# Patient Record
Sex: MALE | Race: WHITE | NOT HISPANIC OR LATINO
[De-identification: names, ages, dates, MRNs, and addresses within clinical notes are randomized per-mention and may not be internally consistent; named-entity substitution may affect disease eponyms.]

---

## 2017-01-30 ENCOUNTER — RX RENEWAL (OUTPATIENT)
Age: 72
End: 2017-01-30

## 2017-03-16 ENCOUNTER — OUTPATIENT (OUTPATIENT)
Dept: OUTPATIENT SERVICES | Facility: HOSPITAL | Age: 72
LOS: 1 days | End: 2017-03-16
Payer: MEDICARE

## 2017-03-16 ENCOUNTER — TRANSCRIPTION ENCOUNTER (OUTPATIENT)
Age: 72
End: 2017-03-16

## 2017-03-16 DIAGNOSIS — M54.16 RADICULOPATHY, LUMBAR REGION: ICD-10-CM

## 2017-03-16 PROCEDURE — 76000 FLUOROSCOPY <1 HR PHYS/QHP: CPT

## 2017-03-16 PROCEDURE — 64483 NJX AA&/STRD TFRM EPI L/S 1: CPT | Mod: RT

## 2017-03-16 PROCEDURE — 64484 NJX AA&/STRD TFRM EPI L/S EA: CPT | Mod: RT

## 2017-04-18 ENCOUNTER — APPOINTMENT (OUTPATIENT)
Dept: PULMONOLOGY | Facility: CLINIC | Age: 72
End: 2017-04-18

## 2017-04-18 VITALS — SYSTOLIC BLOOD PRESSURE: 120 MMHG | OXYGEN SATURATION: 95 % | HEART RATE: 79 BPM | DIASTOLIC BLOOD PRESSURE: 70 MMHG

## 2017-04-18 VITALS — BODY MASS INDEX: 24.07 KG/M2 | WEIGHT: 163 LBS

## 2017-05-12 ENCOUNTER — OUTPATIENT (OUTPATIENT)
Dept: OUTPATIENT SERVICES | Facility: HOSPITAL | Age: 72
LOS: 1 days | End: 2017-05-12
Payer: MEDICARE

## 2017-05-12 ENCOUNTER — TRANSCRIPTION ENCOUNTER (OUTPATIENT)
Age: 72
End: 2017-05-12

## 2017-05-12 DIAGNOSIS — M54.16 RADICULOPATHY, LUMBAR REGION: ICD-10-CM

## 2017-05-30 ENCOUNTER — TRANSCRIPTION ENCOUNTER (OUTPATIENT)
Age: 72
End: 2017-05-30

## 2017-05-30 ENCOUNTER — OUTPATIENT (OUTPATIENT)
Dept: OUTPATIENT SERVICES | Facility: HOSPITAL | Age: 72
LOS: 1 days | End: 2017-05-30
Payer: MEDICARE

## 2017-05-30 DIAGNOSIS — M54.16 RADICULOPATHY, LUMBAR REGION: ICD-10-CM

## 2017-05-30 PROCEDURE — 64494 INJ PARAVERT F JNT L/S 2 LEV: CPT | Mod: LT

## 2017-05-30 PROCEDURE — 76000 FLUOROSCOPY <1 HR PHYS/QHP: CPT

## 2017-05-30 PROCEDURE — 64493 INJ PARAVERT F JNT L/S 1 LEV: CPT | Mod: LT

## 2017-06-16 PROCEDURE — 76000 FLUOROSCOPY <1 HR PHYS/QHP: CPT

## 2017-06-16 PROCEDURE — 64483 NJX AA&/STRD TFRM EPI L/S 1: CPT | Mod: RT

## 2017-06-16 PROCEDURE — 64484 NJX AA&/STRD TFRM EPI L/S EA: CPT | Mod: RT

## 2017-08-19 ENCOUNTER — OUTPATIENT (OUTPATIENT)
Dept: OUTPATIENT SERVICES | Facility: HOSPITAL | Age: 72
LOS: 1 days | End: 2017-08-19

## 2017-10-27 ENCOUNTER — APPOINTMENT (OUTPATIENT)
Dept: PULMONOLOGY | Facility: CLINIC | Age: 72
End: 2017-10-27
Payer: MEDICARE

## 2017-10-27 VITALS — BODY MASS INDEX: 22.3 KG/M2 | WEIGHT: 151 LBS

## 2017-10-27 VITALS — HEART RATE: 93 BPM | SYSTOLIC BLOOD PRESSURE: 130 MMHG | OXYGEN SATURATION: 98 % | DIASTOLIC BLOOD PRESSURE: 80 MMHG

## 2017-10-27 PROCEDURE — 94010 BREATHING CAPACITY TEST: CPT

## 2017-10-27 PROCEDURE — 99215 OFFICE O/P EST HI 40 MIN: CPT | Mod: 25

## 2017-11-27 ENCOUNTER — APPOINTMENT (OUTPATIENT)
Dept: PULMONOLOGY | Facility: CLINIC | Age: 72
End: 2017-11-27
Payer: MEDICARE

## 2017-11-27 VITALS
RESPIRATION RATE: 14 BRPM | OXYGEN SATURATION: 94 % | WEIGHT: 151 LBS | HEIGHT: 69 IN | BODY MASS INDEX: 22.36 KG/M2 | TEMPERATURE: 98.4 F | HEART RATE: 99 BPM | DIASTOLIC BLOOD PRESSURE: 80 MMHG | SYSTOLIC BLOOD PRESSURE: 120 MMHG

## 2017-11-27 PROCEDURE — 99215 OFFICE O/P EST HI 40 MIN: CPT

## 2017-11-27 RX ORDER — DIAZEPAM 5 MG/1
5 TABLET ORAL
Qty: 3 | Refills: 0 | Status: DISCONTINUED | COMMUNITY
Start: 2017-06-08

## 2017-12-05 ENCOUNTER — APPOINTMENT (OUTPATIENT)
Dept: VASCULAR SURGERY | Facility: CLINIC | Age: 72
End: 2017-12-05
Payer: MEDICARE

## 2017-12-05 VITALS
DIASTOLIC BLOOD PRESSURE: 84 MMHG | HEIGHT: 69 IN | SYSTOLIC BLOOD PRESSURE: 137 MMHG | TEMPERATURE: 98.1 F | OXYGEN SATURATION: 95 % | HEART RATE: 92 BPM | RESPIRATION RATE: 14 BRPM

## 2017-12-05 DIAGNOSIS — R42 DIZZINESS AND GIDDINESS: ICD-10-CM

## 2017-12-05 PROCEDURE — 99214 OFFICE O/P EST MOD 30 MIN: CPT | Mod: 25

## 2017-12-05 PROCEDURE — 93880 EXTRACRANIAL BILAT STUDY: CPT

## 2018-01-10 ENCOUNTER — RX RENEWAL (OUTPATIENT)
Age: 73
End: 2018-01-10

## 2018-03-06 ENCOUNTER — APPOINTMENT (OUTPATIENT)
Dept: VASCULAR SURGERY | Facility: CLINIC | Age: 73
End: 2018-03-06
Payer: MEDICARE

## 2018-03-06 VITALS
OXYGEN SATURATION: 95 % | DIASTOLIC BLOOD PRESSURE: 78 MMHG | WEIGHT: 164 LBS | BODY MASS INDEX: 24.29 KG/M2 | SYSTOLIC BLOOD PRESSURE: 133 MMHG | RESPIRATION RATE: 14 BRPM | HEART RATE: 100 BPM | TEMPERATURE: 97.8 F | HEIGHT: 69 IN

## 2018-03-06 DIAGNOSIS — M79.605 PAIN IN LEFT LEG: ICD-10-CM

## 2018-03-06 PROCEDURE — 99214 OFFICE O/P EST MOD 30 MIN: CPT

## 2018-04-26 ENCOUNTER — APPOINTMENT (OUTPATIENT)
Dept: PULMONOLOGY | Facility: CLINIC | Age: 73
End: 2018-04-26
Payer: MEDICARE

## 2018-04-26 VITALS — OXYGEN SATURATION: 94 % | HEART RATE: 75 BPM

## 2018-04-26 VITALS — SYSTOLIC BLOOD PRESSURE: 120 MMHG | WEIGHT: 156 LBS | DIASTOLIC BLOOD PRESSURE: 80 MMHG | BODY MASS INDEX: 23.04 KG/M2

## 2018-04-26 VITALS — BODY MASS INDEX: 22.89 KG/M2 | WEIGHT: 155 LBS

## 2018-04-26 PROCEDURE — 99215 OFFICE O/P EST HI 40 MIN: CPT | Mod: 25

## 2018-04-26 PROCEDURE — 94060 EVALUATION OF WHEEZING: CPT

## 2018-04-26 PROCEDURE — 94664 DEMO&/EVAL PT USE INHALER: CPT | Mod: 59

## 2018-04-26 RX ORDER — PREDNISONE 10 MG/1
10 TABLET ORAL
Qty: 30 | Refills: 6 | Status: DISCONTINUED | COMMUNITY
Start: 2017-11-27 | End: 2018-04-26

## 2018-04-27 ENCOUNTER — RX RENEWAL (OUTPATIENT)
Age: 73
End: 2018-04-27

## 2018-05-07 ENCOUNTER — RECORD ABSTRACTING (OUTPATIENT)
Age: 73
End: 2018-05-07

## 2018-05-07 DIAGNOSIS — Z95.5 PRESENCE OF CORONARY ANGIOPLASTY IMPLANT AND GRAFT: ICD-10-CM

## 2018-05-11 ENCOUNTER — APPOINTMENT (OUTPATIENT)
Dept: CARDIOLOGY | Facility: CLINIC | Age: 73
End: 2018-05-11
Payer: MEDICARE

## 2018-05-11 VITALS
DIASTOLIC BLOOD PRESSURE: 70 MMHG | BODY MASS INDEX: 22.81 KG/M2 | SYSTOLIC BLOOD PRESSURE: 110 MMHG | RESPIRATION RATE: 14 BRPM | HEART RATE: 74 BPM | WEIGHT: 154 LBS | HEIGHT: 69 IN

## 2018-05-11 PROCEDURE — 93000 ELECTROCARDIOGRAM COMPLETE: CPT

## 2018-05-11 PROCEDURE — 99214 OFFICE O/P EST MOD 30 MIN: CPT

## 2018-05-11 RX ORDER — METOPROLOL TARTRATE 25 MG/1
25 TABLET, FILM COATED ORAL
Qty: 30 | Refills: 0 | Status: DISCONTINUED | COMMUNITY
Start: 2017-08-25 | End: 2018-05-11

## 2018-05-11 RX ORDER — CEFPODOXIME PROXETIL 200 MG/1
200 TABLET, FILM COATED ORAL
Qty: 20 | Refills: 0 | Status: DISCONTINUED | COMMUNITY
Start: 2017-11-27 | End: 2018-05-11

## 2018-05-11 RX ORDER — CIPROFLOXACIN HYDROCHLORIDE 500 MG/1
500 TABLET, FILM COATED ORAL
Qty: 14 | Refills: 0 | Status: DISCONTINUED | COMMUNITY
Start: 2017-08-19 | End: 2018-05-11

## 2018-05-11 RX ORDER — TIZANIDINE 2 MG/1
2 TABLET ORAL
Qty: 60 | Refills: 0 | Status: DISCONTINUED | COMMUNITY
Start: 2017-06-29 | End: 2018-05-11

## 2018-05-11 RX ORDER — HYDROCODONE POLISTIREX AND CHLORPHENIRAMINE POLISTIREX 10; 8 MG/5ML; MG/5ML
10-8 SUSPENSION, EXTENDED RELEASE ORAL
Qty: 115 | Refills: 0 | Status: COMPLETED | COMMUNITY
Start: 2018-01-19

## 2018-05-11 RX ORDER — TIOTROPIUM BROMIDE AND OLODATEROL 3.124; 2.736 UG/1; UG/1
2.5-2.5 SPRAY, METERED RESPIRATORY (INHALATION) DAILY
Qty: 1 | Refills: 5 | Status: DISCONTINUED | COMMUNITY
Start: 2018-04-26 | End: 2018-05-11

## 2018-05-11 RX ORDER — MECLIZINE HYDROCHLORIDE 25 MG/1
25 TABLET ORAL
Qty: 21 | Refills: 0 | Status: DISCONTINUED | COMMUNITY
Start: 2017-12-05 | End: 2018-05-11

## 2018-05-11 RX ORDER — METRONIDAZOLE 500 MG/1
500 TABLET ORAL
Qty: 21 | Refills: 0 | Status: DISCONTINUED | COMMUNITY
Start: 2017-08-19 | End: 2018-05-11

## 2018-05-11 RX ORDER — CYCLOBENZAPRINE HYDROCHLORIDE 10 MG/1
10 TABLET, FILM COATED ORAL
Qty: 30 | Refills: 0 | Status: DISCONTINUED | COMMUNITY
Start: 2017-05-25 | End: 2018-05-11

## 2018-05-11 RX ORDER — PREDNISONE 10 MG/1
10 TABLET ORAL
Qty: 100 | Refills: 0 | Status: DISCONTINUED | COMMUNITY
Start: 2018-04-26 | End: 2018-05-11

## 2018-06-07 ENCOUNTER — RX RENEWAL (OUTPATIENT)
Age: 73
End: 2018-06-07

## 2018-06-07 ENCOUNTER — MEDICATION RENEWAL (OUTPATIENT)
Age: 73
End: 2018-06-07

## 2018-06-12 ENCOUNTER — APPOINTMENT (OUTPATIENT)
Dept: PULMONOLOGY | Facility: CLINIC | Age: 73
End: 2018-06-12
Payer: MEDICARE

## 2018-06-12 VITALS — OXYGEN SATURATION: 94 % | HEART RATE: 78 BPM

## 2018-06-12 VITALS — DIASTOLIC BLOOD PRESSURE: 60 MMHG | SYSTOLIC BLOOD PRESSURE: 120 MMHG

## 2018-06-12 VITALS — BODY MASS INDEX: 23.18 KG/M2 | WEIGHT: 157 LBS

## 2018-06-12 PROCEDURE — 99214 OFFICE O/P EST MOD 30 MIN: CPT | Mod: 25

## 2018-06-12 PROCEDURE — 94010 BREATHING CAPACITY TEST: CPT

## 2018-08-02 ENCOUNTER — APPOINTMENT (OUTPATIENT)
Dept: PULMONOLOGY | Facility: CLINIC | Age: 73
End: 2018-08-02
Payer: MEDICARE

## 2018-08-02 VITALS
HEIGHT: 69 IN | BODY MASS INDEX: 23.25 KG/M2 | WEIGHT: 157 LBS | HEART RATE: 92 BPM | OXYGEN SATURATION: 98 % | RESPIRATION RATE: 14 BRPM | SYSTOLIC BLOOD PRESSURE: 110 MMHG | DIASTOLIC BLOOD PRESSURE: 60 MMHG

## 2018-08-02 PROCEDURE — 99214 OFFICE O/P EST MOD 30 MIN: CPT

## 2018-09-04 ENCOUNTER — APPOINTMENT (OUTPATIENT)
Dept: VASCULAR SURGERY | Facility: CLINIC | Age: 73
End: 2018-09-04

## 2018-09-12 LAB
25(OH)D3 SERPL-MCNC: 40.2 NG/ML
ALBUMIN SERPL ELPH-MCNC: 4.7 G/DL
ALP BLD-CCNC: 96 U/L
ALT SERPL-CCNC: 28 U/L
ANION GAP SERPL CALC-SCNC: 18 MMOL/L
AST SERPL-CCNC: 25 U/L
BASOPHILS # BLD AUTO: 0.07 K/UL
BASOPHILS NFR BLD AUTO: 1.4 %
BILIRUB SERPL-MCNC: 0.7 MG/DL
BUN SERPL-MCNC: 9 MG/DL
CALCIUM SERPL-MCNC: 10.2 MG/DL
CHLORIDE SERPL-SCNC: 100 MMOL/L
CHOLEST SERPL-MCNC: 161 MG/DL
CHOLEST/HDLC SERPL: 2.2 RATIO
CO2 SERPL-SCNC: 24 MMOL/L
CREAT SERPL-MCNC: 0.69 MG/DL
EOSINOPHIL # BLD AUTO: 0.42 K/UL
EOSINOPHIL NFR BLD AUTO: 8.7 %
GLUCOSE SERPL-MCNC: 119 MG/DL
HBA1C MFR BLD HPLC: 5.9 %
HCT VFR BLD CALC: 38.6 %
HDLC SERPL-MCNC: 72 MG/DL
HGB BLD-MCNC: 12.9 G/DL
IMM GRANULOCYTES NFR BLD AUTO: 0.2 %
LDLC SERPL CALC-MCNC: 74 MG/DL
LYMPHOCYTES # BLD AUTO: 1.51 K/UL
LYMPHOCYTES NFR BLD AUTO: 31.1 %
MAN DIFF?: NORMAL
MCHC RBC-ENTMCNC: 30 PG
MCHC RBC-ENTMCNC: 33.4 GM/DL
MCV RBC AUTO: 89.8 FL
MONOCYTES # BLD AUTO: 0.44 K/UL
MONOCYTES NFR BLD AUTO: 9.1 %
NEUTROPHILS # BLD AUTO: 2.4 K/UL
NEUTROPHILS NFR BLD AUTO: 49.5 %
PLATELET # BLD AUTO: 295 K/UL
POTASSIUM SERPL-SCNC: 5.5 MMOL/L
PROT SERPL-MCNC: 7.2 G/DL
PSA SERPL-MCNC: 1.54 NG/ML
RBC # BLD: 4.3 M/UL
RBC # FLD: 15.1 %
SODIUM SERPL-SCNC: 142 MMOL/L
TRIGL SERPL-MCNC: 76 MG/DL
TSH SERPL-ACNC: 1 UIU/ML
WBC # FLD AUTO: 4.85 K/UL

## 2018-09-28 ENCOUNTER — APPOINTMENT (OUTPATIENT)
Dept: CARDIOLOGY | Facility: CLINIC | Age: 73
End: 2018-09-28
Payer: MEDICARE

## 2018-09-28 PROCEDURE — 93306 TTE W/DOPPLER COMPLETE: CPT

## 2018-10-23 ENCOUNTER — APPOINTMENT (OUTPATIENT)
Dept: CARDIOLOGY | Facility: CLINIC | Age: 73
End: 2018-10-23
Payer: MEDICARE

## 2018-10-23 VITALS
SYSTOLIC BLOOD PRESSURE: 108 MMHG | HEART RATE: 76 BPM | WEIGHT: 148 LBS | HEIGHT: 69 IN | RESPIRATION RATE: 14 BRPM | BODY MASS INDEX: 21.92 KG/M2 | DIASTOLIC BLOOD PRESSURE: 70 MMHG

## 2018-10-23 PROCEDURE — 99214 OFFICE O/P EST MOD 30 MIN: CPT

## 2018-10-23 PROCEDURE — 93000 ELECTROCARDIOGRAM COMPLETE: CPT

## 2018-10-23 RX ORDER — PANTOPRAZOLE 40 MG/1
40 TABLET, DELAYED RELEASE ORAL DAILY
Refills: 0 | Status: DISCONTINUED | COMMUNITY
Start: 2017-09-21 | End: 2018-10-23

## 2018-10-26 ENCOUNTER — OUTPATIENT (OUTPATIENT)
Dept: OUTPATIENT SERVICES | Facility: HOSPITAL | Age: 73
LOS: 1 days | End: 2018-10-26
Payer: MEDICARE

## 2018-10-26 DIAGNOSIS — Z01.818 ENCOUNTER FOR OTHER PREPROCEDURAL EXAMINATION: ICD-10-CM

## 2018-10-26 LAB
ANION GAP SERPL CALC-SCNC: 10 MMOL/L — SIGNIFICANT CHANGE UP (ref 5–17)
APTT BLD: 29.1 SEC — SIGNIFICANT CHANGE UP (ref 27.5–37.4)
BASOPHILS # BLD AUTO: 0 K/UL — SIGNIFICANT CHANGE UP (ref 0–0.2)
BASOPHILS NFR BLD AUTO: 0.7 % — SIGNIFICANT CHANGE UP (ref 0–2)
BUN SERPL-MCNC: 14 MG/DL — SIGNIFICANT CHANGE UP (ref 8–20)
CALCIUM SERPL-MCNC: 9.7 MG/DL — SIGNIFICANT CHANGE UP (ref 8.6–10.2)
CHLORIDE SERPL-SCNC: 102 MMOL/L — SIGNIFICANT CHANGE UP (ref 98–107)
CO2 SERPL-SCNC: 28 MMOL/L — SIGNIFICANT CHANGE UP (ref 22–29)
CREAT SERPL-MCNC: 0.57 MG/DL — SIGNIFICANT CHANGE UP (ref 0.5–1.3)
EOSINOPHIL # BLD AUTO: 0.4 K/UL — SIGNIFICANT CHANGE UP (ref 0–0.5)
EOSINOPHIL NFR BLD AUTO: 6.8 % — HIGH (ref 0–5)
GLUCOSE SERPL-MCNC: 133 MG/DL — HIGH (ref 70–115)
HCT VFR BLD CALC: 36.5 % — LOW (ref 42–52)
HGB BLD-MCNC: 11.9 G/DL — LOW (ref 14–18)
INR BLD: 0.95 RATIO — SIGNIFICANT CHANGE UP (ref 0.88–1.16)
LYMPHOCYTES # BLD AUTO: 1.1 K/UL — SIGNIFICANT CHANGE UP (ref 1–4.8)
LYMPHOCYTES # BLD AUTO: 20.3 % — SIGNIFICANT CHANGE UP (ref 20–55)
MCHC RBC-ENTMCNC: 29.9 PG — SIGNIFICANT CHANGE UP (ref 27–31)
MCHC RBC-ENTMCNC: 32.6 G/DL — SIGNIFICANT CHANGE UP (ref 32–36)
MCV RBC AUTO: 91.7 FL — SIGNIFICANT CHANGE UP (ref 80–94)
MONOCYTES # BLD AUTO: 0.5 K/UL — SIGNIFICANT CHANGE UP (ref 0–0.8)
MONOCYTES NFR BLD AUTO: 9.1 % — SIGNIFICANT CHANGE UP (ref 3–10)
NEUTROPHILS # BLD AUTO: 3.5 K/UL — SIGNIFICANT CHANGE UP (ref 1.8–8)
NEUTROPHILS NFR BLD AUTO: 62.9 % — SIGNIFICANT CHANGE UP (ref 37–73)
PLATELET # BLD AUTO: 258 K/UL — SIGNIFICANT CHANGE UP (ref 150–400)
POTASSIUM SERPL-MCNC: 5.1 MMOL/L — SIGNIFICANT CHANGE UP (ref 3.5–5.3)
POTASSIUM SERPL-SCNC: 5.1 MMOL/L — SIGNIFICANT CHANGE UP (ref 3.5–5.3)
PROTHROM AB SERPL-ACNC: 10.4 SEC — SIGNIFICANT CHANGE UP (ref 9.8–12.7)
RBC # BLD: 3.98 M/UL — LOW (ref 4.6–6.2)
RBC # FLD: 14.5 % — SIGNIFICANT CHANGE UP (ref 11–15.6)
SODIUM SERPL-SCNC: 140 MMOL/L — SIGNIFICANT CHANGE UP (ref 135–145)
WBC # BLD: 5.6 K/UL — SIGNIFICANT CHANGE UP (ref 4.8–10.8)
WBC # FLD AUTO: 5.6 K/UL — SIGNIFICANT CHANGE UP (ref 4.8–10.8)

## 2018-10-26 PROCEDURE — 85730 THROMBOPLASTIN TIME PARTIAL: CPT

## 2018-10-26 PROCEDURE — G0463: CPT

## 2018-10-26 PROCEDURE — 71046 X-RAY EXAM CHEST 2 VIEWS: CPT

## 2018-10-26 PROCEDURE — 93005 ELECTROCARDIOGRAM TRACING: CPT

## 2018-10-26 PROCEDURE — 71046 X-RAY EXAM CHEST 2 VIEWS: CPT | Mod: 26

## 2018-10-26 PROCEDURE — 93010 ELECTROCARDIOGRAM REPORT: CPT

## 2018-10-26 PROCEDURE — 85027 COMPLETE CBC AUTOMATED: CPT

## 2018-10-26 PROCEDURE — 85610 PROTHROMBIN TIME: CPT

## 2018-10-26 PROCEDURE — 80048 BASIC METABOLIC PNL TOTAL CA: CPT

## 2018-10-26 PROCEDURE — 36415 COLL VENOUS BLD VENIPUNCTURE: CPT

## 2018-11-06 ENCOUNTER — RX RENEWAL (OUTPATIENT)
Age: 73
End: 2018-11-06

## 2018-11-07 ENCOUNTER — MEDICATION RENEWAL (OUTPATIENT)
Age: 73
End: 2018-11-07

## 2018-12-05 ENCOUNTER — RESULT REVIEW (OUTPATIENT)
Age: 73
End: 2018-12-05

## 2018-12-05 ENCOUNTER — APPOINTMENT (OUTPATIENT)
Dept: PULMONOLOGY | Facility: CLINIC | Age: 73
End: 2018-12-05
Payer: MEDICARE

## 2018-12-05 VITALS
HEART RATE: 78 BPM | OXYGEN SATURATION: 98 % | RESPIRATION RATE: 16 BRPM | DIASTOLIC BLOOD PRESSURE: 80 MMHG | SYSTOLIC BLOOD PRESSURE: 128 MMHG

## 2018-12-05 VITALS — WEIGHT: 155 LBS | BODY MASS INDEX: 22.89 KG/M2

## 2018-12-05 LAB
ANION GAP SERPL CALC-SCNC: 10 MMOL/L
BUN SERPL-MCNC: 10 MG/DL
CALCIUM SERPL-MCNC: 9.8 MG/DL
CHLORIDE SERPL-SCNC: 102 MMOL/L
CO2 SERPL-SCNC: 28 MMOL/L
CREAT SERPL-MCNC: 0.65 MG/DL
GLUCOSE SERPL-MCNC: 87 MG/DL
POTASSIUM SERPL-SCNC: 5 MMOL/L
SODIUM SERPL-SCNC: 140 MMOL/L

## 2018-12-05 PROCEDURE — 99215 OFFICE O/P EST HI 40 MIN: CPT | Mod: 25

## 2018-12-05 PROCEDURE — 94010 BREATHING CAPACITY TEST: CPT

## 2019-01-10 ENCOUNTER — RX RENEWAL (OUTPATIENT)
Age: 74
End: 2019-01-10

## 2019-01-21 ENCOUNTER — TRANSCRIPTION ENCOUNTER (OUTPATIENT)
Age: 74
End: 2019-01-21

## 2019-01-22 ENCOUNTER — OUTPATIENT (OUTPATIENT)
Dept: OUTPATIENT SERVICES | Facility: HOSPITAL | Age: 74
LOS: 1 days | End: 2019-01-22
Payer: MEDICARE

## 2019-01-22 DIAGNOSIS — M54.16 RADICULOPATHY, LUMBAR REGION: ICD-10-CM

## 2019-01-22 PROCEDURE — 64483 NJX AA&/STRD TFRM EPI L/S 1: CPT | Mod: LT

## 2019-01-22 PROCEDURE — 64484 NJX AA&/STRD TFRM EPI L/S EA: CPT

## 2019-01-22 PROCEDURE — 76000 FLUOROSCOPY <1 HR PHYS/QHP: CPT

## 2019-02-06 ENCOUNTER — FORM ENCOUNTER (OUTPATIENT)
Age: 74
End: 2019-02-06

## 2019-02-07 ENCOUNTER — APPOINTMENT (OUTPATIENT)
Dept: CT IMAGING | Facility: CLINIC | Age: 74
End: 2019-02-07
Payer: MEDICARE

## 2019-02-07 ENCOUNTER — OUTPATIENT (OUTPATIENT)
Dept: OUTPATIENT SERVICES | Facility: HOSPITAL | Age: 74
LOS: 1 days | End: 2019-02-07
Payer: MEDICARE

## 2019-02-07 DIAGNOSIS — R91.8 OTHER NONSPECIFIC ABNORMAL FINDING OF LUNG FIELD: ICD-10-CM

## 2019-02-07 PROCEDURE — 71250 CT THORAX DX C-: CPT | Mod: 26

## 2019-02-07 PROCEDURE — 71250 CT THORAX DX C-: CPT

## 2019-02-19 ENCOUNTER — MEDICATION RENEWAL (OUTPATIENT)
Age: 74
End: 2019-02-19

## 2019-02-19 ENCOUNTER — RX RENEWAL (OUTPATIENT)
Age: 74
End: 2019-02-19

## 2019-03-11 ENCOUNTER — RX RENEWAL (OUTPATIENT)
Age: 74
End: 2019-03-11

## 2019-04-02 ENCOUNTER — APPOINTMENT (OUTPATIENT)
Dept: PULMONOLOGY | Facility: CLINIC | Age: 74
End: 2019-04-02
Payer: MEDICARE

## 2019-04-02 VITALS — SYSTOLIC BLOOD PRESSURE: 128 MMHG | OXYGEN SATURATION: 98 % | DIASTOLIC BLOOD PRESSURE: 70 MMHG | HEART RATE: 86 BPM

## 2019-04-02 VITALS — BODY MASS INDEX: 25.74 KG/M2 | WEIGHT: 164 LBS | HEIGHT: 67 IN

## 2019-04-02 PROCEDURE — 99215 OFFICE O/P EST HI 40 MIN: CPT | Mod: 25

## 2019-04-02 PROCEDURE — 94010 BREATHING CAPACITY TEST: CPT

## 2019-04-02 NOTE — CONSULT LETTER
[Dear  ___] : Dear  [unfilled], [Consult Letter:] : I had the pleasure of evaluating your patient, [unfilled]. [Please see my note below.] : Please see my note below. [Sincerely,] : Sincerely, [FreeTextEntry3] : Epi Fuller MD FCCP\par Pulmonary/Critical Care/Sleep Medicine\par Department of Internal Medicine\par \par [facility] [employer]\par

## 2019-04-02 NOTE — DISCUSSION/SUMMARY
[COPD] : chronic obstructive pulmonary disease [Stable] : stable [Responding to Treatment] : responding to treatment [Stage II (Moderate)] : stage II (moderate) [None] : There are no changes in medication management [FreeTextEntry1] : Findings on CAT scan, most likely inflammatory in nature. Patient asymptomatic.\par \par Due to the patient's history of prior tobacco use they fulfill criteria for low dose, lung cancer screening. This method was described to the patient with criteria for greater than 30 pack years of smoking, over the age of 55, and screening for 15 years following cessation of tobacco use.\par  [de-identified] : Followup chest CT in February

## 2019-04-02 NOTE — PHYSICAL EXAM
[General Appearance - Well Developed] : well developed [Normal Appearance] : normal appearance [Well Groomed] : well groomed [General Appearance - Well Nourished] : well nourished [No Deformities] : no deformities [General Appearance - In No Acute Distress] : no acute distress [Normal Conjunctiva] : the conjunctiva exhibited no abnormalities [Eyelids - No Xanthelasma] : the eyelids demonstrated no xanthelasmas [Normal Oropharynx] : normal oropharynx [Neck Appearance] : the appearance of the neck was normal [Neck Cervical Mass (___cm)] : no neck mass was observed [Jugular Venous Distention Increased] : there was no jugular-venous distention [Thyroid Diffuse Enlargement] : the thyroid was not enlarged [Thyroid Nodule] : there were no palpable thyroid nodules [Heart Rate And Rhythm] : heart rate and rhythm were normal [Heart Sounds] : normal S1 and S2 [Murmurs] : no murmurs present [Respiration, Rhythm And Depth] : normal respiratory rhythm and effort [Exaggerated Use Of Accessory Muscles For Inspiration] : no accessory muscle use [Auscultation Breath Sounds / Voice Sounds] : lungs were clear to auscultation bilaterally [Abdomen Soft] : soft [Abdomen Tenderness] : non-tender [Abdomen Mass (___ Cm)] : no abdominal mass palpated [Abnormal Walk] : normal gait [Gait - Sufficient For Exercise Testing] : the gait was sufficient for exercise testing [Nail Clubbing] : no clubbing of the fingernails [Cyanosis, Localized] : no localized cyanosis [Petechial Hemorrhages (___cm)] : no petechial hemorrhages [Skin Color & Pigmentation] : normal skin color and pigmentation [] : no rash [No Venous Stasis] : no venous stasis [Skin Lesions] : no skin lesions [No Skin Ulcers] : no skin ulcer [No Xanthoma] : no  xanthoma was observed [Deep Tendon Reflexes (DTR)] : deep tendon reflexes were 2+ and symmetric [Sensation] : the sensory exam was normal to light touch and pinprick [No Focal Deficits] : no focal deficits

## 2019-04-02 NOTE — PROCEDURE
[FreeTextEntry1] : Spirometry stable with moderate obstruction\par \par \par EXAM: CT CHEST \par \par \par PROCEDURE DATE: 02/07/2019 \par \par \par \par INTERPRETATION: CT CHEST WITHOUT CONTRAST \par \par INDICATION: History of pulmonary nodules. \par \par TECHNIQUE: Unenhanced helical images were obtained of the chest. Coronal and \par sagittal images were reconstructed. Maximum intensity projection images were \par generated. \par \par COMPARISON: CT chest 7/20/2016. Chest radiograph 10/26/2018. \par \par FINDINGS: \par \par Tubes/Lines: The spinal stimulator is in place. \par \par Lungs And Airways: Emphysema. There are bilateral pulmonary nodules. The \par largest nodules measure: \par * A left upper lobe noncalcified nodule measures 3 mm (series 3 image 29). \par * A right middle lobe noncalcified nodule measures 3 mm (series 3 image \par 100). \par The remainder of the lungs are clear. The airways are unremarkable. \par \par Pleura: No pleural effusion or pneumothorax. \par \par Mediastinum: There are no enlarged chest lymph nodes. The visualized thyroid \par gland is unremarkable. The esophagus is unremarkable. \par \par Heart and Vasculature: The heart is normal in size. No pericardial effusion. \par Coronary calcifications. Minimal aortic valve calcifications. The aorta is \par ectatic. Atheromatous disease of the aorta. The main pulmonary artery is \par normal in caliber. \par \par Upper Abdomen: The upper abdomen is unremarkable. \par \par Bones And Soft Tissues: Degenerative change of the spine. Subtle deformity \par of the superior endplate of the T4 body. Slight anterior wedging of the T5 \par vertebral body. \par \par \par IMPRESSION: \par \par 1. No change in the pulmonary nodules since 7/20/2016. \par 2. Emphysema. \par \par \par REYNA DUMONT M.D., ATTENDING RADIOLOGIST \par This document has been electronically signed. Feb 8 2019 11:46AM \par Chest CAT scan reviewed on PACS with the patient.\par

## 2019-04-02 NOTE — HISTORY OF PRESENT ILLNESS
[Doing Well] : doing well [None] : ~He/She~ has no comorbid illnesses [Difficulty Breathing During Exertion] : improved dyspnea on exertion [Feelings Of Weakness On Exertion] : improved exercise intolerance [Cough] : denies coughing [Coughing Up Sputum] : denies coughing up sputum [Wheezing] : denies wheezing [Regional Soft Tissue Swelling Both Lower Extremities] : denies lower extremity edema

## 2019-04-23 ENCOUNTER — APPOINTMENT (OUTPATIENT)
Dept: PULMONOLOGY | Facility: CLINIC | Age: 74
End: 2019-04-23

## 2019-04-23 ENCOUNTER — APPOINTMENT (OUTPATIENT)
Dept: CARDIOLOGY | Facility: CLINIC | Age: 74
End: 2019-04-23
Payer: MEDICARE

## 2019-04-23 ENCOUNTER — MEDICATION RENEWAL (OUTPATIENT)
Age: 74
End: 2019-04-23

## 2019-04-23 ENCOUNTER — NON-APPOINTMENT (OUTPATIENT)
Age: 74
End: 2019-04-23

## 2019-04-23 VITALS
DIASTOLIC BLOOD PRESSURE: 72 MMHG | HEART RATE: 71 BPM | HEIGHT: 67 IN | RESPIRATION RATE: 16 BRPM | BODY MASS INDEX: 25.9 KG/M2 | SYSTOLIC BLOOD PRESSURE: 140 MMHG | WEIGHT: 165 LBS

## 2019-04-23 PROCEDURE — 93000 ELECTROCARDIOGRAM COMPLETE: CPT

## 2019-04-23 PROCEDURE — 99214 OFFICE O/P EST MOD 30 MIN: CPT

## 2019-04-25 NOTE — HISTORY OF PRESENT ILLNESS
[FreeTextEntry1] : Mr. Henson presents today with complaints of intermittent retrosternal chest pain which he describes as a pressure-like sensation.  Chest pain occurs both with and without exertion.  He is status-post recent insertion of a spinal stimulator and is currently in need of additional nerve block therapy.

## 2019-04-25 NOTE — PHYSICAL EXAM
[General Appearance - Well Developed] : well developed [General Appearance - Well Nourished] : well nourished [General Appearance - In No Acute Distress] : no acute distress [Normal Oral Mucosa] : normal oral mucosa [Normal Conjunctiva] : the conjunctiva exhibited no abnormalities [Auscultation Breath Sounds / Voice Sounds] : lungs were clear to auscultation bilaterally [Heart Rate And Rhythm] : heart rate and rhythm were normal [Heart Sounds] : normal S1 and S2 [Bowel Sounds] : normal bowel sounds [Abnormal Walk] : normal gait [Skin Turgor] : normal skin turgor [Skin Color & Pigmentation] : normal skin color and pigmentation [Affect] : the affect was normal [Oriented To Time, Place, And Person] : oriented to person, place, and time [Mood] : the mood was normal [FreeTextEntry1] : No edema.

## 2019-04-25 NOTE — REASON FOR VISIT
[FreeTextEntry1] : Mr. Henson is a 73-year-old white male with a past medical history significant for hypertension, hyperlipidemia, coronary artery disease, and severe lumbosacral disc disease who presents for follow up evaluation.

## 2019-04-25 NOTE — ASSESSMENT
[FreeTextEntry1] : 1.  EKG today reveals normal sinus rhythm at 71 bpm.  Normal intervals.  No evidence of ischemia.  \par 2.  Chest pain:  Given patient’s underlying cardiac history and need for additional pain management therapy under anesthesia, I have advised Mr. Henson to undergo a nuclear stress test as soon as possible.  Echocardiography performed last fall revealed normal left ventricular function.  No unnecessary exertion until further notice.   \par

## 2019-05-20 ENCOUNTER — APPOINTMENT (OUTPATIENT)
Dept: CARDIOLOGY | Facility: CLINIC | Age: 74
End: 2019-05-20
Payer: MEDICARE

## 2019-05-20 PROCEDURE — 78452 HT MUSCLE IMAGE SPECT MULT: CPT

## 2019-05-20 PROCEDURE — A9500: CPT

## 2019-05-20 PROCEDURE — 93015 CV STRESS TEST SUPVJ I&R: CPT

## 2019-05-24 ENCOUNTER — NON-APPOINTMENT (OUTPATIENT)
Age: 74
End: 2019-05-24

## 2019-05-24 ENCOUNTER — APPOINTMENT (OUTPATIENT)
Dept: CARDIOLOGY | Facility: CLINIC | Age: 74
End: 2019-05-24
Payer: MEDICARE

## 2019-05-24 VITALS
HEART RATE: 63 BPM | BODY MASS INDEX: 26.21 KG/M2 | WEIGHT: 167 LBS | DIASTOLIC BLOOD PRESSURE: 82 MMHG | SYSTOLIC BLOOD PRESSURE: 149 MMHG | RESPIRATION RATE: 16 BRPM | HEIGHT: 67 IN

## 2019-05-24 PROCEDURE — 99214 OFFICE O/P EST MOD 30 MIN: CPT

## 2019-05-24 PROCEDURE — 93000 ELECTROCARDIOGRAM COMPLETE: CPT

## 2019-05-24 NOTE — PHYSICAL EXAM
[Normal Conjunctiva] : the conjunctiva exhibited no abnormalities [General Appearance - In No Acute Distress] : no acute distress [General Appearance - Well Developed] : well developed [Normal Oral Mucosa] : normal oral mucosa [Auscultation Breath Sounds / Voice Sounds] : lungs were clear to auscultation bilaterally [Heart Rate And Rhythm] : heart rate and rhythm were normal [Arterial Pulses Normal] : the arterial pulses were normal [Abnormal Walk] : normal gait [Bowel Sounds] : normal bowel sounds [Skin Color & Pigmentation] : normal skin color and pigmentation [Skin Turgor] : normal skin turgor [FreeTextEntry1] : No edema [Oriented To Time, Place, And Person] : oriented to person, place, and time [Affect] : the affect was normal [Mood] : the mood was normal

## 2019-05-24 NOTE — HISTORY OF PRESENT ILLNESS
[FreeTextEntry1] : Mr. Henson presents today for follow up after his recent nuclear stress test.  He continues to experience chest discomfort "pressure" and shortness of breath with exertion, as well as increasing fatigue.  These symptoms are very similar to those that he felt prior to his previous stents.  He also experienced chest discomfort while performing his nuclear stress test.

## 2019-05-24 NOTE — DISCUSSION/SUMMARY
[FreeTextEntry1] : Case and plan discussed with Dr. Fink.\par \par 1 - Hypertension:  blood pressure submaximally controlled on current medications.  Will increase lisinopril to 2.5mg BID.  Advised to follow low salt diet.\par \par 2 - Coronary artery disease status-post stenting:  patient with chest discomfort "pressure" and shortness of breath with exertion and Increasing fatigue.  Patient underwent nuclear stress test.\par - Normal Sestamibi myocardial perfusion SPECT imaging.\par - EF 70%.\par - Despite EKG changes, there were no signs of ischemia on perfusion imaging.\par - The patient developed back pain, shortness of breath, leg fatigue and chest pain during the stress test.  The symptoms resolved with rest.\par - The blood pressure response was normal.\par - The patient developed occasional PACs, PVCs, and 3 beat atrial tachycardia during exercise.\par - The patient's functional capacity was average.\par - There are no prior studies on this patient for comparison purposes.\par - Recommend clinical correlation with the above findings.\par \par Despite a negative nuclear stress test, and the patient's continuous symptoms, that he states are very similar to those experienced prior to his stents, we will schedule him for a cardiac catheterization at [facility] with Dr. Mueller.  Hopefully we can get him done early next week.\par \par 3 - Follow up with Dr. Fink after cardiac catheterization is completed.\par \par

## 2019-05-24 NOTE — REASON FOR VISIT
[FreeTextEntry1] : The patient is a 74 y.o. white male who presents today for the evaluation and management of hypertension, hyperlipidemia, coronary artery disease status-post stenting.  History of severe lumbosacral disc disease.

## 2019-05-31 ENCOUNTER — MEDICATION RENEWAL (OUTPATIENT)
Age: 74
End: 2019-05-31

## 2019-06-05 ENCOUNTER — RX RENEWAL (OUTPATIENT)
Age: 74
End: 2019-06-05

## 2019-06-07 ENCOUNTER — INPATIENT (INPATIENT)
Facility: HOSPITAL | Age: 74
LOS: 0 days | Discharge: HOME | DRG: 247 | End: 2019-06-08
Attending: INTERNAL MEDICINE | Admitting: INTERNAL MEDICINE
Payer: COMMERCIAL

## 2019-06-07 ENCOUNTER — TRANSCRIPTION ENCOUNTER (OUTPATIENT)
Age: 74
End: 2019-06-07

## 2019-06-07 VITALS
RESPIRATION RATE: 16 BRPM | DIASTOLIC BLOOD PRESSURE: 83 MMHG | TEMPERATURE: 98 F | HEART RATE: 76 BPM | SYSTOLIC BLOOD PRESSURE: 156 MMHG | OXYGEN SATURATION: 95 %

## 2019-06-07 DIAGNOSIS — Z98.49 CATARACT EXTRACTION STATUS, UNSPECIFIED EYE: Chronic | ICD-10-CM

## 2019-06-07 DIAGNOSIS — Z95.5 PRESENCE OF CORONARY ANGIOPLASTY IMPLANT AND GRAFT: Chronic | ICD-10-CM

## 2019-06-07 DIAGNOSIS — I25.10 ATHEROSCLEROTIC HEART DISEASE OF NATIVE CORONARY ARTERY WITHOUT ANGINA PECTORIS: ICD-10-CM

## 2019-06-07 DIAGNOSIS — R07.9 CHEST PAIN, UNSPECIFIED: ICD-10-CM

## 2019-06-07 LAB
ANION GAP SERPL CALC-SCNC: 13 MMOL/L — SIGNIFICANT CHANGE UP (ref 5–17)
APTT BLD: 29.7 SEC — SIGNIFICANT CHANGE UP (ref 27.5–36.3)
BUN SERPL-MCNC: 15 MG/DL — SIGNIFICANT CHANGE UP (ref 8–20)
CALCIUM SERPL-MCNC: 9.5 MG/DL — SIGNIFICANT CHANGE UP (ref 8.6–10.2)
CHLORIDE SERPL-SCNC: 104 MMOL/L — SIGNIFICANT CHANGE UP (ref 98–107)
CO2 SERPL-SCNC: 25 MMOL/L — SIGNIFICANT CHANGE UP (ref 22–29)
CREAT SERPL-MCNC: 0.57 MG/DL — SIGNIFICANT CHANGE UP (ref 0.5–1.3)
GLUCOSE SERPL-MCNC: 106 MG/DL — SIGNIFICANT CHANGE UP (ref 70–115)
HCT VFR BLD CALC: 37.8 % — LOW (ref 42–52)
HGB BLD-MCNC: 12.5 G/DL — LOW (ref 14–18)
INR BLD: 0.96 RATIO — SIGNIFICANT CHANGE UP (ref 0.88–1.16)
MCHC RBC-ENTMCNC: 30 PG — SIGNIFICANT CHANGE UP (ref 27–31)
MCHC RBC-ENTMCNC: 33.1 G/DL — SIGNIFICANT CHANGE UP (ref 32–36)
MCV RBC AUTO: 90.6 FL — SIGNIFICANT CHANGE UP (ref 80–94)
PLATELET # BLD AUTO: 281 K/UL — SIGNIFICANT CHANGE UP (ref 150–400)
POTASSIUM SERPL-MCNC: 4.4 MMOL/L — SIGNIFICANT CHANGE UP (ref 3.5–5.3)
POTASSIUM SERPL-SCNC: 4.4 MMOL/L — SIGNIFICANT CHANGE UP (ref 3.5–5.3)
PROTHROM AB SERPL-ACNC: 11 SEC — SIGNIFICANT CHANGE UP (ref 10–12.9)
RBC # BLD: 4.17 M/UL — LOW (ref 4.6–6.2)
RBC # FLD: 14.3 % — SIGNIFICANT CHANGE UP (ref 11–15.6)
SODIUM SERPL-SCNC: 142 MMOL/L — SIGNIFICANT CHANGE UP (ref 135–145)
WBC # BLD: 4.9 K/UL — SIGNIFICANT CHANGE UP (ref 4.8–10.8)
WBC # FLD AUTO: 4.9 K/UL — SIGNIFICANT CHANGE UP (ref 4.8–10.8)

## 2019-06-07 PROCEDURE — 99222 1ST HOSP IP/OBS MODERATE 55: CPT | Mod: 25

## 2019-06-07 RX ORDER — ASPIRIN/CALCIUM CARB/MAGNESIUM 324 MG
81 TABLET ORAL ONCE
Refills: 0 | Status: COMPLETED | OUTPATIENT
Start: 2019-06-07 | End: 2019-06-07

## 2019-06-07 RX ORDER — ALPRAZOLAM 0.25 MG
0.25 TABLET ORAL EVERY 6 HOURS
Refills: 0 | Status: DISCONTINUED | OUTPATIENT
Start: 2019-06-07 | End: 2019-06-08

## 2019-06-07 RX ORDER — LISINOPRIL 2.5 MG/1
5 TABLET ORAL DAILY
Refills: 0 | Status: DISCONTINUED | OUTPATIENT
Start: 2019-06-07 | End: 2019-06-08

## 2019-06-07 RX ORDER — ASPIRIN/CALCIUM CARB/MAGNESIUM 324 MG
81 TABLET ORAL DAILY
Refills: 0 | Status: DISCONTINUED | OUTPATIENT
Start: 2019-06-07 | End: 2019-06-08

## 2019-06-07 RX ORDER — FENTANYL CITRATE 50 UG/ML
25 INJECTION INTRAVENOUS ONCE
Refills: 0 | Status: DISCONTINUED | OUTPATIENT
Start: 2019-06-07 | End: 2019-06-07

## 2019-06-07 RX ORDER — METOPROLOL TARTRATE 50 MG
50 TABLET ORAL DAILY
Refills: 0 | Status: DISCONTINUED | OUTPATIENT
Start: 2019-06-07 | End: 2019-06-08

## 2019-06-07 RX ORDER — ATORVASTATIN CALCIUM 80 MG/1
40 TABLET, FILM COATED ORAL AT BEDTIME
Refills: 0 | Status: DISCONTINUED | OUTPATIENT
Start: 2019-06-07 | End: 2019-06-08

## 2019-06-07 RX ORDER — CLOPIDOGREL BISULFATE 75 MG/1
75 TABLET, FILM COATED ORAL DAILY
Refills: 0 | Status: DISCONTINUED | OUTPATIENT
Start: 2019-06-07 | End: 2019-06-08

## 2019-06-07 RX ORDER — GABAPENTIN 400 MG/1
300 CAPSULE ORAL THREE TIMES A DAY
Refills: 0 | Status: DISCONTINUED | OUTPATIENT
Start: 2019-06-07 | End: 2019-06-08

## 2019-06-07 RX ORDER — OXYCODONE AND ACETAMINOPHEN 5; 325 MG/1; MG/1
2 TABLET ORAL EVERY 6 HOURS
Refills: 0 | Status: DISCONTINUED | OUTPATIENT
Start: 2019-06-07 | End: 2019-06-08

## 2019-06-07 RX ADMIN — Medication 81 MILLIGRAM(S): at 13:42

## 2019-06-07 RX ADMIN — FENTANYL CITRATE 25 MICROGRAM(S): 50 INJECTION INTRAVENOUS at 13:15

## 2019-06-07 RX ADMIN — ATORVASTATIN CALCIUM 40 MILLIGRAM(S): 80 TABLET, FILM COATED ORAL at 21:38

## 2019-06-07 RX ADMIN — OXYCODONE AND ACETAMINOPHEN 2 TABLET(S): 5; 325 TABLET ORAL at 19:08

## 2019-06-07 RX ADMIN — OXYCODONE AND ACETAMINOPHEN 2 TABLET(S): 5; 325 TABLET ORAL at 17:48

## 2019-06-07 RX ADMIN — FENTANYL CITRATE 25 MICROGRAM(S): 50 INJECTION INTRAVENOUS at 13:30

## 2019-06-07 RX ADMIN — Medication 0.25 MILLIGRAM(S): at 21:38

## 2019-06-07 RX ADMIN — Medication 50 MILLIGRAM(S): at 21:40

## 2019-06-07 RX ADMIN — GABAPENTIN 300 MILLIGRAM(S): 400 CAPSULE ORAL at 21:38

## 2019-06-07 NOTE — DISCHARGE NOTE PROVIDER - HOSPITAL COURSE
74 year old male with h/o HTN, HLD, lumbosacral disc disease with nerve stimulator and CAD s/p RCA stent (per pt and done in [place]) who c/o retrosternal chest pain. Now S/P PCI with YAYA X2 RCA via right radial. 74 year old male with h/o HTN, HLD, lumbosacral disc disease with nerve stimulator and CAD s/p RCA stent (per pt and done in [place]) who c/o retrosternal chest pain. Now S/P PCI with YAYA X2 RCA via right radial.     No overnight events    Vital stable     Presents this am feeling well Reports no chest pain  or SOB     a/p CAD with stents to RCA via RRA     - ASA/ plavix reinforced     - Continue statin , BB and ACE-1     _ Procedure results medications and follow up d/w pt      -Wrist precautions    - Follow  up with Cardiologist 1 week

## 2019-06-07 NOTE — DISCHARGE NOTE PROVIDER - CARE PROVIDER_API CALL
Jassi Fink)  Cardiovascular Disease; Internal Medicine  [address] [place] [address]  Phone: [phone]  Fax: [phone]  Follow Up Time:

## 2019-06-07 NOTE — H&P PST ADULT - ASSESSMENT
74 year old male with h/o cad prior cardiac stent who presents with intermittent chest pain.  For [facility] 74 year old male with h/o cad prior cardiac stent who presents with intermittent chest pain.  For C    Bleeding risk: 1.9%

## 2019-06-07 NOTE — DISCHARGE NOTE PROVIDER - NSDCCPCAREPLAN_GEN_ALL_CORE_FT
PRINCIPAL DISCHARGE DIAGNOSIS  Diagnosis: CAD in native artery  Assessment and Plan of Treatment: s/p PCI

## 2019-06-07 NOTE — H&P PST ADULT - HISTORY OF PRESENT ILLNESS
74 year old male with h/o HTN, HLD, lumbosacral disc disease with nerve stimulator and CAD s/p RCA stent (per pt and done in [place]) who c/o retrosternal chest pain. For [facility] to assess coronary arteries. Besides CAD pt with on other risk factors.     EKG: NSR 63 bpm  YRN 9/2018: EF 60-65%, mild valvular disease  Stress test: 5/20/19: EF 70%, 1.0mm ST segment depression leads II, III and aVF and V4, V5, V6 during peak stress which was negative for ischemia, test was terminated d/t SOB, atypical chest pain.  Guajardo treadmill score of -8, moderate risk    PT takes Beta blocker, ace, plavix, statin

## 2019-06-07 NOTE — H&P PST ADULT - NSICDXPASTMEDICALHX_GEN_ALL_CORE_FT
PAST MEDICAL HISTORY:  Arthritis     CAD in native artery     Lumbosacral disc disease has nerve stimulator

## 2019-06-07 NOTE — PROGRESS NOTE ADULT - SUBJECTIVE AND OBJECTIVE BOX
Nurse Practitioner Progress note:     INTERVAL HISTORY :74 year old male with h/o HTN, HLD, lumbosacral disc disease with nerve stimulator and CAD s/p RCA stent (per pt and done in [place]) who c/o retrosternal chest pain. For [facility] to assess coronary arteries.       MEDICATIONS:  lisinopril 5 milliGRAM(s) Oral daily  metoprolol succinate ER 50 milliGRAM(s) Oral daily  fentaNYL    Injectable 25 MICROGram(s) IV Push once  gabapentin 300 milliGRAM(s) Oral three times a day  oxyCODONE    5 mG/acetaminophen 325 mG 2 Tablet(s) Oral every 6 hours PRN  atorvastatin 40 milliGRAM(s) Oral at bedtime  aspirin  chewable 81 milliGRAM(s) Oral daily  aspirin  chewable 81 milliGRAM(s) Oral once  clopidogrel Tablet 75 milliGRAM(s) Oral daily      TELEMETRY: NSR    T(C): 36.7 (06-07-19 @ 09:38), Max: 36.7 (06-07-19 @ 09:38)  HR: 76 (06-07-19 @ 09:38) (76 - 76)  BP: 156/83 (06-07-19 @ 09:38) (156/83 - 156/83)  RR: 16 (06-07-19 @ 09:38) (16 - 16)  SpO2: 95% (06-07-19 @ 09:38) (95% - 95%)  Wt(kg): --    PHYSICAL EXAM:  Appearance: Normal	  Cardiovascular: Normal S1 S2, No JVD, No murmurs, No edema  Respiratory: Lungs clear to auscultation	  Psychiatry: A & O x 3, Mood & affect appropriate  Neurologic: Non-focal, A&O X3.  No neuro deficits  Procedure Site: Right radial band in place.  Site benign.  No bleeding/hematoma/ecchymosis.  + palp radial pulse    12 lead EKG:  	    PROCEDURE RESULTS: S/P PCI with YAYA X2 to RCA via right radial    ASSESSMENT/PLAN: 	  -radial precautions  -D/C radial band in 2 hours  -Resume home meds  -Initiate ASA  -Follow up with Dr. Santoyo  -Check labs/EKG/site check in AM  -Probable discharge in AM Nurse Practitioner Progress note:     INTERVAL HISTORY :74 year old male with h/o HTN, HLD, lumbosacral disc disease with nerve stimulator and CAD s/p RCA stent (per pt and done in [place]) who c/o retrosternal chest pain. For [facility] to assess coronary arteries.       MEDICATIONS:  lisinopril 5 milliGRAM(s) Oral daily  metoprolol succinate ER 50 milliGRAM(s) Oral daily  fentaNYL    Injectable 25 MICROGram(s) IV Push once  gabapentin 300 milliGRAM(s) Oral three times a day  oxyCODONE    5 mG/acetaminophen 325 mG 2 Tablet(s) Oral every 6 hours PRN  atorvastatin 40 milliGRAM(s) Oral at bedtime  aspirin  chewable 81 milliGRAM(s) Oral daily  aspirin  chewable 81 milliGRAM(s) Oral once  clopidogrel Tablet 75 milliGRAM(s) Oral daily      TELEMETRY: NSR    T(C): 36.7 (06-07-19 @ 09:38), Max: 36.7 (06-07-19 @ 09:38)  HR: 76 (06-07-19 @ 09:38) (76 - 76)  BP: 156/83 (06-07-19 @ 09:38) (156/83 - 156/83)  RR: 16 (06-07-19 @ 09:38) (16 - 16)  SpO2: 95% (06-07-19 @ 09:38) (95% - 95%)  Wt(kg): --    PHYSICAL EXAM:  Appearance: Normal	  Cardiovascular: Normal S1 S2, No JVD, No murmurs, No edema  Respiratory: Lungs clear to auscultation	  Psychiatry: A & O x 3, Mood & affect appropriate  Neurologic: Non-focal, A&O X3.  No neuro deficits  Procedure Site: Right radial band in place.  Site benign.  No bleeding/hematoma/ecchymosis.  + palp radial pulse    12 lead EKG:  	    PROCEDURE RESULTS: S/P PCI with YAYA X2 to RCA via right radial    ASSESSMENT/PLAN: 	  -Admit to telemetry d/t long lesion and hypotension during cath  -radial precautions  -D/C radial band in 2 hours  -Resume home meds  -Initiate ASA  -Follow up with Dr. Santoyo  -Check labs/EKG/site check in AM  -Probable discharge in AM Nurse Practitioner Progress note:     INTERVAL HISTORY :74 year old male with h/o HTN, HLD, lumbosacral disc disease with nerve stimulator and CAD s/p RCA stent (per pt and done in [place]) who c/o retrosternal chest pain. For [facility] to assess coronary arteries.       MEDICATIONS:  lisinopril 5 milliGRAM(s) Oral daily  metoprolol succinate ER 50 milliGRAM(s) Oral daily  fentaNYL    Injectable 25 MICROGram(s) IV Push once  gabapentin 300 milliGRAM(s) Oral three times a day  oxyCODONE    5 mG/acetaminophen 325 mG 2 Tablet(s) Oral every 6 hours PRN  atorvastatin 40 milliGRAM(s) Oral at bedtime  aspirin  chewable 81 milliGRAM(s) Oral daily  aspirin  chewable 81 milliGRAM(s) Oral once  clopidogrel Tablet 75 milliGRAM(s) Oral daily      TELEMETRY: NSR    T(C): 36.7 (06-07-19 @ 09:38), Max: 36.7 (06-07-19 @ 09:38)  HR: 76 (06-07-19 @ 09:38) (76 - 76)  BP: 156/83 (06-07-19 @ 09:38) (156/83 - 156/83)  RR: 16 (06-07-19 @ 09:38) (16 - 16)  SpO2: 95% (06-07-19 @ 09:38) (95% - 95%)  Wt(kg): --    PHYSICAL EXAM:  Appearance: Normal	  Cardiovascular: Normal S1 S2, No JVD, No murmurs, No edema  Respiratory: Lungs clear to auscultation	  Psychiatry: A & O x 3, Mood & affect appropriate  Neurologic: Non-focal, A&O X3.  No neuro deficits  Procedure Site: Right radial band in place.  Site benign.  No bleeding/hematoma/ecchymosis.  + palp radial pulse    12 lead EKG:  	NSR 81 bpm.  No acute changes    PROCEDURE RESULTS: S/P PCI with YAYA X2 to RCA via right radial    ASSESSMENT/PLAN: 	  -Admit to telemetry d/t long lesion and hypotension during cath  -radial precautions  -D/C radial band in 2 hours  -Resume home meds  -Initiate ASA  -Follow up with Dr. Santoyo  -Check labs/EKG/site check in AM  -Probable discharge in AM

## 2019-06-08 ENCOUNTER — TRANSCRIPTION ENCOUNTER (OUTPATIENT)
Age: 74
End: 2019-06-08

## 2019-06-08 VITALS
HEART RATE: 88 BPM | OXYGEN SATURATION: 95 % | RESPIRATION RATE: 20 BRPM | SYSTOLIC BLOOD PRESSURE: 118 MMHG | DIASTOLIC BLOOD PRESSURE: 59 MMHG

## 2019-06-08 LAB
ANION GAP SERPL CALC-SCNC: 11 MMOL/L — SIGNIFICANT CHANGE UP (ref 5–17)
BUN SERPL-MCNC: 14 MG/DL — SIGNIFICANT CHANGE UP (ref 8–20)
CALCIUM SERPL-MCNC: 9.2 MG/DL — SIGNIFICANT CHANGE UP (ref 8.6–10.2)
CHLORIDE SERPL-SCNC: 105 MMOL/L — SIGNIFICANT CHANGE UP (ref 98–107)
CO2 SERPL-SCNC: 25 MMOL/L — SIGNIFICANT CHANGE UP (ref 22–29)
CREAT SERPL-MCNC: 0.53 MG/DL — SIGNIFICANT CHANGE UP (ref 0.5–1.3)
GLUCOSE SERPL-MCNC: 96 MG/DL — SIGNIFICANT CHANGE UP (ref 70–115)
HCT VFR BLD CALC: 38.2 % — LOW (ref 42–52)
HGB BLD-MCNC: 12.7 G/DL — LOW (ref 14–18)
MAGNESIUM SERPL-MCNC: 2 MG/DL — SIGNIFICANT CHANGE UP (ref 1.6–2.6)
MCHC RBC-ENTMCNC: 30.1 PG — SIGNIFICANT CHANGE UP (ref 27–31)
MCHC RBC-ENTMCNC: 33.2 G/DL — SIGNIFICANT CHANGE UP (ref 32–36)
MCV RBC AUTO: 90.5 FL — SIGNIFICANT CHANGE UP (ref 80–94)
PLATELET # BLD AUTO: 270 K/UL — SIGNIFICANT CHANGE UP (ref 150–400)
POTASSIUM SERPL-MCNC: 4.1 MMOL/L — SIGNIFICANT CHANGE UP (ref 3.5–5.3)
POTASSIUM SERPL-SCNC: 4.1 MMOL/L — SIGNIFICANT CHANGE UP (ref 3.5–5.3)
RBC # BLD: 4.22 M/UL — LOW (ref 4.6–6.2)
RBC # FLD: 14.3 % — SIGNIFICANT CHANGE UP (ref 11–15.6)
SODIUM SERPL-SCNC: 141 MMOL/L — SIGNIFICANT CHANGE UP (ref 135–145)
WBC # BLD: 5.8 K/UL — SIGNIFICANT CHANGE UP (ref 4.8–10.8)
WBC # FLD AUTO: 5.8 K/UL — SIGNIFICANT CHANGE UP (ref 4.8–10.8)

## 2019-06-08 PROCEDURE — 36415 COLL VENOUS BLD VENIPUNCTURE: CPT

## 2019-06-08 PROCEDURE — 92978 ENDOLUMINL IVUS OCT C 1ST: CPT

## 2019-06-08 PROCEDURE — 85730 THROMBOPLASTIN TIME PARTIAL: CPT

## 2019-06-08 PROCEDURE — C1769: CPT

## 2019-06-08 PROCEDURE — 80048 BASIC METABOLIC PNL TOTAL CA: CPT

## 2019-06-08 PROCEDURE — 99153 MOD SED SAME PHYS/QHP EA: CPT

## 2019-06-08 PROCEDURE — 85610 PROTHROMBIN TIME: CPT

## 2019-06-08 PROCEDURE — 93010 ELECTROCARDIOGRAM REPORT: CPT

## 2019-06-08 PROCEDURE — C1753: CPT

## 2019-06-08 PROCEDURE — 93458 L HRT ARTERY/VENTRICLE ANGIO: CPT

## 2019-06-08 PROCEDURE — C1725: CPT

## 2019-06-08 PROCEDURE — C1894: CPT

## 2019-06-08 PROCEDURE — C1887: CPT

## 2019-06-08 PROCEDURE — 99152 MOD SED SAME PHYS/QHP 5/>YRS: CPT

## 2019-06-08 PROCEDURE — C1874: CPT

## 2019-06-08 PROCEDURE — 83735 ASSAY OF MAGNESIUM: CPT

## 2019-06-08 PROCEDURE — 93005 ELECTROCARDIOGRAM TRACING: CPT

## 2019-06-08 PROCEDURE — 85027 COMPLETE CBC AUTOMATED: CPT

## 2019-06-08 PROCEDURE — 92928 PRQ TCAT PLMT NTRAC ST 1 LES: CPT

## 2019-06-08 RX ORDER — ASPIRIN/CALCIUM CARB/MAGNESIUM 324 MG
1 TABLET ORAL
Qty: 0 | Refills: 0 | DISCHARGE
Start: 2019-06-08

## 2019-06-08 RX ADMIN — OXYCODONE AND ACETAMINOPHEN 2 TABLET(S): 5; 325 TABLET ORAL at 06:20

## 2019-06-08 RX ADMIN — GABAPENTIN 300 MILLIGRAM(S): 400 CAPSULE ORAL at 05:25

## 2019-06-08 RX ADMIN — OXYCODONE AND ACETAMINOPHEN 2 TABLET(S): 5; 325 TABLET ORAL at 05:25

## 2019-06-08 RX ADMIN — LISINOPRIL 5 MILLIGRAM(S): 2.5 TABLET ORAL at 08:41

## 2019-06-08 NOTE — PROGRESS NOTE ADULT - ASSESSMENT
Can you office help elaborate on this, please.    Thank you    74 year old male with h/o HTN, HLD, lumbosacral disc disease with nerve stimulator and CAD s/p RCA stent (per pt and done in [place]) who c/o retrosternal chest pain. For [facility] to assess coronary arteries.   s/p LHC and intervention s/p YAYA x 2 RCA   No overnight events   Presents this am feeling well Reports no chest pain  or SOB 74 year old male with h/o HTN, HLD, lumbosacral disc disease with nerve stimulator and CAD s/p RCA stent (per pt and done in [place]) who c/o retrosternal chest pain. For [facility] to assess coronary arteries.   s/p LHC and intervention s/p YAYA x 2 RCA   No overnight events   Presents this am feeling well Reports no chest pain  or SOB   a/p CAD with stents to RCA via RRA   - ASA/ plavix reinforced   - Continue statin , BB and ACE-1   _ Procedure results medications and follow up d/w pt    -Wrist precautions  - Follow  up with Cardiologist 1 week

## 2019-06-08 NOTE — PROGRESS NOTE ADULT - SUBJECTIVE AND OBJECTIVE BOX
Subjective:  I feel good this morning       Medications:  ALPRAZolam 0.25 milliGRAM(s) Oral every 6 hours PRN  aspirin  chewable 81 milliGRAM(s) Oral daily  atorvastatin 40 milliGRAM(s) Oral at bedtime  clopidogrel Tablet 75 milliGRAM(s) Oral daily  gabapentin 300 milliGRAM(s) Oral three times a day  lisinopril 5 milliGRAM(s) Oral daily  metoprolol succinate ER 50 milliGRAM(s) Oral daily  oxyCODONE    5 mG/acetaminophen 325 mG 2 Tablet(s) Oral every 6 hours PRN      PHYSICAL EXAM:  Vital Signs Last 24 Hrs  T(C): 36.7 (07 Jun 2019 09:38), Max: 36.7 (07 Jun 2019 09:38)  T(F): 98.1 (07 Jun 2019 09:38), Max: 98.1 (07 Jun 2019 09:38)  HR: 78 (08 Jun 2019 05:25) (75 - 94)  BP: 124/68 (08 Jun 2019 05:25) (95/54 - 156/83)  RR: 20 (08 Jun 2019 05:25) (16 - 20)  SpO2: 94% (08 Jun 2019 05:25) (93% - 97%)      General: A/ox 3, No acute Distress  Neck: Supple, NO JVD  Cardiac: S1 S2, No M/R/G  Pulmonary: CTAB, Breathing unlabored, No Rhonchi/Rales/Wheezing  Abdomen: Soft, Non -tender, +BS   Extremities: No Rashes, No edema  Neuro: A/o x 3, No focal deficits  Psch: normal mood , normal affect    LABS:                          12.7   5.8   )-----------( 270      ( 08 Jun 2019 05:44 )             38.2     06-08    141  |  105  |  14.0  ----------------------------<  96  4.1   |  25.0  |  0.53    Ca    9.2      08 Jun 2019 05:44  Mg     2.0     06-08      PT/INR - ( 07 Jun 2019 09:38 )   PT: 11.0 sec;   INR: 0.96 ratio         PTT - ( 07 Jun 2019 09:38 )  PTT:29.7 sec Subjective:  "I feel good this morning "  report no chest pain or SOB at present       Medications:  ALPRAZolam 0.25 milliGRAM(s) Oral every 6 hours PRN  aspirin  chewable 81 milliGRAM(s) Oral daily  atorvastatin 40 milliGRAM(s) Oral at bedtime  clopidogrel Tablet 75 milliGRAM(s) Oral daily  gabapentin 300 milliGRAM(s) Oral three times a day  lisinopril 5 milliGRAM(s) Oral daily  metoprolol succinate ER 50 milliGRAM(s) Oral daily  oxyCODONE    5 mG/acetaminophen 325 mG 2 Tablet(s) Oral every 6 hours PRN      PHYSICAL EXAM:  Vital Signs Last 24 Hrs  T(C): 36.7 (07 Jun 2019 09:38), Max: 36.7 (07 Jun 2019 09:38)  T(F): 98.1 (07 Jun 2019 09:38), Max: 98.1 (07 Jun 2019 09:38)  HR: 78 (08 Jun 2019 05:25) (75 - 94)  BP: 124/68 (08 Jun 2019 05:25) (95/54 - 156/83)  RR: 20 (08 Jun 2019 05:25) (16 - 20)  SpO2: 94% (08 Jun 2019 05:25) (93% - 97%)      General: A/ox 3, No acute Distress  Neck: Supple, NO JVD  Cardiac: S1 S2, No M/R/G  Pulmonary: CTAB, Breathing unlabored, No Rhonchi/Rales/Wheezing  Abdomen: Soft, Non -tender, +BS   Extremities: No Rashes, No edema  R radial wrist +pp fingers warm and mobile good capillary refill   Neuro: A/o x 3, No focal deficits  Psch: normal mood , normal affect    LABS:                          12.7   5.8   )-----------( 270      ( 08 Jun 2019 05:44 )             38.2     06-08    141  |  105  |  14.0  ----------------------------<  96  4.1   |  25.0  |  0.53    Ca    9.2      08 Jun 2019 05:44  Mg     2.0     06-08      PT/INR - ( 07 Jun 2019 09:38 )   PT: 11.0 sec;   INR: 0.96 ratio         PTT - ( 07 Jun 2019 09:38 )  PTT:29.7 sec    EKG 6/8/19 NSR rate 75 no ST or t wave changes

## 2019-06-08 NOTE — DISCHARGE NOTE NURSING/CASE MANAGEMENT/SOCIAL WORK - NSDCDPATPORTLINK_GEN_ALL_CORE
You can access the [facility] Patient Portal, offered by [facility], by registering with the following website: [email or web address]

## 2019-06-10 ENCOUNTER — INBOUND DOCUMENT (OUTPATIENT)
Age: 74
End: 2019-06-10

## 2019-06-12 PROBLEM — M51.9 UNSPECIFIED THORACIC, THORACOLUMBAR AND LUMBOSACRAL INTERVERTEBRAL DISC DISORDER: Chronic | Status: ACTIVE | Noted: 2019-06-07

## 2019-06-12 PROBLEM — M19.90 UNSPECIFIED OSTEOARTHRITIS, UNSPECIFIED SITE: Chronic | Status: ACTIVE | Noted: 2019-06-07

## 2019-06-13 RX ORDER — KIT FOR THE PREPARATION OF TECHNETIUM TC99M SESTAMIBI 1 MG/5ML
INJECTION, POWDER, LYOPHILIZED, FOR SOLUTION PARENTERAL
Refills: 0 | Status: COMPLETED | OUTPATIENT
Start: 2019-06-13

## 2019-06-13 RX ADMIN — KIT FOR THE PREPARATION OF TECHNETIUM TC99M SESTAMIBI 0: 1 INJECTION, POWDER, LYOPHILIZED, FOR SOLUTION PARENTERAL at 00:00

## 2019-06-26 ENCOUNTER — MEDICATION RENEWAL (OUTPATIENT)
Age: 74
End: 2019-06-26

## 2019-06-26 ENCOUNTER — APPOINTMENT (OUTPATIENT)
Dept: CARDIOLOGY | Facility: CLINIC | Age: 74
End: 2019-06-26
Payer: MEDICARE

## 2019-06-26 ENCOUNTER — NON-APPOINTMENT (OUTPATIENT)
Age: 74
End: 2019-06-26

## 2019-06-26 VITALS
BODY MASS INDEX: 26.06 KG/M2 | HEIGHT: 67 IN | RESPIRATION RATE: 16 BRPM | WEIGHT: 166 LBS | HEART RATE: 80 BPM | DIASTOLIC BLOOD PRESSURE: 78 MMHG | SYSTOLIC BLOOD PRESSURE: 122 MMHG

## 2019-06-26 DIAGNOSIS — Z00.00 ENCOUNTER FOR GENERAL ADULT MEDICAL EXAMINATION W/OUT ABNORMAL FINDINGS: ICD-10-CM

## 2019-06-26 PROCEDURE — 93000 ELECTROCARDIOGRAM COMPLETE: CPT

## 2019-06-26 PROCEDURE — 99214 OFFICE O/P EST MOD 30 MIN: CPT

## 2019-06-26 RX ORDER — METOPROLOL SUCCINATE 50 MG/1
50 TABLET, EXTENDED RELEASE ORAL
Qty: 90 | Refills: 3 | Status: DISCONTINUED | COMMUNITY
Start: 2017-07-31 | End: 2019-06-26

## 2019-06-27 NOTE — REASON FOR VISIT
[FreeTextEntry1] : Mr. Henson is a pleasant 74-year-old white male with a past medical history significant for hypertension, hyperlipidemia, and coronary artery disease status-post RCA stenting in 2002 as well as severe lumbosacral disc disease who presents for follow up evaluation.

## 2019-06-27 NOTE — ASSESSMENT
[FreeTextEntry1] : 1.  EKG today reveals sinus rhythm at 80 bpm.  RSR’ lead V1.  Normal intervals.  No evidence of ischemia. \par 2.  Coronary artery disease status-post RCA stenting x2:  Patient presents today without chest pain or shortness of breath.  He remains physically active.  I have advised Mr. Henson to follow a strict low-fat / low-cholesterol diet.  Cardiac rehabilitation was offered but declines.  Patient wishes to exercise at a local gym.  I asked him to begin this gradually. \par 3.  Hypertension:  Blood pressure well controlled at this time on current medications.  A low-salt diet advised. \par 4.  Hyperlipidemia:  Patient to follow a low-fat / low-cholesterol diet.  Will undergo fasting bloodwork prior to his next office visit in three months. \par \par

## 2019-06-27 NOTE — PHYSICAL EXAM
[General Appearance - Well Developed] : well developed [General Appearance - Well Nourished] : well nourished [General Appearance - In No Acute Distress] : no acute distress [Normal Conjunctiva] : the conjunctiva exhibited no abnormalities [Normal Oral Mucosa] : normal oral mucosa [Auscultation Breath Sounds / Voice Sounds] : lungs were clear to auscultation bilaterally [Heart Rate And Rhythm] : heart rate and rhythm were normal [Heart Sounds] : normal S1 and S2 [Bowel Sounds] : normal bowel sounds [Abnormal Walk] : normal gait [Skin Color & Pigmentation] : normal skin color and pigmentation [Skin Turgor] : normal skin turgor [Oriented To Time, Place, And Person] : oriented to person, place, and time [Affect] : the affect was normal [Mood] : the mood was normal [FreeTextEntry1] : No edema.

## 2019-06-27 NOTE — HISTORY OF PRESENT ILLNESS
[FreeTextEntry1] : Mr. Henson presents today without complaints of exertional chest pain, shortness of breath, or other cardiac symptoms.  He recently underwent nuclear stress testing which was abnormal and a subsequent cardiac catheterization which reveals significant disease in his right coronary artery including in-stent restenosis.  Patient received two drug-eluting stents to his right coronary artery.  Also noted was a residual lesion in the mid-LAD of 30-50% which was not intervened upon.

## 2019-07-31 ENCOUNTER — OUTPATIENT (OUTPATIENT)
Dept: OUTPATIENT SERVICES | Facility: HOSPITAL | Age: 74
LOS: 1 days | End: 2019-07-31
Payer: MEDICARE

## 2019-07-31 ENCOUNTER — APPOINTMENT (OUTPATIENT)
Dept: RADIOLOGY | Facility: CLINIC | Age: 74
End: 2019-07-31

## 2019-07-31 DIAGNOSIS — M54.16 RADICULOPATHY, LUMBAR REGION: ICD-10-CM

## 2019-07-31 DIAGNOSIS — Z98.49 CATARACT EXTRACTION STATUS, UNSPECIFIED EYE: Chronic | ICD-10-CM

## 2019-07-31 DIAGNOSIS — Z95.5 PRESENCE OF CORONARY ANGIOPLASTY IMPLANT AND GRAFT: Chronic | ICD-10-CM

## 2019-07-31 PROCEDURE — 72074 X-RAY EXAM THORAC SPINE4/>VW: CPT

## 2019-07-31 PROCEDURE — 72074 X-RAY EXAM THORAC SPINE4/>VW: CPT | Mod: 26

## 2019-08-09 ENCOUNTER — RX RENEWAL (OUTPATIENT)
Age: 74
End: 2019-08-09

## 2019-08-12 ENCOUNTER — RX RENEWAL (OUTPATIENT)
Age: 74
End: 2019-08-12

## 2019-08-15 ENCOUNTER — CLINICAL ADVICE (OUTPATIENT)
Age: 74
End: 2019-08-15

## 2019-08-22 ENCOUNTER — RX RENEWAL (OUTPATIENT)
Age: 74
End: 2019-08-22

## 2019-09-18 ENCOUNTER — APPOINTMENT (OUTPATIENT)
Dept: CARDIOLOGY | Facility: CLINIC | Age: 74
End: 2019-09-18
Payer: MEDICARE

## 2019-09-18 ENCOUNTER — NON-APPOINTMENT (OUTPATIENT)
Age: 74
End: 2019-09-18

## 2019-09-18 VITALS
HEART RATE: 76 BPM | DIASTOLIC BLOOD PRESSURE: 78 MMHG | RESPIRATION RATE: 16 BRPM | BODY MASS INDEX: 25.9 KG/M2 | SYSTOLIC BLOOD PRESSURE: 124 MMHG | HEIGHT: 67 IN | WEIGHT: 165 LBS

## 2019-09-18 PROCEDURE — 93000 ELECTROCARDIOGRAM COMPLETE: CPT

## 2019-09-18 PROCEDURE — 99215 OFFICE O/P EST HI 40 MIN: CPT

## 2019-09-18 RX ORDER — LISINOPRIL 2.5 MG/1
2.5 TABLET ORAL TWICE DAILY
Qty: 180 | Refills: 3 | Status: DISCONTINUED | COMMUNITY
Start: 2016-07-22 | End: 2019-09-18

## 2019-09-18 RX ORDER — DIAZEPAM 10 MG/1
10 TABLET ORAL
Refills: 0 | Status: DISCONTINUED | COMMUNITY
Start: 2017-07-13 | End: 2019-09-18

## 2019-09-19 NOTE — HISTORY OF PRESENT ILLNESS
[FreeTextEntry1] : From a cardiac standpoint, Mr. Henson denies exertional chest pain but does admit to occasional dyspnea on exertion.  He denies palpitations, lightheadedness, or syncope.

## 2019-09-19 NOTE — PHYSICAL EXAM
[General Appearance - Well Developed] : well developed [General Appearance - Well Nourished] : well nourished [General Appearance - In No Acute Distress] : no acute distress [Normal Conjunctiva] : the conjunctiva exhibited no abnormalities [Normal Oral Mucosa] : normal oral mucosa [Heart Rate And Rhythm] : heart rate and rhythm were normal [Auscultation Breath Sounds / Voice Sounds] : lungs were clear to auscultation bilaterally [Heart Sounds] : normal S1 and S2 [Bowel Sounds] : normal bowel sounds [Abnormal Walk] : normal gait [Skin Color & Pigmentation] : normal skin color and pigmentation [Skin Turgor] : normal skin turgor [Oriented To Time, Place, And Person] : oriented to person, place, and time [Affect] : the affect was normal [Mood] : the mood was normal [FreeTextEntry1] : No edema.

## 2019-09-19 NOTE — REASON FOR VISIT
[FreeTextEntry1] : Mr. Henson is a pleasant 74-year-old white male with a past medical history significant for hypertension, hyperlipidemia, and coronary artery disease status-post RCA stent insertion in 2002 as well as RCA stent insertion x2 again to the RCA in June of 2019, who presents for follow up evaluation. \par \par \par

## 2019-09-19 NOTE — PHYSICAL EXAM
[General Appearance - Well Developed] : well developed [General Appearance - Well Nourished] : well nourished [General Appearance - In No Acute Distress] : no acute distress [Normal Conjunctiva] : the conjunctiva exhibited no abnormalities [Normal Oral Mucosa] : normal oral mucosa [Auscultation Breath Sounds / Voice Sounds] : lungs were clear to auscultation bilaterally [Heart Rate And Rhythm] : heart rate and rhythm were normal [Heart Sounds] : normal S1 and S2 [Bowel Sounds] : normal bowel sounds [Abnormal Walk] : normal gait [Skin Turgor] : normal skin turgor [Skin Color & Pigmentation] : normal skin color and pigmentation [Oriented To Time, Place, And Person] : oriented to person, place, and time [Affect] : the affect was normal [Mood] : the mood was normal [FreeTextEntry1] : No edema.

## 2019-09-19 NOTE — ASSESSMENT
[FreeTextEntry1] :  1.  EKG today reveals normal sinus rhythm at 76 bpm.  Normal intervals.  No evidence of ischemia.  2.  Hypertension:  Blood pressure reasonably controlled at this time on current medications.  2.  Hypercholesterolemia:  Recent lipid profile revealed a total cholesterol of 149, HDL 50, TC/HDL ratio 3.0, LDL 72, triglycerides 137.  Patient advised to continue a low-fat / low-cholesterol diet.   3.  Anemia:  Patient with hemoglobin of 11.9 and hematocrit of 37.6.  This may well be due combination aspirin-Plavix which the patient is currently on for recent stent insertion.  Patient advised to follow up with PCP regarding anemia workup.  I will start patient on Pantoprazole 40 mg daily at this point.  He may well require a GI evaluation as well.  If push comes to shove, aspirin can be DC’d and the patient can continue on Clopidogrel.   4.  Coronary artery disease status-post multistenting:  Patient chest pain free and reasonably active.  I have advised him to walk as much as possible.  Follow up office visit is planned in three months or sooner if necessary.

## 2019-09-20 LAB
ALBUMIN SERPL ELPH-MCNC: 4.3 G/DL
ALP BLD-CCNC: 81 U/L
ALT SERPL-CCNC: 23 U/L
ANION GAP SERPL CALC-SCNC: 14 MMOL/L
AST SERPL-CCNC: 21 U/L
BASOPHILS # BLD AUTO: 0.05 K/UL
BASOPHILS NFR BLD AUTO: 1 %
BILIRUB SERPL-MCNC: 0.4 MG/DL
BUN SERPL-MCNC: 12 MG/DL
CALCIUM SERPL-MCNC: 9.4 MG/DL
CHLORIDE SERPL-SCNC: 104 MMOL/L
CHOLEST SERPL-MCNC: 149 MG/DL
CHOLEST/HDLC SERPL: 3 RATIO
CO2 SERPL-SCNC: 23 MMOL/L
CREAT SERPL-MCNC: 0.62 MG/DL
EOSINOPHIL # BLD AUTO: 0.34 K/UL
EOSINOPHIL NFR BLD AUTO: 6.7 %
ESTIMATED AVERAGE GLUCOSE: 128 MG/DL
GLUCOSE SERPL-MCNC: 102 MG/DL
HBA1C MFR BLD HPLC: 6.1 %
HCT VFR BLD CALC: 37.6 %
HDLC SERPL-MCNC: 50 MG/DL
HGB BLD-MCNC: 11.9 G/DL
IMM GRANULOCYTES NFR BLD AUTO: 0.2 %
LDLC SERPL CALC-MCNC: 72 MG/DL
LYMPHOCYTES # BLD AUTO: 1.95 K/UL
LYMPHOCYTES NFR BLD AUTO: 38.2 %
MAN DIFF?: NORMAL
MCHC RBC-ENTMCNC: 29.7 PG
MCHC RBC-ENTMCNC: 31.6 GM/DL
MCV RBC AUTO: 93.8 FL
MONOCYTES # BLD AUTO: 0.54 K/UL
MONOCYTES NFR BLD AUTO: 10.6 %
NEUTROPHILS # BLD AUTO: 2.21 K/UL
NEUTROPHILS NFR BLD AUTO: 43.3 %
PLATELET # BLD AUTO: 265 K/UL
POTASSIUM SERPL-SCNC: 4.8 MMOL/L
PROT SERPL-MCNC: 6.2 G/DL
RBC # BLD: 4.01 M/UL
RBC # FLD: 14.6 %
SODIUM SERPL-SCNC: 141 MMOL/L
TRIGL SERPL-MCNC: 137 MG/DL
TSH SERPL-ACNC: 1.68 UIU/ML
WBC # FLD AUTO: 5.1 K/UL

## 2019-10-03 ENCOUNTER — OTHER (OUTPATIENT)
Age: 74
End: 2019-10-03

## 2019-10-17 ENCOUNTER — RX RENEWAL (OUTPATIENT)
Age: 74
End: 2019-10-17

## 2019-10-18 ENCOUNTER — MEDICATION RENEWAL (OUTPATIENT)
Age: 74
End: 2019-10-18

## 2019-10-22 ENCOUNTER — APPOINTMENT (OUTPATIENT)
Dept: PULMONOLOGY | Facility: CLINIC | Age: 74
End: 2019-10-22
Payer: MEDICARE

## 2019-10-22 VITALS — HEART RATE: 74 BPM | OXYGEN SATURATION: 92 % | SYSTOLIC BLOOD PRESSURE: 100 MMHG | DIASTOLIC BLOOD PRESSURE: 60 MMHG

## 2019-10-22 VITALS — WEIGHT: 167 LBS | BODY MASS INDEX: 26.52 KG/M2 | HEIGHT: 66.5 IN

## 2019-10-22 DIAGNOSIS — Z12.9 ENCOUNTER FOR SCREENING FOR MALIGNANT NEOPLASM, SITE UNSPECIFIED: ICD-10-CM

## 2019-10-22 PROCEDURE — 94010 BREATHING CAPACITY TEST: CPT

## 2019-10-22 PROCEDURE — 99214 OFFICE O/P EST MOD 30 MIN: CPT | Mod: 25

## 2019-10-22 NOTE — DISCUSSION/SUMMARY
[COPD] : chronic obstructive pulmonary disease [Stable] : stable [Stage II (Moderate)] : stage II (moderate) [Responding to Treatment] : responding to treatment [None] : There are no changes in medication management [FreeTextEntry1] : Due to the patient's history of prior tobacco use they fulfill criteria for low dose, lung cancer screening. This method was described to the patient with criteria for greater than 30 pack years of smoking, over the age of 55, and screening for 15 years following cessation of tobacco use.\par  [de-identified] : Followup chest CT in February

## 2019-10-22 NOTE — HISTORY OF PRESENT ILLNESS
[None] : ~He/She~ has no comorbid illnesses [Doing Well] : doing well [Difficulty Breathing During Exertion] : improved dyspnea on exertion [Feelings Of Weakness On Exertion] : improved exercise intolerance [Cough] : denies coughing [Wheezing] : denies wheezing [Coughing Up Sputum] : denies coughing up sputum [Regional Soft Tissue Swelling Both Lower Extremities] : denies lower extremity edema [de-identified] : S/p RCA stent x 2 June 2019

## 2019-10-22 NOTE — PHYSICAL EXAM
[General Appearance - Well Developed] : well developed [General Appearance - Well Nourished] : well nourished [Well Groomed] : well groomed [Normal Appearance] : normal appearance [No Deformities] : no deformities [Normal Conjunctiva] : the conjunctiva exhibited no abnormalities [General Appearance - In No Acute Distress] : no acute distress [Neck Appearance] : the appearance of the neck was normal [Eyelids - No Xanthelasma] : the eyelids demonstrated no xanthelasmas [Normal Oropharynx] : normal oropharynx [Jugular Venous Distention Increased] : there was no jugular-venous distention [Neck Cervical Mass (___cm)] : no neck mass was observed [Thyroid Nodule] : there were no palpable thyroid nodules [Heart Rate And Rhythm] : heart rate and rhythm were normal [Thyroid Diffuse Enlargement] : the thyroid was not enlarged [Murmurs] : no murmurs present [Heart Sounds] : normal S1 and S2 [Respiration, Rhythm And Depth] : normal respiratory rhythm and effort [Exaggerated Use Of Accessory Muscles For Inspiration] : no accessory muscle use [Auscultation Breath Sounds / Voice Sounds] : lungs were clear to auscultation bilaterally [Abdomen Soft] : soft [Abdomen Tenderness] : non-tender [Abnormal Walk] : normal gait [Abdomen Mass (___ Cm)] : no abdominal mass palpated [Gait - Sufficient For Exercise Testing] : the gait was sufficient for exercise testing [Petechial Hemorrhages (___cm)] : no petechial hemorrhages [Cyanosis, Localized] : no localized cyanosis [Nail Clubbing] : no clubbing of the fingernails [Skin Color & Pigmentation] : normal skin color and pigmentation [No Venous Stasis] : no venous stasis [Skin Lesions] : no skin lesions [] : no rash [No Xanthoma] : no  xanthoma was observed [No Skin Ulcers] : no skin ulcer [Sensation] : the sensory exam was normal to light touch and pinprick [Deep Tendon Reflexes (DTR)] : deep tendon reflexes were 2+ and symmetric [No Focal Deficits] : no focal deficits [Affect] : the affect was normal [Impaired Insight] : insight and judgment were intact [Oriented To Time, Place, And Person] : oriented to person, place, and time

## 2019-10-22 NOTE — CONSULT LETTER
[Consult Letter:] : I had the pleasure of evaluating your patient, [unfilled]. [Dear  ___] : Dear  [unfilled], [Sincerely,] : Sincerely, [Please see my note below.] : Please see my note below. [FreeTextEntry3] : Epi Fuller MD FCCP\par Pulmonary/Critical Care/Sleep Medicine\par Department of Internal Medicine\par \par [facility] [employer]\par

## 2019-12-27 LAB
ALBUMIN SERPL ELPH-MCNC: 4.6 G/DL
ALP BLD-CCNC: 78 U/L
ALT SERPL-CCNC: 20 U/L
AST SERPL-CCNC: 19 U/L
BASOPHILS # BLD AUTO: 0.06 K/UL
BASOPHILS NFR BLD AUTO: 1.3 %
BILIRUB DIRECT SERPL-MCNC: 0.1 MG/DL
BILIRUB INDIRECT SERPL-MCNC: 0.3 MG/DL
BILIRUB SERPL-MCNC: 0.4 MG/DL
CHOLEST SERPL-MCNC: 156 MG/DL
CHOLEST/HDLC SERPL: 2.9 RATIO
EOSINOPHIL # BLD AUTO: 0.33 K/UL
EOSINOPHIL NFR BLD AUTO: 7.3 %
HCT VFR BLD CALC: 39 %
HDLC SERPL-MCNC: 53 MG/DL
HGB BLD-MCNC: 12.4 G/DL
IMM GRANULOCYTES NFR BLD AUTO: 0.2 %
LDLC SERPL CALC-MCNC: 84 MG/DL
LYMPHOCYTES # BLD AUTO: 1.71 K/UL
LYMPHOCYTES NFR BLD AUTO: 38 %
MAN DIFF?: NORMAL
MCHC RBC-ENTMCNC: 30 PG
MCHC RBC-ENTMCNC: 31.8 GM/DL
MCV RBC AUTO: 94.4 FL
MONOCYTES # BLD AUTO: 0.44 K/UL
MONOCYTES NFR BLD AUTO: 9.8 %
NEUTROPHILS # BLD AUTO: 1.95 K/UL
NEUTROPHILS NFR BLD AUTO: 43.4 %
PLATELET # BLD AUTO: 249 K/UL
PROT SERPL-MCNC: 6.5 G/DL
RBC # BLD: 4.13 M/UL
RBC # FLD: 14.4 %
TRIGL SERPL-MCNC: 95 MG/DL
WBC # FLD AUTO: 4.5 K/UL

## 2019-12-30 ENCOUNTER — NON-APPOINTMENT (OUTPATIENT)
Age: 74
End: 2019-12-30

## 2019-12-30 ENCOUNTER — APPOINTMENT (OUTPATIENT)
Dept: CARDIOLOGY | Facility: CLINIC | Age: 74
End: 2019-12-30
Payer: MEDICARE

## 2019-12-30 VITALS
DIASTOLIC BLOOD PRESSURE: 67 MMHG | HEART RATE: 67 BPM | RESPIRATION RATE: 16 BRPM | WEIGHT: 173 LBS | SYSTOLIC BLOOD PRESSURE: 118 MMHG | HEIGHT: 68 IN | BODY MASS INDEX: 26.22 KG/M2

## 2019-12-30 DIAGNOSIS — D64.9 ANEMIA, UNSPECIFIED: ICD-10-CM

## 2019-12-30 PROCEDURE — 99214 OFFICE O/P EST MOD 30 MIN: CPT

## 2019-12-30 PROCEDURE — 93000 ELECTROCARDIOGRAM COMPLETE: CPT

## 2019-12-30 RX ORDER — PANTOPRAZOLE 40 MG/1
40 TABLET, DELAYED RELEASE ORAL
Qty: 30 | Refills: 3 | Status: DISCONTINUED | COMMUNITY
Start: 2019-09-18 | End: 2019-12-30

## 2019-12-30 NOTE — PHYSICAL EXAM
[General Appearance - Well Developed] : well developed [General Appearance - In No Acute Distress] : no acute distress [Normal Conjunctiva] : the conjunctiva exhibited no abnormalities [Normal Oral Mucosa] : normal oral mucosa [Auscultation Breath Sounds / Voice Sounds] : lungs were clear to auscultation bilaterally [Heart Rate And Rhythm] : heart rate and rhythm were normal [] : no respiratory distress [Arterial Pulses Normal] : the arterial pulses were normal [Bowel Sounds] : normal bowel sounds [Skin Color & Pigmentation] : normal skin color and pigmentation [FreeTextEntry1] : No edema [Abnormal Walk] : normal gait [Affect] : the affect was normal [Oriented To Time, Place, And Person] : oriented to person, place, and time [Skin Turgor] : normal skin turgor [Mood] : the mood was normal

## 2019-12-30 NOTE — REASON FOR VISIT
[FreeTextEntry1] : The patient is a 74 year old white male with a past medical history significant for hypertension, hyperlipidemia, and coronary artery disease status-post RCA stent insertion in 2002 as well as RCA stent insertion X 2 again to the RCA in June of 2019, who presents for follow up evaluation.

## 2019-12-30 NOTE — DISCUSSION/SUMMARY
[FreeTextEntry1] : 1 - Hypertension:  blood pressure well controlled on current medications.  Advised to follow low sodium diet.\par \par 2 - Hypercholesterolemia: cholesterol 156, HDL 53, LDL 84, triglycerides 95, TC/HDL 2.9.  LDL still not at target of 70.  Continue with low fat, low cholesterol, low carbohydrate diet and atorvastatin 40mg daily.\par \par 3 - Coronary artery disease status-post multi-stenting:  Mr. Henson states that he has been experiencing some chest pain with exertion, associated with shortness of breath and some mild lightheadedness.  The discomfort is similar to what he felt prior to his stents, however not as severe.  At this time we will schedule a cardiac catheterization with Dr. Mueller at [facility] by the end of the week.  We will also add NitroPatch 0.2mg.\par \par 4 - Anemia: Hgb 12.4, Hct 39.  Patient stopped taking pantoprazole as he states his face was "getting blotchy."  He still needs to schedule an appointment with the gastroenterologist.\par \par 5 - Follow up after cardiac catheterization is completed.

## 2019-12-30 NOTE — HISTORY OF PRESENT ILLNESS
[FreeTextEntry1] : Mr. Henson presents today for follow up evaluation.  He states that he has been experiencing some chest pain with exertion, associated with shortness of breath and some mild lightheadedness.  The discomfort is similar to what he felt prior to his stents, however not as severe.  He has also been having some tingling/numbness in his right arm and hand.  His hand becomes cold and white.  This first occurred a few days ago and only lasted a few minutes.

## 2020-01-03 ENCOUNTER — TRANSCRIPTION ENCOUNTER (OUTPATIENT)
Age: 75
End: 2020-01-03

## 2020-01-03 ENCOUNTER — OUTPATIENT (OUTPATIENT)
Dept: OUTPATIENT SERVICES | Facility: HOSPITAL | Age: 75
LOS: 1 days | Discharge: HOME | End: 2020-01-03
Payer: MEDICARE

## 2020-01-03 VITALS
OXYGEN SATURATION: 95 % | DIASTOLIC BLOOD PRESSURE: 69 MMHG | HEART RATE: 83 BPM | SYSTOLIC BLOOD PRESSURE: 109 MMHG | RESPIRATION RATE: 18 BRPM

## 2020-01-03 VITALS
SYSTOLIC BLOOD PRESSURE: 140 MMHG | RESPIRATION RATE: 18 BRPM | OXYGEN SATURATION: 97 % | HEART RATE: 76 BPM | DIASTOLIC BLOOD PRESSURE: 78 MMHG | TEMPERATURE: 98 F

## 2020-01-03 DIAGNOSIS — I25.10 ATHEROSCLEROTIC HEART DISEASE OF NATIVE CORONARY ARTERY WITHOUT ANGINA PECTORIS: ICD-10-CM

## 2020-01-03 DIAGNOSIS — Z98.49 CATARACT EXTRACTION STATUS, UNSPECIFIED EYE: Chronic | ICD-10-CM

## 2020-01-03 DIAGNOSIS — Z95.5 PRESENCE OF CORONARY ANGIOPLASTY IMPLANT AND GRAFT: Chronic | ICD-10-CM

## 2020-01-03 LAB
ANION GAP SERPL CALC-SCNC: 15 MMOL/L — SIGNIFICANT CHANGE UP (ref 5–17)
APTT BLD: 30.1 SEC — SIGNIFICANT CHANGE UP (ref 27.5–36.3)
BUN SERPL-MCNC: 20 MG/DL — SIGNIFICANT CHANGE UP (ref 8–20)
CALCIUM SERPL-MCNC: 9.2 MG/DL — SIGNIFICANT CHANGE UP (ref 8.6–10.2)
CHLORIDE SERPL-SCNC: 103 MMOL/L — SIGNIFICANT CHANGE UP (ref 98–107)
CO2 SERPL-SCNC: 25 MMOL/L — SIGNIFICANT CHANGE UP (ref 22–29)
CREAT SERPL-MCNC: 0.61 MG/DL — SIGNIFICANT CHANGE UP (ref 0.5–1.3)
GLUCOSE SERPL-MCNC: 99 MG/DL — SIGNIFICANT CHANGE UP (ref 70–115)
HCT VFR BLD CALC: 38.3 % — LOW (ref 39–50)
HGB BLD-MCNC: 12.4 G/DL — LOW (ref 13–17)
INR BLD: 0.93 RATIO — SIGNIFICANT CHANGE UP (ref 0.88–1.16)
MAGNESIUM SERPL-MCNC: 1.9 MG/DL — SIGNIFICANT CHANGE UP (ref 1.8–2.6)
MCHC RBC-ENTMCNC: 29.7 PG — SIGNIFICANT CHANGE UP (ref 27–34)
MCHC RBC-ENTMCNC: 32.4 GM/DL — SIGNIFICANT CHANGE UP (ref 32–36)
MCV RBC AUTO: 91.6 FL — SIGNIFICANT CHANGE UP (ref 80–100)
PLATELET # BLD AUTO: 249 K/UL — SIGNIFICANT CHANGE UP (ref 150–400)
POTASSIUM SERPL-MCNC: 4.4 MMOL/L — SIGNIFICANT CHANGE UP (ref 3.5–5.3)
POTASSIUM SERPL-SCNC: 4.4 MMOL/L — SIGNIFICANT CHANGE UP (ref 3.5–5.3)
PROTHROM AB SERPL-ACNC: 10.7 SEC — SIGNIFICANT CHANGE UP (ref 10–12.9)
RBC # BLD: 4.18 M/UL — LOW (ref 4.2–5.8)
RBC # FLD: 13.9 % — SIGNIFICANT CHANGE UP (ref 10.3–14.5)
SODIUM SERPL-SCNC: 143 MMOL/L — SIGNIFICANT CHANGE UP (ref 135–145)
WBC # BLD: 5.34 K/UL — SIGNIFICANT CHANGE UP (ref 3.8–10.5)
WBC # FLD AUTO: 5.34 K/UL — SIGNIFICANT CHANGE UP (ref 3.8–10.5)

## 2020-01-03 PROCEDURE — 36415 COLL VENOUS BLD VENIPUNCTURE: CPT

## 2020-01-03 PROCEDURE — 99152 MOD SED SAME PHYS/QHP 5/>YRS: CPT

## 2020-01-03 PROCEDURE — 93571 IV DOP VEL&/PRESS C FLO 1ST: CPT | Mod: LD

## 2020-01-03 PROCEDURE — C1760: CPT

## 2020-01-03 PROCEDURE — 93571 IV DOP VEL&/PRESS C FLO 1ST: CPT | Mod: 26,LD

## 2020-01-03 PROCEDURE — C1894: CPT

## 2020-01-03 PROCEDURE — 85027 COMPLETE CBC AUTOMATED: CPT

## 2020-01-03 PROCEDURE — 93005 ELECTROCARDIOGRAM TRACING: CPT

## 2020-01-03 PROCEDURE — 83735 ASSAY OF MAGNESIUM: CPT

## 2020-01-03 PROCEDURE — 85610 PROTHROMBIN TIME: CPT

## 2020-01-03 PROCEDURE — 80048 BASIC METABOLIC PNL TOTAL CA: CPT

## 2020-01-03 PROCEDURE — 93010 ELECTROCARDIOGRAM REPORT: CPT

## 2020-01-03 PROCEDURE — 85730 THROMBOPLASTIN TIME PARTIAL: CPT

## 2020-01-03 PROCEDURE — C1769: CPT

## 2020-01-03 PROCEDURE — 93458 L HRT ARTERY/VENTRICLE ANGIO: CPT | Mod: 26

## 2020-01-03 PROCEDURE — 99153 MOD SED SAME PHYS/QHP EA: CPT

## 2020-01-03 PROCEDURE — 93458 L HRT ARTERY/VENTRICLE ANGIO: CPT

## 2020-01-03 PROCEDURE — C1887: CPT

## 2020-01-03 RX ORDER — NITROGLYCERIN 6.5 MG
0 CAPSULE, EXTENDED RELEASE ORAL
Qty: 0 | Refills: 0 | DISCHARGE

## 2020-01-03 RX ORDER — OXYCODONE AND ACETAMINOPHEN 5; 325 MG/1; MG/1
2 TABLET ORAL EVERY 6 HOURS
Refills: 0 | Status: DISCONTINUED | OUTPATIENT
Start: 2020-01-03 | End: 2020-01-03

## 2020-01-03 RX ORDER — ISOSORBIDE MONONITRATE 60 MG/1
1 TABLET, EXTENDED RELEASE ORAL
Qty: 30 | Refills: 0
Start: 2020-01-03 | End: 2020-02-01

## 2020-01-03 RX ORDER — LISINOPRIL 2.5 MG/1
1 TABLET ORAL
Qty: 0 | Refills: 0 | DISCHARGE

## 2020-01-03 RX ORDER — METOPROLOL TARTRATE 50 MG
1 TABLET ORAL
Qty: 0 | Refills: 0 | DISCHARGE

## 2020-01-03 RX ORDER — ISOSORBIDE MONONITRATE 60 MG/1
30 TABLET, EXTENDED RELEASE ORAL DAILY
Refills: 0 | Status: DISCONTINUED | OUTPATIENT
Start: 2020-01-03 | End: 2020-02-04

## 2020-01-03 RX ORDER — GABAPENTIN 400 MG/1
1 CAPSULE ORAL
Qty: 0 | Refills: 0 | DISCHARGE

## 2020-01-03 RX ORDER — RANOLAZINE 500 MG/1
500 TABLET, FILM COATED, EXTENDED RELEASE ORAL
Refills: 0 | Status: DISCONTINUED | OUTPATIENT
Start: 2020-01-03 | End: 2020-02-04

## 2020-01-03 RX ORDER — RANOLAZINE 500 MG/1
1 TABLET, FILM COATED, EXTENDED RELEASE ORAL
Qty: 60 | Refills: 0
Start: 2020-01-03 | End: 2020-02-01

## 2020-01-03 RX ADMIN — OXYCODONE AND ACETAMINOPHEN 2 TABLET(S): 5; 325 TABLET ORAL at 09:28

## 2020-01-03 RX ADMIN — OXYCODONE AND ACETAMINOPHEN 2 TABLET(S): 5; 325 TABLET ORAL at 11:26

## 2020-01-03 NOTE — PROGRESS NOTE ADULT - SUBJECTIVE AND OBJECTIVE BOX
Nurse Practitioner Progress note:   s/p [facility]     Patient feels well.  Denies chest pain, shortness of breath, dizziness or palpitations at this time    Right groin procedure site with angioseal CDI. No bleeding, no hematoma, site soft, non tender, positive pedal pulses bilaterally    T(C): 36.6 (01-03-20 @ 08:32), Max: 36.6 (01-03-20 @ 07:57)  HR: 73 (01-03-20 @ 11:45) (73 - 81)  BP: 132/75 (01-03-20 @ 11:30) (132/75 - 140/78)  RR: 18 (01-03-20 @ 11:45) (18 - 20)  SpO2: 93% (01-03-20 @ 11:45) (93% - 97%)    CBC Full  -  ( 03 Jan 2020 07:45 )  WBC Count : 5.34 K/uL  RBC Count : 4.18 M/uL  Hemoglobin : 12.4 g/dL  Hematocrit : 38.3 %  Platelet Count - Automated : 249 K/uL  Mean Cell Volume : 91.6 fl  Mean Cell Hemoglobin : 29.7 pg  Mean Cell Hemoglobin Concentration : 32.4 gm/dL      01-03    143  |  103  |  20.0  ----------------------------<  99  4.4   |  25.0  |  0.61    Ca    9.2      03 Jan 2020 07:45  Mg     1.9     01-03        MEDICATIONS  (STANDING):  isosorbide   mononitrate ER Tablet (IMDUR) 30 milliGRAM(s) Oral daily  ranolazine 500 milliGRAM(s) Oral two times a day    MEDICATIONS  (PRN):  oxycodone    5 mG/acetaminophen 325 mG 2 Tablet(s) Oral every 6 hours PRN Moderate Pain (4 - 6)        HPI:  Narrative: This is a 75 yo white male with a PMHx of HTN, HLD, COPD, BPH, lung nodules, anemia and PCI and stents to the RCA. One YAYA to the RCA in 2002 and 2 YAYA to the RCA in 2019. Patiemnt ccontinues to c/o CP KELLY and some mild lightheadedness and palpations.      ASA 3  Mallampati II    Creat 0.61  Bleeding Risk 1.1%    Symptoms: CP and dyspnea       Anigina (Class): CCS 3       Ischemic Symptoms:     Heart Failure: None           Assessment of LVEF (Must be within 6 months):       EF: 60       Assessed by: echo       Date: 10/12/2018    Prior Cardiac Interventions (LHC, stents, CABG):       PCI's (Date, Stents, Vessels): 2019 and 2002 SES to the RCA       CABG (Date, Grafts): None    Noninvasive Testing:   Stress Test: Date: NA         Echo (Date, Findings):   Normal LV cavity size  EF 60-65%  Mildly dilated LA  LA vol 36 ml/ml2  RA normal in size and structure  normal RV size and sys function  Mild dilatation of rhe ascending aorta  Mild to moderate aortic valve sclerosiss/calcification without any evidence of aortic stenosis  Trace aortic regurg  Mild thickening of the anterior and posterior mitral valve leaflets   Mild mitral valve regurg  Mild tricuspid regurg  Imteratrial and interventricular septa intact    Antianginal Therapies:        Beta Blockers:  Metoprolol       Calcium Channel Blockers: no       Long Acting Nitrates: Nitropatch       Ranexa: no (03 Jan 2020 08:32)    now s/p LHC  revealing moderate to sever diffuse disease to the LAD and LCX. Recommendation medical management - add Renexa and Imdur.        ASSESSMENT/PLAN:    Coronary artery disease  - mod-severe diffuse coronary artery disease  -Recover patient for 3 hours  -Resume cardiac diet  -Ambulate patient p 2.5 hours  -Discharge to home with escort if procedure site and patient remain stable after patient eats, ambulates and toilets  -Start Ranexa 500mg BID  -Start Imdur ER 30mg daily  -Stop nitro-patch  -Resume all other home medications  -Plan of care discussed with patient, family and MD  -Follow-up with attending cardiologist Dr Fink within 1 month  -Discussed therapeutic lifestyle changes to reduce risk factors such as following a cardiac diet, weight loss, maintaining a healthy weight, exercise, smoking cessation, medication compliance, and regular follow-up with MD to know your numbers (BP, cholesterol, weight, and glucose)

## 2020-01-03 NOTE — H&P PST ADULT - HISTORY OF PRESENT ILLNESS
Narrative: This is a 75 yo white male with a PMHx of HTN, HLD, COPD, BPH, lung nodules, anemia and PCI and stents to the RCA. One YAYA to the RCA in 2002 and 2 YAYA to the RCA in 2019. Patiemnt ccontinues to c/o CP KELLY and some mild lightheadedness and palpations.      Symptoms: CP and dyspnea       Anigina (Class): CCS 3       Ischemic Symptoms:     Heart Failure: None           Assessment of LVEF (Must be within 6 months):       EF: 60       Assessed by: echo       Date: 10/12/2018    Prior Cardiac Interventions (LHC, stents, CABG):       PCI's (Date, Stents, Vessels): 2019 and 2002 SES to the RCA       CABG (Date, Grafts): None    Noninvasive Testing:   Stress Test: Date: NA         Echo (Date, Findings):   Normal LV cavity size  EF 60-65%  Mildly dilated LA  LA vol 36 ml/ml2  RA normal in size and structure  normal RV size and sys function  Mild dilatation of rhe ascending aorta  Mild to moderate aortic valve sclerosiss/calcification without any evidence of aortic stenosis  Trace aortic regurg  Mild thickening of the anterior and posterior mitral valve leaflets   Mild mitral valve regurg  Mild tricuspid regurg  Imteratrial and interventricular septa intact    Antianginal Therapies:        Beta Blockers:  Metoprolol       Calcium Channel Blockers: no       Long Acting Nitrates: Nitropatch       Ranexa: no Narrative: This is a 73 yo white male with a PMHx of HTN, HLD, COPD, BPH, lung nodules, anemia and PCI and stents to the RCA. One YAYA to the RCA in 2002 and 2 YAYA to the RCA in 2019. Patiemnt ccontinues to c/o CP KELLY and some mild lightheadedness and palpations.      ASA 3  Mallampati II    Creat 0.61  Bleeding Risk 1.1%    Symptoms: CP and dyspnea       Anigina (Class): CCS 3       Ischemic Symptoms:     Heart Failure: None           Assessment of LVEF (Must be within 6 months):       EF: 60       Assessed by: echo       Date: 10/12/2018    Prior Cardiac Interventions (LHC, stents, CABG):       PCI's (Date, Stents, Vessels): 2019 and 2002 SES to the RCA       CABG (Date, Grafts): None    Noninvasive Testing:   Stress Test: Date: NA         Echo (Date, Findings):   Normal LV cavity size  EF 60-65%  Mildly dilated LA  LA vol 36 ml/ml2  RA normal in size and structure  normal RV size and sys function  Mild dilatation of rhe ascending aorta  Mild to moderate aortic valve sclerosiss/calcification without any evidence of aortic stenosis  Trace aortic regurg  Mild thickening of the anterior and posterior mitral valve leaflets   Mild mitral valve regurg  Mild tricuspid regurg  Imteratrial and interventricular septa intact    Antianginal Therapies:        Beta Blockers:  Metoprolol       Calcium Channel Blockers: no       Long Acting Nitrates: Nitropatch       Ranexa: no

## 2020-01-03 NOTE — DISCHARGE NOTE PROVIDER - NSDCFUSCHEDAPPT_GEN_ALL_CORE_FT
JENNIFER MOORE ; 02/06/2020 ; NPP [address]  JENNIFER MOORE ; 02/06/2020 ; NP [address]

## 2020-01-03 NOTE — DISCHARGE NOTE NURSING/CASE MANAGEMENT/SOCIAL WORK - PATIENT PORTAL LINK FT
You can access the FollowMyHealth Patient Portal offered by [facility] by registering at the following website: [email or web address]. By joining [facility]’s FollowMyHealth portal, you will also be able to view your health information using other applications (apps) compatible with our system.

## 2020-01-03 NOTE — DISCHARGE NOTE PROVIDER - NSDCMRMEDTOKEN_GEN_ALL_CORE_FT
Anoro Ellipta 62.5 mcg-25 mcg/inh inhalation powder: 1 puff(s) inhaled once a day  aspirin 81 mg oral tablet, chewable: 1 tab(s) orally once a day  atorvastatin 40 mg oral tablet: 1 tab(s) orally once a day  clopidogrel 75 mg oral tablet: 1 tab(s) orally once a day  Flomax 0.4 mg oral capsule: 1 cap(s) orally once a day  gabapentin 400 mg oral capsule: 1 cap(s) orally 3 times a day  isosorbide mononitrate 30 mg oral tablet, extended release: 1 tab(s) orally once a day  lisinopril 2.5 mg oral tablet: 1 tab(s) orally once a day  Metoprolol Tartrate 25 mg oral tablet: 1 tab(s) orally 2 times a day  Percocet 10/325 oral tablet:   ranolazine 500 mg oral tablet, extended release: 1 tab(s) orally 2 times a day

## 2020-01-03 NOTE — H&P PST ADULT - NEGATIVE NEUROLOGICAL SYMPTOMS
no transient paralysis/no vertigo/no loss of sensation/no difficulty walking/no headache/no focal seizures/no syncope/no tremors/no weakness/no paresthesias/no generalized seizures

## 2020-01-03 NOTE — DISCHARGE NOTE PROVIDER - HOSPITAL COURSE
HPI:    Narrative: This is a 75 yo white male with a PMHx of HTN, HLD, COPD, BPH, lung nodules, anemia and PCI and stents to the RCA. One YAYA to the RCA in 2002 and 2 YAYA to the RCA in 2019. Patiemnt ccontinues to c/o CP KELLY and some mild lightheadedness and palpations.          ASA 3    Mallampati II        Creat 0.61    Bleeding Risk 1.1%        Symptoms: CP and dyspnea         Anigina (Class): CCS 3         Ischemic Symptoms:         Heart Failure: None                 Assessment of LVEF (Must be within 6 months):         EF: 60         Assessed by: echo         Date: 10/12/2018        Prior Cardiac Interventions (LHC, stents, CABG):         PCI's (Date, Stents, Vessels): 2019 and 2002 SES to the RCA         CABG (Date, Grafts): None        Noninvasive Testing:     Stress Test: Date: NA             Echo (Date, Findings):     Normal LV cavity size    EF 60-65%    Mildly dilated LA    LA vol 36 ml/ml2    RA normal in size and structure    normal RV size and sys function    Mild dilatation of rhe ascending aorta    Mild to moderate aortic valve sclerosiss/calcification without any evidence of aortic stenosis    Trace aortic regurg    Mild thickening of the anterior and posterior mitral valve leaflets     Mild mitral valve regurg    Mild tricuspid regurg    Imteratrial and interventricular septa intact        Antianginal Therapies:          Beta Blockers:  Metoprolol         Calcium Channel Blockers: no         Long Acting Nitrates: Nitropatch         Ranexa: no (03 Jan 2020 08:32)        now s/p LHC  revealing moderate to sever diffuse disease to the LAD and LCX. Recommendation medical management - add Renexa and Imdur.                ASSESSMENT/PLAN:      Coronary artery disease    - mod-severe diffuse coronary artery disease    -Recovered patient for 2.5 hours    -Resumed cardiac diet    -Ambulated patient p 2.5 hours    -Discharged to home with escort     -Started Ranexa 500mg BID    -Started Imdur ER 30mg daily    -Stopped nitro-patch    -Resumed all other home medications    -Plan of care discussed with patient, family and MD    -Follow-up with attending cardiologist Dr Fink within 1 to 2 weeks    -Discussed therapeutic lifestyle changes to reduce risk factors such as following a cardiac diet, weight loss, maintaining a healthy weight, exercise, smoking cessation, medication compliance, and regular follow-up with MD to know your numbers (BP, cholesterol, weight, and glucose)

## 2020-01-03 NOTE — H&P PST ADULT - NSICDXPASTMEDICALHX_GEN_ALL_CORE_FT
PAST MEDICAL HISTORY:  Arthritis     CAD in native artery RCA 3 YAYA 1 in 2002 and 2 in 2019    HLD (hyperlipidemia)     HTN (hypertension)     Lumbosacral disc disease has nerve stimulator

## 2020-01-03 NOTE — H&P PST ADULT - ASSESSMENT
This is a 75 yo white male with a PMHx of HTN, HLD, COPD, BPH, lung nodules, anemia and PCI and stents to the RCA. One YAYA to the RCA in 2002 and 2 YAYA to the RCA in 2019. Patiemnt ccontinues to c/o CP KELLY and some mild lightheadedness and palpations.      Plan: [facility]

## 2020-01-03 NOTE — DISCHARGE NOTE PROVIDER - CARE PROVIDER_API CALL
Jassi Fink)  Cardiovascular Disease; Internal Medicine  [address] [place] [address]  Phone: [phone]  Fax: [phone]  Follow Up Time: 2 weeks

## 2020-01-06 PROBLEM — E78.5 HYPERLIPIDEMIA, UNSPECIFIED: Chronic | Status: ACTIVE | Noted: 2020-01-03

## 2020-01-06 PROBLEM — I25.10 ATHEROSCLEROTIC HEART DISEASE OF NATIVE CORONARY ARTERY WITHOUT ANGINA PECTORIS: Chronic | Status: ACTIVE | Noted: 2019-06-07

## 2020-01-06 PROBLEM — I10 ESSENTIAL (PRIMARY) HYPERTENSION: Chronic | Status: ACTIVE | Noted: 2020-01-03

## 2020-01-07 DIAGNOSIS — R07.89 OTHER CHEST PAIN: ICD-10-CM

## 2020-01-10 ENCOUNTER — APPOINTMENT (OUTPATIENT)
Dept: CARDIOLOGY | Facility: CLINIC | Age: 75
End: 2020-01-10
Payer: MEDICARE

## 2020-01-10 ENCOUNTER — NON-APPOINTMENT (OUTPATIENT)
Age: 75
End: 2020-01-10

## 2020-01-10 VITALS
BODY MASS INDEX: 26.83 KG/M2 | SYSTOLIC BLOOD PRESSURE: 94 MMHG | HEIGHT: 68 IN | DIASTOLIC BLOOD PRESSURE: 60 MMHG | HEART RATE: 76 BPM | WEIGHT: 177 LBS

## 2020-01-10 PROCEDURE — 99214 OFFICE O/P EST MOD 30 MIN: CPT

## 2020-01-10 PROCEDURE — 93000 ELECTROCARDIOGRAM COMPLETE: CPT

## 2020-01-10 RX ORDER — NITROGLYCERIN 40 MG/1
0.2 PATCH TRANSDERMAL DAILY
Qty: 90 | Refills: 3 | Status: DISCONTINUED | COMMUNITY
Start: 2019-12-30 | End: 2020-01-10

## 2020-01-10 RX ORDER — ISOSORBIDE MONONITRATE 30 MG/1
30 TABLET, EXTENDED RELEASE ORAL
Refills: 0 | Status: DISCONTINUED | COMMUNITY
End: 2020-01-10

## 2020-01-11 NOTE — PHYSICAL EXAM
[General Appearance - Well Developed] : well developed [Normal Oral Mucosa] : normal oral mucosa [Normal Conjunctiva] : the conjunctiva exhibited no abnormalities [General Appearance - In No Acute Distress] : no acute distress [] : no respiratory distress [Auscultation Breath Sounds / Voice Sounds] : lungs were clear to auscultation bilaterally [Heart Rate And Rhythm] : heart rate and rhythm were normal [Arterial Pulses Normal] : the arterial pulses were normal [Abnormal Walk] : normal gait [Bowel Sounds] : normal bowel sounds [Oriented To Time, Place, And Person] : oriented to person, place, and time [Skin Turgor] : normal skin turgor [Skin Color & Pigmentation] : normal skin color and pigmentation [Affect] : the affect was normal [Mood] : the mood was normal [FreeTextEntry1] : No edema

## 2020-01-11 NOTE — DISCUSSION/SUMMARY
[FreeTextEntry1] : 1 - Coronary artery disease status-post multi-stenting:  patient had cardiac catheterization on 1/3/2020.  Diffuse disease, medically treated with ranexa and imdur.  If medical therapy does not work, then patient will need to consider CABG.  Blood pressure a bit on the low side, most likely due to imdur.  Will discontinue imdur and increase ranexa to 1000mg BID.\par \par 2 - Follow up in 4 weeks.\par \par

## 2020-02-05 ENCOUNTER — FORM ENCOUNTER (OUTPATIENT)
Age: 75
End: 2020-02-05

## 2020-02-06 ENCOUNTER — OUTPATIENT (OUTPATIENT)
Dept: OUTPATIENT SERVICES | Facility: HOSPITAL | Age: 75
LOS: 1 days | End: 2020-02-06
Payer: MEDICARE

## 2020-02-06 ENCOUNTER — APPOINTMENT (OUTPATIENT)
Dept: CT IMAGING | Facility: CLINIC | Age: 75
End: 2020-02-06
Payer: MEDICARE

## 2020-02-06 VITALS — BODY MASS INDEX: 25.31 KG/M2 | WEIGHT: 167 LBS | HEIGHT: 68 IN

## 2020-02-06 DIAGNOSIS — F17.200 NICOTINE DEPENDENCE, UNSPECIFIED, UNCOMPLICATED: ICD-10-CM

## 2020-02-06 DIAGNOSIS — Z95.5 PRESENCE OF CORONARY ANGIOPLASTY IMPLANT AND GRAFT: Chronic | ICD-10-CM

## 2020-02-06 DIAGNOSIS — Z98.49 CATARACT EXTRACTION STATUS, UNSPECIFIED EYE: Chronic | ICD-10-CM

## 2020-02-06 PROCEDURE — G0296 VISIT TO DETERM LDCT ELIG: CPT

## 2020-02-06 PROCEDURE — G0297: CPT | Mod: 26

## 2020-02-06 PROCEDURE — G0297: CPT

## 2020-02-06 NOTE — DATA REVIEWED
[Lung Cancer Screening] : Patient underwent lung cancer screening [2] : 2 [S] : S [TextBox_12] : 07/16 [TextBox_57] : CT chest 2/19 - no change

## 2020-02-06 NOTE — HISTORY OF PRESENT ILLNESS
[TextBox_13] : Referred by Dr. Epi Fuller.\par \par Mr. MOORE is a 74 year old male with a history of CAD s/p stents, high cholesterol, HTN, COPD and emphysema.\par \par He was seen in the office today for review of eligibility for, as well as, Low-Dose CT lung cancer screening.  Reviewed and confirmed that the patient meets screening eligibility criteria:\par \par 74 years old \par \par Smoking Status: Former smoker\par \par Number of pack(s) per day: 2\par Number of years smoked: 50\par Number of pack years smokin\par \par Number of years since quitting smokin\par Quit year: \par \par Mr. MOORE denies any symptoms of lung cancer, including new cough, change in cough, hemoptysis, and unintentional weight loss.\par \par Mr. MOORE denies any personal history of lung cancer.  No lung cancer in a first degree relative.  Admits to exposure to asbestos.

## 2020-02-06 NOTE — PLAN
[FreeTextEntry1] : - Low Dose CT chest for lung cancer screening.\par \par - Encouraged continued smoking abstinence\par \par Engaged in share decision making with Mr. MOORE.  Answered all questions.  He verbalized understanding and agreement.  He knows to call back with any questions or concerns.\par

## 2020-02-11 ENCOUNTER — NON-APPOINTMENT (OUTPATIENT)
Age: 75
End: 2020-02-11

## 2020-02-14 ENCOUNTER — APPOINTMENT (OUTPATIENT)
Dept: CARDIOLOGY | Facility: CLINIC | Age: 75
End: 2020-02-14
Payer: MEDICARE

## 2020-02-14 ENCOUNTER — NON-APPOINTMENT (OUTPATIENT)
Age: 75
End: 2020-02-14

## 2020-02-14 VITALS
HEIGHT: 68 IN | BODY MASS INDEX: 25.46 KG/M2 | RESPIRATION RATE: 16 BRPM | DIASTOLIC BLOOD PRESSURE: 70 MMHG | SYSTOLIC BLOOD PRESSURE: 110 MMHG | HEART RATE: 71 BPM | WEIGHT: 168 LBS

## 2020-02-14 PROCEDURE — 99214 OFFICE O/P EST MOD 30 MIN: CPT

## 2020-02-14 PROCEDURE — 93000 ELECTROCARDIOGRAM COMPLETE: CPT

## 2020-02-14 NOTE — CARDIOLOGY SUMMARY
Lmom for pt with entire message. I instructed pt to call us with any questions or concerns. [___] : [unfilled]

## 2020-02-14 NOTE — PHYSICAL EXAM
[General Appearance - In No Acute Distress] : no acute distress [General Appearance - Well Developed] : well developed [Normal Conjunctiva] : the conjunctiva exhibited no abnormalities [Normal Oral Mucosa] : normal oral mucosa [] : no respiratory distress [Heart Rate And Rhythm] : heart rate and rhythm were normal [Arterial Pulses Normal] : the arterial pulses were normal [Bowel Sounds] : normal bowel sounds [Abnormal Walk] : normal gait [FreeTextEntry1] : No edema [Skin Color & Pigmentation] : normal skin color and pigmentation [Skin Turgor] : normal skin turgor [Oriented To Time, Place, And Person] : oriented to person, place, and time [Mood] : the mood was normal [Affect] : the affect was normal

## 2020-02-14 NOTE — HISTORY OF PRESENT ILLNESS
[FreeTextEntry1] : Mr. Henson presents today with continuing complaints of shortness of breath and chest pain despite the increase in Ranexa to 1000mg BID.  States he has noticed a very mild improvement, but still having symptoms.  Has been feeling very fatigued and could fall asleep at any given time.  This has all been a lot for him and is making him very depressed.

## 2020-02-14 NOTE — DISCUSSION/SUMMARY
[FreeTextEntry1] : Case and plan discussed with Dr. Fink.\par \par 1 - Chest pain/Shortness of breath: patient continues experiencing both shortness of breath, chest pain and occasional lightheadedness despite Ranexa 1000mg BID.  His most recent cardiac catheterization revealed an EF of 60% and diffuse disease.  Medical management was recommended.  If medical therapy failed then CABG would be considered.  I spoke with Dr. Mueller, to inform him that the patient is breaking through max doses of Ranexa.  Recommends CABG.  Patient stated he will discuss with his wife and will inform us of his decision.\par \par 2 - Hypertension:  blood pressure well controlled on current medications.  Advised to follow low sodium diet.\par \par 3 - Follow up in 6 weeks.

## 2020-03-10 ENCOUNTER — APPOINTMENT (OUTPATIENT)
Dept: CARDIOTHORACIC SURGERY | Facility: CLINIC | Age: 75
End: 2020-03-10
Payer: MEDICARE

## 2020-03-11 ENCOUNTER — APPOINTMENT (OUTPATIENT)
Dept: CARDIOTHORACIC SURGERY | Facility: CLINIC | Age: 75
End: 2020-03-11
Payer: MEDICARE

## 2020-03-11 VITALS
HEART RATE: 73 BPM | SYSTOLIC BLOOD PRESSURE: 149 MMHG | DIASTOLIC BLOOD PRESSURE: 81 MMHG | HEIGHT: 68 IN | RESPIRATION RATE: 16 BRPM | WEIGHT: 168 LBS | OXYGEN SATURATION: 95 % | BODY MASS INDEX: 25.46 KG/M2

## 2020-03-11 PROCEDURE — 99215 OFFICE O/P EST HI 40 MIN: CPT

## 2020-03-11 RX ORDER — TAMSULOSIN HYDROCHLORIDE 0.4 MG/1
0.4 CAPSULE ORAL
Refills: 0 | Status: COMPLETED | COMMUNITY
End: 2020-03-11

## 2020-03-17 NOTE — REVIEW OF SYSTEMS
[Feeling Poorly] : feeling poorly [Feeling Tired] : feeling tired [Chest Pain] : chest pain [Shortness Of Breath] : shortness of breath [SOB on Exertion] : shortness of breath during exertion [Negative] : Heme/Lymph [Palpitations] : no palpitations [Lower Ext Edema] : no extremity edema [Orthopnea] : no orthopnea

## 2020-03-17 NOTE — CONSULT LETTER
[FreeTextEntry2] : Malik Mueller MD [FreeTextEntry3] : Julian Hutchison MD\par  of Cardiothoracic Surgery\par [facility]\par [address] [place] [address] \[place] [address]\par [phone]\par

## 2020-03-17 NOTE — ASSESSMENT
[FreeTextEntry1] : Mr. MOORE is a 74 year old male referred by Dr. Mueller who presents for consultation. His past medical history includes HTN, HLD, CAD (PCI RCA 2002 and again in 2019).\par \par He reports today with complaints of chest discomfort since June and feeling fatigued and generally poor.  He began feeling better after stent placed in June and then in January he began experiencing the symptoms again.  A cardiac cath was performed by Dr Mueller revealing multivessel disease.  He is now having shortness of breath accompanied by chest pain at rest.\par \par After reviewing the cardiac catheterization images it is clear that he has multivessel disease and will require coronary artery bypass grafting.  We discussed the findings together and he and his wife would like to proceed with surgical correction.  All risks, benefits, and alternatives discussed at length with patient overall risk of surgery 2%.  We also discussed his COPD issues and recent pulmonary function testing and possible requirement of aggressive pulmonary treatments post operatively.  All questions addressed.  Patient and spouse would like to proceed with surgical intervention as discussed.\par \par PLAN:\par - Coronary Artery Bypass Grafting  \par - Carotid Doppler Studies\par - Transthoracic Echocardiogram \par \par \par Written by Rd Abebe NP acting as a scribe for Dr. Hutchison. \par “The documentation recorded by the scribe accurately reflects the service I personally performed and the decisions made by me.” \par Julian Hutchison MD. \par \par \par \par \par

## 2020-03-17 NOTE — DATA REVIEWED
[FreeTextEntry1] : Cardiac Catheterization from 01/03/20 at [facility]\par EF estimated was 60 %.\par LM: There was a 30 % stenosis.\par Ostial LAD: There was a 60 % stenosis.  Proximal LAD: There was a 50 % stenosis. The lesion was moderately calcified.\par Mid LAD: There was a 70 % stenosis. The lesion was heavily calcified.\par D1: There was a 70 % stenosis.\par Ostial circumflex: There was a 70 % stenosis.\par Proximal RCA: There was a 50 % stenosis in-stent.  Mid RCA: There was a 0 % stenosis in-stent.\par DIAGNOSTIC RECOMMENDATIONS: Considering diffuse disease, would initially prefer to manage with medical therapy. Will add imdur and Ranexa. If has failure of therapy, will have to consider CABG.\par LM is heavily calcified but non-obstructive. LAD is diffusely diseased. iFr in mid LAD approximately 0.78, proximal LAD 0.86. Has diagonal disease and circumflex disease.\par

## 2020-03-17 NOTE — PHYSICAL EXAM
[General Appearance - Well Nourished] : well nourished [General Appearance - Well Developed] : well developed [Sclera] : the sclera and conjunctiva were normal [Outer Ear] : the ears and nose were normal in appearance [Neck Appearance] : the appearance of the neck was normal [Jugular Venous Distention Increased] : there was no jugular-venous distention [Respiration, Rhythm And Depth] : normal respiratory rhythm and effort [Auscultation Breath Sounds / Voice Sounds] : lungs were clear to auscultation bilaterally [Heart Rate And Rhythm] : heart rate was normal and rhythm regular [Heart Sounds] : normal S1 and S2 [Examination Of The Chest] : the chest was normal in appearance [Chest Visual Inspection Thoracic Asymmetry] : no chest asymmetry [2+] : left 2+ [Abnormal Walk] : normal gait [Motor Tone] : muscle strength and tone were normal [Skin Color & Pigmentation] : normal skin color and pigmentation [Sensation] : the sensory exam was normal to light touch and pinprick [Motor Exam] : the motor exam was normal [Oriented To Time, Place, And Person] : oriented to person, place, and time [Impaired Insight] : insight and judgment were intact [Fingers] :  capillary refill of the fingers was normal

## 2020-03-19 ENCOUNTER — APPOINTMENT (OUTPATIENT)
Dept: PULMONOLOGY | Facility: CLINIC | Age: 75
End: 2020-03-19
Payer: MEDICARE

## 2020-03-19 ENCOUNTER — OUTPATIENT (OUTPATIENT)
Dept: OUTPATIENT SERVICES | Facility: HOSPITAL | Age: 75
LOS: 1 days | End: 2020-03-19
Payer: MEDICARE

## 2020-03-19 ENCOUNTER — RESULT REVIEW (OUTPATIENT)
Age: 75
End: 2020-03-19

## 2020-03-19 VITALS
RESPIRATION RATE: 14 BRPM | SYSTOLIC BLOOD PRESSURE: 110 MMHG | HEART RATE: 70 BPM | HEIGHT: 68 IN | TEMPERATURE: 98 F | DIASTOLIC BLOOD PRESSURE: 70 MMHG | WEIGHT: 170.42 LBS

## 2020-03-19 DIAGNOSIS — Z98.49 CATARACT EXTRACTION STATUS, UNSPECIFIED EYE: Chronic | ICD-10-CM

## 2020-03-19 DIAGNOSIS — I25.10 ATHEROSCLEROTIC HEART DISEASE OF NATIVE CORONARY ARTERY WITHOUT ANGINA PECTORIS: ICD-10-CM

## 2020-03-19 DIAGNOSIS — I65.29 OCCLUSION AND STENOSIS OF UNSPECIFIED CAROTID ARTERY: ICD-10-CM

## 2020-03-19 DIAGNOSIS — I26.99 OTHER PULMONARY EMBOLISM WITHOUT ACUTE COR PULMONALE: ICD-10-CM

## 2020-03-19 DIAGNOSIS — Z95.5 PRESENCE OF CORONARY ANGIOPLASTY IMPLANT AND GRAFT: Chronic | ICD-10-CM

## 2020-03-19 DIAGNOSIS — Z01.818 ENCOUNTER FOR OTHER PREPROCEDURAL EXAMINATION: ICD-10-CM

## 2020-03-19 DIAGNOSIS — Z29.9 ENCOUNTER FOR PROPHYLACTIC MEASURES, UNSPECIFIED: ICD-10-CM

## 2020-03-19 LAB
ALBUMIN SERPL ELPH-MCNC: 4.3 G/DL — SIGNIFICANT CHANGE UP (ref 3.3–5.2)
ALP SERPL-CCNC: 108 U/L — SIGNIFICANT CHANGE UP (ref 40–120)
ALT FLD-CCNC: 34 U/L — SIGNIFICANT CHANGE UP
ANION GAP SERPL CALC-SCNC: 11 MMOL/L — SIGNIFICANT CHANGE UP (ref 5–17)
APPEARANCE UR: CLEAR — SIGNIFICANT CHANGE UP
APTT BLD: 34 SEC — SIGNIFICANT CHANGE UP (ref 27.5–36.3)
AST SERPL-CCNC: 25 U/L — SIGNIFICANT CHANGE UP
BASOPHILS # BLD AUTO: 0.03 K/UL — SIGNIFICANT CHANGE UP (ref 0–0.2)
BASOPHILS NFR BLD AUTO: 0.6 % — SIGNIFICANT CHANGE UP (ref 0–2)
BILIRUB SERPL-MCNC: 0.4 MG/DL — SIGNIFICANT CHANGE UP (ref 0.4–2)
BILIRUB UR-MCNC: NEGATIVE — SIGNIFICANT CHANGE UP
BLD GP AB SCN SERPL QL: SIGNIFICANT CHANGE UP
BUN SERPL-MCNC: 15 MG/DL — SIGNIFICANT CHANGE UP (ref 8–20)
CALCIUM SERPL-MCNC: 9.5 MG/DL — SIGNIFICANT CHANGE UP (ref 8.6–10.2)
CHLORIDE SERPL-SCNC: 98 MMOL/L — SIGNIFICANT CHANGE UP (ref 98–107)
CO2 SERPL-SCNC: 27 MMOL/L — SIGNIFICANT CHANGE UP (ref 22–29)
COLOR SPEC: YELLOW — SIGNIFICANT CHANGE UP
CREAT SERPL-MCNC: 0.62 MG/DL — SIGNIFICANT CHANGE UP (ref 0.5–1.3)
DIFF PNL FLD: NEGATIVE — SIGNIFICANT CHANGE UP
EOSINOPHIL # BLD AUTO: 0.36 K/UL — SIGNIFICANT CHANGE UP (ref 0–0.5)
EOSINOPHIL NFR BLD AUTO: 7.6 % — HIGH (ref 0–6)
GLUCOSE SERPL-MCNC: 86 MG/DL — SIGNIFICANT CHANGE UP (ref 70–99)
GLUCOSE UR QL: NEGATIVE MG/DL — SIGNIFICANT CHANGE UP
HBA1C BLD-MCNC: 5.8 % — HIGH (ref 4–5.6)
HCT VFR BLD CALC: 36.8 % — LOW (ref 39–50)
HGB BLD-MCNC: 11.7 G/DL — LOW (ref 13–17)
IMM GRANULOCYTES NFR BLD AUTO: 0.2 % — SIGNIFICANT CHANGE UP (ref 0–1.5)
INR BLD: 1.02 RATIO — SIGNIFICANT CHANGE UP (ref 0.88–1.16)
KETONES UR-MCNC: NEGATIVE — SIGNIFICANT CHANGE UP
LEUKOCYTE ESTERASE UR-ACNC: NEGATIVE — SIGNIFICANT CHANGE UP
LYMPHOCYTES # BLD AUTO: 1.15 K/UL — SIGNIFICANT CHANGE UP (ref 1–3.3)
LYMPHOCYTES # BLD AUTO: 24.3 % — SIGNIFICANT CHANGE UP (ref 13–44)
MCHC RBC-ENTMCNC: 31 PG — SIGNIFICANT CHANGE UP (ref 27–34)
MCHC RBC-ENTMCNC: 31.8 GM/DL — LOW (ref 32–36)
MCV RBC AUTO: 97.4 FL — SIGNIFICANT CHANGE UP (ref 80–100)
MONOCYTES # BLD AUTO: 0.51 K/UL — SIGNIFICANT CHANGE UP (ref 0–0.9)
MONOCYTES NFR BLD AUTO: 10.8 % — SIGNIFICANT CHANGE UP (ref 2–14)
MRSA PCR RESULT.: SIGNIFICANT CHANGE UP
NEUTROPHILS # BLD AUTO: 2.68 K/UL — SIGNIFICANT CHANGE UP (ref 1.8–7.4)
NEUTROPHILS NFR BLD AUTO: 56.5 % — SIGNIFICANT CHANGE UP (ref 43–77)
NITRITE UR-MCNC: NEGATIVE — SIGNIFICANT CHANGE UP
NT-PROBNP SERPL-SCNC: 204 PG/ML — SIGNIFICANT CHANGE UP (ref 0–300)
PH UR: 6 — SIGNIFICANT CHANGE UP (ref 5–8)
PLATELET # BLD AUTO: 256 K/UL — SIGNIFICANT CHANGE UP (ref 150–400)
POTASSIUM SERPL-MCNC: 5.3 MMOL/L — SIGNIFICANT CHANGE UP (ref 3.5–5.3)
POTASSIUM SERPL-SCNC: 5.3 MMOL/L — SIGNIFICANT CHANGE UP (ref 3.5–5.3)
PREALB SERPL-MCNC: 23 MG/DL — SIGNIFICANT CHANGE UP (ref 18–38)
PROT SERPL-MCNC: 7 G/DL — SIGNIFICANT CHANGE UP (ref 6.6–8.7)
PROT UR-MCNC: NEGATIVE MG/DL — SIGNIFICANT CHANGE UP
PROTHROM AB SERPL-ACNC: 11.5 SEC — SIGNIFICANT CHANGE UP (ref 10–12.9)
RBC # BLD: 3.78 M/UL — LOW (ref 4.2–5.8)
RBC # FLD: 15 % — HIGH (ref 10.3–14.5)
S AUREUS DNA NOSE QL NAA+PROBE: DETECTED
SODIUM SERPL-SCNC: 136 MMOL/L — SIGNIFICANT CHANGE UP (ref 135–145)
SP GR SPEC: 1.01 — SIGNIFICANT CHANGE UP (ref 1.01–1.02)
T3 SERPL-MCNC: 132 NG/DL — SIGNIFICANT CHANGE UP (ref 80–200)
T4 AB SER-ACNC: 6.6 UG/DL — SIGNIFICANT CHANGE UP (ref 4.5–12)
TSH SERPL-MCNC: 1.93 UIU/ML — SIGNIFICANT CHANGE UP (ref 0.27–4.2)
UROBILINOGEN FLD QL: NEGATIVE MG/DL — SIGNIFICANT CHANGE UP
WBC # BLD: 4.74 K/UL — SIGNIFICANT CHANGE UP (ref 3.8–10.5)
WBC # FLD AUTO: 4.74 K/UL — SIGNIFICANT CHANGE UP (ref 3.8–10.5)

## 2020-03-19 PROCEDURE — 84480 ASSAY TRIIODOTHYRONINE (T3): CPT

## 2020-03-19 PROCEDURE — 93880 EXTRACRANIAL BILAT STUDY: CPT

## 2020-03-19 PROCEDURE — 85018 HEMOGLOBIN: CPT | Mod: QW

## 2020-03-19 PROCEDURE — 93306 TTE W/DOPPLER COMPLETE: CPT

## 2020-03-19 PROCEDURE — 36415 COLL VENOUS BLD VENIPUNCTURE: CPT

## 2020-03-19 PROCEDURE — 87086 URINE CULTURE/COLONY COUNT: CPT

## 2020-03-19 PROCEDURE — 85730 THROMBOPLASTIN TIME PARTIAL: CPT

## 2020-03-19 PROCEDURE — 81003 URINALYSIS AUTO W/O SCOPE: CPT

## 2020-03-19 PROCEDURE — 85610 PROTHROMBIN TIME: CPT

## 2020-03-19 PROCEDURE — G0463: CPT | Mod: 25

## 2020-03-19 PROCEDURE — 93005 ELECTROCARDIOGRAM TRACING: CPT | Mod: XU

## 2020-03-19 PROCEDURE — 84436 ASSAY OF TOTAL THYROXINE: CPT

## 2020-03-19 PROCEDURE — 94727 GAS DIL/WSHOT DETER LNG VOL: CPT

## 2020-03-19 PROCEDURE — 94010 BREATHING CAPACITY TEST: CPT

## 2020-03-19 PROCEDURE — 86900 BLOOD TYPING SEROLOGIC ABO: CPT

## 2020-03-19 PROCEDURE — 87640 STAPH A DNA AMP PROBE: CPT

## 2020-03-19 PROCEDURE — 93880 EXTRACRANIAL BILAT STUDY: CPT | Mod: 26

## 2020-03-19 PROCEDURE — 83036 HEMOGLOBIN GLYCOSYLATED A1C: CPT

## 2020-03-19 PROCEDURE — 93306 TTE W/DOPPLER COMPLETE: CPT | Mod: 26

## 2020-03-19 PROCEDURE — 86850 RBC ANTIBODY SCREEN: CPT

## 2020-03-19 PROCEDURE — 87641 MR-STAPH DNA AMP PROBE: CPT

## 2020-03-19 PROCEDURE — 84443 ASSAY THYROID STIM HORMONE: CPT

## 2020-03-19 PROCEDURE — 84134 ASSAY OF PREALBUMIN: CPT

## 2020-03-19 PROCEDURE — 94729 DIFFUSING CAPACITY: CPT

## 2020-03-19 PROCEDURE — 85027 COMPLETE CBC AUTOMATED: CPT

## 2020-03-19 PROCEDURE — 71046 X-RAY EXAM CHEST 2 VIEWS: CPT | Mod: 26

## 2020-03-19 PROCEDURE — 86901 BLOOD TYPING SEROLOGIC RH(D): CPT

## 2020-03-19 PROCEDURE — 93010 ELECTROCARDIOGRAM REPORT: CPT

## 2020-03-19 PROCEDURE — 80053 COMPREHEN METABOLIC PANEL: CPT

## 2020-03-19 PROCEDURE — 71046 X-RAY EXAM CHEST 2 VIEWS: CPT

## 2020-03-19 PROCEDURE — 83880 ASSAY OF NATRIURETIC PEPTIDE: CPT

## 2020-03-19 RX ORDER — MUPIROCIN 20 MG/G
1 OINTMENT TOPICAL
Qty: 1 | Refills: 0
Start: 2020-03-19 | End: 2020-03-23

## 2020-03-19 NOTE — H&P PST ADULT - HISTORY OF PRESENT ILLNESS
Pain (4 - 6)        HPI:  Narrative: This is a 75 yo white male with a PMHx of HTN, HLD, COPD, BPH, lung  nodules, anemia and PCI and stents to the RCA. One YAYA to the RCA in 2002 and 2  YAYA to the RCA in 2019. Patiemnt ccontinues to c/o CP KELLY and some mild  lightheadedness and palpations.    ASA 3    Assessment and Plan:  - Assessment   74 year old male with h/o HTN, HLD, lumbosacral disc disease with nerve  stimulator and CAD s/p RCA stent (per pt and done in [place]) who c/o  retrosternal chest pain. For LHC to assess coronary arteries.  s/p LHC and intervention s/p YAYA x 2 RCA  No overnight events  Presents this am feeling well Reports no chest pain or SOB  a/p CAD with stents to RCA via RRA  - ASA/ plavix reinforced  - Continue statin , BB and ACE-1  _ Procedure results medications and follow up d/w pt  -Wrist precautions  - Follow up with Cardiologist 1 week 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 HLD, lumbosacral disc disease with a nerve stimulator,  COPD, BPH. anemia , stents RCA . Patient c/o chest discomfort with chest pain with activity, walking and lifting . Patient state his pain ranges from 3/10 to 4/10 denies nausea . Patient also reports lightheadedness when standing up from a seated  position. Patient presents to PST for evaluation for a CABG X4  with Dr Hutchison on 03/23/20

## 2020-03-19 NOTE — H&P PST ADULT - ASSESSMENT
OPIOID RISK TOOL    MIRA EACH BOX THAT APPLIES AND ADD TOTALS AT THE END    FAMILY HISTORY OF SUBSTANCE ABUSE                 FEMALE         MALE                                                Alcohol                             [  ]1 pt          [  ]3pts                                               Illegal Drugs                     [  ]2 pts        [  ]3pts                                               Rx Drugs                           [  ]4 pts        [  ]4 pts    PERSONAL HISTORY OF SUBSTANCE ABUSE                                                                                          Alcohol                             [  ]3 pts       [  ]3 pts                                               Illegal Drugs                     [  ]4 pts        [  ]4 pts                                               Rx Drugs                           [  ]5 pts        [  ]5 pts    AGE BETWEEN 16-45 YEARS                                      [  ]1 pt         [  ]1 pt    HISTORY OF PREADOLESCENT   SEXUAL ABUSE                                                             [  ]3 pts        [  ]0pts    PSYCHOLOGICAL DISEASE                     ADD, OCD, Bipolar, Schizophrenia        [  ]2 pts         [  ]2 pts                      Depression                                               [  ]1 pt           [  ]1 pt           SCORING TOTAL   (add numbers and type here)              (**0*)                                     A score of 3 or lower indicated LOW risk for future opioid abuse  A score of 4 to 7 indicated moderate risk for future opioid abuse  A score of 8 or higher indicates a high risk for opioid abuse      CAPRINI VTE 2.0 SCORE [CLOT updated 2019]    AGE RELATED RISK FACTORS                                                       MOBILITY RELATED FACTORS  [ ] Age 41-60 years                                            (1 Point)                    [ ] Bed rest                                                        (1 Point)  [ x] Age: 61-74 years                                           (2 Points)                  [ ] Plaster cast                                                   (2 Points)  [ ] Age= 75 years                                              (3 Points)                    [ ] Bed bound for more than 72 hours                 (2 Points)    DISEASE RELATED RISK FACTORS                                               GENDER SPECIFIC FACTORS  [ ] Edema in the lower extremities                       (1 Point)              [ ] Pregnancy                                                     (1 Point)  [ ] Varicose veins                                               (1 Point)                     [ ] Post-partum < 6 weeks                                   (1 Point)             [ ] BMI > 25 Kg/m2                                            (1 Point)                     [ ] Hormonal therapy  or oral contraception          (1 Point)                 [ ] Sepsis (in the previous month)                        (1 Point)               [ ] History of pregnancy complications                 (1 point)  [ ] Pneumonia or serious lung disease                                               [ ] Unexplained or recurrent                     (1 Point)           (in the previous month)                               (1 Point)  [ ] Abnormal pulmonary function test                     (1 Point)                 SURGERY RELATED RISK FACTORS  [ ] Acute myocardial infarction                              (1 Point)               [ ]  Section                                             (1 Point)  [ ] Congestive heart failure (in the previous month)  (1 Point)      [ ] Minor surgery                                                  (1 Point)   [ ] Inflammatory bowel disease                             (1 Point)               [ ] Arthroscopic surgery                                        (2 Points)  [ ] Central venous access                                      (2 Points)                [x ] General surgery lasting more than 45 minutes (2 points)  [ ] Malignancy- Present or previous                   (2 Points)                [ ] Elective arthroplasty                                         (5 points)    [ ] Stroke (in the previous month)                          (5 Points)                                                                                                                                                           HEMATOLOGY RELATED FACTORS                                                 TRAUMA RELATED RISK FACTORS  [x ] Prior episodes of VTE                                     (3 Points)                [ ] Fracture of the hip, pelvis, or leg                       (5 Points)  [ ] Positive family history for VTE                         (3 Points)             [ ] Acute spinal cord injury (in the previous month)  (5 Points)  [ ] Prothrombin 31067 A                                     (3 Points)               [ ] Paralysis  (less than 1 month)                             (5 Points)  [ ] Factor V Leiden                                             (3 Points)                  [ ] Multiple Trauma within 1 month                        (5 Points)  [ ] Lupus anticoagulants                                     (3 Points)                                                           [ ] Anticardiolipin antibodies                               (3 Points)                                                       [ ] High homocysteine in the blood                      (3 Points)                                             [ ] Other congenital or acquired thrombophilia      (3 Points)                                                [ ] Heparin induced thrombocytopenia                  (3 Points)                                     Total Score [    7      ] OPIOID RISK TOOL    MIRA EACH BOX THAT APPLIES AND ADD TOTALS AT THE END    FAMILY HISTORY OF SUBSTANCE ABUSE                 FEMALE         MALE                                                Alcohol                             [  ]1 pt          [  ]3pts                                               Illegal Drugs                     [  ]2 pts        [  ]3pts                                               Rx Drugs                           [  ]4 pts        [  ]4 pts    PERSONAL HISTORY OF SUBSTANCE ABUSE                                                                                          Alcohol                             [  ]3 pts       [  ]3 pts                                               Illegal Drugs                     [  ]4 pts        [  ]4 pts                                               Rx Drugs                           [  ]5 pts        [  ]5 pts    AGE BETWEEN 16-45 YEARS                                      [  ]1 pt         [  ]1 pt    HISTORY OF PREADOLESCENT   SEXUAL ABUSE                                                             [  ]3 pts        [  ]0pts    PSYCHOLOGICAL DISEASE                     ADD, OCD, Bipolar, Schizophrenia        [  ]2 pts         [  ]2 pts                      Depression                                               [  ]1 pt           [  ]1 pt           SCORING TOTAL   (add numbers and type here)              (**0*)                                     A score of 3 or lower indicated LOW risk for future opioid abuse  A score of 4 to 7 indicated moderate risk for future opioid abuse  A score of 8 or higher indicates a high risk for opioid abuse      CAPRINI VTE 2.0 SCORE [CLOT updated 2019]    AGE RELATED RISK FACTORS                                                       MOBILITY RELATED FACTORS  [ ] Age 41-60 years                                            (1 Point)                    [ ] Bed rest                                                        (1 Point)  [ x] Age: 61-74 years                                           (2 Points)                  [ ] Plaster cast                                                   (2 Points)  [ ] Age= 75 years                                              (3 Points)                    [ ] Bed bound for more than 72 hours                 (2 Points)    DISEASE RELATED RISK FACTORS                                               GENDER SPECIFIC FACTORS  [ ] Edema in the lower extremities                       (1 Point)              [ ] Pregnancy                                                     (1 Point)  [ ] Varicose veins                                               (1 Point)                     [ ] Post-partum < 6 weeks                                   (1 Point)             [ ] BMI > 25 Kg/m2                                            (1 Point)                     [ ] Hormonal therapy  or oral contraception          (1 Point)                 [ ] Sepsis (in the previous month)                        (1 Point)               [ ] History of pregnancy complications                 (1 point)  [ ] Pneumonia or serious lung disease                                               [ ] Unexplained or recurrent                     (1 Point)           (in the previous month)                               (1 Point)  [ ] Abnormal pulmonary function test                     (1 Point)                 SURGERY RELATED RISK FACTORS  [ ] Acute myocardial infarction                              (1 Point)               [ ]  Section                                             (1 Point)  [ ] Congestive heart failure (in the previous month)  (1 Point)      [ ] Minor surgery                                                  (1 Point)   [ ] Inflammatory bowel disease                             (1 Point)               [ ] Arthroscopic surgery                                        (2 Points)  [ ] Central venous access                                      (2 Points)                [x ] General surgery lasting more than 45 minutes (2 points)  [ ] Malignancy- Present or previous                   (2 Points)                [ ] Elective arthroplasty                                         (5 points)    [ ] Stroke (in the previous month)                          (5 Points)                                                                                                                                                           HEMATOLOGY RELATED FACTORS                                                 TRAUMA RELATED RISK FACTORS  [x ] Prior episodes of VTE                                     (3 Points)                [ ] Fracture of the hip, pelvis, or leg                       (5 Points)  [ ] Positive family history for VTE                         (3 Points)             [ ] Acute spinal cord injury (in the previous month)  (5 Points)  [ ] Prothrombin 01337 A                                     (3 Points)               [ ] Paralysis  (less than 1 month)                             (5 Points)  [ ] Factor V Leiden                                             (3 Points)                  [ ] Multiple Trauma within 1 month                        (5 Points)  [ ] Lupus anticoagulants                                     (3 Points)                                                           [ ] Anticardiolipin antibodies                               (3 Points)                                                       [ ] High homocysteine in the blood                      (3 Points)                                             [ ] Other congenital or acquired thrombophilia      (3 Points)                                                [ ] Heparin induced thrombocytopenia                  (3 Points)                                     Total Score [    7      ]    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 HLD, lumbosacral disc disease with a nerve stimulator,  COPD, BPH. anemia , stents RCA . Patient c/o chest discomfort with chest pain with activity, walking and lifting . Patient state his pain ranges from 3/10 to 4/10 denies nausea . Patient also reports lightheadedness when standing up from a seated  position. Patient presents to PST for evaluation for a CABG X4  with Dr Hutchison on 20     Patient educated on pre op prep with written and verbal instruction

## 2020-03-19 NOTE — PATIENT PROFILE ADULT - ANY SIGNIFICANT CHANGE IN ABILITY TO PERFORM THE FOLLOWING ADL SINCE THE ONSET OF PRESENT ILLNESS?
Date & Time: 3/15/2022, 9:15 AM  Patient: Yousuf Hunt  Encounter Provider(s):    Krystal Sargent PA-C       To Whom It May Concern:    Yamilka Garcia was seen and treated in our department on 3/15/2022. She should not return to work until 3/16/22.     If you have any questions or concerns, please do not hesitate to call.        _____________________________  Physician/APC Signature
no

## 2020-03-19 NOTE — H&P PST ADULT - ATTENDING COMMENTS
risks, benefits, and alternatives of surgery was discussed with patient and family. Patient continuing to have active chest pain which is increasing.

## 2020-03-19 NOTE — H&P PST ADULT - NSICDXPROBLEM_GEN_ALL_CORE_FT
PROBLEM DIAGNOSES  Problem: Need for prophylactic measure  Assessment and Plan: PROBLEM DIAGNOSES  Problem: Arteriosclerotic heart disease (ASHD)  Assessment and Plan: pt is having cabg x3 with Dr Hutchison on 03/23/20    Problem: Need for prophylactic measure  Assessment and Plan: patient caprini score is 7 high VTE risk SCD's ordered surgical team to assess the need for pharm proph

## 2020-03-20 DIAGNOSIS — R78.81 BACTEREMIA: ICD-10-CM

## 2020-03-20 DIAGNOSIS — B95.61 BACTEREMIA: ICD-10-CM

## 2020-03-20 LAB
CULTURE RESULTS: SIGNIFICANT CHANGE UP
SPECIMEN SOURCE: SIGNIFICANT CHANGE UP

## 2020-03-23 ENCOUNTER — INPATIENT (INPATIENT)
Facility: HOSPITAL | Age: 75
LOS: 4 days | Discharge: HOME | DRG: 235 | End: 2020-03-28
Attending: THORACIC SURGERY (CARDIOTHORACIC VASCULAR SURGERY) | Admitting: THORACIC SURGERY (CARDIOTHORACIC VASCULAR SURGERY)
Payer: MEDICARE

## 2020-03-23 ENCOUNTER — RESULT REVIEW (OUTPATIENT)
Age: 75
End: 2020-03-23

## 2020-03-23 ENCOUNTER — APPOINTMENT (OUTPATIENT)
Dept: CARDIOTHORACIC SURGERY | Facility: HOSPITAL | Age: 75
End: 2020-03-23

## 2020-03-23 VITALS
SYSTOLIC BLOOD PRESSURE: 152 MMHG | OXYGEN SATURATION: 99 % | TEMPERATURE: 98 F | RESPIRATION RATE: 16 BRPM | WEIGHT: 171.52 LBS | DIASTOLIC BLOOD PRESSURE: 71 MMHG | HEART RATE: 70 BPM | HEIGHT: 68 IN

## 2020-03-23 DIAGNOSIS — Z95.5 PRESENCE OF CORONARY ANGIOPLASTY IMPLANT AND GRAFT: Chronic | ICD-10-CM

## 2020-03-23 DIAGNOSIS — Z98.49 CATARACT EXTRACTION STATUS, UNSPECIFIED EYE: Chronic | ICD-10-CM

## 2020-03-23 DIAGNOSIS — I25.10 ATHEROSCLEROTIC HEART DISEASE OF NATIVE CORONARY ARTERY WITHOUT ANGINA PECTORIS: ICD-10-CM

## 2020-03-23 LAB
ABO RH CONFIRMATION: SIGNIFICANT CHANGE UP
ALBUMIN SERPL ELPH-MCNC: 3.1 G/DL — LOW (ref 3.3–5.2)
ALP SERPL-CCNC: 80 U/L — SIGNIFICANT CHANGE UP (ref 40–120)
ALT FLD-CCNC: 21 U/L — SIGNIFICANT CHANGE UP
ANION GAP SERPL CALC-SCNC: 12 MMOL/L — SIGNIFICANT CHANGE UP (ref 5–17)
APTT BLD: 31.7 SEC — SIGNIFICANT CHANGE UP (ref 27.5–36.3)
AST SERPL-CCNC: 40 U/L — HIGH
BILIRUB SERPL-MCNC: 1.1 MG/DL — SIGNIFICANT CHANGE UP (ref 0.4–2)
BUN SERPL-MCNC: 10 MG/DL — SIGNIFICANT CHANGE UP (ref 8–20)
CALCIUM SERPL-MCNC: 8.8 MG/DL — SIGNIFICANT CHANGE UP (ref 8.6–10.2)
CHLORIDE SERPL-SCNC: 104 MMOL/L — SIGNIFICANT CHANGE UP (ref 98–107)
CK MB CFR SERPL CALC: 34.2 NG/ML — HIGH (ref 0–6.7)
CK SERPL-CCNC: 341 U/L — HIGH (ref 30–200)
CO2 SERPL-SCNC: 23 MMOL/L — SIGNIFICANT CHANGE UP (ref 22–29)
CREAT SERPL-MCNC: 0.46 MG/DL — LOW (ref 0.5–1.3)
GAS PNL BLDA: SIGNIFICANT CHANGE UP
GLUCOSE BLDC GLUCOMTR-MCNC: 123 MG/DL — HIGH (ref 70–99)
GLUCOSE BLDC GLUCOMTR-MCNC: 124 MG/DL — HIGH (ref 70–99)
GLUCOSE BLDC GLUCOMTR-MCNC: 148 MG/DL — HIGH (ref 70–99)
GLUCOSE SERPL-MCNC: 168 MG/DL — HIGH (ref 70–99)
HCT VFR BLD CALC: 33.1 % — LOW (ref 39–50)
HGB BLD-MCNC: 11.2 G/DL — LOW (ref 13–17)
INR BLD: 1.47 RATIO — HIGH (ref 0.88–1.16)
MAGNESIUM SERPL-MCNC: 1.5 MG/DL — LOW (ref 1.6–2.6)
MCHC RBC-ENTMCNC: 31.1 PG — SIGNIFICANT CHANGE UP (ref 27–34)
MCHC RBC-ENTMCNC: 33.8 GM/DL — SIGNIFICANT CHANGE UP (ref 32–36)
MCV RBC AUTO: 91.9 FL — SIGNIFICANT CHANGE UP (ref 80–100)
PLATELET # BLD AUTO: 125 K/UL — LOW (ref 150–400)
POTASSIUM SERPL-MCNC: 4.5 MMOL/L — SIGNIFICANT CHANGE UP (ref 3.5–5.3)
POTASSIUM SERPL-SCNC: 4.5 MMOL/L — SIGNIFICANT CHANGE UP (ref 3.5–5.3)
PROT SERPL-MCNC: 4.9 G/DL — LOW (ref 6.6–8.7)
PROTHROM AB SERPL-ACNC: 16.8 SEC — HIGH (ref 10–12.9)
RBC # BLD: 3.6 M/UL — LOW (ref 4.2–5.8)
RBC # FLD: 15.5 % — HIGH (ref 10.3–14.5)
SODIUM SERPL-SCNC: 139 MMOL/L — SIGNIFICANT CHANGE UP (ref 135–145)
TROPONIN T SERPL-MCNC: 0.89 NG/ML — HIGH (ref 0–0.06)
WBC # BLD: 13.34 K/UL — HIGH (ref 3.8–10.5)
WBC # FLD AUTO: 13.34 K/UL — HIGH (ref 3.8–10.5)

## 2020-03-23 PROCEDURE — 93010 ELECTROCARDIOGRAM REPORT: CPT

## 2020-03-23 PROCEDURE — 71045 X-RAY EXAM CHEST 1 VIEW: CPT | Mod: 26

## 2020-03-23 PROCEDURE — 33508 ENDOSCOPIC VEIN HARVEST: CPT

## 2020-03-23 PROCEDURE — 76998 US GUIDE INTRAOP: CPT | Mod: 26,AS,59

## 2020-03-23 PROCEDURE — 33533 CABG ARTERIAL SINGLE: CPT | Mod: AS

## 2020-03-23 PROCEDURE — 88305 TISSUE EXAM BY PATHOLOGIST: CPT | Mod: 26

## 2020-03-23 PROCEDURE — 76998 US GUIDE INTRAOP: CPT | Mod: 26,59

## 2020-03-23 PROCEDURE — 33518 CABG ARTERY-VEIN TWO: CPT

## 2020-03-23 PROCEDURE — 33518 CABG ARTERY-VEIN TWO: CPT | Mod: AS

## 2020-03-23 PROCEDURE — 33533 CABG ARTERIAL SINGLE: CPT

## 2020-03-23 RX ORDER — SODIUM CHLORIDE 9 MG/ML
3 INJECTION INTRAMUSCULAR; INTRAVENOUS; SUBCUTANEOUS EVERY 8 HOURS
Refills: 0 | Status: DISCONTINUED | OUTPATIENT
Start: 2020-03-23 | End: 2020-03-23

## 2020-03-23 RX ORDER — SODIUM CHLORIDE 9 MG/ML
1000 INJECTION INTRAMUSCULAR; INTRAVENOUS; SUBCUTANEOUS
Refills: 0 | Status: DISCONTINUED | OUTPATIENT
Start: 2020-03-23 | End: 2020-03-25

## 2020-03-23 RX ORDER — DEXTROSE 50 % IN WATER 50 %
25 SYRINGE (ML) INTRAVENOUS
Refills: 0 | Status: DISCONTINUED | OUTPATIENT
Start: 2020-03-23 | End: 2020-03-23

## 2020-03-23 RX ORDER — PANTOPRAZOLE SODIUM 20 MG/1
40 TABLET, DELAYED RELEASE ORAL
Refills: 0 | Status: DISCONTINUED | OUTPATIENT
Start: 2020-03-23 | End: 2020-03-28

## 2020-03-23 RX ORDER — SODIUM CHLORIDE 9 MG/ML
1000 INJECTION INTRAMUSCULAR; INTRAVENOUS; SUBCUTANEOUS
Refills: 0 | Status: DISCONTINUED | OUTPATIENT
Start: 2020-03-23 | End: 2020-03-26

## 2020-03-23 RX ORDER — MAGNESIUM SULFATE 500 MG/ML
2 VIAL (ML) INJECTION ONCE
Refills: 0 | Status: COMPLETED | OUTPATIENT
Start: 2020-03-23 | End: 2020-03-23

## 2020-03-23 RX ORDER — ONDANSETRON 8 MG/1
4 TABLET, FILM COATED ORAL EVERY 4 HOURS
Refills: 0 | Status: DISCONTINUED | OUTPATIENT
Start: 2020-03-23 | End: 2020-03-25

## 2020-03-23 RX ORDER — NOREPINEPHRINE BITARTRATE/D5W 8 MG/250ML
0.1 PLASTIC BAG, INJECTION (ML) INTRAVENOUS
Qty: 8 | Refills: 0 | Status: DISCONTINUED | OUTPATIENT
Start: 2020-03-23 | End: 2020-03-23

## 2020-03-23 RX ORDER — ACETAMINOPHEN 500 MG
1000 TABLET ORAL ONCE
Refills: 0 | Status: COMPLETED | OUTPATIENT
Start: 2020-03-23 | End: 2020-03-23

## 2020-03-23 RX ORDER — ASPIRIN/CALCIUM CARB/MAGNESIUM 324 MG
325 TABLET ORAL DAILY
Refills: 0 | Status: DISCONTINUED | OUTPATIENT
Start: 2020-03-23 | End: 2020-03-23

## 2020-03-23 RX ORDER — SENNA PLUS 8.6 MG/1
2 TABLET ORAL AT BEDTIME
Refills: 0 | Status: DISCONTINUED | OUTPATIENT
Start: 2020-03-23 | End: 2020-03-28

## 2020-03-23 RX ORDER — FENTANYL CITRATE 50 UG/ML
25 INJECTION INTRAVENOUS ONCE
Refills: 0 | Status: DISCONTINUED | OUTPATIENT
Start: 2020-03-23 | End: 2020-03-23

## 2020-03-23 RX ORDER — ATORVASTATIN CALCIUM 80 MG/1
40 TABLET, FILM COATED ORAL AT BEDTIME
Refills: 0 | Status: DISCONTINUED | OUTPATIENT
Start: 2020-03-23 | End: 2020-03-28

## 2020-03-23 RX ORDER — DEXTROSE 50 % IN WATER 50 %
50 SYRINGE (ML) INTRAVENOUS
Refills: 0 | Status: DISCONTINUED | OUTPATIENT
Start: 2020-03-23 | End: 2020-03-23

## 2020-03-23 RX ORDER — POTASSIUM CHLORIDE 20 MEQ
10 PACKET (EA) ORAL
Refills: 0 | Status: COMPLETED | OUTPATIENT
Start: 2020-03-23 | End: 2020-03-23

## 2020-03-23 RX ORDER — POTASSIUM CHLORIDE 20 MEQ
10 PACKET (EA) ORAL
Refills: 0 | Status: DISCONTINUED | OUTPATIENT
Start: 2020-03-23 | End: 2020-03-23

## 2020-03-23 RX ORDER — SODIUM CHLORIDE 9 MG/ML
500 INJECTION, SOLUTION INTRAVENOUS ONCE
Refills: 0 | Status: COMPLETED | OUTPATIENT
Start: 2020-03-23 | End: 2020-03-23

## 2020-03-23 RX ORDER — ASPIRIN/CALCIUM CARB/MAGNESIUM 324 MG
325 TABLET ORAL DAILY
Refills: 0 | Status: DISCONTINUED | OUTPATIENT
Start: 2020-03-24 | End: 2020-03-28

## 2020-03-23 RX ORDER — LISINOPRIL 2.5 MG/1
1 TABLET ORAL
Qty: 0 | Refills: 0 | DISCHARGE

## 2020-03-23 RX ORDER — PSYLLIUM SEED (WITH DEXTROSE)
1 POWDER (GRAM) ORAL THREE TIMES A DAY
Refills: 0 | Status: DISCONTINUED | OUTPATIENT
Start: 2020-03-23 | End: 2020-03-28

## 2020-03-23 RX ORDER — TAMSULOSIN HYDROCHLORIDE 0.4 MG/1
0.4 CAPSULE ORAL AT BEDTIME
Refills: 0 | Status: DISCONTINUED | OUTPATIENT
Start: 2020-03-23 | End: 2020-03-28

## 2020-03-23 RX ORDER — ALBUMIN HUMAN 25 %
250 VIAL (ML) INTRAVENOUS
Refills: 0 | Status: COMPLETED | OUTPATIENT
Start: 2020-03-23 | End: 2020-03-23

## 2020-03-23 RX ORDER — CEFUROXIME AXETIL 250 MG
1500 TABLET ORAL EVERY 8 HOURS
Refills: 0 | Status: COMPLETED | OUTPATIENT
Start: 2020-03-23 | End: 2020-03-25

## 2020-03-23 RX ORDER — GABAPENTIN 400 MG/1
400 CAPSULE ORAL THREE TIMES A DAY
Refills: 0 | Status: DISCONTINUED | OUTPATIENT
Start: 2020-03-24 | End: 2020-03-28

## 2020-03-23 RX ORDER — PROPOFOL 10 MG/ML
10 INJECTION, EMULSION INTRAVENOUS
Qty: 1000 | Refills: 0 | Status: DISCONTINUED | OUTPATIENT
Start: 2020-03-23 | End: 2020-03-23

## 2020-03-23 RX ORDER — VANCOMYCIN HCL 1 G
1000 VIAL (EA) INTRAVENOUS EVERY 12 HOURS
Refills: 0 | Status: COMPLETED | OUTPATIENT
Start: 2020-03-23 | End: 2020-03-25

## 2020-03-23 RX ORDER — PANTOPRAZOLE SODIUM 20 MG/1
40 TABLET, DELAYED RELEASE ORAL DAILY
Refills: 0 | Status: DISCONTINUED | OUTPATIENT
Start: 2020-03-23 | End: 2020-03-23

## 2020-03-23 RX ORDER — HYDROMORPHONE HYDROCHLORIDE 2 MG/ML
0.5 INJECTION INTRAMUSCULAR; INTRAVENOUS; SUBCUTANEOUS
Refills: 0 | Status: DISCONTINUED | OUTPATIENT
Start: 2020-03-23 | End: 2020-03-24

## 2020-03-23 RX ORDER — INSULIN HUMAN 100 [IU]/ML
2 INJECTION, SOLUTION SUBCUTANEOUS
Qty: 50 | Refills: 0 | Status: DISCONTINUED | OUTPATIENT
Start: 2020-03-23 | End: 2020-03-23

## 2020-03-23 RX ORDER — CLOPIDOGREL BISULFATE 75 MG/1
75 TABLET, FILM COATED ORAL DAILY
Refills: 0 | Status: DISCONTINUED | OUTPATIENT
Start: 2020-03-24 | End: 2020-03-28

## 2020-03-23 RX ORDER — CHLORHEXIDINE GLUCONATE 213 G/1000ML
1 SOLUTION TOPICAL DAILY
Refills: 0 | Status: DISCONTINUED | OUTPATIENT
Start: 2020-03-23 | End: 2020-03-25

## 2020-03-23 RX ORDER — DEXMEDETOMIDINE HYDROCHLORIDE IN 0.9% SODIUM CHLORIDE 4 UG/ML
0.2 INJECTION INTRAVENOUS
Qty: 200 | Refills: 0 | Status: DISCONTINUED | OUTPATIENT
Start: 2020-03-23 | End: 2020-03-23

## 2020-03-23 RX ORDER — CHLORHEXIDINE GLUCONATE 213 G/1000ML
15 SOLUTION TOPICAL EVERY 12 HOURS
Refills: 0 | Status: DISCONTINUED | OUTPATIENT
Start: 2020-03-23 | End: 2020-03-23

## 2020-03-23 RX ORDER — CEFUROXIME AXETIL 250 MG
1500 TABLET ORAL ONCE
Refills: 0 | Status: DISCONTINUED | OUTPATIENT
Start: 2020-03-23 | End: 2020-03-23

## 2020-03-23 RX ORDER — ASPIRIN/CALCIUM CARB/MAGNESIUM 324 MG
325 TABLET ORAL ONCE
Refills: 0 | Status: COMPLETED | OUTPATIENT
Start: 2020-03-23 | End: 2020-03-23

## 2020-03-23 RX ORDER — CHLORHEXIDINE GLUCONATE 213 G/1000ML
5 SOLUTION TOPICAL EVERY 4 HOURS
Refills: 0 | Status: DISCONTINUED | OUTPATIENT
Start: 2020-03-23 | End: 2020-03-23

## 2020-03-23 RX ADMIN — FENTANYL CITRATE 25 MICROGRAM(S): 50 INJECTION INTRAVENOUS at 16:00

## 2020-03-23 RX ADMIN — FENTANYL CITRATE 25 MICROGRAM(S): 50 INJECTION INTRAVENOUS at 18:00

## 2020-03-23 RX ADMIN — Medication 100 MILLIEQUIVALENT(S): at 15:32

## 2020-03-23 RX ADMIN — FENTANYL CITRATE 25 MICROGRAM(S): 50 INJECTION INTRAVENOUS at 18:15

## 2020-03-23 RX ADMIN — Medication 50 GRAM(S): at 14:00

## 2020-03-23 RX ADMIN — FENTANYL CITRATE 25 MICROGRAM(S): 50 INJECTION INTRAVENOUS at 16:15

## 2020-03-23 RX ADMIN — Medication 100 MILLIGRAM(S): at 15:32

## 2020-03-23 RX ADMIN — HYDROMORPHONE HYDROCHLORIDE 0.5 MILLIGRAM(S): 2 INJECTION INTRAMUSCULAR; INTRAVENOUS; SUBCUTANEOUS at 20:20

## 2020-03-23 RX ADMIN — Medication 250 MILLIGRAM(S): at 20:20

## 2020-03-23 RX ADMIN — Medication 400 MILLIGRAM(S): at 17:00

## 2020-03-23 RX ADMIN — PANTOPRAZOLE SODIUM 40 MILLIGRAM(S): 20 TABLET, DELAYED RELEASE ORAL at 14:00

## 2020-03-23 RX ADMIN — HYDROMORPHONE HYDROCHLORIDE 0.5 MILLIGRAM(S): 2 INJECTION INTRAMUSCULAR; INTRAVENOUS; SUBCUTANEOUS at 19:45

## 2020-03-23 RX ADMIN — ONDANSETRON 4 MILLIGRAM(S): 8 TABLET, FILM COATED ORAL at 22:05

## 2020-03-23 RX ADMIN — ATORVASTATIN CALCIUM 40 MILLIGRAM(S): 80 TABLET, FILM COATED ORAL at 22:26

## 2020-03-23 RX ADMIN — SODIUM CHLORIDE 1000 MILLILITER(S): 9 INJECTION, SOLUTION INTRAVENOUS at 14:02

## 2020-03-23 RX ADMIN — SODIUM CHLORIDE 1000 MILLILITER(S): 9 INJECTION, SOLUTION INTRAVENOUS at 16:12

## 2020-03-23 RX ADMIN — FENTANYL CITRATE 25 MICROGRAM(S): 50 INJECTION INTRAVENOUS at 16:10

## 2020-03-23 RX ADMIN — Medication 50 GRAM(S): at 20:42

## 2020-03-23 RX ADMIN — Medication 325 MILLIGRAM(S): at 20:42

## 2020-03-23 RX ADMIN — Medication 1000 MILLIGRAM(S): at 17:30

## 2020-03-23 RX ADMIN — HYDROMORPHONE HYDROCHLORIDE 0.5 MILLIGRAM(S): 2 INJECTION INTRAMUSCULAR; INTRAVENOUS; SUBCUTANEOUS at 23:15

## 2020-03-23 RX ADMIN — CHLORHEXIDINE GLUCONATE 5 MILLILITER(S): 213 SOLUTION TOPICAL at 14:00

## 2020-03-23 RX ADMIN — Medication 500 MILLILITER(S): at 14:01

## 2020-03-23 RX ADMIN — Medication 500 MILLILITER(S): at 15:31

## 2020-03-23 RX ADMIN — HYDROMORPHONE HYDROCHLORIDE 0.5 MILLIGRAM(S): 2 INJECTION INTRAMUSCULAR; INTRAVENOUS; SUBCUTANEOUS at 22:59

## 2020-03-23 RX ADMIN — FENTANYL CITRATE 25 MICROGRAM(S): 50 INJECTION INTRAVENOUS at 16:25

## 2020-03-23 NOTE — AIRWAY REMOVAL NOTE  ADULT & PEDS - ARTIFICAL AIRWAY REMOVAL COMMENTS
No respiratory distress noted.  Pt appears stable and is tolerating N/C well.  Monitoring continued.

## 2020-03-23 NOTE — ASU PREOP CHECKLIST - HEIGHT IN CM
Patient was transported to our department for radiation therapy treatment number 4 of 5 to his upper T spine. We plan on transporting him again tomorrow for his final treatment.   [phone]

## 2020-03-23 NOTE — BRIEF OPERATIVE NOTE - COMMENTS
No qualified resident was available to assist in this case. I have personally first assisted the Cardiothoracic Surgeon listed in this brief op note throughout the entirety of this case.     To CTICU on Levophed and Propofol.

## 2020-03-23 NOTE — BRIEF OPERATIVE NOTE - NSICDXBRIEFPROCEDURE_GEN_ALL_CORE_FT
PROCEDURES:  Bypass graft, coronary artery, 1 arterial and 2 venous 23-Mar-2020 12:26:56  Cami Shearer

## 2020-03-24 DIAGNOSIS — M51.9 UNSPECIFIED THORACIC, THORACOLUMBAR AND LUMBOSACRAL INTERVERTEBRAL DISC DISORDER: ICD-10-CM

## 2020-03-24 DIAGNOSIS — Z29.9 ENCOUNTER FOR PROPHYLACTIC MEASURES, UNSPECIFIED: ICD-10-CM

## 2020-03-24 DIAGNOSIS — N40.0 BENIGN PROSTATIC HYPERPLASIA WITHOUT LOWER URINARY TRACT SYMPTOMS: ICD-10-CM

## 2020-03-24 DIAGNOSIS — E78.5 HYPERLIPIDEMIA, UNSPECIFIED: ICD-10-CM

## 2020-03-24 DIAGNOSIS — I10 ESSENTIAL (PRIMARY) HYPERTENSION: ICD-10-CM

## 2020-03-24 DIAGNOSIS — I25.10 ATHEROSCLEROTIC HEART DISEASE OF NATIVE CORONARY ARTERY WITHOUT ANGINA PECTORIS: ICD-10-CM

## 2020-03-24 LAB
ANION GAP SERPL CALC-SCNC: 10 MMOL/L — SIGNIFICANT CHANGE UP (ref 5–17)
ANION GAP SERPL CALC-SCNC: 12 MMOL/L — SIGNIFICANT CHANGE UP (ref 5–17)
BUN SERPL-MCNC: 14 MG/DL — SIGNIFICANT CHANGE UP (ref 8–20)
BUN SERPL-MCNC: 17 MG/DL — SIGNIFICANT CHANGE UP (ref 8–20)
CALCIUM SERPL-MCNC: 7.9 MG/DL — LOW (ref 8.6–10.2)
CALCIUM SERPL-MCNC: 8.2 MG/DL — LOW (ref 8.6–10.2)
CHLORIDE SERPL-SCNC: 100 MMOL/L — SIGNIFICANT CHANGE UP (ref 98–107)
CHLORIDE SERPL-SCNC: 104 MMOL/L — SIGNIFICANT CHANGE UP (ref 98–107)
CO2 SERPL-SCNC: 21 MMOL/L — LOW (ref 22–29)
CO2 SERPL-SCNC: 24 MMOL/L — SIGNIFICANT CHANGE UP (ref 22–29)
CREAT SERPL-MCNC: 0.51 MG/DL — SIGNIFICANT CHANGE UP (ref 0.5–1.3)
CREAT SERPL-MCNC: 0.53 MG/DL — SIGNIFICANT CHANGE UP (ref 0.5–1.3)
GLUCOSE BLDC GLUCOMTR-MCNC: 122 MG/DL — HIGH (ref 70–99)
GLUCOSE BLDC GLUCOMTR-MCNC: 125 MG/DL — HIGH (ref 70–99)
GLUCOSE BLDC GLUCOMTR-MCNC: 134 MG/DL — HIGH (ref 70–99)
GLUCOSE BLDC GLUCOMTR-MCNC: 150 MG/DL — HIGH (ref 70–99)
GLUCOSE BLDC GLUCOMTR-MCNC: 154 MG/DL — HIGH (ref 70–99)
GLUCOSE SERPL-MCNC: 126 MG/DL — HIGH (ref 70–99)
GLUCOSE SERPL-MCNC: 164 MG/DL — HIGH (ref 70–99)
HCT VFR BLD CALC: 28 % — LOW (ref 39–50)
HCT VFR BLD CALC: 29.8 % — LOW (ref 39–50)
HGB BLD-MCNC: 9.4 G/DL — LOW (ref 13–17)
HGB BLD-MCNC: 9.9 G/DL — LOW (ref 13–17)
MAGNESIUM SERPL-MCNC: 1.9 MG/DL — SIGNIFICANT CHANGE UP (ref 1.6–2.6)
MAGNESIUM SERPL-MCNC: 2 MG/DL — SIGNIFICANT CHANGE UP (ref 1.6–2.6)
MCHC RBC-ENTMCNC: 30.7 PG — SIGNIFICANT CHANGE UP (ref 27–34)
MCHC RBC-ENTMCNC: 30.8 PG — SIGNIFICANT CHANGE UP (ref 27–34)
MCHC RBC-ENTMCNC: 33.2 GM/DL — SIGNIFICANT CHANGE UP (ref 32–36)
MCHC RBC-ENTMCNC: 33.6 GM/DL — SIGNIFICANT CHANGE UP (ref 32–36)
MCV RBC AUTO: 91.8 FL — SIGNIFICANT CHANGE UP (ref 80–100)
MCV RBC AUTO: 92.3 FL — SIGNIFICANT CHANGE UP (ref 80–100)
PLATELET # BLD AUTO: 118 K/UL — LOW (ref 150–400)
PLATELET # BLD AUTO: 124 K/UL — LOW (ref 150–400)
POTASSIUM SERPL-MCNC: 3.9 MMOL/L — SIGNIFICANT CHANGE UP (ref 3.5–5.3)
POTASSIUM SERPL-MCNC: 4.6 MMOL/L — SIGNIFICANT CHANGE UP (ref 3.5–5.3)
POTASSIUM SERPL-SCNC: 3.9 MMOL/L — SIGNIFICANT CHANGE UP (ref 3.5–5.3)
POTASSIUM SERPL-SCNC: 4.6 MMOL/L — SIGNIFICANT CHANGE UP (ref 3.5–5.3)
RBC # BLD: 3.05 M/UL — LOW (ref 4.2–5.8)
RBC # BLD: 3.23 M/UL — LOW (ref 4.2–5.8)
RBC # FLD: 15.9 % — HIGH (ref 10.3–14.5)
RBC # FLD: 16.5 % — HIGH (ref 10.3–14.5)
SODIUM SERPL-SCNC: 133 MMOL/L — LOW (ref 135–145)
SODIUM SERPL-SCNC: 138 MMOL/L — SIGNIFICANT CHANGE UP (ref 135–145)
WBC # BLD: 6.49 K/UL — SIGNIFICANT CHANGE UP (ref 3.8–10.5)
WBC # BLD: 8.37 K/UL — SIGNIFICANT CHANGE UP (ref 3.8–10.5)
WBC # FLD AUTO: 6.49 K/UL — SIGNIFICANT CHANGE UP (ref 3.8–10.5)
WBC # FLD AUTO: 8.37 K/UL — SIGNIFICANT CHANGE UP (ref 3.8–10.5)

## 2020-03-24 PROCEDURE — 71045 X-RAY EXAM CHEST 1 VIEW: CPT | Mod: 26

## 2020-03-24 PROCEDURE — 93010 ELECTROCARDIOGRAM REPORT: CPT

## 2020-03-24 RX ORDER — OXYCODONE HYDROCHLORIDE 5 MG/1
5 TABLET ORAL ONCE
Refills: 0 | Status: DISCONTINUED | OUTPATIENT
Start: 2020-03-24 | End: 2020-03-24

## 2020-03-24 RX ORDER — ACETAMINOPHEN 500 MG
1000 TABLET ORAL ONCE
Refills: 0 | Status: COMPLETED | OUTPATIENT
Start: 2020-03-24 | End: 2020-03-24

## 2020-03-24 RX ORDER — AMIODARONE HYDROCHLORIDE 400 MG/1
200 TABLET ORAL DAILY
Refills: 0 | Status: DISCONTINUED | OUTPATIENT
Start: 2020-03-28 | End: 2020-03-28

## 2020-03-24 RX ORDER — POTASSIUM CHLORIDE 20 MEQ
10 PACKET (EA) ORAL
Refills: 0 | Status: COMPLETED | OUTPATIENT
Start: 2020-03-24 | End: 2020-03-24

## 2020-03-24 RX ORDER — AMIODARONE HYDROCHLORIDE 400 MG/1
400 TABLET ORAL EVERY 8 HOURS
Refills: 0 | Status: COMPLETED | OUTPATIENT
Start: 2020-03-24 | End: 2020-03-28

## 2020-03-24 RX ORDER — MAGNESIUM SULFATE 500 MG/ML
2 VIAL (ML) INJECTION ONCE
Refills: 0 | Status: COMPLETED | OUTPATIENT
Start: 2020-03-24 | End: 2020-03-24

## 2020-03-24 RX ORDER — OXYCODONE HYDROCHLORIDE 5 MG/1
5 TABLET ORAL EVERY 4 HOURS
Refills: 0 | Status: DISCONTINUED | OUTPATIENT
Start: 2020-03-24 | End: 2020-03-24

## 2020-03-24 RX ORDER — AMIODARONE HYDROCHLORIDE 400 MG/1
150 TABLET ORAL ONCE
Refills: 0 | Status: COMPLETED | OUTPATIENT
Start: 2020-03-24 | End: 2020-03-24

## 2020-03-24 RX ORDER — MIDODRINE HYDROCHLORIDE 2.5 MG/1
5 TABLET ORAL EVERY 8 HOURS
Refills: 0 | Status: DISCONTINUED | OUTPATIENT
Start: 2020-03-24 | End: 2020-03-26

## 2020-03-24 RX ORDER — NOREPINEPHRINE BITARTRATE/D5W 8 MG/250ML
0.05 PLASTIC BAG, INJECTION (ML) INTRAVENOUS
Qty: 8 | Refills: 0 | Status: DISCONTINUED | OUTPATIENT
Start: 2020-03-24 | End: 2020-03-25

## 2020-03-24 RX ORDER — HYDROMORPHONE HYDROCHLORIDE 2 MG/ML
0.25 INJECTION INTRAMUSCULAR; INTRAVENOUS; SUBCUTANEOUS ONCE
Refills: 0 | Status: DISCONTINUED | OUTPATIENT
Start: 2020-03-24 | End: 2020-03-24

## 2020-03-24 RX ORDER — OXYCODONE HYDROCHLORIDE 5 MG/1
5 TABLET ORAL EVERY 4 HOURS
Refills: 0 | Status: DISCONTINUED | OUTPATIENT
Start: 2020-03-24 | End: 2020-03-28

## 2020-03-24 RX ORDER — SODIUM CHLORIDE 9 MG/ML
500 INJECTION, SOLUTION INTRAVENOUS ONCE
Refills: 0 | Status: COMPLETED | OUTPATIENT
Start: 2020-03-24 | End: 2020-03-24

## 2020-03-24 RX ORDER — AMIODARONE HYDROCHLORIDE 400 MG/1
TABLET ORAL
Refills: 0 | Status: DISCONTINUED | OUTPATIENT
Start: 2020-03-28 | End: 2020-03-28

## 2020-03-24 RX ORDER — METOPROLOL TARTRATE 50 MG
12.5 TABLET ORAL
Refills: 0 | Status: DISCONTINUED | OUTPATIENT
Start: 2020-03-24 | End: 2020-03-27

## 2020-03-24 RX ORDER — INSULIN LISPRO 100/ML
VIAL (ML) SUBCUTANEOUS
Refills: 0 | Status: DISCONTINUED | OUTPATIENT
Start: 2020-03-24 | End: 2020-03-27

## 2020-03-24 RX ORDER — OXYCODONE HYDROCHLORIDE 5 MG/1
10 TABLET ORAL EVERY 4 HOURS
Refills: 0 | Status: DISCONTINUED | OUTPATIENT
Start: 2020-03-24 | End: 2020-03-28

## 2020-03-24 RX ORDER — ENOXAPARIN SODIUM 100 MG/ML
40 INJECTION SUBCUTANEOUS DAILY
Refills: 0 | Status: DISCONTINUED | OUTPATIENT
Start: 2020-03-24 | End: 2020-03-28

## 2020-03-24 RX ORDER — MIDODRINE HYDROCHLORIDE 2.5 MG/1
10 TABLET ORAL EVERY 8 HOURS
Refills: 0 | Status: DISCONTINUED | OUTPATIENT
Start: 2020-03-24 | End: 2020-03-24

## 2020-03-24 RX ADMIN — OXYCODONE HYDROCHLORIDE 10 MILLIGRAM(S): 5 TABLET ORAL at 22:00

## 2020-03-24 RX ADMIN — MIDODRINE HYDROCHLORIDE 5 MILLIGRAM(S): 2.5 TABLET ORAL at 14:16

## 2020-03-24 RX ADMIN — MIDODRINE HYDROCHLORIDE 10 MILLIGRAM(S): 2.5 TABLET ORAL at 10:33

## 2020-03-24 RX ADMIN — GABAPENTIN 400 MILLIGRAM(S): 400 CAPSULE ORAL at 05:43

## 2020-03-24 RX ADMIN — HYDROMORPHONE HYDROCHLORIDE 0.5 MILLIGRAM(S): 2 INJECTION INTRAMUSCULAR; INTRAVENOUS; SUBCUTANEOUS at 02:00

## 2020-03-24 RX ADMIN — Medication 100 MILLIGRAM(S): at 23:48

## 2020-03-24 RX ADMIN — OXYCODONE HYDROCHLORIDE 5 MILLIGRAM(S): 5 TABLET ORAL at 11:35

## 2020-03-24 RX ADMIN — Medication 50 MILLIEQUIVALENT(S): at 17:14

## 2020-03-24 RX ADMIN — OXYCODONE HYDROCHLORIDE 5 MILLIGRAM(S): 5 TABLET ORAL at 10:25

## 2020-03-24 RX ADMIN — Medication 325 MILLIGRAM(S): at 12:24

## 2020-03-24 RX ADMIN — HYDROMORPHONE HYDROCHLORIDE 0.5 MILLIGRAM(S): 2 INJECTION INTRAMUSCULAR; INTRAVENOUS; SUBCUTANEOUS at 05:09

## 2020-03-24 RX ADMIN — ATORVASTATIN CALCIUM 40 MILLIGRAM(S): 80 TABLET, FILM COATED ORAL at 21:30

## 2020-03-24 RX ADMIN — Medication 400 MILLIGRAM(S): at 12:30

## 2020-03-24 RX ADMIN — AMIODARONE HYDROCHLORIDE 618 MILLIGRAM(S): 400 TABLET ORAL at 14:45

## 2020-03-24 RX ADMIN — AMIODARONE HYDROCHLORIDE 618 MILLIGRAM(S): 400 TABLET ORAL at 14:25

## 2020-03-24 RX ADMIN — Medication 50 GRAM(S): at 14:59

## 2020-03-24 RX ADMIN — SODIUM CHLORIDE 500 MILLILITER(S): 9 INJECTION, SOLUTION INTRAVENOUS at 00:20

## 2020-03-24 RX ADMIN — Medication 50 MILLIEQUIVALENT(S): at 17:34

## 2020-03-24 RX ADMIN — Medication 250 MILLIGRAM(S): at 20:29

## 2020-03-24 RX ADMIN — GABAPENTIN 400 MILLIGRAM(S): 400 CAPSULE ORAL at 14:16

## 2020-03-24 RX ADMIN — OXYCODONE HYDROCHLORIDE 10 MILLIGRAM(S): 5 TABLET ORAL at 21:26

## 2020-03-24 RX ADMIN — HYDROMORPHONE HYDROCHLORIDE 0.5 MILLIGRAM(S): 2 INJECTION INTRAMUSCULAR; INTRAVENOUS; SUBCUTANEOUS at 05:44

## 2020-03-24 RX ADMIN — Medication 250 MILLIGRAM(S): at 08:33

## 2020-03-24 RX ADMIN — Medication 1 PACKET(S): at 14:17

## 2020-03-24 RX ADMIN — Medication 12.5 MILLIGRAM(S): at 18:55

## 2020-03-24 RX ADMIN — OXYCODONE HYDROCHLORIDE 10 MILLIGRAM(S): 5 TABLET ORAL at 18:35

## 2020-03-24 RX ADMIN — HYDROMORPHONE HYDROCHLORIDE 0.5 MILLIGRAM(S): 2 INJECTION INTRAMUSCULAR; INTRAVENOUS; SUBCUTANEOUS at 02:15

## 2020-03-24 RX ADMIN — OXYCODONE HYDROCHLORIDE 5 MILLIGRAM(S): 5 TABLET ORAL at 13:45

## 2020-03-24 RX ADMIN — AMIODARONE HYDROCHLORIDE 400 MILLIGRAM(S): 400 TABLET ORAL at 21:24

## 2020-03-24 RX ADMIN — GABAPENTIN 400 MILLIGRAM(S): 400 CAPSULE ORAL at 22:51

## 2020-03-24 RX ADMIN — Medication 100 MILLIGRAM(S): at 16:12

## 2020-03-24 RX ADMIN — CLOPIDOGREL BISULFATE 75 MILLIGRAM(S): 75 TABLET, FILM COATED ORAL at 12:24

## 2020-03-24 RX ADMIN — OXYCODONE HYDROCHLORIDE 5 MILLIGRAM(S): 5 TABLET ORAL at 10:35

## 2020-03-24 RX ADMIN — Medication 1 PACKET(S): at 21:25

## 2020-03-24 RX ADMIN — ENOXAPARIN SODIUM 40 MILLIGRAM(S): 100 INJECTION SUBCUTANEOUS at 12:25

## 2020-03-24 RX ADMIN — OXYCODONE HYDROCHLORIDE 10 MILLIGRAM(S): 5 TABLET ORAL at 17:35

## 2020-03-24 RX ADMIN — HYDROMORPHONE HYDROCHLORIDE 0.25 MILLIGRAM(S): 2 INJECTION INTRAMUSCULAR; INTRAVENOUS; SUBCUTANEOUS at 12:35

## 2020-03-24 RX ADMIN — Medication 1000 MILLIGRAM(S): at 20:30

## 2020-03-24 RX ADMIN — Medication 100 MILLIGRAM(S): at 08:33

## 2020-03-24 RX ADMIN — OXYCODONE HYDROCHLORIDE 5 MILLIGRAM(S): 5 TABLET ORAL at 14:45

## 2020-03-24 RX ADMIN — Medication 400 MILLIGRAM(S): at 06:20

## 2020-03-24 RX ADMIN — CHLORHEXIDINE GLUCONATE 1 APPLICATION(S): 213 SOLUTION TOPICAL at 12:09

## 2020-03-24 RX ADMIN — Medication 400 MILLIGRAM(S): at 20:15

## 2020-03-24 RX ADMIN — Medication 1000 MILLIGRAM(S): at 06:50

## 2020-03-24 RX ADMIN — SENNA PLUS 2 TABLET(S): 8.6 TABLET ORAL at 21:24

## 2020-03-24 RX ADMIN — Medication 50 MILLIEQUIVALENT(S): at 16:32

## 2020-03-24 RX ADMIN — HYDROMORPHONE HYDROCHLORIDE 0.25 MILLIGRAM(S): 2 INJECTION INTRAMUSCULAR; INTRAVENOUS; SUBCUTANEOUS at 12:20

## 2020-03-24 RX ADMIN — Medication 1000 MILLIGRAM(S): at 13:30

## 2020-03-24 RX ADMIN — Medication 7.29 MICROGRAM(S)/KG/MIN: at 06:41

## 2020-03-24 RX ADMIN — OXYCODONE HYDROCHLORIDE 5 MILLIGRAM(S): 5 TABLET ORAL at 09:25

## 2020-03-24 RX ADMIN — Medication 1: at 16:28

## 2020-03-24 RX ADMIN — MIDODRINE HYDROCHLORIDE 5 MILLIGRAM(S): 2.5 TABLET ORAL at 21:24

## 2020-03-24 RX ADMIN — Medication 100 MILLIGRAM(S): at 00:05

## 2020-03-24 RX ADMIN — TAMSULOSIN HYDROCHLORIDE 0.4 MILLIGRAM(S): 0.4 CAPSULE ORAL at 00:13

## 2020-03-24 RX ADMIN — PANTOPRAZOLE SODIUM 40 MILLIGRAM(S): 20 TABLET, DELAYED RELEASE ORAL at 10:33

## 2020-03-24 RX ADMIN — TAMSULOSIN HYDROCHLORIDE 0.4 MILLIGRAM(S): 0.4 CAPSULE ORAL at 22:51

## 2020-03-24 RX ADMIN — AMIODARONE HYDROCHLORIDE 400 MILLIGRAM(S): 400 TABLET ORAL at 16:12

## 2020-03-24 NOTE — PROGRESS NOTE ADULT - PROVIDER SPECIALTY LIST ADULT
August 28, 2018     Evelin Sims, [address]  [address] [place] [address]    Patient: Cristino Kimball   YOB: 1937   Date of Visit: 8/28/2018       Dear Dr Xiang Hollins:    Thank you for referring Chris Crawford to me for evaluation  Below are my notes for this consultation  If you have questions, please do not hesitate to call me  I look forward to following your patient along with you  Sincerely,        Lakhwinder Mackay MD        CC: No Recipients  Lakhwinder Mackay MD  8/28/2018 12:05 PM  Sign at close encounter  Follow-up Note - [address] [facility]  Cristino Kimball 1937 68 y o  female     ASSESSMENT @ PLAN:   She is a 79-year-old female with severe aortic stenosis who is going for aortic valve surgery and probable anticoagulation who has a stool Hemoccult positive microcytic iron deficiency anemia  She had a normal colonoscopy 5 years ago and she is reporting epigastric abdominal pain and has known reflux    1 will do EGD to investigate    2 she will continue on her omeprazole    3 if the endoscopy is negative she will prior a colonoscopy and video capsule endoscopy    Reason:   Stool Hemoccult positive iron deficiency anemia    HPI:   She is a 26-year-old female with multiple medical problems  She has aortic valve stenosis  She is going for a valve surgery  She was found in the preoperative testing to have an iron deficiency anemia and her stool Hemoccult test was positive  The patient had a normal colonoscopy 5 years ago  She has never had endoscopy  She does not have overt blood loss  She has no melena or bleeding  She has no nausea vomiting she has no diarrhea no constipation she has no heartburn  She has an uncomfortable sensation when she eats in the epigastrium  She has never had an ulcer  She is going for valve surgery  REVIEW OF SYSTEMS:     CONSTITUTIONAL: Denies any fever, chills, or rigors   Good appetite, and no CT Surgery recent weight loss  HEENT: No earache or tinnitus  Denies hearing loss or visual disturbances  CARDIOVASCULAR: No chest pain or palpitations  RESPIRATORY: Denies any cough, hemoptysis, shortness of breath or dyspnea on exertion  GASTROINTESTINAL: As noted in the History of Present Illness  GENITOURINARY: No problems with urination  Denies any hematuria or dysuria  NEUROLOGIC: No dizziness or vertigo, denies headaches  MUSCULOSKELETAL: Denies any muscle or joint pain  SKIN: Denies skin rashes or itching  ENDOCRINE: Denies excessive thirst  Denies intolerance to heat or cold  PSYCHOSOCIAL: Denies depression or anxiety  Denies any recent memory loss  Past Medical History:   Diagnosis Date    Anemia 08/22/2018    Aortic valve disease     Arthritis     Cardiac disease     "leaky valve"    CHF (congestive heart failure) (HCC)     Coronary artery disease     Disease of thyroid gland     Dislocation of right shoulder joint     Hyperlipidemia     Hypertension     Hypothyroid 08/22/2018    Murmur, cardiac     Obesity, morbid ([facility] [address] ) 08/22/2018    Pulmonary hypertension ([facility] [address] ) 08/22/2018    Simple goiter     Skin cyst     within the armpits, right      Past Surgical History:   Procedure Laterality Date    BREAST BIOPSY      CARPAL TUNNEL RELEASE      CHOLECYSTECTOMY      DILATION AND CURETTAGE OF UTERUS      HYSTEROSCOPY      JOINT REPLACEMENT      b/l knee     JOINT REPLACEMENT  2007    left hip     Social History     Social History    Marital status: Single     Spouse name: N/A    Number of children: N/A    Years of education: N/A     Occupational History    Not on file       Social History Main Topics    Smoking status: Never Smoker    Smokeless tobacco: Never Used    Alcohol use No    Drug use: No    Sexual activity: No     Other Topics Concern    Not on file     Social History Narrative    Denied drinking coffee    Denied exercise habits         Family History   Problem Relation Age of Onset    Diabetes Mother     Stroke Mother     Cancer Father     Lung cancer Father     Diabetes Sister     Heart disease Sister     Coronary artery disease Family     Diabetes Family     Hypertension Family     Cancer Family     Stroke Family      Latex and Neosporin [neomycin-bacitracin zn-polymyx]  Current Outpatient Prescriptions   Medication Sig Dispense Refill    albuterol (2 5 mg/3 mL) 0 083 % nebulizer solution Take 3 mL by nebulization every 6 (six) hours as needed for wheezing or shortness of breath 75 mL 0    aspirin (ECOTRIN LOW STRENGTH) 81 mg EC tablet Take 1 tablet by mouth daily 30 tablet 0    b complex vitamins capsule Take 1 capsule by mouth 2 (two) times a day        calcium carbonate (OS-SYLVESTER) 600 MG tablet Take 1 tablet by mouth 2 (two) times a day        Fesoterodine Fumarate ER (TOVIAZ) 8 MG TB24 Take 8 mg by mouth daily      fluticasone (FLONASE) 50 mcg/act nasal spray 1 spray into each nostril daily      furosemide (LASIX) 40 mg tablet Take 1 tablet (40 mg total) by mouth daily 30 tablet 0    levothyroxine 50 mcg tablet Take 50 mcg by mouth daily      loratadine (CLARITIN) 10 mg tablet Take 10 mg by mouth daily      losartan (COZAAR) 50 mg tablet Take 0 5 tablets (25 mg total) by mouth daily 30 tablet 3    omeprazole (PriLOSEC) 40 MG capsule Take 40 mg by mouth 2 (two) times a day  1    sertraline (ZOLOFT) 50 mg tablet Take 50 mg by mouth daily      simvastatin (ZOCOR) 40 mg tablet Take 40 mg by mouth daily at bedtime      temazepam (RESTORIL) 15 mg capsule Take 15 mg by mouth daily  1     No current facility-administered medications for this visit  Blood pressure 136/70, pulse 67, weight 99 5 kg (219 lb 4 oz)      PHYSICAL EXAM:     General Appearance:   Alert, cooperative, no distress, appears stated age    HEENT:   Normocephalic, atraumatic, anicteric      Neck:  Supple, symmetrical, trachea midline, no adenopathy;    thyroid: no enlargement/tenderness/nodules; no carotid  bruit or JVD    Lungs:   Clear to auscultation bilaterally; no rales, rhonchi or wheezing; respirations unlabored    Heart[de-identified]   S1 and S2 normal; regular rate and rhythm; no murmur, rub, or gallop     Abdomen:   Soft, non-tender, non-distended; normal bowel sounds; no masses, no organomegaly    Genitalia:   Deferred    Rectal:   Deferred    Extremities:  No cyanosis, clubbing or edema    Pulses:  2+ and symmetric all extremities    Skin:  Skin color, texture, turgor normal, no rashes or lesions    Lymph nodes:  No palpable cervical, axillary or inguinal lymphadenopathy        Lab Results   Component Value Date    WBC 8 75 08/15/2018    HGB 9 8 (L) 08/15/2018    HCT 33 2 (L) 08/15/2018    MCV 79 (L) 08/15/2018     (H) 08/15/2018     Lab Results   Component Value Date    CALCIUM 9 6 08/15/2018     08/15/2018    K 4 3 08/15/2018    CO2 31 08/15/2018     08/15/2018    BUN 25 08/15/2018    CREATININE 0 83 08/15/2018     Lab Results   Component Value Date    ALT 18 08/13/2018    AST 18 08/13/2018    ALKPHOS 86 08/13/2018     Lab Results   Component Value Date    INR 0 98 12/02/2017    PROTIME 13 2 12/02/2017

## 2020-03-24 NOTE — PROGRESS NOTE ADULT - SUBJECTIVE AND OBJECTIVE BOX
POD #1, s/p CABG x 3 (LIMA to LAD, SVG to PDA, SVG to OM)    Subjective: no c/o at this time. Denies CP, SOB, palpitations, N/V, other c/o.    T(C): 36.8 (03-24-20 @ 00:00), Max: 37.1 (03-23-20 @ 16:00)  HR: 100 (03-24-20 @ 00:00) (70 - 103), SR  BP: 114/67 (03-24-20 @ 00:00) (101/47 - 152/71)  ABP: 90/42 (03-24-20 @ 00:00) (88/56 - 146/70)  ABP(mean): 78 (03-23-20 @ 23:00) (70 - 96)  RR: 20 (03-24-20 @ 00:00) (12 - 21)  SpO2: 98% (03-24-20 @ 00:00) (97% - 100%) on 3L NC  CVP(mm Hg): 5 (03-24-20 @ 00:00) (1 - 12)      I&O's Detail    23 Mar 2020 07:01  -  24 Mar 2020 01:10  --------------------------------------------------------  IN:    Albumin 5%  - 250 mL: 500 mL    Lactated Ringers IV Bolus: 1000 mL    Oral Fluid: 100 mL    propofol Infusion: 27 mL    sodium chloride 0.9%.: 120 mL    sodium chloride 0.9%.: 60 mL    Solution: 100 mL    Solution: 100 mL    Solution: 250 mL    Solution: 100 mL    Solution: 100 mL  Total IN: 2457 mL    OUT:    Chest Tube: 120 mL    Chest Tube: 605 mL    Indwelling Catheter - Urethral: 1200 mL  Total OUT: 1925 mL    Total NET: 532 mL      LABS: All Lab data reviewed and analyzed                        11.2   13.34 )-----------( 125      ( 23 Mar 2020 12:44 )             33.1     03-23    139  |  104  |  10.0  ----------------------------<  168<H>  4.5   |  23.0  |  0.46<L>    Ca    8.8      23 Mar 2020 12:44  Mg     1.5     03-23  TPro  4.9<L>  /  Alb  3.1<L>  /  TBili  1.1  /  DBili  x   /  AST  40<H>  /  ALT  21  /  AlkPhos  80  03-23    PT/INR - ( 23 Mar 2020 12:44 )   PT: 16.8 sec;   INR: 1.47 ratio    PTT - ( 23 Mar 2020 12:44 )  PTT:31.7 sec    CARDIAC MARKERS ( 23 Mar 2020 12:44 )   U/L / CKMB34.2 ng/mL / Troponin T0.89 ng/mL /    ABG - ( 23 Mar 2020 18:41 )  pH, Arterial: 7.39  /  pCO2: 42    /  pO2: 90    / HCO3: 25    / Base Excess: 0.5   /  SaO2: 98        POCT Blood Glucose.: 124 mg/dL (23 Mar 2020 23:26)  POCT Blood Glucose.: 123 mg/dL (23 Mar 2020 20:52)  POCT Blood Glucose.: 148 mg/dL (23 Mar 2020 15:36)           RADIOLOGY: - Reviewed and analyzed   < from: Xray Chest 1 View AP/PA. (03.23.20 @ 12:06) >  FINDINGS: Single frontal view of the chest demonstrates patient to be status post open heart surgery. Endotracheal tube tip is above the janeth. NG tube courses below the hemidiaphragm. Right-sided central venous catheter tip in the distal SVC. Mediastinal and left-sided chest tubes are demonstrated. No large pneumothorax. The cardiomediastinal silhouette is enlarged. No acute osseous abnormalities. Overlying EKG leads and wires are noted. Intrathoracic pain stimulator leads are noted. Mild congestive changes.  IMPRESSION: Status post open heart surgery. Mild congestive changes.  < end of copied text >    3/24/2020 CXR: pending      HOSPITAL MEDICATIONS: All medications reviewed and analyzed  MEDICATIONS  (STANDING):  aspirin enteric coated 325 milliGRAM(s) Oral daily  atorvastatin 40 milliGRAM(s) Oral at bedtime  cefuroxime  IVPB 1500 milliGRAM(s) IV Intermittent every 8 hours  chlorhexidine 2% Cloths 1 Application(s) Topical daily  clopidogrel Tablet 75 milliGRAM(s) Oral daily  gabapentin 400 milliGRAM(s) Oral three times a day  lactated ringers Bolus 500 milliLiter(s) IV Bolus once  pantoprazole    Tablet 40 milliGRAM(s) Oral before breakfast  psyllium Powder 1 Packet(s) Oral three times a day  senna 2 Tablet(s) Oral at bedtime  sodium chloride 0.9%. 1000 milliLiter(s) (10 mL/Hr) IV Continuous  sodium chloride 0.9%. 1000 milliLiter(s) (5 mL/Hr) IV Continuous   tamsulosin 0.4 milliGRAM(s) Oral at bedtime  vancomycin  IVPB 1000 milliGRAM(s) IV Intermittent every 12 hours    MEDICATIONS  (PRN):  HYDROmorphone  Injectable 0.5 milliGRAM(s) IV Push every 3 hours PRN Severe Pain (7 - 10)  ondansetron Injectable 4 milliGRAM(s) IV Push every 4 hours PRN Nausea and/or Vomiting      Physical Exam  Neuro: A+O x 3, non-focal, speech clear and intact  HEENT:  NCAT, PERRL, EOMI. No conjuctival edema or icterus, no thrush.  No ETT or NGT/OGT  Neck:  RIJ introducer with site C/D/I. supple, trachea midline, no tracheostomy  Pulm: CTA, good air entry, equal bilaterally, no rales/rhonchi/wheezing, no accessory muscle use noted  Chest: mediastinal CT and left pleural CT all with dressings intact and no air leak, no subQ emphysema       +PW (settings: VVI, rate: 50, mA: 10, threshold: 1.5, sensitivity: 0.8)  CV: regular rate, regular rhythm, +S1S2, no murmur or rub noted  Abd: soft, NT, ND, + BS  : cano in situ to bedside drainage  Ext: BARKER x 4, no edema, no cyanosis or clubbing, distal motor/neuro/circ intact  Skin: warm, dry, well perfused  Incisions: midsternal C/D/I/stable w/ dressing, LLE C/D/I w/ dressing and Ace wrap

## 2020-03-25 DIAGNOSIS — I48.0 PAROXYSMAL ATRIAL FIBRILLATION: ICD-10-CM

## 2020-03-25 LAB
ANION GAP SERPL CALC-SCNC: 9 MMOL/L — SIGNIFICANT CHANGE UP (ref 5–17)
BUN SERPL-MCNC: 14 MG/DL — SIGNIFICANT CHANGE UP (ref 8–20)
CALCIUM SERPL-MCNC: 8 MG/DL — LOW (ref 8.6–10.2)
CHLORIDE SERPL-SCNC: 104 MMOL/L — SIGNIFICANT CHANGE UP (ref 98–107)
CO2 SERPL-SCNC: 24 MMOL/L — SIGNIFICANT CHANGE UP (ref 22–29)
CREAT SERPL-MCNC: 0.45 MG/DL — LOW (ref 0.5–1.3)
GLUCOSE BLDC GLUCOMTR-MCNC: 111 MG/DL — HIGH (ref 70–99)
GLUCOSE BLDC GLUCOMTR-MCNC: 112 MG/DL — HIGH (ref 70–99)
GLUCOSE BLDC GLUCOMTR-MCNC: 116 MG/DL — HIGH (ref 70–99)
GLUCOSE BLDC GLUCOMTR-MCNC: 125 MG/DL — HIGH (ref 70–99)
GLUCOSE SERPL-MCNC: 123 MG/DL — HIGH (ref 70–99)
HCT VFR BLD CALC: 28.8 % — LOW (ref 39–50)
HGB BLD-MCNC: 9.5 G/DL — LOW (ref 13–17)
MAGNESIUM SERPL-MCNC: 2.1 MG/DL — SIGNIFICANT CHANGE UP (ref 1.6–2.6)
MCHC RBC-ENTMCNC: 30.5 PG — SIGNIFICANT CHANGE UP (ref 27–34)
MCHC RBC-ENTMCNC: 33 GM/DL — SIGNIFICANT CHANGE UP (ref 32–36)
MCV RBC AUTO: 92.6 FL — SIGNIFICANT CHANGE UP (ref 80–100)
PLATELET # BLD AUTO: 119 K/UL — LOW (ref 150–400)
POTASSIUM SERPL-MCNC: 4.5 MMOL/L — SIGNIFICANT CHANGE UP (ref 3.5–5.3)
POTASSIUM SERPL-SCNC: 4.5 MMOL/L — SIGNIFICANT CHANGE UP (ref 3.5–5.3)
RBC # BLD: 3.11 M/UL — LOW (ref 4.2–5.8)
RBC # FLD: 16.1 % — HIGH (ref 10.3–14.5)
SODIUM SERPL-SCNC: 137 MMOL/L — SIGNIFICANT CHANGE UP (ref 135–145)
SURGICAL PATHOLOGY STUDY: SIGNIFICANT CHANGE UP
WBC # BLD: 8.25 K/UL — SIGNIFICANT CHANGE UP (ref 3.8–10.5)
WBC # FLD AUTO: 8.25 K/UL — SIGNIFICANT CHANGE UP (ref 3.8–10.5)

## 2020-03-25 PROCEDURE — 71045 X-RAY EXAM CHEST 1 VIEW: CPT | Mod: 26

## 2020-03-25 PROCEDURE — 93010 ELECTROCARDIOGRAM REPORT: CPT

## 2020-03-25 RX ORDER — ACETAMINOPHEN 500 MG
1000 TABLET ORAL ONCE
Refills: 0 | Status: COMPLETED | OUTPATIENT
Start: 2020-03-25 | End: 2020-03-25

## 2020-03-25 RX ORDER — FUROSEMIDE 40 MG
40 TABLET ORAL DAILY
Refills: 0 | Status: DISCONTINUED | OUTPATIENT
Start: 2020-03-25 | End: 2020-03-28

## 2020-03-25 RX ORDER — ACETAMINOPHEN 500 MG
975 TABLET ORAL EVERY 8 HOURS
Refills: 0 | Status: DISCONTINUED | OUTPATIENT
Start: 2020-03-25 | End: 2020-03-28

## 2020-03-25 RX ORDER — HYDROMORPHONE HYDROCHLORIDE 2 MG/ML
0.5 INJECTION INTRAMUSCULAR; INTRAVENOUS; SUBCUTANEOUS ONCE
Refills: 0 | Status: DISCONTINUED | OUTPATIENT
Start: 2020-03-25 | End: 2020-03-25

## 2020-03-25 RX ADMIN — ENOXAPARIN SODIUM 40 MILLIGRAM(S): 100 INJECTION SUBCUTANEOUS at 13:44

## 2020-03-25 RX ADMIN — GABAPENTIN 400 MILLIGRAM(S): 400 CAPSULE ORAL at 22:02

## 2020-03-25 RX ADMIN — Medication 100 MILLIGRAM(S): at 07:46

## 2020-03-25 RX ADMIN — AMIODARONE HYDROCHLORIDE 400 MILLIGRAM(S): 400 TABLET ORAL at 13:43

## 2020-03-25 RX ADMIN — Medication 12.5 MILLIGRAM(S): at 06:04

## 2020-03-25 RX ADMIN — MIDODRINE HYDROCHLORIDE 5 MILLIGRAM(S): 2.5 TABLET ORAL at 22:02

## 2020-03-25 RX ADMIN — OXYCODONE HYDROCHLORIDE 10 MILLIGRAM(S): 5 TABLET ORAL at 22:02

## 2020-03-25 RX ADMIN — Medication 1 PACKET(S): at 06:06

## 2020-03-25 RX ADMIN — CLOPIDOGREL BISULFATE 75 MILLIGRAM(S): 75 TABLET, FILM COATED ORAL at 13:45

## 2020-03-25 RX ADMIN — Medication 40 MILLIGRAM(S): at 07:01

## 2020-03-25 RX ADMIN — OXYCODONE HYDROCHLORIDE 10 MILLIGRAM(S): 5 TABLET ORAL at 10:11

## 2020-03-25 RX ADMIN — OXYCODONE HYDROCHLORIDE 10 MILLIGRAM(S): 5 TABLET ORAL at 11:00

## 2020-03-25 RX ADMIN — MIDODRINE HYDROCHLORIDE 5 MILLIGRAM(S): 2.5 TABLET ORAL at 13:46

## 2020-03-25 RX ADMIN — HYDROMORPHONE HYDROCHLORIDE 0.5 MILLIGRAM(S): 2 INJECTION INTRAMUSCULAR; INTRAVENOUS; SUBCUTANEOUS at 15:00

## 2020-03-25 RX ADMIN — OXYCODONE HYDROCHLORIDE 10 MILLIGRAM(S): 5 TABLET ORAL at 02:00

## 2020-03-25 RX ADMIN — AMIODARONE HYDROCHLORIDE 400 MILLIGRAM(S): 400 TABLET ORAL at 06:03

## 2020-03-25 RX ADMIN — ATORVASTATIN CALCIUM 40 MILLIGRAM(S): 80 TABLET, FILM COATED ORAL at 22:02

## 2020-03-25 RX ADMIN — AMIODARONE HYDROCHLORIDE 400 MILLIGRAM(S): 400 TABLET ORAL at 22:02

## 2020-03-25 RX ADMIN — MIDODRINE HYDROCHLORIDE 5 MILLIGRAM(S): 2.5 TABLET ORAL at 06:03

## 2020-03-25 RX ADMIN — Medication 250 MILLIGRAM(S): at 07:47

## 2020-03-25 RX ADMIN — Medication 12.5 MILLIGRAM(S): at 18:45

## 2020-03-25 RX ADMIN — OXYCODONE HYDROCHLORIDE 10 MILLIGRAM(S): 5 TABLET ORAL at 18:29

## 2020-03-25 RX ADMIN — PANTOPRAZOLE SODIUM 40 MILLIGRAM(S): 20 TABLET, DELAYED RELEASE ORAL at 06:07

## 2020-03-25 RX ADMIN — OXYCODONE HYDROCHLORIDE 10 MILLIGRAM(S): 5 TABLET ORAL at 06:02

## 2020-03-25 RX ADMIN — HYDROMORPHONE HYDROCHLORIDE 0.5 MILLIGRAM(S): 2 INJECTION INTRAMUSCULAR; INTRAVENOUS; SUBCUTANEOUS at 15:30

## 2020-03-25 RX ADMIN — Medication 1 PACKET(S): at 22:02

## 2020-03-25 RX ADMIN — Medication 325 MILLIGRAM(S): at 13:46

## 2020-03-25 RX ADMIN — Medication 1 PACKET(S): at 13:43

## 2020-03-25 RX ADMIN — OXYCODONE HYDROCHLORIDE 10 MILLIGRAM(S): 5 TABLET ORAL at 22:30

## 2020-03-25 RX ADMIN — OXYCODONE HYDROCHLORIDE 10 MILLIGRAM(S): 5 TABLET ORAL at 06:30

## 2020-03-25 RX ADMIN — OXYCODONE HYDROCHLORIDE 10 MILLIGRAM(S): 5 TABLET ORAL at 01:33

## 2020-03-25 RX ADMIN — SENNA PLUS 2 TABLET(S): 8.6 TABLET ORAL at 22:02

## 2020-03-25 RX ADMIN — GABAPENTIN 400 MILLIGRAM(S): 400 CAPSULE ORAL at 07:01

## 2020-03-25 RX ADMIN — TAMSULOSIN HYDROCHLORIDE 0.4 MILLIGRAM(S): 0.4 CAPSULE ORAL at 22:02

## 2020-03-25 RX ADMIN — GABAPENTIN 400 MILLIGRAM(S): 400 CAPSULE ORAL at 13:44

## 2020-03-25 RX ADMIN — Medication 400 MILLIGRAM(S): at 23:49

## 2020-03-25 NOTE — CHART NOTE - NSCHARTNOTEFT_GEN_A_CORE
Upon Nutritional Assessment by the Registered Dietitian your patient was determined to meet criteria / has evidence of the following diagnosis/diagnoses:          [ ]  Mild Protein Calorie Malnutrition        [ ]  Moderate Protein Calorie Malnutrition        [x ] Severe Protein Calorie Malnutrition        [ ] Unspecified Protein Calorie Malnutrition        [ ] Underweight / BMI <19        [ ] Morbid Obesity / BMI > 40      Findings as based on:  •  Comprehensive nutrition assessment and consultation  •  Calorie counts (nutrient intake analysis)  •  Food acceptance and intake status from observations by staff  •  Follow up  •  Patient education  •  Intervention secondary to interdisciplinary rounds  •   concerns    Ensure TID     Treatment:    The following diet has been recommended:      PROVIDER Section:     By signing this assessment you are acknowledging and agree with the diagnosis/diagnoses assigned by the Registered Dietitian    Comments:

## 2020-03-25 NOTE — DIETITIAN INITIAL EVALUATION ADULT. - PERTINENT LABORATORY DATA
03-25 Na137 mmol/L Glu 123 mg/dL<H> K+ 4.5 mmol/L Cr  0.45 mg/dL<L> BUN 14.0 mg/dL Phos n/a   Alb n/a   PAB n/a

## 2020-03-25 NOTE — DIETITIAN INITIAL EVALUATION ADULT. - ETIOLOGY
Related to inadequate protein energy intake in setting of advanced age with decreased appetite and now healing (s/p CABG x 3)

## 2020-03-25 NOTE — DIETITIAN INITIAL EVALUATION ADULT. - MALNUTRITION
Severe-chronic NFPE: moderate muscle mass loss in temple, clavicle, shoulder and moderate fat loss in orbit/buccal, thoracic regions; +generalized edema noted

## 2020-03-25 NOTE — PROGRESS NOTE ADULT - SUBJECTIVE AND OBJECTIVE BOX
POD 2 s/p C3L, postop course notable for AF RVR requiring amio    Subjective: c/o incisional pain, relieved with oxycodone, denies CP, palpitations, SOB, cough, fever, chills, itchiness/rash, diaphoresis, vision changes, HA, dizziness/lightheadedness, numbness/tingling, abd pain, N/V     T(C): 36.8 (03-24-20 @ 16:00), Max: 36.8 (03-24-20 @ 16:00)  HR: 78 (03-25-20 @ 00:00) (76 - 134)  BP: 97/59 (03-25-20 @ 00:00) (86/60 - 124/75)  ABP: 103/48 (03-25-20 @ 00:00) (80/48 - 122/64)  ABP(mean): 64 (03-25-20 @ 00:00) (60 - 88)  RR: 14 (03-25-20 @ 00:00) (11 - 29)  SpO2: 97% (03-25-20 @ 00:00) (93% - 100%)  CVP(mm Hg): 8 (03-25-20 @ 00:00) (5 - 13)    03-24    133<L>  |  100  |  17.0  ----------------------------<  164<H>  3.9   |  21.0<L>  |  0.51    Ca    8.2<L>      24 Mar 2020 14:53  Mg     1.9     03-24    TPro  4.9<L>  /  Alb  3.1<L>  /  TBili  1.1  /  DBili  x   /  AST  40<H>  /  ALT  21  /  AlkPhos  80  03-23                               9.9    8.37  )-----------( 118      ( 24 Mar 2020 14:53 )             29.8   PT/INR - ( 23 Mar 2020 12:44 )   PT: 16.8 sec;   INR: 1.47 ratio    PTT - ( 23 Mar 2020 12:44 )  PTT:31.7 sec  ABG - ( 23 Mar 2020 18:41 )  pH, Arterial: 7.39  pH, Blood: x     /  pCO2: 42    /  pO2: 90    / HCO3: 25    / Base Excess: 0.5   /  SaO2: 98        CAPILLARY BLOOD GLUCOSE  POCT Blood Glucose.: 122 mg/dL (24 Mar 2020 21:18)  POCT Blood Glucose.: 154 mg/dL (24 Mar 2020 16:09)  POCT Blood Glucose.: 134 mg/dL (24 Mar 2020 12:26)  POCT Blood Glucose.: 150 mg/dL (24 Mar 2020 08:10)  POCT Blood Glucose.: 125 mg/dL (24 Mar 2020 02:20)    23 Mar 2020 07:01  -  24 Mar 2020 07:00  --------------------------------------------------------  IN:    Albumin 5%  - 250 mL: 500 mL    Lactated Ringers IV Bolus: 1500 mL    norepinephrine Infusion: 17.9 mL    Oral Fluid: 200 mL    Packed Red Blood Cells: 300 mL    propofol Infusion: 27 mL    sodium chloride 0.9%.: 95 mL    sodium chloride 0.9%.: 190 mL    Solution: 200 mL    Solution: 100 mL    Solution: 100 mL    Solution: 250 mL    Solution: 100 mL  Total IN: 3579.9 mL    OUT:    Chest Tube: 220 mL    Chest Tube: 790 mL    Indwelling Catheter - Urethral: 1540 mL  Total OUT: 2550 mL  Total NET: 1029.9 mL    24 Mar 2020 07:01  -  25 Mar 2020 01:15  --------------------------------------------------------  IN:    norepinephrine Infusion: 18.5 mL    Oral Fluid: 1010 mL    sodium chloride 0.9%.: 180 mL    sodium chloride 0.9%.: 90 mL    Solution: 50 mL    Solution: 200 mL    Solution: 150 mL    Solution: 200 mL    Solution: 500 mL    Solution: 200 mL  Total IN: 2598.5 mL    OUT:    Chest Tube: 170 mL    Chest Tube: 140 mL    Indwelling Catheter - Urethral: 1925 mL  Total OUT: 2235 mL  Total NET: 363.5 mL    Drug Dosing Weight  Height (cm): 172.72 (23 Mar 2020 06:31)  Weight (kg): 77.8 (23 Mar 2020 06:31)  BMI (kg/m2): 26.1 (23 Mar 2020 06:31)  BSA (m2): 1.91 (23 Mar 2020 06:31)    MEDICATIONS  (STANDING):  aMIOdarone    Tablet 400 milliGRAM(s) Oral every 8 hours  aMIOdarone    Tablet   Oral   aspirin enteric coated 325 milliGRAM(s) Oral daily  atorvastatin 40 milliGRAM(s) Oral at bedtime  cefuroxime  IVPB 1500 milliGRAM(s) IV Intermittent every 8 hours  chlorhexidine 2% Cloths 1 Application(s) Topical daily  clopidogrel Tablet 75 milliGRAM(s) Oral daily  enoxaparin Injectable 40 milliGRAM(s) SubCutaneous daily  gabapentin 400 milliGRAM(s) Oral three times a day  insulin lispro (HumaLOG) corrective regimen sliding scale   SubCutaneous Before meals and at bedtime  metoprolol tartrate 12.5 milliGRAM(s) Oral two times a day  midodrine 5 milliGRAM(s) Oral every 8 hours  norepinephrine Infusion 0.05 MICROgram(s)/kG/Min (7.29 mL/Hr) IV Continuous <Continuous>  pantoprazole    Tablet 40 milliGRAM(s) Oral before breakfast  psyllium Powder 1 Packet(s) Oral three times a day  senna 2 Tablet(s) Oral at bedtime  sodium chloride 0.9%. 1000 milliLiter(s) (10 mL/Hr) IV Continuous <Continuous>  sodium chloride 0.9%. 1000 milliLiter(s) (5 mL/Hr) IV Continuous <Continuous>  tamsulosin 0.4 milliGRAM(s) Oral at bedtime  vancomycin  IVPB 1000 milliGRAM(s) IV Intermittent every 12 hours    MEDICATIONS  (PRN):  ondansetron Injectable 4 milliGRAM(s) IV Push every 4 hours PRN Nausea and/or Vomiting  oxyCODONE    IR 5 milliGRAM(s) Oral every 4 hours PRN Moderate Pain (4 - 6)  oxyCODONE    IR 10 milliGRAM(s) Oral every 4 hours PRN Severe Pain (7 - 10)    Physical Exam  Constitutional: NAD, well developed, well nourished  Neuro: A+O x 3, non-focal, speech clear and intact  Neck: supple, no JVD  CV: S1S2 RRR, no murmurs/rbus  Pulm/chest: diminished breath sounds b/l with rhonchi, no wheezing  Abd: hypoactive BS soft NT ND  Ext: BARKER x 4, trace b/l LE edema, no clubbing/cyanosis  Skin: warm, well perfused  vascular: 2+ DP/radial pulses b/l  Psych: calm, appropriate affect   inc: dsg intact with blood stain, sternum stable, no click  lines/tubes: RIJ intro, R radial josemanuel, cano, MCTx2, LpCTx1 all to suction no air leak, EPM VVI 50/10/2.0

## 2020-03-25 NOTE — DIETITIAN INITIAL EVALUATION ADULT. - OTHER INFO
Pt is a 74 year ols Male, PMH HTN, HLD, lumbosacral disc disease with nerve stimulation, COPD, BPH, anemia, RCA stents (YAYA x 1 in 2002, x 2 in 2019), bilat cataract sx, A1c 5.8, small bilat pulmonary nodules. 3/23 s/p CABG x 3; Pt awake and alert. Pt reports tolerating clear liquid well without difficulty swallowing. Pt reports having decreased appetite over past few years, typically only eats one meal per day; Pt is unsure if he has lost wt. Pt is edentulous, has dentures, however not currently with him. NFPE conducted.

## 2020-03-26 LAB
ANION GAP SERPL CALC-SCNC: 10 MMOL/L — SIGNIFICANT CHANGE UP (ref 5–17)
BUN SERPL-MCNC: 13 MG/DL — SIGNIFICANT CHANGE UP (ref 8–20)
CALCIUM SERPL-MCNC: 8 MG/DL — LOW (ref 8.6–10.2)
CHLORIDE SERPL-SCNC: 100 MMOL/L — SIGNIFICANT CHANGE UP (ref 98–107)
CO2 SERPL-SCNC: 27 MMOL/L — SIGNIFICANT CHANGE UP (ref 22–29)
CREAT SERPL-MCNC: 0.46 MG/DL — LOW (ref 0.5–1.3)
GLUCOSE BLDC GLUCOMTR-MCNC: 103 MG/DL — HIGH (ref 70–99)
GLUCOSE BLDC GLUCOMTR-MCNC: 105 MG/DL — HIGH (ref 70–99)
GLUCOSE BLDC GLUCOMTR-MCNC: 134 MG/DL — HIGH (ref 70–99)
GLUCOSE BLDC GLUCOMTR-MCNC: 94 MG/DL — SIGNIFICANT CHANGE UP (ref 70–99)
GLUCOSE SERPL-MCNC: 119 MG/DL — HIGH (ref 70–99)
HCT VFR BLD CALC: 25.4 % — LOW (ref 39–50)
HCT VFR BLD CALC: 31.9 % — LOW (ref 39–50)
HGB BLD-MCNC: 10.9 G/DL — LOW (ref 13–17)
HGB BLD-MCNC: 8.5 G/DL — LOW (ref 13–17)
MAGNESIUM SERPL-MCNC: 2 MG/DL — SIGNIFICANT CHANGE UP (ref 1.6–2.6)
MCHC RBC-ENTMCNC: 31.1 PG — SIGNIFICANT CHANGE UP (ref 27–34)
MCHC RBC-ENTMCNC: 31.2 PG — SIGNIFICANT CHANGE UP (ref 27–34)
MCHC RBC-ENTMCNC: 33.5 GM/DL — SIGNIFICANT CHANGE UP (ref 32–36)
MCHC RBC-ENTMCNC: 34.2 GM/DL — SIGNIFICANT CHANGE UP (ref 32–36)
MCV RBC AUTO: 91.4 FL — SIGNIFICANT CHANGE UP (ref 80–100)
MCV RBC AUTO: 93 FL — SIGNIFICANT CHANGE UP (ref 80–100)
PLATELET # BLD AUTO: 142 K/UL — LOW (ref 150–400)
PLATELET # BLD AUTO: 162 K/UL — SIGNIFICANT CHANGE UP (ref 150–400)
POTASSIUM SERPL-MCNC: 3.9 MMOL/L — SIGNIFICANT CHANGE UP (ref 3.5–5.3)
POTASSIUM SERPL-SCNC: 3.9 MMOL/L — SIGNIFICANT CHANGE UP (ref 3.5–5.3)
RBC # BLD: 2.73 M/UL — LOW (ref 4.2–5.8)
RBC # BLD: 3.49 M/UL — LOW (ref 4.2–5.8)
RBC # FLD: 15.4 % — HIGH (ref 10.3–14.5)
RBC # FLD: 15.5 % — HIGH (ref 10.3–14.5)
SODIUM SERPL-SCNC: 137 MMOL/L — SIGNIFICANT CHANGE UP (ref 135–145)
WBC # BLD: 7.74 K/UL — SIGNIFICANT CHANGE UP (ref 3.8–10.5)
WBC # BLD: 9.14 K/UL — SIGNIFICANT CHANGE UP (ref 3.8–10.5)
WBC # FLD AUTO: 7.74 K/UL — SIGNIFICANT CHANGE UP (ref 3.8–10.5)
WBC # FLD AUTO: 9.14 K/UL — SIGNIFICANT CHANGE UP (ref 3.8–10.5)

## 2020-03-26 PROCEDURE — 84436 ASSAY OF TOTAL THYROXINE: CPT

## 2020-03-26 PROCEDURE — 87086 URINE CULTURE/COLONY COUNT: CPT

## 2020-03-26 PROCEDURE — 86900 BLOOD TYPING SEROLOGIC ABO: CPT

## 2020-03-26 PROCEDURE — 86901 BLOOD TYPING SEROLOGIC RH(D): CPT

## 2020-03-26 PROCEDURE — 85027 COMPLETE CBC AUTOMATED: CPT

## 2020-03-26 PROCEDURE — 87640 STAPH A DNA AMP PROBE: CPT

## 2020-03-26 PROCEDURE — 86923 COMPATIBILITY TEST ELECTRIC: CPT

## 2020-03-26 PROCEDURE — 84443 ASSAY THYROID STIM HORMONE: CPT

## 2020-03-26 PROCEDURE — 85610 PROTHROMBIN TIME: CPT

## 2020-03-26 PROCEDURE — G0463: CPT

## 2020-03-26 PROCEDURE — 36415 COLL VENOUS BLD VENIPUNCTURE: CPT

## 2020-03-26 PROCEDURE — 87641 MR-STAPH DNA AMP PROBE: CPT

## 2020-03-26 PROCEDURE — 85730 THROMBOPLASTIN TIME PARTIAL: CPT

## 2020-03-26 PROCEDURE — 71045 X-RAY EXAM CHEST 1 VIEW: CPT | Mod: 26

## 2020-03-26 PROCEDURE — 93005 ELECTROCARDIOGRAM TRACING: CPT

## 2020-03-26 PROCEDURE — 86850 RBC ANTIBODY SCREEN: CPT

## 2020-03-26 PROCEDURE — 83880 ASSAY OF NATRIURETIC PEPTIDE: CPT

## 2020-03-26 PROCEDURE — 71046 X-RAY EXAM CHEST 2 VIEWS: CPT

## 2020-03-26 PROCEDURE — 80053 COMPREHEN METABOLIC PANEL: CPT

## 2020-03-26 PROCEDURE — 84480 ASSAY TRIIODOTHYRONINE (T3): CPT

## 2020-03-26 PROCEDURE — 83036 HEMOGLOBIN GLYCOSYLATED A1C: CPT

## 2020-03-26 PROCEDURE — 84134 ASSAY OF PREALBUMIN: CPT

## 2020-03-26 PROCEDURE — 81003 URINALYSIS AUTO W/O SCOPE: CPT

## 2020-03-26 RX ORDER — FUROSEMIDE 40 MG
20 TABLET ORAL ONCE
Refills: 0 | Status: COMPLETED | OUTPATIENT
Start: 2020-03-26 | End: 2020-03-26

## 2020-03-26 RX ORDER — POTASSIUM CHLORIDE 20 MEQ
20 PACKET (EA) ORAL ONCE
Refills: 0 | Status: COMPLETED | OUTPATIENT
Start: 2020-03-26 | End: 2020-03-26

## 2020-03-26 RX ORDER — SODIUM CHLORIDE 9 MG/ML
3 INJECTION INTRAMUSCULAR; INTRAVENOUS; SUBCUTANEOUS EVERY 8 HOURS
Refills: 0 | Status: DISCONTINUED | OUTPATIENT
Start: 2020-03-26 | End: 2020-03-28

## 2020-03-26 RX ORDER — MIDODRINE HYDROCHLORIDE 2.5 MG/1
10 TABLET ORAL EVERY 8 HOURS
Refills: 0 | Status: DISCONTINUED | OUTPATIENT
Start: 2020-03-26 | End: 2020-03-28

## 2020-03-26 RX ADMIN — Medication 40 MILLIGRAM(S): at 06:25

## 2020-03-26 RX ADMIN — AMIODARONE HYDROCHLORIDE 400 MILLIGRAM(S): 400 TABLET ORAL at 21:15

## 2020-03-26 RX ADMIN — Medication 20 MILLIGRAM(S): at 11:06

## 2020-03-26 RX ADMIN — GABAPENTIN 400 MILLIGRAM(S): 400 CAPSULE ORAL at 21:16

## 2020-03-26 RX ADMIN — Medication 1 PACKET(S): at 21:16

## 2020-03-26 RX ADMIN — SENNA PLUS 2 TABLET(S): 8.6 TABLET ORAL at 21:16

## 2020-03-26 RX ADMIN — Medication 12.5 MILLIGRAM(S): at 06:25

## 2020-03-26 RX ADMIN — OXYCODONE HYDROCHLORIDE 10 MILLIGRAM(S): 5 TABLET ORAL at 15:11

## 2020-03-26 RX ADMIN — Medication 1 PACKET(S): at 15:11

## 2020-03-26 RX ADMIN — MIDODRINE HYDROCHLORIDE 10 MILLIGRAM(S): 2.5 TABLET ORAL at 14:37

## 2020-03-26 RX ADMIN — TAMSULOSIN HYDROCHLORIDE 0.4 MILLIGRAM(S): 0.4 CAPSULE ORAL at 21:16

## 2020-03-26 RX ADMIN — Medication 1 PACKET(S): at 06:25

## 2020-03-26 RX ADMIN — CLOPIDOGREL BISULFATE 75 MILLIGRAM(S): 75 TABLET, FILM COATED ORAL at 12:18

## 2020-03-26 RX ADMIN — SODIUM CHLORIDE 3 MILLILITER(S): 9 INJECTION INTRAMUSCULAR; INTRAVENOUS; SUBCUTANEOUS at 14:32

## 2020-03-26 RX ADMIN — OXYCODONE HYDROCHLORIDE 10 MILLIGRAM(S): 5 TABLET ORAL at 19:21

## 2020-03-26 RX ADMIN — OXYCODONE HYDROCHLORIDE 10 MILLIGRAM(S): 5 TABLET ORAL at 19:51

## 2020-03-26 RX ADMIN — AMIODARONE HYDROCHLORIDE 400 MILLIGRAM(S): 400 TABLET ORAL at 14:36

## 2020-03-26 RX ADMIN — GABAPENTIN 400 MILLIGRAM(S): 400 CAPSULE ORAL at 08:00

## 2020-03-26 RX ADMIN — ATORVASTATIN CALCIUM 40 MILLIGRAM(S): 80 TABLET, FILM COATED ORAL at 21:15

## 2020-03-26 RX ADMIN — ENOXAPARIN SODIUM 40 MILLIGRAM(S): 100 INJECTION SUBCUTANEOUS at 12:18

## 2020-03-26 RX ADMIN — OXYCODONE HYDROCHLORIDE 10 MILLIGRAM(S): 5 TABLET ORAL at 11:05

## 2020-03-26 RX ADMIN — Medication 12.5 MILLIGRAM(S): at 16:28

## 2020-03-26 RX ADMIN — OXYCODONE HYDROCHLORIDE 10 MILLIGRAM(S): 5 TABLET ORAL at 12:05

## 2020-03-26 RX ADMIN — OXYCODONE HYDROCHLORIDE 10 MILLIGRAM(S): 5 TABLET ORAL at 16:11

## 2020-03-26 RX ADMIN — Medication 325 MILLIGRAM(S): at 12:19

## 2020-03-26 RX ADMIN — SODIUM CHLORIDE 3 MILLILITER(S): 9 INJECTION INTRAMUSCULAR; INTRAVENOUS; SUBCUTANEOUS at 22:00

## 2020-03-26 RX ADMIN — Medication 20 MILLIEQUIVALENT(S): at 06:32

## 2020-03-26 RX ADMIN — OXYCODONE HYDROCHLORIDE 10 MILLIGRAM(S): 5 TABLET ORAL at 23:53

## 2020-03-26 RX ADMIN — MIDODRINE HYDROCHLORIDE 10 MILLIGRAM(S): 2.5 TABLET ORAL at 06:18

## 2020-03-26 RX ADMIN — OXYCODONE HYDROCHLORIDE 10 MILLIGRAM(S): 5 TABLET ORAL at 06:32

## 2020-03-26 RX ADMIN — MIDODRINE HYDROCHLORIDE 10 MILLIGRAM(S): 2.5 TABLET ORAL at 21:15

## 2020-03-26 RX ADMIN — GABAPENTIN 400 MILLIGRAM(S): 400 CAPSULE ORAL at 14:37

## 2020-03-26 RX ADMIN — AMIODARONE HYDROCHLORIDE 400 MILLIGRAM(S): 400 TABLET ORAL at 06:25

## 2020-03-26 RX ADMIN — PANTOPRAZOLE SODIUM 40 MILLIGRAM(S): 20 TABLET, DELAYED RELEASE ORAL at 06:25

## 2020-03-26 RX ADMIN — Medication 1000 MILLIGRAM(S): at 00:12

## 2020-03-26 NOTE — CHART NOTE - NSCHARTNOTEFT_GEN_A_CORE
Patient transferred to [address] [place] [address] this afternoon without incident.     74 year old male patient with a PMHx of HTN, CAD s/p PCO to RCA, HLD, lumbosacral disc disease with a nerve stimulator, COPD, former smoker, BPH, anemia, now CABG X 3 (LIMA-LAD, SVG-PDA, SVG-OM) with left saphenous vein harvest by Dr. Hutchison on 3/23/20. Postoperative rapid afib noted with amio load started. Blood pressure currently maintained on midodrine 10mg q8hrs. Earlier today patient received 1 unit PRBC for a HCT of 25. Repeat CBC showed a HCT of 31. Patient received 40mg IV Lasix and was downgraded to the floor.     PHYSICAL EXAM:  General: Well nourished, no acute distress, sitting up in chair at bedside  Neurology: Alert and oriented X 4, nonfocal, no gross deficits  Respiratory: Decreased BSs at b/l lung bases, minimal expiratory wheeze noted Right lung base  CV:  RRR, S1 S2  Abdomen: Soft, nontender, nondistended, positive bowel sounds  Extremities: Warm, well perfused, no edema +DP pulses, LLE with ACE wrap in place  Incisions: Midline sternal incision, c/d/i, sternum stable, mepilex in place    Pain currently controlled on current medication regimen. Passed a BM earlier today. V wires attached to external pacing box at bedside as patient remains on an amio load. Vital signs remain stable at this time. Will continue to monitor.

## 2020-03-26 NOTE — PROGRESS NOTE ADULT - SUBJECTIVE AND OBJECTIVE BOX
Subjective:  75yo M resting comfortable, no c/o pain.      HPI:  Patient is a 75 y/o male aox3 retired  .PMHx of HTN. HLD, lumbosacral disc disease with a nerve stimulator,  COPD, BPH. anemia , stents RCA . Patient c/o chest discomfort with chest pain with activity, walking and lifting . Patient state his pain ranges from 3/10 to 4/10 denies nausea . Patient also reports lightheadedness when standing up from a seated  position. Patient presents to PST for evaluation for a CABG X4  with Dr Hutchison on 20 (19 Mar 2020 10:15)          PAST MEDICAL & SURGICAL HISTORY:  HLD (hyperlipidemia)  HTN (hypertension)  Lumbosacral disc disease: has nerve stimulator  Arthritis  CAD in native artery: RCA 3 YAYA 1 in  and 2 in   S/P cataract surgery: b/l eyes   H/O heart artery stent: RCA          MEDICATIONS  (STANDING):  aMIOdarone    Tablet 400 milliGRAM(s) Oral every 8 hours  aMIOdarone    Tablet   Oral   aspirin enteric coated 325 milliGRAM(s) Oral daily  atorvastatin 40 milliGRAM(s) Oral at bedtime  clopidogrel Tablet 75 milliGRAM(s) Oral daily  enoxaparin Injectable 40 milliGRAM(s) SubCutaneous daily  furosemide    Tablet 40 milliGRAM(s) Oral daily  gabapentin 400 milliGRAM(s) Oral three times a day  insulin lispro (HumaLOG) corrective regimen sliding scale   SubCutaneous Before meals and at bedtime  metoprolol tartrate 12.5 milliGRAM(s) Oral two times a day  midodrine 10 milliGRAM(s) Oral every 8 hours  pantoprazole    Tablet 40 milliGRAM(s) Oral before breakfast  psyllium Powder 1 Packet(s) Oral three times a day  senna 2 Tablet(s) Oral at bedtime  sodium chloride 0.9%. 1000 milliLiter(s) (5 mL/Hr) IV Continuous <Continuous>  tamsulosin 0.4 milliGRAM(s) Oral at bedtime    MEDICATIONS  (PRN):  acetaminophen   Tablet .. 975 milliGRAM(s) Oral every 8 hours PRN Moderate Pain (4 - 6)  oxyCODONE    IR 5 milliGRAM(s) Oral every 4 hours PRN Moderate Pain (4 - 6)  oxyCODONE    IR 10 milliGRAM(s) Oral every 4 hours PRN Severe Pain (7 - 10)          Allergies    codeine (Urticaria)  ibuprofen (Anaphylaxis)    Intolerances          WEIGHTS:  Daily     Daily Weight in k (25 Mar 2020 07:59)  Admit Wt: Drug Dosing Weight  Height (cm): 172.72 (23 Mar 2020 06:31)  Weight (kg): 77.8 (23 Mar 2020 06:31)  BMI (kg/m2): 26.1 (23 Mar 2020 06:31)  BSA (m2): 1.91 (23 Mar 2020 06:31)  I&O's Summary    24 Mar 2020 07:01  -  25 Mar 2020 07:00  --------------------------------------------------------  IN: 2688.5 mL / OUT: 2645 mL / NET: 43.5 mL    25 Mar 2020 07:01  -  26 Mar 2020 03:08  --------------------------------------------------------  IN: 875 mL / OUT: 2150 mL / NET: -1275 mL        VITAL SIGNS:  ICU Vital Signs Last 24 Hrs  T(C): 37.1 (26 Mar 2020 00:00), Max: 37.3 (25 Mar 2020 20:00)  T(F): 98.7 (26 Mar 2020 00:00), Max: 99.1 (25 Mar 2020 20:00)  HR: 84 (26 Mar 2020 02:00) (84 - 103)  BP: 100/59 (26 Mar 2020 02:00) (96/63 - 136/72)  BP(mean): 73 (26 Mar 2020 02:00) (71 - 92)  ABP: 131/55 (25 Mar 2020 05:00) (101/51 - 131/55)  ABP(mean): 77 (25 Mar 2020 05:00) (66 - 77)  RR: 22 (26 Mar 2020 02:00) (12 - 23)  SpO2: 99% (26 Mar 2020 02:00) (95% - 100%)        All laboratory results, radiology and medications reviewed.    LABS:  03-25    137  |  104  |  14.0  ----------------------------<  123<H>  4.5   |  24.0  |  0.45<L>    Ca    8.0<L>      25 Mar 2020 03:20  Mg     2.1                                        9.5    8.25  )-----------( 119      ( 25 Mar 2020 03:20 )             28.8                    CAPILLARY BLOOD GLUCOSE      POCT Blood Glucose.: 112 mg/dL (25 Mar 2020 21:45)  POCT Blood Glucose.: 125 mg/dL (25 Mar 2020 16:41)  POCT Blood Glucose.: 111 mg/dL (25 Mar 2020 13:35)  POCT Blood Glucose.: 116 mg/dL (25 Mar 2020 05:13)             PHYSICAL EXAM:  General:  well nourished, no acute distress  Neurology:  alert and oriented X 4, nonfocal, no gross deficits  Respiratory:  clear to auscultation bilaterally  CV:  regular rate and rhythm, normal S1 S2  Abdomen:  soft, nontender, nondistended, positive bowel sounds  Extremities:  warm, well perfused, no edema +DP pulses  Incisions:  midline sternal incision, c/d/i, sternum stable

## 2020-03-27 ENCOUNTER — APPOINTMENT (OUTPATIENT)
Dept: CARDIOLOGY | Facility: CLINIC | Age: 75
End: 2020-03-27

## 2020-03-27 ENCOUNTER — TRANSCRIPTION ENCOUNTER (OUTPATIENT)
Age: 75
End: 2020-03-27

## 2020-03-27 LAB
ANION GAP SERPL CALC-SCNC: 12 MMOL/L — SIGNIFICANT CHANGE UP (ref 5–17)
BUN SERPL-MCNC: 15 MG/DL — SIGNIFICANT CHANGE UP (ref 8–20)
CALCIUM SERPL-MCNC: 8.2 MG/DL — LOW (ref 8.6–10.2)
CHLORIDE SERPL-SCNC: 99 MMOL/L — SIGNIFICANT CHANGE UP (ref 98–107)
CO2 SERPL-SCNC: 29 MMOL/L — SIGNIFICANT CHANGE UP (ref 22–29)
CREAT SERPL-MCNC: 0.47 MG/DL — LOW (ref 0.5–1.3)
GLUCOSE BLDC GLUCOMTR-MCNC: 103 MG/DL — HIGH (ref 70–99)
GLUCOSE BLDC GLUCOMTR-MCNC: 98 MG/DL — SIGNIFICANT CHANGE UP (ref 70–99)
GLUCOSE SERPL-MCNC: 102 MG/DL — HIGH (ref 70–99)
HCT VFR BLD CALC: 29.2 % — LOW (ref 39–50)
HGB BLD-MCNC: 9.6 G/DL — LOW (ref 13–17)
MAGNESIUM SERPL-MCNC: 2 MG/DL — SIGNIFICANT CHANGE UP (ref 1.6–2.6)
MCHC RBC-ENTMCNC: 30.6 PG — SIGNIFICANT CHANGE UP (ref 27–34)
MCHC RBC-ENTMCNC: 32.9 GM/DL — SIGNIFICANT CHANGE UP (ref 32–36)
MCV RBC AUTO: 93 FL — SIGNIFICANT CHANGE UP (ref 80–100)
PLATELET # BLD AUTO: 193 K/UL — SIGNIFICANT CHANGE UP (ref 150–400)
POTASSIUM SERPL-MCNC: 4.3 MMOL/L — SIGNIFICANT CHANGE UP (ref 3.5–5.3)
POTASSIUM SERPL-SCNC: 4.3 MMOL/L — SIGNIFICANT CHANGE UP (ref 3.5–5.3)
RBC # BLD: 3.14 M/UL — LOW (ref 4.2–5.8)
RBC # FLD: 15.8 % — HIGH (ref 10.3–14.5)
SODIUM SERPL-SCNC: 140 MMOL/L — SIGNIFICANT CHANGE UP (ref 135–145)
WBC # BLD: 6.93 K/UL — SIGNIFICANT CHANGE UP (ref 3.8–10.5)
WBC # FLD AUTO: 6.93 K/UL — SIGNIFICANT CHANGE UP (ref 3.8–10.5)

## 2020-03-27 PROCEDURE — 71045 X-RAY EXAM CHEST 1 VIEW: CPT | Mod: 26

## 2020-03-27 RX ORDER — METOPROLOL TARTRATE 50 MG
12.5 TABLET ORAL ONCE
Refills: 0 | Status: COMPLETED | OUTPATIENT
Start: 2020-03-27 | End: 2020-03-27

## 2020-03-27 RX ORDER — METOPROLOL TARTRATE 50 MG
25 TABLET ORAL
Refills: 0 | Status: DISCONTINUED | OUTPATIENT
Start: 2020-03-27 | End: 2020-03-28

## 2020-03-27 RX ORDER — DILTIAZEM HCL 120 MG
10 CAPSULE, EXT RELEASE 24 HR ORAL ONCE
Refills: 0 | Status: COMPLETED | OUTPATIENT
Start: 2020-03-27 | End: 2020-03-27

## 2020-03-27 RX ADMIN — SENNA PLUS 2 TABLET(S): 8.6 TABLET ORAL at 21:22

## 2020-03-27 RX ADMIN — OXYCODONE HYDROCHLORIDE 10 MILLIGRAM(S): 5 TABLET ORAL at 08:46

## 2020-03-27 RX ADMIN — MIDODRINE HYDROCHLORIDE 10 MILLIGRAM(S): 2.5 TABLET ORAL at 06:33

## 2020-03-27 RX ADMIN — AMIODARONE HYDROCHLORIDE 400 MILLIGRAM(S): 400 TABLET ORAL at 15:21

## 2020-03-27 RX ADMIN — Medication 1 PACKET(S): at 15:21

## 2020-03-27 RX ADMIN — GABAPENTIN 400 MILLIGRAM(S): 400 CAPSULE ORAL at 06:31

## 2020-03-27 RX ADMIN — OXYCODONE HYDROCHLORIDE 10 MILLIGRAM(S): 5 TABLET ORAL at 17:22

## 2020-03-27 RX ADMIN — TAMSULOSIN HYDROCHLORIDE 0.4 MILLIGRAM(S): 0.4 CAPSULE ORAL at 21:21

## 2020-03-27 RX ADMIN — OXYCODONE HYDROCHLORIDE 10 MILLIGRAM(S): 5 TABLET ORAL at 00:23

## 2020-03-27 RX ADMIN — OXYCODONE HYDROCHLORIDE 10 MILLIGRAM(S): 5 TABLET ORAL at 16:52

## 2020-03-27 RX ADMIN — MIDODRINE HYDROCHLORIDE 10 MILLIGRAM(S): 2.5 TABLET ORAL at 15:21

## 2020-03-27 RX ADMIN — Medication 10 MILLIGRAM(S): at 08:16

## 2020-03-27 RX ADMIN — OXYCODONE HYDROCHLORIDE 10 MILLIGRAM(S): 5 TABLET ORAL at 08:16

## 2020-03-27 RX ADMIN — AMIODARONE HYDROCHLORIDE 400 MILLIGRAM(S): 400 TABLET ORAL at 06:32

## 2020-03-27 RX ADMIN — Medication 40 MILLIGRAM(S): at 06:32

## 2020-03-27 RX ADMIN — SODIUM CHLORIDE 3 MILLILITER(S): 9 INJECTION INTRAMUSCULAR; INTRAVENOUS; SUBCUTANEOUS at 11:02

## 2020-03-27 RX ADMIN — ATORVASTATIN CALCIUM 40 MILLIGRAM(S): 80 TABLET, FILM COATED ORAL at 21:22

## 2020-03-27 RX ADMIN — OXYCODONE HYDROCHLORIDE 10 MILLIGRAM(S): 5 TABLET ORAL at 12:18

## 2020-03-27 RX ADMIN — SODIUM CHLORIDE 3 MILLILITER(S): 9 INJECTION INTRAMUSCULAR; INTRAVENOUS; SUBCUTANEOUS at 21:25

## 2020-03-27 RX ADMIN — ENOXAPARIN SODIUM 40 MILLIGRAM(S): 100 INJECTION SUBCUTANEOUS at 21:22

## 2020-03-27 RX ADMIN — OXYCODONE HYDROCHLORIDE 10 MILLIGRAM(S): 5 TABLET ORAL at 12:48

## 2020-03-27 RX ADMIN — Medication 25 MILLIGRAM(S): at 16:52

## 2020-03-27 RX ADMIN — PANTOPRAZOLE SODIUM 40 MILLIGRAM(S): 20 TABLET, DELAYED RELEASE ORAL at 06:32

## 2020-03-27 RX ADMIN — GABAPENTIN 400 MILLIGRAM(S): 400 CAPSULE ORAL at 21:21

## 2020-03-27 RX ADMIN — Medication 325 MILLIGRAM(S): at 11:01

## 2020-03-27 RX ADMIN — SODIUM CHLORIDE 3 MILLILITER(S): 9 INJECTION INTRAMUSCULAR; INTRAVENOUS; SUBCUTANEOUS at 06:35

## 2020-03-27 RX ADMIN — CLOPIDOGREL BISULFATE 75 MILLIGRAM(S): 75 TABLET, FILM COATED ORAL at 11:01

## 2020-03-27 RX ADMIN — Medication 1 PACKET(S): at 06:36

## 2020-03-27 RX ADMIN — OXYCODONE HYDROCHLORIDE 10 MILLIGRAM(S): 5 TABLET ORAL at 04:24

## 2020-03-27 RX ADMIN — Medication 12.5 MILLIGRAM(S): at 06:32

## 2020-03-27 RX ADMIN — AMIODARONE HYDROCHLORIDE 400 MILLIGRAM(S): 400 TABLET ORAL at 22:17

## 2020-03-27 RX ADMIN — Medication 12.5 MILLIGRAM(S): at 12:18

## 2020-03-27 RX ADMIN — GABAPENTIN 400 MILLIGRAM(S): 400 CAPSULE ORAL at 15:20

## 2020-03-27 RX ADMIN — OXYCODONE HYDROCHLORIDE 10 MILLIGRAM(S): 5 TABLET ORAL at 21:22

## 2020-03-27 RX ADMIN — OXYCODONE HYDROCHLORIDE 10 MILLIGRAM(S): 5 TABLET ORAL at 22:22

## 2020-03-27 RX ADMIN — OXYCODONE HYDROCHLORIDE 10 MILLIGRAM(S): 5 TABLET ORAL at 04:54

## 2020-03-27 NOTE — DISCHARGE NOTE PROVIDER - NSDCACTIVITY_GEN_ALL_CORE
Do not drive or operate machinery/Showering allowed/Do not make important decisions/Stairs allowed/No heavy lifting/straining/Walking - Outdoors allowed/Walking - Indoors allowed

## 2020-03-27 NOTE — PROGRESS NOTE ADULT - PROBLEM SELECTOR PLAN 6
Continue Amio load.    Rapid afib this AM> total of 10mg IV Cardizem given and converted to -105.  Lopressor increased to 25mg BID.
continue post-op GI, DVT, abx prophylaxis
currently SR on amio load and lopessor
currently SR on Amio load and Lopessor

## 2020-03-27 NOTE — PROGRESS NOTE ADULT - PROBLEM SELECTOR PLAN 5
continue Gabapentin  spinal stimulator currently off
continue gabapentin
continue gabapentin  spinal stimulator currently off
continue Gabapentin  spinal stimulator currently off

## 2020-03-27 NOTE — PROGRESS NOTE ADULT - ASSESSMENT
74 year old male with PMH HTN, HLD, lumbosacral disc disease with nerve stimulation, COPD, BPH, anemia, RCA stents (YAYA x 1 in 2002, x 2 in 2019), bilat cataract sx, A1c 5.8, small bilat pulmonary nodules. 3/23 s/p CABG x 3 (LIMA to LAD, SVG to PDA, SVG to OM).
74M, PMH HTN, HLD, lumbosacral disc disease with nerve stimulation, COPD, BPH, anemia, RCA stents (YAYA x 1 in 2002, x 2 in 2019), bilat cataract sx, A1c 5.8, small bilat pulmonary nodules. 3/23 s/p CABG x 3 (LIMA to LAD, SVG to PDA, SVG to OM), postop ANKUR,  on PO amio load.
74M, PMH HTN, HLD, lumbosacral disc disease with nerve stimulation, COPD, BPH, anemia, RCA stents (YAYA x 1 in 2002, x 2 in 2019), bilat cataract sx, A1c 5.8, small bilat pulmonary nodules. 3/23 s/p CABG x 3 (LIMA to LAD, SVG to PDA, SVG to OM), postop ANKUR, now SR on amio load;
74M, PMH HTN, HLD, lumbosacral disc disease with nerve stimulation, COPD, BPH, anemia, RCA stents (YAYA x 1 in 2002, x 2 in 2019), bilat cataract sx, A1c 5.8, small bilat pulmonary nodules. 3/23 s/p CABG x 3 (LIMA to LAD, SVG to PDA, SVG to OM), postop ANKUR, now SR on PO amio load.

## 2020-03-27 NOTE — CHART NOTE - NSCHARTNOTEFT_GEN_A_CORE
Source: Patient [ ]  Family [ ]   other [x ]    Current Diet: Diet, Regular:   Consistent Carbohydrate {No Snacks}  DASH/TLC {Sodium & Cholesteral Restricted} (03-25-20 @ 08:27)    PO intake:  Pt with fair po intake at meals.    Current Weight:   (3/27) 167#  (3/23) 170#    % Weight Change possible 3# wt loss since admission, will continue to monitor.  Aware 1+ trace edema b/l legs noted.    Pertinent Medications: MEDICATIONS  (STANDING):  aMIOdarone    Tablet 400 milliGRAM(s) Oral every 8 hours  aMIOdarone    Tablet   Oral   aspirin enteric coated 325 milliGRAM(s) Oral daily  atorvastatin 40 milliGRAM(s) Oral at bedtime  clopidogrel Tablet 75 milliGRAM(s) Oral daily  enoxaparin Injectable 40 milliGRAM(s) SubCutaneous daily  furosemide    Tablet 40 milliGRAM(s) Oral daily  gabapentin 400 milliGRAM(s) Oral three times a day  insulin lispro (HumaLOG) corrective regimen sliding scale   SubCutaneous Before meals and at bedtime  metoprolol tartrate 12.5 milliGRAM(s) Oral two times a day  midodrine 10 milliGRAM(s) Oral every 8 hours  pantoprazole    Tablet 40 milliGRAM(s) Oral before breakfast  psyllium Powder 1 Packet(s) Oral three times a day  senna 2 Tablet(s) Oral at bedtime  sodium chloride 0.9% lock flush 3 milliLiter(s) IV Push every 8 hours  tamsulosin 0.4 milliGRAM(s) Oral at bedtime    MEDICATIONS  (PRN):  acetaminophen   Tablet .. 975 milliGRAM(s) Oral every 8 hours PRN Moderate Pain (4 - 6)  oxyCODONE    IR 5 milliGRAM(s) Oral every 4 hours PRN Moderate Pain (4 - 6)  oxyCODONE    IR 10 milliGRAM(s) Oral every 4 hours PRN Severe Pain (7 - 10)    Pertinent Labs: CBC Full  -  ( 27 Mar 2020 06:06 )  WBC Count : 6.93 K/uL  RBC Count : 3.14 M/uL  Hemoglobin : 9.6 g/dL  Hematocrit : 29.2 %  Platelet Count - Automated : 193 K/uL  Mean Cell Volume : 93.0 fl  Mean Cell Hemoglobin : 30.6 pg  Mean Cell Hemoglobin Concentration : 32.9 gm/dL  03-27 Na140 mmol/L Glu 102 mg/dL<H> K+ 4.3 mmol/L Cr  0.47 mg/dL<L> BUN 15.0 mg/dL Phos n/a   Alb n/a   PAB n/a       Skin: sx sternum    Nutrition focused physical exam previously conducted - found signs of malnutrition [ ]absent [x ]present    Subcutaneous fat loss: [x ] Orbital fat pads region, [x ]Buccal fat region, [ ]Triceps region,  [ ]Ribs region    Muscle wasting: [x ]Temples region, [ x]Clavicle region, [ x]Shoulder region, [ ]Scapula region, [ ]Interosseous region,  [ ]thigh region, [ ]Calf region    Estimated Needs:   [x ] no change since previous assessment  [ ] recalculated:     Current Nutrition Diagnosis: Pt remains at nutrition risk secondary to severe protein calorie malnutrition related to inability to consume sufficient protein energy intake with decreased appetite in setting of advanced age and now healing (s/p CABG x 3) as evidenced by pt meeting <50% estimated energy intake > 5 days and moderate muscle wasting/fat loss.  Pt also with possible 3# wt loss since admission- will continue to monitor.  Pt now of DASH, cons cho diet with fair po intake at meals.    Recommendations:   RX: Ensure TID  RX: MVI daily   Monitor daily wts     Monitoring and Evaluation:   [x ] PO intake [x ] Tolerance to diet prescription [X] Weights  [X] Follow up per protocol [X] Labs:

## 2020-03-27 NOTE — DISCHARGE NOTE PROVIDER - NSDCCAREPROVSEEN_GEN_ALL_CORE_FT
Julian Hutchison    Patient assessment, examination, discharge planning, coordination of care, patient and family education and counseling took me 45 minutes to complete.     Pt went home

## 2020-03-27 NOTE — DISCHARGE NOTE PROVIDER - NSDCMRMEDTOKEN_GEN_ALL_CORE_FT
Anoro Ellipta 62.5 mcg-25 mcg/inh inhalation powder: 1 puff(s) inhaled once a day  aspirin 81 mg oral tablet, chewable: 1 tab(s) orally once a day  atorvastatin 40 mg oral tablet: 1 tab(s) orally once a day  clopidogrel 75 mg oral tablet: 1 tab(s) orally once a day  Flomax 0.4 mg oral capsule: 1 cap(s) orally once a day  gabapentin 400 mg oral capsule: 1 cap(s) orally 3 times a day  isosorbide mononitrate 30 mg oral tablet, extended release: 1 tab(s) orally once a day  lisinopril 2.5 mg oral tablet: 1 tab(s) orally once a day  Metoprolol Tartrate 25 mg oral tablet: 1 tab(s) orally 2 times a day  mupirocin 2% topical ointment: Apply topically to affected area nosrtils 2 times a day for 5 days . start today   Percocet 10/325 oral tablet:   ranolazine 500 mg oral tablet, extended release: 1 tab(s) orally 2 times a day acetaminophen 325 mg oral tablet: 3 tab(s) orally every 8 hours, As needed, Moderate Pain (4 - 6)  Anoro Ellipta 62.5 mcg-25 mcg/inh inhalation powder: 1 puff(s) inhaled once a day  aspirin 81 mg oral tablet, chewable: 1 tab(s) orally once a day MDD:1  atorvastatin 40 mg oral tablet: 1 tab(s) orally once a day (at bedtime)  clopidogrel 75 mg oral tablet: 1 tab(s) orally once a day  Flomax 0.4 mg oral capsule: 1 cap(s) orally once a day  furosemide 40 mg oral tablet: 1 tab(s) orally once a day  gabapentin 400 mg oral capsule: 1 cap(s) orally 3 times a day  Metoprolol Tartrate 25 mg oral tablet: 1 tab(s) orally 2 times a day  midodrine 5 mg oral tablet: 1 tab(s) orally 3 times a day  oxyCODONE 5 mg oral tablet: 1 tab(s) orally every 6 hours, As Needed -Moderate Pain (4 - 6) MDD:4 acetaminophen 325 mg oral tablet: 3 tab(s) orally every 8 hours, As needed, Moderate Pain (4 - 6)  Anoro Ellipta 62.5 mcg-25 mcg/inh inhalation powder: 1 puff(s) inhaled once a day  aspirin 81 mg oral tablet, chewable: 1 tab(s) orally once a day MDD:1  atorvastatin 40 mg oral tablet: 1 tab(s) orally once a day (at bedtime)  clopidogrel 75 mg oral tablet: 1 tab(s) orally once a day  DME: [facility]   Flomax 0.4 mg oral capsule: 1 cap(s) orally once a day  furosemide 40 mg oral tablet: 1 tab(s) orally once a day  gabapentin 400 mg oral capsule: 1 cap(s) orally 3 times a day  Metoprolol Tartrate 25 mg oral tablet: 1 tab(s) orally 2 times a day  midodrine 5 mg oral tablet: 1 tab(s) orally 3 times a day  oxyCODONE 5 mg oral tablet: 1 tab(s) orally every 6 hours, As Needed -Moderate Pain (4 - 6) MDD:4

## 2020-03-27 NOTE — DISCHARGE NOTE PROVIDER - NSDCFUSCHEDAPPT_GEN_ALL_CORE_FT
JENNIFER MOORE ; 04/15/2020 ; [facility] [address] [place] [address] JENNIFER MOORE ; 04/15/2020 ; [facility] [address] [place] [address] JENNIFER MOORE ; 04/15/2020 ; [facility] [address] [place] [address]

## 2020-03-27 NOTE — DISCHARGE NOTE PROVIDER - CARE PROVIDER_API CALL
Julian Hutchison)  Surgery; Thoracic and Cardiac Surgery  [address] [place] [address]  Phone: [phone]  Fax: [phone]  Follow Up Time:     Jassi Fink)  Cardiovascular Disease; Internal Medicine  [address] [place] [address]  Phone: [phone]  Fax: [phone]  Follow Up Time:

## 2020-03-27 NOTE — DISCHARGE NOTE PROVIDER - HOSPITAL COURSE
74M, PMH HTN, HLD, lumbosacral disc disease with nerve stimulation, COPD, BPH, anemia, RCA stents (YAYA x 1 in 2002, x 2 in 2019), bilat cataract sx, A1c 5.8, small bilat pulmonary nodules. 3/23 s/p CABG x 3 (LIMA to LAD, SVG to PDA, SVG to OM), postop ANKUR,  on PO amio load. 74M, PMH HTN, HLD, lumbosacral disc disease with nerve stimulation, COPD, BPH, anemia, RCA stents (YAYA x 1 in 2002, x 2 in 2019), bilat cataract sx, A1c 5.8, small bilat pulmonary nodules. 3/23 s/p CABG x 3 (LIMA to LAD, SVG to PDA, SVG to OM), postop ANKUR,  on PO amio load.     Subjective: Pt in chair NAD.  No Issues overnight. Plan for discharge today     VITAL SIGNS    T(C): 36.7 (20 @ 09:38), Max: 36.8 (20 @ 15:55)    HR: 89 (20 @ 09:38) (81 - 90)    BP: 135/84 (20 @ 09:38) (110/67 - 148/78)    RR: 16 (20 @ 09:38) (16 - 18)    SpO2: 93% (20 @ 09:38) (93% - 99%) RA            Daily       Daily Weight in k.2 (28 Mar 2020 05:00)    Admit Wt: Drug Dosing Weight    Height (cm): 172.72 (23 Mar 2020 06:31)    Weight (kg): 77.8 (23 Mar 2020 06:31)    Telemetry:   SR     LVEF: 70%    MEDICATIONS    acetaminophen   Tablet .. 975 milliGRAM(s) Oral every 8 hours PRN    aspirin enteric coated 325 milliGRAM(s) Oral daily    atorvastatin 40 milliGRAM(s) Oral at bedtime    clopidogrel Tablet 75 milliGRAM(s) Oral daily    enoxaparin Injectable 40 milliGRAM(s) SubCutaneous daily    furosemide    Tablet 40 milliGRAM(s) Oral daily    gabapentin 400 milliGRAM(s) Oral three times a day    metoprolol tartrate 25 milliGRAM(s) Oral two times a day    midodrine. 5 milliGRAM(s) Oral three times a day    oxyCODONE    IR 5 milliGRAM(s) Oral every 4 hours PRN    oxyCODONE    IR 10 milliGRAM(s) Oral every 4 hours PRN    pantoprazole    Tablet 40 milliGRAM(s) Oral before breakfast    psyllium Powder 1 Packet(s) Oral three times a day    senna 2 Tablet(s) Oral at bedtime    sodium chloride 0.9% lock flush 3 milliLiter(s) IV Push every 8 hours    tamsulosin 0.4 milliGRAM(s) Oral at bedtime    MEDICATIONS  (PRN):    acetaminophen   Tablet .. 975 milliGRAM(s) Oral every 8 hours PRN Moderate Pain (4 - 6)    oxyCODONE    IR 5 milliGRAM(s) Oral every 4 hours PRN Moderate Pain (4 - 6)    oxyCODONE    IR 10 milliGRAM(s) Oral every 4 hours PRN Severe Pain (7 - 10)    PHYSICAL EXAM    General: Alert Awake NAD     Respiratory:  Decreased at bases b/l     CV: RRR S1 S2     Abdomen: Soft NT ND + BS     Extremities: Trace edema b/l EVH site C/D/I      Incisions: MSI C/D/I  stable + Staples and silks     I&O's Detail        27 Mar 2020 07:01  -  28 Mar 2020 07:00    --------------------------------------------------------    IN:      Oral Fluid: 840 mL    Total IN: 840 mL        OUT:      Voided: 1275 mL    Total OUT: 1275 mL    Total NET: -435 mL    LABS            141  |  97<L>  |  16.0    ----------------------------<  102<H>    3.8   |  30.0<H>  |  0.40<L>    Ca    8.4<L>      28 Mar 2020 06:01    Phos  3.2         Mg     2.0         TPro  5.9<L>  /  Alb  3.4  /  TBili  0.9  /  DBili  x   /  AST  55<H>  /  ALT  52<H>  /  AlkPhos  123<H>  03-2                         9.9      6.32  )-----------( 247      ( 28 Mar 2020 06:01 )               30.1       PT/INR - ( 28 Mar 2020 06:01 )   PT: 14.7 sec;   INR: 1.29 ratio          LIVER FUNCTIONS - ( 28 Mar 2020 06:01 ).    Assessment and Plan:     · Assessment    74M, PMH HTN, HLD, lumbosacral disc disease with nerve stimulation, COPD, BPH, anemia, RCA stents (YAYA x 1 in 2002, x 2 in 2019), bilat cataract sx, A1c 5.8, small bilat pulmonary nodules. 3/23 s/p CABG x 3 (LIMA to LAD, SVG to PDA, SVG to OM), postop ANKUR,  on PO amio load.     Problem/Plan - 1:    ·  Problem: Coronary artery disease involving native coronary artery of native heart, angina presence unspecified.  Plan: oob, ambulate, PT as tolerated    Encourage Incentive Spirometry, chest PT, coughing and deep breathing    cont ASA and Lipitor for graft patency    Midodrine  increased back to 10mg TID.    Lopressor increased to 25mg BID for tachycardia and Afib.    tolerating diet, cont bowel regimen    Lasix for post op volume overload.    Problem/Plan - 2:    ·  Problem: HLD (hyperlipidemia).  Plan: continue statin.     Problem/Plan - 3:    ·  Problem: HTN (hypertension).  Plan: cont Lopressor along with Midodrine.      Problem/Plan - 4    ·  Problem: Lumbosacral disc disease.  Plan: continue Gabapentin    spinal stimulator currently off.     Problem/Plan - 5:    Problem: Paroxysmal atrial fibrillation. Plan: Continue Amio load.      Rapid afib this AM> total of 10mg IV Cardizem given and converted to -105.    Lopressor increased to 25mg BID.    Plan to d/c home today     BARRY Hutchison in am rounds

## 2020-03-27 NOTE — PROGRESS NOTE ADULT - PROBLEM SELECTOR PLAN 1
neurologically intact  hemodynamically stable  start lopressor this am if BP remains adequate  Aspirin and plavix for acute graft closure prophylaxis  Lipitor for chronic graft patency prophylaxis  extubated yesterday, oxygenating well, coughing and deep breathing exercises/incentive spirometry instructed and encouraged  dysphagia screen passed, clear liquid diet and advance as tolerated  no diuresis at this time, replace electrolytes PRN, cano catheter to remain in place POD #1 for urine output monitoring in critically ill patient  BS WNL, if remains so will change to FS AC+HS with coverage  OOB to chair and PT consult this morning  acetaminophen and dilaudid PRN for analgesia  continue with supportive ICU care as needed  above plan of care to be discussed with CT surgical team on am rounds
oob, ambulate, PT as tolerated  Encourage Incentive Spirometry, chest PT, coughing and deep breathing  cont ASA and Lipitor for graft patency  Midodrine  increased back to 10mg TID.  Lopressor increased to 25mg BID for tachycardia and Afib.  tolerating diet, cont bowel regimen  Lasix for post op volume overload.
oob, ambulate, PT as tolerated  wean NC, encourage Incentive Spirometry, chest PT, coughing and deep breathing  cont asa and lipitor for graft patency  wean midodrine since BP improved and continue lopressor  tolerating diet, cont bowel regimen  Bun/Cr stable, d/c cano, autodiuresing, replace lytes PRN  trend H/H  likely deintensify and transfer to floor  d/w team in AM rounds
oob, ambulate, PT as tolerated  Encourage Incentive Spirometry, chest PT, coughing and deep breathing  cont ASA and Lipitor for graft patency  Midodrine  increased back to 10mg TID, continue lopressor 12.5mg BID  tolerating diet, cont bowel regimen  Bun/Cr stable,  auto diuresing, replace lytes PRN  trend H/H  likely transfer to floor later today  d/w team in AM rounds

## 2020-03-27 NOTE — PROGRESS NOTE ADULT - PROBLEM SELECTOR PLAN 3
beta-blocker when BP will allow
cont Lopressor along with Midodrine.
cont lopressor  wean midodrine off since BP improved
cont Lopressor  Wean Midodrine, maintain SBP > 100

## 2020-03-27 NOTE — DISCHARGE NOTE PROVIDER - NSDCCPTREATMENT_GEN_ALL_CORE_FT
PRINCIPAL PROCEDURE  Procedure: Bypass graft, coronary artery, 1 arterial and 2 venous  Findings and Treatment:

## 2020-03-27 NOTE — PROGRESS NOTE ADULT - SUBJECTIVE AND OBJECTIVE BOX
Subjective: Pt sitting up in chair.  Denies CP or SOB.  NAD noted.      Tele:   SR overnight.  ANKUR this AM.  Now SR.                        T(F): 98 (20 @ 09:45), Max: 98.7 (20 @ 06:36)  HR: 93 (20 @ 09:45) (81 - 93)  BP: 108/68 (20 @ 09:45) (102/70 - 128/73)  RR: 20 (20 @ 09:45) (18 - 20)  SpO2: 100% (20 @ 09:45) (97% - 100%) On room air.    Daily     Daily Weight in k.7 (27 Mar 2020 06:50)    LV EF: 70%    Allergies:  codeine (Urticaria)  ibuprofen (Anaphylaxis)          140  |  99  |  15.0  ----------------------------<  102<H>  4.3   |  29.0  |  0.47<L>    Ca    8.2<L>      27 Mar 2020 06:06  Mg     2.0                                      9.6    6.93  )-----------( 193      ( 27 Mar 2020 06:06 )             29.2               CAPILLARY BLOOD GLUCOSE  POCT Blood Glucose.: 98 mg/dL (27 Mar 2020 12:04)  POCT Blood Glucose.: 103 mg/dL (27 Mar 2020 08:04)  POCT Blood Glucose.: 103 mg/dL (26 Mar 2020 21:19)  POCT Blood Glucose.: 105 mg/dL (26 Mar 2020 17:09)           CXR: < from: Xray Chest 1 View- PORTABLE-Routine (20 @ 07:42) >  EXAM:  XR CHEST PORTABLE ROUTINE 1V                          PROCEDURE DATE:  2020      INTERPRETATION:  Clinical information: Status post open-heart surgery.    TECHNIQUE: Frontal view of the chest.    COMPARISON: Prior chest x-ray examination dated 3/26/2020.    FINDINGS: The patient is status post median sternotomy. Surgical skin staples are noted within the midline.    The lungs are clear. The heart size is at the upper limits of normal. Multilevel degenerative changes are noted within the imaged potions of the spine. Spinal stimulator leads are in place and appear unchanged in position.    IMPRESSION: Clear lungs without interval change.    WILLIE HOBBS M.D., ATTENDING RADIOLOGIST  This document has been electronically signed. Mar 27 2020  8:54AM    < end of copied text >      I&O's Detail    26 Mar 2020 07:01  -  27 Mar 2020 07:00  --------------------------------------------------------  IN:    Oral Fluid: 520 mL    Packed Red Blood Cells: 331 mL  Total IN: 851 mL    OUT:    Voided: 1150 mL  Total OUT: 1150 mL    Total NET: -299 mL      27 Mar 2020 07:  -  27 Mar 2020 12:09  --------------------------------------------------------  IN:    Oral Fluid: 300 mL  Total IN: 300 mL    OUT:    Voided: 625 mL  Total OUT: 625 mL    Total NET: -325 mL      CHEST TUBE:  [ ] YES [x ] NO  OUTPUT:     per 24 hours    AIR LEAKS:  [ ] YES [ ] NO      DARWIN DRAIN:   [ ] YES [x ] NO  OUTPUT:     per 24 hours    EPICARDIAL WIRES:  [x ] YES [ ] NO      BOWEL MOVEMENT:  [x ] YES [ ] NO        Active Medications:  acetaminophen   Tablet .. 975 milliGRAM(s) Oral every 8 hours PRN  aMIOdarone    Tablet 400 milliGRAM(s) Oral every 8 hours  aMIOdarone    Tablet   Oral   aspirin enteric coated 325 milliGRAM(s) Oral daily  atorvastatin 40 milliGRAM(s) Oral at bedtime  clopidogrel Tablet 75 milliGRAM(s) Oral daily  enoxaparin Injectable 40 milliGRAM(s) SubCutaneous daily  furosemide    Tablet 40 milliGRAM(s) Oral daily  gabapentin 400 milliGRAM(s) Oral three times a day  insulin lispro (HumaLOG) corrective regimen sliding scale   SubCutaneous Before meals and at bedtime  metoprolol tartrate 12.5 milliGRAM(s) Oral once  metoprolol tartrate 25 milliGRAM(s) Oral two times a day  midodrine 10 milliGRAM(s) Oral every 8 hours  oxyCODONE    IR 5 milliGRAM(s) Oral every 4 hours PRN  oxyCODONE    IR 10 milliGRAM(s) Oral every 4 hours PRN  pantoprazole    Tablet 40 milliGRAM(s) Oral before breakfast  psyllium Powder 1 Packet(s) Oral three times a day  senna 2 Tablet(s) Oral at bedtime  sodium chloride 0.9% lock flush 3 milliLiter(s) IV Push every 8 hours  tamsulosin 0.4 milliGRAM(s) Oral at bedtime      Physical Exam:    Neuro: AAOx3.  Non focal.    Pulm: Diminished BLL.    CV: RRR.  +S1+S2.    Abd: Soft/NT/ND.  +BS. +BM this AM.    Extremities: Mild edema BLE.  +pp.    Incision(s): MSI with mepilex c/d/i.  + PW/                     PAST MEDICAL & SURGICAL HISTORY:  HLD (hyperlipidemia)  HTN (hypertension)  Lumbosacral disc disease: has nerve stimulator  Arthritis  CAD in native artery: RCA 3 YAYA 1 in  and 2 in   S/P cataract surgery: b/l eyes 2016  H/O heart artery stent: RCA

## 2020-03-27 NOTE — PROGRESS NOTE ADULT - PROBLEM SELECTOR PROBLEM 6
Paroxysmal atrial fibrillation
Paroxysmal atrial fibrillation
Prophylactic measure
Paroxysmal atrial fibrillation

## 2020-03-27 NOTE — DISCHARGE NOTE PROVIDER - CARE PROVIDERS DIRECT ADDRESSES
[email or web address]

## 2020-03-27 NOTE — PROGRESS NOTE ADULT - PROBLEM SELECTOR PLAN 7
Continue Lovenox for DVT prophylaxis  Continue Pantoprazole for GI prophylaxis    Discussed with Dr. Hutchison.
continue post-op GI, DVT, abx prophylaxis
Continue Lovenox for DVT prophylaxis  Continue Pantoprazole for GI prophylaxis

## 2020-03-27 NOTE — DISCHARGE NOTE PROVIDER - NSDCCPCAREPLAN_GEN_ALL_CORE_FT
PRINCIPAL DISCHARGE DIAGNOSIS  Diagnosis: Coronary artery disease involving native coronary artery of native heart, angina presence unspecified  Assessment and Plan of Treatment: Coronary artery disease involving native coronary artery of native heart, angina presence unspecified  Please follow up with Dr. Hutchison in 1 week. Please call the office for an appointment [phone].  There is a prescription for a chest xray in your discharge folder.  Please come to the hospital approximately 1 hour prior to your appointment to get a chest xray.  Bring the prescription with you.   Shower daily, no bathing swimming in a pool or the ocean until instructed by MD.    We advise that you do not drive until instructed by MD.   You may not return to work until instructed by MD.   DO NOT APPLY CREAM POWDER LOTION OR OINTMENT TO ANY SURGICAL WOUND>THIS CAN IMPEDE HEALING AND CAUSE AN INFECTION  Please eat a low fat low salt diet.   Please come to ED or Call Cardio thoracic office at [phone] if Chest pain, Shortness of Breath, persistant Nausea & vomiting, oozing from wounds,palpitations or pain not relieved with medication  Weigh yourself daily>notify surgeons office if  2 lb increase in weight in 24 hours.   Wash incisions with soap and water using washcloth in shower daily      SECONDARY DISCHARGE DIAGNOSES  Diagnosis: Paroxysmal atrial fibrillation  Assessment and Plan of Treatment: Paroxysmal atrial fibrillation  Please continue to take your amiodarone.  Follow up with  your cardiologist in 2-3 weeks.  Call for an appointment.

## 2020-03-27 NOTE — DISCHARGE NOTE PROVIDER - NSDCFUADDAPPT_GEN_ALL_CORE_FT
Follow up appointment with Dr Hutchison in 1 week  Cardiac surgery office second floor at [facility]     Please arrive one hour prior to your appointment and have a CXR done.. Go to registration with your prescription

## 2020-03-28 ENCOUNTER — TRANSCRIPTION ENCOUNTER (OUTPATIENT)
Age: 75
End: 2020-03-28

## 2020-03-28 VITALS — DIASTOLIC BLOOD PRESSURE: 64 MMHG | SYSTOLIC BLOOD PRESSURE: 110 MMHG

## 2020-03-28 LAB
ALBUMIN SERPL ELPH-MCNC: 3.4 G/DL — SIGNIFICANT CHANGE UP (ref 3.3–5.2)
ALP SERPL-CCNC: 123 U/L — HIGH (ref 40–120)
ALT FLD-CCNC: 52 U/L — HIGH
ANION GAP SERPL CALC-SCNC: 14 MMOL/L — SIGNIFICANT CHANGE UP (ref 5–17)
AST SERPL-CCNC: 55 U/L — HIGH
BILIRUB SERPL-MCNC: 0.9 MG/DL — SIGNIFICANT CHANGE UP (ref 0.4–2)
BUN SERPL-MCNC: 16 MG/DL — SIGNIFICANT CHANGE UP (ref 8–20)
CALCIUM SERPL-MCNC: 8.4 MG/DL — LOW (ref 8.6–10.2)
CHLORIDE SERPL-SCNC: 97 MMOL/L — LOW (ref 98–107)
CO2 SERPL-SCNC: 30 MMOL/L — HIGH (ref 22–29)
CREAT SERPL-MCNC: 0.4 MG/DL — LOW (ref 0.5–1.3)
GLUCOSE SERPL-MCNC: 102 MG/DL — HIGH (ref 70–99)
HCT VFR BLD CALC: 30.1 % — LOW (ref 39–50)
HGB BLD-MCNC: 9.9 G/DL — LOW (ref 13–17)
INR BLD: 1.29 RATIO — HIGH (ref 0.88–1.16)
MAGNESIUM SERPL-MCNC: 2 MG/DL — SIGNIFICANT CHANGE UP (ref 1.6–2.6)
MCHC RBC-ENTMCNC: 30.9 PG — SIGNIFICANT CHANGE UP (ref 27–34)
MCHC RBC-ENTMCNC: 32.9 GM/DL — SIGNIFICANT CHANGE UP (ref 32–36)
MCV RBC AUTO: 94.1 FL — SIGNIFICANT CHANGE UP (ref 80–100)
PHOSPHATE SERPL-MCNC: 3.2 MG/DL — SIGNIFICANT CHANGE UP (ref 2.4–4.7)
PLATELET # BLD AUTO: 247 K/UL — SIGNIFICANT CHANGE UP (ref 150–400)
POTASSIUM SERPL-MCNC: 3.8 MMOL/L — SIGNIFICANT CHANGE UP (ref 3.5–5.3)
POTASSIUM SERPL-SCNC: 3.8 MMOL/L — SIGNIFICANT CHANGE UP (ref 3.5–5.3)
PROT SERPL-MCNC: 5.9 G/DL — LOW (ref 6.6–8.7)
PROTHROM AB SERPL-ACNC: 14.7 SEC — HIGH (ref 10–12.9)
RBC # BLD: 3.2 M/UL — LOW (ref 4.2–5.8)
RBC # FLD: 15.3 % — HIGH (ref 10.3–14.5)
SODIUM SERPL-SCNC: 141 MMOL/L — SIGNIFICANT CHANGE UP (ref 135–145)
WBC # BLD: 6.32 K/UL — SIGNIFICANT CHANGE UP (ref 3.8–10.5)
WBC # FLD AUTO: 6.32 K/UL — SIGNIFICANT CHANGE UP (ref 3.8–10.5)

## 2020-03-28 PROCEDURE — 85027 COMPLETE CBC AUTOMATED: CPT

## 2020-03-28 PROCEDURE — 86923 COMPATIBILITY TEST ELECTRIC: CPT

## 2020-03-28 PROCEDURE — 80048 BASIC METABOLIC PNL TOTAL CA: CPT

## 2020-03-28 PROCEDURE — 36430 TRANSFUSION BLD/BLD COMPNT: CPT

## 2020-03-28 PROCEDURE — 93005 ELECTROCARDIOGRAM TRACING: CPT

## 2020-03-28 PROCEDURE — 71045 X-RAY EXAM CHEST 1 VIEW: CPT | Mod: 26

## 2020-03-28 PROCEDURE — 71045 X-RAY EXAM CHEST 1 VIEW: CPT

## 2020-03-28 PROCEDURE — 88305 TISSUE EXAM BY PATHOLOGIST: CPT

## 2020-03-28 PROCEDURE — 82947 ASSAY GLUCOSE BLOOD QUANT: CPT

## 2020-03-28 PROCEDURE — 84295 ASSAY OF SERUM SODIUM: CPT

## 2020-03-28 PROCEDURE — 93320 DOPPLER ECHO COMPLETE: CPT

## 2020-03-28 PROCEDURE — 97163 PT EVAL HIGH COMPLEX 45 MIN: CPT

## 2020-03-28 PROCEDURE — 83605 ASSAY OF LACTIC ACID: CPT

## 2020-03-28 PROCEDURE — P9016: CPT

## 2020-03-28 PROCEDURE — 80053 COMPREHEN METABOLIC PANEL: CPT

## 2020-03-28 PROCEDURE — 82550 ASSAY OF CK (CPK): CPT

## 2020-03-28 PROCEDURE — 82435 ASSAY OF BLOOD CHLORIDE: CPT

## 2020-03-28 PROCEDURE — 36415 COLL VENOUS BLD VENIPUNCTURE: CPT

## 2020-03-28 PROCEDURE — C1889: CPT

## 2020-03-28 PROCEDURE — 83735 ASSAY OF MAGNESIUM: CPT

## 2020-03-28 PROCEDURE — 85730 THROMBOPLASTIN TIME PARTIAL: CPT

## 2020-03-28 PROCEDURE — 85014 HEMATOCRIT: CPT

## 2020-03-28 PROCEDURE — 82803 BLOOD GASES ANY COMBINATION: CPT

## 2020-03-28 PROCEDURE — 82330 ASSAY OF CALCIUM: CPT

## 2020-03-28 PROCEDURE — 97530 THERAPEUTIC ACTIVITIES: CPT

## 2020-03-28 PROCEDURE — 84484 ASSAY OF TROPONIN QUANT: CPT

## 2020-03-28 PROCEDURE — 82553 CREATINE MB FRACTION: CPT

## 2020-03-28 PROCEDURE — 85610 PROTHROMBIN TIME: CPT

## 2020-03-28 PROCEDURE — 84100 ASSAY OF PHOSPHORUS: CPT

## 2020-03-28 PROCEDURE — P9045: CPT

## 2020-03-28 PROCEDURE — 97116 GAIT TRAINING THERAPY: CPT

## 2020-03-28 PROCEDURE — 97110 THERAPEUTIC EXERCISES: CPT

## 2020-03-28 PROCEDURE — 94002 VENT MGMT INPAT INIT DAY: CPT

## 2020-03-28 PROCEDURE — 93010 ELECTROCARDIOGRAM REPORT: CPT

## 2020-03-28 PROCEDURE — 84132 ASSAY OF SERUM POTASSIUM: CPT

## 2020-03-28 PROCEDURE — 93312 ECHO TRANSESOPHAGEAL: CPT

## 2020-03-28 PROCEDURE — 94760 N-INVAS EAR/PLS OXIMETRY 1: CPT

## 2020-03-28 PROCEDURE — 93325 DOPPLER ECHO COLOR FLOW MAPG: CPT

## 2020-03-28 PROCEDURE — 82962 GLUCOSE BLOOD TEST: CPT

## 2020-03-28 RX ORDER — METOPROLOL TARTRATE 50 MG
1 TABLET ORAL
Qty: 0 | Refills: 0 | DISCHARGE

## 2020-03-28 RX ORDER — MIDODRINE HYDROCHLORIDE 2.5 MG/1
1 TABLET ORAL
Qty: 90 | Refills: 0
Start: 2020-03-28

## 2020-03-28 RX ORDER — TAMSULOSIN HYDROCHLORIDE 0.4 MG/1
1 CAPSULE ORAL
Qty: 0 | Refills: 0 | DISCHARGE

## 2020-03-28 RX ORDER — GABAPENTIN 400 MG/1
1 CAPSULE ORAL
Qty: 90 | Refills: 0
Start: 2020-03-28

## 2020-03-28 RX ORDER — OXYCODONE HYDROCHLORIDE 5 MG/1
1 TABLET ORAL
Qty: 50 | Refills: 0
Start: 2020-03-28

## 2020-03-28 RX ORDER — POTASSIUM CHLORIDE 20 MEQ
40 PACKET (EA) ORAL ONCE
Refills: 0 | Status: COMPLETED | OUTPATIENT
Start: 2020-03-28 | End: 2020-03-28

## 2020-03-28 RX ORDER — LISINOPRIL 2.5 MG/1
1 TABLET ORAL
Qty: 0 | Refills: 0 | DISCHARGE

## 2020-03-28 RX ORDER — METOPROLOL TARTRATE 50 MG
1 TABLET ORAL
Qty: 60 | Refills: 1
Start: 2020-03-28

## 2020-03-28 RX ORDER — ATORVASTATIN CALCIUM 80 MG/1
1 TABLET, FILM COATED ORAL
Qty: 30 | Refills: 0
Start: 2020-03-28

## 2020-03-28 RX ORDER — FUROSEMIDE 40 MG
1 TABLET ORAL
Qty: 7 | Refills: 0
Start: 2020-03-28 | End: 2020-04-03

## 2020-03-28 RX ORDER — CLOPIDOGREL BISULFATE 75 MG/1
1 TABLET, FILM COATED ORAL
Qty: 0 | Refills: 0 | DISCHARGE

## 2020-03-28 RX ORDER — TAMSULOSIN HYDROCHLORIDE 0.4 MG/1
1 CAPSULE ORAL
Qty: 30 | Refills: 1
Start: 2020-03-28

## 2020-03-28 RX ORDER — CLOPIDOGREL BISULFATE 75 MG/1
1 TABLET, FILM COATED ORAL
Qty: 30 | Refills: 0
Start: 2020-03-28

## 2020-03-28 RX ORDER — ASPIRIN/CALCIUM CARB/MAGNESIUM 324 MG
1 TABLET ORAL
Qty: 30 | Refills: 0
Start: 2020-03-28

## 2020-03-28 RX ORDER — GABAPENTIN 400 MG/1
1 CAPSULE ORAL
Qty: 0 | Refills: 0 | DISCHARGE

## 2020-03-28 RX ORDER — MIDODRINE HYDROCHLORIDE 2.5 MG/1
5 TABLET ORAL THREE TIMES A DAY
Refills: 0 | Status: DISCONTINUED | OUTPATIENT
Start: 2020-03-28 | End: 2020-03-28

## 2020-03-28 RX ORDER — ATORVASTATIN CALCIUM 80 MG/1
1 TABLET, FILM COATED ORAL
Qty: 0 | Refills: 0 | DISCHARGE

## 2020-03-28 RX ORDER — ACETAMINOPHEN 500 MG
3 TABLET ORAL
Qty: 0 | Refills: 0 | DISCHARGE
Start: 2020-03-28

## 2020-03-28 RX ADMIN — OXYCODONE HYDROCHLORIDE 10 MILLIGRAM(S): 5 TABLET ORAL at 13:11

## 2020-03-28 RX ADMIN — SODIUM CHLORIDE 3 MILLILITER(S): 9 INJECTION INTRAMUSCULAR; INTRAVENOUS; SUBCUTANEOUS at 05:35

## 2020-03-28 RX ADMIN — MIDODRINE HYDROCHLORIDE 10 MILLIGRAM(S): 2.5 TABLET ORAL at 05:30

## 2020-03-28 RX ADMIN — GABAPENTIN 400 MILLIGRAM(S): 400 CAPSULE ORAL at 05:29

## 2020-03-28 RX ADMIN — OXYCODONE HYDROCHLORIDE 10 MILLIGRAM(S): 5 TABLET ORAL at 09:39

## 2020-03-28 RX ADMIN — OXYCODONE HYDROCHLORIDE 10 MILLIGRAM(S): 5 TABLET ORAL at 05:30

## 2020-03-28 RX ADMIN — OXYCODONE HYDROCHLORIDE 10 MILLIGRAM(S): 5 TABLET ORAL at 06:30

## 2020-03-28 RX ADMIN — PANTOPRAZOLE SODIUM 40 MILLIGRAM(S): 20 TABLET, DELAYED RELEASE ORAL at 05:30

## 2020-03-28 RX ADMIN — Medication 1 PACKET(S): at 05:30

## 2020-03-28 RX ADMIN — Medication 40 MILLIEQUIVALENT(S): at 09:39

## 2020-03-28 RX ADMIN — Medication 325 MILLIGRAM(S): at 12:17

## 2020-03-28 RX ADMIN — MIDODRINE HYDROCHLORIDE 5 MILLIGRAM(S): 2.5 TABLET ORAL at 12:20

## 2020-03-28 RX ADMIN — Medication 40 MILLIGRAM(S): at 05:30

## 2020-03-28 RX ADMIN — Medication 25 MILLIGRAM(S): at 05:29

## 2020-03-28 RX ADMIN — OXYCODONE HYDROCHLORIDE 10 MILLIGRAM(S): 5 TABLET ORAL at 12:19

## 2020-03-28 RX ADMIN — OXYCODONE HYDROCHLORIDE 10 MILLIGRAM(S): 5 TABLET ORAL at 01:29

## 2020-03-28 RX ADMIN — GABAPENTIN 400 MILLIGRAM(S): 400 CAPSULE ORAL at 12:18

## 2020-03-28 RX ADMIN — CLOPIDOGREL BISULFATE 75 MILLIGRAM(S): 75 TABLET, FILM COATED ORAL at 12:17

## 2020-03-28 RX ADMIN — OXYCODONE HYDROCHLORIDE 10 MILLIGRAM(S): 5 TABLET ORAL at 02:29

## 2020-03-28 RX ADMIN — AMIODARONE HYDROCHLORIDE 400 MILLIGRAM(S): 400 TABLET ORAL at 05:30

## 2020-03-28 NOTE — DISCHARGE NOTE NURSING/CASE MANAGEMENT/SOCIAL WORK - NSDCFUADDAPPT_GEN_ALL_CORE_FT
Follow up appointment with Dr Hutchison in 1 week  Cardiac surgery office second floor at [facility]     Please arrive one hour prior to your appointment and have a CXR done.. Go to registration with your prescription

## 2020-03-28 NOTE — DISCHARGE NOTE NURSING/CASE MANAGEMENT/SOCIAL WORK - PATIENT PORTAL LINK FT
You can access the FollowMyHealth Patient Portal offered by [facility] by registering at the following website: [email or web address]. By joining [employer]’s FollowMyHealth portal, you will also be able to view your health information using other applications (apps) compatible with our system.

## 2020-03-30 RX ORDER — LISINOPRIL 2.5 MG/1
2.5 TABLET ORAL DAILY
Qty: 90 | Refills: 3 | Status: DISCONTINUED | COMMUNITY
End: 2020-03-30

## 2020-03-30 RX ORDER — RANOLAZINE 1000 MG/1
1000 TABLET, EXTENDED RELEASE ORAL
Qty: 180 | Refills: 3 | Status: DISCONTINUED | COMMUNITY
End: 2020-03-30

## 2020-03-30 RX ORDER — MUPIROCIN 20 MG/G
2 OINTMENT TOPICAL
Qty: 1 | Refills: 0 | Status: DISCONTINUED | COMMUNITY
Start: 2020-03-20 | End: 2020-03-30

## 2020-04-07 ENCOUNTER — APPOINTMENT (OUTPATIENT)
Dept: CARDIOTHORACIC SURGERY | Facility: CLINIC | Age: 75
End: 2020-04-07
Payer: MEDICARE

## 2020-04-07 VITALS
BODY MASS INDEX: 25.09 KG/M2 | WEIGHT: 165 LBS | OXYGEN SATURATION: 96 % | TEMPERATURE: 98.3 F | HEART RATE: 76 BPM | SYSTOLIC BLOOD PRESSURE: 116 MMHG | RESPIRATION RATE: 16 BRPM | DIASTOLIC BLOOD PRESSURE: 72 MMHG

## 2020-04-07 PROCEDURE — 99024 POSTOP FOLLOW-UP VISIT: CPT

## 2020-04-22 ENCOUNTER — APPOINTMENT (OUTPATIENT)
Dept: CARDIOLOGY | Facility: CLINIC | Age: 75
End: 2020-04-22
Payer: MEDICARE

## 2020-04-22 PROCEDURE — 99214 OFFICE O/P EST MOD 30 MIN: CPT | Mod: 95

## 2020-04-27 NOTE — ASSESSMENT
[FreeTextEntry1] : \par 1.  Coronary artery disease status-post CABG x3:  Patient recovering uneventfully from surgery one month ago.  I have advised him to drink plenty of water and may use Mucinex on a p.r.n. basis. \par \par 2.  Post-op hypotension:  In terms of his blood pressure at home, he is ranging between 110 and 125 systolic.  I have advised him to cut back on Midodrine to 5 mg b.i.d. as long his blood pressure is 110 or greater.  Ultimately, he will be weaned off.  We will speak with him in two weeks to reassess this issue.  \par \par 3.  Hyperlipidemia:  At this time, the patient is advised on a low-fat / low-cholesterol diet.  The diet at this point may also be rather liberal in terms of calories.  If clinically stable, will see patient hopefully in the office in three months.    \par

## 2020-04-27 NOTE — HISTORY OF PRESENT ILLNESS
[Home] : at home, [unfilled] , at the time of the visit. [Medical Office: ([facility])___] : at the medical office located in  [Spouse] : spouse [FreeTextEntry1] : Patient requested a TELEHEALTH contact at this time to discuss specific cardiovascular issues and associated risk factors. This contact took place via TELEHEALTH link utilizing [employer] software. Consent was obtained from the patient in advance of this communication. The consent form can be found in the "OTHER CONSENTS" section of the patient's medical record. Time spent on this communication was approximately: 25 MINUTES\par \par \par From a cardiac standpoint, Mr. Henson denies exertional chest pain or shortness of breath.  He states he is bringing up considerable amounts of mucous which I suspect are related to his underlying COPD.  He has some right shoulder pain which is likely post-op musculoskeletal in nature.  Denies palpitations or lightheadedness but has been taking Midodrine 5 mg t.i.d. since surgery for low blood pressure. \par

## 2020-04-27 NOTE — PHYSICAL EXAM
[General Appearance - Well Developed] : well developed [General Appearance - In No Acute Distress] : no acute distress [] : no respiratory distress [Normal Conjunctiva] : the conjunctiva exhibited no abnormalities [Skin Color & Pigmentation] : normal skin color and pigmentation [Skin Lesions] : no skin lesions [No Venous Stasis] : no venous stasis [FreeTextEntry1] : thin

## 2020-04-27 NOTE — REASON FOR VISIT
[FreeTextEntry1] : Mr. Henson is a pleasant 74-year-old white male with a past medical history significant for hypertension, hyperlipidemia, and coronary artery disease status-post RCA stent insertion in 2002 as well as recent CABG x3 (LIMA to LAD, saphenous vein grafts to circumflex OM1, and PDA), performed March 23, 2020, at [facility] by Dr. Hutchison.  Patient presents today for telehealth follow up visit.\par

## 2020-05-28 ENCOUNTER — APPOINTMENT (OUTPATIENT)
Dept: ORTHOPEDIC SURGERY | Facility: CLINIC | Age: 75
End: 2020-05-28

## 2020-05-29 ENCOUNTER — APPOINTMENT (OUTPATIENT)
Dept: PULMONOLOGY | Facility: CLINIC | Age: 75
End: 2020-05-29
Payer: MEDICARE

## 2020-05-29 VITALS — DIASTOLIC BLOOD PRESSURE: 70 MMHG | BODY MASS INDEX: 24.48 KG/M2 | SYSTOLIC BLOOD PRESSURE: 122 MMHG | WEIGHT: 161 LBS

## 2020-05-29 VITALS — HEART RATE: 80 BPM | OXYGEN SATURATION: 93 %

## 2020-05-29 PROCEDURE — 99214 OFFICE O/P EST MOD 30 MIN: CPT

## 2020-05-29 RX ORDER — OXYCODONE 5 MG/1
5 TABLET ORAL
Qty: 50 | Refills: 0 | Status: DISCONTINUED | COMMUNITY
Start: 2020-03-28 | End: 2020-05-29

## 2020-05-29 RX ORDER — OXYCODONE AND ACETAMINOPHEN 10; 325 MG/1; MG/1
10-325 TABLET ORAL 3 TIMES DAILY
Qty: 90 | Refills: 0 | Status: DISCONTINUED | COMMUNITY
Start: 2017-06-29 | End: 2020-05-29

## 2020-05-29 NOTE — PHYSICAL EXAM
[No Acute Distress] : no acute distress [Normal Oropharynx] : normal oropharynx [Normal Appearance] : normal appearance [Normal Rate/Rhythm] : normal rate/rhythm [No Neck Mass] : no neck mass [Normal S1, S2] : normal s1, s2 [No Murmurs] : no murmurs [No Resp Distress] : no resp distress [Clear to Auscultation Bilaterally] : clear to auscultation bilaterally [Surgical scars] : surgical scars [No Abnormalities] : no abnormalities [No Clubbing] : no clubbing [Benign] : benign [Normal Gait] : normal gait [No Edema] : no edema [No Cyanosis] : no cyanosis [Normal Color/ Pigmentation] : normal color/ pigmentation [FROM] : FROM [No Focal Deficits] : no focal deficits [Oriented x3] : oriented x3 [Normal Affect] : normal affect

## 2020-05-29 NOTE — DISCUSSION/SUMMARY
[FreeTextEntry1] : Lung cancer screening normal [COPD] : chronic obstructive pulmonary disease [Decompensated] : decompensated [Stage II (Moderate)] : stage II (moderate) [Medication Changes Per Orders] : Medication changes are as documented in orders [de-identified] : Followup chest CT in February

## 2020-05-29 NOTE — CONSULT LETTER
[Consult Letter:] : I had the pleasure of evaluating your patient, [unfilled]. [Dear  ___] : Dear  [unfilled], [Please see my note below.] : Please see my note below. [Sincerely,] : Sincerely, [FreeTextEntry3] : Epi Fuller MD FCCP\par Pulmonary/Critical Care/Sleep Medicine\par Department of Internal Medicine\par \par [employer] [facility]\par

## 2020-05-29 NOTE — HISTORY OF PRESENT ILLNESS
[Doing Well] : doing well [None] : ~He/She~ has no comorbid illnesses [Feelings Of Weakness On Exertion] : denies exercise intolerance [Difficulty Breathing During Exertion] : denies dyspnea on exertion [Cough] : denies coughing [Coughing Up Sputum] : denies coughing up sputum [Regional Soft Tissue Swelling Both Lower Extremities] : denies lower extremity edema [Wheezing] : denies wheezing [de-identified] : s/p CABG x 3 3/23/20 [de-identified] : Nightly wheezes

## 2020-07-21 ENCOUNTER — APPOINTMENT (OUTPATIENT)
Dept: CARDIOLOGY | Facility: CLINIC | Age: 75
End: 2020-07-21
Payer: MEDICARE

## 2020-07-21 VITALS
DIASTOLIC BLOOD PRESSURE: 62 MMHG | RESPIRATION RATE: 16 BRPM | BODY MASS INDEX: 24.25 KG/M2 | HEIGHT: 68 IN | HEART RATE: 77 BPM | SYSTOLIC BLOOD PRESSURE: 110 MMHG | TEMPERATURE: 98.2 F | WEIGHT: 160 LBS

## 2020-07-21 DIAGNOSIS — I95.9 HYPOTENSION, UNSPECIFIED: ICD-10-CM

## 2020-07-21 PROCEDURE — 93000 ELECTROCARDIOGRAM COMPLETE: CPT

## 2020-07-21 PROCEDURE — 99214 OFFICE O/P EST MOD 30 MIN: CPT

## 2020-07-21 RX ORDER — RANOLAZINE 500 MG/1
500 TABLET, EXTENDED RELEASE ORAL
Qty: 60 | Refills: 0 | Status: DISCONTINUED | COMMUNITY
Start: 2020-01-03 | End: 2020-07-21

## 2020-07-24 NOTE — REASON FOR VISIT
[FreeTextEntry1] : Mr. Henson is a pleasant 75-year-old white male with a past medical history significant for hypertension, hyperlipidemia, and coronary artery disease status-post RCA stent insertion 2002 as well as more recently CABG x3 (LIMA to LAD, saphenous vein grafts to circumflex OM1 and PDA) performed at [facility] on March 23, 2020, by Dr. Hutchison.\par \par

## 2020-07-24 NOTE — HISTORY OF PRESENT ILLNESS
[FreeTextEntry1] : \par From a cardiac standpoint, Mr. Henson appears well.  He does admit to some chest discomfort which at times he describes as pressure and at times he describes as sharp.  These appear to be exacerbated by movement and suspect of representing musculoskeletal issues, as well as part of the healing from his recent bypass surgery.  He denies shortness of breath, palpitations, lightheadedness, or syncope, and remains reasonably active.

## 2020-07-24 NOTE — PHYSICAL EXAM
[General Appearance - Well Developed] : well developed [General Appearance - In No Acute Distress] : no acute distress [Normal Conjunctiva] : the conjunctiva exhibited no abnormalities [Normal Oral Mucosa] : normal oral mucosa [] : no respiratory distress [Heart Rate And Rhythm] : heart rate and rhythm were normal [Heart Sounds] : normal S1 and S2 [Bowel Sounds] : normal bowel sounds [Abnormal Walk] : normal gait [Skin Color & Pigmentation] : normal skin color and pigmentation [Skin Lesions] : no skin lesions [No Venous Stasis] : no venous stasis [Affect] : the affect was normal [Oriented To Time, Place, And Person] : oriented to person, place, and time [Mood] : the mood was normal [FreeTextEntry1] : No edema.

## 2020-07-24 NOTE — ASSESSMENT
[FreeTextEntry1] : \par 1.  EKG today reveals sinus rhythm at 77 bpm.  Right bundle branch block.  No acute ischemic changes. \par \par 2.  Coronary artery disease status-post CABG x3:  Patient clinically stable at this time.  Experiencing some wound healing symptoms but overall remains active.  I have reassured him and advised him to walk as much as possible.\par \par 3.  Hyperlipidemia:  Patient has not undergone fasting bloodwork in sometime.  Will continue his low-fat / low-cholesterol diet as well as Atorvastatin therapy.  \par \par 4.  Labile blood pressure:  Patient still on Midodrine 5 mg b.i.d.  Will continue for now.  I will reassess this in the next 2-3 months. \par \par 5.  Back pain:  Patient may proceed with epidural injections.  Will need to stop both aspirin and Plavix for one week prior to his injection. \par \par 6.  If clinically stable, follow up office visit three months.   \par

## 2020-08-21 ENCOUNTER — APPOINTMENT (OUTPATIENT)
Dept: PULMONOLOGY | Facility: CLINIC | Age: 75
End: 2020-08-21
Payer: MEDICARE

## 2020-08-21 DIAGNOSIS — R50.9 FEVER, UNSPECIFIED: ICD-10-CM

## 2020-08-21 PROCEDURE — 99214 OFFICE O/P EST MOD 30 MIN: CPT | Mod: 95

## 2020-08-21 NOTE — DISCUSSION/SUMMARY
[COPD] : chronic obstructive pulmonary disease [Decompensated] : decompensated [Stage II (Moderate)] : stage II (moderate) [Medication Changes Per Orders] : Medication changes are as documented in orders [Pneumonia] : pneumonia [Patient] : the patient [de-identified] : Secondary to febrile illness. [de-identified] : Viral illness, Covid 19 [de-identified] : Covid 19 panel, chest x-ray [de-identified] : Referred to [facility] urgent care Center in [place]

## 2020-08-21 NOTE — CONSULT LETTER
[Dear  ___] : Dear  [unfilled], [Consult Letter:] : I had the pleasure of evaluating your patient, [unfilled]. [Please see my note below.] : Please see my note below. [Sincerely,] : Sincerely, [FreeTextEntry3] : Epi Fuller MD FCCP\par Pulmonary/Critical Care/Sleep Medicine\par Department of Internal Medicine\par \par [employer] [facility] [employer]\par

## 2020-08-21 NOTE — HISTORY OF PRESENT ILLNESS
[Home] : at home, [unfilled] , at the time of the visit. [Medical Office: ([facility])___] : at the medical office located in  [Doing Well] : doing well [None] : ~He/She~ has no comorbid illnesses [Coughing Up Sputum] : denies coughing up sputum [Wheezing] : denies wheezing [Regional Soft Tissue Swelling Both Lower Extremities] : denies lower extremity edema [Difficulty Breathing During Exertion] : worsened dyspnea on exertion [Feelings Of Weakness On Exertion] : worsened exercise intolerance [Cough] : worsened coughing [de-identified] : s/p CABG x 3 3/23/20 [de-identified] : Patient developed fever of 101 of last 24 hours associated with increased respiratory symptoms. Denies any history of new exposures except for attending a wake 3 days ago.

## 2020-08-24 LAB
ALBUMIN SERPL ELPH-MCNC: 4.6 G/DL
ALP BLD-CCNC: 158 U/L
ALT SERPL-CCNC: 40 U/L
ANION GAP SERPL CALC-SCNC: 14 MMOL/L
AST SERPL-CCNC: 31 U/L
BASOPHILS # BLD AUTO: 0.05 K/UL
BASOPHILS NFR BLD AUTO: 0.4 %
BILIRUB SERPL-MCNC: 0.5 MG/DL
BUN SERPL-MCNC: 11 MG/DL
CALCIUM SERPL-MCNC: 9.2 MG/DL
CHLORIDE SERPL-SCNC: 102 MMOL/L
CO2 SERPL-SCNC: 25 MMOL/L
CREAT SERPL-MCNC: 0.7 MG/DL
CRP SERPL-MCNC: 3.18 MG/DL
DEPRECATED D DIMER PPP IA-ACNC: 359 NG/ML DDU
EOSINOPHIL # BLD AUTO: 0.27 K/UL
EOSINOPHIL NFR BLD AUTO: 2.1 %
FERRITIN SERPL-MCNC: 246 NG/ML
GLUCOSE SERPL-MCNC: 133 MG/DL
HCT VFR BLD CALC: 39.6 %
HGB BLD-MCNC: 12.2 G/DL
IMM GRANULOCYTES NFR BLD AUTO: 0.4 %
LYMPHOCYTES # BLD AUTO: 1.05 K/UL
LYMPHOCYTES NFR BLD AUTO: 8.3 %
MAN DIFF?: NORMAL
MCHC RBC-ENTMCNC: 29.8 PG
MCHC RBC-ENTMCNC: 30.8 GM/DL
MCV RBC AUTO: 96.6 FL
MONOCYTES # BLD AUTO: 0.76 K/UL
MONOCYTES NFR BLD AUTO: 6 %
NEUTROPHILS # BLD AUTO: 10.48 K/UL
NEUTROPHILS NFR BLD AUTO: 82.8 %
PLATELET # BLD AUTO: 278 K/UL
POTASSIUM SERPL-SCNC: 4.5 MMOL/L
PROCALCITONIN SERPL-MCNC: 0.12 NG/ML
PROT SERPL-MCNC: 6.6 G/DL
RBC # BLD: 4.1 M/UL
RBC # FLD: 15.3 %
SARS-COV-2 N GENE NPH QL NAA+PROBE: NOT DETECTED
SODIUM SERPL-SCNC: 141 MMOL/L
WBC # FLD AUTO: 12.66 K/UL

## 2020-08-27 ENCOUNTER — RX RENEWAL (OUTPATIENT)
Age: 75
End: 2020-08-27

## 2020-09-16 ENCOUNTER — APPOINTMENT (OUTPATIENT)
Dept: PULMONOLOGY | Facility: CLINIC | Age: 75
End: 2020-09-16
Payer: MEDICARE

## 2020-09-16 VITALS
RESPIRATION RATE: 14 BRPM | WEIGHT: 158 LBS | BODY MASS INDEX: 23.95 KG/M2 | HEART RATE: 73 BPM | HEIGHT: 68 IN | SYSTOLIC BLOOD PRESSURE: 105 MMHG | OXYGEN SATURATION: 96 % | DIASTOLIC BLOOD PRESSURE: 70 MMHG

## 2020-09-16 DIAGNOSIS — Z23 ENCOUNTER FOR IMMUNIZATION: ICD-10-CM

## 2020-09-16 PROCEDURE — 99214 OFFICE O/P EST MOD 30 MIN: CPT | Mod: 25

## 2020-09-16 PROCEDURE — 90662 IIV NO PRSV INCREASED AG IM: CPT

## 2020-09-16 PROCEDURE — G0008: CPT

## 2020-09-16 RX ORDER — OXYCODONE AND ACETAMINOPHEN 10; 325 MG/1; MG/1
10-325 TABLET ORAL 3 TIMES DAILY
Qty: 21 | Refills: 0 | Status: DISCONTINUED | COMMUNITY
Start: 2020-04-17 | End: 2020-09-16

## 2020-09-16 NOTE — CONSULT LETTER
[Dear  ___] : Dear  [unfilled], [Consult Letter:] : I had the pleasure of evaluating your patient, [unfilled]. [Please see my note below.] : Please see my note below. [Sincerely,] : Sincerely, [FreeTextEntry3] : Epi Fuller MD FCCP\par Pulmonary/Critical Care/Sleep Medicine\par Department of Internal Medicine\par \par [facility] [employer]\par

## 2020-09-16 NOTE — DISCUSSION/SUMMARY
[COPD] : chronic obstructive pulmonary disease [Stage II (Moderate)] : stage II (moderate) [Stable] : stable [Responding to Treatment] : responding to treatment [None] : There are no changes in medication management [de-identified] : samples of trelegy provided in place of anoro and arnuity [de-identified] : Followup chest CT in February

## 2020-09-16 NOTE — HISTORY OF PRESENT ILLNESS
[Doing Well] : doing well [None] : ~He/She~ has no comorbid illnesses [Cough] : denies coughing [Coughing Up Sputum] : denies coughing up sputum [Wheezing] : denies wheezing [Regional Soft Tissue Swelling Both Lower Extremities] : denies lower extremity edema [Difficulty Breathing During Exertion] : resolved dyspnea on exertion [Feelings Of Weakness On Exertion] : resolved exercise intolerance [Fever] : no fever [de-identified] : s/p CABG x 3 3/23/20 [de-identified] : s/p AECOPD with fever 8/21/20 [TextBox_3] : 3

## 2020-10-07 ENCOUNTER — LABORATORY RESULT (OUTPATIENT)
Age: 75
End: 2020-10-07

## 2020-10-16 ENCOUNTER — NON-APPOINTMENT (OUTPATIENT)
Age: 75
End: 2020-10-16

## 2020-10-16 ENCOUNTER — APPOINTMENT (OUTPATIENT)
Dept: CARDIOLOGY | Facility: CLINIC | Age: 75
End: 2020-10-16
Payer: MEDICARE

## 2020-10-16 VITALS
WEIGHT: 156 LBS | HEIGHT: 68 IN | DIASTOLIC BLOOD PRESSURE: 68 MMHG | TEMPERATURE: 98 F | SYSTOLIC BLOOD PRESSURE: 122 MMHG | BODY MASS INDEX: 23.64 KG/M2 | HEART RATE: 81 BPM | RESPIRATION RATE: 14 BRPM

## 2020-10-16 DIAGNOSIS — E55.9 VITAMIN D DEFICIENCY, UNSPECIFIED: ICD-10-CM

## 2020-10-16 PROCEDURE — 99214 OFFICE O/P EST MOD 30 MIN: CPT

## 2020-10-16 PROCEDURE — 93000 ELECTROCARDIOGRAM COMPLETE: CPT

## 2020-10-16 RX ORDER — PANTOPRAZOLE 40 MG/1
TABLET, DELAYED RELEASE ORAL
Refills: 0 | Status: DISCONTINUED | COMMUNITY
End: 2020-10-16

## 2020-10-16 RX ORDER — LEVOFLOXACIN 500 MG/1
500 TABLET, FILM COATED ORAL
Qty: 7 | Refills: 0 | Status: DISCONTINUED | COMMUNITY
Start: 2020-08-21 | End: 2020-10-16

## 2020-10-16 RX ORDER — FLUTICASONE FUROATE 200 UG/1
200 POWDER RESPIRATORY (INHALATION) DAILY
Qty: 1 | Refills: 5 | Status: DISCONTINUED | COMMUNITY
Start: 2020-05-29 | End: 2020-10-16

## 2020-10-16 NOTE — HISTORY OF PRESENT ILLNESS
[FreeTextEntry1] : Mr. Henson presents today for follow up evaluation.  Presently feeling well.  Denies complaints of chest pain, shortness of breath, palpitations, lightheadedness or syncope.  He does have some mild incisional discomfort.  Has been compliant with his medications and diet.

## 2020-10-16 NOTE — PHYSICAL EXAM
[General Appearance - In No Acute Distress] : no acute distress [General Appearance - Well Developed] : well developed [Normal Oral Mucosa] : normal oral mucosa [Normal Conjunctiva] : the conjunctiva exhibited no abnormalities [] : no respiratory distress [Auscultation Breath Sounds / Voice Sounds] : lungs were clear to auscultation bilaterally [Heart Rate And Rhythm] : heart rate and rhythm were normal [Arterial Pulses Normal] : the arterial pulses were normal [Bowel Sounds] : normal bowel sounds [Abnormal Walk] : normal gait [FreeTextEntry1] : No edema [Skin Color & Pigmentation] : normal skin color and pigmentation [Oriented To Time, Place, And Person] : oriented to person, place, and time [Skin Turgor] : normal skin turgor [Mood] : the mood was normal [Affect] : the affect was normal

## 2020-10-16 NOTE — DISCUSSION/SUMMARY
[FreeTextEntry1] : 1 - Hypertension:  blood pressure well controlled on current medications.  States his blood pressure runs low at times at home and feels fatigued.  Will continue with Midodrine 5mg BID.  Will reassess with Dr. Fink at next visit.\par \par 2 - Coronary artery disease status-post stenting in 2002 and more recently CABG x 3 (3/2020): stable at this time.  Denies chest pain, shortness of breath, palpitaitons, lightheadedness or syncope.  Continues with some mild incisional pain.  Advised to continue to be as active as possible.\par \par 3 - Hyperlipidemia:  cholesterol 135, triglycerides 93, HDL 50, LDL 66, TC/HDL 2.7.  Continue Atorvastatin 40mg daily.  Follow low fat, low cholesterol diet.  Fasting lipids prior to follow up.\par \par 4 - Labs:  H/H 12.4/40.3, platelets 236, potassium 4.5, glucose 88, BUN 16, creatinine 0.64, PSA 1.32, HgA1c 6, Vitamin D 23.8.  Patient to start Vitamin D3 5000 units daily.  Repeal labs in 3 months.\par \par 5 - Follow up with Dr. Fink in 3 months.

## 2020-10-16 NOTE — REASON FOR VISIT
[FreeTextEntry1] : The patient is a pleasant 75-year-old white male with a past medical histyory significant for hypertension, hyperlipidemia, and coronary artery disease status-post  RCA stent insertion in 2002 as well as more recently CABG x 3 (LIMA to LAD, SVG to OM1 and  PDA) performed at [facility] on March 23, 2020 by Dr. Hutchison.

## 2020-10-27 ENCOUNTER — INPATIENT (INPATIENT)
Facility: HOSPITAL | Age: 75
LOS: 1 days | Discharge: HOME | DRG: 871 | End: 2020-10-29
Attending: HOSPITALIST | Admitting: HOSPITALIST
Payer: MEDICARE

## 2020-10-27 ENCOUNTER — NON-APPOINTMENT (OUTPATIENT)
Age: 75
End: 2020-10-27

## 2020-10-27 ENCOUNTER — TRANSCRIPTION ENCOUNTER (OUTPATIENT)
Age: 75
End: 2020-10-27

## 2020-10-27 VITALS
OXYGEN SATURATION: 93 % | SYSTOLIC BLOOD PRESSURE: 100 MMHG | HEIGHT: 68 IN | WEIGHT: 158.07 LBS | RESPIRATION RATE: 24 BRPM | DIASTOLIC BLOOD PRESSURE: 68 MMHG | HEART RATE: 101 BPM | TEMPERATURE: 100 F

## 2020-10-27 DIAGNOSIS — A41.9 SEPSIS, UNSPECIFIED ORGANISM: ICD-10-CM

## 2020-10-27 DIAGNOSIS — Z98.49 CATARACT EXTRACTION STATUS, UNSPECIFIED EYE: Chronic | ICD-10-CM

## 2020-10-27 DIAGNOSIS — Z95.5 PRESENCE OF CORONARY ANGIOPLASTY IMPLANT AND GRAFT: Chronic | ICD-10-CM

## 2020-10-27 LAB
ALBUMIN SERPL ELPH-MCNC: 1.5 G/DL — LOW (ref 3.3–5.2)
ALP SERPL-CCNC: 29 U/L — LOW (ref 40–120)
ALT FLD-CCNC: 9 U/L — SIGNIFICANT CHANGE UP
ANION GAP SERPL CALC-SCNC: 21 MMOL/L — HIGH (ref 5–17)
APPEARANCE UR: CLEAR — SIGNIFICANT CHANGE UP
APTT BLD: 22.5 SEC — LOW (ref 27.5–35.5)
AST SERPL-CCNC: 6 U/L — SIGNIFICANT CHANGE UP
BASOPHILS # BLD AUTO: 0.05 K/UL — SIGNIFICANT CHANGE UP (ref 0–0.2)
BASOPHILS NFR BLD AUTO: 0.3 % — SIGNIFICANT CHANGE UP (ref 0–2)
BILIRUB SERPL-MCNC: 0.3 MG/DL — LOW (ref 0.4–2)
BILIRUB UR-MCNC: NEGATIVE — SIGNIFICANT CHANGE UP
BUN SERPL-MCNC: 7 MG/DL — LOW (ref 8–20)
CALCIUM SERPL-MCNC: 6.5 MG/DL — CRITICAL LOW (ref 8.6–10.2)
CHLORIDE SERPL-SCNC: 98 MMOL/L — SIGNIFICANT CHANGE UP (ref 98–107)
CO2 SERPL-SCNC: 13 MMOL/L — LOW (ref 22–29)
COLOR SPEC: YELLOW — SIGNIFICANT CHANGE UP
CREAT SERPL-MCNC: <0.2 MG/DL — LOW (ref 0.5–1.3)
DIFF PNL FLD: NEGATIVE — SIGNIFICANT CHANGE UP
EOSINOPHIL # BLD AUTO: 0.28 K/UL — SIGNIFICANT CHANGE UP (ref 0–0.5)
EOSINOPHIL NFR BLD AUTO: 1.8 % — SIGNIFICANT CHANGE UP (ref 0–6)
GLUCOSE SERPL-MCNC: 54 MG/DL — LOW (ref 70–99)
GLUCOSE UR QL: 1000 MG/DL
HCT VFR BLD CALC: 37.2 % — LOW (ref 39–50)
HGB BLD-MCNC: 12.3 G/DL — LOW (ref 13–17)
IMM GRANULOCYTES NFR BLD AUTO: 0.4 % — SIGNIFICANT CHANGE UP (ref 0–1.5)
INR BLD: 1.09 RATIO — SIGNIFICANT CHANGE UP (ref 0.88–1.16)
KETONES UR-MCNC: NEGATIVE — SIGNIFICANT CHANGE UP
LACTATE BLDV-MCNC: 1.2 MMOL/L — SIGNIFICANT CHANGE UP (ref 0.5–2)
LEUKOCYTE ESTERASE UR-ACNC: NEGATIVE — SIGNIFICANT CHANGE UP
LYMPHOCYTES # BLD AUTO: 1.33 K/UL — SIGNIFICANT CHANGE UP (ref 1–3.3)
LYMPHOCYTES # BLD AUTO: 8.4 % — LOW (ref 13–44)
MCHC RBC-ENTMCNC: 30.9 PG — SIGNIFICANT CHANGE UP (ref 27–34)
MCHC RBC-ENTMCNC: 33.1 GM/DL — SIGNIFICANT CHANGE UP (ref 32–36)
MCV RBC AUTO: 93.5 FL — SIGNIFICANT CHANGE UP (ref 80–100)
MONOCYTES # BLD AUTO: 1.12 K/UL — HIGH (ref 0–0.9)
MONOCYTES NFR BLD AUTO: 7 % — SIGNIFICANT CHANGE UP (ref 2–14)
NEUTROPHILS # BLD AUTO: 13.06 K/UL — HIGH (ref 1.8–7.4)
NEUTROPHILS NFR BLD AUTO: 82.1 % — HIGH (ref 43–77)
NITRITE UR-MCNC: NEGATIVE — SIGNIFICANT CHANGE UP
NT-PROBNP SERPL-SCNC: 91 PG/ML — SIGNIFICANT CHANGE UP (ref 0–300)
PH UR: 6 — SIGNIFICANT CHANGE UP (ref 5–8)
PLATELET # BLD AUTO: 239 K/UL — SIGNIFICANT CHANGE UP (ref 150–400)
POTASSIUM SERPL-MCNC: 3.9 MMOL/L — SIGNIFICANT CHANGE UP (ref 3.5–5.3)
POTASSIUM SERPL-SCNC: 3.9 MMOL/L — SIGNIFICANT CHANGE UP (ref 3.5–5.3)
PROT SERPL-MCNC: 2.4 G/DL — LOW (ref 6.6–8.7)
PROT UR-MCNC: NEGATIVE MG/DL — SIGNIFICANT CHANGE UP
PROTHROM AB SERPL-ACNC: 12.6 SEC — SIGNIFICANT CHANGE UP (ref 10.6–13.6)
RAPID RVP RESULT: SIGNIFICANT CHANGE UP
RBC # BLD: 3.98 M/UL — LOW (ref 4.2–5.8)
RBC # FLD: 14.4 % — SIGNIFICANT CHANGE UP (ref 10.3–14.5)
SARS-COV-2 RNA SPEC QL NAA+PROBE: SIGNIFICANT CHANGE UP
SODIUM SERPL-SCNC: 132 MMOL/L — LOW (ref 135–145)
SP GR SPEC: 1.01 — SIGNIFICANT CHANGE UP (ref 1.01–1.02)
TROPONIN T SERPL-MCNC: <0.01 NG/ML — SIGNIFICANT CHANGE UP (ref 0–0.06)
UROBILINOGEN FLD QL: NEGATIVE MG/DL — SIGNIFICANT CHANGE UP
WBC # BLD: 15.9 K/UL — HIGH (ref 3.8–10.5)
WBC # FLD AUTO: 15.9 K/UL — HIGH (ref 3.8–10.5)

## 2020-10-27 PROCEDURE — 99285 EMERGENCY DEPT VISIT HI MDM: CPT | Mod: CS

## 2020-10-27 PROCEDURE — 99223 1ST HOSP IP/OBS HIGH 75: CPT

## 2020-10-27 PROCEDURE — 93010 ELECTROCARDIOGRAM REPORT: CPT

## 2020-10-27 PROCEDURE — 71045 X-RAY EXAM CHEST 1 VIEW: CPT | Mod: 26

## 2020-10-27 RX ORDER — CEFTRIAXONE 500 MG/1
1000 INJECTION, POWDER, FOR SOLUTION INTRAMUSCULAR; INTRAVENOUS ONCE
Refills: 0 | Status: COMPLETED | OUTPATIENT
Start: 2020-10-27 | End: 2020-10-27

## 2020-10-27 RX ORDER — SODIUM CHLORIDE 9 MG/ML
1000 INJECTION, SOLUTION INTRAVENOUS ONCE
Refills: 0 | Status: COMPLETED | OUTPATIENT
Start: 2020-10-27 | End: 2020-10-28

## 2020-10-27 RX ORDER — AZITHROMYCIN 500 MG/1
500 TABLET, FILM COATED ORAL ONCE
Refills: 0 | Status: COMPLETED | OUTPATIENT
Start: 2020-10-27 | End: 2020-10-27

## 2020-10-27 RX ORDER — ENOXAPARIN SODIUM 100 MG/ML
40 INJECTION SUBCUTANEOUS DAILY
Refills: 0 | Status: DISCONTINUED | OUTPATIENT
Start: 2020-10-27 | End: 2020-10-29

## 2020-10-27 RX ORDER — OXYCODONE AND ACETAMINOPHEN 5; 325 MG/1; MG/1
2 TABLET ORAL EVERY 6 HOURS
Refills: 0 | Status: DISCONTINUED | OUTPATIENT
Start: 2020-10-27 | End: 2020-10-29

## 2020-10-27 RX ORDER — SODIUM CHLORIDE 9 MG/ML
2200 INJECTION, SOLUTION INTRAVENOUS ONCE
Refills: 0 | Status: COMPLETED | OUTPATIENT
Start: 2020-10-27 | End: 2020-10-27

## 2020-10-27 RX ORDER — DEXTROSE 50 % IN WATER 50 %
50 SYRINGE (ML) INTRAVENOUS ONCE
Refills: 0 | Status: COMPLETED | OUTPATIENT
Start: 2020-10-27 | End: 2020-10-27

## 2020-10-27 RX ORDER — CALCIUM GLUCONATE 100 MG/ML
2 VIAL (ML) INTRAVENOUS ONCE
Refills: 0 | Status: COMPLETED | OUTPATIENT
Start: 2020-10-27 | End: 2020-10-27

## 2020-10-27 RX ORDER — SODIUM CHLORIDE 9 MG/ML
1000 INJECTION, SOLUTION INTRAVENOUS
Refills: 0 | Status: DISCONTINUED | OUTPATIENT
Start: 2020-10-27 | End: 2020-10-28

## 2020-10-27 RX ORDER — ALBUTEROL 90 UG/1
1 AEROSOL, METERED ORAL
Refills: 0 | Status: DISCONTINUED | OUTPATIENT
Start: 2020-10-27 | End: 2020-10-29

## 2020-10-27 RX ADMIN — AZITHROMYCIN 500 MILLIGRAM(S): 500 TABLET, FILM COATED ORAL at 20:54

## 2020-10-27 RX ADMIN — ALBUTEROL 1 PUFF(S): 90 AEROSOL, METERED ORAL at 18:45

## 2020-10-27 RX ADMIN — OXYCODONE AND ACETAMINOPHEN 2 TABLET(S): 5; 325 TABLET ORAL at 21:05

## 2020-10-27 RX ADMIN — ALBUTEROL 1 PUFF(S): 90 AEROSOL, METERED ORAL at 19:05

## 2020-10-27 RX ADMIN — Medication 60 MILLIGRAM(S): at 18:36

## 2020-10-27 RX ADMIN — Medication 50 MILLILITER(S): at 21:05

## 2020-10-27 RX ADMIN — Medication 200 GRAM(S): at 21:19

## 2020-10-27 RX ADMIN — SODIUM CHLORIDE 2200 MILLILITER(S): 9 INJECTION, SOLUTION INTRAVENOUS at 20:31

## 2020-10-27 RX ADMIN — AZITHROMYCIN 255 MILLIGRAM(S): 500 TABLET, FILM COATED ORAL at 19:54

## 2020-10-27 RX ADMIN — CEFTRIAXONE 100 MILLIGRAM(S): 500 INJECTION, POWDER, FOR SOLUTION INTRAMUSCULAR; INTRAVENOUS at 18:46

## 2020-10-27 RX ADMIN — OXYCODONE AND ACETAMINOPHEN 2 TABLET(S): 5; 325 TABLET ORAL at 20:38

## 2020-10-27 RX ADMIN — SODIUM CHLORIDE 2200 MILLILITER(S): 9 INJECTION, SOLUTION INTRAVENOUS at 18:20

## 2020-10-27 NOTE — ED ADULT TRIAGE NOTE - CHIEF COMPLAINT QUOTE
pt kristya from [facility] urgent care for sob, hypotension and fever since this morning. reports was tested today and COVID negative resulted. as per ems, spo2 on RA 75%. pt presents 93% on 4lpm o2 via nasal cannula. pt c/o chest pain and sob. reports hx copd and cardiac hx.

## 2020-10-27 NOTE — H&P ADULT - ASSESSMENT
76 y/o male with PMH of HTN, HLD, CAD s/p CABG x3, COPD, pre-DM, afib was sent to the ED from urgent care for evaluation of hypoxia. Reported fever (102) at home, noted to be hypoxic to 75% at the urgent care. In the ED, leukocytosis, tachycardia, sepsis criteria initiated. Antibiotic started for possible PNA.  Also, found to have hypoglycemia in the ED, patient endorse not eating all day.     Shortness of breath with productive cough likely 2/2 PNA   Admit to telemetry   Continue antibiotic (Rocephin and Azithromycin)   Oxygen vis NC as needed     Sepsis likely due to PNA   WBC: 15.90 with left shift   Blood and urine culture sent follow up   IVF given as per protocol   Tylenol PRN for fever/pain  Trend CBC     Hypocalcemia   Ca: 6.5; corrected 8.5  Likely due to hypoalbuminemia   Supplemented in the ED   Will repeat lab stat     CAD  S/p CABG x3 (03/2020)  Continue BB, Statin     Afib   Rate controlled   Metoprolol 25mg bid with holding parameters     BPH   Tamsulosin 0.4mg     Hx of hypotension   Midodrine 5mg bid     Pre-DM  HbA1C 6  Diet control    Supportive   DVT prophylaxis: Lovenox   Diet: DASH                  76 y/o male with PMH of HTN, HLD, CAD s/p CABG x3, COPD, pre-DM, afib was sent to the ED from urgent care for evaluation of hypoxia. Reported fever (102) at home, noted to be hypoxic to 75% at the urgent care. In the ED, leukocytosis, tachycardia, sepsis criteria initiated. Antibiotic started for possible PNA.  Also, found to have hypoglycemia in the ED, patient endorse not eating all day.     Shortness of breath with productive cough likely 2/2 PNA   Admit to telemetry   Continue antibiotic (Rocephin and Azithromycin)   Oxygen vis NC as needed     Sepsis likely due to PNA   WBC: 15.90 with left shift   Blood and urine culture sent follow up   IVF given as per protocol   Tylenol PRN for fever/pain  Trend CBC     Electrolytes abnormality   Ca: 6.5; corrected 8.5  Hypoalbuminemia 1.5  Likely lab error, repeat CMP WNL     CAD  S/p CABG x3 (03/2020)  Continue BB, Statin     Afib   Rate controlled   Metoprolol 25mg bid with holding parameters     BPH   Tamsulosin 0.4mg     Hx of hypotension   Midodrine 5mg bid     Pre-DM  HbA1C 6  Diet control    Supportive   DVT prophylaxis: Lovenox   Diet: DASH                  74 y/o male with PMH of HTN, HLD, CAD s/p CABG x3, COPD, pre-DM, afib was sent to the ED from urgent care for evaluation of hypoxia. Reported fever (102) at home, noted to be hypoxic to 75% at the urgent care. In the ED, leukocytosis, tachycardia, sepsis criteria initiated. Antibiotic started for possible PNA.  Also, found to have hypoglycemia in the ED, patient endorse not eating all day. Also electrolytes deranged but likely lab error, repeat was wnl.    Shortness of breath with productive cough likely 2/2 PNA   Admit to telemetry   Continue antibiotic (Rocephin and Azithromycin)   Oxygen vis NC as needed     Sepsis likely due to PNA   WBC: 15.90 with left shift   Blood and urine culture sent follow up   IVF given as per protocol   Tylenol PRN for fever/pain  Trend CBC     COPD   Not on home O2   Albuterol PRN   Oxygen via NC as needed     CAD  S/p CABG x3 (03/2020)  Continue BB, Statin     Afib   Rate controlled   Metoprolol 25mg bid with holding parameters     BPH   Tamsulosin 0.4mg     Hx of hypotension   Midodrine 5mg bid     Pre-DM  HbA1C 6  Diet control    Supportive   DVT prophylaxis: Lovenox   Diet: DASH

## 2020-10-27 NOTE — ED PROVIDER NOTE - PMH
Arthritis    CAD in native artery  RCA 3 YAYA 1 in 2002 and 2 in 2019  HLD (hyperlipidemia)    HTN (hypertension)    Lumbosacral disc disease  has nerve stimulator

## 2020-10-27 NOTE — ED ADULT NURSE NOTE - OBJECTIVE STATEMENT
from [facility] urgent care, a&ox3, for sob, hypotension and fever since this morning. reports was tested today and COVID negative resulted. as per ems, spo2 on RA 75%. pt presents 93% on 4lpm o2 via nasal cannula. pt c/o chest pain and sob. reports hx copd and cardiac hx. md keating at bedside for eval.

## 2020-10-27 NOTE — ED ADULT NURSE REASSESSMENT NOTE - NS ED NURSE REASSESS COMMENT FT1
Pt serum glucose 54 & BP 97/55. Pt asymptomatic @ this time. MD Gregory made aware. Meds to be given as per MD order. Will reassess.

## 2020-10-27 NOTE — ED PROVIDER NOTE - PHYSICAL EXAMINATION
Gen: NAD, AOx3  Head: NCAT  HEENT: EOMI, oral mucosa moist, normal conjunctiva, neck supple  Lung: decreased air entry with faint forced expiratory wheeze, +belly breathing, speaking in full sentences, O2 90% on RA  CV: tachycardic, regular, no murmur, Normal perfusion  Abd: soft, NTND  MSK: No edema, no visible deformities  Neuro: No focal neurologic deficits  Skin: No rash   Psych: normal affect

## 2020-10-27 NOTE — ED PROVIDER NOTE - NS ED ROS FT
ROS: +CP/SOB. +cough. +fever. no n/v/d/c. no abd pain. no rash. no bleeding. no urinary complaints. +weakness. no vision changes. no HA. no neck/back pain. no extremity swelling/deformity. No change in mental status.

## 2020-10-27 NOTE — ED ADULT NURSE NOTE - ED STAT RN HANDOFF DETAILS
Report given to Adalberto SORIA. Pt made aware of admission and verbalized understanding. Pt remains in ISO4 on CM w/. Respirations even & unlabored. NAD present.

## 2020-10-27 NOTE — ED ADULT NURSE REASSESSMENT NOTE - NS ED NURSE REASSESS COMMENT FT1
Assumed pt care @1940. Report received from day YANG Aguilera. Pt A&Ox4 w/no complaints at this time. Pt remains on CM w/. VSS. Respirations even & unlabored. NAD present. Pt using urinal @ bedside. 2nd  antibiotic initiated. Pt made aware of plan of care.

## 2020-10-27 NOTE — H&P ADULT - MS EXT PE MLT D E PC
[de-identified] : Mr. Lawrence is a 74 y/o male who presented with one month history of RLQ abdominal pain.  He denies weight loss, fever, melena, blood in stool or nausea/emesis.  Patient underwent EGD/colonoscopy 02/25/2019 which demonstrated a partially obstructing necrotic lesion with apple core shape at the hepatic flexure.  Biopsies taken demonstrated moderately differentiated adenocarcinoma.  The mass was tattooed with Mary ink.\par A initial staging CT abdomen/pelvis was performed 2/28/2019 did not show evidence of distant metastasis or regional adenopathy. Known lesion was not commented on in the official report.\par Patient had blood work done at [facility], but not available for review.\par \par 04/09/2019:  Patient is s/p robotic right colectomy 03/25/2019.  He had an uneventful post-operative course.  He reports feeling well and is tolerating diet.  He denies nausea/emesis and is having regular bowel movements.\par Pathology:  T3N0 moderately differentiated adenocarcinoma of colon with focus of poorly differentiated cancer.  Seventeen lymph nodes negative, margins negative. no cyanosis/no pedal edema/no clubbing

## 2020-10-27 NOTE — ED PROVIDER NOTE - CLINICAL SUMMARY MEDICAL DECISION MAKING FREE TEXT BOX
likely sepsis from PNA/resp illness. will cover with ceftri/azithro, albuterol inhaler and steroids, cultures, RVP/COVID. requiring 3L NC TBA

## 2020-10-27 NOTE — ED PROVIDER NOTE - CARE PLAN
Principal Discharge DX:	Sepsis  Secondary Diagnosis:	Pneumonia  Secondary Diagnosis:	COPD exacerbation  Secondary Diagnosis:	Hypocalcemia  Secondary Diagnosis:	Hypoglycemia

## 2020-10-27 NOTE — ED ADULT NURSE NOTE - CHIEF COMPLAINT QUOTE
pt kristya from [facility] urgent care for sob, hypotension and fever since this morning. reports was tested today and COVID negative resulted. as per ems, spo2 on RA 75%. pt presents 93% on 4lpm o2 via nasal cannula. pt c/o chest pain and sob. reports hx copd and cardiac hx.

## 2020-10-27 NOTE — ED ADULT NURSE NOTE - NSIMPLEMENTINTERV_GEN_ALL_ED
Implemented All Universal Safety Interventions:  [place] to call system. Call bell, personal items and telephone within reach. Instruct patient to call for assistance. Room bathroom lighting operational. Non-slip footwear when patient is off stretcher. Physically safe environment: no spills, clutter or unnecessary equipment. Stretcher in lowest position, wheels locked, appropriate side rails in place.

## 2020-10-27 NOTE — ED ADULT TRIAGE NOTE - BANDS:
Chief Complaint   Patient presents with   • Depression     Doing better   • Imm/Inj     Tdap       SUBJECTIVE:  Malia Castellano is a 38 year old female presenting with follow-up depression. Has been taking bupropion 100 mg twice a day, sertraline 100 mg a day, however she takes a half a tablet of the 100 mg in the morning and half a tablet in the evening. Denies side effects. Still struggles with anxiety. States her depression is significantly better. She is more ambitious.. Has not had any suicidal thoughts at this time. She continues with counseling which is going well. Sleeping well. Avoiding alcohol. States that her appetite is good. She is taking classes, states that it is going very well. She had  from her significant other several weeks ago, she states that they are now working on things and are back together.    PAST MEDICAL HISTORY:  Patient Active Problem List   Diagnosis   • Migraine with aura and without status migrainosus   • Severe episode of recurrent major depressive disorder, without psychotic features   • Borderline personality disorder       No past surgical history on file.    MEDICATIONS:  Current Outpatient Prescriptions   Medication Sig Dispense Refill   • buPROPion (WELLBUTRIN) 100 MG tablet Take 1 tablet by mouth 2 times daily. Indications: anxiety and depression Take 1 pill 2 x a day  for anxiety and depression 60 tablet 2   • sertraline (ZOLOFT) 100 MG tablet Take 100 mg by mouth daily. Taking 1/2 tablet in the morning & 1/2 tablet at night     • sumatriptan (IMITREX) 100 MG tablet Take1 at the onset of migraine headaches, repeat in 2 hours if needed. No more than 2 doses in 24 hours 9 tablet 1   • sertraline (ZOLOFT) 25 MG tablet Indications: anxiety and depression Take 25 mg by mouth 2 x a day in addition to the 100 mg tablet 30 tablet 0     No current facility-administered medications for this visit.        ALLERGIES:  ALLERGIES:  No Known Allergies    SOCIAL HISTORY:  Social  History     Social History   • Marital status: Single     Spouse name: N/A   • Number of children: 4   • Years of education: N/A     Occupational History   • Student       [employer]     Social History Main Topics   • Smoking status: Former Smoker     Types: Cigarettes   • Smokeless tobacco: Never Used      Comment: Quit 2000   • Alcohol use 2.4 oz/week     4 Standard drinks or equivalent per week   • Drug use: No   • Sexual activity: Yes     Partners: Male     Birth control/ protection: None     Other Topics Concern   • Not on file     Social History Narrative        OBJECTIVE:  Vital Signs: Weight is stable  Visit Vitals   • /82 (BP Location: [facility], Patient Position: Sitting, Cuff Size: Regular)   • Pulse 52   • Ht 5' 10\" (1.778 m)   • Wt 59.7 kg   • BMI 18.88 kg/m2     General: Appears well, smiles during the exam. Dress casually, grooming is good  Mentation: Alert and oriented ×3. Converses appropriately. She initiates conversation. She is upbeat and smiles throughout our visit. She discusses the future. Does not pace. No tremors noted. No flight of ideas. Speech is not pressured.  No visits with results within 1 Week(s) from this visit.  Latest known visit with results is:    Lab Services on 12/02/2016   Component Date Value   • WBC 12/02/2016 4.7    • RBC 12/02/2016 4.59    • HGB 12/02/2016 13.2    • HCT 12/02/2016 39.2    • MCV 12/02/2016 85.4    • MCH 12/02/2016 28.8    • MCHC 12/02/2016 33.7    • RDW-CV 12/02/2016 13.0    • PLT 12/02/2016 302    • DIFF TYPE 12/02/2016 AUTOMATED DIFFERENTIAL    • Neutrophil 12/02/2016 60    • LYMPH 12/02/2016 26    • MONO 12/02/2016 11    • EOSIN 12/02/2016 2    • BASO 12/02/2016 1    • Absolute Neutrophil 12/02/2016 2.8    • Absolute Lymph 12/02/2016 1.2    • Absolute Mono 12/02/2016 0.5    • Absolute Eos 12/02/2016 0.1    • Absolute Baso 12/02/2016 0.0    • Fasting Status 12/02/2016 UNKNOWN    • Sodium 12/02/2016 136    • Potassium 12/02/2016 4.2    • Chloride 12/02/2016 101     • Carbon Dioxide 12/02/2016 27    • Anion Gap 12/02/2016 12    • Glucose 12/02/2016 91    • BUN 12/02/2016 15    • Creatinine 12/02/2016 0.73    • GFR Estimate,  Am* 12/02/2016 >90    • GFR Estimate, Non [place]* 12/02/2016 >90    • BUN/Creatinine Ratio 12/02/2016 21    • CALCIUM 12/02/2016 9.1    • TOTAL BILIRUBIN 12/02/2016 0.7    • AST/SGOT 12/02/2016 9    • ALT/SGPT 12/02/2016 30    • ALK PHOSPHATASE 12/02/2016 60    • TOTAL PROTEIN 12/02/2016 7.0    • Albumin 12/02/2016 4.1    • GLOBULIN 12/02/2016 2.9    • A/G Ratio, Serum 12/02/2016 1.4    • TSH 12/02/2016 1.913          ASSESSMENT AND PLAN:  Ami was seen today for depression and imm/inj.    Diagnoses and all orders for this visit:    Anxiety and depression    Other orders  -     sertraline (ZOLOFT) 25 MG tablet; Indications: anxiety and depression Take 25 mg by mouth 2 x a day in addition to the 100 mg tablet  -     buPROPion (WELLBUTRIN) 100 MG tablet; Take 1 tablet by mouth 2 times daily. Indications: anxiety and depression Take 1 pill 2 x a day  for anxiety and depression     Plan: Increase sertraline 250 mg daily. Potential side effects were again reviewed. Continue bupropion 100 mg twice daily. Follow-up in 6 weeks, sooner as needed. Continue with counseling. Continue to exercise and eat healthy.          Allergy;

## 2020-10-27 NOTE — H&P ADULT - HISTORY OF PRESENT ILLNESS
76 y/o male with PMH of HTN, HLD, CAD s/p CABG x3, COPD, pre-DM, afib was sent to the ED from urgent care for evaluation of hypoxia. Patient reported not feeling well for the past few days; noted fever (102) and weakness today. He also reported worsening cough productive of whitish-yellow sputum as well as shortness of breath and chest discomfort. He took Percocet for his back pain prior to going to the urgent care. At urgent care, he was found to be hypoxic to 75% and rapid covid was negative. He has no nausea, vomiting, diaphoresis, sick contact, recent travel, abdominal pain, change in bowel/urinary, HA.

## 2020-10-27 NOTE — ED PROVIDER NOTE - OBJECTIVE STATEMENT
76yo M with COPD, CABG, BPH mild fatigue and cough last few days, today woke up with fever 102 and SOB worse on exertion. has chest discomfort, interimttent but worse today with SOB. no leg swelling. no h/o PE/DVT. no sick contacts. went to urgent care who sent to ED for O2 of 75% COVID negative at urgent care. no urinary sx. no GI sx. no sick contacts. cough productive- discolored phlegm

## 2020-10-28 ENCOUNTER — TRANSCRIPTION ENCOUNTER (OUTPATIENT)
Age: 75
End: 2020-10-28

## 2020-10-28 LAB
ALBUMIN SERPL ELPH-MCNC: 3.7 G/DL — SIGNIFICANT CHANGE UP (ref 3.3–5.2)
ALP SERPL-CCNC: 84 U/L — SIGNIFICANT CHANGE UP (ref 40–120)
ALT FLD-CCNC: 23 U/L — SIGNIFICANT CHANGE UP
ANION GAP SERPL CALC-SCNC: 10 MMOL/L — SIGNIFICANT CHANGE UP (ref 5–17)
ANION GAP SERPL CALC-SCNC: 14 MMOL/L — SIGNIFICANT CHANGE UP (ref 5–17)
AST SERPL-CCNC: 22 U/L — SIGNIFICANT CHANGE UP
BILIRUB SERPL-MCNC: 0.6 MG/DL — SIGNIFICANT CHANGE UP (ref 0.4–2)
BUN SERPL-MCNC: 11 MG/DL — SIGNIFICANT CHANGE UP (ref 8–20)
BUN SERPL-MCNC: 12 MG/DL — SIGNIFICANT CHANGE UP (ref 8–20)
CALCIUM SERPL-MCNC: 9.2 MG/DL — SIGNIFICANT CHANGE UP (ref 8.6–10.2)
CALCIUM SERPL-MCNC: 9.2 MG/DL — SIGNIFICANT CHANGE UP (ref 8.6–10.2)
CHLORIDE SERPL-SCNC: 102 MMOL/L — SIGNIFICANT CHANGE UP (ref 98–107)
CHLORIDE SERPL-SCNC: 99 MMOL/L — SIGNIFICANT CHANGE UP (ref 98–107)
CO2 SERPL-SCNC: 23 MMOL/L — SIGNIFICANT CHANGE UP (ref 22–29)
CO2 SERPL-SCNC: 28 MMOL/L — SIGNIFICANT CHANGE UP (ref 22–29)
CREAT SERPL-MCNC: 0.53 MG/DL — SIGNIFICANT CHANGE UP (ref 0.5–1.3)
CREAT SERPL-MCNC: 0.6 MG/DL — SIGNIFICANT CHANGE UP (ref 0.5–1.3)
GLUCOSE BLDC GLUCOMTR-MCNC: 249 MG/DL — HIGH (ref 70–99)
GLUCOSE BLDC GLUCOMTR-MCNC: 273 MG/DL — HIGH (ref 70–99)
GLUCOSE SERPL-MCNC: 174 MG/DL — HIGH (ref 70–99)
GLUCOSE SERPL-MCNC: 263 MG/DL — HIGH (ref 70–99)
HCT VFR BLD CALC: 33.9 % — LOW (ref 39–50)
HCV AB S/CO SERPL IA: 0.09 S/CO — SIGNIFICANT CHANGE UP (ref 0–0.99)
HCV AB SERPL-IMP: SIGNIFICANT CHANGE UP
HGB BLD-MCNC: 10.9 G/DL — LOW (ref 13–17)
MCHC RBC-ENTMCNC: 30.6 PG — SIGNIFICANT CHANGE UP (ref 27–34)
MCHC RBC-ENTMCNC: 32.2 GM/DL — SIGNIFICANT CHANGE UP (ref 32–36)
MCV RBC AUTO: 95.2 FL — SIGNIFICANT CHANGE UP (ref 80–100)
PLATELET # BLD AUTO: 213 K/UL — SIGNIFICANT CHANGE UP (ref 150–400)
POTASSIUM SERPL-MCNC: 4.5 MMOL/L — SIGNIFICANT CHANGE UP (ref 3.5–5.3)
POTASSIUM SERPL-MCNC: 5.3 MMOL/L — SIGNIFICANT CHANGE UP (ref 3.5–5.3)
POTASSIUM SERPL-SCNC: 4.5 MMOL/L — SIGNIFICANT CHANGE UP (ref 3.5–5.3)
POTASSIUM SERPL-SCNC: 5.3 MMOL/L — SIGNIFICANT CHANGE UP (ref 3.5–5.3)
PROCALCITONIN SERPL-MCNC: 0.27 NG/ML — HIGH (ref 0.02–0.1)
PROT SERPL-MCNC: 6.2 G/DL — LOW (ref 6.6–8.7)
RBC # BLD: 3.56 M/UL — LOW (ref 4.2–5.8)
RBC # FLD: 14.5 % — SIGNIFICANT CHANGE UP (ref 10.3–14.5)
SARS-COV-2 IGG SERPL QL IA: NEGATIVE — SIGNIFICANT CHANGE UP
SARS-COV-2 IGM SERPL IA-ACNC: <0.1 INDEX — SIGNIFICANT CHANGE UP
SODIUM SERPL-SCNC: 136 MMOL/L — SIGNIFICANT CHANGE UP (ref 135–145)
SODIUM SERPL-SCNC: 140 MMOL/L — SIGNIFICANT CHANGE UP (ref 135–145)
WBC # BLD: 10.83 K/UL — HIGH (ref 3.8–10.5)
WBC # FLD AUTO: 10.83 K/UL — HIGH (ref 3.8–10.5)

## 2020-10-28 PROCEDURE — 99233 SBSQ HOSP IP/OBS HIGH 50: CPT

## 2020-10-28 PROCEDURE — 99223 1ST HOSP IP/OBS HIGH 75: CPT

## 2020-10-28 PROCEDURE — 99221 1ST HOSP IP/OBS SF/LOW 40: CPT

## 2020-10-28 RX ORDER — ASPIRIN/CALCIUM CARB/MAGNESIUM 324 MG
81 TABLET ORAL DAILY
Refills: 0 | Status: DISCONTINUED | OUTPATIENT
Start: 2020-10-28 | End: 2020-10-29

## 2020-10-28 RX ORDER — ACETAMINOPHEN 500 MG
650 TABLET ORAL EVERY 6 HOURS
Refills: 0 | Status: DISCONTINUED | OUTPATIENT
Start: 2020-10-28 | End: 2020-10-29

## 2020-10-28 RX ORDER — TAMSULOSIN HYDROCHLORIDE 0.4 MG/1
0.4 CAPSULE ORAL AT BEDTIME
Refills: 0 | Status: DISCONTINUED | OUTPATIENT
Start: 2020-10-28 | End: 2020-10-29

## 2020-10-28 RX ORDER — AZITHROMYCIN 500 MG/1
500 TABLET, FILM COATED ORAL EVERY 24 HOURS
Refills: 0 | Status: DISCONTINUED | OUTPATIENT
Start: 2020-10-28 | End: 2020-10-29

## 2020-10-28 RX ORDER — CHOLECALCIFEROL (VITAMIN D3) 125 MCG
5000 CAPSULE ORAL DAILY
Refills: 0 | Status: DISCONTINUED | OUTPATIENT
Start: 2020-10-28 | End: 2020-10-29

## 2020-10-28 RX ORDER — CEFTRIAXONE 500 MG/1
1000 INJECTION, POWDER, FOR SOLUTION INTRAMUSCULAR; INTRAVENOUS EVERY 24 HOURS
Refills: 0 | Status: DISCONTINUED | OUTPATIENT
Start: 2020-10-28 | End: 2020-10-29

## 2020-10-28 RX ORDER — GABAPENTIN 400 MG/1
400 CAPSULE ORAL THREE TIMES A DAY
Refills: 0 | Status: DISCONTINUED | OUTPATIENT
Start: 2020-10-28 | End: 2020-10-29

## 2020-10-28 RX ORDER — ATORVASTATIN CALCIUM 80 MG/1
40 TABLET, FILM COATED ORAL AT BEDTIME
Refills: 0 | Status: DISCONTINUED | OUTPATIENT
Start: 2020-10-28 | End: 2020-10-29

## 2020-10-28 RX ORDER — MIDODRINE HYDROCHLORIDE 2.5 MG/1
5 TABLET ORAL
Refills: 0 | Status: DISCONTINUED | OUTPATIENT
Start: 2020-10-28 | End: 2020-10-29

## 2020-10-28 RX ORDER — METOPROLOL TARTRATE 50 MG
25 TABLET ORAL
Refills: 0 | Status: DISCONTINUED | OUTPATIENT
Start: 2020-10-28 | End: 2020-10-29

## 2020-10-28 RX ADMIN — ENOXAPARIN SODIUM 40 MILLIGRAM(S): 100 INJECTION SUBCUTANEOUS at 08:04

## 2020-10-28 RX ADMIN — SODIUM CHLORIDE 1000 MILLILITER(S): 9 INJECTION, SOLUTION INTRAVENOUS at 00:03

## 2020-10-28 RX ADMIN — Medication 81 MILLIGRAM(S): at 08:04

## 2020-10-28 RX ADMIN — OXYCODONE AND ACETAMINOPHEN 2 TABLET(S): 5; 325 TABLET ORAL at 05:15

## 2020-10-28 RX ADMIN — MIDODRINE HYDROCHLORIDE 5 MILLIGRAM(S): 2.5 TABLET ORAL at 22:27

## 2020-10-28 RX ADMIN — OXYCODONE AND ACETAMINOPHEN 2 TABLET(S): 5; 325 TABLET ORAL at 12:42

## 2020-10-28 RX ADMIN — AZITHROMYCIN 255 MILLIGRAM(S): 500 TABLET, FILM COATED ORAL at 19:43

## 2020-10-28 RX ADMIN — Medication 5000 UNIT(S): at 08:04

## 2020-10-28 RX ADMIN — GABAPENTIN 400 MILLIGRAM(S): 400 CAPSULE ORAL at 05:16

## 2020-10-28 RX ADMIN — MIDODRINE HYDROCHLORIDE 5 MILLIGRAM(S): 2.5 TABLET ORAL at 08:04

## 2020-10-28 RX ADMIN — CEFTRIAXONE 100 MILLIGRAM(S): 500 INJECTION, POWDER, FOR SOLUTION INTRAMUSCULAR; INTRAVENOUS at 17:31

## 2020-10-28 RX ADMIN — OXYCODONE AND ACETAMINOPHEN 2 TABLET(S): 5; 325 TABLET ORAL at 20:30

## 2020-10-28 RX ADMIN — GABAPENTIN 400 MILLIGRAM(S): 400 CAPSULE ORAL at 13:03

## 2020-10-28 RX ADMIN — Medication 25 MILLIGRAM(S): at 05:16

## 2020-10-28 RX ADMIN — OXYCODONE AND ACETAMINOPHEN 2 TABLET(S): 5; 325 TABLET ORAL at 19:46

## 2020-10-28 RX ADMIN — ATORVASTATIN CALCIUM 40 MILLIGRAM(S): 80 TABLET, FILM COATED ORAL at 22:27

## 2020-10-28 RX ADMIN — Medication 25 MILLIGRAM(S): at 16:43

## 2020-10-28 RX ADMIN — TAMSULOSIN HYDROCHLORIDE 0.4 MILLIGRAM(S): 0.4 CAPSULE ORAL at 22:27

## 2020-10-28 RX ADMIN — GABAPENTIN 400 MILLIGRAM(S): 400 CAPSULE ORAL at 22:27

## 2020-10-28 RX ADMIN — OXYCODONE AND ACETAMINOPHEN 2 TABLET(S): 5; 325 TABLET ORAL at 11:46

## 2020-10-28 NOTE — DISCHARGE NOTE NURSING/CASE MANAGEMENT/SOCIAL WORK - PATIENT PORTAL LINK FT
You can access the FollowMyHealth Patient Portal offered by [facility] by registering at the following website: [email or web address]. By joining [facility]’s FollowMyHealth portal, you will also be able to view your health information using other applications (apps) compatible with our system.

## 2020-10-28 NOTE — PROGRESS NOTE ADULT - ASSESSMENT
74 y/o male with PMH of HTN, HLD, CAD s/p CABG x3, COPD, pre-DM, afib was sent to the ED from urgent care for evaluation of hypoxia. Reported fever (102) at home, noted to be hypoxic to 75% at the urgent care. In the ED, leukocytosis, tachycardia, sepsis criteria initiated. Antibiotic started for possible PNA.  Also, found to have hypoglycemia in the ED, patient endorse not eating all day. Also electrolytes deranged but likely lab error, repeat was wnl.    Shortness of breath with productive cough likely 2/2 PNA   Admit to telemetry   Continue antibiotic (Rocephin and Azithromycin)   Oxygen vis NC as needed     Sepsis likely due to PNA from likely gram pos or gram negative bacterial infection   WBC: 15.90 with left shift   Blood and urine culture pending  IVF given as per protocol   Tylenol PRN for fever/pain  Trend CBC, sepsis is resolving, white count trended down, will continue with Azithromycin and Ceftriaxone   he does not require any supplemental oxygen      COPD   Not on home O2   Albuterol PRN   Oxygen via NC as needed    CAD  S/p CABG x3 (03/2020)  Continue BB, Statin     Paroxsymal Afib   Rate controlled   Metoprolol 25mg bid with holding parameters, not on anticoagulation.     BPH   Tamsulosin 0.4mg     Hx of hypotension   Midodrine 5mg bid     Pre-DM  HbA1C 6  Diet control    Supportive   DVT prophylaxis: Lovenox   Diet: DASH     Dispo: likely tomorrow, will pt eval, O2 on ambulation and follow cultures.                  76 y/o male with PMH of HTN, HLD, CAD s/p CABG x3, COPD, pre-DM, afib was sent to the ED from urgent care for evaluation of hypoxia. Reported fever (102) at home, noted to be hypoxic to 75% at the urgent care. In the ED, leukocytosis, tachycardia, sepsis criteria initiated. Antibiotic started for possible PNA.  Also, found to have hypoglycemia in the ED, patient endorse not eating all day. Also electrolytes deranged but likely lab error, repeat was wnl.    Shortness of breath with productive cough likely 2/2 PNA   Admit to telemetry   Continue antibiotic (Rocephin and Azithromycin)   Oxygen vis NC as needed     Sepsis likely due to PNA from likely gram pos or gram negative bacterial infection   WBC: 15.90 with left shift   Blood and urine culture pending  IVF given as per protocol   Tylenol PRN for fever/pain  Trend CBC, sepsis is resolving, white count trended down, will continue with Azithromycin and Ceftriaxone   he does not require any supplemental oxygen      COPD   Not on home O2   Albuterol PRN   Oxygen via NC as needed    CAD  S/p CABG x3 (03/2020)  Continue aspirin BB, Statin     Paroxsymal Afib   Rate controlled   Metoprolol 25mg bid with holding parameters, not on anticoagulation., will discuss discuss with his cardiologist to see the reason.     BPH   Tamsulosin 0.4mg     Hx of hypotension   Midodrine 5mg bid     Pre-DM  HbA1C 6  Diet control    Anemia: looks chronic, his H&H has been at his baseline, further workup as out patient.     Supportive   DVT prophylaxis: Lovenox   Diet: DASH     Dispo: likely tomorrow, will pt eval, O2 on ambulation and follow cultures.

## 2020-10-28 NOTE — DISCHARGE NOTE PROVIDER - HOSPITAL COURSE
76 y/o male with PMH of HTN, HLD, CAD s/p CABG x3, COPD, pre-DM, afib was sent to the ED from urgent care for evaluation of hypoxia. Reported fever (102) at home, noted to be hypoxic to 75% at the urgent care. In the ED, leukocytosis, tachycardia, sepsis criteria initiated. Antibiotic started for possible PNA.  Also, found to have hypoglycemia in the ED, patient endorse not eating all day. Also electrolytes deranged but likely lab error, repeat was wnl, he was admitted under medicine was hooked up with monitor, his blood cultures obtained and was started on azithromycin and Ceftriaxone, he got IV fluids becaue of sepsis likely due to PNA from likely gram pos or gram negative bacterial infection, [facility] admission WBC: 15.90 with left shift, came down now, Blood cultures came   his sepsis resolved, white count trended down, he has been weaned off from the supplemental oxygen, he will be continue to take azithromycin and will change Ceftriaxone to Augmentin to  complete 5 days course.   He has Hx of COPD, not on home O2 , no exacerbation, he was continued with Albuterol PRN   he has Hx of CAD S/p CABG x3 (03/2020), he was continued with aspirin BB, Statin   he has hx of Paroxsymal Afib, he had post operatively after the CABG, he is sinus rhythm now, he has no more episodes and he is not on any anticoagulation, seen by cardiology.   Patient has hx BPH and was continued with Tamsulosin 0.4mg   He has Hx of hypotension continued with Midodrine 5mg bid   Anemia: looks chronic, his H&H has been at his baseline, further workup as out patient.            76 y/o male with PMH of HTN, HLD, CAD s/p CABG x3, COPD, pre-DM, afib was sent to the ED from urgent care for evaluation of hypoxia. Reported fever (102) at home, noted to be hypoxic to 75% at the urgent care. In the ED, leukocytosis, tachycardia, sepsis criteria initiated. Antibiotic started for possible PNA.  Also, found to have hypoglycemia in the ED, patient endorse not eating all day. Also electrolytes deranged but likely lab error, repeat was wnl, he was admitted under medicine was hooked up with monitor, his blood cultures obtained and was started on azithromycin and Ceftriaxone, he got IV fluids becaue of sepsis likely due to PNA from likely gram pos or gram negative bacterial infection, his admission WBC: 15.90 with left shift, came down now, Blood cultures has been negative, his sepsis resolved, white count trended down, he has been weaned off from the supplemental oxygen, he was continued with azithromycin and Ceftriaxone to Augmentin to  complete 7 days course.   He has Hx of COPD, not on home O2 , no exacerbation, he was continued with Albuterol PRN   he has Hx of CAD S/p CABG x3 (03/2020), he was continued with aspirin BB, Statin   he has hx of Paroxsymal Afib, he had post operatively after the CABG, he is sinus rhythm now, he has no more episodes and he is not on any anticoagulation, seen by cardiology.   Patient has hx BPH and was continued with Tamsulosin 0.4mg   He has Hx of hypotension continued with Midodrine 5mg bid   Anemia: looks chronic, his H&H has been at his baseline, further workup as out patient.   he was have some crampy pain in the left leg, US doppler obtained showed no evidence of DVT.    patient is looking better, his all symptoms has been resolved, he is being discharged home in a stable condition.     Vital Signs Last 24 Hrs  T(C): 37 (29 Oct 2020 11:35), Max: 37 (29 Oct 2020 11:35)  T(F): 98.6 (29 Oct 2020 11:35), Max: 98.6 (29 Oct 2020 11:35)  HR: 80 (29 Oct 2020 11:35) (75 - 82)  BP: 122/70 (29 Oct 2020 11:35) (112/63 - 132/74)  RR: 20 (29 Oct 2020 11:35) (20 - 23)  SpO2: 97% (29 Oct 2020 11:35) (93% - 97%)    PHYSICAL EXAM:    GENERAL: Elderly male looking comfortable   HEENT: PERRL, +EOMI  NECK: soft, Supple, No JVD,   CHEST/LUNG: Decrease air entry bilaterally; No wheezing  HEART: S1S2+, Regular rate and rhythm; No murmurs  ABDOMEN: Soft, Nontender, Nondistended; Bowel sounds present  EXTREMITIES:  1+ Peripheral Pulses, No edema  SKIN: No rashes or lesions  NEURO: AAOX3, no focal deficits, no motor r sensory loss  PSYCH: normal mood      Total time spent 38minutes

## 2020-10-28 NOTE — CONSULT NOTE ADULT - ASSESSMENT
Imp--COPD, Fevers, leading to hypoxemia, hypotension.  Cultures neg so far, CXR NAPD, WBC 10k.  RVP neg.  Plan--Nebs, abx, fluids.  procalc

## 2020-10-28 NOTE — DISCHARGE NOTE PROVIDER - CARE PROVIDER_API CALL
Walter Arndt  CRITICAL CARE MEDICINE  [address], [address]  [place] [address]  Phone: [phone]  Fax: [phone]  Follow Up Time:     PMD,   1 week, please call the office and get an appiontment  Phone: (   )    -  Fax: (   )    -  Follow Up Time:

## 2020-10-28 NOTE — DISCHARGE NOTE PROVIDER - NSDCCPCAREPLAN_GEN_ALL_CORE_FT
PRINCIPAL DISCHARGE DIAGNOSIS  Diagnosis: Sepsis  Assessment and Plan of Treatment:       SECONDARY DISCHARGE DIAGNOSES  Diagnosis: Pneumonia  Assessment and Plan of Treatment:     Diagnosis: Hypoglycemia  Assessment and Plan of Treatment:     Diagnosis: Hypocalcemia  Assessment and Plan of Treatment:     Diagnosis: COPD exacerbation  Assessment and Plan of Treatment:     Diagnosis: Pneumonia  Assessment and Plan of Treatment:

## 2020-10-28 NOTE — DISCHARGE NOTE PROVIDER - PROVIDER TOKENS
PROVIDER:[TOKEN:[[id number]]],FREE:[LAST:[PMD],PHONE:[(   )    -],FAX:[(   )    -],ADDRESS:[1 week, please call the office and get an appiontment]]

## 2020-10-28 NOTE — PHARMACOTHERAPY INTERVENTION NOTE - COMMENTS
[facility]ed [facility] to obtain [facility] list Called [facility] to [facility])    [facility] mayra

## 2020-10-28 NOTE — CONSULT NOTE ADULT - SUBJECTIVE AND OBJECTIVE BOX
PULMONARY CONSULT NOTE      [id number]    Patient is a 75y old  Male who presents with a chief complaint of SOB, fevers.    INTERVAL HPI/OVERNIGHT EVENTS:5 y/o male with PMH of HTN, HLD, CAD s/p CABG x3, COPD, pre-DM, afib was sent to the ED from urgent care for evaluation of hypoxia. Patient reported not feeling well for the past few days; noted fever (102) and weakness today. He also reported worsening cough productive of whitish-yellow sputum as well as shortness of breath and chest discomfort. He took Percocet for his back pain prior to going to the urgent care. At urgent care, he was found to be hypoxic to 75% and rapid covid was negative. He has no nausea, vomiting, diaphoresis, sick contact, recent travel, abdominal pain, change in bowel/urinary, HA.     Pt known to us with mod-severe COPD--FEV1 50%, not on home O2. Was hypotensive, given fluids.  Feeling better today but still not at baseline.  Cough with white-yellow sputum.      MEDICATIONS  (STANDING):  ALBUTerol    90 MICROgram(s) HFA Inhaler 1 Puff(s) Inhalation every 15 minutes  aspirin  chewable 81 milliGRAM(s) Oral daily  atorvastatin 40 milliGRAM(s) Oral at bedtime  azithromycin  IVPB 500 milliGRAM(s) IV Intermittent every 24 hours  cefTRIAXone   IVPB 1000 milliGRAM(s) IV Intermittent every 24 hours  cholecalciferol 5000 Unit(s) Oral daily  enoxaparin Injectable 40 milliGRAM(s) SubCutaneous daily  gabapentin 400 milliGRAM(s) Oral three times a day  metoprolol tartrate 25 milliGRAM(s) Oral two times a day  midodrine. 5 milliGRAM(s) Oral <User Schedule>  tamsulosin 0.4 milliGRAM(s) Oral at bedtime      MEDICATIONS  (PRN):  acetaminophen   Tablet .. 650 milliGRAM(s) Oral every 6 hours PRN Temp greater or equal to 38C (100.4F), Mild Pain (1 - 3)  oxycodone    5 mG/acetaminophen 325 mG 2 Tablet(s) Oral every 6 hours PRN Moderate Pain (4 - 6)      Allergies    codeine (Urticaria)  ibuprofen (Anaphylaxis)    Intolerances        PAST MEDICAL & SURGICAL HISTORY:  HLD (hyperlipidemia)    HTN (hypertension)    Lumbosacral disc disease  has nerve stimulator    Arthritis    CAD in native artery  RCA 3 YAYA 1 in  and 2 in 2019    S/P cataract surgery  b/l eyes 2016    H/O heart artery stent  RCA        FAMILY HISTORY:  FH: colon cancer        SOCIAL HISTORY  Smoking History: former    REVIEW OF SYSTEMS:    CONSTITUTIONAL:  As per HPI.    HEENT:  Eyes:  No diplopia or blurred vision. ENT:  No earache, sore throat or runny nose.    CARDIOVASCULAR:  No pressure, squeezing, tightness, heaviness or aching about the chest; no palpitations.    RESPIRATORY:  Per HPI    GASTROINTESTINAL:  No nausea, vomiting or diarrhea.    GENITOURINARY:  No dysuria, frequency or urgency.    MUSCULOSKELETAL:  No joint pains    SKIN:  No new lesions.    NEUROLOGIC:  No paresthesias, fasciculations, seizures or weakness.    PSYCHIATRIC:  No disorder of thought or mood.    ENDOCRINE:  No heat or cold intolerance, polyuria or polydipsia.    HEMATOLOGICAL:  No easy bruising or bleeding.     Vital Signs Last 24 Hrs  T(C): 36.4 (28 Oct 2020 08:41), Max: 37.5 (27 Oct 2020 18:06)  T(F): 97.6 (28 Oct 2020 08:41), Max: 99.5 (27 Oct 2020 18:06)  HR: 83 (28 Oct 2020 08:41) (81 - 101)  BP: 120/56 (28 Oct 2020 08:41) (97/55 - 126/68)  BP(mean): --  RR: 22 (28 Oct 2020 08:41) (20 - 56)  SpO2: 96% (28 Oct 2020 08:41) (93% - 99%)    PHYSICAL EXAMINATION:    GENERAL: The patient is a well-developed, well-nourished __WM___in no apparent distress.     HEENT: Head is normocephalic and atraumatic. Extraocular muscles are intact. Mucous membranes are moist.     NECK: Supple.     LUNGS: diminished bs, scattered rhonchi    HEART: Regular rate and rhythm without murmur.    ABDOMEN: Soft, nontender, and nondistended.  No hepatosplenomegaly is noted.    EXTREMITIES: Without any cyanosis, clubbing, rash, lesions or edema.    NEUROLOGIC: Grossly intact.    SKIN: No ulceration or induration present.      LABS:                        10.9   10.83 )-----------( 213      ( 28 Oct 2020 03:13 )             33.9     10-28    140  |  102  |  11.0  ----------------------------<  174<H>  4.5   |  28.0  |  0.53    Ca    9.2      28 Oct 2020 03:13    TPro  6.2<L>  /  Alb  3.7  /  TBili  0.6  /  DBili  x   /  AST  22  /  ALT  23  /  AlkPhos  84  10-28    PT/INR - ( 27 Oct 2020 18:33 )   PT: 12.6 sec;   INR: 1.09 ratio         PTT - ( 27 Oct 2020 18:33 )  PTT:22.5 sec  Urinalysis Basic - ( 27 Oct 2020 23:49 )    Color: Yellow / Appearance: Clear / S.010 / pH: x  Gluc: x / Ketone: Negative  / Bili: Negative / Urobili: Negative mg/dL   Blood: x / Protein: Negative mg/dL / Nitrite: Negative   Leuk Esterase: Negative / RBC: x / WBC x   Sq Epi: x / Non Sq Epi: x / Bacteria: x        CARDIAC MARKERS ( 27 Oct 2020 20:22 )  x     / <0.01 ng/mL / x     / x     / x            Serum Pro-Brain Natriuretic Peptide: 91 pg/mL (10-27-20 @ 20:22)          MICROBIOLOGY:Respiratory Viral Panel with COVID-19 by AGUSTIN (10.27.20 @ 18:45)    Rapid RVP Result: NotDetec: This is NOT your COVID-19 test result. Separate COVID-19 report may  follow.  This Respiratory Panel uses polymerase chain reaction (PCR) to detect for  adenovirus; coronavirus (HKU1, NL63, 229E, OC43); human metapneumovirus  (hMPV); human enterovirus/rhinovirus (Entero/RV); influenza A; influenza  A/H1; influenza A/H3; influenza A/H1-2009; influenza B; parainfluenza  viruses 1, 2, 3, 4; respiratory syncytial virus; Mycoplasma pneumoniae;  and Chlamydophila pneumoniae.        RADIOLOGY & ADDITIONAL STUDIES:< from: Xray Chest 1 View- PORTABLE-Urgent (10.27.20 @ 18:51) >  IMPRESSION: No acute cardiopulmonary disease findings.    < end of copied text >

## 2020-10-28 NOTE — DISCHARGE NOTE NURSING/CASE MANAGEMENT/SOCIAL WORK - NSDCFUADDAPPT_GEN_ALL_CORE_FT
You have an appt w/ your pulmonologist Dr Fuller ([phone]) on 11/11/20 at 10:00. Please make every attempt to keep this apt.    You have stated that you don't need assistance affording your medications.

## 2020-10-28 NOTE — DISCHARGE NOTE PROVIDER - NSDCFUSCHEDAPPT_GEN_ALL_CORE_FT
JENNIFER MOORE ; 11/11/2020 ; NPP PulmMed [address] [facility]  JENNIFER MOORE ; 01/14/2021 ; NPP Cardiology [address]

## 2020-10-28 NOTE — PROGRESS NOTE ADULT - SUBJECTIVE AND OBJECTIVE BOX
JENNIFER MOORE    [id number]    75y      Male    Patient is a 75y old  Male who presents with a chief complaint of     INTERVAL HPI/OVERNIGHT EVENTS:    Patient is feeling some what better, breathing is improving, has intermittent cough, has no more fever, chills, chest pain  REVIEW OF SYSTEMS:    CONSTITUTIONAL: No fever, some fatigue  RESPIRATORY: intermittent cough, improving shortness of breath  CARDIOVASCULAR: No chest pain, palpitations  GASTROINTESTINAL: No abdominal, No nausea, vomiting  NEUROLOGICAL: No headaches,  loss of strength.  MISCELLANEOUS: No joint swelling or pain       Vital Signs Last 24 Hrs  T(C): 36.4 (28 Oct 2020 08:41), Max: 37.5 (27 Oct 2020 18:06)  T(F): 97.6 (28 Oct 2020 08:41), Max: 99.5 (27 Oct 2020 18:06)  HR: 83 (28 Oct 2020 08:41) (81 - 101)  BP: 120/56 (28 Oct 2020 08:41) (97/55 - 126/68)  RR: 22 (28 Oct 2020 08:41) (20 - 56)  SpO2: 96% (28 Oct 2020 08:41) (93% - 99%)    PHYSICAL EXAM:    GENERAL: Elderly male looking comfortable   HEENT: PERRL, +EOMI  NECK: soft, Supple, No JVD,   CHEST/LUNG: Decrease air entry bilaterally; No wheezing  HEART: S1S2+, Regular rate and rhythm; No murmurs  ABDOMEN: Soft, Nontender, Nondistended; Bowel sounds present  EXTREMITIES:  1+ Peripheral Pulses, No edema  SKIN: No rashes or lesions  NEURO: AAOX3, no focal deficits, no motor r sensory loss  PSYCH: normal mood      LABS:                        10.9   10.83 )-----------( 213      ( 28 Oct 2020 03:13 )             33.9     10-28    140  |  102  |  11.0  ----------------------------<  174<H>  4.5   |  28.0  |  0.53    Ca    9.2      28 Oct 2020 03:13    TPro  6.2<L>  /  Alb  3.7  /  TBili  0.6  /  DBili  x   /  AST  22  /  ALT  23  /  AlkPhos  84  10-28    PT/INR - ( 27 Oct 2020 18:33 )   PT: 12.6 sec;   INR: 1.09 ratio         PTT - ( 27 Oct 2020 18:33 )  PTT:22.5 sec  Urinalysis Basic - ( 27 Oct 2020 23:49 )    Color: Yellow / Appearance: Clear / S.010 / pH: x  Gluc: x / Ketone: Negative  / Bili: Negative / Urobili: Negative mg/dL   Blood: x / Protein: Negative mg/dL / Nitrite: Negative   Leuk Esterase: Negative / RBC: x / WBC x   Sq Epi: x / Non Sq Epi: x / Bacteria: x          I&O's Summary      MEDICATIONS  (STANDING):  ALBUTerol    90 MICROgram(s) HFA Inhaler 1 Puff(s) Inhalation every 15 minutes  aspirin  chewable 81 milliGRAM(s) Oral daily  atorvastatin 40 milliGRAM(s) Oral at bedtime  azithromycin  IVPB 500 milliGRAM(s) IV Intermittent every 24 hours  cefTRIAXone   IVPB 1000 milliGRAM(s) IV Intermittent every 24 hours  cholecalciferol 5000 Unit(s) Oral daily  enoxaparin Injectable 40 milliGRAM(s) SubCutaneous daily  gabapentin 400 milliGRAM(s) Oral three times a day  metoprolol tartrate 25 milliGRAM(s) Oral two times a day  midodrine. 5 milliGRAM(s) Oral <User Schedule>  tamsulosin 0.4 milliGRAM(s) Oral at bedtime    MEDICATIONS  (PRN):  acetaminophen   Tablet .. 650 milliGRAM(s) Oral every 6 hours PRN Temp greater or equal to 38C (100.4F), Mild Pain (1 - 3)  oxycodone    5 mG/acetaminophen 325 mG 2 Tablet(s) Oral every 6 hours PRN Moderate Pain (4 - 6)

## 2020-10-28 NOTE — CONSULT NOTE ADULT - SUBJECTIVE AND OBJECTIVE BOX
CHIEF COMPLAINT: cough    HPI: Patient is a  75y Male with CAd s/p CABG 3/2020, HTN, HLD, here with cough and fever. Started past couple days. Fever up to 103, cough several times per day with minimal sputum. HAs had some discomfort in chest at surgical site since CABG, no other chest pains. Did have episode of acute SOB that improved but otherwise states breathing ok. No PND/orthopnea/palps/sycnope/edema. Patient was seen at urgent care, hypoxic and rapid COVID negative. Had COVID PCR here negative now as well.     PAST MEDICAL & SURGICAL HISTORY:  HLD (hyperlipidemia)    HTN (hypertension)    Lumbosacral disc disease  has nerve stimulator    Arthritis    CAD in native artery  RCA 3 YAYA 1 in 2002 and 2 in 2019    S/P cataract surgery  b/l eyes 2016    H/O heart artery stent  RCA        MEDICATIONS:  MEDICATIONS  (STANDING):  ALBUTerol    90 MICROgram(s) HFA Inhaler 1 Puff(s) Inhalation every 15 minutes  aspirin  chewable 81 milliGRAM(s) Oral daily  atorvastatin 40 milliGRAM(s) Oral at bedtime  azithromycin  IVPB 500 milliGRAM(s) IV Intermittent every 24 hours  cefTRIAXone   IVPB 1000 milliGRAM(s) IV Intermittent every 24 hours  cholecalciferol 5000 Unit(s) Oral daily  enoxaparin Injectable 40 milliGRAM(s) SubCutaneous daily  gabapentin 400 milliGRAM(s) Oral three times a day  metoprolol tartrate 25 milliGRAM(s) Oral two times a day  midodrine. 5 milliGRAM(s) Oral <User Schedule>  tamsulosin 0.4 milliGRAM(s) Oral at bedtime    MEDICATIONS  (PRN):  acetaminophen   Tablet .. 650 milliGRAM(s) Oral every 6 hours PRN Temp greater or equal to 38C (100.4F), Mild Pain (1 - 3)  oxycodone    5 mG/acetaminophen 325 mG 2 Tablet(s) Oral every 6 hours PRN Moderate Pain (4 - 6)    acetaminophen   Tablet .. 650 milliGRAM(s) Oral every 6 hours PRN  ALBUTerol    90 MICROgram(s) HFA Inhaler 1 Puff(s) Inhalation every 15 minutes  aspirin  chewable 81 milliGRAM(s) Oral daily  atorvastatin 40 milliGRAM(s) Oral at bedtime  azithromycin  IVPB 500 milliGRAM(s) IV Intermittent every 24 hours  cefTRIAXone   IVPB 1000 milliGRAM(s) IV Intermittent every 24 hours  cholecalciferol 5000 Unit(s) Oral daily  enoxaparin Injectable 40 milliGRAM(s) SubCutaneous daily  gabapentin 400 milliGRAM(s) Oral three times a day  metoprolol tartrate 25 milliGRAM(s) Oral two times a day  midodrine. 5 milliGRAM(s) Oral <User Schedule>  oxycodone    5 mG/acetaminophen 325 mG 2 Tablet(s) Oral every 6 hours PRN  tamsulosin 0.4 milliGRAM(s) Oral at bedtime      FAMILY HISTORY:  FAMILY HISTORY:  FH: colon cancer        SOCIAL HISTORY: no EtOH, drugs or tobacco    ROS: GI negative,  All others negative    PHYSCIAL EXAM:  Vital Signs Last 24 Hrs  T(C): 36.4 (28 Oct 2020 08:41), Max: 37.5 (27 Oct 2020 18:06)  T(F): 97.6 (28 Oct 2020 08:41), Max: 99.5 (27 Oct 2020 18:06)  HR: 83 (28 Oct 2020 08:41) (81 - 101)  BP: 120/56 (28 Oct 2020 08:41) (97/55 - 126/68)  BP(mean): --  RR: 22 (28 Oct 2020 08:41) (20 - 56)  SpO2: 96% (28 Oct 2020 08:41) (93% - 99%)  I&O's Summary    GEN: NAD  HEENT: MMM, sclera anicteric  RESP: bronchial breath sounds  CVS: S1S2, RRR, no JVD, no M/R/G  GI: Soft, NT, ND, BS+  EXT: no C/C/E  NEURO: AAOX3  PSYCH: Normal affect    ECG: SR, LAE, RBBB    LABS:                        10.9   10.83 )-----------( 213      ( 28 Oct 2020 03:13 )             33.9     10-28    140  |  102  |  11.0  ----------------------------<  174<H>  4.5   |  28.0  |  0.53    Ca    9.2      28 Oct 2020 03:13    TPro  6.2<L>  /  Alb  3.7  /  TBili  0.6  /  DBili  x   /  AST  22  /  ALT  23  /  AlkPhos  84  10-28    CARDIAC MARKERS ( 27 Oct 2020 20:22 )  x     / <0.01 ng/mL / x     / x     / x          PT/INR - ( 27 Oct 2020 18:33 )   PT: 12.6 sec;   INR: 1.09 ratio         PTT - ( 27 Oct 2020 18:33 )  PTT:22.5 sec      RADIOLOGY & ADDITIONAL STUDIES:    Assessment:    Patient is a  75y Male with a PMH of CAD s/P CABG x 3 3/2020, HTN, HLD, COPD, post-op PAF without recurrence here wiht PNA. COVID negative (rapid and PCR). NO signs CHF or active ischemia. PAtient in SR. Had post-op AF and no recurrence, has remained on ASA only.     Plan:  1. Antibiotics for PNA  2. Continue ASA/statin and metoprolol  3. No indication for A/C  4. Supportive care  5. Call if questions, thanks!      Hakeem Saxena MD

## 2020-10-28 NOTE — DISCHARGE NOTE PROVIDER - NSDCFUADDAPPT_GEN_ALL_CORE_FT
You have an appt w/ your pulmonologist Dr Fuller ([phone]) on 11/11/20 at 10:00. Please make every attempt to keep this apt.    You have stated that you don't need assistance affording your medications.

## 2020-10-29 VITALS
TEMPERATURE: 99 F | HEART RATE: 80 BPM | SYSTOLIC BLOOD PRESSURE: 122 MMHG | OXYGEN SATURATION: 97 % | RESPIRATION RATE: 20 BRPM | DIASTOLIC BLOOD PRESSURE: 70 MMHG

## 2020-10-29 LAB
CULTURE RESULTS: NO GROWTH — SIGNIFICANT CHANGE UP
SPECIMEN SOURCE: SIGNIFICANT CHANGE UP

## 2020-10-29 PROCEDURE — 93971 EXTREMITY STUDY: CPT

## 2020-10-29 PROCEDURE — 82962 GLUCOSE BLOOD TEST: CPT

## 2020-10-29 PROCEDURE — 71045 X-RAY EXAM CHEST 1 VIEW: CPT

## 2020-10-29 PROCEDURE — 99285 EMERGENCY DEPT VISIT HI MDM: CPT | Mod: 25

## 2020-10-29 PROCEDURE — 87040 BLOOD CULTURE FOR BACTERIA: CPT

## 2020-10-29 PROCEDURE — 85610 PROTHROMBIN TIME: CPT

## 2020-10-29 PROCEDURE — 96365 THER/PROPH/DIAG IV INF INIT: CPT

## 2020-10-29 PROCEDURE — 86803 HEPATITIS C AB TEST: CPT

## 2020-10-29 PROCEDURE — 80053 COMPREHEN METABOLIC PANEL: CPT

## 2020-10-29 PROCEDURE — U0003: CPT

## 2020-10-29 PROCEDURE — 87086 URINE CULTURE/COLONY COUNT: CPT

## 2020-10-29 PROCEDURE — 81003 URINALYSIS AUTO W/O SCOPE: CPT

## 2020-10-29 PROCEDURE — 85027 COMPLETE CBC AUTOMATED: CPT

## 2020-10-29 PROCEDURE — 93005 ELECTROCARDIOGRAM TRACING: CPT

## 2020-10-29 PROCEDURE — 85730 THROMBOPLASTIN TIME PARTIAL: CPT

## 2020-10-29 PROCEDURE — 86769 SARS-COV-2 COVID-19 ANTIBODY: CPT

## 2020-10-29 PROCEDURE — 84145 PROCALCITONIN (PCT): CPT

## 2020-10-29 PROCEDURE — 84484 ASSAY OF TROPONIN QUANT: CPT

## 2020-10-29 PROCEDURE — 36415 COLL VENOUS BLD VENIPUNCTURE: CPT

## 2020-10-29 PROCEDURE — 0225U NFCT DS DNA&RNA 21 SARSCOV2: CPT

## 2020-10-29 PROCEDURE — 85025 COMPLETE CBC W/AUTO DIFF WBC: CPT

## 2020-10-29 PROCEDURE — 93971 EXTREMITY STUDY: CPT | Mod: 26,LT

## 2020-10-29 PROCEDURE — 94640 AIRWAY INHALATION TREATMENT: CPT

## 2020-10-29 PROCEDURE — 83880 ASSAY OF NATRIURETIC PEPTIDE: CPT

## 2020-10-29 PROCEDURE — 96375 TX/PRO/DX INJ NEW DRUG ADDON: CPT

## 2020-10-29 PROCEDURE — 80048 BASIC METABOLIC PNL TOTAL CA: CPT

## 2020-10-29 PROCEDURE — 83605 ASSAY OF LACTIC ACID: CPT

## 2020-10-29 PROCEDURE — 96361 HYDRATE IV INFUSION ADD-ON: CPT

## 2020-10-29 PROCEDURE — 99232 SBSQ HOSP IP/OBS MODERATE 35: CPT

## 2020-10-29 PROCEDURE — 99239 HOSP IP/OBS DSCHRG MGMT >30: CPT

## 2020-10-29 RX ORDER — CHOLECALCIFEROL (VITAMIN D3) 125 MCG
1 CAPSULE ORAL
Qty: 0 | Refills: 0 | DISCHARGE

## 2020-10-29 RX ORDER — MIDODRINE HYDROCHLORIDE 2.5 MG/1
1 TABLET ORAL
Qty: 0 | Refills: 0 | DISCHARGE
Start: 2020-10-29

## 2020-10-29 RX ORDER — CHOLECALCIFEROL (VITAMIN D3) 125 MCG
5000 CAPSULE ORAL
Qty: 0 | Refills: 0 | DISCHARGE
Start: 2020-10-29

## 2020-10-29 RX ORDER — UMECLIDINIUM BROMIDE AND VILANTEROL TRIFENATATE 62.5; 25 UG/1; UG/1
1 POWDER RESPIRATORY (INHALATION)
Qty: 0 | Refills: 0 | DISCHARGE

## 2020-10-29 RX ORDER — ACETAMINOPHEN 500 MG
2 TABLET ORAL
Qty: 0 | Refills: 0 | DISCHARGE
Start: 2020-10-29

## 2020-10-29 RX ORDER — UMECLIDINIUM BROMIDE AND VILANTEROL TRIFENATATE 62.5; 25 UG/1; UG/1
1 POWDER RESPIRATORY (INHALATION)
Qty: 1 | Refills: 0
Start: 2020-10-29 | End: 2020-11-27

## 2020-10-29 RX ORDER — ALBUTEROL 90 UG/1
1 AEROSOL, METERED ORAL
Qty: 1 | Refills: 0
Start: 2020-10-29 | End: 2020-11-27

## 2020-10-29 RX ORDER — AZITHROMYCIN 500 MG/1
1 TABLET, FILM COATED ORAL
Qty: 2 | Refills: 0
Start: 2020-10-29 | End: 2020-10-30

## 2020-10-29 RX ORDER — MIDODRINE HYDROCHLORIDE 2.5 MG/1
1 TABLET ORAL
Qty: 0 | Refills: 0 | DISCHARGE

## 2020-10-29 RX ORDER — METOPROLOL TARTRATE 50 MG
1 TABLET ORAL
Qty: 0 | Refills: 0 | DISCHARGE
Start: 2020-10-29

## 2020-10-29 RX ADMIN — Medication 650 MILLIGRAM(S): at 05:50

## 2020-10-29 RX ADMIN — MIDODRINE HYDROCHLORIDE 5 MILLIGRAM(S): 2.5 TABLET ORAL at 08:38

## 2020-10-29 RX ADMIN — Medication 650 MILLIGRAM(S): at 05:05

## 2020-10-29 RX ADMIN — OXYCODONE AND ACETAMINOPHEN 2 TABLET(S): 5; 325 TABLET ORAL at 03:38

## 2020-10-29 RX ADMIN — ENOXAPARIN SODIUM 40 MILLIGRAM(S): 100 INJECTION SUBCUTANEOUS at 08:38

## 2020-10-29 RX ADMIN — Medication 5000 UNIT(S): at 08:39

## 2020-10-29 RX ADMIN — OXYCODONE AND ACETAMINOPHEN 2 TABLET(S): 5; 325 TABLET ORAL at 04:25

## 2020-10-29 RX ADMIN — Medication 25 MILLIGRAM(S): at 05:01

## 2020-10-29 RX ADMIN — GABAPENTIN 400 MILLIGRAM(S): 400 CAPSULE ORAL at 05:01

## 2020-10-29 RX ADMIN — OXYCODONE AND ACETAMINOPHEN 2 TABLET(S): 5; 325 TABLET ORAL at 11:00

## 2020-10-29 RX ADMIN — Medication 81 MILLIGRAM(S): at 08:38

## 2020-10-29 NOTE — PROGRESS NOTE ADULT - ASSESSMENT
Imp-- possible pneumonia--cxr unrevealing.  Temp and WBC down with abx.  COPD stable  Plan--OK for d/c on po abx  f/u our office.

## 2020-10-29 NOTE — PROGRESS NOTE ADULT - SUBJECTIVE AND OBJECTIVE BOX
PULMONARY PROGRESS NOTE      [id number]    Patient is a 75y old  Male who presents with a chief complaint of     INTERVAL HPI/OVERNIGHT EVENTS:Feeling better, afeb.  No SOB or cough    MEDICATIONS  (STANDING):  ALBUTerol    90 MICROgram(s) HFA Inhaler 1 Puff(s) Inhalation every 15 minutes  aspirin  chewable 81 milliGRAM(s) Oral daily  atorvastatin 40 milliGRAM(s) Oral at bedtime  azithromycin  IVPB 500 milliGRAM(s) IV Intermittent every 24 hours  cefTRIAXone   IVPB 1000 milliGRAM(s) IV Intermittent every 24 hours  cholecalciferol 5000 Unit(s) Oral daily  enoxaparin Injectable 40 milliGRAM(s) SubCutaneous daily  gabapentin 400 milliGRAM(s) Oral three times a day  metoprolol tartrate 25 milliGRAM(s) Oral two times a day  midodrine. 5 milliGRAM(s) Oral <User Schedule>  tamsulosin 0.4 milliGRAM(s) Oral at bedtime      MEDICATIONS  (PRN):  acetaminophen   Tablet .. 650 milliGRAM(s) Oral every 6 hours PRN Temp greater or equal to 38C (100.4F), Mild Pain (1 - 3)  oxycodone    5 mG/acetaminophen 325 mG 2 Tablet(s) Oral every 6 hours PRN Moderate Pain (4 - 6)      Allergies    codeine (Urticaria)  ibuprofen (Anaphylaxis)    Intolerances        PAST MEDICAL & SURGICAL HISTORY:  HLD (hyperlipidemia)    HTN (hypertension)    Lumbosacral disc disease  has nerve stimulator    Arthritis    CAD in native artery  RCA 3 YAYA 1 in  and 2 in     S/P cataract surgery  b/l eyes     H/O heart artery stent  RCA        SOCIAL HISTORY  Smoking History:   former    REVIEW OF SYSTEMS:    CONSTITUTIONAL:  No distress    HEENT:  Eyes:  No diplopia or blurred vision. ENT:  No earache, sore throat or runny nose.    CARDIOVASCULAR:  No pressure, squeezing, tightness, heaviness or aching about the chest; no palpitations.    RESPIRATORY:  per hpi    GASTROINTESTINAL:  No nausea, vomiting or diarrhea.    GENITOURINARY:  No dysuria, frequency or urgency.    MUSCULOSKELETAL:  No joint pain    SKIN:  No new lesions.    NEUROLOGIC:  No paresthesias, fasciculations, seizures or weakness.    PSYCHIATRIC:  No disorder of thought or mood.    ENDOCRINE:  No heat or cold intolerance, polyuria or polydipsia.    HEMATOLOGICAL:  No easy bruising or bleeding.     Vital Signs Last 24 Hrs  T(C): 36.5 (29 Oct 2020 07:40), Max: 36.7 (28 Oct 2020 15:18)  T(F): 97.7 (29 Oct 2020 07:40), Max: 98 (28 Oct 2020 15:18)  HR: 79 (29 Oct 2020 07:40) (75 - 82)  BP: 112/63 (29 Oct 2020 07:40) (112/63 - 132/74)  BP(mean): --  RR: 23 (29 Oct 2020 07:40) (20 - 23)  SpO2: 95% (29 Oct 2020 07:40) (93% - 97%)    PHYSICAL EXAMINATION:    GENERAL: The patient is awake and alert in no apparent distress.     HEENT: Head is normocephalic and atraumatic. Extraocular muscles are intact. Mucous membranes are moist.    NECK: Supple.    LUNGS:diminished bs, few crackles Rt base    HEART: Regular rate and rhythm without murmur.    ABDOMEN: Soft, nontender, and nondistended.      EXTREMITIES: Without any cyanosis, clubbing, rash, lesions or edema.    NEUROLOGIC: Grossly intact.    SKIN: No ulceration or induration present.      LABS:                        10.9   10.83 )-----------( 213      ( 28 Oct 2020 03:13 )             33.9     10-28    140  |  102  |  11.0  ----------------------------<  174<H>  4.5   |  28.0  |  0.53    Ca    9.2      28 Oct 2020 03:13    TPro  6.2<L>  /  Alb  3.7  /  TBili  0.6  /  DBili  x   /  AST  22  /  ALT  23  /  AlkPhos  84  10-28    PT/INR - ( 27 Oct 2020 18:33 )   PT: 12.6 sec;   INR: 1.09 ratio         PTT - ( 27 Oct 2020 18:33 )  PTT:22.5 sec  Urinalysis Basic - ( 27 Oct 2020 23:49 )    Color: Yellow / Appearance: Clear / S.010 / pH: x  Gluc: x / Ketone: Negative  / Bili: Negative / Urobili: Negative mg/dL   Blood: x / Protein: Negative mg/dL / Nitrite: Negative   Leuk Esterase: Negative / RBC: x / WBC x   Sq Epi: x / Non Sq Epi: x / Bacteria: x        CARDIAC MARKERS ( 27 Oct 2020 20:22 )  x     / <0.01 ng/mL / x     / x     / x            Serum Pro-Brain Natriuretic Peptide: 91 pg/mL (10-27-20 @ 20:22)      Procalcitonin, Serum: 0.27 ng/mL (10-28-20 @ 17:09)      MICROBIOLOGY:    RADIOLOGY & ADDITIONAL STUDIES:

## 2020-11-01 LAB
CULTURE RESULTS: SIGNIFICANT CHANGE UP
CULTURE RESULTS: SIGNIFICANT CHANGE UP
SPECIMEN SOURCE: SIGNIFICANT CHANGE UP
SPECIMEN SOURCE: SIGNIFICANT CHANGE UP

## 2020-11-11 ENCOUNTER — APPOINTMENT (OUTPATIENT)
Dept: PULMONOLOGY | Facility: CLINIC | Age: 75
End: 2020-11-11
Payer: MEDICARE

## 2020-11-11 VITALS
DIASTOLIC BLOOD PRESSURE: 72 MMHG | HEIGHT: 66 IN | SYSTOLIC BLOOD PRESSURE: 122 MMHG | HEART RATE: 84 BPM | WEIGHT: 156 LBS | OXYGEN SATURATION: 95 % | BODY MASS INDEX: 25.07 KG/M2

## 2020-11-11 PROCEDURE — 99495 TRANSJ CARE MGMT MOD F2F 14D: CPT

## 2020-11-11 NOTE — QUALITY MEASURES
[Patient unable to perform] : patient is unable to perform spirometry [Patient has reason to be excluded] : patient has reason to be excluded from screening for tobacco use (eg: limited life expectancy, other medical reason)

## 2020-11-11 NOTE — REASON FOR VISIT
[Follow-Up - From Hospitalization] : a follow-up visit after a recent hospitalization [COPD] : COPD [TextBox_44] : pneumonia

## 2020-11-11 NOTE — DISCUSSION/SUMMARY
[COPD] : chronic obstructive pulmonary disease [Improving] : improving [None] : There are no changes in medication management [de-identified] : s/p hospitalization [de-identified] : CBC, CMP [FreeTextEntry1] : Patient's hospital record, as well as PACs, was reviewed

## 2020-11-11 NOTE — HISTORY OF PRESENT ILLNESS
[TextBox_4] : 75-year-old male with a history of hypertension, hyperlipidemia, stage II, COPD, atrial fibrillation, seen today status post hospitalization at [facility] from 10/27-10/29/20. Patient was treated with course of azithromycin and ceftriaxone as well as IV fluids with negative cultures and resolving leukocytosis. He did not suffer an exacerbation during this episode. He has completed a course of Augmentin and is seen today in followup. [ESS] : 0

## 2020-11-11 NOTE — CONSULT LETTER
[Dear  ___] : Dear  [unfilled], [Consult Letter:] : I had the pleasure of evaluating your patient, [unfilled]. [Please see my note below.] : Please see my note below. [Consult Closing:] : Thank you very much for allowing me to participate in the care of this patient.  If you have any questions, please do not hesitate to contact me. [Sincerely,] : Sincerely, [FreeTextEntry3] : Epi Fuller MD FCCP\par Pulmonary/Critical Care/Sleep Medicine\par Department of Internal Medicine\par \par [facility] [employer]\par

## 2020-11-12 LAB
ALBUMIN SERPL ELPH-MCNC: 4.3 G/DL
ALP BLD-CCNC: 115 U/L
ALT SERPL-CCNC: 48 U/L
ANION GAP SERPL CALC-SCNC: 14 MMOL/L
AST SERPL-CCNC: 24 U/L
BASOPHILS # BLD AUTO: 0.08 K/UL
BASOPHILS NFR BLD AUTO: 1 %
BILIRUB SERPL-MCNC: 0.3 MG/DL
BUN SERPL-MCNC: 8 MG/DL
CALCIUM SERPL-MCNC: 9.6 MG/DL
CHLORIDE SERPL-SCNC: 103 MMOL/L
CO2 SERPL-SCNC: 28 MMOL/L
CREAT SERPL-MCNC: 0.61 MG/DL
EOSINOPHIL # BLD AUTO: 0.33 K/UL
EOSINOPHIL NFR BLD AUTO: 4.3 %
GLUCOSE SERPL-MCNC: 123 MG/DL
HCT VFR BLD CALC: 37.4 %
HGB BLD-MCNC: 11.8 G/DL
IMM GRANULOCYTES NFR BLD AUTO: 0.3 %
LYMPHOCYTES # BLD AUTO: 1.62 K/UL
LYMPHOCYTES NFR BLD AUTO: 21.1 %
MAN DIFF?: NORMAL
MCHC RBC-ENTMCNC: 30.8 PG
MCHC RBC-ENTMCNC: 31.6 GM/DL
MCV RBC AUTO: 97.7 FL
MONOCYTES # BLD AUTO: 0.68 K/UL
MONOCYTES NFR BLD AUTO: 8.9 %
NEUTROPHILS # BLD AUTO: 4.94 K/UL
NEUTROPHILS NFR BLD AUTO: 64.4 %
PLATELET # BLD AUTO: 319 K/UL
POTASSIUM SERPL-SCNC: 5.4 MMOL/L
PROT SERPL-MCNC: 6.4 G/DL
RBC # BLD: 3.83 M/UL
RBC # FLD: 14.3 %
SODIUM SERPL-SCNC: 145 MMOL/L
WBC # FLD AUTO: 7.67 K/UL

## 2020-11-25 ENCOUNTER — APPOINTMENT (OUTPATIENT)
Dept: RADIOLOGY | Facility: CLINIC | Age: 75
End: 2020-11-25
Payer: MEDICARE

## 2020-11-25 PROCEDURE — 71046 X-RAY EXAM CHEST 2 VIEWS: CPT

## 2020-12-15 DIAGNOSIS — Z01.818 ENCOUNTER FOR OTHER PREPROCEDURAL EXAMINATION: ICD-10-CM

## 2020-12-17 ENCOUNTER — INPATIENT (INPATIENT)
Facility: HOSPITAL | Age: 75
LOS: 4 days | Discharge: HOME | DRG: 205 | End: 2020-12-22
Attending: FAMILY MEDICINE | Admitting: HOSPITALIST
Payer: MEDICARE

## 2020-12-17 VITALS
TEMPERATURE: 99 F | HEART RATE: 116 BPM | HEIGHT: 68 IN | SYSTOLIC BLOOD PRESSURE: 126 MMHG | OXYGEN SATURATION: 88 % | DIASTOLIC BLOOD PRESSURE: 75 MMHG | WEIGHT: 156.09 LBS | RESPIRATION RATE: 20 BRPM

## 2020-12-17 DIAGNOSIS — Z95.1 PRESENCE OF AORTOCORONARY BYPASS GRAFT: Chronic | ICD-10-CM

## 2020-12-17 DIAGNOSIS — J96.01 ACUTE RESPIRATORY FAILURE WITH HYPOXIA: ICD-10-CM

## 2020-12-17 DIAGNOSIS — Z98.49 CATARACT EXTRACTION STATUS, UNSPECIFIED EYE: Chronic | ICD-10-CM

## 2020-12-17 DIAGNOSIS — Z95.5 PRESENCE OF CORONARY ANGIOPLASTY IMPLANT AND GRAFT: Chronic | ICD-10-CM

## 2020-12-17 LAB
ALBUMIN SERPL ELPH-MCNC: 4 G/DL — SIGNIFICANT CHANGE UP (ref 3.3–5.2)
ALP SERPL-CCNC: 91 U/L — SIGNIFICANT CHANGE UP (ref 40–120)
ALT FLD-CCNC: 16 U/L — SIGNIFICANT CHANGE UP
ANION GAP SERPL CALC-SCNC: 12 MMOL/L — SIGNIFICANT CHANGE UP (ref 5–17)
APTT BLD: 31.8 SEC — SIGNIFICANT CHANGE UP (ref 27.5–35.5)
AST SERPL-CCNC: 21 U/L — SIGNIFICANT CHANGE UP
BASE EXCESS BLDV CALC-SCNC: 4.7 MMOL/L — HIGH (ref -2–2)
BASOPHILS # BLD AUTO: 0.04 K/UL — SIGNIFICANT CHANGE UP (ref 0–0.2)
BASOPHILS NFR BLD AUTO: 0.3 % — SIGNIFICANT CHANGE UP (ref 0–2)
BILIRUB SERPL-MCNC: 0.4 MG/DL — SIGNIFICANT CHANGE UP (ref 0.4–2)
BUN SERPL-MCNC: 13 MG/DL — SIGNIFICANT CHANGE UP (ref 8–20)
CA-I SERPL-SCNC: 1.12 MMOL/L — LOW (ref 1.15–1.33)
CALCIUM SERPL-MCNC: 8.9 MG/DL — SIGNIFICANT CHANGE UP (ref 8.6–10.2)
CHLORIDE BLDV-SCNC: 104 MMOL/L — SIGNIFICANT CHANGE UP (ref 98–107)
CHLORIDE SERPL-SCNC: 101 MMOL/L — SIGNIFICANT CHANGE UP (ref 98–107)
CK SERPL-CCNC: 67 U/L — SIGNIFICANT CHANGE UP (ref 30–200)
CO2 SERPL-SCNC: 25 MMOL/L — SIGNIFICANT CHANGE UP (ref 22–29)
CREAT SERPL-MCNC: 0.48 MG/DL — LOW (ref 0.5–1.3)
CRP SERPL-MCNC: 1.75 MG/DL — HIGH (ref 0–0.4)
D DIMER BLD IA.RAPID-MCNC: 206 NG/ML DDU — SIGNIFICANT CHANGE UP
EOSINOPHIL # BLD AUTO: 0.27 K/UL — SIGNIFICANT CHANGE UP (ref 0–0.5)
EOSINOPHIL NFR BLD AUTO: 2.1 % — SIGNIFICANT CHANGE UP (ref 0–6)
FERRITIN SERPL-MCNC: 212 NG/ML — SIGNIFICANT CHANGE UP (ref 30–400)
GAS PNL BLDV: 141 MMOL/L — SIGNIFICANT CHANGE UP (ref 135–145)
GAS PNL BLDV: SIGNIFICANT CHANGE UP
GAS PNL BLDV: SIGNIFICANT CHANGE UP
GLUCOSE BLDV-MCNC: 102 MG/DL — HIGH (ref 70–99)
GLUCOSE SERPL-MCNC: 104 MG/DL — HIGH (ref 70–99)
HCO3 BLDV-SCNC: 29 MMOL/L — SIGNIFICANT CHANGE UP (ref 21–29)
HCT VFR BLD CALC: 35.2 % — LOW (ref 39–50)
HCT VFR BLDA CALC: 36 — LOW (ref 39–50)
HGB BLD CALC-MCNC: 11.9 G/DL — LOW (ref 13–17)
HGB BLD-MCNC: 11.5 G/DL — LOW (ref 13–17)
IMM GRANULOCYTES NFR BLD AUTO: 0.3 % — SIGNIFICANT CHANGE UP (ref 0–1.5)
INR BLD: 1.1 RATIO — SIGNIFICANT CHANGE UP (ref 0.88–1.16)
LACTATE BLDV-MCNC: 1.2 MMOL/L — SIGNIFICANT CHANGE UP (ref 0.5–2)
LDH SERPL L TO P-CCNC: 231 U/L — HIGH (ref 98–192)
LYMPHOCYTES # BLD AUTO: 1.09 K/UL — SIGNIFICANT CHANGE UP (ref 1–3.3)
LYMPHOCYTES # BLD AUTO: 8.4 % — LOW (ref 13–44)
MCHC RBC-ENTMCNC: 30.7 PG — SIGNIFICANT CHANGE UP (ref 27–34)
MCHC RBC-ENTMCNC: 32.7 GM/DL — SIGNIFICANT CHANGE UP (ref 32–36)
MCV RBC AUTO: 93.9 FL — SIGNIFICANT CHANGE UP (ref 80–100)
MONOCYTES # BLD AUTO: 0.75 K/UL — SIGNIFICANT CHANGE UP (ref 0–0.9)
MONOCYTES NFR BLD AUTO: 5.8 % — SIGNIFICANT CHANGE UP (ref 2–14)
NEUTROPHILS # BLD AUTO: 10.76 K/UL — HIGH (ref 1.8–7.4)
NEUTROPHILS NFR BLD AUTO: 83.1 % — HIGH (ref 43–77)
NT-PROBNP SERPL-SCNC: 471 PG/ML — HIGH (ref 0–300)
OTHER CELLS CSF MANUAL: 16 ML/DL — LOW (ref 18–22)
PCO2 BLDV: 49 MMHG — SIGNIFICANT CHANGE UP (ref 35–50)
PH BLDV: 7.4 — SIGNIFICANT CHANGE UP (ref 7.32–7.43)
PLATELET # BLD AUTO: 248 K/UL — SIGNIFICANT CHANGE UP (ref 150–400)
PO2 BLDV: 78 MMHG — HIGH (ref 25–45)
POTASSIUM BLDV-SCNC: 4.3 MMOL/L — SIGNIFICANT CHANGE UP (ref 3.4–4.5)
POTASSIUM SERPL-MCNC: 4.2 MMOL/L — SIGNIFICANT CHANGE UP (ref 3.5–5.3)
POTASSIUM SERPL-SCNC: 4.2 MMOL/L — SIGNIFICANT CHANGE UP (ref 3.5–5.3)
PROCALCITONIN SERPL-MCNC: 0.1 NG/ML — SIGNIFICANT CHANGE UP (ref 0.02–0.1)
PROT SERPL-MCNC: 6.4 G/DL — LOW (ref 6.6–8.7)
PROTHROM AB SERPL-ACNC: 12.7 SEC — SIGNIFICANT CHANGE UP (ref 10.6–13.6)
RAPID RVP RESULT: SIGNIFICANT CHANGE UP
RBC # BLD: 3.75 M/UL — LOW (ref 4.2–5.8)
RBC # FLD: 13.9 % — SIGNIFICANT CHANGE UP (ref 10.3–14.5)
SAO2 % BLDV: 96 % — SIGNIFICANT CHANGE UP
SARS-COV-2 RNA SPEC QL NAA+PROBE: SIGNIFICANT CHANGE UP
SODIUM SERPL-SCNC: 138 MMOL/L — SIGNIFICANT CHANGE UP (ref 135–145)
TROPONIN T SERPL-MCNC: <0.01 NG/ML — SIGNIFICANT CHANGE UP (ref 0–0.06)
WBC # BLD: 12.95 K/UL — HIGH (ref 3.8–10.5)
WBC # FLD AUTO: 12.95 K/UL — HIGH (ref 3.8–10.5)

## 2020-12-17 PROCEDURE — 71250 CT THORAX DX C-: CPT | Mod: 26

## 2020-12-17 PROCEDURE — 71046 X-RAY EXAM CHEST 2 VIEWS: CPT | Mod: 26

## 2020-12-17 PROCEDURE — 99285 EMERGENCY DEPT VISIT HI MDM: CPT

## 2020-12-17 PROCEDURE — 93010 ELECTROCARDIOGRAM REPORT: CPT

## 2020-12-17 RX ORDER — ACETAMINOPHEN 500 MG
975 TABLET ORAL ONCE
Refills: 0 | Status: COMPLETED | OUTPATIENT
Start: 2020-12-17 | End: 2020-12-17

## 2020-12-17 RX ORDER — CEFEPIME 1 G/1
2000 INJECTION, POWDER, FOR SOLUTION INTRAMUSCULAR; INTRAVENOUS EVERY 8 HOURS
Refills: 0 | Status: DISCONTINUED | OUTPATIENT
Start: 2020-12-17 | End: 2020-12-22

## 2020-12-17 RX ORDER — METOPROLOL TARTRATE 50 MG
25 TABLET ORAL
Refills: 0 | Status: DISCONTINUED | OUTPATIENT
Start: 2020-12-17 | End: 2020-12-22

## 2020-12-17 RX ORDER — ATORVASTATIN CALCIUM 80 MG/1
40 TABLET, FILM COATED ORAL AT BEDTIME
Refills: 0 | Status: DISCONTINUED | OUTPATIENT
Start: 2020-12-17 | End: 2020-12-22

## 2020-12-17 RX ORDER — TIOTROPIUM BROMIDE 18 UG/1
1 CAPSULE ORAL; RESPIRATORY (INHALATION) DAILY
Refills: 0 | Status: DISCONTINUED | OUTPATIENT
Start: 2020-12-17 | End: 2020-12-17

## 2020-12-17 RX ORDER — OXYCODONE HYDROCHLORIDE 5 MG/1
10 TABLET ORAL ONCE
Refills: 0 | Status: DISCONTINUED | OUTPATIENT
Start: 2020-12-17 | End: 2020-12-17

## 2020-12-17 RX ORDER — SODIUM CHLORIDE 9 MG/ML
2100 INJECTION, SOLUTION INTRAVENOUS ONCE
Refills: 0 | Status: COMPLETED | OUTPATIENT
Start: 2020-12-17 | End: 2020-12-17

## 2020-12-17 RX ORDER — VANCOMYCIN HCL 1 G
1000 VIAL (EA) INTRAVENOUS EVERY 12 HOURS
Refills: 0 | Status: DISCONTINUED | OUTPATIENT
Start: 2020-12-17 | End: 2020-12-18

## 2020-12-17 RX ORDER — ALBUTEROL 90 UG/1
2.5 AEROSOL, METERED ORAL
Refills: 0 | Status: DISCONTINUED | OUTPATIENT
Start: 2020-12-17 | End: 2020-12-22

## 2020-12-17 RX ORDER — ALBUTEROL 90 UG/1
2 AEROSOL, METERED ORAL EVERY 6 HOURS
Refills: 0 | Status: DISCONTINUED | OUTPATIENT
Start: 2020-12-17 | End: 2020-12-17

## 2020-12-17 RX ORDER — TIOTROPIUM BROMIDE 18 UG/1
1 CAPSULE ORAL; RESPIRATORY (INHALATION) DAILY
Refills: 0 | Status: DISCONTINUED | OUTPATIENT
Start: 2020-12-17 | End: 2020-12-21

## 2020-12-17 RX ORDER — CEFEPIME 1 G/1
1000 INJECTION, POWDER, FOR SOLUTION INTRAMUSCULAR; INTRAVENOUS ONCE
Refills: 0 | Status: COMPLETED | OUTPATIENT
Start: 2020-12-17 | End: 2020-12-17

## 2020-12-17 RX ORDER — ACETAMINOPHEN 500 MG
650 TABLET ORAL EVERY 6 HOURS
Refills: 0 | Status: DISCONTINUED | OUTPATIENT
Start: 2020-12-17 | End: 2020-12-22

## 2020-12-17 RX ORDER — MIDODRINE HYDROCHLORIDE 2.5 MG/1
5 TABLET ORAL
Refills: 0 | Status: DISCONTINUED | OUTPATIENT
Start: 2020-12-17 | End: 2020-12-21

## 2020-12-17 RX ORDER — ALBUTEROL 90 UG/1
1 AEROSOL, METERED ORAL EVERY 4 HOURS
Refills: 0 | Status: COMPLETED | OUTPATIENT
Start: 2020-12-17 | End: 2021-11-15

## 2020-12-17 RX ORDER — GABAPENTIN 400 MG/1
400 CAPSULE ORAL ONCE
Refills: 0 | Status: COMPLETED | OUTPATIENT
Start: 2020-12-17 | End: 2020-12-17

## 2020-12-17 RX ORDER — LACTOBACILLUS ACIDOPHILUS 100MM CELL
1 CAPSULE ORAL
Refills: 0 | Status: DISCONTINUED | OUTPATIENT
Start: 2020-12-17 | End: 2020-12-22

## 2020-12-17 RX ORDER — TAMSULOSIN HYDROCHLORIDE 0.4 MG/1
0.4 CAPSULE ORAL AT BEDTIME
Refills: 0 | Status: DISCONTINUED | OUTPATIENT
Start: 2020-12-17 | End: 2020-12-22

## 2020-12-17 RX ORDER — ASPIRIN/CALCIUM CARB/MAGNESIUM 324 MG
81 TABLET ORAL DAILY
Refills: 0 | Status: DISCONTINUED | OUTPATIENT
Start: 2020-12-17 | End: 2020-12-22

## 2020-12-17 RX ORDER — IPRATROPIUM/ALBUTEROL SULFATE 18-103MCG
3 AEROSOL WITH ADAPTER (GRAM) INHALATION EVERY 6 HOURS
Refills: 0 | Status: DISCONTINUED | OUTPATIENT
Start: 2020-12-17 | End: 2020-12-20

## 2020-12-17 RX ORDER — GABAPENTIN 400 MG/1
400 CAPSULE ORAL THREE TIMES A DAY
Refills: 0 | Status: DISCONTINUED | OUTPATIENT
Start: 2020-12-17 | End: 2020-12-22

## 2020-12-17 RX ORDER — AZITHROMYCIN 500 MG/1
500 TABLET, FILM COATED ORAL ONCE
Refills: 0 | Status: COMPLETED | OUTPATIENT
Start: 2020-12-17 | End: 2020-12-17

## 2020-12-17 RX ADMIN — CEFEPIME 100 MILLIGRAM(S): 1 INJECTION, POWDER, FOR SOLUTION INTRAMUSCULAR; INTRAVENOUS at 23:01

## 2020-12-17 RX ADMIN — GABAPENTIN 400 MILLIGRAM(S): 400 CAPSULE ORAL at 23:02

## 2020-12-17 RX ADMIN — SODIUM CHLORIDE 2100 MILLILITER(S): 9 INJECTION, SOLUTION INTRAVENOUS at 23:48

## 2020-12-17 RX ADMIN — Medication 125 MILLIGRAM(S): at 23:01

## 2020-12-17 RX ADMIN — AZITHROMYCIN 255 MILLIGRAM(S): 500 TABLET, FILM COATED ORAL at 21:18

## 2020-12-17 RX ADMIN — Medication 975 MILLIGRAM(S): at 22:52

## 2020-12-17 RX ADMIN — OXYCODONE HYDROCHLORIDE 10 MILLIGRAM(S): 5 TABLET ORAL at 22:52

## 2020-12-17 NOTE — ED PROVIDER NOTE - NS ED ROS FT
Const: + fever, + chills, + generalized weakness  HEENT: Denies blurry vision, sore throat  Neck: Denies neck pain/stiffness  Resp: + coughing, + SOB  Cardiovascular: + CP. Denies palpitations, LE edema  GI: Denies nausea, vomiting, abdominal pain, diarrhea, constipation, blood in stool  : Denies urinary frequency/urgency/dysuria, hematuria  MSK: Denies back pain  Neuro: Denies HA, dizziness, numbness, weakness  Skin: Denies rashes.

## 2020-12-17 NOTE — ED ADULT NURSE NOTE - NSIMPLEMENTINTERV_GEN_ALL_ED
Implemented All Universal Safety Interventions:  [place] to call system. Call bell, personal items and telephone within reach. Instruct patient to call for assistance. Room bathroom lighting operational. Non-slip footwear when patient is off stretcher. Physically safe environment: no spills, clutter or unnecessary equipment. Stretcher in lowest position, wheels locked, appropriate side rails in place.

## 2020-12-17 NOTE — ED PROVIDER NOTE - CLINICAL SUMMARY MEDICAL DECISION MAKING FREE TEXT BOX
76 y/o M presents complaining of shortness of breath, fever, cough since last night, not hypoxic, temp 99.4 here, took tylenol 3 hours PTA. Will evaluate for infectious cause including COVID, treat coarse breath sounds with albuterol/ipratropium, check EKG and trop/BNP due to patient's cardiac history as well and reassess.

## 2020-12-17 NOTE — H&P ADULT - NSHPLABSRESULTS_GEN_ALL_CORE
ekg reviewed by me sinus tach 108, rbbb, septal infarct age undetermined, cardiologist read pending.

## 2020-12-17 NOTE — H&P ADULT - ASSESSMENT
In summary 74 y/o male who was admitted at [facility] on october 28 for pneumonia came back to the ER for past 2 days of increasing cough with phlegm, sob and wheeze in the chest, last night pt. got sig. cough and wheeze, used his inhaler and felt better but not at his baseline so came to the ER, no sick contact, no recent travel. no cp, no abd. pain, no fever. no n/v/d. pt. got iv antibiotics in the ER, solumedrol 125 mg , breathing treatment and feels a little better. pt. reports no swallowing difficulty. pt's covid-19 and rvp negative in the ER. pt. will be admitted for HCAP, COPD exacerbation.     - HCAP, will keep on cefepime and vancomycin as complex pneumonia,  blood, sputum, urine cx to follow, will get ID consult.    - COPD exacerbation, will keep on solumedrol, duoneb, o2 support, pulmonology consult. copd exacerbation likely contributing to pneumonia.    - hypoxia, o2 sat RA 88 %, responding very well to NC 2 L , sat 96 %, likely copd exacerbation and pneumonia related. d-dimer normal.    - Essential ht, continue metoprolol, bp controlled.    - CAD, s/p cabg, native heart without angina, continue b.blk, asa, statin.

## 2020-12-17 NOTE — H&P ADULT - NSICDXPASTSURGICALHX_GEN_ALL_CORE_FT
PAST SURGICAL HISTORY:  H/O heart artery stent RCA    S/P CABG (coronary artery bypass graft)     S/P cataract surgery b/l eyes 2016

## 2020-12-17 NOTE — ED PROVIDER NOTE - PROGRESS NOTE DETAILS
Laureano: SPO2 [id number] on NC. CT with scattered opacities, will treat as bacterial PNA. PT remains hemodynamically stable, HR down to 90s, appears euvolemic on exam.

## 2020-12-17 NOTE — H&P ADULT - NSHPPHYSICALEXAM_GEN_ALL_CORE
General: Well developed male lying in bed not in distress.  HEENT: AT, NC. PERRL. intact EOM. no throat erythema or exudate.   Neck: supple. no JVD.   Chest: mild end. exp. wheeze at bases bilaterally, air entry mildly diminished B/L , no inter costal retractions.  Heart: S1,S2. RRR. no heart murmur. no edema.   Abdomen: soft. non-tender. non-distended. + BS.   Ext: no calf tenderness. ROM of all ext. intact. distal pulses intact.  Neuro: AAO x3. no focal weakness. no speech disorder, CNs ii to xii intact.  m/r/s intact.  Skin: no rash noted, warm and dry, no pallor.   Psych : co-operative, mood ok , no si/hi.

## 2020-12-17 NOTE — ED ADULT NURSE NOTE - OBJECTIVE STATEMENT
pt awake, alert and oriented x3 c/o fever, chills, cough and shortness of breath sinceyesterday.  pt states recently had pneumonia.  pt hypoxic on room air ~88%, denies home o2 use.

## 2020-12-17 NOTE — ED PROVIDER NOTE - OBJECTIVE STATEMENT
74 y/o M with COPD, CAD s/p CABG, HTN, HLD presents complaining of fever, chills, generalized weakness, cough and SOB that have gradually worsened since yesterday. He was admitted here in October with "walking pneumonia" and states that he has never really been the same since, but yesterday his symptoms worsened with fever T max 101.2 last night, improved with tylenol, then recurrence of fever this afternoon to 100 for which he took tylenol 3 hours PTA. He states that he feels shortness of breath with even mild exertion, such as instructing his neighbor how to start his electric , this afternoon. He states his cough is like "when I was a smoker", at times productive of a thick yellow phlegm, but otherwise dry. He feels body aches and fatigue since yesterday. He sees Dr. Beverly for pulmonology and Dr. Fink for cardiology. He notes allergy to ibuprofen (anaphylaxis), but takes ASA daily without incident.

## 2020-12-17 NOTE — ED ADULT TRIAGE NOTE - CHIEF COMPLAINT QUOTE
pt with reports of SOB and fever since 4:30am. states his pulmonologist told him to come in, hx COPD, no home O2, recently told he had walking PNA.

## 2020-12-17 NOTE — ED ADULT TRIAGE NOTE - NS ED NURSE BANDS TYPE
oriented to person, place, and time. She appears well-developed and well-nourished. Restless/agitated   HENT:   Head: Normocephalic and atraumatic. Eyes: Pupils are equal, round, and reactive to light. Conjunctivae and EOM are normal.   Neck: Neck supple. Cardiovascular: Normal rate, regular rhythm, normal heart sounds and intact distal pulses. Exam reveals no gallop and no friction rub. No murmur heard. Pulmonary/Chest: Effort normal and breath sounds normal. No respiratory distress. She has no wheezes. She has no rales. Abdominal: Soft. She exhibits no distension and no mass. There is no tenderness. There is no rebound and no guarding. Musculoskeletal: She exhibits no edema, tenderness or deformity. Lymphadenopathy:     She has no cervical adenopathy. Neurological: She is alert and oriented to person, place, and time. No cranial nerve deficit or sensory deficit. She exhibits normal muscle tone. Skin: Skin is warm and dry. No rash noted. She is not diaphoretic.        DIFFERENTIAL  DIAGNOSIS     PLAN (LABS / IMAGING / EKG):  Orders Placed This Encounter   Procedures    XR CHEST PORTABLE    CBC WITH AUTO DIFFERENTIAL    Immature Platelet Fraction    Basic Metabolic Panel    HCG Qualitative, Serum    PREVIOUS SPECIMEN    Troponin       MEDICATIONS ORDERED:  Orders Placed This Encounter   Medications    cloNIDine (CATAPRES) tablet 0.1 mg    ondansetron (ZOFRAN) tablet 4 mg    hydrOXYzine (ATARAX) tablet 10 mg       DDX: Syncope, dehydration, cardiogenic syncope, withdrawal, malingering, ACS, intoxication    DIAGNOSTIC RESULTS / EMERGENCY DEPARTMENT COURSE / MDM     LABS:  Results for orders placed or performed during the hospital encounter of 04/26/19   CBC WITH AUTO DIFFERENTIAL   Result Value Ref Range    WBC 8.0 3.5 - 11.3 k/uL    RBC 5.04 3.95 - 5.11 m/uL    Hemoglobin 13.8 11.9 - 15.1 g/dL    Hematocrit 43.1 36.3 - 47.1 %    MCV 85.5 82.6 - 102.9 fL    MCH 27.4 25.2 - 33.5 pg    MCHC 32.0 28.4 - 34.8 g/dL    RDW 13.5 11.8 - 14.4 %    Platelets See Reflexed IPF Result 138 - 453 k/uL    MPV NOT REPORTED 8.1 - 13.5 fL    NRBC Automated 0.0 0.0 per 100 WBC    Differential Type NOT REPORTED     WBC Morphology NOT REPORTED     RBC Morphology NOT REPORTED     Platelet Estimate NOT REPORTED     Seg Neutrophils 81 (H) 36 - 65 %    Lymphocytes 16 (L) 24 - 43 %    Monocytes 2 (L) 3 - 12 %    Eosinophils % 1 1 - 4 %    Basophils 0 0 - 2 %    Immature Granulocytes 0 0 %    Segs Absolute 6.43 1.50 - 8.10 k/uL    Absolute Lymph # 1.24 1.10 - 3.70 k/uL    Absolute Mono # 0.19 0.10 - 1.20 k/uL    Absolute Eos # 0.07 0.00 - 0.44 k/uL    Basophils # 0.03 0.00 - 0.20 k/uL    Absolute Immature Granulocyte <0.03 0.00 - 0.30 k/uL   Immature Platelet Fraction   Result Value Ref Range    Platelet, Fluorescence Platelet clumps present, count appears adequate. 138 - 453 k/uL   Basic Metabolic Panel   Result Value Ref Range    Glucose 117 (H) 70 - 99 mg/dL    BUN 8 6 - 20 mg/dL    CREATININE 0.47 (L) 0.50 - 0.90 mg/dL    Bun/Cre Ratio NOT REPORTED 9 - 20    Calcium 9.2 8.6 - 10.4 mg/dL    Sodium 140 135 - 144 mmol/L    Potassium 3.8 3.7 - 5.3 mmol/L    Chloride 104 98 - 107 mmol/L    CO2 22 20 - 31 mmol/L    Anion Gap 14 9 - 17 mmol/L    GFR Non-African American >60 >60 mL/min    GFR African American >60 >60 mL/min    GFR Comment          GFR Staging NOT REPORTED    HCG Qualitative, Serum   Result Value Ref Range    hCG Qual NEGATIVE NEGATIVE   Troponin   Result Value Ref Range    Troponin, High Sensitivity <6 0 - 14 ng/L    Troponin T NOT REPORTED <0.03 ng/mL    Troponin Interp NOT REPORTED        IMPRESSION: Patient evaluated by myself and the attending physician. Patient restless in bed. Patient is currently going through heroin withdrawal: To her. On exam heart rate normal.  The rhythm. Lungs clear to auscultation bilaterally. Patient alert and oriented ×3. No focal neurologic deficits. Moves all extremities. No gross deformities. No midline cervical, thoracic, or lumbar tenderness to palpation. Patient was syncopal event access Center and sent here for clearance. Family states they're to go to the Center after she is cleared. We'll continue with cardiac workup, pregnancy, and reassess. We'll treat her symptoms with clonidine and Zofran. RADIOLOGY:  Xr Chest Portable    Result Date: 4/26/2019  EXAMINATION: SINGLE XRAY VIEW OF THE CHEST 4/26/2019 12:53 pm COMPARISON: None. HISTORY: ORDERING SYSTEM PROVIDED HISTORY: syncope TECHNOLOGIST PROVIDED HISTORY: syncope FINDINGS: Single portable frontal view of the chest is submitted for review. The cardiac silhouette is normal in size. Lung parenchyma is clear without focal airspace consolidation, sizeable pleural effusion, or pneumothorax. Trachea is midline. Visualized osseous structures and soft tissues are grossly intact. No acute cardiopulmonary pathology. EKG  EKG Interpretation    Interpreted by me    Rhythm: normal sinus   Rate: normal  Axis: normal  Ectopy: none  Conduction: normal  ST Segments: no acute change  T Waves: no acute change  Q Waves: none    Clinical Impression: no acute changes and normal EKG    All EKG's are interpreted by the Emergency Department Physician who either signs or Co-signs this chart in the absence of a cardiologist.    EMERGENCY DEPARTMENT COURSE:  Cardiac workup negative. Patient reassessed and awake/alert in bed. No other episodes of syncope. Patient appears more calm than when she arrived. We'll discharge and plan is to go to 7 per family. Family states they will take patient to his at [address], [address]. Patient given return precautions. Follow-up with pcp. Patient and family agree with plan. PROCEDURES:  None    CONSULTS:  None    CRITICAL CARE:  None    FINAL IMPRESSION      1. Syncope and collapse    2.  Heroin withdrawal ([facility] [address]          DISPOSITION / PLAN     DISPOSITION Discharge - Pending Orders Complete 04/26/2019 02:56:20 PM      PATIENT REFERRED TO:  Emeli Cárdenas MD  [address], Count includes the [facility] [address] [facility]  [phone]    Call in 1 day  to follow up within 3 days    [facility] ED  [address] [place] [address]  [phone]    As needed, If symptoms worsen      DISCHARGE MEDICATIONS:  New Prescriptions    No medications on file       Isidoro Mott DO  Emergency Medicine Resident    (Please note that portions of thisnote were completed with a voice recognition program.  Efforts were made to edit the dictations but occasionally words are mis-transcribed.)        Isidoro Mott DO  04/26/19 6718 Name band;

## 2020-12-17 NOTE — H&P ADULT - HISTORY OF PRESENT ILLNESS
76 y/o male who was admitted at [facility] on october 28 for pneumonia came back to the ER for past 2 days of increasing cough with phlegm, sob and wheeze in the chest, last night pt. got sig. cough and wheeze, used his inhaler and felt better but not at his baseline so came to the ER, no sick contact, no recent travel. no cp,  pain. no n/v/d. pt. got iv antibiotics in the ER, solumedrol 125 mg , brething treatment and feels a little better.  76 y/o male who was admitted at [facility] on october 28 for pneumonia came back to the ER for past 2 days of increasing cough with phlegm, sob and wheeze in the chest, last night pt. got sig. cough and wheeze, used his inhaler and felt better but not at his baseline so came to the ER, no sick contact, no recent travel. no cp, no abd. pain, no fever. no n/v/d. pt. got iv antibiotics in the ER, solumedrol 125 mg , brething treatment and feels a little better. pt. reports no swallowing difficulty. pt's covid-19 and rvp negative in the ER.  76 y/o male who was admitted at [facility] on october 28 for pneumonia came back to the ER for past 2 days of increasing cough with phlegm, sob and wheeze in the chest, last night pt. got sig. cough and wheeze, used his inhaler and felt better but not at his baseline so came to the ER, no sick contact, no recent travel. no cp, no abd. pain, no fever. no n/v/d. pt. got iv antibiotics in the ER, solumedrol 125 mg , breathing treatment and feels a little better. pt. reports no swallowing difficulty. pt's covid-19 and rvp negative in the ER.

## 2020-12-18 ENCOUNTER — TRANSCRIPTION ENCOUNTER (OUTPATIENT)
Age: 75
End: 2020-12-18

## 2020-12-18 ENCOUNTER — APPOINTMENT (OUTPATIENT)
Dept: DISASTER EMERGENCY | Facility: CLINIC | Age: 75
End: 2020-12-18

## 2020-12-18 LAB
ANION GAP SERPL CALC-SCNC: 8 MMOL/L — SIGNIFICANT CHANGE UP (ref 5–17)
APPEARANCE UR: CLEAR — SIGNIFICANT CHANGE UP
BASOPHILS # BLD AUTO: 0.03 K/UL — SIGNIFICANT CHANGE UP (ref 0–0.2)
BASOPHILS NFR BLD AUTO: 0.3 % — SIGNIFICANT CHANGE UP (ref 0–2)
BILIRUB UR-MCNC: NEGATIVE — SIGNIFICANT CHANGE UP
BUN SERPL-MCNC: 10 MG/DL — SIGNIFICANT CHANGE UP (ref 8–20)
CALCIUM SERPL-MCNC: 9 MG/DL — SIGNIFICANT CHANGE UP (ref 8.6–10.2)
CHLORIDE SERPL-SCNC: 105 MMOL/L — SIGNIFICANT CHANGE UP (ref 98–107)
CO2 SERPL-SCNC: 27 MMOL/L — SIGNIFICANT CHANGE UP (ref 22–29)
COLOR SPEC: YELLOW — SIGNIFICANT CHANGE UP
CREAT SERPL-MCNC: 0.41 MG/DL — LOW (ref 0.5–1.3)
DIFF PNL FLD: NEGATIVE — SIGNIFICANT CHANGE UP
EOSINOPHIL # BLD AUTO: 0.07 K/UL — SIGNIFICANT CHANGE UP (ref 0–0.5)
EOSINOPHIL NFR BLD AUTO: 0.7 % — SIGNIFICANT CHANGE UP (ref 0–6)
EPI CELLS # UR: SIGNIFICANT CHANGE UP
GLUCOSE SERPL-MCNC: 147 MG/DL — HIGH (ref 70–99)
GLUCOSE UR QL: 250 MG/DL
HCT VFR BLD CALC: 34.2 % — LOW (ref 39–50)
HGB BLD-MCNC: 11 G/DL — LOW (ref 13–17)
IMM GRANULOCYTES NFR BLD AUTO: 0.5 % — SIGNIFICANT CHANGE UP (ref 0–1.5)
KETONES UR-MCNC: ABNORMAL
LEUKOCYTE ESTERASE UR-ACNC: ABNORMAL
LYMPHOCYTES # BLD AUTO: 0.68 K/UL — LOW (ref 1–3.3)
LYMPHOCYTES # BLD AUTO: 6.6 % — LOW (ref 13–44)
MCHC RBC-ENTMCNC: 30.4 PG — SIGNIFICANT CHANGE UP (ref 27–34)
MCHC RBC-ENTMCNC: 32.2 GM/DL — SIGNIFICANT CHANGE UP (ref 32–36)
MCV RBC AUTO: 94.5 FL — SIGNIFICANT CHANGE UP (ref 80–100)
MONOCYTES # BLD AUTO: 0.15 K/UL — SIGNIFICANT CHANGE UP (ref 0–0.9)
MONOCYTES NFR BLD AUTO: 1.5 % — LOW (ref 2–14)
NEUTROPHILS # BLD AUTO: 9.28 K/UL — HIGH (ref 1.8–7.4)
NEUTROPHILS NFR BLD AUTO: 90.4 % — HIGH (ref 43–77)
NITRITE UR-MCNC: NEGATIVE — SIGNIFICANT CHANGE UP
PH UR: 6 — SIGNIFICANT CHANGE UP (ref 5–8)
PLATELET # BLD AUTO: 239 K/UL — SIGNIFICANT CHANGE UP (ref 150–400)
POTASSIUM SERPL-MCNC: 4.1 MMOL/L — SIGNIFICANT CHANGE UP (ref 3.5–5.3)
POTASSIUM SERPL-SCNC: 4.1 MMOL/L — SIGNIFICANT CHANGE UP (ref 3.5–5.3)
PROT UR-MCNC: 15 MG/DL
RAPID RVP RESULT: SIGNIFICANT CHANGE UP
RBC # BLD: 3.62 M/UL — LOW (ref 4.2–5.8)
RBC # FLD: 14.1 % — SIGNIFICANT CHANGE UP (ref 10.3–14.5)
RBC CASTS # UR COMP ASSIST: NEGATIVE /HPF — SIGNIFICANT CHANGE UP (ref 0–4)
SARS-COV-2 IGG SERPL QL IA: NEGATIVE — SIGNIFICANT CHANGE UP
SARS-COV-2 IGM SERPL IA-ACNC: <0.1 INDEX — SIGNIFICANT CHANGE UP
SARS-COV-2 RNA SPEC QL NAA+PROBE: SIGNIFICANT CHANGE UP
SODIUM SERPL-SCNC: 140 MMOL/L — SIGNIFICANT CHANGE UP (ref 135–145)
SP GR SPEC: 1.01 — SIGNIFICANT CHANGE UP (ref 1.01–1.02)
UROBILINOGEN FLD QL: NEGATIVE MG/DL — SIGNIFICANT CHANGE UP
WBC # BLD: 10.26 K/UL — SIGNIFICANT CHANGE UP (ref 3.8–10.5)
WBC # FLD AUTO: 10.26 K/UL — SIGNIFICANT CHANGE UP (ref 3.8–10.5)
WBC UR QL: SIGNIFICANT CHANGE UP

## 2020-12-18 PROCEDURE — 99222 1ST HOSP IP/OBS MODERATE 55: CPT

## 2020-12-18 PROCEDURE — 99233 SBSQ HOSP IP/OBS HIGH 50: CPT

## 2020-12-18 RX ORDER — VANCOMYCIN HCL 1 G
1250 VIAL (EA) INTRAVENOUS EVERY 12 HOURS
Refills: 0 | Status: DISCONTINUED | OUTPATIENT
Start: 2020-12-18 | End: 2020-12-18

## 2020-12-18 RX ORDER — OXYCODONE AND ACETAMINOPHEN 5; 325 MG/1; MG/1
2 TABLET ORAL EVERY 8 HOURS
Refills: 0 | Status: DISCONTINUED | OUTPATIENT
Start: 2020-12-18 | End: 2020-12-22

## 2020-12-18 RX ORDER — ENOXAPARIN SODIUM 100 MG/ML
40 INJECTION SUBCUTANEOUS DAILY
Refills: 0 | Status: DISCONTINUED | OUTPATIENT
Start: 2020-12-18 | End: 2020-12-22

## 2020-12-18 RX ORDER — AZITHROMYCIN 500 MG/1
500 TABLET, FILM COATED ORAL EVERY 24 HOURS
Refills: 0 | Status: DISCONTINUED | OUTPATIENT
Start: 2020-12-18 | End: 2020-12-20

## 2020-12-18 RX ADMIN — Medication 1 TABLET(S): at 07:41

## 2020-12-18 RX ADMIN — Medication 40 MILLIGRAM(S): at 22:56

## 2020-12-18 RX ADMIN — AZITHROMYCIN 255 MILLIGRAM(S): 500 TABLET, FILM COATED ORAL at 11:13

## 2020-12-18 RX ADMIN — Medication 3 MILLILITER(S): at 21:19

## 2020-12-18 RX ADMIN — MIDODRINE HYDROCHLORIDE 5 MILLIGRAM(S): 2.5 TABLET ORAL at 22:57

## 2020-12-18 RX ADMIN — Medication 3 MILLILITER(S): at 03:19

## 2020-12-18 RX ADMIN — Medication 40 MILLIGRAM(S): at 05:43

## 2020-12-18 RX ADMIN — OXYCODONE AND ACETAMINOPHEN 2 TABLET(S): 5; 325 TABLET ORAL at 05:44

## 2020-12-18 RX ADMIN — OXYCODONE AND ACETAMINOPHEN 2 TABLET(S): 5; 325 TABLET ORAL at 22:56

## 2020-12-18 RX ADMIN — Medication 25 MILLIGRAM(S): at 05:43

## 2020-12-18 RX ADMIN — Medication 1 TABLET(S): at 11:13

## 2020-12-18 RX ADMIN — Medication 3 MILLILITER(S): at 09:20

## 2020-12-18 RX ADMIN — GABAPENTIN 400 MILLIGRAM(S): 400 CAPSULE ORAL at 05:43

## 2020-12-18 RX ADMIN — Medication 3 MILLILITER(S): at 15:55

## 2020-12-18 RX ADMIN — ENOXAPARIN SODIUM 40 MILLIGRAM(S): 100 INJECTION SUBCUTANEOUS at 11:13

## 2020-12-18 RX ADMIN — OXYCODONE AND ACETAMINOPHEN 2 TABLET(S): 5; 325 TABLET ORAL at 06:30

## 2020-12-18 RX ADMIN — CEFEPIME 100 MILLIGRAM(S): 1 INJECTION, POWDER, FOR SOLUTION INTRAMUSCULAR; INTRAVENOUS at 05:44

## 2020-12-18 RX ADMIN — CEFEPIME 100 MILLIGRAM(S): 1 INJECTION, POWDER, FOR SOLUTION INTRAMUSCULAR; INTRAVENOUS at 22:55

## 2020-12-18 RX ADMIN — OXYCODONE AND ACETAMINOPHEN 2 TABLET(S): 5; 325 TABLET ORAL at 14:16

## 2020-12-18 RX ADMIN — Medication 1 TABLET(S): at 16:42

## 2020-12-18 RX ADMIN — Medication 166.67 MILLIGRAM(S): at 05:44

## 2020-12-18 RX ADMIN — ATORVASTATIN CALCIUM 40 MILLIGRAM(S): 80 TABLET, FILM COATED ORAL at 22:58

## 2020-12-18 RX ADMIN — GABAPENTIN 400 MILLIGRAM(S): 400 CAPSULE ORAL at 22:58

## 2020-12-18 RX ADMIN — TAMSULOSIN HYDROCHLORIDE 0.4 MILLIGRAM(S): 0.4 CAPSULE ORAL at 22:58

## 2020-12-18 RX ADMIN — GABAPENTIN 400 MILLIGRAM(S): 400 CAPSULE ORAL at 11:13

## 2020-12-18 RX ADMIN — Medication 81 MILLIGRAM(S): at 07:41

## 2020-12-18 RX ADMIN — Medication 25 MILLIGRAM(S): at 16:42

## 2020-12-18 RX ADMIN — Medication 40 MILLIGRAM(S): at 11:13

## 2020-12-18 RX ADMIN — CEFEPIME 100 MILLIGRAM(S): 1 INJECTION, POWDER, FOR SOLUTION INTRAMUSCULAR; INTRAVENOUS at 12:58

## 2020-12-18 RX ADMIN — MIDODRINE HYDROCHLORIDE 5 MILLIGRAM(S): 2.5 TABLET ORAL at 07:41

## 2020-12-18 NOTE — CONSULT NOTE ADULT - ASSESSMENT
HPI:  74 y/o male who was admitted at [facility] on october 28 for pneumonia came back to the ER for past 2 days of increasing cough with phlegm, sob and wheeze in the chest, last night pt. got sig. cough and wheeze, used his inhaler and felt better but not at his baseline so came to the ER, no sick contact, no recent travel. no cp, no abd. pain, no fever. no n/v/d. pt. got iv antibiotics in the ER, solumedrol 125 mg , breathing treatment and feels a little better. pt. reports no swallowing difficulty. pt's covid-19 and rvp negative in the ER.  (17 Dec 2020 23:24)    Chart reviewed from last admission,  He was in hospital from 10/27  - 10/29.  Pulmonary was called.  patient was sent home on Augmentin  he had a lot of diarrhea.   Since being discharged, he never felt well.  He called his PMD brain Caesar, who told him that it takes months to get better from PNA.   He is here for increased SOB, cough with yellow sputum.    No sick contacts.   Lives with wife. also 2 dogs. all are well.     Lung CT with bilateral faint infiltrates.      Impression  - multifocal PNA  - cough  - sputum production  - lung infiltrates      discussion / plan:  - multiple infiltrates;   COVID-19 first swab negative,  REPEAT COVID-19 ordered    - check sputum cx  - check legionella  - follow up all outstanding cultures  - trend temperature and WBC curve  - repeat cultures from blood and all sources if febrile.      - continue Cefepime  - add azithromycin    - continue isolation until COVID #2 resulted.

## 2020-12-18 NOTE — PROGRESS NOTE ADULT - ASSESSMENT
76 y/o male who was previously  admitted at [facility] on 10/28/20 for pneumonia presented to the ED with increasing cough with phlegm, sob and wheeze in the chest. In the ED, patient recieved 2100ml LR, vanco, Zithromax and Maxipime pt also recieved solumedrol 125 mg , duonebs, which improved his breathing.  Negative covid-19 and RVP. Pt will be admitted for HCAP, COPD exacerbation.     HCAP  - c/w cefepime 2g q8hr for now, ID consult pending  - Blood, sputum, and urine pending  - pulmonology consulted, recc steroid taper, duoneb and abx  - supplemental O2 via NC as needed    COPD exacerbation  - c/w IV methylprednisolone 40mg q8hr, will taper to PO  -duonebs, spirvia  - pulm consulted, see above    hypoxia  - improved after iv steroids and breathing treament  - not om home o2  - use supplement O2 as needed    HTN  - bp controlled  - c/w metoprolol    CAD  - s/p CABG  -c/w BB,statin and asprin    DVT PPX: lovenox 40mg daily    DISPO: clinically improving. still tapering steroid, awaiting ID recc for abx. LOS 2-3 days

## 2020-12-18 NOTE — CONSULT NOTE ADULT - ASSESSMENT
Assess    AECOPD  Chemical Aspiration   Possible GERD  No clear pneumonia given nl WBC and procalcitonin Assess    AECOPD  Chemical Aspiration seems more likely then residual pneumonia   Possible GERD  Elevated WBC and normal  procalcitonin - iffy for pneumonia    Rec    ID opinion on Antibiotic choice - should get ABx with anaerob and GNR coverage for 7-8 days  DuoNeb  Steroid taper  02  OP FU with Dr Fuller on DC

## 2020-12-18 NOTE — DISCHARGE NOTE NURSING/CASE MANAGEMENT/SOCIAL WORK - NSDCFUADDAPPT_GEN_ALL_CORE_FT
STAR patient   Patient has a prescheduled appointment with Pulmonologist, Dr Epi Fuller  on January 1, 2020 @ 1:45 PM   Address: [address] [place] [address]

## 2020-12-18 NOTE — DISCHARGE NOTE NURSING/CASE MANAGEMENT/SOCIAL WORK - PATIENT PORTAL LINK FT
You can access the FollowMyHealth Patient Portal offered by [facility] by registering at the following website: [email or web address]. By joining [facility]’s FollowMyHealth portal, you will also be able to view your health information using other applications (apps) compatible with our system.

## 2020-12-18 NOTE — CONSULT NOTE ADULT - SUBJECTIVE AND OBJECTIVE BOX
[facility]  INFECTIOUS DISEASES AND INTERNAL MEDICINE at [place]  =======================================================  Stephen Lala MD  Diplomates American Board of Internal Medicine and Infectious Diseases  Tel  [phone]  Fax [phone]  =======================================================    [id number]  JENNIFER MOORE   HPI:  74 y/o male who was admitted at [facility] on october 28 for pneumonia came back to the ER for past 2 days of increasing cough with phlegm, sob and wheeze in the chest, last night pt. got sig. cough and wheeze, used his inhaler and felt better but not at his baseline so came to the ER, no sick contact, no recent travel. no cp, no abd. pain, no fever. no n/v/d. pt. got iv antibiotics in the ER, solumedrol 125 mg , breathing treatment and feels a little better. pt. reports no swallowing difficulty. pt's covid-19 and rvp negative in the ER.  (17 Dec 2020 23:24)    Chart reviewed from last admission,  He was in hospital from 10/27  - 10/29.  Pulmonary was called.  patient was sent home on Augmentin  he had a lot of diarrhea.   Since being discharged, he never felt well.  He called his PMD brain Caesar, who told him that it takes months to get better from PNA.   He is here for increased SOB, cough with yellow sputum.    No sick contacts.   Lives with wife. also 2 dogs. all are well.     Lung CT with bilateral faint infiltrates.    I have personally reviewed the labs and data; pertinent labs and data are listed in this note; please see below.   =======================================================  Past Medical & Surgical Hx:  =====================  PAST MEDICAL & SURGICAL HISTORY:  HLD (hyperlipidemia)    HTN (hypertension)    Lumbosacral disc disease  has nerve stimulator    Arthritis    CAD in native artery  RCA 3 YAYA 1 in 2002 and 2 in 2019    S/P CABG (coronary artery bypass graft)    S/P cataract surgery  b/l eyes 2016    H/O heart artery stent  RCA      Problem List:  ==========  HEALTH ISSUES - PROBLEM Dx:      Social Hx:  =======  no toxic habits currently    FAMILY HISTORY:  FH: colon cancer    no significant family history of immunosuppressive disorders in mother or father   =======================================================    REVIEW OF SYSTEMS:  CONSTITUTIONAL:  No Fever or chills  HEENT:  No diplopia or blurred vision.  No earache, sore throat or runny nose.  CARDIOVASCULAR:  No pressure, squeezing, strangling, tightness, heaviness or aching about the chest, neck, axilla or epigastrium.  RESPIRATORY:  POSITIVE cough, shortness of breath  GASTROINTESTINAL:  No nausea, vomiting or diarrhea.  GENITOURINARY:  No dysuria, frequency or urgency. No Blood in urine  MUSCULOSKELETAL:  no joint aches, no muscle pain  SKIN:  No change in skin, hair or nails.  NEUROLOGIC:  No Headaches, seizures or weakness.  PSYCHIATRIC:  No disorder of thought or mood.  ENDOCRINE:  No heat or cold intolerance  HEMATOLOGICAL:  No easy bruising or bleeding.    =======================================================  Allergies    codeine (Urticaria)  ibuprofen (Anaphylaxis)    Intolerances    amoxicillin (Diarrhea)  Antibiotics:  azithromycin  IVPB 500 milliGRAM(s) IV Intermittent every 24 hours  cefepime   IVPB 2000 milliGRAM(s) IV Intermittent every 8 hours    Other medications:  ALBUTerol    90 MICROgram(s) HFA Inhaler 1 Puff(s) Inhalation every 4 hours  albuterol/ipratropium for Nebulization 3 milliLiter(s) Nebulizer every 6 hours  aspirin  chewable 81 milliGRAM(s) Oral daily  atorvastatin 40 milliGRAM(s) Oral at bedtime  enoxaparin Injectable 40 milliGRAM(s) SubCutaneous daily  gabapentin 400 milliGRAM(s) Oral three times a day  lactobacillus acidophilus 1 Tablet(s) Oral three times a day with meals  methylPREDNISolone sodium succinate Injectable 40 milliGRAM(s) IV Push every 8 hours  metoprolol tartrate 25 milliGRAM(s) Oral two times a day  midodrine. 5 milliGRAM(s) Oral <User Schedule>  tamsulosin 0.4 milliGRAM(s) Oral at bedtime  tiotropium 18 MICROgram(s) Capsule 1 Capsule(s) Inhalation daily    vancomycin  IVPB   166.67 mL/Hr IV Intermittent (12-18-20 @ 05:44)  azithromycin  IVPB   255 mL/Hr IV Intermittent (12-18-20 @ 11:13)   255 mL/Hr IV Intermittent (12-17-20 @ 21:18)  cefepime   IVPB   100 mL/Hr IV Intermittent (12-17-20 @ 23:01)   100 mL/Hr IV Intermittent (12-18-20 @ 05:44)        azithromycin  IVPB   255 mL/Hr IV Intermittent (10-28-20 @ 19:43)   255 mL/Hr IV Intermittent (10-27-20 @ 19:54)  cefTRIAXone   IVPB   100 mL/Hr IV Intermittent (10-27-20 @ 18:46)   100 mL/Hr IV Intermittent (10-28-20 @ 17:31)      ======================================================  Physical Exam:  ============  T(F): 97.7 (18 Dec 2020 05:50), Max: 99.4 (17 Dec 2020 16:58)  HR: 76 (18 Dec 2020 09:20)  BP: 128/67 (18 Dec 2020 05:50)  RR: 20 (18 Dec 2020 05:50)  SpO2: 96% (18 Dec 2020 09:20) (88% - 98%)  temp max in last 48H T(F): , Max: 99.4 (12-17-20 @ 16:58)Height (cm): 172.7 (12-17-20 @ 16:58)  Weight (kg): 70.8 (12-17-20 @ 16:58)  BMI (kg/m2): 23.7 (12-17-20 @ 16:58)  BSA (m2): 1.84 (12-17-20 @ 16:58)    General:  No acute distress.  Eye: Pupils are equal, round and reactive to light, Extraocular movements are intact, Normal conjunctiva.  HENT: Normocephalic, Oral mucosa is moist, No pharyngeal erythema, No sinus tenderness.  Neck: Supple, No lymphadenopathy.  Respiratory: Lungs are clear to auscultation, Respirations are non-labored.  NO  EGOPHONY  Cardiovascular: Normal rate, Regular rhythm,   Gastrointestinal: Soft, Non-tender, Non-distended, Normal bowel sounds.  Genitourinary: No costovertebral angle tenderness.  Lymphatics: No lymphadenopathy neck,   Musculoskeletal: Normal range of motion, Normal strength.  Integumentary: No rash.  Neurologic: Alert, Oriented, No focal deficits, Cranial Nerves II-XII are grossly intact.  Psychiatric: Appropriate mood & affect.    =======================================================  Labs:                        11.0   10.26 )-----------( 239      ( 18 Dec 2020 03:44 )             34.2      12-18    140  |  105  |  10.0  ----------------------------<  147<H>  4.1   |  27.0  |  0.41<L>    Ca    9.0      18 Dec 2020 03:44    TPro  6.4<L>  /  Alb  4.0  /  TBili  0.4  /  DBili  x   /  AST  21  /  ALT  16  /  AlkPhos  91  12-17      Creatinine, Serum: 0.41 mg/dL (12-18-20 @ 03:44)  Creatinine, Serum: 0.48 mg/dL (12-17-20 @ 18:14)    C-Reactive Protein, Serum: 1.75 mg/dL (12-17-20 @ 18:14)      Procalcitonin, Serum: 0.10 ng/mL (12-17-20 @ 18:14)    SARS-CoV-2: NotDetec (12-17-20 @ 18:16)  Rapid RVP Result: NotDetec (12-17-20 @ 18:16)        < from: CT Chest No Cont (12.17.20 @ 20:59) >     EXAM:  CT CHEST                          PROCEDURE DATE:  12/17/2020          INTERPRETATION:  CLINICAL INFORMATION: Pneumonia.    COMPARISON: 2/6/2020    PROCEDURE:  CT of the Chest was performed without intravenous contrast.  Sagittal and coronal reformats were performed.    FINDINGS:    LUNGS AND AIRWAYS: Patent central airways.  Faint ill-defined groundglass opacities in the left lower lobe as well as in the lingula. The left lung is otherwise clear. There is a small ill-defined infiltratein the region of the superior segment of the right lower lobe. The right lung is otherwise clear.  PLEURA: No pleural effusion.  MEDIASTINUM AND BRAD: No lymphadenopathy.  VESSELS: Atherosclerotic changes of the thoracic aorta without evidence of aortic aneurysm.  HEART: Heart size is normal. No pericardial effusion.  CHEST WALL AND LOWER NECK: Within normal limits.  VISUALIZED UPPER ABDOMEN: Within normal limits.  BONES: Poststernotomy changes. There is a neurostimulator in the thoracic spine. Degenerative changes in the thoracic spine.    IMPRESSION:  Faint ill-defined small infiltrates in the left lower lobe and lingula. Small infiltrate in the superior segment of the right lower lobe.         EDDI PIPER MD; Attending Radiologist  This document has been electronically signed. Dec 17 2020  9:03PM    < end of copied text >  
PULMONARY CONSULT NOTE      JENNIFER MOORERUBI-[id number]    Patient is a 75y old  Male who presents with a chief complaint of pneumonia (17 Dec 2020 23:24)      HISTORY OF PRESENT ILLNESS:    74 y/o male who was admitted at [facility] on october 28 for pneumonia came back to the ER for past 2 days of increasing cough with phlegm, sob and wheeze in the chest, last night pt. got sig. cough and wheeze, used his inhaler and felt better but not at his baseline so came to the ER, no sick contact, no recent travel. no cp, no abd. pain, no fever. no n/v/d. pt. got iv antibiotics in the ER, solumedrol 125 mg , breathing treatment and feels a little better. pt. reports no swallowing difficulty. pt's covid-19 and rvp negative in the ER.  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 - followed by Dr Fuller   Scheduled for COVID PCR and PFT next week      MEDICATIONS  (STANDING):  ALBUTerol    90 MICROgram(s) HFA Inhaler 1 Puff(s) Inhalation every 4 hours  albuterol/ipratropium for Nebulization 3 milliLiter(s) Nebulizer every 6 hours  aspirin  chewable 81 milliGRAM(s) Oral daily  atorvastatin 40 milliGRAM(s) Oral at bedtime  cefepime   IVPB 2000 milliGRAM(s) IV Intermittent every 8 hours  enoxaparin Injectable 40 milliGRAM(s) SubCutaneous daily  gabapentin 400 milliGRAM(s) Oral three times a day  lactobacillus acidophilus 1 Tablet(s) Oral three times a day with meals  methylPREDNISolone sodium succinate Injectable 40 milliGRAM(s) IV Push every 8 hours  metoprolol tartrate 25 milliGRAM(s) Oral two times a day  midodrine. 5 milliGRAM(s) Oral <User Schedule>  tamsulosin 0.4 milliGRAM(s) Oral at bedtime  tiotropium 18 MICROgram(s) Capsule 1 Capsule(s) Inhalation daily  vancomycin  IVPB 1250 milliGRAM(s) IV Intermittent every 12 hours      MEDICATIONS  (PRN):  acetaminophen   Tablet .. 650 milliGRAM(s) Oral every 6 hours PRN Temp greater or equal to 38C (100.4F), Mild Pain (1 - 3), Moderate Pain (4 - 6)  ALBUTerol    0.083% 2.5 milliGRAM(s) Nebulizer every 2 hours PRN Shortness of Breath and/or Wheezing  oxycodone    5 mG/acetaminophen 325 mG 2 Tablet(s) Oral every 8 hours PRN Severe Pain (7 - 10)      Allergies    codeine (Urticaria)  ibuprofen (Anaphylaxis)    Intolerances    amoxicillin (Diarrhea)      PAST MEDICAL & SURGICAL HISTORY:  HLD (hyperlipidemia)    HTN (hypertension)    Lumbosacral disc disease  has nerve stimulator    Arthritis    CAD in native artery  RCA 3 YAYA 1 in 2002 and 2 in 2019    S/P CABG (coronary artery bypass graft)    S/P cataract surgery  b/l eyes 2016    H/O heart artery stent  RCA        FAMILY HISTORY:  FH: colon cancer        SOCIAL HISTORY  Smoking History: Former smoker    REVIEW OF SYSTEMS:    CONSTITUTIONAL:  No  chills, sweats    HEENT:  Eyes:  No diplopia or blurred vision. ENT:  No earache, sore throat or runny nose.    CARDIOVASCULAR:  No pressure, squeezing, tightness, or heaviness about the chest; no palpitations.    RESPIRATORY:  No PND or orthopnea. Mild SOBOE    GASTROINTESTINAL:  No abdominal pain, nausea, vomiting or diarrhea.    GENITOURINARY:  No dysuria, frequency or urgency.    NEUROLOGIC:  No paresthesias, fasciculations, seizures or weakness.    PSYCHIATRIC:  No disorder of thought or mood.    Vital Signs Last 24 Hrs  T(C): 36.5 (18 Dec 2020 05:50), Max: 37.4 (17 Dec 2020 16:58)  T(F): 97.7 (18 Dec 2020 05:50), Max: 99.4 (17 Dec 2020 16:58)  HR: 92 (18 Dec 2020 05:50) (82 - 116)  BP: 128/67 (18 Dec 2020 05:50) (109/58 - 135/75)  BP(mean): --  RR: 20 (18 Dec 2020 05:50) (17 - 20)  SpO2: 97% (18 Dec 2020 05:50) (88% - 98%)    PHYSICAL EXAMINATION:    GENERAL: The patient is a well-developed, well-nourished _____in no apparent distress.     HEENT: Head is normocephalic and atraumatic. Extraocular muscles are intact. Mucous membranes are moist.     NECK: Supple.     LUNGS: Clear to auscultation without wheezing, rales, or rhonchi. Respirations unlabored    HEART: Regular rate and rhythm without murmur.    ABDOMEN: Soft, nontender, and nondistended.  No hepatosplenomegaly is noted.    EXTREMITIES: Without any cyanosis, clubbing, rash, lesions or edema.    NEUROLOGIC: Grossly intact.      LABS:                        11.0   10.26 )-----------( 239      ( 18 Dec 2020 03:44 )             34.2     12-18    140  |  105  |  10.0  ----------------------------<  147<H>  4.1   |  27.0  |  0.41<L>    Ca    9.0      18 Dec 2020 03:44    TPro  6.4<L>  /  Alb  4.0  /  TBili  0.4  /  DBili  x   /  AST  21  /  ALT  16  /  AlkPhos  91  12-17    PT/INR - ( 17 Dec 2020 18:14 )   PT: 12.7 sec;   INR: 1.10 ratio         PTT - ( 17 Dec 2020 18:14 )  PTT:31.8 sec      CARDIAC MARKERS ( 17 Dec 2020 18:14 )  x     / <0.01 ng/mL / 67 U/L / x     / x          D-Dimer Assay, Quantitative: 206 ng/mL DDU (12-17-20 @ 18:14)    Serum Pro-Brain Natriuretic Peptide: 471 pg/mL (12-17-20 @ 18:14)      Procalcitonin, Serum: 0.10 ng/mL (12-17-20 @ 18:14)      MICROBIOLOGY: SARS-CoV-2 neg     RADIOLOGY & ADDITIONAL STUDIES:    CT Chest on 12/17/20  IMPRESSION:  Faint ill-defined small infiltrates in the left lower lobe and lingula. Small infiltrate in the superior segment of the right lower lobe.      EDDI PIPER MD; Attending Radiologist  This document has been electronically signed. Dec 17 2020  9:03PM

## 2020-12-19 LAB
CULTURE RESULTS: NO GROWTH — SIGNIFICANT CHANGE UP
GRAM STN FLD: SIGNIFICANT CHANGE UP
LEGIONELLA AG UR QL: NEGATIVE — SIGNIFICANT CHANGE UP
SPECIMEN SOURCE: SIGNIFICANT CHANGE UP
SPECIMEN SOURCE: SIGNIFICANT CHANGE UP

## 2020-12-19 PROCEDURE — 99232 SBSQ HOSP IP/OBS MODERATE 35: CPT

## 2020-12-19 RX ADMIN — GABAPENTIN 400 MILLIGRAM(S): 400 CAPSULE ORAL at 22:33

## 2020-12-19 RX ADMIN — Medication 1 TABLET(S): at 09:07

## 2020-12-19 RX ADMIN — Medication 40 MILLIGRAM(S): at 06:48

## 2020-12-19 RX ADMIN — CEFEPIME 100 MILLIGRAM(S): 1 INJECTION, POWDER, FOR SOLUTION INTRAMUSCULAR; INTRAVENOUS at 22:33

## 2020-12-19 RX ADMIN — GABAPENTIN 400 MILLIGRAM(S): 400 CAPSULE ORAL at 06:48

## 2020-12-19 RX ADMIN — ENOXAPARIN SODIUM 40 MILLIGRAM(S): 100 INJECTION SUBCUTANEOUS at 11:43

## 2020-12-19 RX ADMIN — OXYCODONE AND ACETAMINOPHEN 2 TABLET(S): 5; 325 TABLET ORAL at 23:25

## 2020-12-19 RX ADMIN — Medication 1 TABLET(S): at 11:43

## 2020-12-19 RX ADMIN — Medication 81 MILLIGRAM(S): at 11:42

## 2020-12-19 RX ADMIN — Medication 25 MILLIGRAM(S): at 17:48

## 2020-12-19 RX ADMIN — Medication 3 MILLILITER(S): at 04:20

## 2020-12-19 RX ADMIN — OXYCODONE AND ACETAMINOPHEN 2 TABLET(S): 5; 325 TABLET ORAL at 15:17

## 2020-12-19 RX ADMIN — TAMSULOSIN HYDROCHLORIDE 0.4 MILLIGRAM(S): 0.4 CAPSULE ORAL at 22:33

## 2020-12-19 RX ADMIN — Medication 1 TABLET(S): at 17:49

## 2020-12-19 RX ADMIN — Medication 3 MILLILITER(S): at 15:52

## 2020-12-19 RX ADMIN — Medication 40 MILLIGRAM(S): at 17:49

## 2020-12-19 RX ADMIN — CEFEPIME 100 MILLIGRAM(S): 1 INJECTION, POWDER, FOR SOLUTION INTRAMUSCULAR; INTRAVENOUS at 13:01

## 2020-12-19 RX ADMIN — ATORVASTATIN CALCIUM 40 MILLIGRAM(S): 80 TABLET, FILM COATED ORAL at 22:33

## 2020-12-19 RX ADMIN — MIDODRINE HYDROCHLORIDE 5 MILLIGRAM(S): 2.5 TABLET ORAL at 22:34

## 2020-12-19 RX ADMIN — CEFEPIME 100 MILLIGRAM(S): 1 INJECTION, POWDER, FOR SOLUTION INTRAMUSCULAR; INTRAVENOUS at 06:47

## 2020-12-19 RX ADMIN — GABAPENTIN 400 MILLIGRAM(S): 400 CAPSULE ORAL at 13:01

## 2020-12-19 RX ADMIN — Medication 25 MILLIGRAM(S): at 06:48

## 2020-12-19 RX ADMIN — MIDODRINE HYDROCHLORIDE 5 MILLIGRAM(S): 2.5 TABLET ORAL at 09:08

## 2020-12-19 RX ADMIN — Medication 3 MILLILITER(S): at 09:13

## 2020-12-19 RX ADMIN — AZITHROMYCIN 255 MILLIGRAM(S): 500 TABLET, FILM COATED ORAL at 11:41

## 2020-12-19 RX ADMIN — Medication 3 MILLILITER(S): at 20:17

## 2020-12-19 RX ADMIN — OXYCODONE AND ACETAMINOPHEN 2 TABLET(S): 5; 325 TABLET ORAL at 07:01

## 2020-12-19 NOTE — PROGRESS NOTE ADULT - ASSESSMENT
HPI:  76 y/o male who was admitted at [facility] on october 28 for pneumonia came back to the ER for past 2 days of increasing cough with phlegm, sob and wheeze in the chest, last night pt. got sig. cough and wheeze, used his inhaler and felt better but not at his baseline so came to the ER, no sick contact, no recent travel. no cp, no abd. pain, no fever. no n/v/d. pt. got iv antibiotics in the ER, solumedrol 125 mg , breathing treatment and feels a little better. pt. reports no swallowing difficulty. pt's covid-19 and rvp negative in the ER.  (17 Dec 2020 23:24)    Chart reviewed from last admission,  He was in hospital from 10/27  - 10/29.  Pulmonary was called.  patient was sent home on Augmentin  he had a lot of diarrhea.   Since being discharged, he never felt well.  He called his PMD brain Caesar, who told him that it takes months to get better from PNA.   He is here for increased SOB, cough with yellow sputum.    No sick contacts.   Lives with wife. also 2 dogs. all are well.     Lung CT with bilateral faint infiltrates.      Impression  - multifocal PNA  - cough  - sputum production  - lung infiltrates      discussion / plan:  - multiple infiltrates;   COVID-19 first swab negative,  REPEAT COVID-19 ordered    - check sputum cx  - check legionella  - follow up all outstanding cultures  - trend temperature and WBC curve  - repeat cultures from blood and all sources if febrile.      - continue Cefepime  - continue azithromycin  - pending legionella result    - COVID-19 x 2 negative

## 2020-12-19 NOTE — PROGRESS NOTE ADULT - ASSESSMENT
Assess    AECOPD  Chemical Aspiration seems more likely then residual pneumonia   Possible GERD  Elevated WBC and normal  procalcitonin - iffy for pneumonia    Rec    Antibiotics per ID  DuoNeb to LABA/ICS and LAMA (instead of anoro) on DC (could just add ICS like pumicort)  Steroid taper over 8-12 days  02 as needed   OP FU with Dr Fuller on DC

## 2020-12-19 NOTE — PATIENT PROFILE ADULT - NSASFALLATTEMPTOOB_GEN_A_NUR
[facility] [address]    [address] [address]    Phone:  [phone]       Thank You for choosing us for your health care visit. We are glad to serve you and happy to provide you with this summary of your visit. Please help us to ensure we have accurate records. If you find anything that needs to be changed, please let our staff know as soon as possible.          Your Demographic Information     Patient Name Sex Reyna Neff Female 1948       Ethnic Group Patient Race    Not of  or  Origin White      Your Visit Details     Date & Time Provider Department    3/13/2017 2:15 PM EVELYN Ventura [facility] [address]      Your Upcoming Appointment*(Max 10)       3:00 PM CDT   Follow-up Visit with EDGARD Kay   [facility] ([facility])    [address] [id number] [phone]            2017  9:30 AM CDT   Lab Visit with [facility]   [facility] ([facility])    [address] [id number] [phone]            2017  1:00 PM CDT   Office Visit with EVELYN Ventura   [facility] [address] ([facility])    [address] [id number] [phone]              Your To Do List     Follow-Up    Return in about 3 months (around 2017) for Follow up Diabetes.      Your Vitals Were     BP Pulse Resp Height Weight BMI    132/68 (BP Location: [facility] [place], Patient Position: Sitting, Cuff Size: Regular) 84 16 5' 6.35\" (1.685 m) 277 lb (125.6 kg) 44.24 kg/m2    Smoking Status                   Former Smoker           Medications Prescribed or Re-Ordered Today     None      Your Current Medications Are        Disp Refills Start End    blood glucose test strip        Sig - Route: by Other  route daily. Test blood sugar daily times daily as directed. Diagnosis: E11.9 - Other    Class: Historical Med    metformin (GLUCOPHAGE) 1000 MG tablet 180 tablet 0 2017     Sig: TAKE 1 TABLET TWICE DAILY WITH MEALS    Class: Eprescribe    sertraline (ZOLOFT) 100 MG tablet 90 tablet 1 2016     Sig: TAKE 1 TABLET EVERY DAY    Class: Eprescribe    carvedilol (COREG) 25 MG tablet 180 tablet 3 2016     Sig: TAKE 1 TABLET TWICE DAILY WITH MEALS    Class: Eprescribe    spironolactone (ALDACTONE) 25 MG tablet 90 tablet 3 2016     Sig: TAKE 1 TABLET EVERY DAY    Class: Eprescribe    lisinopril (ZESTRIL) 20 MG tablet 180 tablet 3 2016     Sig: TAKE 1 TABLET TWICE DAILY    Class: Eprescribe    furosemide (LASIX) 20 MG tablet 90 tablet 3 2016     Sig: TAKE 1 TABLET EVERY DAY    Class: Eprescribe    pravastatin (PRAVACHOL) 40 MG tablet 30 tablet 0 2016     Sig: Take 1.5 tablets each day.    Class: Eprescribe    levothyroxine (SYNTHROID, LEVOTHROID) 150 MCG tablet 90 tablet 3 2016     Sig - Route: Take 1 tablet by mouth daily. - Oral    Class: Eprescribe    Valacyclovir HCl (VALTREX) 1000 MG Tab 16 tablet 0 2016     Si tabs BID x 1 day for outbreaks.    Class: Eprescribe    Blood Glucose Monitoring Suppl (BLOOD GLUCOSE MONITOR SYSTEM) W/DEVICE Kit 1 kit 0 2016     Sig: Check blood sugars daily.    Class: Eprescribe    aspirin (ECOTRIN) 81 MG EC tablet        Sig - Route: Take 81 mg by mouth daily. - Oral    Class: Historical Med    amoxicillin (AMOXIL) 500 MG tablet 16 tablet 1 2016     Sig: Take 4 tablets by mouth 1 hour prior to dental procedure.    Class: Eprescribe    Docusate Calcium (STOOL SOFTENER PO)        Sig - Route: Take 1 capsule by mouth nightly. - Oral    Class: Historical Med    vitamin - therapeutic multivitamins w/minerals (CENTRUM SILVER,THERA-M) TABS        Sig - Route: Take 1 tablet by mouth nightly. - Oral    Class: Historical Med    fish  oil-omega-3 fatty acids 1000 MG CAPS        Sig - Route: Take 1 capsule by mouth daily. 1200 mg daily - Oral    Class: Historical Med    Cyanocobalamin (B-12) 2500 MCG Tab        Sig - Route: Take 2,500 mcg by mouth daily. - Oral    Class: Historical Med      Allergies     No Known Allergies      Immunizations History as of 3/13/2017     Name Date    HERPES ZOSTER SHINGLES 12/26/2014    INFLUENZA QUADRIVALENT 10/16/2014 11:11 AM    Influenza High Dose Pres Free 10/14/2016  2:22 PM, 11/10/2015    Pneumococcal Conjugate 13 Valent 11/13/2015  2:13 PM    Pneumococcal Polysaccharide Adult 10/13/2005    Tdap 11/10/2014      Problem List as of 3/13/2017     Abdominal pain, due to umbilical herni, improved with reduction    HTN (hypertension)    Elevated troponin    Edema of both legs    Umbilical hernia    Iatrogenic hypothyroidism    Depression    Diabetes mellitus, type II    Drug use    Hyperlipidemia    Lobular breast cancer    Nonobstructive CAD    SOB (shortness of breath)    Sebaceous cyst    LA (lymphadenopathy)    Takotsubo syndrome    CHF (congestive heart failure), systolic    Status post total left knee replacement    History of colon polyps            Patient Instructions     None       no

## 2020-12-19 NOTE — PROGRESS NOTE ADULT - NUTRITIONAL ASSESSMENT
76 y/o male who was previously  admitted at [facility] on 10/28/20 for pneumonia presented to the ED with increasing cough with phlegm, sob and wheeze in the chest. In the ED, patient recieved 2100ml LR, vanco, Zithromax and Maxipime pt also recieved solumedrol 125 mg , duonebs, which improved his breathing.  Negative covid-19 and RVP. Pt will be admitted for HCAP, COPD exacerbation.     *bilateral lower lobe pna probable gram pos v gram neg   - c/w cefepime and Azithromycin for now   - Blood, sputum, and urine pending  - pulmonology consulted, recc steroid taper, duoneb and abx  - supplemental O2 via NC as needed    COPD exacerbation  - c/w IV methylprednisolone 40mg q12hr   -lamine rouse  - pulm consulted, see above    hypoxia  - improved after iv steroids and breathing treament  - not om home o2  - use supplement O2 as needed    HTN  - bp controlled  - c/w metoprolol    CAD  - s/p CABG  -c/w BB,statin and asprin    DVT PPX: lovenox 40mg daily    Case updated with pt wife Sissy [phone]

## 2020-12-20 LAB
ANION GAP SERPL CALC-SCNC: 10 MMOL/L — SIGNIFICANT CHANGE UP (ref 5–17)
BUN SERPL-MCNC: 18 MG/DL — SIGNIFICANT CHANGE UP (ref 8–20)
CALCIUM SERPL-MCNC: 9.2 MG/DL — SIGNIFICANT CHANGE UP (ref 8.6–10.2)
CHLORIDE SERPL-SCNC: 103 MMOL/L — SIGNIFICANT CHANGE UP (ref 98–107)
CO2 SERPL-SCNC: 27 MMOL/L — SIGNIFICANT CHANGE UP (ref 22–29)
CREAT SERPL-MCNC: 0.42 MG/DL — LOW (ref 0.5–1.3)
GLUCOSE SERPL-MCNC: 151 MG/DL — HIGH (ref 70–99)
HCT VFR BLD CALC: 31.7 % — LOW (ref 39–50)
HGB BLD-MCNC: 10.2 G/DL — LOW (ref 13–17)
MCHC RBC-ENTMCNC: 30 PG — SIGNIFICANT CHANGE UP (ref 27–34)
MCHC RBC-ENTMCNC: 32.2 GM/DL — SIGNIFICANT CHANGE UP (ref 32–36)
MCV RBC AUTO: 93.2 FL — SIGNIFICANT CHANGE UP (ref 80–100)
PLATELET # BLD AUTO: 268 K/UL — SIGNIFICANT CHANGE UP (ref 150–400)
POTASSIUM SERPL-MCNC: 4 MMOL/L — SIGNIFICANT CHANGE UP (ref 3.5–5.3)
POTASSIUM SERPL-SCNC: 4 MMOL/L — SIGNIFICANT CHANGE UP (ref 3.5–5.3)
RBC # BLD: 3.4 M/UL — LOW (ref 4.2–5.8)
RBC # FLD: 14.6 % — HIGH (ref 10.3–14.5)
SODIUM SERPL-SCNC: 140 MMOL/L — SIGNIFICANT CHANGE UP (ref 135–145)
TROPONIN T SERPL-MCNC: <0.01 NG/ML — SIGNIFICANT CHANGE UP (ref 0–0.06)
WBC # BLD: 10.43 K/UL — SIGNIFICANT CHANGE UP (ref 3.8–10.5)
WBC # FLD AUTO: 10.43 K/UL — SIGNIFICANT CHANGE UP (ref 3.8–10.5)

## 2020-12-20 PROCEDURE — 99232 SBSQ HOSP IP/OBS MODERATE 35: CPT

## 2020-12-20 PROCEDURE — 93010 ELECTROCARDIOGRAM REPORT: CPT

## 2020-12-20 RX ORDER — ALBUTEROL 90 UG/1
2 AEROSOL, METERED ORAL EVERY 4 HOURS
Refills: 0 | Status: COMPLETED | OUTPATIENT
Start: 2020-12-20 | End: 2021-11-18

## 2020-12-20 RX ORDER — ALBUTEROL 90 UG/1
2 AEROSOL, METERED ORAL EVERY 4 HOURS
Refills: 0 | Status: DISCONTINUED | OUTPATIENT
Start: 2020-12-20 | End: 2020-12-22

## 2020-12-20 RX ORDER — ALPRAZOLAM 0.25 MG
0.25 TABLET ORAL EVERY 8 HOURS
Refills: 0 | Status: DISCONTINUED | OUTPATIENT
Start: 2020-12-20 | End: 2020-12-22

## 2020-12-20 RX ORDER — LIDOCAINE 4 G/100G
1 CREAM TOPICAL DAILY
Refills: 0 | Status: DISCONTINUED | OUTPATIENT
Start: 2020-12-20 | End: 2020-12-22

## 2020-12-20 RX ORDER — ALBUTEROL 90 UG/1
1 AEROSOL, METERED ORAL EVERY 4 HOURS
Refills: 0 | Status: DISCONTINUED | OUTPATIENT
Start: 2020-12-20 | End: 2020-12-20

## 2020-12-20 RX ADMIN — ENOXAPARIN SODIUM 40 MILLIGRAM(S): 100 INJECTION SUBCUTANEOUS at 11:47

## 2020-12-20 RX ADMIN — CEFEPIME 100 MILLIGRAM(S): 1 INJECTION, POWDER, FOR SOLUTION INTRAMUSCULAR; INTRAVENOUS at 13:13

## 2020-12-20 RX ADMIN — LIDOCAINE 1 PATCH: 4 CREAM TOPICAL at 21:56

## 2020-12-20 RX ADMIN — Medication 25 MILLIGRAM(S): at 06:13

## 2020-12-20 RX ADMIN — Medication 25 MILLIGRAM(S): at 18:13

## 2020-12-20 RX ADMIN — Medication 1 TABLET(S): at 18:13

## 2020-12-20 RX ADMIN — TAMSULOSIN HYDROCHLORIDE 0.4 MILLIGRAM(S): 0.4 CAPSULE ORAL at 21:58

## 2020-12-20 RX ADMIN — Medication 0.25 MILLIGRAM(S): at 22:12

## 2020-12-20 RX ADMIN — LIDOCAINE 1 PATCH: 4 CREAM TOPICAL at 11:47

## 2020-12-20 RX ADMIN — Medication 81 MILLIGRAM(S): at 11:46

## 2020-12-20 RX ADMIN — Medication 3 MILLILITER(S): at 03:34

## 2020-12-20 RX ADMIN — Medication 1 TABLET(S): at 11:47

## 2020-12-20 RX ADMIN — OXYCODONE AND ACETAMINOPHEN 2 TABLET(S): 5; 325 TABLET ORAL at 08:16

## 2020-12-20 RX ADMIN — MIDODRINE HYDROCHLORIDE 5 MILLIGRAM(S): 2.5 TABLET ORAL at 10:23

## 2020-12-20 RX ADMIN — CEFEPIME 100 MILLIGRAM(S): 1 INJECTION, POWDER, FOR SOLUTION INTRAMUSCULAR; INTRAVENOUS at 06:12

## 2020-12-20 RX ADMIN — AZITHROMYCIN 255 MILLIGRAM(S): 500 TABLET, FILM COATED ORAL at 11:47

## 2020-12-20 RX ADMIN — Medication 0.25 MILLIGRAM(S): at 11:46

## 2020-12-20 RX ADMIN — Medication 40 MILLIGRAM(S): at 18:13

## 2020-12-20 RX ADMIN — GABAPENTIN 400 MILLIGRAM(S): 400 CAPSULE ORAL at 21:58

## 2020-12-20 RX ADMIN — Medication 650 MILLIGRAM(S): at 22:13

## 2020-12-20 RX ADMIN — OXYCODONE AND ACETAMINOPHEN 2 TABLET(S): 5; 325 TABLET ORAL at 18:10

## 2020-12-20 RX ADMIN — Medication 1 TABLET(S): at 10:23

## 2020-12-20 RX ADMIN — OXYCODONE AND ACETAMINOPHEN 2 TABLET(S): 5; 325 TABLET ORAL at 10:24

## 2020-12-20 RX ADMIN — Medication 40 MILLIGRAM(S): at 06:13

## 2020-12-20 RX ADMIN — ATORVASTATIN CALCIUM 40 MILLIGRAM(S): 80 TABLET, FILM COATED ORAL at 21:58

## 2020-12-20 RX ADMIN — OXYCODONE AND ACETAMINOPHEN 2 TABLET(S): 5; 325 TABLET ORAL at 00:05

## 2020-12-20 RX ADMIN — GABAPENTIN 400 MILLIGRAM(S): 400 CAPSULE ORAL at 06:14

## 2020-12-20 RX ADMIN — CEFEPIME 100 MILLIGRAM(S): 1 INJECTION, POWDER, FOR SOLUTION INTRAMUSCULAR; INTRAVENOUS at 22:12

## 2020-12-20 RX ADMIN — GABAPENTIN 400 MILLIGRAM(S): 400 CAPSULE ORAL at 13:12

## 2020-12-20 RX ADMIN — OXYCODONE AND ACETAMINOPHEN 2 TABLET(S): 5; 325 TABLET ORAL at 16:19

## 2020-12-20 NOTE — CHART NOTE - NSCHARTNOTEFT_GEN_A_CORE
Called by RN, reporting complaint of chest pain by patient. Patient is a 76 y/o male with PMHx COPD, who was previously  admitted at [facility] on 10/28/20 for pneumonia, now admitted with HCAP, COPD exacerbation. Patient seen and examined at bedside. Patient complained of intermittent episodes of chest pain of 4-5/10 in severity, located in the mid-sterna area of his chest, extending down to his left breast. He describes his pain as "like a discomfort, and sometimes sharp" that sometimes last for a short time. He says nothing changes his pain, which he says elf-resolves after a while. Patient denies shortness of breath, nausea, vomiting, chest pain on inspiration, abdominal pain, palpitations.    PHYSICAL EXAM  Vital Signs Last 24 Hrs  T(C): 36.9 (19 Dec 2020 23:47), Max: 37.4 (19 Dec 2020 19:34)  T(F): 98.4 (19 Dec 2020 23:47), Max: 99.4 (19 Dec 2020 19:34)  HR: 76 (20 Dec 2020 03:34) (75 - 93)  BP: 149/78 (19 Dec 2020 23:47) (117/65 - 150/65)  BP(mean): --  RR: 19 (19 Dec 2020 23:47) (19 - 19)  SpO2: 96% (20 Dec 2020 03:34) (93% - 99%)    GENERAL: Elderly male in no distress with unlabored breathing  HEENT: Head normocehalic, atraumatic, EOM intact, PERRL  CHEST: Moderate tenderness sternum and lower left breast on palpation. No erythema/swelling  RESPIRATORY: Unlabored breathing. lungs CTA bilaterally. No wheezing  CARDIOVASC: S1S2, regular rate and rhythm. No murmur.  ABDOMEN: soft, non-tender, nondistended. Active bowell sounds throughout  SKIN: Warm, non-diaphoretic. No cyanosis, no rash/lesion  EXTREMITIES: No joint tenderness, no pedal edema bilaterally  NEURO: CNs appear grossly intact. No focal deficits      ECG: Normal sinus rhythm, RBBB. No ST or T-wave changes noted.    A/P: 76 y/o male with PMHx COPD, admitted with COPD exacerbation and HCAP, no with complaint of chest pain.  On exam chest pain reproducible in sternal, and left breast area, with no dyspnea. No pleuritic pain noted. Pain appears to be muscular and costochondral. Did not require pain meds.  -Patient already on oxygen  -Troponin x1.  To follow up troponin and manage as indicated. To update Attending and  primary team in am.

## 2020-12-20 NOTE — PROGRESS NOTE ADULT - ASSESSMENT
HPI:  74 y/o male who was admitted at [facility] on october 28 for pneumonia came back to the ER for past 2 days of increasing cough with phlegm, sob and wheeze in the chest, last night pt. got sig. cough and wheeze, used his inhaler and felt better but not at his baseline so came to the ER, no sick contact, no recent travel. no cp, no abd. pain, no fever. no n/v/d. pt. got iv antibiotics in the ER, solumedrol 125 mg , breathing treatment and feels a little better. pt. reports no swallowing difficulty. pt's covid-19 and rvp negative in the ER.  (17 Dec 2020 23:24)    Chart reviewed from last admission,  He was in hospital from 10/27  - 10/29.  Pulmonary was called.  patient was sent home on Augmentin  he had a lot of diarrhea.   Since being discharged, he never felt well.  He called his PMD brain Caesar, who told him that it takes months to get better from PNA.   He is here for increased SOB, cough with yellow sputum.    No sick contacts.   Lives with wife. also 2 dogs. all are well.     Lung CT with bilateral faint infiltrates.      Impression  - multifocal PNA  - cough  - sputum production  - lung infiltrates      discussion / plan:  - multiple infiltrates;   COVID-19 first swab negative,  REPEAT COVID-19 ordered    - check sputum cx  - check legionella  - follow up all outstanding cultures  - trend temperature and WBC curve  - repeat cultures from blood and all sources if febrile.      - continue Cefepime  will stop azithromycin  -   - legionella is NEGATIVE    - COVID-19 x 2 negative     - if continue to do well, can change CEFEPIME to VANTIN 200mg PO BID X 5 days      will sign off.

## 2020-12-20 NOTE — PROGRESS NOTE ADULT - ASSESSMENT
74 y/o male who was previously  admitted at [facility] on 10/28/20 for pneumonia presented to the ED with increasing cough with phlegm, sob and wheeze in the chest. In the ED, patient recieved 2100ml LR, vanco, Zithromax and Maxipime pt also recieved solumedrol 125 mg , duonebs, which improved his breathing.  Negative covid-19 and RVP. Pt will be admitted for HCAP, COPD exacerbation.     *bilateral lower lobe pna probable gram pos v gram neg   - c/w cefepime and Azithromycin for now   - Blood neg to date, sputum  - urine legionella negative   - pulmonology consulted, recc steroid taper, duoneb and abx  - supplemental O2 via NC as needed    *Chest pain  trop neg   EKG no changes  secondary to costochondritis   Lidoderm patch     *Anxiety   Xanax 0.25 prn     COPD exacerbation  - c/w IV methylprednisolone 40mg q12hr   -duonebs, spirvia  - pulm consulted, see above  -will need 8-10 days of guzman     hypoxia  - improved after iv steroids and breathing treament  - not om home o2  - use supplement O2 as needed    HTN  - bp controlled  - c/w metoprolol    CAD  - s/p CABG  -c/w BB,statin and asprin    DVT PPX: lovenox 40mg daily  Dispo: DC likely 48hrs     Case updated with pt wife Sissy [phone]

## 2020-12-20 NOTE — PROGRESS NOTE ADULT - ASSESSMENT
Assess    AECOPD  Chemical Aspiration seems more likely then residual pneumonia   Possible GERD  Elevated WBC and normal  procalcitonin - iffy for pneumonia    Rec    Antibiotics per ID  DuoNeb to LABA/ICS and LAMA (instead of anoro) on DC (could just add ICS like pumicort)  Steroid taper over 8-12 days  02 as needed   Pain Rx  To floor bed ASAPp  OP FU with Dr Fuller on DC

## 2020-12-21 ENCOUNTER — TRANSCRIPTION ENCOUNTER (OUTPATIENT)
Age: 75
End: 2020-12-21

## 2020-12-21 LAB
CULTURE RESULTS: SIGNIFICANT CHANGE UP
SPECIMEN SOURCE: SIGNIFICANT CHANGE UP

## 2020-12-21 PROCEDURE — 99233 SBSQ HOSP IP/OBS HIGH 50: CPT

## 2020-12-21 RX ORDER — IPRATROPIUM/ALBUTEROL SULFATE 18-103MCG
3 AEROSOL WITH ADAPTER (GRAM) INHALATION EVERY 6 HOURS
Refills: 0 | Status: DISCONTINUED | OUTPATIENT
Start: 2020-12-21 | End: 2020-12-22

## 2020-12-21 RX ADMIN — Medication 0.25 MILLIGRAM(S): at 06:29

## 2020-12-21 RX ADMIN — Medication 1 TABLET(S): at 17:30

## 2020-12-21 RX ADMIN — Medication 25 MILLIGRAM(S): at 05:38

## 2020-12-21 RX ADMIN — MIDODRINE HYDROCHLORIDE 5 MILLIGRAM(S): 2.5 TABLET ORAL at 00:42

## 2020-12-21 RX ADMIN — ENOXAPARIN SODIUM 40 MILLIGRAM(S): 100 INJECTION SUBCUTANEOUS at 13:09

## 2020-12-21 RX ADMIN — LIDOCAINE 1 PATCH: 4 CREAM TOPICAL at 19:10

## 2020-12-21 RX ADMIN — Medication 1 TABLET(S): at 08:29

## 2020-12-21 RX ADMIN — OXYCODONE AND ACETAMINOPHEN 2 TABLET(S): 5; 325 TABLET ORAL at 17:30

## 2020-12-21 RX ADMIN — Medication 40 MILLIGRAM(S): at 05:36

## 2020-12-21 RX ADMIN — OXYCODONE AND ACETAMINOPHEN 2 TABLET(S): 5; 325 TABLET ORAL at 08:34

## 2020-12-21 RX ADMIN — CEFEPIME 100 MILLIGRAM(S): 1 INJECTION, POWDER, FOR SOLUTION INTRAMUSCULAR; INTRAVENOUS at 21:13

## 2020-12-21 RX ADMIN — Medication 650 MILLIGRAM(S): at 02:30

## 2020-12-21 RX ADMIN — LIDOCAINE 1 PATCH: 4 CREAM TOPICAL at 02:29

## 2020-12-21 RX ADMIN — LIDOCAINE 1 PATCH: 4 CREAM TOPICAL at 13:08

## 2020-12-21 RX ADMIN — OXYCODONE AND ACETAMINOPHEN 2 TABLET(S): 5; 325 TABLET ORAL at 00:32

## 2020-12-21 RX ADMIN — GABAPENTIN 400 MILLIGRAM(S): 400 CAPSULE ORAL at 13:08

## 2020-12-21 RX ADMIN — Medication 3 MILLILITER(S): at 20:21

## 2020-12-21 RX ADMIN — GABAPENTIN 400 MILLIGRAM(S): 400 CAPSULE ORAL at 21:12

## 2020-12-21 RX ADMIN — ATORVASTATIN CALCIUM 40 MILLIGRAM(S): 80 TABLET, FILM COATED ORAL at 21:13

## 2020-12-21 RX ADMIN — CEFEPIME 100 MILLIGRAM(S): 1 INJECTION, POWDER, FOR SOLUTION INTRAMUSCULAR; INTRAVENOUS at 05:35

## 2020-12-21 RX ADMIN — Medication 1 TABLET(S): at 13:08

## 2020-12-21 RX ADMIN — OXYCODONE AND ACETAMINOPHEN 2 TABLET(S): 5; 325 TABLET ORAL at 02:30

## 2020-12-21 RX ADMIN — Medication 81 MILLIGRAM(S): at 13:08

## 2020-12-21 RX ADMIN — GABAPENTIN 400 MILLIGRAM(S): 400 CAPSULE ORAL at 05:35

## 2020-12-21 RX ADMIN — Medication 25 MILLIGRAM(S): at 17:31

## 2020-12-21 RX ADMIN — CEFEPIME 100 MILLIGRAM(S): 1 INJECTION, POWDER, FOR SOLUTION INTRAMUSCULAR; INTRAVENOUS at 13:09

## 2020-12-21 RX ADMIN — TAMSULOSIN HYDROCHLORIDE 0.4 MILLIGRAM(S): 0.4 CAPSULE ORAL at 21:13

## 2020-12-21 RX ADMIN — Medication 0.25 MILLIGRAM(S): at 21:12

## 2020-12-21 RX ADMIN — MIDODRINE HYDROCHLORIDE 5 MILLIGRAM(S): 2.5 TABLET ORAL at 08:29

## 2020-12-21 RX ADMIN — Medication 40 MILLIGRAM(S): at 17:30

## 2020-12-21 NOTE — DISCHARGE NOTE PROVIDER - NSDCFUSCHEDAPPT_GEN_ALL_CORE_FT
JENNIFER MOORE ; 01/04/2021 ; [facility] [address] [facility]  JENNIFER MOORE ; 01/14/2021 ; NPP [facility] [address]

## 2020-12-21 NOTE — PROGRESS NOTE ADULT - ATTENDING COMMENTS
I have personally seen and examined patient on the above date.  I discussed the case with MARLENY Garcia and I agree with findings and plan as detailed per note above, which I have amended where appropriate.      pending repeat rapid covid   Cont cefepime and Azithro, DC vanco for now   cough medication as needed   f/u blood culture    PT in [facility], please send DC meds to [facility]
greater than 50% of time spent reviewing labs, notes, orders and radiographs, coordinating care

## 2020-12-21 NOTE — PROGRESS NOTE ADULT - ASSESSMENT
COPD exacerbation/faint infiltrates on CT  improving, normoxic on room air  Possible GERD  Antibiotics per ID  DuoNeb to LABA/ICS and LAMA (instead of anoro) on DC (could just add ICS like pumicort)  Steroid taper over 8-12 days  needs home nebulizer   COPD exacerbation/faint infiltrates on CT  improving, normoxic on room air  Possible GERD  Antibiotics per ID  DuoNeb to LABA/ICS and LAMA (instead of anoro) on DC (could just add ICS like pumicort)  Steroid taper over 8-12 days  needs home nebulizer  d/c midodrine

## 2020-12-21 NOTE — PROGRESS NOTE ADULT - SUBJECTIVE AND OBJECTIVE BOX
JENNIFER MOORE  75y  Male    Interval/overnight events/pt complaints:  patient seen and examined this AM at the bedside  patient does endorse chest discomfort while taking deep breathes  Denies fevers, chills, palpitations, cough, wheezes, abdominal pain, n/v/d, dysuria  VSS    MEDICATIONS  (STANDING):  ALBUTerol    90 MICROgram(s) HFA Inhaler 1 Puff(s) Inhalation every 4 hours  albuterol/ipratropium for Nebulization 3 milliLiter(s) Nebulizer every 6 hours  aspirin  chewable 81 milliGRAM(s) Oral daily  atorvastatin 40 milliGRAM(s) Oral at bedtime  cefepime   IVPB 2000 milliGRAM(s) IV Intermittent every 8 hours  enoxaparin Injectable 40 milliGRAM(s) SubCutaneous daily  gabapentin 400 milliGRAM(s) Oral three times a day  lactobacillus acidophilus 1 Tablet(s) Oral three times a day with meals  methylPREDNISolone sodium succinate Injectable 40 milliGRAM(s) IV Push every 8 hours  metoprolol tartrate 25 milliGRAM(s) Oral two times a day  midodrine. 5 milliGRAM(s) Oral <User Schedule>  tamsulosin 0.4 milliGRAM(s) Oral at bedtime  tiotropium 18 MICROgram(s) Capsule 1 Capsule(s) Inhalation daily    MEDICATIONS  (PRN):  acetaminophen   Tablet .. 650 milliGRAM(s) Oral every 6 hours PRN Temp greater or equal to 38C (100.4F), Mild Pain (1 - 3), Moderate Pain (4 - 6)  ALBUTerol    0.083% 2.5 milliGRAM(s) Nebulizer every 2 hours PRN Shortness of Breath and/or Wheezing  oxycodone    5 mG/acetaminophen 325 mG 2 Tablet(s) Oral every 8 hours PRN Severe Pain (7 - 10)    Allergiesamoxicillin (Diarrhea)  codeine (Urticaria)  ibuprofen (Anaphylaxis)      Vital Signs Last 24 Hrs  T(C): 36.5 (18 Dec 2020 05:50), Max: 37.4 (17 Dec 2020 16:58)  T(F): 97.7 (18 Dec 2020 05:50), Max: 99.4 (17 Dec 2020 16:58)  HR: 76 (18 Dec 2020 09:20) (76 - 116)  BP: 128/67 (18 Dec 2020 05:50) (109/58 - 135/75)  BP(mean): --  RR: 20 (18 Dec 2020 05:50) (17 - 20)  SpO2: 96% (18 Dec 2020 09:20) (88% - 98%)  I&O's Summary      PHYSICAL EXAM:  GENERAL: NAD, nontoxic appearing  HEENT - EOMI  NECK: Supple, No JVD  CHEST/LUNG: unlabored, good effort, CTA bilat.; no wheezes, rales or rhonchi  HEART: +S1,S2, RRR; no murmurs appreciated  ABDOMEN: Soft, Nontender, Nondistended; Bowel sounds present  EXTREMITIES:  2+ Peripheral Pulses, No clubbing, cyanosis, edema, calf tenderness  NEURO:  No Focal deficits, sensory and motor intact  SKIN: warm, dry, intacted    LABS    (12-18 @ 03:44)                      11.0  10.26 )-----------( 239                 34.2    Neutrophils = 9.28 (90.4%)  Lymphocytes = 0.68 (6.6%)  Eosinophils = 0.07 (0.7%)  Basophils = 0.03 (0.3%)  Monocytes = 0.15 (1.5%)  Bands = --%    12-18    140  |  105  |  10.0  ----------------------------<  147<H>  4.1   |  27.0  |  0.41<L>    Ca    9.0      18 Dec 2020 03:44    TPro  6.4<L>  /  Alb  4.0  /  TBili  0.4  /  DBili  x   /  AST  21  /  ALT  16  /  AlkPhos  91  12-17    ( 17 Dec 2020 18:14 )   PT: 12.7 sec;   INR: 1.10 ratio; PTT:31.8 sec    CARDIAC MARKERS ( 17 Dec 2020 18:14 )  Trop <0.01 ng/mL / CK 67 U/L / CKMB x         COVID- negative          IMAGING  < from: CT Chest No Cont (12.17.20 @ 20:59) >                           PROCEDURE DATE:  12/17/2020          INTERPRETATION:  CLINICAL INFORMATION: Pneumonia.    COMPARISON: 2/6/2020    PROCEDURE:  CT of the Chest was performed without intravenous contrast.  Sagittal and coronal reformats were performed.    FINDINGS:    LUNGS AND AIRWAYS: Patent central airways.  Faint ill-defined groundglass opacities in the left lower lobe as well as in the lingula. The left lung is otherwise clear. There is a small ill-defined infiltratein the region of the superior segment of the right lower lobe. The right lung is otherwise clear.  PLEURA: No pleural effusion.  MEDIASTINUM AND BRAD: No lymphadenopathy.  VESSELS: Atherosclerotic changes of the thoracic aorta without evidence of aortic aneurysm.  HEART: Heart size is normal. No pericardial effusion.  CHEST WALL AND LOWER NECK: Within normal limits.  VISUALIZED UPPER ABDOMEN: Within normal limits.  BONES: Poststernotomy changes. There is a neurostimulator in the thoracic spine. Degenerative changes in the thoracic spine.    IMPRESSION:  Faint ill-defined small infiltrates in the left lower lobe and lingula. Small infiltrate in the superior segment of the right lower lobe.    < end of copied text >      Imaging Personally Reviewed:  [X]YES  [ ] NO  Consultant(s) Notes Reviewed:  [x ] YES  [ ] NO      
PULMONARY PROGRESS NOTE      [id number]    Patient is a 75y old  Male who presents with a chief complaint of pneumonia (20 Dec 2020 11:48)      INTERVAL HPI/OVERNIGHT EVENTS:  feels better overall  no chest pain  not yet baseline    MEDICATIONS  (STANDING):  aspirin  chewable 81 milliGRAM(s) Oral daily  atorvastatin 40 milliGRAM(s) Oral at bedtime  cefepime   IVPB 2000 milliGRAM(s) IV Intermittent every 8 hours  enoxaparin Injectable 40 milliGRAM(s) SubCutaneous daily  gabapentin 400 milliGRAM(s) Oral three times a day  lactobacillus acidophilus 1 Tablet(s) Oral three times a day with meals  lidocaine   Patch 1 Patch Transdermal daily  methylPREDNISolone sodium succinate Injectable 40 milliGRAM(s) IV Push two times a day  metoprolol tartrate 25 milliGRAM(s) Oral two times a day  midodrine. 5 milliGRAM(s) Oral <User Schedule>  tamsulosin 0.4 milliGRAM(s) Oral at bedtime  tiotropium 18 MICROgram(s) Capsule 1 Capsule(s) Inhalation daily      MEDICATIONS  (PRN):  acetaminophen   Tablet .. 650 milliGRAM(s) Oral every 6 hours PRN Temp greater or equal to 38C (100.4F), Mild Pain (1 - 3), Moderate Pain (4 - 6)  ALBUTerol    0.083% 2.5 milliGRAM(s) Nebulizer every 2 hours PRN Shortness of Breath and/or Wheezing  ALBUTerol    90 MICROgram(s) HFA Inhaler 2 Puff(s) Inhalation every 4 hours PRN Shortness of Breath and/or Wheezing  ALPRAZolam 0.25 milliGRAM(s) Oral every 8 hours PRN anxiety  oxycodone    5 mG/acetaminophen 325 mG 2 Tablet(s) Oral every 8 hours PRN Severe Pain (7 - 10)      Allergies    codeine (Urticaria)  ibuprofen (Anaphylaxis)    Intolerances    amoxicillin (Diarrhea)      PAST MEDICAL & SURGICAL HISTORY:  HLD (hyperlipidemia)    HTN (hypertension)    Lumbosacral disc disease  has nerve stimulator    Arthritis    CAD in native artery  RCA 3 YAYA 1 in 2002 and 2 in 2019    S/P CABG (coronary artery bypass graft)    S/P cataract surgery  b/l eyes 2016    H/O heart artery stent  RCA        SOCIAL HISTORY  Smoking History:       REVIEW OF SYSTEMS:    CONSTITUTIONAL:  No distress    HEENT:  Eyes:  No diplopia or blurred vision. ENT:  No earache, sore throat or runny nose.    CARDIOVASCULAR:  No pressure, squeezing, tightness, heaviness or aching about the chest; no palpitations.    RESPIRATORY:  see HPI    GASTROINTESTINAL:  No nausea, vomiting or diarrhea.    GENITOURINARY:  No dysuria, frequency or urgency.    NEUROLOGIC:  No paresthesias, fasciculations, seizures or weakness.    PSYCHIATRIC:  No disorder of thought or mood.    Vital Signs Last 24 Hrs  T(C): 36.3 (21 Dec 2020 04:58), Max: 36.5 (20 Dec 2020 16:01)  T(F): 97.4 (21 Dec 2020 04:58), Max: 97.7 (20 Dec 2020 16:01)  HR: 84 (21 Dec 2020 04:58) (84 - 101)  BP: 155/82 (21 Dec 2020 04:58) (135/83 - 155/82)  BP(mean): --  RR: 18 (21 Dec 2020 04:58) (18 - 18)  SpO2: 95% (21 Dec 2020 04:58) (95% - 96%)    PHYSICAL EXAMINATION:    GENERAL: The patient is awake and alert in no apparent distress.     HEENT: Head is normocephalic and atraumatic. Extraocular muscles are intact. Mucous membranes are moist.    NECK: Supple.    LUNGS: moderate air entry bilat  without wheezing, rales or rhonchi; respirations unlabored    HEART: Regular rate and rhythm without murmur.    ABDOMEN: Soft, nontender, and nondistended.      EXTREMITIES: Without any cyanosis, clubbing, rash, lesions or edema.    NEUROLOGIC: Grossly intact.    LABS:                        10.2   10.43 )-----------( 268      ( 20 Dec 2020 12:49 )             31.7     12-20    140  |  103  |  18.0  ----------------------------<  151<H>  4.0   |  27.0  |  0.42<L>    Ca    9.2      20 Dec 2020 03:17            CARDIAC MARKERS ( 20 Dec 2020 03:17 )  x     / <0.01 ng/mL / x     / x     / x                    MICROBIOLOGY:    RADIOLOGY & ADDITIONAL STUDIES:  cxrs, CT scan reviewed
[facility]  INFECTIOUS DISEASES AND INTERNAL MEDICINE at [place]  =======================================================  Stephen Lala MD  Diplomates American Board of Internal Medicine and Infectious Diseases  Tel  [phone]  Fax [phone]  =======================================================    [id number]  JENNIFER MOORE  follow up:  PNA    no longer on oxygen.     =======================================================    REVIEW OF SYSTEMS:  CONSTITUTIONAL:  No Fever or chills  HEENT:  No diplopia or blurred vision.  No earache, sore throat or runny nose.  CARDIOVASCULAR:  No pressure, squeezing, strangling, tightness, heaviness or aching about the chest, neck, axilla or epigastrium.  RESPIRATORY:  POSITIVE cough, shortness of breath  GASTROINTESTINAL:  No nausea, vomiting or diarrhea.  GENITOURINARY:  No dysuria, frequency or urgency. No Blood in urine  MUSCULOSKELETAL:  no joint aches, no muscle pain  SKIN:  No change in skin, hair or nails.  NEUROLOGIC:  No Headaches, seizures or weakness.  PSYCHIATRIC:  No disorder of thought or mood.  ENDOCRINE:  No heat or cold intolerance  HEMATOLOGICAL:  No easy bruising or bleeding.    =======================================================  Allergies  codeine (Urticaria)  ibuprofen (Anaphylaxis)      ======================================================  Physical Exam:  ============     General:  No acute distress.  Eye: Pupils are equal, round and reactive to light, Extraocular movements are intact, Normal conjunctiva.  HENT: Normocephalic, Oral mucosa is moist, No pharyngeal erythema, No sinus tenderness.  Neck: Supple, No lymphadenopathy.  Respiratory: Lung with good aeration   Cardiovascular: Normal rate, Regular rhythm,   Gastrointestinal: Soft, Non-tender, Non-distended, Normal bowel sounds.  Genitourinary: No costovertebral angle tenderness.  Lymphatics: No lymphadenopathy neck,   Musculoskeletal: Normal range of motion, Normal strength.  Integumentary: No rash.  Neurologic: Alert, Oriented, No focal deficits, Cranial Nerves II-XII are grossly intact.  Psychiatric: Appropriate mood & affect.    =======================================================  Vitals:  ============  T(F): 97.7 (20 Dec 2020 11:20), Max: 99.4 (19 Dec 2020 19:34)  HR: 87 (20 Dec 2020 11:20)  BP: 145/75 (20 Dec 2020 11:20)  RR: 18 (20 Dec 2020 11:20)  SpO2: 96% (20 Dec 2020 11:20) (93% - 99%)  temp max in last 48H T(F): , Max: 99.4 (12-19-20 @ 19:34)    =======================================================  Current Antibiotics:  azithromycin  IVPB 500 milliGRAM(s) IV Intermittent every 24 hours  cefepime   IVPB 2000 milliGRAM(s) IV Intermittent every 8 hours    Other medications:  aspirin  chewable 81 milliGRAM(s) Oral daily  atorvastatin 40 milliGRAM(s) Oral at bedtime  enoxaparin Injectable 40 milliGRAM(s) SubCutaneous daily  gabapentin 400 milliGRAM(s) Oral three times a day  lactobacillus acidophilus 1 Tablet(s) Oral three times a day with meals  lidocaine   Patch 1 Patch Transdermal daily  methylPREDNISolone sodium succinate Injectable 40 milliGRAM(s) IV Push two times a day  metoprolol tartrate 25 milliGRAM(s) Oral two times a day  midodrine. 5 milliGRAM(s) Oral <User Schedule>  tamsulosin 0.4 milliGRAM(s) Oral at bedtime  tiotropium 18 MICROgram(s) Capsule 1 Capsule(s) Inhalation daily      =======================================================  Labs:                        10.2   10.43 )-----------( 268      ( 20 Dec 2020 12:49 )             31.7      12-20    140  |  103  |  18.0  ----------------------------<  151<H>  4.0   |  27.0  |  0.42<L>    Ca    9.2      20 Dec 2020 03:17        Culture - Sputum (collected 12-19-20 @ 16:18)  Source: .Sputum Sputum  Gram Stain (12-19-20 @ 22:21):    Few polymorphonuclear leukocytes per low power field    Rare Squamous epithelial cells per low power field    Few Yeast like cells per oil power field    Few Gram Positive Cocci in Pairs and Chains per oil power field    Rare Gram Positive Rods per oil power field    Culture - Urine (collected 12-19-20 @ 01:11)  Source: .Urine Clean Catch (Midstream)  Final Report (12-19-20 @ 20:00):    No growth    Culture - Blood (collected 12-18-20 @ 03:44)  Source: .Blood Blood-Peripheral    Culture - Blood (collected 12-18-20 @ 03:44)  Source: .Blood Blood-Peripheral    Legionella Antigen, Urine: Negative (12-19-20 @ 03:55)    Creatinine, Serum: 0.42 mg/dL (12-20-20 @ 03:17)  Creatinine, Serum: 0.41 mg/dL (12-18-20 @ 03:44)  Creatinine, Serum: 0.48 mg/dL (12-17-20 @ 18:14)    Procalcitonin, Serum: 0.10 ng/mL (12-17-20 @ 18:14)    D-Dimer Assay, Quantitative: 206 ng/mL DDU (12-17-20 @ 18:14)    Ferritin, Serum: 212 ng/mL (12-17-20 @ 18:14)    C-Reactive Protein, Serum: 1.75 mg/dL (12-17-20 @ 18:14)    WBC Count: 10.43 K/uL (12-20-20 @ 12:49)  WBC Count: 10.26 K/uL (12-18-20 @ 03:44)  WBC Count: 12.95 K/uL (12-17-20 @ 18:14)    SARS-CoV-2: NotDetec (12-18-20 @ 10:24)  Rapid RVP Result: NotDetec (12-18-20 @ 10:24)  SARS-CoV-2: NotDetec (12-17-20 @ 18:16)  Rapid RVP Result: NotDetec (12-17-20 @ 18:16)    COVID-19 IgG Antibody Index: <0.10 Index (12-18-20 @ 07:18)  COVID-19 IgG Antibody Interpretation: Negative (12-18-20 @ 07:18)    Lactate Dehydrogenase, Serum: 231 U/L (12-17-20 @ 18:14)    Alkaline Phosphatase, Serum: 91 U/L (12-17-20 @ 18:14)  Alanine Aminotransferase (ALT/SGPT): 16 U/L (12-17-20 @ 18:14)  Aspartate Aminotransferase (AST/SGOT): 21 U/L (12-17-20 @ 18:14)  Bilirubin Total, Serum: 0.4 mg/dL (12-17-20 @ 18:14)    < from: CT Chest No Cont (12.17.20 @ 20:59) >     EXAM:  CT CHEST                          PROCEDURE DATE:  12/17/2020          INTERPRETATION:  CLINICAL INFORMATION: Pneumonia.    COMPARISON: 2/6/2020    PROCEDURE:  CT of the Chest was performed without intravenous contrast.  Sagittal and coronal reformats were performed.    FINDINGS:    LUNGS AND AIRWAYS: Patent central airways.  Faint ill-defined groundglass opacities in the left lower lobe as well as in the lingula. The left lung is otherwise clear. There is a small ill-defined infiltratein the region of the superior segment of the right lower lobe. The right lung is otherwise clear.  PLEURA: No pleural effusion.  MEDIASTINUM AND BRAD: No lymphadenopathy.  VESSELS: Atherosclerotic changes of the thoracic aorta without evidence of aortic aneurysm.  HEART: Heart size is normal. No pericardial effusion.  CHEST WALL AND LOWER NECK: Within normal limits.  VISUALIZED UPPER ABDOMEN: Within normal limits.  BONES: Poststernotomy changes. There is a neurostimulator in the thoracic spine. Degenerative changes in the thoracic spine.    IMPRESSION:  Faint ill-defined small infiltrates in the left lower lobe and lingula. Small infiltrate in the superior segment of the right lower lobe.         EDDI PIPER MD; Attending Radiologist  This document has been electronically signed. Dec 17 2020  9:03PM    < end of copied text >  
[facility]  INFECTIOUS DISEASES AND INTERNAL MEDICINE at [place]  =======================================================  Stephen Lala MD  Diplomates American Board of Internal Medicine and Infectious Diseases  Tel  [phone]  Fax [phone]  =======================================================    [id number]  JENNIFER MOORE  follow up:  PNA    WBC normalizing  no fevers      =======================================================    REVIEW OF SYSTEMS:  CONSTITUTIONAL:  No Fever or chills  HEENT:  No diplopia or blurred vision.  No earache, sore throat or runny nose.  CARDIOVASCULAR:  No pressure, squeezing, strangling, tightness, heaviness or aching about the chest, neck, axilla or epigastrium.  RESPIRATORY:  POSITIVE cough, shortness of breath  GASTROINTESTINAL:  No nausea, vomiting or diarrhea.  GENITOURINARY:  No dysuria, frequency or urgency. No Blood in urine  MUSCULOSKELETAL:  no joint aches, no muscle pain  SKIN:  No change in skin, hair or nails.  NEUROLOGIC:  No Headaches, seizures or weakness.  PSYCHIATRIC:  No disorder of thought or mood.  ENDOCRINE:  No heat or cold intolerance  HEMATOLOGICAL:  No easy bruising or bleeding.    =======================================================  Allergies  codeine (Urticaria)  ibuprofen (Anaphylaxis)          ======================================================  Physical Exam:  ============     General:  No acute distress.  Eye: Pupils are equal, round and reactive to light, Extraocular movements are intact, Normal conjunctiva.  HENT: Normocephalic, Oral mucosa is moist, No pharyngeal erythema, No sinus tenderness.  Neck: Supple, No lymphadenopathy.  Respiratory: Lungs are clear to auscultation, Respirations are non-labored.  NO  EGOPHONY  Cardiovascular: Normal rate, Regular rhythm,   Gastrointestinal: Soft, Non-tender, Non-distended, Normal bowel sounds.  Genitourinary: No costovertebral angle tenderness.  Lymphatics: No lymphadenopathy neck,   Musculoskeletal: Normal range of motion, Normal strength.  Integumentary: No rash.  Neurologic: Alert, Oriented, No focal deficits, Cranial Nerves II-XII are grossly intact.  Psychiatric: Appropriate mood & affect.    =======================================================  Vitals:  ============  T(F): 97.7 (19 Dec 2020 13:08), Max: 98.6 (18 Dec 2020 15:57)  HR: 93 (19 Dec 2020 13:08)  BP: 120/71 (19 Dec 2020 13:08)  RR: 19 (19 Dec 2020 13:08)  SpO2: 95% (19 Dec 2020 13:08) (95% - 98%)  temp max in last 48H T(F): , Max: 99.4 (12-17-20 @ 16:58)    =======================================================  Current Antibiotics:  azithromycin  IVPB 500 milliGRAM(s) IV Intermittent every 24 hours  cefepime   IVPB 2000 milliGRAM(s) IV Intermittent every 8 hours    Other medications:  ALBUTerol    90 MICROgram(s) HFA Inhaler 1 Puff(s) Inhalation every 4 hours  albuterol/ipratropium for Nebulization 3 milliLiter(s) Nebulizer every 6 hours  aspirin  chewable 81 milliGRAM(s) Oral daily  atorvastatin 40 milliGRAM(s) Oral at bedtime  enoxaparin Injectable 40 milliGRAM(s) SubCutaneous daily  gabapentin 400 milliGRAM(s) Oral three times a day  lactobacillus acidophilus 1 Tablet(s) Oral three times a day with meals  methylPREDNISolone sodium succinate Injectable 40 milliGRAM(s) IV Push two times a day  metoprolol tartrate 25 milliGRAM(s) Oral two times a day  midodrine. 5 milliGRAM(s) Oral <User Schedule>  tamsulosin 0.4 milliGRAM(s) Oral at bedtime  tiotropium 18 MICROgram(s) Capsule 1 Capsule(s) Inhalation daily      =======================================================  Labs:                        11.0   10.26 )-----------( 239      ( 18 Dec 2020 03:44 )             34.2      12-18    140  |  105  |  10.0  ----------------------------<  147<H>  4.1   |  27.0  |  0.41<L>    Ca    9.0      18 Dec 2020 03:44    TPro  6.4<L>  /  Alb  4.0  /  TBili  0.4  /  DBili  x   /  AST  21  /  ALT  16  /  AlkPhos  91  12-17      Creatinine, Serum: 0.41 mg/dL (12-18-20 @ 03:44)  Creatinine, Serum: 0.48 mg/dL (12-17-20 @ 18:14)    Procalcitonin, Serum: 0.10 ng/mL (12-17-20 @ 18:14)    D-Dimer Assay, Quantitative: 206 ng/mL DDU (12-17-20 @ 18:14)    Ferritin, Serum: 212 ng/mL (12-17-20 @ 18:14)    C-Reactive Protein, Serum: 1.75 mg/dL (12-17-20 @ 18:14)    WBC Count: 10.26 K/uL (12-18-20 @ 03:44)  WBC Count: 12.95 K/uL (12-17-20 @ 18:14)    SARS-CoV-2: NotDetec (12-18-20 @ 10:24)  Rapid RVP Result: NotDetec (12-18-20 @ 10:24)  SARS-CoV-2: NotDetec (12-17-20 @ 18:16)  Rapid RVP Result: NotDetec (12-17-20 @ 18:16)    COVID-19 IgG Antibody Index: <0.10 Index (12-18-20 @ 07:18)  COVID-19 IgG Antibody Interpretation: Negative (12-18-20 @ 07:18)    Lactate Dehydrogenase, Serum: 231 U/L (12-17-20 @ 18:14)    Alkaline Phosphatase, Serum: 91 U/L (12-17-20 @ 18:14)  Alanine Aminotransferase (ALT/SGPT): 16 U/L (12-17-20 @ 18:14)  Aspartate Aminotransferase (AST/SGOT): 21 U/L (12-17-20 @ 18:14)  Bilirubin Total, Serum: 0.4 mg/dL (12-17-20 @ 18:14)        < from: CT Chest No Cont (12.17.20 @ 20:59) >     EXAM:  CT CHEST                          PROCEDURE DATE:  12/17/2020          INTERPRETATION:  CLINICAL INFORMATION: Pneumonia.    COMPARISON: 2/6/2020    PROCEDURE:  CT of the Chest was performed without intravenous contrast.  Sagittal and coronal reformats were performed.    FINDINGS:    LUNGS AND AIRWAYS: Patent central airways.  Faint ill-defined groundglass opacities in the left lower lobe as well as in the lingula. The left lung is otherwise clear. There is a small ill-defined infiltratein the region of the superior segment of the right lower lobe. The right lung is otherwise clear.  PLEURA: No pleural effusion.  MEDIASTINUM AND BRAD: No lymphadenopathy.  VESSELS: Atherosclerotic changes of the thoracic aorta without evidence of aortic aneurysm.  HEART: Heart size is normal. No pericardial effusion.  CHEST WALL AND LOWER NECK: Within normal limits.  VISUALIZED UPPER ABDOMEN: Within normal limits.  BONES: Poststernotomy changes. There is a neurostimulator in the thoracic spine. Degenerative changes in the thoracic spine.    IMPRESSION:  Faint ill-defined small infiltrates in the left lower lobe and lingula. Small infiltrate in the superior segment of the right lower lobe.         EDDI PIPER MD; Attending Radiologist  This document has been electronically signed. Dec 17 2020  9:03PM    < end of copied text >  
HPI  Pt is a 76yo M admitted to [facility] for bilateral pneumonia.   Pt was seen and examined at bedside. Overnight pt had left chest pain. Pt states it is still there but improved. Pt also feel more anxiety then normal and is giving low dose xanax        Vital Signs Last 24 Hrs  T(C): 36.8 (20 Dec 2020 04:18), Max: 37.4 (19 Dec 2020 19:34)  T(F): 98.2 (20 Dec 2020 04:18), Max: 99.4 (19 Dec 2020 19:34)  HR: 88 (20 Dec 2020 04:18) (75 - 93)  BP: 130/74 (20 Dec 2020 04:18) (120/71 - 150/65)  BP(mean): --  RR: 19 (20 Dec 2020 04:18) (19 - 19)  SpO2: 96% (20 Dec 2020 08:01) (93% - 99%)    I&O's Summary      CAPILLARY BLOOD GLUCOSE          PHYSICAL EXAM:    GENERAL: NAD, nontoxic appearing  HEENT - EOMI  NECK: Supple, No JVD  CHEST/LUNG: unlabored, good effort, CTA bilat.; no wheezes, rales or rhonchi  HEART: +S1,S2, RRR; no murmurs appreciated  ABDOMEN: Soft, Nontender, Nondistended; Bowel sounds present  EXTREMITIES:  2+ Peripheral Pulses, No clubbing, cyanosis, edema, calf tenderness  NEURO:  No Focal deficits, sensory and motor intact  SKIN: warm, dry, intacted    MEDICATIONS:  MEDICATIONS  (STANDING):  aspirin  chewable 81 milliGRAM(s) Oral daily  atorvastatin 40 milliGRAM(s) Oral at bedtime  azithromycin  IVPB 500 milliGRAM(s) IV Intermittent every 24 hours  cefepime   IVPB 2000 milliGRAM(s) IV Intermittent every 8 hours  enoxaparin Injectable 40 milliGRAM(s) SubCutaneous daily  gabapentin 400 milliGRAM(s) Oral three times a day  lactobacillus acidophilus 1 Tablet(s) Oral three times a day with meals  lidocaine   Patch 1 Patch Transdermal daily  methylPREDNISolone sodium succinate Injectable 40 milliGRAM(s) IV Push two times a day  metoprolol tartrate 25 milliGRAM(s) Oral two times a day  midodrine. 5 milliGRAM(s) Oral <User Schedule>  tamsulosin 0.4 milliGRAM(s) Oral at bedtime  tiotropium 18 MICROgram(s) Capsule 1 Capsule(s) Inhalation daily      LABS: All Labs Reviewed:    12-20    140  |  103  |  18.0  ----------------------------<  151<H>  4.0   |  27.0  |  0.42<L>    Ca    9.2      20 Dec 2020 03:17        CARDIAC MARKERS ( 20 Dec 2020 03:17 )  x     / <0.01 ng/mL / x     / x     / x          Blood Culture: 12-19 @ 16:18  Organism --  Gram Stain Blood -- Gram Stain   Few polymorphonuclear leukocytes per low power field  Rare Squamous epithelial cells per low power field  Few Yeast like cells per oil power field  Few Gram Positive Cocci in Pairs and Chains per oil power field  Rare Gram Positive Rods per oil power field  Specimen Source .Sputum Sputum  Culture-Blood --    12-19 @ 01:11  Organism --  Gram Stain Blood -- Gram Stain --  Specimen Source .Urine Clean Catch (Midstream)  Culture-Blood --    12-18 @ 03:44  Organism --  Gram Stain Blood -- Gram Stain --  Specimen Source .Blood Blood-Peripheral  Culture-Blood --        RADIOLOGY/EKG:    DVT PPX:    ADVANCED DIRECTIVE:    DISPOSITION:
PULMONARY PROGRESS NOTE      [id number]    Patient is a 75y old  Male who presents with a chief complaint of pneumonia (19 Dec 2020 12:23)  76 y/o male who was admitted at [facility] on  for pneumonia came back to the ER for past 2 days of increasing cough with phlegm, sob and wheeze in the chest, last night pt. got sig. cough and wheeze, used his inhaler and felt better but not at his baseline so came to the ER, no sick contact, no recent travel. no cp, no abd. pain, no fever. no n/v/d. pt. got iv antibiotics in the ER, solumedrol 125 mg , breathing treatment and feels a little better. pt. reports no swallowing difficulty. pt's covid-19 and rvp negative in the ER.  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 - followed by Dr Fuller   Scheduled for COVID PCR and PFT next week    INTERVAL HPI/OVERNIGHT EVENTS:  Unable to sleep  Chest pain  High BP  Unhappy in ED >48hrs     MEDICATIONS  (STANDING):  ALBUTerol    90 MICROgram(s) HFA Inhaler 1 Puff(s) Inhalation every 4 hours  albuterol/ipratropium for Nebulization 3 milliLiter(s) Nebulizer every 6 hours  aspirin  chewable 81 milliGRAM(s) Oral daily  atorvastatin 40 milliGRAM(s) Oral at bedtime  azithromycin  IVPB 500 milliGRAM(s) IV Intermittent every 24 hours  cefepime   IVPB 2000 milliGRAM(s) IV Intermittent every 8 hours  enoxaparin Injectable 40 milliGRAM(s) SubCutaneous daily  gabapentin 400 milliGRAM(s) Oral three times a day  lactobacillus acidophilus 1 Tablet(s) Oral three times a day with meals  methylPREDNISolone sodium succinate Injectable 40 milliGRAM(s) IV Push two times a day  metoprolol tartrate 25 milliGRAM(s) Oral two times a day  midodrine. 5 milliGRAM(s) Oral <User Schedule>  tamsulosin 0.4 milliGRAM(s) Oral at bedtime  tiotropium 18 MICROgram(s) Capsule 1 Capsule(s) Inhalation daily      MEDICATIONS  (PRN):  acetaminophen   Tablet .. 650 milliGRAM(s) Oral every 6 hours PRN Temp greater or equal to 38C (100.4F), Mild Pain (1 - 3), Moderate Pain (4 - 6)  ALBUTerol    0.083% 2.5 milliGRAM(s) Nebulizer every 2 hours PRN Shortness of Breath and/or Wheezing  oxycodone    5 mG/acetaminophen 325 mG 2 Tablet(s) Oral every 8 hours PRN Severe Pain (7 - 10)      Allergies    codeine (Urticaria)  ibuprofen (Anaphylaxis)    Intolerances    amoxicillin (Diarrhea)      PAST MEDICAL & SURGICAL HISTORY:  HLD (hyperlipidemia)    HTN (hypertension)    Lumbosacral disc disease  has nerve stimulator    Arthritis    CAD in native artery  RCA 3 YAYA 1 in  and 2 in     S/P CABG (coronary artery bypass graft)    S/P cataract surgery  b/l eyes 2016    H/O heart artery stent  RCA        SOCIAL HISTORY  Smoking History:       REVIEW OF SYSTEMS:    CONSTITUTIONAL:  Mild distress    HEENT:  Eyes:  No diplopia or blurred vision. ENT:  No earache, sore throat or runny nose.    CARDIOVASCULAR:  No palpitations.    RESPIRATORY:  No cough, shortness of breath, PND or orthopnea. Mild SOBOE    GASTROINTESTINAL:  No nausea, vomiting or diarrhea.    GENITOURINARY:  No dysuria, frequency or urgency.    NEUROLOGIC:  No paresthesias, fasciculations, seizures or weakness.    PSYCHIATRIC:  No disorder of thought or mood.    Vital Signs Last 24 Hrs  T(C): 36.8 (20 Dec 2020 04:18), Max: 37.4 (19 Dec 2020 19:34)  T(F): 98.2 (20 Dec 2020 04:18), Max: 99.4 (19 Dec 2020 19:34)  HR: 88 (20 Dec 2020 04:18) (75 - 93)  BP: 130/74 (20 Dec 2020 04:18) (120/71 - 150/65)  BP(mean): --  RR: 19 (20 Dec 2020 04:18) (19 - 19)  SpO2: 98% (20 Dec 2020 04:18) (93% - 99%)    PHYSICAL EXAMINATION:    GENERAL: The patient is awake and alert in no apparent distress.     HEENT: Head is normocephalic and atraumatic. Extraocular muscles are intact. Mucous membranes are moist.    NECK: Supple.    LUNGS: Clear to auscultation without wheezing, rales or rhonchi; respirations unlabored    HEART: Regular rate and rhythm without murmur.    ABDOMEN: Soft, nontender, and nondistended.      EXTREMITIES: Without any cyanosis, clubbing, rash, lesions or edema.    NEUROLOGIC: Grossly intact.    LABS:        140  |  103  |  18.0  ----------------------------<  151<H>  4.0   |  27.0  |  0.42<L>    Ca    9.2      20 Dec 2020 03:17        Urinalysis Basic - ( 18 Dec 2020 20:40 )    Color: Yellow / Appearance: Clear / S.015 / pH: x  Gluc: x / Ketone: Trace  / Bili: Negative / Urobili: Negative mg/dL   Blood: x / Protein: 15 mg/dL / Nitrite: Negative   Leuk Esterase: Trace / RBC: Negative /HPF / WBC 0-2   Sq Epi: x / Non Sq Epi: Occasional / Bacteria: x        CARDIAC MARKERS ( 20 Dec 2020 03:17 )  x     / <0.01 ng/mL / x     / x     / x            Serum Pro-Brain Natriuretic Peptide: 471 pg/mL (20 @ 18:14)      Procalcitonin, Serum: 0.10 ng/mL (20 @ 18:14)      MICROBIOLOGY:  SARS-CoV-2 and RVP neg     RADIOLOGY & ADDITIONAL STUDIES:    CT Chest on 20  IMPRESSION:  Faint ill-defined small infiltrates in the left lower lobe and lingula. Small infiltrate in the superior segment of the right lower lobe.      EDDI PIPER MD; Attending Radiologist  This document has been electronically signed. Dec 17 2020  9:03PM      
HPI  Pt is a 74yo M admitted to [facility] for bilateral pneumonia.   Pt was seen and examined at bedside. Pt states he has more phlegm today then yesterday.      Vital Signs Last 24 Hrs  T(C): 36.9 (19 Dec 2020 08:12), Max: 37 (18 Dec 2020 15:57)  T(F): 98.4 (19 Dec 2020 08:12), Max: 98.6 (18 Dec 2020 15:57)  HR: 76 (19 Dec 2020 09:26) (76 - 98)  BP: 128/72 (19 Dec 2020 08:12) (111/59 - 136/72)  BP(mean): --  RR: 19 (19 Dec 2020 08:12) (19 - 20)  SpO2: 95% (19 Dec 2020 08:12) (95% - 98%)    I&O's Summary      CAPILLARY BLOOD GLUCOSE          PHYSICAL EXAM:    GENERAL: NAD, nontoxic appearing  HEENT - EOMI  NECK: Supple, No JVD  CHEST/LUNG: unlabored, good effort, CTA bilat.; no wheezes, rales or rhonchi  HEART: +S1,S2, RRR; no murmurs appreciated  ABDOMEN: Soft, Nontender, Nondistended; Bowel sounds present  EXTREMITIES:  2+ Peripheral Pulses, No clubbing, cyanosis, edema, calf tenderness  NEURO:  No Focal deficits, sensory and motor intact  SKIN: warm, dry, intacted    MEDICATIONS:  MEDICATIONS  (STANDING):  ALBUTerol    90 MICROgram(s) HFA Inhaler 1 Puff(s) Inhalation every 4 hours  albuterol/ipratropium for Nebulization 3 milliLiter(s) Nebulizer every 6 hours  aspirin  chewable 81 milliGRAM(s) Oral daily  atorvastatin 40 milliGRAM(s) Oral at bedtime  azithromycin  IVPB 500 milliGRAM(s) IV Intermittent every 24 hours  cefepime   IVPB 2000 milliGRAM(s) IV Intermittent every 8 hours  enoxaparin Injectable 40 milliGRAM(s) SubCutaneous daily  gabapentin 400 milliGRAM(s) Oral three times a day  lactobacillus acidophilus 1 Tablet(s) Oral three times a day with meals  methylPREDNISolone sodium succinate Injectable 40 milliGRAM(s) IV Push every 8 hours  metoprolol tartrate 25 milliGRAM(s) Oral two times a day  midodrine. 5 milliGRAM(s) Oral <User Schedule>  tamsulosin 0.4 milliGRAM(s) Oral at bedtime  tiotropium 18 MICROgram(s) Capsule 1 Capsule(s) Inhalation daily      LABS: All Labs Reviewed:                        11.0   10.26 )-----------( 239      ( 18 Dec 2020 03:44 )             34.2     12-18    140  |  105  |  10.0  ----------------------------<  147<H>  4.1   |  27.0  |  0.41<L>    Ca    9.0      18 Dec 2020 03:44    TPro  6.4<L>  /  Alb  4.0  /  TBili  0.4  /  DBili  x   /  AST  21  /  ALT  16  /  AlkPhos  91  12-17    PT/INR - ( 17 Dec 2020 18:14 )   PT: 12.7 sec;   INR: 1.10 ratio         PTT - ( 17 Dec 2020 18:14 )  PTT:31.8 sec  CARDIAC MARKERS ( 17 Dec 2020 18:14 )  x     / <0.01 ng/mL / 67 U/L / x     / x          Blood Culture:     RADIOLOGY/EKG:    DVT PPX:    ADVANCED DIRECTIVE:    DISPOSITION:
PULMONARY Progress NOTE      JENNIFER MOOREK[facility]-[id number]    Patient is a 75y old  Male who presents with a chief complaint of pneumonia (17 Dec 2020 23:24)      HISTORY OF PRESENT ILLNESS:    74 y/o male who was admitted at [facility] on october 28 for pneumonia came back to the ER for past 2 days of increasing cough with phlegm, sob and wheeze in the chest, last night pt. got sig. cough and wheeze, used his inhaler and felt better but not at his baseline so came to the ER, no sick contact, no recent travel. no cp, no abd. pain, no fever. no n/v/d. pt. got iv antibiotics in the ER, solumedrol 125 mg , breathing treatment and feels a little better. pt. reports no swallowing difficulty. pt's covid-19 and rvp negative in the ER.  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 - followed by Dr Fuller   Scheduled for COVID PCR and PFT next week    Interval Hx  Much improved  Doesn't want to go home and return again quickly      MEDICATIONS  (STANDING):  ALBUTerol    90 MICROgram(s) HFA Inhaler 1 Puff(s) Inhalation every 4 hours  albuterol/ipratropium for Nebulization 3 milliLiter(s) Nebulizer every 6 hours  aspirin  chewable 81 milliGRAM(s) Oral daily  atorvastatin 40 milliGRAM(s) Oral at bedtime  cefepime   IVPB 2000 milliGRAM(s) IV Intermittent every 8 hours  enoxaparin Injectable 40 milliGRAM(s) SubCutaneous daily  gabapentin 400 milliGRAM(s) Oral three times a day  lactobacillus acidophilus 1 Tablet(s) Oral three times a day with meals  methylPREDNISolone sodium succinate Injectable 40 milliGRAM(s) IV Push every 8 hours  metoprolol tartrate 25 milliGRAM(s) Oral two times a day  midodrine. 5 milliGRAM(s) Oral <User Schedule>  tamsulosin 0.4 milliGRAM(s) Oral at bedtime  tiotropium 18 MICROgram(s) Capsule 1 Capsule(s) Inhalation daily  vancomycin  IVPB 1250 milliGRAM(s) IV Intermittent every 12 hours      MEDICATIONS  (PRN):  acetaminophen   Tablet .. 650 milliGRAM(s) Oral every 6 hours PRN Temp greater or equal to 38C (100.4F), Mild Pain (1 - 3), Moderate Pain (4 - 6)  ALBUTerol    0.083% 2.5 milliGRAM(s) Nebulizer every 2 hours PRN Shortness of Breath and/or Wheezing  oxycodone    5 mG/acetaminophen 325 mG 2 Tablet(s) Oral every 8 hours PRN Severe Pain (7 - 10)      Allergies    codeine (Urticaria)  ibuprofen (Anaphylaxis)    Intolerances    amoxicillin (Diarrhea)      PAST MEDICAL & SURGICAL HISTORY:  HLD (hyperlipidemia)    HTN (hypertension)    Lumbosacral disc disease  has nerve stimulator    Arthritis    CAD in native artery  RCA 3 YAYA 1 in 2002 and 2 in 2019    S/P CABG (coronary artery bypass graft)    S/P cataract surgery  b/l eyes 2016    H/O heart artery stent  RCA        FAMILY HISTORY:  FH: colon cancer        SOCIAL HISTORY  Smoking History: Former smoker    REVIEW OF SYSTEMS:    CONSTITUTIONAL:  No  chills, sweats    HEENT:  Eyes:  No diplopia or blurred vision. ENT:  No earache, sore throat or runny nose.    CARDIOVASCULAR:  No pressure, squeezing, tightness, or heaviness about the chest; no palpitations.    RESPIRATORY:  No PND or orthopnea. Mild SOBOE    GASTROINTESTINAL:  No abdominal pain, nausea, vomiting or diarrhea.    GENITOURINARY:  No dysuria, frequency or urgency.    NEUROLOGIC:  No paresthesias, fasciculations, seizures or weakness.    PSYCHIATRIC:  No disorder of thought or mood.    Vital Signs Last 24 Hrs  T(C): 36.5 (18 Dec 2020 05:50), Max: 37.4 (17 Dec 2020 16:58)  T(F): 97.7 (18 Dec 2020 05:50), Max: 99.4 (17 Dec 2020 16:58)  HR: 92 (18 Dec 2020 05:50) (82 - 116)  BP: 128/67 (18 Dec 2020 05:50) (109/58 - 135/75)  BP(mean): --  RR: 20 (18 Dec 2020 05:50) (17 - 20)  SpO2: 97% (18 Dec 2020 05:50) (88% - 98%)    PHYSICAL EXAMINATION:    GENERAL: The patient is a well-developed, well-nourished _____in no apparent distress.     HEENT: Head is normocephalic and atraumatic. Extraocular muscles are intact. Mucous membranes are moist.     NECK: Supple.     LUNGS: Clear to auscultation without wheezing, rales, or rhonchi. Respirations unlabored    HEART: Regular rate and rhythm without murmur.    ABDOMEN: Soft, nontender, and nondistended.  No hepatosplenomegaly is noted.    EXTREMITIES: Without any cyanosis, clubbing, rash, lesions or edema.    NEUROLOGIC: Grossly intact.      LABS:                        11.0   10.26 )-----------( 239      ( 18 Dec 2020 03:44 )             34.2     12-18    140  |  105  |  10.0  ----------------------------<  147<H>  4.1   |  27.0  |  0.41<L>    Ca    9.0      18 Dec 2020 03:44    TPro  6.4<L>  /  Alb  4.0  /  TBili  0.4  /  DBili  x   /  AST  21  /  ALT  16  /  AlkPhos  91  12-17    PT/INR - ( 17 Dec 2020 18:14 )   PT: 12.7 sec;   INR: 1.10 ratio         PTT - ( 17 Dec 2020 18:14 )  PTT:31.8 sec      CARDIAC MARKERS ( 17 Dec 2020 18:14 )  x     / <0.01 ng/mL / 67 U/L / x     / x          D-Dimer Assay, Quantitative: 206 ng/mL DDU (12-17-20 @ 18:14)    Serum Pro-Brain Natriuretic Peptide: 471 pg/mL (12-17-20 @ 18:14)      Procalcitonin, Serum: 0.10 ng/mL (12-17-20 @ 18:14)      MICROBIOLOGY: SARS-CoV-2 and RVP neg     RADIOLOGY & ADDITIONAL STUDIES:    CT Chest on 12/17/20  IMPRESSION:  Faint ill-defined small infiltrates in the left lower lobe and lingula. Small infiltrate in the superior segment of the right lower lobe.      EDDI PIPER MD; Attending Radiologist  This document has been electronically signed. Dec 17 2020  9:03PM

## 2020-12-21 NOTE — DISCHARGE NOTE PROVIDER - CARE PROVIDERS DIRECT ADDRESSES
,[email or web address] Point [email or web address] [email or web address]

## 2020-12-21 NOTE — DISCHARGE NOTE PROVIDER - NSDCCPCAREPLAN_GEN_ALL_CORE_FT
PRINCIPAL DISCHARGE DIAGNOSIS  Diagnosis: Acute respiratory failure with hypoxia  Assessment and Plan of Treatment: resolved. due to POA pneumonia and COPD. F/u pulm and PMD. Cont steroids and respiratory treatments      SECONDARY DISCHARGE DIAGNOSES  Diagnosis: HTN (hypertension)  Assessment and Plan of Treatment: Continue medications as prescribed. Please follow up with your primary care physician and cardiology within 1 week.    Diagnosis: CAD (coronary atherosclerotic disease)  Assessment and Plan of Treatment: CAD s/p CABG. To continue BB,statin and asprin and to Please follow up with your primary care physician and cardiology within 1 week.    Diagnosis: Pneumonia  Assessment and Plan of Treatment: s/p antibiotics, Please follow up with your primary care physician and pulmonology within 1 week.

## 2020-12-21 NOTE — DISCHARGE NOTE PROVIDER - HOSPITAL COURSE
Pt is a 76 y/o male who was previously  admitted at [facility] on 10/28/20 for pneumonia presented to the ED with increasing cough with phlegm, sob and wheeze in the chest. In the ED, patient recieved 2100ml LR, vanco, Zithromax and Maxipime pt also received solumedrol 125 mg , duonebs, which improved his breathing. Negative covid-19 and RVP. Pt will be admitted for HCAP, COPD exacerbation. Started on IV cefepime and Azithromycin. Blood neg to date, sputum negative. urine legionella negative. Pulmonology consulted and Pt to follow up upon discharge. Steroid taper and to continue duonebs and inhaler upon dischrage. Chest pain workup negative, trop neg, EKG no changes, secondary to costochondritis, improved.       Pt is a 76 y/o male who was previously  admitted at [facility] on 10/28/20 for pneumonia presented to the ED with increasing cough with phlegm, sob and wheeze in the chest. In the ED, patient recieved 2100ml LR, vanco, Zithromax and Maxipime pt also received solumedrol 125 mg , duonebs, which improved his breathing. Negative covid-19 and RVP. Pt will be admitted for HCAP, COPD exacerbation. Started on IV cefepime and Azithromycin. Blood neg to date, sputum negative. urine legionella negative. Pulmonology consulted and Pt to follow up upon discharge. Steroid taper and to continue duonebs and inhaler upon dischrage. Chest pain workup negative, trop neg, EKG no changes, secondary to costochondritis, improved.       Vital Signs Last 24 Hrs  T(C): 36.7 (22 Dec 2020 08:25), Max: 36.7 (21 Dec 2020 22:16)  T(F): 98.1 (22 Dec 2020 08:25), Max: 98.1 (21 Dec 2020 22:16)  HR: 80 (22 Dec 2020 08:25) (80 - 86)  BP: 158/90 (22 Dec 2020 08:25) (137/82 - 158/90)  RR: 18 (22 Dec 2020 08:25) (18 - 18)  SpO2: 94% (22 Dec 2020 08:25) (93% - 97%)    PHYSICAL EXAM:  GENERAL: Pt lying in bed comfortably in NAD  HEAD:  Atraumatic   EYES: EOMI, PERRL, conjunctiva and sclera clear  ENT: Moist mucous membranes  NECK: Supple, No JVD  CHEST/LUNG: Clear to auscultation bilaterally; No rales, rhonchi, wheezing or rubs. Unlabored respirations  HEART: Regular rate and rhythm; No murmurs, rubs, or gallops  ABDOMEN: Bowel sounds present; Soft, Nontender, Nondistended. No guarding or rigidity    EXTREMITIES:  2+ Peripheral Pulses, brisk capillary refill. No clubbing, cyanosis, or edema  NERVOUS SYSTEM:  Alert & Oriented X3, speech clear. Answers questions appropriately. Full and equal strength B/L upper and lower extremities. No deficits   MSK: FROM x 4 extremities   SKIN: No rashes or lesions    Disposition: Stable for discharge. Outpatient followup discussed and to take medications as prescribed.  Discharge planning time 35 minutes.

## 2020-12-21 NOTE — DISCHARGE NOTE PROVIDER - NSDCMRMEDTOKEN_GEN_ALL_CORE_FT
albuterol 90 mcg/inh inhalation aerosol: 1 puff(s) inhaled 4 times a day, As Needed   Anoro Ellipta 62.5 mcg-25 mcg/inh inhalation powder: 1 puff(s) inhaled once a day  aspirin 81 mg oral tablet, chewable: 1 tab(s) orally once a day MDD:1  atorvastatin 40 mg oral tablet: 1 tab(s) orally once a day (at bedtime)  cholecalciferol oral tablet: 5000 unit(s) orally once a day  Flomax 0.4 mg oral capsule: 1 cap(s) orally once a day  gabapentin 400 mg oral capsule: 1 cap(s) orally 3 times a day  metoprolol tartrate 25 mg oral tablet: 1 tab(s) orally 2 times a day  midodrine 5 mg oral tablet: 1 tab(s) orally 2 times a day  Percocet 10/325 oral tablet: 1 tab(s) orally 3 times a day, As Needed   acetaminophen 325 mg oral tablet: 2 tab(s) orally every 6 hours, As needed, Temp greater or equal to 38C (100.4F), Mild Pain (1 - 3), Moderate Pain (4 - 6)  albuterol 90 mcg/inh inhalation aerosol: 1 puff(s) inhaled 4 times a day, As Needed   Anoro Ellipta 62.5 mcg-25 mcg/inh inhalation powder: 1 puff(s) inhaled once a day  aspirin 81 mg oral tablet, chewable: 1 tab(s) orally once a day MDD:1  atorvastatin 40 mg oral tablet: 1 tab(s) orally once a day (at bedtime)  cholecalciferol oral tablet: 5000 unit(s) orally once a day  Flomax 0.4 mg oral capsule: 1 cap(s) orally once a day  gabapentin 400 mg oral capsule: 1 cap(s) orally 3 times a day  metoprolol tartrate 25 mg oral tablet: 1 tab(s) orally 2 times a day  Percocet 10/325 oral tablet: 1 tab(s) orally 3 times a day, As Needed   acetaminophen 325 mg oral tablet: 2 tab(s) orally every 6 hours, As needed, Temp greater or equal to 38C (100.4F), Mild Pain (1 - 3), Moderate Pain (4 - 6)  albuterol 90 mcg/inh inhalation aerosol: 1 puff(s) inhaled 4 times a day, As Needed   Anoro Ellipta 62.5 mcg-25 mcg/inh inhalation powder: 1 puff(s) inhaled once a day  aspirin 81 mg oral tablet, chewable: 1 tab(s) orally once a day MDD:1  atorvastatin 40 mg oral tablet: 1 tab(s) orally once a day (at bedtime)  cefpodoxime 200 mg oral tablet: 1 tab(s) orally 2 times a day   cholecalciferol oral tablet: 5000 unit(s) orally once a day  Flomax 0.4 mg oral capsule: 1 cap(s) orally once a day  gabapentin 400 mg oral capsule: 1 cap(s) orally 3 times a day  metoprolol tartrate 25 mg oral tablet: 1 tab(s) orally 2 times a day  Percocet 10/325 oral tablet: 1 tab(s) orally 3 times a day, As Needed  predniSONE 50 mg oral tablet: 1 tab(s) orally once a day   Pulmicort Flexhaler 90 mcg/inh inhalation powder: 180 microgram(s) inhaled 2 times a day

## 2020-12-21 NOTE — PROGRESS NOTE ADULT - PROVIDER SPECIALTY LIST ADULT
Hospitalist
Hospitalist
Infectious Disease
Hospitalist
Infectious Disease
Pulmonology

## 2020-12-21 NOTE — DISCHARGE NOTE PROVIDER - PROVIDER TOKENS
done PROVIDER:[TOKEN:[[id number]],FOLLOWUP:[1 week]],PROVIDER:[TOKEN:[[id number]],FOLLOWUP:[1 week]],PROVIDER:[TOKEN:[[id number]],FOLLOWUP:[1 week]],PROVIDER:[TOKEN:[[id number]],FOLLOWUP:[1 week]]

## 2020-12-21 NOTE — DISCHARGE NOTE PROVIDER - NSDCFUADDAPPT_GEN_ALL_CORE_FT
STAR patient   Patient has a prescheduled appointment with Pulmonologist, Dr Epi Fuller  on January 1, 2020 @ 1:45 PM   Address: [address] [place] [address]

## 2020-12-22 ENCOUNTER — APPOINTMENT (OUTPATIENT)
Dept: PULMONOLOGY | Facility: CLINIC | Age: 75
End: 2020-12-22

## 2020-12-22 VITALS
SYSTOLIC BLOOD PRESSURE: 158 MMHG | HEART RATE: 80 BPM | TEMPERATURE: 98 F | OXYGEN SATURATION: 94 % | RESPIRATION RATE: 18 BRPM | DIASTOLIC BLOOD PRESSURE: 90 MMHG

## 2020-12-22 LAB
ALBUMIN SERPL ELPH-MCNC: 3.9 G/DL — SIGNIFICANT CHANGE UP (ref 3.3–5.2)
ALP SERPL-CCNC: 76 U/L — SIGNIFICANT CHANGE UP (ref 40–120)
ALT FLD-CCNC: 18 U/L — SIGNIFICANT CHANGE UP
ANION GAP SERPL CALC-SCNC: 11 MMOL/L — SIGNIFICANT CHANGE UP (ref 5–17)
AST SERPL-CCNC: 11 U/L — SIGNIFICANT CHANGE UP
BASOPHILS # BLD AUTO: 0 K/UL — SIGNIFICANT CHANGE UP (ref 0–0.2)
BASOPHILS NFR BLD AUTO: 0 % — SIGNIFICANT CHANGE UP (ref 0–2)
BILIRUB SERPL-MCNC: 0.3 MG/DL — LOW (ref 0.4–2)
BUN SERPL-MCNC: 22 MG/DL — HIGH (ref 8–20)
BURR CELLS BLD QL SMEAR: PRESENT — SIGNIFICANT CHANGE UP
CALCIUM SERPL-MCNC: 9.3 MG/DL — SIGNIFICANT CHANGE UP (ref 8.6–10.2)
CHLORIDE SERPL-SCNC: 104 MMOL/L — SIGNIFICANT CHANGE UP (ref 98–107)
CO2 SERPL-SCNC: 30 MMOL/L — HIGH (ref 22–29)
CREAT SERPL-MCNC: 0.49 MG/DL — LOW (ref 0.5–1.3)
ELLIPTOCYTES BLD QL SMEAR: SLIGHT — SIGNIFICANT CHANGE UP
EOSINOPHIL # BLD AUTO: 0 K/UL — SIGNIFICANT CHANGE UP (ref 0–0.5)
EOSINOPHIL NFR BLD AUTO: 0 % — SIGNIFICANT CHANGE UP (ref 0–6)
GLUCOSE SERPL-MCNC: 159 MG/DL — HIGH (ref 70–99)
HCT VFR BLD CALC: 38.2 % — LOW (ref 39–50)
HGB BLD-MCNC: 12.3 G/DL — LOW (ref 13–17)
LYMPHOCYTES # BLD AUTO: 0.47 K/UL — LOW (ref 1–3.3)
LYMPHOCYTES # BLD AUTO: 6.1 % — LOW (ref 13–44)
MAGNESIUM SERPL-MCNC: 2.2 MG/DL — SIGNIFICANT CHANGE UP (ref 1.6–2.6)
MANUAL SMEAR VERIFICATION: SIGNIFICANT CHANGE UP
MCHC RBC-ENTMCNC: 30.1 PG — SIGNIFICANT CHANGE UP (ref 27–34)
MCHC RBC-ENTMCNC: 32.2 GM/DL — SIGNIFICANT CHANGE UP (ref 32–36)
MCV RBC AUTO: 93.6 FL — SIGNIFICANT CHANGE UP (ref 80–100)
METAMYELOCYTES # FLD: 0.9 % — HIGH (ref 0–0)
MONOCYTES # BLD AUTO: 0.4 K/UL — SIGNIFICANT CHANGE UP (ref 0–0.9)
MONOCYTES NFR BLD AUTO: 5.2 % — SIGNIFICANT CHANGE UP (ref 2–14)
NEUTROPHILS # BLD AUTO: 6.65 K/UL — SIGNIFICANT CHANGE UP (ref 1.8–7.4)
NEUTROPHILS NFR BLD AUTO: 86.9 % — HIGH (ref 43–77)
OVALOCYTES BLD QL SMEAR: SLIGHT — SIGNIFICANT CHANGE UP
PLAT MORPH BLD: NORMAL — SIGNIFICANT CHANGE UP
PLATELET # BLD AUTO: 290 K/UL — SIGNIFICANT CHANGE UP (ref 150–400)
POIKILOCYTOSIS BLD QL AUTO: SLIGHT — SIGNIFICANT CHANGE UP
POTASSIUM SERPL-MCNC: 5.3 MMOL/L — SIGNIFICANT CHANGE UP (ref 3.5–5.3)
POTASSIUM SERPL-SCNC: 5.3 MMOL/L — SIGNIFICANT CHANGE UP (ref 3.5–5.3)
PROT SERPL-MCNC: 6.4 G/DL — LOW (ref 6.6–8.7)
RBC # BLD: 4.08 M/UL — LOW (ref 4.2–5.8)
RBC # FLD: 13.8 % — SIGNIFICANT CHANGE UP (ref 10.3–14.5)
RBC BLD AUTO: ABNORMAL
SODIUM SERPL-SCNC: 145 MMOL/L — SIGNIFICANT CHANGE UP (ref 135–145)
VARIANT LYMPHS # BLD: 0.9 % — SIGNIFICANT CHANGE UP (ref 0–6)
WBC # BLD: 7.65 K/UL — SIGNIFICANT CHANGE UP (ref 3.8–10.5)
WBC # FLD AUTO: 7.65 K/UL — SIGNIFICANT CHANGE UP (ref 3.8–10.5)

## 2020-12-22 PROCEDURE — 99239 HOSP IP/OBS DSCHRG MGMT >30: CPT

## 2020-12-22 RX ORDER — ACETAMINOPHEN 500 MG
2 TABLET ORAL
Qty: 0 | Refills: 0 | DISCHARGE
Start: 2020-12-22

## 2020-12-22 RX ORDER — BUDESONIDE AND FORMOTEROL FUMARATE DIHYDRATE 160; 4.5 UG/1; UG/1
1 AEROSOL RESPIRATORY (INHALATION)
Qty: 1 | Refills: 0
Start: 2020-12-22 | End: 2021-01-20

## 2020-12-22 RX ORDER — CEFPODOXIME PROXETIL 100 MG
1 TABLET ORAL
Qty: 10 | Refills: 0
Start: 2020-12-22 | End: 2020-12-26

## 2020-12-22 RX ORDER — BUDESONIDE, MICRONIZED 100 %
180 POWDER (GRAM) MISCELLANEOUS
Qty: 1 | Refills: 0
Start: 2020-12-22 | End: 2021-01-20

## 2020-12-22 RX ADMIN — Medication 1 TABLET(S): at 13:52

## 2020-12-22 RX ADMIN — Medication 40 MILLIGRAM(S): at 05:34

## 2020-12-22 RX ADMIN — LIDOCAINE 1 PATCH: 4 CREAM TOPICAL at 02:49

## 2020-12-22 RX ADMIN — OXYCODONE AND ACETAMINOPHEN 2 TABLET(S): 5; 325 TABLET ORAL at 02:49

## 2020-12-22 RX ADMIN — GABAPENTIN 400 MILLIGRAM(S): 400 CAPSULE ORAL at 13:52

## 2020-12-22 RX ADMIN — OXYCODONE AND ACETAMINOPHEN 2 TABLET(S): 5; 325 TABLET ORAL at 11:12

## 2020-12-22 RX ADMIN — ENOXAPARIN SODIUM 40 MILLIGRAM(S): 100 INJECTION SUBCUTANEOUS at 11:13

## 2020-12-22 RX ADMIN — LIDOCAINE 1 PATCH: 4 CREAM TOPICAL at 11:13

## 2020-12-22 RX ADMIN — GABAPENTIN 400 MILLIGRAM(S): 400 CAPSULE ORAL at 05:33

## 2020-12-22 RX ADMIN — CEFEPIME 100 MILLIGRAM(S): 1 INJECTION, POWDER, FOR SOLUTION INTRAMUSCULAR; INTRAVENOUS at 05:34

## 2020-12-22 RX ADMIN — OXYCODONE AND ACETAMINOPHEN 2 TABLET(S): 5; 325 TABLET ORAL at 12:39

## 2020-12-22 RX ADMIN — Medication 3 MILLILITER(S): at 08:28

## 2020-12-22 RX ADMIN — OXYCODONE AND ACETAMINOPHEN 2 TABLET(S): 5; 325 TABLET ORAL at 03:36

## 2020-12-22 RX ADMIN — Medication 0.25 MILLIGRAM(S): at 09:51

## 2020-12-22 RX ADMIN — Medication 25 MILLIGRAM(S): at 05:33

## 2020-12-22 RX ADMIN — Medication 81 MILLIGRAM(S): at 11:13

## 2020-12-22 RX ADMIN — Medication 1 TABLET(S): at 08:20

## 2020-12-22 RX ADMIN — Medication 3 MILLILITER(S): at 02:06

## 2020-12-23 ENCOUNTER — TRANSCRIPTION ENCOUNTER (OUTPATIENT)
Age: 75
End: 2020-12-23

## 2020-12-28 ENCOUNTER — TRANSCRIPTION ENCOUNTER (OUTPATIENT)
Age: 75
End: 2020-12-28

## 2020-12-28 PROCEDURE — 85610 PROTHROMBIN TIME: CPT

## 2020-12-28 PROCEDURE — 85018 HEMOGLOBIN: CPT

## 2020-12-28 PROCEDURE — 84145 PROCALCITONIN (PCT): CPT

## 2020-12-28 PROCEDURE — 71046 X-RAY EXAM CHEST 2 VIEWS: CPT

## 2020-12-28 PROCEDURE — 82550 ASSAY OF CK (CPK): CPT

## 2020-12-28 PROCEDURE — 83605 ASSAY OF LACTIC ACID: CPT

## 2020-12-28 PROCEDURE — 83880 ASSAY OF NATRIURETIC PEPTIDE: CPT

## 2020-12-28 PROCEDURE — 93005 ELECTROCARDIOGRAM TRACING: CPT

## 2020-12-28 PROCEDURE — 84132 ASSAY OF SERUM POTASSIUM: CPT

## 2020-12-28 PROCEDURE — 94760 N-INVAS EAR/PLS OXIMETRY 1: CPT

## 2020-12-28 PROCEDURE — 87070 CULTURE OTHR SPECIMN AEROBIC: CPT

## 2020-12-28 PROCEDURE — 82435 ASSAY OF BLOOD CHLORIDE: CPT

## 2020-12-28 PROCEDURE — 85025 COMPLETE CBC W/AUTO DIFF WBC: CPT

## 2020-12-28 PROCEDURE — 87086 URINE CULTURE/COLONY COUNT: CPT

## 2020-12-28 PROCEDURE — 84484 ASSAY OF TROPONIN QUANT: CPT

## 2020-12-28 PROCEDURE — 87040 BLOOD CULTURE FOR BACTERIA: CPT

## 2020-12-28 PROCEDURE — 86769 SARS-COV-2 COVID-19 ANTIBODY: CPT

## 2020-12-28 PROCEDURE — 84295 ASSAY OF SERUM SODIUM: CPT

## 2020-12-28 PROCEDURE — 82947 ASSAY GLUCOSE BLOOD QUANT: CPT

## 2020-12-28 PROCEDURE — 85027 COMPLETE CBC AUTOMATED: CPT

## 2020-12-28 PROCEDURE — 87449 NOS EACH ORGANISM AG IA: CPT

## 2020-12-28 PROCEDURE — 80048 BASIC METABOLIC PNL TOTAL CA: CPT

## 2020-12-28 PROCEDURE — 83735 ASSAY OF MAGNESIUM: CPT

## 2020-12-28 PROCEDURE — 85014 HEMATOCRIT: CPT

## 2020-12-28 PROCEDURE — 94640 AIRWAY INHALATION TREATMENT: CPT

## 2020-12-28 PROCEDURE — 81001 URINALYSIS AUTO W/SCOPE: CPT

## 2020-12-28 PROCEDURE — 82728 ASSAY OF FERRITIN: CPT

## 2020-12-28 PROCEDURE — 86140 C-REACTIVE PROTEIN: CPT

## 2020-12-28 PROCEDURE — 85730 THROMBOPLASTIN TIME PARTIAL: CPT

## 2020-12-28 PROCEDURE — 71250 CT THORAX DX C-: CPT

## 2020-12-28 PROCEDURE — 85379 FIBRIN DEGRADATION QUANT: CPT

## 2020-12-28 PROCEDURE — 82330 ASSAY OF CALCIUM: CPT

## 2020-12-28 PROCEDURE — 99285 EMERGENCY DEPT VISIT HI MDM: CPT

## 2020-12-28 PROCEDURE — 82803 BLOOD GASES ANY COMBINATION: CPT

## 2020-12-28 PROCEDURE — 80053 COMPREHEN METABOLIC PANEL: CPT

## 2020-12-28 PROCEDURE — 83615 LACTATE (LD) (LDH) ENZYME: CPT

## 2020-12-28 PROCEDURE — 0225U NFCT DS DNA&RNA 21 SARSCOV2: CPT

## 2020-12-28 PROCEDURE — 36415 COLL VENOUS BLD VENIPUNCTURE: CPT

## 2020-12-29 ENCOUNTER — LABORATORY RESULT (OUTPATIENT)
Age: 75
End: 2020-12-29

## 2020-12-29 ENCOUNTER — TRANSCRIPTION ENCOUNTER (OUTPATIENT)
Age: 75
End: 2020-12-29

## 2020-12-29 ENCOUNTER — INPATIENT (INPATIENT)
Facility: HOSPITAL | Age: 75
LOS: 6 days | Discharge: HOME | DRG: 178 | End: 2021-01-05
Attending: FAMILY MEDICINE | Admitting: FAMILY MEDICINE
Payer: MEDICARE

## 2020-12-29 ENCOUNTER — APPOINTMENT (OUTPATIENT)
Dept: INTERNAL MEDICINE | Facility: CLINIC | Age: 75
End: 2020-12-29
Payer: MEDICARE

## 2020-12-29 VITALS
WEIGHT: 159 LBS | DIASTOLIC BLOOD PRESSURE: 56 MMHG | TEMPERATURE: 97.1 F | HEIGHT: 67 IN | HEART RATE: 122 BPM | OXYGEN SATURATION: 92 % | BODY MASS INDEX: 24.96 KG/M2 | SYSTOLIC BLOOD PRESSURE: 87 MMHG

## 2020-12-29 VITALS — SYSTOLIC BLOOD PRESSURE: 96 MMHG | DIASTOLIC BLOOD PRESSURE: 72 MMHG

## 2020-12-29 VITALS — WEIGHT: 160.06 LBS | HEIGHT: 68 IN

## 2020-12-29 DIAGNOSIS — R25.1 TREMOR, UNSPECIFIED: ICD-10-CM

## 2020-12-29 DIAGNOSIS — Z95.1 PRESENCE OF AORTOCORONARY BYPASS GRAFT: Chronic | ICD-10-CM

## 2020-12-29 DIAGNOSIS — Z95.5 PRESENCE OF CORONARY ANGIOPLASTY IMPLANT AND GRAFT: Chronic | ICD-10-CM

## 2020-12-29 DIAGNOSIS — Z98.49 CATARACT EXTRACTION STATUS, UNSPECIFIED EYE: Chronic | ICD-10-CM

## 2020-12-29 DIAGNOSIS — J18.9 PNEUMONIA, UNSPECIFIED ORGANISM: ICD-10-CM

## 2020-12-29 LAB
ALBUMIN SERPL ELPH-MCNC: 3.8 G/DL — SIGNIFICANT CHANGE UP (ref 3.3–5.2)
ALP SERPL-CCNC: 219 U/L — HIGH (ref 40–120)
ALT FLD-CCNC: 59 U/L — HIGH
ANION GAP SERPL CALC-SCNC: 11 MMOL/L — SIGNIFICANT CHANGE UP (ref 5–17)
APPEARANCE UR: CLEAR — SIGNIFICANT CHANGE UP
APTT BLD: 31.2 SEC — SIGNIFICANT CHANGE UP (ref 27.5–35.5)
AST SERPL-CCNC: 37 U/L — SIGNIFICANT CHANGE UP
BASE EXCESS BLDV CALC-SCNC: 5.3 MMOL/L — HIGH (ref -2–2)
BASOPHILS # BLD AUTO: 0.03 K/UL — SIGNIFICANT CHANGE UP (ref 0–0.2)
BASOPHILS NFR BLD AUTO: 0.1 % — SIGNIFICANT CHANGE UP (ref 0–2)
BILIRUB SERPL-MCNC: 0.7 MG/DL — SIGNIFICANT CHANGE UP (ref 0.4–2)
BILIRUB UR-MCNC: NEGATIVE — SIGNIFICANT CHANGE UP
BUN SERPL-MCNC: 17 MG/DL — SIGNIFICANT CHANGE UP (ref 8–20)
CA-I SERPL-SCNC: 1.12 MMOL/L — LOW (ref 1.15–1.33)
CALCIUM SERPL-MCNC: 9.1 MG/DL — SIGNIFICANT CHANGE UP (ref 8.6–10.2)
CHLORIDE BLDV-SCNC: 98 MMOL/L — SIGNIFICANT CHANGE UP (ref 98–107)
CHLORIDE SERPL-SCNC: 97 MMOL/L — LOW (ref 98–107)
CO2 SERPL-SCNC: 27 MMOL/L — SIGNIFICANT CHANGE UP (ref 22–29)
COLOR SPEC: YELLOW — SIGNIFICANT CHANGE UP
CREAT SERPL-MCNC: 0.73 MG/DL — SIGNIFICANT CHANGE UP (ref 0.5–1.3)
DIFF PNL FLD: NEGATIVE — SIGNIFICANT CHANGE UP
EOSINOPHIL # BLD AUTO: 0.35 K/UL — SIGNIFICANT CHANGE UP (ref 0–0.5)
EOSINOPHIL NFR BLD AUTO: 1.6 % — SIGNIFICANT CHANGE UP (ref 0–6)
FLUAV AG NPH QL: SIGNIFICANT CHANGE UP COUNTS
FLUBV AG NPH QL: SIGNIFICANT CHANGE UP COUNTS
GAS PNL BLDV: 137 MMOL/L — SIGNIFICANT CHANGE UP (ref 135–145)
GAS PNL BLDV: SIGNIFICANT CHANGE UP
GAS PNL BLDV: SIGNIFICANT CHANGE UP
GLUCOSE BLDV-MCNC: 155 MG/DL — HIGH (ref 70–99)
GLUCOSE SERPL-MCNC: 159 MG/DL — HIGH (ref 70–99)
GLUCOSE UR QL: NEGATIVE MG/DL — SIGNIFICANT CHANGE UP
HCO3 BLDV-SCNC: 29 MMOL/L — SIGNIFICANT CHANGE UP (ref 21–29)
HCT VFR BLD CALC: 38.6 % — LOW (ref 39–50)
HCT VFR BLDA CALC: 39 — SIGNIFICANT CHANGE UP (ref 39–50)
HGB BLD CALC-MCNC: 12.8 G/DL — LOW (ref 13–17)
HGB BLD-MCNC: 12.4 G/DL — LOW (ref 13–17)
IMM GRANULOCYTES NFR BLD AUTO: 0.7 % — SIGNIFICANT CHANGE UP (ref 0–1.5)
INR BLD: 1.14 RATIO — SIGNIFICANT CHANGE UP (ref 0.88–1.16)
KETONES UR-MCNC: NEGATIVE — SIGNIFICANT CHANGE UP
LACTATE BLDV-MCNC: 1.1 MMOL/L — SIGNIFICANT CHANGE UP (ref 0.5–2)
LEUKOCYTE ESTERASE UR-ACNC: NEGATIVE — SIGNIFICANT CHANGE UP
LYMPHOCYTES # BLD AUTO: 0.92 K/UL — LOW (ref 1–3.3)
LYMPHOCYTES # BLD AUTO: 4.1 % — LOW (ref 13–44)
MCHC RBC-ENTMCNC: 30 PG — SIGNIFICANT CHANGE UP (ref 27–34)
MCHC RBC-ENTMCNC: 32.1 GM/DL — SIGNIFICANT CHANGE UP (ref 32–36)
MCV RBC AUTO: 93.5 FL — SIGNIFICANT CHANGE UP (ref 80–100)
MONOCYTES # BLD AUTO: 1.15 K/UL — HIGH (ref 0–0.9)
MONOCYTES NFR BLD AUTO: 5.1 % — SIGNIFICANT CHANGE UP (ref 2–14)
NEUTROPHILS # BLD AUTO: 19.9 K/UL — HIGH (ref 1.8–7.4)
NEUTROPHILS NFR BLD AUTO: 88.4 % — HIGH (ref 43–77)
NITRITE UR-MCNC: NEGATIVE — SIGNIFICANT CHANGE UP
OTHER CELLS CSF MANUAL: 16 ML/DL — LOW (ref 18–22)
PCO2 BLDV: 49 MMHG — SIGNIFICANT CHANGE UP (ref 35–50)
PH BLDV: 7.41 — SIGNIFICANT CHANGE UP (ref 7.32–7.43)
PH UR: 6.5 — SIGNIFICANT CHANGE UP (ref 5–8)
PLATELET # BLD AUTO: 276 K/UL — SIGNIFICANT CHANGE UP (ref 150–400)
PO2 BLDV: 64 MMHG — HIGH (ref 25–45)
POTASSIUM BLDV-SCNC: 4.6 MMOL/L — HIGH (ref 3.4–4.5)
POTASSIUM SERPL-MCNC: 4.6 MMOL/L — SIGNIFICANT CHANGE UP (ref 3.5–5.3)
POTASSIUM SERPL-SCNC: 4.6 MMOL/L — SIGNIFICANT CHANGE UP (ref 3.5–5.3)
PROT SERPL-MCNC: 6.8 G/DL — SIGNIFICANT CHANGE UP (ref 6.6–8.7)
PROT UR-MCNC: NEGATIVE MG/DL — SIGNIFICANT CHANGE UP
PROTHROM AB SERPL-ACNC: 13.1 SEC — SIGNIFICANT CHANGE UP (ref 10.6–13.6)
RBC # BLD: 4.13 M/UL — LOW (ref 4.2–5.8)
RBC # FLD: 14.3 % — SIGNIFICANT CHANGE UP (ref 10.3–14.5)
RSV RNA NPH QL NAA+NON-PROBE: SIGNIFICANT CHANGE UP COUNTS
SAO2 % BLDV: 92 % — SIGNIFICANT CHANGE UP
SARS-COV-2 RNA SPEC QL NAA+PROBE: SIGNIFICANT CHANGE UP COUNTS
SODIUM SERPL-SCNC: 135 MMOL/L — SIGNIFICANT CHANGE UP (ref 135–145)
SP GR SPEC: 1 — LOW (ref 1.01–1.02)
TROPONIN T SERPL-MCNC: <0.01 NG/ML — SIGNIFICANT CHANGE UP (ref 0–0.06)
TROPONIN T SERPL-MCNC: <0.01 NG/ML — SIGNIFICANT CHANGE UP (ref 0–0.06)
UROBILINOGEN FLD QL: NEGATIVE MG/DL — SIGNIFICANT CHANGE UP
WBC # BLD: 22.51 K/UL — HIGH (ref 3.8–10.5)
WBC # FLD AUTO: 22.51 K/UL — HIGH (ref 3.8–10.5)

## 2020-12-29 PROCEDURE — 36415 COLL VENOUS BLD VENIPUNCTURE: CPT

## 2020-12-29 PROCEDURE — 71045 X-RAY EXAM CHEST 1 VIEW: CPT | Mod: 26

## 2020-12-29 PROCEDURE — 99291 CRITICAL CARE FIRST HOUR: CPT | Mod: CS

## 2020-12-29 PROCEDURE — 99222 1ST HOSP IP/OBS MODERATE 55: CPT

## 2020-12-29 PROCEDURE — 93306 TTE W/DOPPLER COMPLETE: CPT | Mod: 26

## 2020-12-29 PROCEDURE — 99215 OFFICE O/P EST HI 40 MIN: CPT | Mod: 25

## 2020-12-29 PROCEDURE — 71275 CT ANGIOGRAPHY CHEST: CPT | Mod: 26

## 2020-12-29 PROCEDURE — 99223 1ST HOSP IP/OBS HIGH 75: CPT

## 2020-12-29 PROCEDURE — 93010 ELECTROCARDIOGRAM REPORT: CPT

## 2020-12-29 RX ORDER — TIOTROPIUM BROMIDE 18 UG/1
1 CAPSULE ORAL; RESPIRATORY (INHALATION) DAILY
Refills: 0 | Status: DISCONTINUED | OUTPATIENT
Start: 2020-12-29 | End: 2021-01-05

## 2020-12-29 RX ORDER — POLYETHYLENE GLYCOL 3350 17 G/17G
17 POWDER, FOR SOLUTION ORAL DAILY
Refills: 0 | Status: DISCONTINUED | OUTPATIENT
Start: 2020-12-29 | End: 2021-01-05

## 2020-12-29 RX ORDER — MEROPENEM 1 G/30ML
1000 INJECTION INTRAVENOUS EVERY 8 HOURS
Refills: 0 | Status: DISCONTINUED | OUTPATIENT
Start: 2020-12-29 | End: 2021-01-04

## 2020-12-29 RX ORDER — ALBUTEROL 90 UG/1
1 AEROSOL, METERED ORAL EVERY 4 HOURS
Refills: 0 | Status: DISCONTINUED | OUTPATIENT
Start: 2020-12-29 | End: 2021-01-05

## 2020-12-29 RX ORDER — AZITHROMYCIN 500 MG/1
500 TABLET, FILM COATED ORAL ONCE
Refills: 0 | Status: COMPLETED | OUTPATIENT
Start: 2020-12-29 | End: 2020-12-29

## 2020-12-29 RX ORDER — ASPIRIN/CALCIUM CARB/MAGNESIUM 324 MG
81 TABLET ORAL DAILY
Refills: 0 | Status: DISCONTINUED | OUTPATIENT
Start: 2020-12-29 | End: 2021-01-05

## 2020-12-29 RX ORDER — SODIUM CHLORIDE 9 MG/ML
2300 INJECTION INTRAMUSCULAR; INTRAVENOUS; SUBCUTANEOUS ONCE
Refills: 0 | Status: COMPLETED | OUTPATIENT
Start: 2020-12-29 | End: 2020-12-29

## 2020-12-29 RX ORDER — VANCOMYCIN HCL 1 G
1000 VIAL (EA) INTRAVENOUS ONCE
Refills: 0 | Status: COMPLETED | OUTPATIENT
Start: 2020-12-29 | End: 2020-12-29

## 2020-12-29 RX ORDER — ENOXAPARIN SODIUM 100 MG/ML
40 INJECTION SUBCUTANEOUS DAILY
Refills: 0 | Status: DISCONTINUED | OUTPATIENT
Start: 2020-12-29 | End: 2020-12-31

## 2020-12-29 RX ORDER — BUDESONIDE, MICRONIZED 100 %
0.5 POWDER (GRAM) MISCELLANEOUS
Refills: 0 | Status: DISCONTINUED | OUTPATIENT
Start: 2020-12-29 | End: 2020-12-31

## 2020-12-29 RX ORDER — OXYCODONE AND ACETAMINOPHEN 5; 325 MG/1; MG/1
2 TABLET ORAL EVERY 8 HOURS
Refills: 0 | Status: DISCONTINUED | OUTPATIENT
Start: 2020-12-29 | End: 2021-01-05

## 2020-12-29 RX ORDER — AZITHROMYCIN 500 MG/1
250 TABLET, FILM COATED ORAL DAILY
Refills: 0 | Status: COMPLETED | OUTPATIENT
Start: 2020-12-30 | End: 2021-01-02

## 2020-12-29 RX ORDER — TAMSULOSIN HYDROCHLORIDE 0.4 MG/1
0.4 CAPSULE ORAL AT BEDTIME
Refills: 0 | Status: DISCONTINUED | OUTPATIENT
Start: 2020-12-29 | End: 2021-01-05

## 2020-12-29 RX ORDER — METOPROLOL TARTRATE 50 MG
25 TABLET ORAL
Refills: 0 | Status: DISCONTINUED | OUTPATIENT
Start: 2020-12-29 | End: 2021-01-05

## 2020-12-29 RX ORDER — LACTOBACILLUS ACIDOPHILUS 100MM CELL
1 CAPSULE ORAL
Refills: 0 | Status: DISCONTINUED | OUTPATIENT
Start: 2020-12-29 | End: 2021-01-05

## 2020-12-29 RX ORDER — OXYCODONE AND ACETAMINOPHEN 5; 325 MG/1; MG/1
1 TABLET ORAL EVERY 12 HOURS
Refills: 0 | Status: DISCONTINUED | OUTPATIENT
Start: 2020-12-29 | End: 2020-12-29

## 2020-12-29 RX ORDER — GABAPENTIN 400 MG/1
400 CAPSULE ORAL THREE TIMES A DAY
Refills: 0 | Status: DISCONTINUED | OUTPATIENT
Start: 2020-12-29 | End: 2021-01-05

## 2020-12-29 RX ORDER — NITROGLYCERIN 6.5 MG
0.4 CAPSULE, EXTENDED RELEASE ORAL
Refills: 0 | Status: DISCONTINUED | OUTPATIENT
Start: 2020-12-29 | End: 2021-01-05

## 2020-12-29 RX ORDER — CEFEPIME 1 G/1
2000 INJECTION, POWDER, FOR SOLUTION INTRAMUSCULAR; INTRAVENOUS ONCE
Refills: 0 | Status: COMPLETED | OUTPATIENT
Start: 2020-12-29 | End: 2020-12-29

## 2020-12-29 RX ORDER — OXYCODONE AND ACETAMINOPHEN 5; 325 MG/1; MG/1
2 TABLET ORAL ONCE
Refills: 0 | Status: DISCONTINUED | OUTPATIENT
Start: 2020-12-29 | End: 2020-12-29

## 2020-12-29 RX ORDER — IPRATROPIUM/ALBUTEROL SULFATE 18-103MCG
3 AEROSOL WITH ADAPTER (GRAM) INHALATION EVERY 6 HOURS
Refills: 0 | Status: DISCONTINUED | OUTPATIENT
Start: 2020-12-29 | End: 2020-12-31

## 2020-12-29 RX ORDER — ATORVASTATIN CALCIUM 80 MG/1
40 TABLET, FILM COATED ORAL AT BEDTIME
Refills: 0 | Status: DISCONTINUED | OUTPATIENT
Start: 2020-12-29 | End: 2021-01-05

## 2020-12-29 RX ORDER — CHOLECALCIFEROL (VITAMIN D3) 125 MCG
2000 CAPSULE ORAL DAILY
Refills: 0 | Status: DISCONTINUED | OUTPATIENT
Start: 2020-12-29 | End: 2021-01-05

## 2020-12-29 RX ADMIN — SODIUM CHLORIDE 2300 MILLILITER(S): 9 INJECTION INTRAMUSCULAR; INTRAVENOUS; SUBCUTANEOUS at 13:37

## 2020-12-29 RX ADMIN — Medication 1000 MILLIGRAM(S): at 13:37

## 2020-12-29 RX ADMIN — MEROPENEM 100 MILLIGRAM(S): 1 INJECTION INTRAVENOUS at 22:09

## 2020-12-29 RX ADMIN — TAMSULOSIN HYDROCHLORIDE 0.4 MILLIGRAM(S): 0.4 CAPSULE ORAL at 22:10

## 2020-12-29 RX ADMIN — SODIUM CHLORIDE 2300 MILLILITER(S): 9 INJECTION INTRAMUSCULAR; INTRAVENOUS; SUBCUTANEOUS at 12:37

## 2020-12-29 RX ADMIN — Medication 25 MILLIGRAM(S): at 19:53

## 2020-12-29 RX ADMIN — Medication 250 MILLIGRAM(S): at 12:37

## 2020-12-29 RX ADMIN — OXYCODONE AND ACETAMINOPHEN 1 TABLET(S): 5; 325 TABLET ORAL at 19:52

## 2020-12-29 RX ADMIN — OXYCODONE AND ACETAMINOPHEN 2 TABLET(S): 5; 325 TABLET ORAL at 12:37

## 2020-12-29 RX ADMIN — CEFEPIME 100 MILLIGRAM(S): 1 INJECTION, POWDER, FOR SOLUTION INTRAMUSCULAR; INTRAVENOUS at 14:34

## 2020-12-29 RX ADMIN — Medication 3 MILLILITER(S): at 22:04

## 2020-12-29 RX ADMIN — OXYCODONE AND ACETAMINOPHEN 2 TABLET(S): 5; 325 TABLET ORAL at 13:42

## 2020-12-29 RX ADMIN — GABAPENTIN 400 MILLIGRAM(S): 400 CAPSULE ORAL at 22:09

## 2020-12-29 RX ADMIN — Medication 0.5 MILLIGRAM(S): at 22:04

## 2020-12-29 RX ADMIN — ATORVASTATIN CALCIUM 40 MILLIGRAM(S): 80 TABLET, FILM COATED ORAL at 22:09

## 2020-12-29 RX ADMIN — AZITHROMYCIN 500 MILLIGRAM(S): 500 TABLET, FILM COATED ORAL at 19:57

## 2020-12-29 RX ADMIN — OXYCODONE AND ACETAMINOPHEN 1 TABLET(S): 5; 325 TABLET ORAL at 20:42

## 2020-12-29 NOTE — CONSULT NOTE ADULT - SUBJECTIVE AND OBJECTIVE BOX
JENNIFER MOORE  [id number]      HPI:        ALLERGIES:  amoxicillin (Diarrhea)  codeine (Urticaria)  ibuprofen (Anaphylaxis)      PAST MEDICAL & SURGICAL HISTORY:  HLD (hyperlipidemia)    HTN (hypertension)    Lumbosacral disc disease  has nerve stimulator    Arthritis    CAD in native artery  RCA 3 YAYA 1 in 2002 and 2 in 2019    S/P CABG (coronary artery bypass graft)    S/P cataract surgery  b/l eyes 2016    H/O heart artery stent  RCA          CURRENT MEDICATIONS:      SOCIAL HISTORY:      FAMILY HISTORY:  FH: colon cancer        ROS:  All 10 systems reviewed and positives noted in HPI    OBJECTIVE:    VITAL SIGNS:  Vital Signs Last 24 Hrs  T(C): 38.3 (29 Dec 2020 12:08), Max: 38.3 (29 Dec 2020 12:08)  T(F): 100.9 (29 Dec 2020 12:08), Max: 100.9 (29 Dec 2020 12:08)  HR: 114 (29 Dec 2020 11:31) (114 - 114)  BP: 120/77 (29 Dec 2020 11:31) (120/77 - 120/77)  BP(mean): --  RR: 18 (29 Dec 2020 11:31) (18 - 18)  SpO2: 91% (29 Dec 2020 11:31) (91% - 91%)    PHYSICAL EXAM:  General: well appearing, no distress  HEENT: sclera anicteric  Neck: supple, no carotid bruits b/l  CVS: JVP ~ 7 cm H20, RRR, s1, s2, no murmurs/rubs/gallops  Chest: unlabored respirations, clear to auscultation b/l  Abdomen: non-distended  Extremities: no lower extremity edema b/l  Neuro: awake, alert & oriented x 3  Psych: normal affect      LABS:                        12.4   22.51 )-----------( 276      ( 29 Dec 2020 12:25 )             38.6     12-29    135  |  97<L>  |  17.0  ----------------------------<  159<H>  4.6   |  27.0  |  0.73    Ca    9.1      29 Dec 2020 12:25    TPro  6.8  /  Alb  3.8  /  TBili  0.7  /  DBili  x   /  AST  37  /  ALT  59<H>  /  AlkPhos  219<H>  12-29    CARDIAC MARKERS ( 29 Dec 2020 12:25 )  x     / <0.01 ng/mL / x     / x     / x          PT/INR - ( 29 Dec 2020 12:25 )   PT: 13.1 sec;   INR: 1.14 ratio         PTT - ( 29 Dec 2020 12:25 )  PTT:31.2 sec      ECG:      TTE:     JENNIFER HENSON  [id number]      HPI:  Jennifer Henson is a 75 year old man with past medical history of Coronary artery disease (s/p RCA stent in 2002 and recent CABG x3 vessel; LIMA-LAD, SVG-OM1 & PDA in March 2020), Hypertension, Hyperlipidemia, COPD and recent hospitalizations for pneumonia who presents with chest pain.       ALLERGIES:  amoxicillin (Diarrhea)  codeine (Urticaria)  ibuprofen (Anaphylaxis)      PAST MEDICAL & SURGICAL HISTORY:  Coronary artery disease (s/p RCA stent in 2002 and recent CABG x3 vessel; LIMA-LAD, SVG-OM1 & PDA in March 2020)  Hypertension  Hyperlipidemia  COPD       FAMILY HISTORY:  FH: colon cancer      ROS:  All 10 systems reviewed and positives noted in HPI    OBJECTIVE:    VITAL SIGNS:  Vital Signs Last 24 Hrs  T(C): 38.3 (29 Dec 2020 12:08), Max: 38.3 (29 Dec 2020 12:08)  T(F): 100.9 (29 Dec 2020 12:08), Max: 100.9 (29 Dec 2020 12:08)  HR: 114 (29 Dec 2020 11:31) (114 - 114)  BP: 120/77 (29 Dec 2020 11:31) (120/77 - 120/77)  BP(mean): --  RR: 18 (29 Dec 2020 11:31) (18 - 18)  SpO2: 91% (29 Dec 2020 11:31) (91% - 91%)    PHYSICAL EXAM:  General: well appearing, no distress  HEENT: sclera anicteric  Neck: supple, no carotid bruits b/l  CVS: JVP ~ 7 cm H20, RRR, s1, s2, no murmurs/rubs/gallops  Chest: unlabored respirations, clear to auscultation b/l  Abdomen: non-distended  Extremities: no lower extremity edema b/l  Neuro: awake, alert & oriented x 3  Psych: normal affect      LABS:                        12.4   22.51 )-----------( 276      ( 29 Dec 2020 12:25 )             38.6     12-29    135  |  97<L>  |  17.0  ----------------------------<  159<H>  4.6   |  27.0  |  0.73    Ca    9.1      29 Dec 2020 12:25    TPro  6.8  /  Alb  3.8  /  TBili  0.7  /  DBili  x   /  AST  37  /  ALT  59<H>  /  AlkPhos  219<H>  12-29    CARDIAC MARKERS ( 29 Dec 2020 12:25 )  x     / <0.01 ng/mL / x     / x     / x          PT/INR - ( 29 Dec 2020 12:25 )   PT: 13.1 sec;   INR: 1.14 ratio         PTT - ( 29 Dec 2020 12:25 )  PTT:31.2 sec      ECG (12/29/2020):       TTE:     JENNIFER HENSON  [id number]      HPI:  Jennifer Henson is a 75 year old man with past medical history of Coronary artery disease (s/p RCA stent in 2002 and recent CABG x3 vessel; LIMA-LAD, SVG-OM1 & PDA in March 2020), Hypertension, Hyperlipidemia, COPD and recent hospitalizations for pneumonia who presents with chest pain.       ALLERGIES:  amoxicillin (Diarrhea)  codeine (Urticaria)  ibuprofen (Anaphylaxis)      PAST MEDICAL & SURGICAL HISTORY:  Coronary artery disease (s/p RCA stent in 2002 and recent CABG x3 vessel; LIMA-LAD, SVG-OM1 & PDA in March 2020)  Hypertension  Hyperlipidemia  COPD       FAMILY HISTORY:  FH: colon cancer      ROS:  All 10 systems reviewed and positives noted in HPI    OBJECTIVE:    VITAL SIGNS:  Vital Signs Last 24 Hrs  T(C): 38.3 (29 Dec 2020 12:08), Max: 38.3 (29 Dec 2020 12:08)  T(F): 100.9 (29 Dec 2020 12:08), Max: 100.9 (29 Dec 2020 12:08)  HR: 114 (29 Dec 2020 11:31) (114 - 114)  BP: 120/77 (29 Dec 2020 11:31) (120/77 - 120/77)  BP(mean): --  RR: 18 (29 Dec 2020 11:31) (18 - 18)  SpO2: 91% (29 Dec 2020 11:31) (91% - 91%)    PHYSICAL EXAM:  General: well appearing, no distress  HEENT: sclera anicteric  Neck: supple, no carotid bruits b/l  CVS: JVP ~ 7 cm H20, RRR, s1, s2, no murmurs/rubs/gallops  Chest: unlabored respirations, clear to auscultation b/l  Abdomen: non-distended  Extremities: no lower extremity edema b/l  Neuro: awake, alert & oriented x 3  Psych: normal affect      LABS:                        12.4   22.51 )-----------( 276      ( 29 Dec 2020 12:25 )             38.6     12-29    135  |  97<L>  |  17.0  ----------------------------<  159<H>  4.6   |  27.0  |  0.73    Ca    9.1      29 Dec 2020 12:25    TPro  6.8  /  Alb  3.8  /  TBili  0.7  /  DBili  x   /  AST  37  /  ALT  59<H>  /  AlkPhos  219<H>  12-29    CARDIAC MARKERS ( 29 Dec 2020 12:25 )  x     / <0.01 ng/mL / x     / x     / x          PT/INR - ( 29 Dec 2020 12:25 )   PT: 13.1 sec;   INR: 1.14 ratio         PTT - ( 29 Dec 2020 12:25 )  PTT:31.2 sec      ECG (12/29/2020): sinus tachycardia, RBBB (similar to prior ECG)      TTE:    Home Cardiac Meds:  Midodrine 5 mg BID  Metoprolol tartrate 25 mg BID  Atorvastatin 40 mg daily  Aspirin 81 mg daily      JENNIFER HENSON  [id number]      HPI:  Jennifer Henson is a 75 year old man with past medical history of Coronary artery disease (s/p RCA stent in 2002 and recent CABG x3 vessel; LIMA-LAD, SVG-OM1 & PDA in March 2020), Hypertension, Hyperlipidemia, COPD and recent hospitalizations for pneumonia who presents with chest pain.       ALLERGIES:  amoxicillin (Diarrhea)  codeine (Urticaria)  ibuprofen (Anaphylaxis)      PAST MEDICAL & SURGICAL HISTORY:  Coronary artery disease (s/p RCA stent in 2002 and recent CABG x3 vessel; LIMA-LAD, SVG-OM1 & PDA in March 2020)  Hypertension  Hyperlipidemia  COPD       FAMILY HISTORY:  FH: colon cancer      ROS:  All 10 systems reviewed and positives noted in HPI    OBJECTIVE:    VITAL SIGNS:  Vital Signs Last 24 Hrs  T(C): 38.3 (29 Dec 2020 12:08), Max: 38.3 (29 Dec 2020 12:08)  T(F): 100.9 (29 Dec 2020 12:08), Max: 100.9 (29 Dec 2020 12:08)  HR: 114 (29 Dec 2020 11:31) (114 - 114)  BP: 120/77 (29 Dec 2020 11:31) (120/77 - 120/77)  BP(mean): --  RR: 18 (29 Dec 2020 11:31) (18 - 18)  SpO2: 91% (29 Dec 2020 11:31) (91% - 91%)    PHYSICAL EXAM:  General: well appearing, no distress  HEENT: sclera anicteric  Neck: supple, no carotid bruits b/l  CVS: JVP ~ 7 cm H20, RRR, s1, s2, no murmurs/rubs/gallops  Chest: unlabored respirations, clear to auscultation b/l  Abdomen: non-distended  Extremities: no lower extremity edema b/l  Neuro: awake, alert & oriented x 3  Psych: normal affect      LABS:                        12.4   22.51 )-----------( 276      ( 29 Dec 2020 12:25 )             38.6     12-29    135  |  97<L>  |  17.0  ----------------------------<  159<H>  4.6   |  27.0  |  0.73    Ca    9.1      29 Dec 2020 12:25    TPro  6.8  /  Alb  3.8  /  TBili  0.7  /  DBili  x   /  AST  37  /  ALT  59<H>  /  AlkPhos  219<H>  12-29    CARDIAC MARKERS ( 29 Dec 2020 12:25 )  x     / <0.01 ng/mL / x     / x     / x          PT/INR - ( 29 Dec 2020 12:25 )   PT: 13.1 sec;   INR: 1.14 ratio         PTT - ( 29 Dec 2020 12:25 )  PTT:31.2 sec      ECG (12/29/2020): sinus tachycardia, RBBB (similar to prior ECG)    TTE (3/2020):   1. Technically good study.   2. Normal global left ventricular systolic function.   3. Left ventricular ejection fraction, by visual estimation, is 70 to 75%.   4. Trace mitral valve regurgitation.   5. There is no evidence of pericardial effusion.    Home Cardiac Meds:  Midodrine 5 mg BID  Metoprolol tartrate 25 mg BID  Atorvastatin 40 mg daily  Aspirin 81 mg daily      JENNIFER HENSON  [id number]      HPI:  Jennifer Henson is a 75 year old man with past medical history of Coronary artery disease (s/p RCA stent in 2002 and recent CABG x3 vessel; LIMA-LAD, SVG-OM1 & PDA in March 2020), Hypertension, Hyperlipidemia, COPD and recent hospitalizations for pneumonia who presents with dyspnea, cough and episodes of chest pain. The patient reports chest wall tenderness which is worse on palpation since his CABG. Denies exertional angina. Reports that he has had productive cough of mucus and fever for past few days. Also has had dyspnea. Denies palpitations or syncope.       ALLERGIES:  amoxicillin (Diarrhea)  codeine (Urticaria)  ibuprofen (Anaphylaxis)      PAST MEDICAL & SURGICAL HISTORY:  Coronary artery disease (s/p RCA stent in 2002 and recent CABG x3 vessel; LIMA-LAD, SVG-OM1 & PDA in March 2020)  Hypertension  Hyperlipidemia  COPD       FAMILY HISTORY:  FH: colon cancer      ROS:  All 10 systems reviewed and positives noted in HPI    OBJECTIVE:    VITAL SIGNS:  Vital Signs Last 24 Hrs  T(C): 38.3 (29 Dec 2020 12:08), Max: 38.3 (29 Dec 2020 12:08)  T(F): 100.9 (29 Dec 2020 12:08), Max: 100.9 (29 Dec 2020 12:08)  HR: 114 (29 Dec 2020 11:31) (114 - 114)  BP: 120/77 (29 Dec 2020 11:31) (120/77 - 120/77)  BP(mean): --  RR: 18 (29 Dec 2020 11:31) (18 - 18)  SpO2: 91% (29 Dec 2020 11:31) (91% - 91%)    PHYSICAL EXAM:  General: elderly man, no acute distress  HEENT: sclera anicteric  Neck: supple, no carotid bruits b/l  CVS: JVP ~ 9 cm H20, RRR, s1, s2, no murmurs  Chest: unlabored respirations, coarse  Abdomen: non-distended  Extremities: no lower extremity edema b/l  Neuro: awake, alert & oriented x 3  Psych: normal affect      LABS:                        12.4   22.51 )-----------( 276      ( 29 Dec 2020 12:25 )             38.6     12-29    135  |  97<L>  |  17.0  ----------------------------<  159<H>  4.6   |  27.0  |  0.73    Ca    9.1      29 Dec 2020 12:25    TPro  6.8  /  Alb  3.8  /  TBili  0.7  /  DBili  x   /  AST  37  /  ALT  59<H>  /  AlkPhos  219<H>  12-29    CARDIAC MARKERS ( 29 Dec 2020 12:25 )  x     / <0.01 ng/mL / x     / x     / x          PT/INR - ( 29 Dec 2020 12:25 )   PT: 13.1 sec;   INR: 1.14 ratio         PTT - ( 29 Dec 2020 12:25 )  PTT:31.2 sec      ECG (12/29/2020): sinus tachycardia, RBBB (similar to prior ECG)    TTE (3/2020):   1. Technically good study.   2. Normal global left ventricular systolic function.   3. Left ventricular ejection fraction, by visual estimation, is 70 to 75%.   4. Trace mitral valve regurgitation.   5. There is no evidence of pericardial effusion.    Home Cardiac Meds:  Midodrine 5 mg BID  Metoprolol tartrate 25 mg BID  Atorvastatin 40 mg daily  Aspirin 81 mg daily

## 2020-12-29 NOTE — ED PROVIDER NOTE - PROGRESS NOTE DETAILS
Ziggy: pt with sats ~90%, infiltrates worwening on ct, wbc 22k, pt requiring admission, ID recommending admission as well. abx/fluids give.

## 2020-12-29 NOTE — ED PROVIDER NOTE - PHYSICAL EXAMINATION
Gen: No acute distress, non toxic  HEENT: Mucous membranes moist, pink conjunctivae, EOMI  CV: tachy ~115, nl s1/s2.  Resp: slightly diminished throughout, normal rate and effort  GI: Abdomen soft, NT, ND. No rebound, no guarding  : No CVAT  Neuro: A&O x 3, moving all 4 extremities  MSK: No spine or joint tenderness to palpation  Skin: No rashes. intact and perfused.

## 2020-12-29 NOTE — CONSULT NOTE ADULT - SUBJECTIVE AND OBJECTIVE BOX
[facility]  INFECTIOUS DISEASES AND INTERNAL MEDICINE at [place]  =======================================================  Stephen Lala MD  Diplomates American Board of Internal Medicine and Infectious Diseases  Tel  [phone]  Fax [phone]  =======================================================    [id number]  JENNIFER PURDYRUBI   HPI:  This75 y/o male who was seen at ID office for follow up , was recently admitted at [facility] for pneumonia and mentioned about on and off pressure type chest pain for past 2 -3 days , lasting for a minute and then getting better, no aggravating factor, no radiation , no diaphoresis, no dizziness, no syncopy. some sob with cough, no orthopnea, pt. still has some cough and phlegm, last night had fever of 100.2 at home. no abd. pain. no n/v/d. no sick contact. pt. reports point tenderness on left side of his chest. pt. reports that he is an active person , not on home o2. pt. reports no trouble swallowing. As per pt. he finished his po steroids 3 days ago which he was given on his recent discharge from [facility].  (29 Dec 2020 16:31)    Recent CT scan showed minimal infiltrates prior to discharge:  No pulmonary embolism.  Diffuse bronchial wall thickening, likely reactive airways disease or bronchitis.  Patchy bilateral groundglass opacities, greater in the lower lungs; differential includesmultifocal infection or inflammation including viral pneumonia.  Scattered tree-in-bud opacities, greater in the lower lobes, likely infectious/inflammatory in etiology.  Lung opacities have overall increased since 12/17/2020 with previously seenopacities in the lower lobes and lingula improved/resolved.        New CT with worsened lower lobe infiltrate.   + SOB  + sputum  denies sick contacts.   COVID testing negative here.       I have personally reviewed the labs and data; pertinent labs and data are listed in this note; please see below.   =======================================================  Past Medical & Surgical Hx:  =====================  PAST MEDICAL & SURGICAL HISTORY:  HLD (hyperlipidemia)    HTN (hypertension)    Lumbosacral disc disease  has nerve stimulator    Arthritis    CAD in native artery  RCA 3 YAYA 1 in 2002 and 2 in 2019    S/P CABG (coronary artery bypass graft)    S/P cataract surgery  b/l eyes 2016    H/O heart artery stent  RCA      Problem List:  ==========  HEALTH ISSUES - PROBLEM Dx:        Social Hx:  =======  no toxic habits currently    FAMILY HISTORY:  FH: colon cancer - father    no significant family history of immunosuppressive disorders in mother or father   =======================================================    REVIEW OF SYSTEMS:  CONSTITUTIONAL:  No Fever or chills  HEENT:  No diplopia or blurred vision.  No earache, sore throat or runny nose.  CARDIOVASCULAR:  No pressure, squeezing, strangling, tightness, heaviness or aching about the chest, neck, axilla or epigastrium.  RESPIRATORY:  as per HPI  GASTROINTESTINAL:  No nausea, vomiting or diarrhea.  GENITOURINARY:  No dysuria, frequency or urgency. No Blood in urine  MUSCULOSKELETAL:  no joint aches, no muscle pain  SKIN:  No change in skin, hair or nails.  NEUROLOGIC:  No Headaches, seizures or weakness.  PSYCHIATRIC:  No disorder of thought or mood.  ENDOCRINE:  No heat or cold intolerance  HEMATOLOGICAL:  No easy bruising or bleeding.    =======================================================  Allergies    codeine (Urticaria)  ibuprofen (Anaphylaxis)    Intolerances    amoxicillin (Diarrhea)  Antibiotics:  azithromycin   Tablet 500 milliGRAM(s) Oral once  meropenem  IVPB 1000 milliGRAM(s) IV Intermittent every 8 hours    Other medications:  ALBUTerol    90 MICROgram(s) HFA Inhaler 1 Puff(s) Inhalation every 4 hours  albuterol/ipratropium for Nebulization 3 milliLiter(s) Nebulizer every 6 hours  aspirin enteric coated 81 milliGRAM(s) Oral daily  atorvastatin 40 milliGRAM(s) Oral at bedtime  cholecalciferol 2000 Unit(s) Oral daily  enoxaparin Injectable 40 milliGRAM(s) SubCutaneous daily  gabapentin 400 milliGRAM(s) Oral three times a day  metoprolol tartrate 25 milliGRAM(s) Oral two times a day  tamsulosin 0.4 milliGRAM(s) Oral at bedtime  tiotropium 18 MICROgram(s) Capsule 1 Capsule(s) Inhalation daily     azithromycin  IVPB   255 mL/Hr IV Intermittent (12-18-20 @ 11:13)   255 mL/Hr IV Intermittent (12-19-20 @ 11:41)   255 mL/Hr IV Intermittent (12-20-20 @ 11:47)    azithromycin  IVPB   255 mL/Hr IV Intermittent (12-17-20 @ 21:18)    cefepime   IVPB   100 mL/Hr IV Intermittent (12-17-20 @ 23:01)    cefepime   IVPB   100 mL/Hr IV Intermittent (12-18-20 @ 05:44)   100 mL/Hr IV Intermittent (12-18-20 @ 12:58)   100 mL/Hr IV Intermittent (12-18-20 @ 22:55)   100 mL/Hr IV Intermittent (12-19-20 @ 06:47)   100 mL/Hr IV Intermittent (12-19-20 @ 13:01)   100 mL/Hr IV Intermittent (12-19-20 @ 22:33)   100 mL/Hr IV Intermittent (12-20-20 @ 06:12)   100 mL/Hr IV Intermittent (12-20-20 @ 13:13)   100 mL/Hr IV Intermittent (12-20-20 @ 22:12)   100 mL/Hr IV Intermittent (12-21-20 @ 05:35)   100 mL/Hr IV Intermittent (12-21-20 @ 13:09)   100 mL/Hr IV Intermittent (12-21-20 @ 21:13)   100 mL/Hr IV Intermittent (12-22-20 @ 05:34)    cefepime   IVPB   100 mL/Hr IV Intermittent (12-29-20 @ 14:34)    vancomycin  IVPB   166.67 mL/Hr IV Intermittent (12-18-20 @ 05:44)    vancomycin  IVPB.   250 mL/Hr IV Intermittent (12-29-20 @ 12:37)      ======================================================  Physical Exam:  ============  T(F): 98.5 (29 Dec 2020 15:54), Max: 100.9 (29 Dec 2020 12:08)  HR: 88 (29 Dec 2020 15:54)  BP: 111/60 (29 Dec 2020 15:54)  RR: 18 (29 Dec 2020 15:54)  SpO2: 91% (29 Dec 2020 15:54) (91% - 91%)  temp max in last 48H T(F): , Max: 100.9 (12-29-20 @ 12:08)Height (cm): 172.7 (12-29-20 @ 11:27)  Weight (kg): 72.6 (12-29-20 @ 11:27)  BMI (kg/m2): 24.3 (12-29-20 @ 11:27)  BSA (m2): 1.86 (12-29-20 @ 11:27)    General:  No acute distress.  Eye: Pupils are equal, round and reactive to light, Extraocular movements are intact, Normal conjunctiva.  HENT: Normocephalic, Oral mucosa is moist, No pharyngeal erythema, No sinus tenderness.  Neck: Supple, No lymphadenopathy.  Respiratory: Lungs with poor aeration at bases.   Cardiovascular: Normal rate, Regular rhythm,   Gastrointestinal: Soft, Non-tender, Non-distended, Normal bowel sounds.  Genitourinary: No costovertebral angle tenderness.  Lymphatics: No lymphadenopathy neck,   Musculoskeletal: Normal range of motion, Normal strength.  Integumentary: No rash.  Neurologic: Alert, Oriented, No focal deficits, Cranial Nerves II-XII are grossly intact.  Psychiatric: Appropriate mood & affect.    =======================================================  Labs:                        12.4   22.51 )-----------( 276      ( 29 Dec 2020 12:25 )             38.6      12-29    135  |  97<L>  |  17.0  ----------------------------<  159<H>  4.6   |  27.0  |  0.73    Ca    9.1      29 Dec 2020 12:25    TPro  6.8  /  Alb  3.8  /  TBili  0.7  /  DBili  x   /  AST  37  /  ALT  59<H>  /  AlkPhos  219<H>  12-29      Culture - Sputum (collected 12-19-20 @ 16:18)  Source: .Sputum Sputum  Gram Stain (12-19-20 @ 22:21):    Few polymorphonuclear leukocytes per low power field    Rare Squamous epithelial cells per low power field    Few Yeast like cells per oil power field    Few Gram Positive Cocci in Pairs and Chains per oil power field    Rare Gram Positive Rods per oil power field  Final Report (12-21-20 @ 17:01):    Normal Respiratory Doris present    Culture - Urine (collected 12-19-20 @ 01:11)  Source: .Urine Clean Catch (Midstream)  Final Report (12-19-20 @ 20:00):    No growth    Culture - Blood (collected 12-18-20 @ 03:44)  Source: .Blood Blood-Peripheral  Final Report (12-23-20 @ 05:00):    No growth at 5 days.    Culture - Blood (collected 12-18-20 @ 03:44)  Source: .Blood Blood-Peripheral  Final Report (12-23-20 @ 05:00):    No growth at 5 days.      Creatinine, Serum: 0.73 mg/dL (12-29-20 @ 12:25)    C-Reactive Protein, Serum: 1.75 mg/dL (12-17-20 @ 18:14)      Procalcitonin, Serum: 0.10 ng/mL (12-17-20 @ 18:14)    SARS-CoV-2: NotDetec (12-18-20 @ 10:24)  Rapid RVP Result: NotDetec (12-18-20 @ 10:24)  SARS-CoV-2: NotDetec (12-17-20 @ 18:16)  Rapid RVP Result: NotDetec (12-17-20 @ 18:16)    COVID-19 IgG Antibody Index: <0.10 Index (12-18-20 @ 07:18)  COVID-19 IgG Antibody Interpretation: Negative (12-18-20 @ 07:18)     < from: CT Angio Chest w/ IV Cont (12.29.20 @ 14:39) >     EXAM:  CT ANGIO CHEST (W)AW IC                          PROCEDURE DATE:  12/29/2020          INTERPRETATION:  CLINICAL INFORMATION: Tachycardia. Hypoxia. Evaluate for pulmonary embolism.    COMPARISON: CT chest 12/17/2020 and chest x-ray 12/29/2020    PROCEDURE:  CT Angiography of the Chest.  74 ml of Omnipaque 350 was injected intravenously. 26 ml were discarded.  Sagittal and coronal reformats were performed as well as 3D (MIP) reconstructions.    FINDINGS:    LUNGS AND AIRWAYS: Diffuse bronchial wall thickening with areas of mucous plugging within the peripheral aspect of the lower lobes. Patchy bilateral groundglass opacities, greater in the lower lobes. There are also scattered tree-in-bud opacities, greatest in the lower lobes. 6 mm right upper lobe nodule (series 5 image 46 with a small droplet of air centrally, likely infectious/inflammatory. Overall, the lung opacities have increased since 12/17/2020, however previously seen opacities in the lower lobes and lingula have improved/resolved. Emphysematous changes.  PLEURA: No pleural effusion.  MEDIASTINUM AND BRAD: No lymphadenopathy.  VESSELS: No pulmonary embolism. Thoracic aorta within normal limits for caliber with atherosclerotic changes. Coronary artery calcifications.  HEART: Heart size is normal. No pericardial effusion.  CHEST WALL AND LOWER NECK: No enlarged axillary lymph nodes.  VISUALIZED UPPER ABDOMEN: Within normal limits.  BONES: Partially imaged spine stimulator. Sternotomy. Compression deformities in the upper thoracic spine, unchanged.    IMPRESSION:  No pulmonary embolism.    Diffuse bronchial wall thickening, likely reactive airways disease or bronchitis.    Patchy bilateral groundglass opacities, greater in the lower lungs; differential includesmultifocal infection or inflammation including viral pneumonia.    Scattered tree-in-bud opacities, greater in the lower lobes, likely infectious/inflammatory in etiology.    Lung opacities have overall increased since 12/17/2020 with previously seenopacities in the lower lobes and lingula improved/resolved.            SHERIDAN ANGELES MD; Attending Radiologist  This document has been electronically signed. Dec 29 2020  3:45PM    < end of copied text >

## 2020-12-29 NOTE — H&P ADULT - NSHPPHYSICALEXAM_GEN_ALL_CORE
General: Well developed. well nourished. not in distress  HEENT: AT, NC. PERRL. intact EOM. no throat erythema or exudate.   Neck: supple. no JVD. no palpable lymph nodes  Chest: CTA bilaterally  Heart: normal S1,S2. RRR. no heart murmur.  Abdomen: soft. non-tender. non-distended. + BS. no hernia or palpable masses.  Genital: not examined  Ext: no C/C/E. no calf tenderness.  Neuro: AAO x3. no focal weakness. no speech disorder  Skin: no rash Physical Exam: General: Well developed male lying in bed not in distress.  HEENT: AT, NC. PERRL. intact EOM. no throat erythema or exudate.   Neck: supple. no JVD.   Chest: no sig. wheeze appreciated , air entry mildly diminished B/L , no inter costal retractions.  Heart: S1,S2. RRR. no heart murmur. no edema.   Abdomen: soft. non-tender. non-distended. + BS.   Ext: no calf tenderness. ROM of all ext. intact. distal pulses 2 +.   Neuro: AAO x3. no focal weakness. no speech disorder, CNs ii to xii intact.  m/r/s intact.  Skin: no rash noted, warm and dry, no pallor.   Psych : co-operative, mood ok , no si/hi.

## 2020-12-29 NOTE — ED PROVIDER NOTE - CLINICAL SUMMARY MEDICAL DECISION MAKING FREE TEXT BOX
tachycardic, febrile, hypoxic ~90% on room air, with recent hcap and hospitalizations. cp with cabg in march. code sepsis with fluids/abx, covid swab, trop, cta to eval for PE, ID and cards consult. reassess.

## 2020-12-29 NOTE — ED PROVIDER NOTE - OBJECTIVE STATEMENT
Patient is a  75y Male with CAd s/p CABG 3/2020, HTN, HLD, copd with 2 recent admissions over past 2 months for pna s/p course of cefepime/cefpodoxime finishing yesterday with neg covids c/o chest pain today. Pt has had intermittent chest pain since cabg in march, had some cp today at ID office with Dr Dailey, who called pt cards office Dr. Fink who told pt to come in. Pt had tachycardia/hypoxia with sbp ~90 in office. states similar cp he has been having. no abd pain, no meningeal symptoms, no urinary symptoms.     ROS: + fever/chills. No eye pain/changes in vision, No ear pain/sore throat/dysphagia, No palpitations.  No abdominal pain, N/V/D, no black/bloody bm. No dysuria/frequency/discharge, No headache. No Dizziness.    No rashes or breaks in skin. No numbness/tingling/weakness.

## 2020-12-29 NOTE — CONSULT NOTE ADULT - ASSESSMENT
Assessment:  Robert Henson is a 75 year old man with past medical history of Coronary artery disease (s/p RCA stent in 2002 and recent CABG x3 vessel; LIMA-LAD, SVG-OM1 & PDA in March 2020), Hypertension, Hyperlipidemia, COPD and recent hospitalizations for pneumonia who presents with chest pain.       Recommendations:  Assessment:  Robert Henson is a 75 year old man with past medical history of Coronary artery disease (s/p RCA stent in 2002 and recent CABG x3 vessel; LIMA-LAD, SVG-OM1 & PDA in March 2020), Hypertension, Hyperlipidemia, COPD and recent hospitalizations for pneumonia who presents with dyspnea, cough and episodes of chest pain, overall suggestive of recurrent pneumonia. Patient reports fever of 100F at home, also here with leukocytosis and infiltrates on CT chest all suggestive of recurrent pneumonia. ECG with no new acute ischemic ST/T wave abnormalities, troponin negative and chest pain appears musculoskeletal in etiology which makes acute coronary syndrome less likely. No overt signs of volume overload. SARS, flu and RSV negative. No pulmonary embolism on CT chest.      Recommendations:  [] Chest pain: Has reproducible chest wall pain on physical exam. Likely musculoskeletal in etiology. Can repeat 2 additional troponins to ensure does not trend up. Will also check echocardiogram to re-evaluate LVEF.   [] Dyspnea/Cough/Leukocytosis: Concerning for recurrent pneumonia, management per primary team and possibly Infectious Diseases and Pulmonary. Unclear why patient presenting with recurrent pneumonias, would ensure no immunocompromised status this admission.  [] CAD, s/p CABG: Appears stable, will follow-up echo and troponins. Continue home Metoprolol tartrate 25 mg BID, Atorvastatin 40 mg daily & Aspirin 81 mg daily     Thank you for the consult. We will follow along.    Discussed with Dr. Little.    Berenice Moncada MD  Cardiology

## 2020-12-29 NOTE — H&P ADULT - ASSESSMENT
In summary 76 y/o male who was seen at ID office for follow up , was recently admitted at [facility] for pneumonia , discharged on 12/22/20 mentioned about on and off pressure type chest pain for past 2 -3 days , lasting for a minute and then getting better, no aggravating factor, no radiation , no diaphoresis, no dizziness, no syncopy. some sob with cough, no orthopnea, pt. still has some cough and phlegm, last night had fever of 100.2 at home. no abd. pain. no n/v/d. no sick contact. pt. reports point tenderness on left side of his chest. pt. reports that he is an active person , not on home o2. pt. reports no trouble swallowing. As per pt. he finished his po steroids 3 days ago which he was given on his recent discharge from [facility]. pt. will be admitted for HCAP, pt. evaluated by ID and started on meropenem and zithromax.    - HCAP,  Pt. will be on meropenem and zithromax, pt's elevated leukocytosis might be related to his recent use of steroids and possible contribution from recurring pneumonia. follow blood, urine and sputum cx.    - Other Chest pain, will trend trop, first negative, cardio consult appreciated possible musculoskeletal as pt. has point tenderness over left chest wall, considering his CAD, will get echo, continue with charok, asa, statins.    -  H/O copd, pt. not in copd exacerbation will be on duoneb, hold steroids at this point, will request pulmonology f/u for recurrent pneumonia.     -  Essential htn, controlled, continue metoprolol.    - H/o chronic back pain, prn percocet.      In summary 74 y/o male who was seen at ID office for follow up , was recently admitted at [facility] for pneumonia , discharged on 12/22/20 mentioned about on and off pressure type chest pain for past 2 -3 days , lasting for a minute and then getting better, no aggravating factor, no radiation , no diaphoresis, no dizziness, no syncopy. some sob with cough, no orthopnea, pt. still has some cough and phlegm, last night had fever of 100.2 at home. no abd. pain. no n/v/d. no sick contact. pt. reports point tenderness on left side of his chest. pt. reports that he is an active person , not on home o2. pt. reports no trouble swallowing. As per pt. he finished his po steroids 3 days ago which he was given on his recent discharge from [facility]. pt. will be admitted for HCAP, pt. evaluated by ID and started on meropenem and zithromax.    - HCAP,  Pt. will be on meropenem and zithromax, pt's elevated leukocytosis might be related to his recent use of steroids and possible contribution from recurring pneumonia. follow blood, urine and sputum cx.    - Other Chest pain, will trend trop, first negative, cardio consult appreciated possible musculoskeletal as pt. has point tenderness over left chest wall, considering his CAD, will get echo, continue with masonblk, asa, statins.    -  H/O copd, pt. not in copd exacerbation will be on duoneb, pulmicort ,  hold po steroids at this point, will request pulmonology f/u for recurrent pneumonia.     -  Essential htn, controlled, continue metoprolol.    - H/o chronic back pain, prn percocet.      In summary 76 y/o male who was seen at ID office for follow up , was recently admitted at [facility] for pneumonia , discharged on 12/22/20 mentioned about on and off pressure type chest pain for past 2 -3 days , lasting for a minute and then getting better, no aggravating factor, no radiation , no diaphoresis, no dizziness, no syncopy. some sob with cough, no orthopnea, pt. still has some cough and phlegm, last night had fever of 100.2 at home. no abd. pain. no n/v/d. no sick contact. pt. reports point tenderness on left side of his chest. pt. reports that he is an active person , not on home o2. pt. reports no trouble swallowing. As per pt. he finished his po steroids 3 days ago which he was given on his recent discharge from [facility]. pt. will be admitted for HCAP, pt. evaluated by ID and started on meropenem and zithromax.    - HCAP,  Pt. will be on meropenem and zithromax, pt's elevated leukocytosis might be related to his recent use of steroids and possible contribution from recurring pneumonia. follow blood, urine and sputum cx.    - Other Chest pain, will trend trop, first negative, cardio consult appreciated possible musculoskeletal as pt. has point tenderness over left chest wall, considering his CAD, will get echo, continue with masonblk, asa, statins.    -  H/O copd, pt. not in copd exacerbation will be on duoneb, pulmicort ,  hold po steroids at this point, will request pulmonology f/u for recurrent pneumonia.     -  Essential htn, controlled, continue metoprolol.    - H/o chronic back pain, prn percocet.     - pt. reported some chest discomfort across chest wall with point tenderness on chest wall , no cp otherwise, no sob, 2nd trop came back negative as well, bp stable, mild anxious ? requesting percocet. 12/30/20, 12:15 am.

## 2020-12-29 NOTE — H&P ADULT - HISTORY OF PRESENT ILLNESS
74 y/o male who was seen at ID office for follow up , was recently admitted at [facility] for pneumonia and mentioned about on and off pressure type chest pain for past 2 -3 days , lasting for a minute and then getting better, no aggravating factor, no radiation , no diaphoresis, no dizziness, no syncopy. some sob with cough, no orthopnea, pt. still has some cough and phlegm, last night had fever of 100.2 at home. no abd. pain. no n/v/d. no sick contact.  74 y/o male who was seen at ID office for follow up , was recently admitted at [facility] for pneumonia and mentioned about on and off pressure type chest pain for past 2 -3 days , lasting for a minute and then getting better, no aggravating factor, no radiation , no diaphoresis, no dizziness, no syncopy. some sob with cough, no orthopnea, pt. still has some cough and phlegm, last night had fever of 100.2 at home. no abd. pain. no n/v/d. no sick contact. pt. reports that he is an active person , not on home o2. 74 y/o male who was seen at ID office for follow up , was recently admitted at [facility] for pneumonia and mentioned about on and off pressure type chest pain for past 2 -3 days , lasting for a minute and then getting better, no aggravating factor, no radiation , no diaphoresis, no dizziness, no syncopy. some sob with cough, no orthopnea, pt. still has some cough and phlegm, last night had fever of 100.2 at home. no abd. pain. no n/v/d. no sick contact. pt. reports point tenderness on left side of his chest. pt. reports that he is an active person , not on home o2. pt. reports no trouble swallowing.  76 y/o male who was seen at ID office for follow up , was recently admitted at [facility] for pneumonia and mentioned about on and off pressure type chest pain for past 2 -3 days , lasting for a minute and then getting better, no aggravating factor, no radiation , no diaphoresis, no dizziness, no syncopy. some sob with cough, no orthopnea, pt. still has some cough and phlegm, last night had fever of 100.2 at home. no abd. pain. no n/v/d. no sick contact. pt. reports point tenderness on left side of his chest. pt. reports that he is an active person , not on home o2. pt. reports no trouble swallowing. As per pt. he finished his po steroids 3 days ago which he was given on his recent discharge from [facility].  76 y/o male who was seen at ID office for follow up , was recently admitted at [facility] for pneumonia , discharged on 12/22/20 mentioned about on and off pressure type chest pain for past 2 -3 days , lasting for a minute and then getting better, no aggravating factor, no radiation , no diaphoresis, no dizziness, no syncopy. some sob with cough, no orthopnea, pt. still has some cough and phlegm, last night had fever of 100.2 at home. no abd. pain. no n/v/d. no sick contact. pt. reports point tenderness on left side of his chest. pt. reports that he is an active person , not on home o2. pt. reports no trouble swallowing. As per pt. he finished his po steroids 3 days ago which he was given on his recent discharge from [facility].   pt's ct angio done today showed :  No pulmonary embolism.  Diffuse bronchial wall thickening, likely reactive airways disease or bronchitis.Patchy bilateral groundglass opacities, greater in the lower lungs; differential includes multifocal infection or inflammation including viral pneumonia.Scattered tree-in-bud opacities, greater in the lower lobes, likely infectious/inflammatory in etiology. Lung opacities have overall increased since 12/17/2020 with previously seen opacities in the lower lobes and lingula improved/resolved.

## 2020-12-29 NOTE — ED CLERICAL - NS ED CLERK NOTE PRE-ARRIVAL INFORMATION; ADDITIONAL PRE-ARRIVAL INFORMATION
This patient is enrolled in a readmission reduction program and has active care navigation. This patient can be followed up by the care navigation team within 24 hours. To arrange close follow-up or to obtain additional clinical information about this patient, please call the contact number above. Please speak with the [place] ED Case Manager for assistance with discharge planning

## 2020-12-29 NOTE — ED ADULT NURSE NOTE - OBJECTIVE STATEMENT
Patient with chest pain x1 day, sinus tach on cardiac monitor, saturating >92% on room air. Patient is alert and oriented x4, hx of pneumonia with recent hospitalization.  Skin is warm and dry.  Rectal temp elevated, sepsis protocol ordered.  Established peripheral IV access, administered IVF and antibiotics as ordered. Patient without complaints at this time.  Educated patient on plan of care, stretcher in lowest position, wheels locked, call bell in reach.

## 2020-12-29 NOTE — CONSULT NOTE ADULT - ASSESSMENT
This75 y/o male who was seen at ID office for follow up , was recently admitted at [facility] for pneumonia and mentioned about on and off pressure type chest pain for past 2 -3 days , lasting for a minute and then getting better, no aggravating factor, no radiation , no diaphoresis, no dizziness, no syncopy. some sob with cough, no orthopnea, pt. still has some cough and phlegm, last night had fever of 100.2 at home. no abd. pain. no n/v/d. no sick contact. pt. reports point tenderness on left side of his chest. pt. reports that he is an active person , not on home o2. pt. reports no trouble swallowing. As per pt. he finished his po steroids 3 days ago which he was given on his recent discharge from [facility].  (29 Dec 2020 16:31)    Recent CT scan showed minimal infiltrates prior to discharge:  No pulmonary embolism.  Diffuse bronchial wall thickening, likely reactive airways disease or bronchitis.  Patchy bilateral groundglass opacities, greater in the lower lungs; differential includesmultifocal infection or inflammation including viral pneumonia.  Scattered tree-in-bud opacities, greater in the lower lobes, likely infectious/inflammatory in etiology.  Lung opacities have overall increased since 12/17/2020 with previously seenopacities in the lower lobes and lingula improved/resolved.        New CT with worsened lower lobe infiltrate.   + SOB  + sputum  denies sick contacts.   COVID testing negative here.         IMpression:  SOB  multifocal PNA  WBC elevation  acute febrile illness        Plan:  check sputum cx  check legioneilla  - follow up all outstanding cultures  - trend temperature and WBC curve  - repeat cultures from blood and all sources if febrile.    continue Antibiotics:  azithromycin   Tablet 500 milliGRAM(s) Oral once  meropenem  IVPB 1000 milliGRAM(s) IV Intermittent every 8 hours      WBC reactive,  will trend

## 2020-12-29 NOTE — ED ADULT NURSE REASSESSMENT NOTE - NS ED NURSE REASSESS COMMENT FT1
patient resting comfortably in bed, no complaints at this time. remains on cardiac monitor. awaiting hospital bed.

## 2020-12-30 ENCOUNTER — TRANSCRIPTION ENCOUNTER (OUTPATIENT)
Age: 75
End: 2020-12-30

## 2020-12-30 DIAGNOSIS — J18.9 PNEUMONIA, UNSPECIFIED ORGANISM: ICD-10-CM

## 2020-12-30 DIAGNOSIS — J44.9 CHRONIC OBSTRUCTIVE PULMONARY DISEASE, UNSPECIFIED: ICD-10-CM

## 2020-12-30 LAB
ANION GAP SERPL CALC-SCNC: 10 MMOL/L — SIGNIFICANT CHANGE UP (ref 5–17)
BASOPHILS # BLD AUTO: 0.02 K/UL — SIGNIFICANT CHANGE UP (ref 0–0.2)
BASOPHILS NFR BLD AUTO: 0.1 % — SIGNIFICANT CHANGE UP (ref 0–2)
BUN SERPL-MCNC: 12 MG/DL — SIGNIFICANT CHANGE UP (ref 8–20)
CALCIUM SERPL-MCNC: 8.5 MG/DL — LOW (ref 8.6–10.2)
CHLORIDE SERPL-SCNC: 98 MMOL/L — SIGNIFICANT CHANGE UP (ref 98–107)
CO2 SERPL-SCNC: 28 MMOL/L — SIGNIFICANT CHANGE UP (ref 22–29)
CREAT SERPL-MCNC: 0.48 MG/DL — LOW (ref 0.5–1.3)
CULTURE RESULTS: NO GROWTH — SIGNIFICANT CHANGE UP
EOSINOPHIL # BLD AUTO: 0.41 K/UL — SIGNIFICANT CHANGE UP (ref 0–0.5)
EOSINOPHIL NFR BLD AUTO: 2.7 % — SIGNIFICANT CHANGE UP (ref 0–6)
GLUCOSE SERPL-MCNC: 103 MG/DL — HIGH (ref 70–99)
HCT VFR BLD CALC: 33.5 % — LOW (ref 39–50)
HGB BLD-MCNC: 10.8 G/DL — LOW (ref 13–17)
IMM GRANULOCYTES NFR BLD AUTO: 0.6 % — SIGNIFICANT CHANGE UP (ref 0–1.5)
LYMPHOCYTES # BLD AUTO: 1.1 K/UL — SIGNIFICANT CHANGE UP (ref 1–3.3)
LYMPHOCYTES # BLD AUTO: 7.2 % — LOW (ref 13–44)
MCHC RBC-ENTMCNC: 30.8 PG — SIGNIFICANT CHANGE UP (ref 27–34)
MCHC RBC-ENTMCNC: 32.2 GM/DL — SIGNIFICANT CHANGE UP (ref 32–36)
MCV RBC AUTO: 95.4 FL — SIGNIFICANT CHANGE UP (ref 80–100)
MONOCYTES # BLD AUTO: 1.14 K/UL — HIGH (ref 0–0.9)
MONOCYTES NFR BLD AUTO: 7.5 % — SIGNIFICANT CHANGE UP (ref 2–14)
NEUTROPHILS # BLD AUTO: 12.42 K/UL — HIGH (ref 1.8–7.4)
NEUTROPHILS NFR BLD AUTO: 81.9 % — HIGH (ref 43–77)
PLATELET # BLD AUTO: 244 K/UL — SIGNIFICANT CHANGE UP (ref 150–400)
POTASSIUM SERPL-MCNC: 4.4 MMOL/L — SIGNIFICANT CHANGE UP (ref 3.5–5.3)
POTASSIUM SERPL-SCNC: 4.4 MMOL/L — SIGNIFICANT CHANGE UP (ref 3.5–5.3)
RBC # BLD: 3.51 M/UL — LOW (ref 4.2–5.8)
RBC # FLD: 14.3 % — SIGNIFICANT CHANGE UP (ref 10.3–14.5)
SARS-COV-2 IGG SERPL QL IA: NEGATIVE — SIGNIFICANT CHANGE UP
SARS-COV-2 IGM SERPL IA-ACNC: 0.06 INDEX — SIGNIFICANT CHANGE UP
SODIUM SERPL-SCNC: 136 MMOL/L — SIGNIFICANT CHANGE UP (ref 135–145)
SPECIMEN SOURCE: SIGNIFICANT CHANGE UP
TROPONIN T SERPL-MCNC: <0.01 NG/ML — SIGNIFICANT CHANGE UP (ref 0–0.06)
WBC # BLD: 15.18 K/UL — HIGH (ref 3.8–10.5)
WBC # FLD AUTO: 15.18 K/UL — HIGH (ref 3.8–10.5)

## 2020-12-30 PROCEDURE — 99222 1ST HOSP IP/OBS MODERATE 55: CPT

## 2020-12-30 PROCEDURE — 99232 SBSQ HOSP IP/OBS MODERATE 35: CPT

## 2020-12-30 PROCEDURE — 99233 SBSQ HOSP IP/OBS HIGH 50: CPT

## 2020-12-30 RX ADMIN — Medication 2000 UNIT(S): at 08:11

## 2020-12-30 RX ADMIN — Medication 3 MILLILITER(S): at 15:22

## 2020-12-30 RX ADMIN — Medication 1 TABLET(S): at 15:20

## 2020-12-30 RX ADMIN — Medication 0.5 MILLIGRAM(S): at 20:18

## 2020-12-30 RX ADMIN — Medication 1 TABLET(S): at 12:13

## 2020-12-30 RX ADMIN — Medication 25 MILLIGRAM(S): at 06:22

## 2020-12-30 RX ADMIN — Medication 30 MILLIGRAM(S): at 15:20

## 2020-12-30 RX ADMIN — OXYCODONE AND ACETAMINOPHEN 2 TABLET(S): 5; 325 TABLET ORAL at 08:11

## 2020-12-30 RX ADMIN — Medication 1 TABLET(S): at 08:11

## 2020-12-30 RX ADMIN — GABAPENTIN 400 MILLIGRAM(S): 400 CAPSULE ORAL at 06:22

## 2020-12-30 RX ADMIN — Medication 25 MILLIGRAM(S): at 15:20

## 2020-12-30 RX ADMIN — GABAPENTIN 400 MILLIGRAM(S): 400 CAPSULE ORAL at 12:15

## 2020-12-30 RX ADMIN — Medication 3 MILLILITER(S): at 20:18

## 2020-12-30 RX ADMIN — MEROPENEM 100 MILLIGRAM(S): 1 INJECTION INTRAVENOUS at 21:49

## 2020-12-30 RX ADMIN — ATORVASTATIN CALCIUM 40 MILLIGRAM(S): 80 TABLET, FILM COATED ORAL at 21:48

## 2020-12-30 RX ADMIN — MEROPENEM 100 MILLIGRAM(S): 1 INJECTION INTRAVENOUS at 12:14

## 2020-12-30 RX ADMIN — Medication 81 MILLIGRAM(S): at 08:11

## 2020-12-30 RX ADMIN — Medication 3 MILLILITER(S): at 08:25

## 2020-12-30 RX ADMIN — TAMSULOSIN HYDROCHLORIDE 0.4 MILLIGRAM(S): 0.4 CAPSULE ORAL at 21:48

## 2020-12-30 RX ADMIN — OXYCODONE AND ACETAMINOPHEN 2 TABLET(S): 5; 325 TABLET ORAL at 00:21

## 2020-12-30 RX ADMIN — Medication 3 MILLILITER(S): at 02:28

## 2020-12-30 RX ADMIN — GABAPENTIN 400 MILLIGRAM(S): 400 CAPSULE ORAL at 21:48

## 2020-12-30 RX ADMIN — Medication 0.5 MILLIGRAM(S): at 08:25

## 2020-12-30 RX ADMIN — AZITHROMYCIN 250 MILLIGRAM(S): 500 TABLET, FILM COATED ORAL at 08:11

## 2020-12-30 RX ADMIN — OXYCODONE AND ACETAMINOPHEN 2 TABLET(S): 5; 325 TABLET ORAL at 09:20

## 2020-12-30 RX ADMIN — OXYCODONE AND ACETAMINOPHEN 2 TABLET(S): 5; 325 TABLET ORAL at 16:48

## 2020-12-30 RX ADMIN — OXYCODONE AND ACETAMINOPHEN 2 TABLET(S): 5; 325 TABLET ORAL at 00:43

## 2020-12-30 RX ADMIN — ENOXAPARIN SODIUM 40 MILLIGRAM(S): 100 INJECTION SUBCUTANEOUS at 08:12

## 2020-12-30 RX ADMIN — MEROPENEM 100 MILLIGRAM(S): 1 INJECTION INTRAVENOUS at 06:22

## 2020-12-30 NOTE — DISCHARGE NOTE NURSING/CASE MANAGEMENT/SOCIAL WORK - NSDCFUADDAPPT_GEN_ALL_CORE_FT
PHARMACY:  [facility], [place]  THE PT. DOES NOT HAVE ANY DIFFICULTIES IN OBTAINING OR TAKING HIS MEDICATIONS      PULMONARY FOLLOW UP APPOINTMENT WAS SCHEDULED   WITH DR. COLIN  ([phone])  ON 1/8/21 11:30 AM  PLEASE BRING YOUR DISCHARGE PAPERWORK WITH YOU TO YOUR APPOINTMENT

## 2020-12-30 NOTE — PROGRESS NOTE ADULT - SUBJECTIVE AND OBJECTIVE BOX
JENNIFER OSCAR  [id number]      Chief Complaint:      Interval History:      Tele:        ALBUTerol    90 MICROgram(s) HFA Inhaler 1 Puff(s) Inhalation every 4 hours  albuterol/ipratropium for Nebulization 3 milliLiter(s) Nebulizer every 6 hours  aspirin enteric coated 81 milliGRAM(s) Oral daily  atorvastatin 40 milliGRAM(s) Oral at bedtime  azithromycin   Tablet 250 milliGRAM(s) Oral daily  buDESOnide    Inhalation Suspension 0.5 milliGRAM(s) Inhalation two times a day  cholecalciferol 2000 Unit(s) Oral daily  enoxaparin Injectable 40 milliGRAM(s) SubCutaneous daily  gabapentin 400 milliGRAM(s) Oral three times a day  lactobacillus acidophilus 1 Tablet(s) Oral three times a day with meals  meropenem  IVPB 1000 milliGRAM(s) IV Intermittent every 8 hours  metoprolol tartrate 25 milliGRAM(s) Oral two times a day  nitroglycerin     SubLingual 0.4 milliGRAM(s) SubLingual every 5 minutes PRN  oxycodone    5 mG/acetaminophen 325 mG 2 Tablet(s) Oral every 8 hours PRN  polyethylene glycol 3350 17 Gram(s) Oral daily PRN  predniSONE   Tablet   Oral   predniSONE   Tablet 30 milliGRAM(s) Oral daily  tamsulosin 0.4 milliGRAM(s) Oral at bedtime  tiotropium 18 MICROgram(s) Capsule 1 Capsule(s) Inhalation daily          Physical Exam:  T(C): 36.9 (12-30-20 @ 11:03), Max: 37.4 (12-29-20 @ 23:44)  HR: 105 (12-30-20 @ 15:23) (88 - 108)  BP: 121/75 (12-30-20 @ 11:03) (109/65 - 126/61)  RR: 38 (12-30-20 @ 11:03) (18 - 38)  SpO2: 93% (12-30-20 @ 15:23) (91% - 98%)  Wt(kg): --  General: Comfortable in NAD  Neck: No JVD  CVS: nl s1s2, no s3  Pulm: CTA b/l  Abd: soft, non-tender  Ext: No c/c/e  Neuro A&O x3  Psych: Normal affect      Labs:   30 Dec 2020 04:20    136    |  98     |  12.0   ----------------------------<  103    4.4     |  28.0   |  0.48     Ca    8.5        30 Dec 2020 04:20    TPro  6.8    /  Alb  3.8    /  TBili  0.7    /  DBili  x      /  AST  37     /  ALT  59     /  AlkPhos  219    29 Dec 2020 12:25                          10.8   15.18 )-----------( 244      ( 30 Dec 2020 04:20 )             33.5     PT/INR - ( 29 Dec 2020 12:25 )   PT: 13.1 sec;   INR: 1.14 ratio         PTT - ( 29 Dec 2020 12:25 )  PTT:31.2 sec  CARDIAC MARKERS ( 30 Dec 2020 04:20 )  x     / <0.01 ng/mL / x     / x     / x      CARDIAC MARKERS ( 29 Dec 2020 21:44 )  x     / <0.01 ng/mL / x     / x     / x      CARDIAC MARKERS ( 29 Dec 2020 12:25 )  x     / <0.01 ng/mL / x     / x     / x              Assessment:  75yMale PMX    Plan:  1.        JENNIFER ELLISLEMUEL  [id number]      Chief Complaint: Atypical chest pain/Pneumonia    Interval History: The patient reports less cough and less dyspnea.     Tele: sinus rhythm       Current meds:   ALBUTerol    90 MICROgram(s) HFA Inhaler 1 Puff(s) Inhalation every 4 hours  albuterol/ipratropium for Nebulization 3 milliLiter(s) Nebulizer every 6 hours  aspirin enteric coated 81 milliGRAM(s) Oral daily  atorvastatin 40 milliGRAM(s) Oral at bedtime  azithromycin   Tablet 250 milliGRAM(s) Oral daily  buDESOnide    Inhalation Suspension 0.5 milliGRAM(s) Inhalation two times a day  cholecalciferol 2000 Unit(s) Oral daily  enoxaparin Injectable 40 milliGRAM(s) SubCutaneous daily  gabapentin 400 milliGRAM(s) Oral three times a day  lactobacillus acidophilus 1 Tablet(s) Oral three times a day with meals  meropenem  IVPB 1000 milliGRAM(s) IV Intermittent every 8 hours  metoprolol tartrate 25 milliGRAM(s) Oral two times a day  nitroglycerin     SubLingual 0.4 milliGRAM(s) SubLingual every 5 minutes PRN  oxycodone    5 mG/acetaminophen 325 mG 2 Tablet(s) Oral every 8 hours PRN  polyethylene glycol 3350 17 Gram(s) Oral daily PRN  predniSONE   Tablet   Oral   predniSONE   Tablet 30 milliGRAM(s) Oral daily  tamsulosin 0.4 milliGRAM(s) Oral at bedtime  tiotropium 18 MICROgram(s) Capsule 1 Capsule(s) Inhalation daily      Objective:     Vital Signs:   T(C): 36.9 (12-30-20 @ 11:03), Max: 37.4 (12-29-20 @ 23:44)  HR: 105 (12-30-20 @ 15:23) (88 - 108)  BP: 121/75 (12-30-20 @ 11:03) (109/65 - 126/61)  RR: 38 (12-30-20 @ 11:03) (18 - 38)  SpO2: 93% (12-30-20 @ 15:23) (91% - 98%)  Wt(kg): --    PHYSICAL EXAM:  General: elderly man, no acute distress  HEENT: sclera anicteric  Neck: supple, no carotid bruits b/l  CVS: JVP ~ 9 cm H20, RRR, s1, s2, no murmurs  Chest: unlabored respirations, coarse  Abdomen: non-distended  Extremities: no lower extremity edema b/l  Neuro: awake, alert & oriented x 3  Psych: normal affect      Labs:   30 Dec 2020 04:20    136    |  98     |  12.0   ----------------------------<  103    4.4     |  28.0   |  0.48     Ca    8.5        30 Dec 2020 04:20    TPro  6.8    /  Alb  3.8    /  TBili  0.7    /  DBili  x      /  AST  37     /  ALT  59     /  AlkPhos  219    29 Dec 2020 12:25                          10.8   15.18 )-----------( 244      ( 30 Dec 2020 04:20 )             33.5     PT/INR - ( 29 Dec 2020 12:25 )   PT: 13.1 sec;   INR: 1.14 ratio         PTT - ( 29 Dec 2020 12:25 )  PTT:31.2 sec  CARDIAC MARKERS ( 30 Dec 2020 04:20 )  x     / <0.01 ng/mL / x     / x     / x      CARDIAC MARKERS ( 29 Dec 2020 21:44 )  x     / <0.01 ng/mL / x     / x     / x      CARDIAC MARKERS ( 29 Dec 2020 12:25 )  x     / <0.01 ng/mL / x     / x     / x              ECG (12/29/2020): sinus tachycardia, RBBB (similar to prior ECG)    TTE (3/2020):   1. Technically good study.   2. Normal global left ventricular systolic function.   3. Left ventricular ejection fraction, by visual estimation, is 70 to 75%.   4. Trace mitral valve regurgitation.   5. There is no evidence of pericardial effusion.    TTE (12/29/2020):   1. Left ventricular ejection fraction, by visual estimation, is 65 to 70%.   2. Normal global left ventricular systolic function.   3. Spectral Doppler shows pseudonormal pattern of left ventricular myocardial filling (Grade II diastolic dysfunction).   4. Mildly reduced RV systolic function with TAPSE of 11mm.   5. Normal left atrial size.   6. Normal right atrial size.   7. Trace mitral valve regurgitation.   8. Sclerotic aortic valve with normal opening.   9. Endocardial visualization was enhanced with intravenous echo contrast.  10. Peak transaortic gradient equals 7.0 mmHg, mean transaortic gradient equals 3.7 mmHg, the calculated aortic valve area equals 1.59 cm² by the continuity equation consistent with mild aortic stenosis.  11. There is no evidence of pericardial effusion.  12. Compared to TTE done on 3/2020; the left ventricular function is unchanged but there is now mild aortic stenosis.      Home Cardiac Meds:  Midodrine 5 mg BID  Metoprolol tartrate 25 mg BID  Atorvastatin 40 mg daily  Aspirin 81 mg daily

## 2020-12-30 NOTE — PROGRESS NOTE ADULT - SUBJECTIVE AND OBJECTIVE BOX
[facility]  INFECTIOUS DISEASES AND INTERNAL MEDICINE at [place]  =======================================================  Stephen Lala MD  Diplomates American Board of Internal Medicine and Infectious Diseases  Tel  [phone]  Fax [phone]  =======================================================    [id number]  JENNIFER MOORE   follow up:  PNA  still with cough and SOB  has some sputum.   but not sent for cx.          =======================================================    REVIEW OF SYSTEMS:  CONSTITUTIONAL:  No Fever or chills  HEENT:  No diplopia or blurred vision.  No earache, sore throat or runny nose.  CARDIOVASCULAR:  No pressure, squeezing, strangling, tightness, heaviness or aching about the chest, neck, axilla or epigastrium.  RESPIRATORY:  as per HPI  GASTROINTESTINAL:  No nausea, vomiting or diarrhea.  GENITOURINARY:  No dysuria, frequency or urgency. No Blood in urine  MUSCULOSKELETAL:  no joint aches, no muscle pain  SKIN:  No change in skin, hair or nails.  NEUROLOGIC:  No Headaches, seizures or weakness.  PSYCHIATRIC:  No disorder of thought or mood.  ENDOCRINE:  No heat or cold intolerance  HEMATOLOGICAL:  No easy bruising or bleeding.    =======================================================  Allergies    codeine (Urticaria)  ibuprofen (Anaphylaxis)     ======================================================  Physical Exam:  ============     General:  No acute distress.  Eye: Pupils are equal, round and reactive to light, Extraocular movements are intact, Normal conjunctiva.  HENT: Normocephalic, Oral mucosa is moist, No pharyngeal erythema, No sinus tenderness.  Neck: Supple, No lymphadenopathy.  Respiratory: Lungs with poor aeration at bases.  Unchanged  Cardiovascular: Normal rate, Regular rhythm,   Gastrointestinal: Soft, Non-tender, Non-distended, Normal bowel sounds.  Genitourinary: No costovertebral angle tenderness.  Lymphatics: No lymphadenopathy neck,   Musculoskeletal: Normal range of motion, Normal strength.  Integumentary: No rash.  Neurologic: Alert, Oriented, No focal deficits, Cranial Nerves II-XII are grossly intact.  Psychiatric: Appropriate mood & affect.    =======================================================  Vitals:  ============  T(F): 98.5 (30 Dec 2020 11:03), Max: 100.9 (29 Dec 2020 12:08)  HR: 108 (30 Dec 2020 11:03)  BP: 121/75 (30 Dec 2020 11:03)  RR: 38 (30 Dec 2020 11:03)  SpO2: 94% (30 Dec 2020 11:03) (91% - 98%)  temp max in last 48H T(F): , Max: 100.9 (12-29-20 @ 12:08)    =======================================================  Current Antibiotics:  azithromycin   Tablet 250 milliGRAM(s) Oral daily  meropenem  IVPB 1000 milliGRAM(s) IV Intermittent every 8 hours    Other medications:  ALBUTerol    90 MICROgram(s) HFA Inhaler 1 Puff(s) Inhalation every 4 hours  albuterol/ipratropium for Nebulization 3 milliLiter(s) Nebulizer every 6 hours  aspirin enteric coated 81 milliGRAM(s) Oral daily  atorvastatin 40 milliGRAM(s) Oral at bedtime  buDESOnide    Inhalation Suspension 0.5 milliGRAM(s) Inhalation two times a day  cholecalciferol 2000 Unit(s) Oral daily  enoxaparin Injectable 40 milliGRAM(s) SubCutaneous daily  gabapentin 400 milliGRAM(s) Oral three times a day  lactobacillus acidophilus 1 Tablet(s) Oral three times a day with meals  metoprolol tartrate 25 milliGRAM(s) Oral two times a day  tamsulosin 0.4 milliGRAM(s) Oral at bedtime  tiotropium 18 MICROgram(s) Capsule 1 Capsule(s) Inhalation daily      =======================================================  Labs:                        10.8   15.18 )-----------( 244      ( 30 Dec 2020 04:20 )             33.5      12-30    136  |  98  |  12.0  ----------------------------<  103<H>  4.4   |  28.0  |  0.48<L>    Ca    8.5<L>      30 Dec 2020 04:20    TPro  6.8  /  Alb  3.8  /  TBili  0.7  /  DBili  x   /  AST  37  /  ALT  59<H>  /  AlkPhos  219<H>  12-29      Culture - Sputum (collected 12-19-20 @ 16:18)  Source: .Sputum Sputum  Gram Stain (12-19-20 @ 22:21):    Few polymorphonuclear leukocytes per low power field    Rare Squamous epithelial cells per low power field    Few Yeast like cells per oil power field    Few Gram Positive Cocci in Pairs and Chains per oil power field    Rare Gram Positive Rods per oil power field  Final Report (12-21-20 @ 17:01):    Normal Respiratory Doris present    Culture - Urine (collected 12-19-20 @ 01:11)  Source: .Urine Clean Catch (Midstream)  Final Report (12-19-20 @ 20:00):    No growth    Culture - Blood (collected 12-18-20 @ 03:44)  Source: .Blood Blood-Peripheral  Final Report (12-23-20 @ 05:00):    No growth at 5 days.    Culture - Blood (collected 12-18-20 @ 03:44)  Source: .Blood Blood-Peripheral  Final Report (12-23-20 @ 05:00):    No growth at 5 days.      Creatinine, Serum: 0.48 mg/dL (12-30-20 @ 04:20)  Creatinine, Serum: 0.73 mg/dL (12-29-20 @ 12:25)    Procalcitonin, Serum: 0.10 ng/mL (12-17-20 @ 18:14)      Ferritin, Serum: 212 ng/mL (12-17-20 @ 18:14)    C-Reactive Protein, Serum: 1.75 mg/dL (12-17-20 @ 18:14)    WBC Count: 15.18 K/uL (12-30-20 @ 04:20)  WBC Count: 22.51 K/uL (12-29-20 @ 12:25)    SARS-CoV-2: NotDetec (12-18-20 @ 10:24)  Rapid RVP Result: NotDetec (12-18-20 @ 10:24)  SARS-CoV-2: NotDetec (12-17-20 @ 18:16)  Rapid RVP Result: NotDetec (12-17-20 @ 18:16)    COVID-19 IgG Antibody Interpretation: Negative (12-30-20 @ 04:49)  COVID-19 IgG Antibody Index: 0.06 Index (12-30-20 @ 04:49)  COVID-19 IgG Antibody Index: <0.10 Index (12-18-20 @ 07:18)  COVID-19 IgG Antibody Interpretation: Negative (12-18-20 @ 07:18)    Lactate Dehydrogenase, Serum: 231 U/L (12-17-20 @ 18:14)    Alkaline Phosphatase, Serum: 219 U/L (12-29-20 @ 12:25)  Alanine Aminotransferase (ALT/SGPT): 59 U/L (12-29-20 @ 12:25)  Aspartate Aminotransferase (AST/SGOT): 37 U/L (12-29-20 @ 12:25)  Bilirubin Total, Serum: 0.7 mg/dL (12-29-20 @ 12:25)      < from: CT Angio Chest w/ IV Cont (12.29.20 @ 14:39) >     EXAM:  CT ANGIO CHEST (W)AW IC                          PROCEDURE DATE:  12/29/2020          INTERPRETATION:  CLINICAL INFORMATION: Tachycardia. Hypoxia. Evaluate for pulmonary embolism.    COMPARISON: CT chest 12/17/2020 and chest x-ray 12/29/2020    PROCEDURE:  CT Angiography of the Chest.  74 ml of Omnipaque 350 was injected intravenously. 26 ml were discarded.  Sagittal and coronal reformats were performed as well as 3D (MIP) reconstructions.    FINDINGS:    LUNGS AND AIRWAYS: Diffuse bronchial wall thickening with areas of mucous plugging within the peripheral aspect of the lower lobes. Patchy bilateral groundglass opacities, greater in the lower lobes. There are also scattered tree-in-bud opacities, greatest in the lower lobes. 6 mm right upper lobe nodule (series 5 image 46 with a small droplet of air centrally, likely infectious/inflammatory. Overall, the lung opacities have increased since 12/17/2020, however previously seen opacities in the lower lobes and lingula have improved/resolved. Emphysematous changes.  PLEURA: No pleural effusion.  MEDIASTINUM AND BRAD: No lymphadenopathy.  VESSELS: No pulmonary embolism. Thoracic aorta within normal limits for caliber with atherosclerotic changes. Coronary artery calcifications.  HEART: Heart size is normal. No pericardial effusion.  CHEST WALL AND LOWER NECK: No enlarged axillary lymph nodes.  VISUALIZED UPPER ABDOMEN: Within normal limits.  BONES: Partially imaged spine stimulator. Sternotomy. Compression deformities in the upper thoracic spine, unchanged.    IMPRESSION:  No pulmonary embolism.    Diffuse bronchial wall thickening, likely reactive airways disease or bronchitis.    Patchy bilateral groundglass opacities, greater in the lower lungs; differential includesmultifocal infection or inflammation including viral pneumonia.    Scattered tree-in-bud opacities, greater in the lower lobes, likely infectious/inflammatory in etiology.    Lung opacities have overall increased since 12/17/2020 with previously seenopacities in the lower lobes and lingula improved/resolved.            SHERIDAN ANGELES MD; Attending Radiologist  This document has been electronically signed. Dec 29 2020  3:45PM    < end of copied text >

## 2020-12-30 NOTE — CONSULT NOTE ADULT - SUBJECTIVE AND OBJECTIVE BOX
PULMONARY CONSULT NOTE      [id number]    Patient is a 75y old  Male who presents with a chief complaint of pneumonia / chest pain (29 Dec 2020 17:29)      HISTORY OF PRESENT ILLNESS:    MEDICATIONS  (STANDING):  ALBUTerol    90 MICROgram(s) HFA Inhaler 1 Puff(s) Inhalation every 4 hours  albuterol/ipratropium for Nebulization 3 milliLiter(s) Nebulizer every 6 hours  aspirin enteric coated 81 milliGRAM(s) Oral daily  atorvastatin 40 milliGRAM(s) Oral at bedtime  azithromycin   Tablet 250 milliGRAM(s) Oral daily  buDESOnide    Inhalation Suspension 0.5 milliGRAM(s) Inhalation two times a day  cholecalciferol 2000 Unit(s) Oral daily  enoxaparin Injectable 40 milliGRAM(s) SubCutaneous daily  gabapentin 400 milliGRAM(s) Oral three times a day  lactobacillus acidophilus 1 Tablet(s) Oral three times a day with meals  meropenem  IVPB 1000 milliGRAM(s) IV Intermittent every 8 hours  metoprolol tartrate 25 milliGRAM(s) Oral two times a day  tamsulosin 0.4 milliGRAM(s) Oral at bedtime  tiotropium 18 MICROgram(s) Capsule 1 Capsule(s) Inhalation daily      MEDICATIONS  (PRN):  nitroglycerin     SubLingual 0.4 milliGRAM(s) SubLingual every 5 minutes PRN Chest Pain  oxycodone    5 mG/acetaminophen 325 mG 2 Tablet(s) Oral every 8 hours PRN Severe Pain (7 - 10)  polyethylene glycol 3350 17 Gram(s) Oral daily PRN Constipation      Allergies    codeine (Urticaria)  ibuprofen (Anaphylaxis)    Intolerances    amoxicillin (Diarrhea)      PAST MEDICAL & SURGICAL HISTORY:  HLD (hyperlipidemia)    HTN (hypertension)    Lumbosacral disc disease  has nerve stimulator    Arthritis    CAD in native artery  RCA 3 YAYA 1 in  and 2 in 2019    S/P CABG (coronary artery bypass graft)    S/P cataract surgery  b/l eyes     H/O heart artery stent  RCA        FAMILY HISTORY:  FH: colon cancer        SOCIAL HISTORY  Smoking History:     REVIEW OF SYSTEMS:    CONSTITUTIONAL:  No fevers, chills, sweats    HEENT:  Eyes:  No diplopia or blurred vision. ENT:  No earache, sore throat or runny nose.    CARDIOVASCULAR:  No pressure, squeezing, tightness, or heaviness about the chest; no palpitations.    RESPIRATORY:  No cough, shortness of breath, PND or orthopnea. Mild SOBOE    GASTROINTESTINAL:  No abdominal pain, nausea, vomiting or diarrhea.    GENITOURINARY:  No dysuria, frequency or urgency.    NEUROLOGIC:  No paresthesias, fasciculations, seizures or weakness.    PSYCHIATRIC:  No disorder of thought or mood.    Vital Signs Last 24 Hrs  T(C): 36.9 (30 Dec 2020 11:03), Max: 38.3 (29 Dec 2020 12:08)  T(F): 98.5 (30 Dec 2020 11:03), Max: 100.9 (29 Dec 2020 12:08)  HR: 108 (30 Dec 2020 11:03) (88 - 405)  BP: 121/75 (30 Dec 2020 11:03) (109/65 - 126/61)  BP(mean): 83 (29 Dec 2020 23:44) (83 - 83)  RR: 38 (30 Dec 2020 11:03) (18 - 38)  SpO2: 94% (30 Dec 2020 11:03) (91% - 98%)    PHYSICAL EXAMINATION:    GENERAL: The patient is a well-developed, well-nourished _____in no apparent distress.     HEENT: Head is normocephalic and atraumatic. Extraocular muscles are intact. Mucous membranes are moist.     NECK: Supple.     LUNGS: Clear to auscultation without wheezing, rales, or rhonchi. Respirations unlabored    HEART: Regular rate and rhythm without murmur.    ABDOMEN: Soft, nontender, and nondistended.  No hepatosplenomegaly is noted.    EXTREMITIES: Without any cyanosis, clubbing, rash, lesions or edema.    NEUROLOGIC: Grossly intact.      LABS:                        10.8   15.18 )-----------( 244      ( 30 Dec 2020 04:20 )             33.5     12-30    136  |  98  |  12.0  ----------------------------<  103<H>  4.4   |  28.0  |  0.48<L>    Ca    8.5<L>      30 Dec 2020 04:20    TPro  6.8  /  Alb  3.8  /  TBili  0.7  /  DBili  x   /  AST  37  /  ALT  59<H>  /  AlkPhos  219<H>  12-29    PT/INR - ( 29 Dec 2020 12:25 )   PT: 13.1 sec;   INR: 1.14 ratio         PTT - ( 29 Dec 2020 12:25 )  PTT:31.2 sec  Urinalysis Basic - ( 29 Dec 2020 16:16 )    Color: Yellow / Appearance: Clear / S.005 / pH: x  Gluc: x / Ketone: Negative  / Bili: Negative / Urobili: Negative mg/dL   Blood: x / Protein: Negative mg/dL / Nitrite: Negative   Leuk Esterase: Negative / RBC: x / WBC x   Sq Epi: x / Non Sq Epi: x / Bacteria: x        CARDIAC MARKERS ( 30 Dec 2020 04:20 )  x     / <0.01 ng/mL / x     / x     / x      CARDIAC MARKERS ( 29 Dec 2020 21:44 )  x     / <0.01 ng/mL / x     / x     / x      CARDIAC MARKERS ( 29 Dec 2020 12:25 )  x     / <0.01 ng/mL / x     / x     / x                    MICROBIOLOGY:    RADIOLOGY & ADDITIONAL STUDIES: PULMONARY CONSULT NOTE      [id number]    Patient is a 75y old  Male who presents with a chief complaint of pneumonia / chest pain (29 Dec 2020 17:29)  This75 y/o male who was seen at ID office for follow up , was recently admitted at [facility] for pneumonia and mentioned about on and off pressure type chest pain for past 2 -3 days , lasting for a minute and then getting better, no aggravating factor, no radiation , no diaphoresis, no dizziness, no syncopy. some sob with cough, no orthopnea, pt. still has some cough and phlegm, last night had fever of 100.2 at home. no abd. pain. no n/v/d. no sick contact. pt. reports point tenderness on left side of his chest. pt. reports that he is an active person , not on home o2. pt. reports no trouble swallowing. As per pt. he finished his po steroids 3 days ago which he was given on his recent discharge from [facility].  (29 Dec 2020 16    HISTORY OF PRESENT ILLNESS:  feels better  no CP  less dyspneic  no exposures at home  no pets  no active sinus complaints  MEDICATIONS  (STANDING):  ALBUTerol    90 MICROgram(s) HFA Inhaler 1 Puff(s) Inhalation every 4 hours  albuterol/ipratropium for Nebulization 3 milliLiter(s) Nebulizer every 6 hours  aspirin enteric coated 81 milliGRAM(s) Oral daily  atorvastatin 40 milliGRAM(s) Oral at bedtime  azithromycin   Tablet 250 milliGRAM(s) Oral daily  buDESOnide    Inhalation Suspension 0.5 milliGRAM(s) Inhalation two times a day  cholecalciferol 2000 Unit(s) Oral daily  enoxaparin Injectable 40 milliGRAM(s) SubCutaneous daily  gabapentin 400 milliGRAM(s) Oral three times a day  lactobacillus acidophilus 1 Tablet(s) Oral three times a day with meals  meropenem  IVPB 1000 milliGRAM(s) IV Intermittent every 8 hours  metoprolol tartrate 25 milliGRAM(s) Oral two times a day  tamsulosin 0.4 milliGRAM(s) Oral at bedtime  tiotropium 18 MICROgram(s) Capsule 1 Capsule(s) Inhalation daily      MEDICATIONS  (PRN):  nitroglycerin     SubLingual 0.4 milliGRAM(s) SubLingual every 5 minutes PRN Chest Pain  oxycodone    5 mG/acetaminophen 325 mG 2 Tablet(s) Oral every 8 hours PRN Severe Pain (7 - 10)  polyethylene glycol 3350 17 Gram(s) Oral daily PRN Constipation      Allergies    codeine (Urticaria)  ibuprofen (Anaphylaxis)    Intolerances    amoxicillin (Diarrhea)      PAST MEDICAL & SURGICAL HISTORY:  HLD (hyperlipidemia)    HTN (hypertension)    Lumbosacral disc disease  has nerve stimulator    Arthritis    CAD in native artery  RCA 3 YAYA 1 in  and 2 in 2019    S/P CABG (coronary artery bypass graft)    S/P cataract surgery  b/l eyes 2016    H/O heart artery stent  RCA        FAMILY HISTORY:  FH: colon cancer        SOCIAL HISTORY  Smoking History:     REVIEW OF SYSTEMS:    CONSTITUTIONAL:  No fevers, chills, sweats    HEENT:  Eyes:  No diplopia or blurred vision. ENT:  No earache, sore throat or runny nose.    CARDIOVASCULAR:  No pressure, squeezing, tightness, or heaviness about the chest; no palpitations.    RESPIRATORY:  see HPI    GASTROINTESTINAL:  No abdominal pain, nausea, vomiting or diarrhea.    GENITOURINARY:  No dysuria, frequency or urgency.    NEUROLOGIC:  No paresthesias, fasciculations, seizures or weakness.    PSYCHIATRIC:  No disorder of thought or mood.    Vital Signs Last 24 Hrs  T(C): 36.9 (30 Dec 2020 11:03), Max: 38.3 (29 Dec 2020 12:08)  T(F): 98.5 (30 Dec 2020 11:03), Max: 100.9 (29 Dec 2020 12:08)  HR: 108 (30 Dec 2020 11:03) (88 - 405)  BP: 121/75 (30 Dec 2020 11:03) (109/65 - 126/61)  BP(mean): 83 (29 Dec 2020 23:44) (83 - 83)  RR: 38 (30 Dec 2020 11:03) (18 - 38)  SpO2: 94% (30 Dec 2020 11:03) (91% - 98%)    PHYSICAL EXAMINATION:    GENERAL: The patient is a well-developed, well-nourished _____in no apparent distress.     HEENT: Head is normocephalic and atraumatic. Extraocular muscles are intact. Mucous membranes are moist.     NECK: Supple.     LUNGS: moderate air entry bilat  without wheezing, rales, or rhonchi. Respirations unlabored    HEART: Regular rate and rhythm without murmur.    ABDOMEN: Soft, nontender, and nondistended.  No hepatosplenomegaly is noted.    EXTREMITIES: Without any cyanosis, clubbing, rash, lesions or edema.    NEUROLOGIC: Grossly intact.      LABS:                        10.8   15.18 )-----------( 244      ( 30 Dec 2020 04:20 )             33.5     12    136  |  98  |  12.0  ----------------------------<  103<H>  4.4   |  28.0  |  0.48<L>    Ca    8.5<L>      30 Dec 2020 04:20    TPro  6.8  /  Alb  3.8  /  TBili  0.7  /  DBili  x   /  AST  37  /  ALT  59<H>  /  AlkPhos  219<H>  12    PT/INR - ( 29 Dec 2020 12:25 )   PT: 13.1 sec;   INR: 1.14 ratio         PTT - ( 29 Dec 2020 12:25 )  PTT:31.2 sec  Urinalysis Basic - ( 29 Dec 2020 16:16 )    Color: Yellow / Appearance: Clear / S.005 / pH: x  Gluc: x / Ketone: Negative  / Bili: Negative / Urobili: Negative mg/dL   Blood: x / Protein: Negative mg/dL / Nitrite: Negative   Leuk Esterase: Negative / RBC: x / WBC x   Sq Epi: x / Non Sq Epi: x / Bacteria: x        CARDIAC MARKERS ( 30 Dec 2020 04:20 )  x     / <0.01 ng/mL / x     / x     / x      CARDIAC MARKERS ( 29 Dec 2020 21:44 )  x     / <0.01 ng/mL / x     / x     / x      CARDIAC MARKERS ( 29 Dec 2020 12:25 )  x     / <0.01 ng/mL / x     / x     / x                    MICROBIOLOGY:    RADIOLOGY & ADDITIONAL STUDIES:  CT scans reviewed and compared

## 2020-12-30 NOTE — DISCHARGE NOTE NURSING/CASE MANAGEMENT/SOCIAL WORK - NSSCNAMETXT_GEN_ALL_CORE
[facility]  ([phone])  [facility]  ([phone])

## 2020-12-30 NOTE — PROGRESS NOTE ADULT - SUBJECTIVE AND OBJECTIVE BOX
CC: PNA     INTERVAL HPI/OVERNIGHT EVENTS: seen and examined , feels ok , still having cough, mild chest pain   mild SOB           Vital Signs Last 24 Hrs  T(C): 37.1 (30 Dec 2020 15:51), Max: 37.4 (29 Dec 2020 23:44)  T(F): 98.7 (30 Dec 2020 15:51), Max: 99.3 (29 Dec 2020 23:44)  HR: 98 (30 Dec 2020 15:51) (98 - 108)  BP: 118/71 (30 Dec 2020 15:51) (109/65 - 126/61)  BP(mean): 83 (29 Dec 2020 23:44) (83 - 83)  RR: 25 (30 Dec 2020 15:51) (18 - 38)  SpO2: 92% (30 Dec 2020 15:51) (92% - 98%)    PHYSICAL EXAM:    GENERAL: NAD, resting comfortable in bed   CHEST/LUNG: rales , decreased air entry BL   HEART: S1S2+, Regular rate and rhythm; No murmurs, rubs, or gallops  ABDOMEN: Soft, Nontender, Nondistended; Bowel sounds present  EXTREMITIES:  no pitting edema , pulses palpated BL.        LABS:                        10.8   15.18 )-----------( 244      ( 30 Dec 2020 04:20 )             33.5     12-30    136  |  98  |  12.0  ----------------------------<  103<H>  4.4   |  28.0  |  0.48<L>    Ca    8.5<L>      30 Dec 2020 04:20    TPro  6.8  /  Alb  3.8  /  TBili  0.7  /  DBili  x   /  AST  37  /  ALT  59<H>  /  AlkPhos  219<H>  12-29    PT/INR - ( 29 Dec 2020 12:25 )   PT: 13.1 sec;   INR: 1.14 ratio         PTT - ( 29 Dec 2020 12:25 )  PTT:31.2 sec  Urinalysis Basic - ( 29 Dec 2020 16:16 )    Color: Yellow / Appearance: Clear / S.005 / pH: x  Gluc: x / Ketone: Negative  / Bili: Negative / Urobili: Negative mg/dL   Blood: x / Protein: Negative mg/dL / Nitrite: Negative   Leuk Esterase: Negative / RBC: x / WBC x   Sq Epi: x / Non Sq Epi: x / Bacteria: x          MEDICATIONS  (STANDING):  ALBUTerol    90 MICROgram(s) HFA Inhaler 1 Puff(s) Inhalation every 4 hours  albuterol/ipratropium for Nebulization 3 milliLiter(s) Nebulizer every 6 hours  aspirin enteric coated 81 milliGRAM(s) Oral daily  atorvastatin 40 milliGRAM(s) Oral at bedtime  azithromycin   Tablet 250 milliGRAM(s) Oral daily  buDESOnide    Inhalation Suspension 0.5 milliGRAM(s) Inhalation two times a day  cholecalciferol 2000 Unit(s) Oral daily  enoxaparin Injectable 40 milliGRAM(s) SubCutaneous daily  gabapentin 400 milliGRAM(s) Oral three times a day  lactobacillus acidophilus 1 Tablet(s) Oral three times a day with meals  meropenem  IVPB 1000 milliGRAM(s) IV Intermittent every 8 hours  metoprolol tartrate 25 milliGRAM(s) Oral two times a day  predniSONE   Tablet   Oral   predniSONE   Tablet 30 milliGRAM(s) Oral daily  tamsulosin 0.4 milliGRAM(s) Oral at bedtime  tiotropium 18 MICROgram(s) Capsule 1 Capsule(s) Inhalation daily    MEDICATIONS  (PRN):  nitroglycerin     SubLingual 0.4 milliGRAM(s) SubLingual every 5 minutes PRN Chest Pain  oxycodone    5 mG/acetaminophen 325 mG 2 Tablet(s) Oral every 8 hours PRN Severe Pain (7 - 10)  polyethylene glycol 3350 17 Gram(s) Oral daily PRN Constipation      RADIOLOGY & ADDITIONAL TESTS:

## 2020-12-30 NOTE — DISCHARGE NOTE NURSING/CASE MANAGEMENT/SOCIAL WORK - PATIENT PORTAL LINK FT
You can access the FollowMyHealth Patient Portal offered by [facility] by registering at the following website: [email or web address]. By joining [facility] FollowMyHealth portal, you will also be able to view your health information using other applications (apps) compatible with our system.

## 2020-12-30 NOTE — CONSULT NOTE ADULT - ASSESSMENT
Moderate COPD, recent admission for COPD/pna  Returns with fever, new patchy infiltrates, diffuse  bronchial wall thickening and areas of resolution from prior  WBC and temp  now decreasing and clinically improved, ill -non toxic  denies active sinus complaints or GERD  Covid , Flu, RSV all negative, no Eos  continue abx, nebs, oxygenating adequately on room air  HOB elevated  short prednisone taper  check MRSA, IGE  needs home neb

## 2020-12-31 LAB
ANION GAP SERPL CALC-SCNC: 12 MMOL/L — SIGNIFICANT CHANGE UP (ref 5–17)
BUN SERPL-MCNC: 9 MG/DL — SIGNIFICANT CHANGE UP (ref 8–20)
CALCIUM SERPL-MCNC: 9 MG/DL — SIGNIFICANT CHANGE UP (ref 8.6–10.2)
CHLORIDE SERPL-SCNC: 101 MMOL/L — SIGNIFICANT CHANGE UP (ref 98–107)
CO2 SERPL-SCNC: 27 MMOL/L — SIGNIFICANT CHANGE UP (ref 22–29)
CREAT SERPL-MCNC: 0.42 MG/DL — LOW (ref 0.5–1.3)
GLUCOSE SERPL-MCNC: 226 MG/DL — HIGH (ref 70–99)
GRAM STN FLD: SIGNIFICANT CHANGE UP
HCT VFR BLD CALC: 33.2 % — LOW (ref 39–50)
HGB BLD-MCNC: 10.6 G/DL — LOW (ref 13–17)
LEGIONELLA AG UR QL: NEGATIVE — SIGNIFICANT CHANGE UP
MAGNESIUM SERPL-MCNC: 2 MG/DL — SIGNIFICANT CHANGE UP (ref 1.8–2.6)
MCHC RBC-ENTMCNC: 30.3 PG — SIGNIFICANT CHANGE UP (ref 27–34)
MCHC RBC-ENTMCNC: 31.9 GM/DL — LOW (ref 32–36)
MCV RBC AUTO: 94.9 FL — SIGNIFICANT CHANGE UP (ref 80–100)
PHOSPHATE SERPL-MCNC: 1.9 MG/DL — LOW (ref 2.4–4.7)
PLATELET # BLD AUTO: 274 K/UL — SIGNIFICANT CHANGE UP (ref 150–400)
POTASSIUM SERPL-MCNC: 5.2 MMOL/L — SIGNIFICANT CHANGE UP (ref 3.5–5.3)
POTASSIUM SERPL-SCNC: 5.2 MMOL/L — SIGNIFICANT CHANGE UP (ref 3.5–5.3)
RBC # BLD: 3.5 M/UL — LOW (ref 4.2–5.8)
RBC # FLD: 14.1 % — SIGNIFICANT CHANGE UP (ref 10.3–14.5)
SODIUM SERPL-SCNC: 140 MMOL/L — SIGNIFICANT CHANGE UP (ref 135–145)
SPECIMEN SOURCE: SIGNIFICANT CHANGE UP
T3 SERPL-MCNC: 69 NG/DL — LOW (ref 80–200)
T4 AB SER-ACNC: 6.3 UG/DL — SIGNIFICANT CHANGE UP (ref 4.5–12)
TROPONIN T SERPL-MCNC: <0.01 NG/ML — SIGNIFICANT CHANGE UP (ref 0–0.06)
TSH SERPL-MCNC: 0.17 UIU/ML — LOW (ref 0.27–4.2)
WBC # BLD: 8.91 K/UL — SIGNIFICANT CHANGE UP (ref 3.8–10.5)
WBC # FLD AUTO: 8.91 K/UL — SIGNIFICANT CHANGE UP (ref 3.8–10.5)

## 2020-12-31 PROCEDURE — 99232 SBSQ HOSP IP/OBS MODERATE 35: CPT

## 2020-12-31 PROCEDURE — 93010 ELECTROCARDIOGRAM REPORT: CPT

## 2020-12-31 PROCEDURE — 99233 SBSQ HOSP IP/OBS HIGH 50: CPT

## 2020-12-31 RX ORDER — ENOXAPARIN SODIUM 100 MG/ML
72 INJECTION SUBCUTANEOUS
Refills: 0 | Status: DISCONTINUED | OUTPATIENT
Start: 2020-12-31 | End: 2021-01-01

## 2020-12-31 RX ORDER — SODIUM CHLORIDE 9 MG/ML
1000 INJECTION INTRAMUSCULAR; INTRAVENOUS; SUBCUTANEOUS ONCE
Refills: 0 | Status: COMPLETED | OUTPATIENT
Start: 2020-12-31 | End: 2020-12-31

## 2020-12-31 RX ADMIN — SODIUM CHLORIDE 1000 MILLILITER(S): 9 INJECTION INTRAMUSCULAR; INTRAVENOUS; SUBCUTANEOUS at 07:30

## 2020-12-31 RX ADMIN — MEROPENEM 100 MILLIGRAM(S): 1 INJECTION INTRAVENOUS at 17:24

## 2020-12-31 RX ADMIN — OXYCODONE AND ACETAMINOPHEN 2 TABLET(S): 5; 325 TABLET ORAL at 04:00

## 2020-12-31 RX ADMIN — TAMSULOSIN HYDROCHLORIDE 0.4 MILLIGRAM(S): 0.4 CAPSULE ORAL at 20:29

## 2020-12-31 RX ADMIN — OXYCODONE AND ACETAMINOPHEN 2 TABLET(S): 5; 325 TABLET ORAL at 13:10

## 2020-12-31 RX ADMIN — OXYCODONE AND ACETAMINOPHEN 2 TABLET(S): 5; 325 TABLET ORAL at 12:10

## 2020-12-31 RX ADMIN — Medication 0.5 MILLIGRAM(S): at 09:40

## 2020-12-31 RX ADMIN — Medication 25 MILLIGRAM(S): at 05:05

## 2020-12-31 RX ADMIN — GABAPENTIN 400 MILLIGRAM(S): 400 CAPSULE ORAL at 05:05

## 2020-12-31 RX ADMIN — Medication 1 TABLET(S): at 17:24

## 2020-12-31 RX ADMIN — OXYCODONE AND ACETAMINOPHEN 2 TABLET(S): 5; 325 TABLET ORAL at 20:17

## 2020-12-31 RX ADMIN — Medication 30 MILLIGRAM(S): at 05:05

## 2020-12-31 RX ADMIN — AZITHROMYCIN 250 MILLIGRAM(S): 500 TABLET, FILM COATED ORAL at 12:10

## 2020-12-31 RX ADMIN — OXYCODONE AND ACETAMINOPHEN 2 TABLET(S): 5; 325 TABLET ORAL at 21:17

## 2020-12-31 RX ADMIN — MEROPENEM 100 MILLIGRAM(S): 1 INJECTION INTRAVENOUS at 05:29

## 2020-12-31 RX ADMIN — Medication 1 TABLET(S): at 12:10

## 2020-12-31 RX ADMIN — GABAPENTIN 400 MILLIGRAM(S): 400 CAPSULE ORAL at 20:29

## 2020-12-31 RX ADMIN — ENOXAPARIN SODIUM 72 MILLIGRAM(S): 100 INJECTION SUBCUTANEOUS at 20:29

## 2020-12-31 RX ADMIN — Medication 3 MILLILITER(S): at 09:40

## 2020-12-31 RX ADMIN — Medication 2000 UNIT(S): at 14:11

## 2020-12-31 RX ADMIN — Medication 100 MILLIGRAM(S): at 17:24

## 2020-12-31 RX ADMIN — Medication 81 MILLIGRAM(S): at 12:10

## 2020-12-31 RX ADMIN — Medication 25 MILLIGRAM(S): at 17:24

## 2020-12-31 RX ADMIN — Medication 1 TABLET(S): at 10:14

## 2020-12-31 RX ADMIN — MEROPENEM 100 MILLIGRAM(S): 1 INJECTION INTRAVENOUS at 21:43

## 2020-12-31 RX ADMIN — ATORVASTATIN CALCIUM 40 MILLIGRAM(S): 80 TABLET, FILM COATED ORAL at 20:28

## 2020-12-31 RX ADMIN — GABAPENTIN 400 MILLIGRAM(S): 400 CAPSULE ORAL at 14:11

## 2020-12-31 RX ADMIN — ENOXAPARIN SODIUM 72 MILLIGRAM(S): 100 INJECTION SUBCUTANEOUS at 12:11

## 2020-12-31 NOTE — CHART NOTE - NSCHARTNOTEFT_GEN_A_CORE
Called by RN for patient with chest pressure different from baseline and tachycardia.  VS: /67, , RR 18, SpO2 95%, SpO2 98.2F  Patient seen and examined at bedside    GENERAL: NAD, lying comfortably  HEAD: Atraumatic, Normocephalic  EYES: EOMI, PERRLA, conjunctiva and sclera clear  ENMT: Moist mucous membranes  NECK: Supple, No JVD  NERVOUS SYSTEM: A&OX3, Good concentration  CHEST/LUNG: Clear to auscultation b/l; Unlabored respirations  HEART: S1S2+, Regular rate and rhythm  ABDOMEN: Soft, Nontender, Nondistended; BS+  EXTREMITIES:  2+ Peripheral Pulses, No LE edema  SKIN: Warm and dry Called by RN for patient with chest pressure different from baseline and tachycardia.  VS: /67, , RR 18, SpO2 95%, SpO2 98.2F  Patient seen and examined at bedside    GENERAL: NAD, lying comfortably  HEAD: Atraumatic, Normocephalic  EYES: EOMI, PERRLA, conjunctiva and sclera clear  ENMT: Moist mucous membranes  NECK: Supple, No JVD  NERVOUS SYSTEM: A&OX3, Good concentration  CHEST/LUNG: Clear to auscultation b/l; Unlabored respirations  HEART: S1S2+, Regular rate and rhythm  ABDOMEN: Soft, Nontender, Nondistended; BS+  EXTREMITIES:  2+ Peripheral Pulses, No LE edema  SKIN: Warm and dry    CHADsVASc: 3, started on full dose AC Called by RN for patient with chest pressure, different from baseline, and tachycardia.  VS: /67, , RR 18, SpO2 95%, SpO2 98.2F  Patient seen and examined at bedside.  Patient reporting pressure across his chest; nonradiating, nonreproducible.   Patient also reports feeling anxious, when asked why, he expands that he is nervous about his PNA.   Patient denies chills, dizziness, headache, SOB, abdominal pain, N/V.    GENERAL: NAD, lying comfortably  HEAD: Atraumatic, Normocephalic  EYES: EOMI, PERRLA, conjunctiva and sclera clear  ENMT: Moist mucous membranes  NECK: Supple, No JVD  NERVOUS SYSTEM: A&OX3, Good concentration  CHEST/LUNG: Mild crackles b/l; Unlabored respirations  HEART: S1S2+, Irregular rate and rhythm  ABDOMEN: Soft, Nontender, Nondistended; BS+  EXTREMITIES:  2+ Peripheral Pulses, No LE edema  SKIN: Warm and dry      STAT EKG showing Afib w/ RVR, RBBB, and new Twave inversions in V2 and V4.  Prior to examination patient was given his AM dose of Metoprolol PO; repeat BP 97/64, .  1L bolus ordered and to be given stat.  CBC, BMP, Mg, Phos, Troponin, and Thyroid panel ordered and pending  Patient placed on telemetry for further monitoring  CHADsVASc: 3, started on full dose AC  RN advised to notify provider if any changes.  Continue to monitor. Called by RN for patient with chest pressure, different from baseline, and tachycardia.  VS: /67, , RR 18, SpO2 95%, SpO2 98.2F  Patient seen and examined at bedside.  Patient reporting pressure across his chest; nonradiating, nonreproducible.   Patient also reports feeling anxious, when asked why, he expands that he is nervous about his PNA.   Patient denies chills, dizziness, headache, SOB, abdominal pain, N/V.    GENERAL: NAD, lying comfortably  HEAD: Atraumatic, Normocephalic  EYES: EOMI, PERRLA, conjunctiva and sclera clear  ENMT: Moist mucous membranes  NECK: Supple, No JVD  NERVOUS SYSTEM: A&OX3, Good concentration  CHEST/LUNG: Mild crackles b/l; Unlabored respirations  HEART: S1S2+, Irregular rate and rhythm  ABDOMEN: Soft, Nontender, Nondistended; BS+  EXTREMITIES:  2+ Peripheral Pulses, No LE edema  SKIN: Warm and dry      STAT EKG showing Afib w/ RVR, RBBB, and new Twave inversions in V2 and V4.  Prior to examination patient was given his AM dose of Metoprolol PO; repeat BP 97/64, HR 90.  1L bolus ordered and to be given stat.  CBC, BMP, Mg, Phos, Troponin, and Thyroid panel ordered and pending  Patient placed on telemetry for further monitoring  CHADsVASc: 3, started on full dose AC  RN advised to notify provider if any changes.  Continue to monitor. Called by RN for patient with chest pressure, different from baseline, and tachycardia.  VS: /67, , RR 18, SpO2 95%, SpO2 98.2F  Patient seen and examined at bedside.  Patient reporting pressure across his chest; nonradiating, nonreproducible.   Patient also reports feeling anxious, when asked why, he expands that he is nervous about his PNA.   Patient denies chills, dizziness, headache, SOB, abdominal pain, N/V.    GENERAL: NAD, lying comfortably  HEAD: Atraumatic, Normocephalic  EYES: EOMI, PERRLA, conjunctiva and sclera clear  ENMT: Moist mucous membranes  NECK: Supple, No JVD  NERVOUS SYSTEM: A&OX3, Good concentration  CHEST/LUNG: Mild crackles b/l; Unlabored respirations  HEART: S1S2+, Irregular rate and rhythm  ABDOMEN: Soft, Nontender, Nondistended; BS+  EXTREMITIES:  2+ Peripheral Pulses, No LE edema  SKIN: Warm and dry      STAT EKG showing new onset Afib w/ RVR, RBBB, and new Twave inversions in V2 and V4.  Prior to examination patient was given his AM dose of Metoprolol PO; repeat BP 97/64, HR 90.  1L bolus ordered and to be given stat.  CBC, BMP, Mg, Phos, Troponin, and Thyroid panel ordered and pending  Patient placed on telemetry for further monitoring  CHADsVASc: 3, started on full dose AC  RN advised to notify provider if any changes.  Continue to monitor. Called by RN for patient with chest pressure, different from baseline, and tachycardia.  VS: /67, , RR 18, SpO2 95%, SpO2 98.2F  Patient seen and examined at bedside.  Patient reporting pressure across his chest; nonradiating, nonreproducible.   Patient also reports feeling anxious, when asked why, he expands that he is nervous about his PNA reoccurring.   Patient denies chills, dizziness, headache, SOB, abdominal pain, N/V.    GENERAL: NAD, lying comfortably  HEAD: Atraumatic, Normocephalic  EYES: EOMI, PERRLA, conjunctiva and sclera clear  ENMT: Moist mucous membranes  NECK: Supple, No JVD  NERVOUS SYSTEM: A&OX3, Good concentration  CHEST/LUNG: Mild crackles b/l; Unlabored respirations  HEART: S1S2+, Irregular rate and rhythm  ABDOMEN: Soft, Nontender, Nondistended; BS+  EXTREMITIES:  2+ Peripheral Pulses, No LE edema  SKIN: Warm and dry      STAT EKG showing new onset Afib w/ RVR, RBBB, and new Twave inversions in V2 and V4.  Prior to examination patient was given his AM dose of Metoprolol PO; repeat BP 97/64, HR 90. 1L bolus ordered.  CBC, BMP, Mg, Phos, Troponin, and Thyroid panel ordered and pending.  Patient placed on telemetry for further monitoring.  CHADsVASc: 3, started on full dose AC  Cardio re-consulted  RN advised to notify provider if any changes.  Continue to monitor.

## 2020-12-31 NOTE — PROGRESS NOTE ADULT - SUBJECTIVE AND OBJECTIVE BOX
[facility]  INFECTIOUS DISEASES AND INTERNAL MEDICINE at [facility]  =======================================================  Stephen Lala MD  Diplomates American Board of Internal Medicine and Infectious Diseases  Tel  [phone]  Fax [phone]  =======================================================    [id number]  JENNIFER MOORE   follow up:  PNA  still with cough and SOB  SPUTUM SENT FOR CULTURES,  GRAM STAIN DONE    BREATHING BETTER         =======================================================    REVIEW OF SYSTEMS:  CONSTITUTIONAL:  No Fever or chills  HEENT:  No diplopia or blurred vision.  No earache, sore throat or runny nose.  CARDIOVASCULAR:  No pressure, squeezing, strangling, tightness, heaviness or aching about the chest, neck, axilla or epigastrium.  RESPIRATORY:  as per HPI  GASTROINTESTINAL:  No nausea, vomiting or diarrhea.  GENITOURINARY:  No dysuria, frequency or urgency. No Blood in urine  MUSCULOSKELETAL:  no joint aches, no muscle pain  SKIN:  No change in skin, hair or nails.  NEUROLOGIC:  No Headaches, seizures or weakness.  PSYCHIATRIC:  No disorder of thought or mood.  ENDOCRINE:  No heat or cold intolerance  HEMATOLOGICAL:  No easy bruising or bleeding.    =======================================================  Allergies    codeine (Urticaria)  ibuprofen (Anaphylaxis)     ======================================================  Physical Exam:  ============     General:  No acute distress.  Eye: Pupils are equal, round and reactive to light, Extraocular movements are intact, Normal conjunctiva.  HENT: Normocephalic, Oral mucosa is moist, No pharyngeal erythema, No sinus tenderness.  Neck: Supple, No lymphadenopathy.  Respiratory: Lungs with poor aeration at bases.  Unchanged  Cardiovascular: Normal rate, Regular rhythm,   Gastrointestinal: Soft, Non-tender, Non-distended, Normal bowel sounds.  Genitourinary: No costovertebral angle tenderness.  Lymphatics: No lymphadenopathy neck,   Musculoskeletal: Normal range of motion, Normal strength.  Integumentary: No rash.  Neurologic: Alert, Oriented, No focal deficits, Cranial Nerves II-XII are grossly intact.  Psychiatric: Appropriate mood & affect.    =======================================================  Vitals:  ============  T(F): 98.8 (31 Dec 2020 06:56), Max: 98.8 (31 Dec 2020 06:56)  HR: 97 (31 Dec 2020 06:56)  BP: 137/80 (31 Dec 2020 06:56)  RR: 18 (31 Dec 2020 06:56)  SpO2: 96% (31 Dec 2020 06:56) (92% - 96%)  temp max in last 48H T(F): , Max: 100.9 (12-29-20 @ 12:08)    =======================================================  Current Antibiotics:  azithromycin   Tablet 250 milliGRAM(s) Oral daily  meropenem  IVPB 1000 milliGRAM(s) IV Intermittent every 8 hours    Other medications:  ALBUTerol    90 MICROgram(s) HFA Inhaler 1 Puff(s) Inhalation every 4 hours  albuterol/ipratropium for Nebulization 3 milliLiter(s) Nebulizer every 6 hours  aspirin enteric coated 81 milliGRAM(s) Oral daily  atorvastatin 40 milliGRAM(s) Oral at bedtime  buDESOnide    Inhalation Suspension 0.5 milliGRAM(s) Inhalation two times a day  cholecalciferol 2000 Unit(s) Oral daily  enoxaparin Injectable 72 milliGRAM(s) SubCutaneous two times a day  gabapentin 400 milliGRAM(s) Oral three times a day  lactobacillus acidophilus 1 Tablet(s) Oral three times a day with meals  metoprolol tartrate 25 milliGRAM(s) Oral two times a day  predniSONE   Tablet   Oral   predniSONE   Tablet 30 milliGRAM(s) Oral daily  sodium chloride 0.9% Bolus 1000 milliLiter(s) IV Bolus once  tamsulosin 0.4 milliGRAM(s) Oral at bedtime  tiotropium 18 MICROgram(s) Capsule 1 Capsule(s) Inhalation daily      =======================================================  Labs:                        10.8   15.18 )-----------( 244      ( 30 Dec 2020 04:20 )             33.5      12-30    136  |  98  |  12.0  ----------------------------<  103<H>  4.4   |  28.0  |  0.48<L>    Ca    8.5<L>      30 Dec 2020 04:20    TPro  6.8  /  Alb  3.8  /  TBili  0.7  /  DBili  x   /  AST  37  /  ALT  59<H>  /  AlkPhos  219<H>  12-29      Culture - Sputum (collected 12-30-20 @ 22:04)  Source: .Sputum Sputum  Gram Stain (12-31-20 @ 06:57):    No polymorphonuclear leukocytes per low power field    No Squamous epithelial cells per low power field    Rare Gram Positive Cocci seen per oil power field    Culture - Urine (collected 12-29-20 @ 22:01)  Source: .Urine Clean Catch (Midstream)  Final Report (12-30-20 @ 17:34):    No growth    Culture - Sputum (collected 12-19-20 @ 16:18)  Source: .Sputum Sputum  Gram Stain (12-19-20 @ 22:21):    Few polymorphonuclear leukocytes per low power field    Rare Squamous epithelial cells per low power field    Few Yeast like cells per oil power field    Few Gram Positive Cocci in Pairs and Chains per oil power field    Rare Gram Positive Rods per oil power field  Final Report (12-21-20 @ 17:01):    Normal Respiratory Doris present    Culture - Urine (collected 12-19-20 @ 01:11)  Source: .Urine Clean Catch (Midstream)  Final Report (12-19-20 @ 20:00):    No growth    Culture - Blood (collected 12-18-20 @ 03:44)  Source: .Blood Blood-Peripheral  Final Report (12-23-20 @ 05:00):    No growth at 5 days.    Culture - Blood (collected 12-18-20 @ 03:44)  Source: .Blood Blood-Peripheral  Final Report (12-23-20 @ 05:00):    No growth at 5 days.    Creatinine, Serum: 0.48 mg/dL (12-30-20 @ 04:20)  Creatinine, Serum: 0.73 mg/dL (12-29-20 @ 12:25)    Procalcitonin, Serum: 0.10 ng/mL (12-17-20 @ 18:14)      Ferritin, Serum: 212 ng/mL (12-17-20 @ 18:14)    C-Reactive Protein, Serum: 1.75 mg/dL (12-17-20 @ 18:14)    WBC Count: 15.18 K/uL (12-30-20 @ 04:20)  WBC Count: 22.51 K/uL (12-29-20 @ 12:25)    SARS-CoV-2: NotDetec (12-18-20 @ 10:24)  Rapid RVP Result: NotDetec (12-18-20 @ 10:24)  SARS-CoV-2: NotDetec (12-17-20 @ 18:16)  Rapid RVP Result: NotDetec (12-17-20 @ 18:16)    COVID-19 IgG Antibody Interpretation: Negative (12-30-20 @ 04:49)  COVID-19 IgG Antibody Index: 0.06 Index (12-30-20 @ 04:49)  COVID-19 IgG Antibody Index: <0.10 Index (12-18-20 @ 07:18)  COVID-19 IgG Antibody Interpretation: Negative (12-18-20 @ 07:18)    Lactate Dehydrogenase, Serum: 231 U/L (12-17-20 @ 18:14)    Alkaline Phosphatase, Serum: 219 U/L (12-29-20 @ 12:25)  Alanine Aminotransferase (ALT/SGPT): 59 U/L (12-29-20 @ 12:25)  Aspartate Aminotransferase (AST/SGOT): 37 U/L (12-29-20 @ 12:25)  Bilirubin Total, Serum: 0.7 mg/dL (12-29-20 @ 12:25)      < from: CT Angio Chest w/ IV Cont (12.29.20 @ 14:39) >     EXAM:  CT ANGIO CHEST (W)AW IC                          PROCEDURE DATE:  12/29/2020          INTERPRETATION:  CLINICAL INFORMATION: Tachycardia. Hypoxia. Evaluate for pulmonary embolism.    COMPARISON: CT chest 12/17/2020 and chest x-ray 12/29/2020    PROCEDURE:  CT Angiography of the Chest.  74 ml of Omnipaque 350 was injected intravenously. 26 ml were discarded.  Sagittal and coronal reformats were performed as well as 3D (MIP) reconstructions.    FINDINGS:    LUNGS AND AIRWAYS: Diffuse bronchial wall thickening with areas of mucous plugging within the peripheral aspect of the lower lobes. Patchy bilateral groundglass opacities, greater in the lower lobes. There are also scattered tree-in-bud opacities, greatest in the lower lobes. 6 mm right upper lobe nodule (series 5 image 46 with a small droplet of air centrally, likely infectious/inflammatory. Overall, the lung opacities have increased since 12/17/2020, however previously seen opacities in the lower lobes and lingula have improved/resolved. Emphysematous changes.  PLEURA: No pleural effusion.  MEDIASTINUM AND BRAD: No lymphadenopathy.  VESSELS: No pulmonary embolism. Thoracic aorta within normal limits for caliber with atherosclerotic changes. Coronary artery calcifications.  HEART: Heart size is normal. No pericardial effusion.  CHEST WALL AND LOWER NECK: No enlarged axillary lymph nodes.  VISUALIZED UPPER ABDOMEN: Within normal limits.  BONES: Partially imaged spine stimulator. Sternotomy. Compression deformities in the upper thoracic spine, unchanged.    IMPRESSION:  No pulmonary embolism.    Diffuse bronchial wall thickening, likely reactive airways disease or bronchitis.    Patchy bilateral groundglass opacities, greater in the lower lungs; differential includesmultifocal infection or inflammation including viral pneumonia.    Scattered tree-in-bud opacities, greater in the lower lobes, likely infectious/inflammatory in etiology.    Lung opacities have overall increased since 12/17/2020 with previously seenopacities in the lower lobes and lingula improved/resolved.            SHERIDAN ANGELES MD; Attending Radiologist  This document has been electronically signed. Dec 29 2020  3:45PM    < end of copied text >

## 2020-12-31 NOTE — PROGRESS NOTE ADULT - SUBJECTIVE AND OBJECTIVE BOX
PULMONARY  F/U  NOTE      [id number]    Patient is a 75y old  Male who presents with a chief complaint of pneumonia / chest pain (29 Dec 2020 17:29)      This75 y/o male  2PPD ex smoker -  who was seen at ID office for follow up , was recently admitted at [facility] for pneumonia and mentioned about on and off pressure type chest pain for past 2 -3 days , lasting for a minute and then getting better, no aggravating factor, no radiation , no diaphoresis, no dizziness, no syncopy. some sob with cough, no orthopnea, pt. still has some cough and phlegm, last night had fever of 100.2 at home. no abd. pain. no n/v/d. no sick contact. pt. reports point tenderness on left side of his chest. pt. reports that he is an active person , not on home o2. pt. reports no trouble swallowing. As per pt. he finished his po steroids 3 days ago which he was given on his recent discharge from [facility].  (29 Dec 2020 16    - feeling better   no chest pain  tolerating po   - does admit to dry intermittent cough  -       PAST MEDICAL & SURGICAL HISTORY:  HLD (hyperlipidemia)    HTN (hypertension)    Lumbosacral disc disease  has nerve stimulator    Arthritis    CAD in native artery  RCA 3 YAYA 1 in  and 2 in     S/P CABG (coronary artery bypass graft)    S/P cataract surgery  b/l eyes 2016    H/O heart artery stent  RCA          Medications:  azithromycin   Tablet 250 milliGRAM(s) Oral daily  meropenem  IVPB 1000 milliGRAM(s) IV Intermittent every 8 hours    metoprolol tartrate 25 milliGRAM(s) Oral two times a day  nitroglycerin     SubLingual 0.4 milliGRAM(s) SubLingual every 5 minutes PRN  tamsulosin 0.4 milliGRAM(s) Oral at bedtime    ALBUTerol    90 MICROgram(s) HFA Inhaler 1 Puff(s) Inhalation every 4 hours  albuterol/ipratropium for Nebulization 3 milliLiter(s) Nebulizer every 6 hours  buDESOnide    Inhalation Suspension 0.5 milliGRAM(s) Inhalation two times a day  tiotropium 18 MICROgram(s) Capsule 1 Capsule(s) Inhalation daily    gabapentin 400 milliGRAM(s) Oral three times a day  oxycodone    5 mG/acetaminophen 325 mG 2 Tablet(s) Oral every 8 hours PRN      aspirin enteric coated 81 milliGRAM(s) Oral daily  enoxaparin Injectable 72 milliGRAM(s) SubCutaneous two times a day    polyethylene glycol 3350 17 Gram(s) Oral daily PRN      atorvastatin 40 milliGRAM(s) Oral at bedtime  predniSONE   Tablet   Oral   predniSONE   Tablet 30 milliGRAM(s) Oral daily    cholecalciferol 2000 Unit(s) Oral daily        lactobacillus acidophilus 1 Tablet(s) Oral three times a day with meals          ICU Vital Signs Last 24 Hrs  T(C): 36.4 (31 Dec 2020 08:00), Max: 37.1 (30 Dec 2020 15:51)  T(F): 97.6 (31 Dec 2020 08:00), Max: 98.8 (31 Dec 2020 06:56)  HR: 100 (31 Dec 2020 09:43) (94 - 147)  BP: 138/78 (31 Dec 2020 08:00) (118/71 - 138/78)  BP(mean): --  ABP: --  ABP(mean): --  RR: 18 (31 Dec 2020 08:00) (18 - 25)  SpO2: 95% (31 Dec 2020 09:43) (92% - 96%)          I&O's Detail        LABS:                        10.6   8.91  )-----------( 274      ( 31 Dec 2020 10:16 )             33.2     12-31    140  |  101  |  9.0  ----------------------------<  226<H>  5.2   |  27.0  |  0.42<L>    Ca    9.0      31 Dec 2020 10:16  Phos  1.9     12-31  Mg     2.0     12-31        CARDIAC MARKERS ( 31 Dec 2020 10:16 )  x     / <0.01 ng/mL / x     / x     / x      CARDIAC MARKERS ( 30 Dec 2020 04:20 )  x     / <0.01 ng/mL / x     / x     / x      CARDIAC MARKERS ( 29 Dec 2020 21:44 )  x     / <0.01 ng/mL / x     / x     / x          CAPILLARY BLOOD GLUCOSE          Urinalysis Basic - ( 29 Dec 2020 16:16 )    Color: Yellow / Appearance: Clear / S.005 / pH: x  Gluc: x / Ketone: Negative  / Bili: Negative / Urobili: Negative mg/dL   Blood: x / Protein: Negative mg/dL / Nitrite: Negative   Leuk Esterase: Negative / RBC: x / WBC x   Sq Epi: x / Non Sq Epi: x / Bacteria: x      CULTURES:  Culture Results:   No growth ( @ 22:01)  Culture Results:   No growth at 48 hours ( @ 12:32)  Culture Results:   No growth at 48 hours ( @ 12:32)      Physical Examination:    General:  pleasant male no distress eating  intermittent cough noted   HEENT: Pupils equal, reactive to light.  Symmetric. no thrush   crowded airway  mp  3-     PULM: Clear to auscultation bilaterally, no   wheezing rales  rhonchi     NECK: Supple, no lymphadenopathy, trachea midline    CVS: Regular rate and rhythm, no murmurs, rubs, or gallops    ABD: Soft, nondistended, nontender, normoactive bowel sounds, no masses    EXT: No edema, nontender    SKIN: Warm and well perfused, no rashes noted.    NEURO: Alert, oriented, interactive, nonfocal      RADIOLOGY: *  ct     20    IMPRESSION:  No pulmonary embolism.    Diffuse bronchial wall thickening, likely reactive airways disease or bronchitis.    Patchy bilateral groundglass opacities, greater in the lower lungs; differential includesmultifocal infection or inflammation including viral pneumonia.    Scattered tree-in-bud opacities, greater in the lower lobes, likely infectious/inflammatory in etiology.    Lung opacities have overall increased since 2020 with previously seenopacities in the lower lobes and lingula improved/resolved.            SHERIDAN ANGELES MD; Attending Radiologist  This document has been electronically signed. Dec 29 2020  3:45PM   **

## 2020-12-31 NOTE — PROGRESS NOTE ADULT - SUBJECTIVE AND OBJECTIVE BOX
CC:  PNA     INTERVAL HPI/OVERNIGHT EVENTS: seen and examined , feels better , last night did go into afib now in sinus rhythm   sputum showing gram positive cocci , will involve speech therapy to do swallow evaluation           Vital Signs Last 24 Hrs  T(C): 36.4 (31 Dec 2020 08:00), Max: 37.1 (30 Dec 2020 15:51)  T(F): 97.6 (31 Dec 2020 08:00), Max: 98.8 (31 Dec 2020 06:56)  HR: 100 (31 Dec 2020 09:43) (94 - 147)  BP: 138/78 (31 Dec 2020 08:00) (118/71 - 138/78)  BP(mean): --  RR: 18 (31 Dec 2020 08:00) (18 - 25)  SpO2: 95% (31 Dec 2020 09:43) (92% - 96%)    PHYSICAL EXAM:    GENERAL: NAD, resting comfortable in bed   CHEST/LUNG: CTAB , no wheezing , rales, rhonchi heard   HEART: S1S2+, Regular rate and rhythm; No murmurs, rubs, or gallops  ABDOMEN: Soft, Nontender, Nondistended; Bowel sounds present  EXTREMITIES:  no pitting edema , pulses palpated BL.        LABS:                        10.6   8.91  )-----------( 274      ( 31 Dec 2020 10:16 )             33.2     12-    140  |  101  |  9.0  ----------------------------<  226<H>  5.2   |  27.0  |  0.42<L>    Ca    9.0      31 Dec 2020 10:16  Phos  1.9     1231  Mg     2.0     1231        Urinalysis Basic - ( 29 Dec 2020 16:16 )    Color: Yellow / Appearance: Clear / S.005 / pH: x  Gluc: x / Ketone: Negative  / Bili: Negative / Urobili: Negative mg/dL   Blood: x / Protein: Negative mg/dL / Nitrite: Negative   Leuk Esterase: Negative / RBC: x / WBC x   Sq Epi: x / Non Sq Epi: x / Bacteria: x          MEDICATIONS  (STANDING):  ALBUTerol    90 MICROgram(s) HFA Inhaler 1 Puff(s) Inhalation every 4 hours  aspirin enteric coated 81 milliGRAM(s) Oral daily  atorvastatin 40 milliGRAM(s) Oral at bedtime  azithromycin   Tablet 250 milliGRAM(s) Oral daily  cholecalciferol 2000 Unit(s) Oral daily  enoxaparin Injectable 72 milliGRAM(s) SubCutaneous two times a day  gabapentin 400 milliGRAM(s) Oral three times a day  lactobacillus acidophilus 1 Tablet(s) Oral three times a day with meals  meropenem  IVPB 1000 milliGRAM(s) IV Intermittent every 8 hours  metoprolol tartrate 25 milliGRAM(s) Oral two times a day  predniSONE   Tablet   Oral   predniSONE   Tablet 30 milliGRAM(s) Oral daily  tamsulosin 0.4 milliGRAM(s) Oral at bedtime  tiotropium 18 MICROgram(s) Capsule 1 Capsule(s) Inhalation daily    MEDICATIONS  (PRN):  nitroglycerin     SubLingual 0.4 milliGRAM(s) SubLingual every 5 minutes PRN Chest Pain  oxycodone    5 mG/acetaminophen 325 mG 2 Tablet(s) Oral every 8 hours PRN Severe Pain (7 - 10)  polyethylene glycol 3350 17 Gram(s) Oral daily PRN Constipation      RADIOLOGY & ADDITIONAL TESTS:

## 2021-01-01 LAB
CULTURE RESULTS: SIGNIFICANT CHANGE UP
LEGIONELLA AG UR QL: NEGATIVE — SIGNIFICANT CHANGE UP
MRSA PCR RESULT.: SIGNIFICANT CHANGE UP
S AUREUS DNA NOSE QL NAA+PROBE: SIGNIFICANT CHANGE UP
SPECIMEN SOURCE: SIGNIFICANT CHANGE UP

## 2021-01-01 PROCEDURE — 99232 SBSQ HOSP IP/OBS MODERATE 35: CPT

## 2021-01-01 PROCEDURE — 99233 SBSQ HOSP IP/OBS HIGH 50: CPT

## 2021-01-01 RX ORDER — ALPRAZOLAM 0.25 MG
0.5 TABLET ORAL EVERY 6 HOURS
Refills: 0 | Status: DISCONTINUED | OUTPATIENT
Start: 2021-01-01 | End: 2021-01-05

## 2021-01-01 RX ORDER — ENOXAPARIN SODIUM 100 MG/ML
40 INJECTION SUBCUTANEOUS DAILY
Refills: 0 | Status: DISCONTINUED | OUTPATIENT
Start: 2021-01-02 | End: 2021-01-05

## 2021-01-01 RX ADMIN — OXYCODONE AND ACETAMINOPHEN 2 TABLET(S): 5; 325 TABLET ORAL at 14:02

## 2021-01-01 RX ADMIN — MEROPENEM 100 MILLIGRAM(S): 1 INJECTION INTRAVENOUS at 05:39

## 2021-01-01 RX ADMIN — Medication 0.5 MILLIGRAM(S): at 21:45

## 2021-01-01 RX ADMIN — GABAPENTIN 400 MILLIGRAM(S): 400 CAPSULE ORAL at 05:39

## 2021-01-01 RX ADMIN — Medication 100 MILLIGRAM(S): at 05:38

## 2021-01-01 RX ADMIN — Medication 1 TABLET(S): at 21:44

## 2021-01-01 RX ADMIN — Medication 100 MILLIGRAM(S): at 17:41

## 2021-01-01 RX ADMIN — Medication 30 MILLIGRAM(S): at 05:39

## 2021-01-01 RX ADMIN — GABAPENTIN 400 MILLIGRAM(S): 400 CAPSULE ORAL at 21:45

## 2021-01-01 RX ADMIN — OXYCODONE AND ACETAMINOPHEN 2 TABLET(S): 5; 325 TABLET ORAL at 15:56

## 2021-01-01 RX ADMIN — ATORVASTATIN CALCIUM 40 MILLIGRAM(S): 80 TABLET, FILM COATED ORAL at 21:44

## 2021-01-01 RX ADMIN — Medication 100 MILLIGRAM(S): at 11:34

## 2021-01-01 RX ADMIN — OXYCODONE AND ACETAMINOPHEN 2 TABLET(S): 5; 325 TABLET ORAL at 06:39

## 2021-01-01 RX ADMIN — Medication 0.5 MILLIGRAM(S): at 15:55

## 2021-01-01 RX ADMIN — MEROPENEM 100 MILLIGRAM(S): 1 INJECTION INTRAVENOUS at 21:44

## 2021-01-01 RX ADMIN — TAMSULOSIN HYDROCHLORIDE 0.4 MILLIGRAM(S): 0.4 CAPSULE ORAL at 21:44

## 2021-01-01 RX ADMIN — MEROPENEM 100 MILLIGRAM(S): 1 INJECTION INTRAVENOUS at 14:04

## 2021-01-01 RX ADMIN — ENOXAPARIN SODIUM 72 MILLIGRAM(S): 100 INJECTION SUBCUTANEOUS at 05:38

## 2021-01-01 RX ADMIN — Medication 1 TABLET(S): at 11:33

## 2021-01-01 RX ADMIN — Medication 25 MILLIGRAM(S): at 05:39

## 2021-01-01 RX ADMIN — GABAPENTIN 400 MILLIGRAM(S): 400 CAPSULE ORAL at 14:04

## 2021-01-01 RX ADMIN — Medication 25 MILLIGRAM(S): at 17:41

## 2021-01-01 RX ADMIN — Medication 100 MILLIGRAM(S): at 00:11

## 2021-01-01 RX ADMIN — OXYCODONE AND ACETAMINOPHEN 2 TABLET(S): 5; 325 TABLET ORAL at 05:39

## 2021-01-01 RX ADMIN — Medication 2000 UNIT(S): at 11:33

## 2021-01-01 RX ADMIN — Medication 1 TABLET(S): at 09:51

## 2021-01-01 RX ADMIN — ENOXAPARIN SODIUM 72 MILLIGRAM(S): 100 INJECTION SUBCUTANEOUS at 17:42

## 2021-01-01 RX ADMIN — AZITHROMYCIN 250 MILLIGRAM(S): 500 TABLET, FILM COATED ORAL at 11:33

## 2021-01-01 NOTE — PROGRESS NOTE ADULT - SUBJECTIVE AND OBJECTIVE BOX
[facility]  INFECTIOUS DISEASES AND INTERNAL MEDICINE at [place]  =======================================================  Stephen Lala MD  Diplomates American Board of Internal Medicine and Infectious Diseases  Tel  [phone]  Fax [phone]  =======================================================    [id number]  JENNIFER MOORE   follow up:  PNA  still with cough and SOB  SPUTUM SENT FOR CULTURES,  normal virgilio identified    legionella is Negative    WBC now normalized    BREATHING BETTER         =======================================================    REVIEW OF SYSTEMS:  CONSTITUTIONAL:  No Fever or chills  HEENT:  No diplopia or blurred vision.  No earache, sore throat or runny nose.  CARDIOVASCULAR:  No pressure, squeezing, strangling, tightness, heaviness or aching about the chest, neck, axilla or epigastrium.  RESPIRATORY:  as per HPI  GASTROINTESTINAL:  No nausea, vomiting or diarrhea.  GENITOURINARY:  No dysuria, frequency or urgency. No Blood in urine  MUSCULOSKELETAL:  no joint aches, no muscle pain  SKIN:  No change in skin, hair or nails.  NEUROLOGIC:  No Headaches, seizures or weakness.  PSYCHIATRIC:  No disorder of thought or mood.  ENDOCRINE:  No heat or cold intolerance  HEMATOLOGICAL:  No easy bruising or bleeding.    =======================================================  Allergies    codeine (Urticaria)  ibuprofen (Anaphylaxis)     ======================================================  Physical Exam:  ============     General:  No acute distress.  Eye: Pupils are equal, round and reactive to light, Extraocular movements are intact, Normal conjunctiva.  HENT: Normocephalic, Oral mucosa is moist, No pharyngeal erythema, No sinus tenderness.  Neck: Supple, No lymphadenopathy.  Respiratory: Lungs with prolong exp phase.  No wheezing  Cardiovascular: Normal rate, Regular rhythm,   Gastrointestinal: Soft, Non-tender, Non-distended, Normal bowel sounds.  Genitourinary: No costovertebral angle tenderness.  Lymphatics: No lymphadenopathy neck,   Musculoskeletal: Normal range of motion, Normal strength.  Integumentary: No rash.  Neurologic: Alert, Oriented, No focal deficits, Cranial Nerves II-XII are grossly intact.  Psychiatric: Appropriate mood & affect.    =======================================================     Vitals:  ============  T(F): 97.5 (01 Jan 2021 04:29), Max: 99.6 (31 Dec 2020 20:14)  HR: 68 (01 Jan 2021 08:26)  BP: 126/78 (01 Jan 2021 08:26)  RR: 16 (01 Jan 2021 08:26)  SpO2: 95% (01 Jan 2021 08:26) (92% - 95%)  temp max in last 48H T(F): , Max: 99.6 (12-31-20 @ 20:14)    =======================================================  Current Antibiotics:  azithromycin   Tablet 250 milliGRAM(s) Oral daily  meropenem  IVPB 1000 milliGRAM(s) IV Intermittent every 8 hours    Other medications:  ALBUTerol    90 MICROgram(s) HFA Inhaler 1 Puff(s) Inhalation every 4 hours  aspirin enteric coated 81 milliGRAM(s) Oral daily  atorvastatin 40 milliGRAM(s) Oral at bedtime  cholecalciferol 2000 Unit(s) Oral daily  enoxaparin Injectable 72 milliGRAM(s) SubCutaneous two times a day  gabapentin 400 milliGRAM(s) Oral three times a day  guaiFENesin   Syrup  (Sugar-Free) 100 milliGRAM(s) Oral every 6 hours  lactobacillus acidophilus 1 Tablet(s) Oral three times a day with meals  metoprolol tartrate 25 milliGRAM(s) Oral two times a day  predniSONE   Tablet   Oral   predniSONE   Tablet 30 milliGRAM(s) Oral daily  tamsulosin 0.4 milliGRAM(s) Oral at bedtime  tiotropium 18 MICROgram(s) Capsule 1 Capsule(s) Inhalation daily      =======================================================  Labs:                        10.6   8.91  )-----------( 274      ( 31 Dec 2020 10:16 )             33.2      12-31    140  |  101  |  9.0  ----------------------------<  226<H>  5.2   |  27.0  |  0.42<L>    Ca    9.0      31 Dec 2020 10:16  Phos  1.9     12-31  Mg     2.0     12-31        Culture - Sputum (collected 12-30-20 @ 22:04)  Source: .Sputum Sputum  Gram Stain (12-31-20 @ 06:57):    No polymorphonuclear leukocytes per low power field    No Squamous epithelial cells per low power field    Rare Gram Positive Cocci seen per oil power field    Culture - Urine (collected 12-29-20 @ 22:01)  Source: .Urine Clean Catch (Midstream)  Final Report (12-30-20 @ 17:34):    No growth    Culture - Blood (collected 12-29-20 @ 12:32)  Source: .Blood Blood-Peripheral    Culture - Blood (collected 12-29-20 @ 12:32)  Source: .Blood Blood-Peripheral    Culture - Sputum (collected 12-19-20 @ 16:18)  Source: .Sputum Sputum  Gram Stain (12-19-20 @ 22:21):    Few polymorphonuclear leukocytes per low power field    Rare Squamous epithelial cells per low power field    Few Yeast like cells per oil power field    Few Gram Positive Cocci in Pairs and Chains per oil power field    Rare Gram Positive Rods per oil power field  Final Report (12-21-20 @ 17:01):    Normal Respiratory Virgilio present    Culture - Urine (collected 12-19-20 @ 01:11)  Source: .Urine Clean Catch (Midstream)  Final Report (12-19-20 @ 20:00):    No growth      Creatinine, Serum: 0.42 mg/dL (12-31-20 @ 10:16)  Creatinine, Serum: 0.48 mg/dL (12-30-20 @ 04:20)  Creatinine, Serum: 0.73 mg/dL (12-29-20 @ 12:25)        Ferritin, Serum: 212 ng/mL (12-17-20 @ 18:14)      WBC Count: 8.91 K/uL (12-31-20 @ 10:16)  WBC Count: 15.18 K/uL (12-30-20 @ 04:20)  WBC Count: 22.51 K/uL (12-29-20 @ 12:25)    SARS-CoV-2: NotDetec (12-18-20 @ 10:24)  Rapid RVP Result: NotDetec (12-18-20 @ 10:24)  SARS-CoV-2: NotDetec (12-17-20 @ 18:16)  Rapid RVP Result: NotDetec (12-17-20 @ 18:16)    COVID-19 IgG Antibody Interpretation: Negative (12-30-20 @ 04:49)  COVID-19 IgG Antibody Index: 0.06 Index (12-30-20 @ 04:49)  COVID-19 IgG Antibody Index: <0.10 Index (12-18-20 @ 07:18)  COVID-19 IgG Antibody Interpretation: Negative (12-18-20 @ 07:18)    Lactate Dehydrogenase, Serum: 231 U/L (12-17-20 @ 18:14)    Alkaline Phosphatase, Serum: 219 U/L (12-29-20 @ 12:25)  Alanine Aminotransferase (ALT/SGPT): 59 U/L (12-29-20 @ 12:25)  Aspartate Aminotransferase (AST/SGOT): 37 U/L (12-29-20 @ 12:25)  Bilirubin Total, Serum: 0.7 mg/dL (12-29-20 @ 12:25)      < from: CT Angio Chest w/ IV Cont (12.29.20 @ 14:39) >     EXAM:  CT ANGIO CHEST (W)AW IC                          PROCEDURE DATE:  12/29/2020          INTERPRETATION:  CLINICAL INFORMATION: Tachycardia. Hypoxia. Evaluate for pulmonary embolism.    COMPARISON: CT chest 12/17/2020 and chest x-ray 12/29/2020    PROCEDURE:  CT Angiography of the Chest.  74 ml of Omnipaque 350 was injected intravenously. 26 ml were discarded.  Sagittal and coronal reformats were performed as well as 3D (MIP) reconstructions.    FINDINGS:    LUNGS AND AIRWAYS: Diffuse bronchial wall thickening with areas of mucous plugging within the peripheral aspect of the lower lobes. Patchy bilateral groundglass opacities, greater in the lower lobes. There are also scattered tree-in-bud opacities, greatest in the lower lobes. 6 mm right upper lobe nodule (series 5 image 46 with a small droplet of air centrally, likely infectious/inflammatory. Overall, the lung opacities have increased since 12/17/2020, however previously seen opacities in the lower lobes and lingula have improved/resolved. Emphysematous changes.  PLEURA: No pleural effusion.  MEDIASTINUM AND BRAD: No lymphadenopathy.  VESSELS: No pulmonary embolism. Thoracic aorta within normal limits for caliber with atherosclerotic changes. Coronary artery calcifications.  HEART: Heart size is normal. No pericardial effusion.  CHEST WALL AND LOWER NECK: No enlarged axillary lymph nodes.  VISUALIZED UPPER ABDOMEN: Within normal limits.  BONES: Partially imaged spine stimulator. Sternotomy. Compression deformities in the upper thoracic spine, unchanged.    IMPRESSION:  No pulmonary embolism.    Diffuse bronchial wall thickening, likely reactive airways disease or bronchitis.    Patchy bilateral groundglass opacities, greater in the lower lungs; differential includesmultifocal infection or inflammation including viral pneumonia.    Scattered tree-in-bud opacities, greater in the lower lobes, likely infectious/inflammatory in etiology.    Lung opacities have overall increased since 12/17/2020 with previously seenopacities in the lower lobes and lingula improved/resolved.            SHERIDAN ANGELES MD; Attending Radiologist  This document has been electronically signed. Dec 29 2020  3:45PM    < end of copied text >

## 2021-01-01 NOTE — SWALLOW BEDSIDE ASSESSMENT ADULT - COMMENTS
Recent CT scan showed minimal infiltrates prior to discharge:  No pulmonary embolism.  Diffuse bronchial wall thickening, likely reactive airways disease or bronchitis.  Patchy bilateral ground glass opacities, greater in the lower lungs; differential includes multifocal infection or inflammation including viral pneumonia.  Scattered tree-in-bud opacities, greater in the lower lobes, likely infectious/inflammatory in etiology.  Lung opacities have overall increased since 12/17/2020 with previously seen opacities in the lower lobes and lingula improved/resolved

## 2021-01-01 NOTE — SWALLOW BEDSIDE ASSESSMENT ADULT - SWALLOW EVAL: DIAGNOSIS
Oral and pharyngeal stages deemed WFL for soft and thin liquids. Pt w/o FL denture, therefore requesting softer foods. FL denture is at home. No overt s/s aspiration/penetration noted.

## 2021-01-01 NOTE — PROGRESS NOTE ADULT - SUBJECTIVE AND OBJECTIVE BOX
Mari Fonseca M.D., F.A.C.C.                                                                                            [facility]                                                                                                  [address] [place] [address]                                                                                                     [phone]          COVERING FOR ROBERT DANIELS is a 75y Male   Past medical history of Coronary artery disease (s/p RCA stent in 2002 and recent CABG x3 vessel; LIMA-LAD, SVG-OM1 & PDA in March 2020), Hypertension, Hyperlipidemia, COPD and recent hospitalizations for pneumonia who presents with dyspnea, cough and episodes of chest pain, overall suggestive of recurrent pneumonia.   Patient reports fever of 100F at home, also here with leukocytosis and infiltrates on CT chest all suggestive of recurrent pneumonia.   ECG with no new acute ischemic ST/T wave abnormalities, troponin negative and chest pain appears musculoskeletal in etiology which makes acute coronary syndrome less likely.   No overt signs of volume overload. SARS, flu and RSV negative.   No pulmonary embolism on CT chest.  Pt stable overnight--pt feels fine   Called for ? Afib      MEDICATIONS  (STANDING):  ALBUTerol    90 MICROgram(s) HFA Inhaler 1 Puff(s) Inhalation every 4 hours  aspirin enteric coated 81 milliGRAM(s) Oral daily  atorvastatin 40 milliGRAM(s) Oral at bedtime  azithromycin   Tablet 250 milliGRAM(s) Oral daily  cholecalciferol 2000 Unit(s) Oral daily  enoxaparin Injectable 72 milliGRAM(s) SubCutaneous two times a day  gabapentin 400 milliGRAM(s) Oral three times a day  guaiFENesin   Syrup  (Sugar-Free) 100 milliGRAM(s) Oral every 6 hours  lactobacillus acidophilus 1 Tablet(s) Oral three times a day with meals  meropenem  IVPB 1000 milliGRAM(s) IV Intermittent every 8 hours  metoprolol tartrate 25 milliGRAM(s) Oral two times a day  predniSONE   Tablet   Oral   predniSONE   Tablet 30 milliGRAM(s) Oral daily  tamsulosin 0.4 milliGRAM(s) Oral at bedtime  tiotropium 18 MICROgram(s) Capsule 1 Capsule(s) Inhalation daily    MEDICATIONS  (PRN):  nitroglycerin     SubLingual 0.4 milliGRAM(s) SubLingual every 5 minutes PRN Chest Pain  oxycodone    5 mG/acetaminophen 325 mG 2 Tablet(s) Oral every 8 hours PRN Severe Pain (7 - 10)  polyethylene glycol 3350 17 Gram(s) Oral daily PRN Constipation      Vital Signs Last 24 Hrs  T(C): 36.4 (01 Jan 2021 04:29), Max: 37.6 (31 Dec 2020 20:14)  T(F): 97.5 (01 Jan 2021 04:29), Max: 99.6 (31 Dec 2020 20:14)  HR: 68 (01 Jan 2021 08:26) (68 - 102)  BP: 126/78 (01 Jan 2021 08:26) (119/80 - 134/85)  BP(mean): 93 (01 Jan 2021 04:29) (93 - 95)  RR: 16 (01 Jan 2021 08:26) (16 - 18)  SpO2: 95% (01 Jan 2021 08:26) (92% - 95%)    I&O's Detail      Constitutional:    NC/AT: Normal     HEENT: Normal     Neck:  No JVD, bruits or thyromegaly    Respiratory:  Clear without rales or rhonchi    Cardiovascular:  RR without murmur, rub or gallop.    Gastrointestinal: Soft without hepatosplenomegaly.    Extremities: without cyanosis, clubbing or edema.    Neurological:  Oriented   x  3      . No gross sensory or motor defects.    Skin:   Clear    Psychiatric: Normal affect.                              10.6   8.91  )-----------( 274      ( 31 Dec 2020 10:16 )             33.2     12-31    140  |  101  |  9.0  ----------------------------<  226<H>  5.2   |  27.0  |  0.42<L>    Ca    9.0      31 Dec 2020 10:16  Phos  1.9     12-31  Mg     2.0     12-31        CARDIAC MARKERS ( 31 Dec 2020 10:16 )  x     / <0.01 ng/mL / x     / x     / x          ECG (12/29/2020): sinus tachycardia, RBBB (similar to prior ECG)    TTE (3/2020):   1. Technically good study.   2. Normal global left ventricular systolic function.   3. Left ventricular ejection fraction, by visual estimation, is 70 to 75%.   4. Trace mitral valve regurgitation.   5. There is no evidence of pericardial effusion.    TTE (12/29/2020):   1. Left ventricular ejection fraction, by visual estimation, is 65 to 70%.   2. Normal global left ventricular systolic function.   3. Spectral Doppler shows pseudonormal pattern of left ventricular myocardial filling (Grade II diastolic dysfunction).   4. Mildly reduced RV systolic function with TAPSE of 11mm.   5. Normal left atrial size.   6. Normal right atrial size.   7. Trace mitral valve regurgitation.   8. Sclerotic aortic valve with normal opening.   9. Endocardial visualization was enhanced with intravenous echo contrast.  10. Peak transaortic gradient equals 7.0 mmHg, mean transaortic gradient equals 3.7 mmHg, the calculated aortic valve area equals 1.59 cm² by the continuity equation consistent with mild aortic stenosis.  11. There is no evidence of pericardial effusion.  12. Compared to TTE done on 3/2020; the left ventricular function is unchanged but there is now mild aortic stenosis.            Assessment and Plan:   · Assessment	  Assessment:  Robert Henson is a 75 year old man with past medical history of Coronary artery disease (s/p RCA stent in 2002 and recent CABG x3 vessel; LIMA-LAD, SVG-OM1 & PDA in March 2020), Hypertension, Hyperlipidemia, COPD and recent hospitalizations for pneumonia who presents with dyspnea, cough and episodes of chest pain, overall suggestive of recurrent pneumonia. Patient reports fever of 100F at home, also here with leukocytosis and infiltrates on CT chest all suggestive of recurrent pneumonia. ECG with no new acute ischemic ST/T wave abnormalities, troponin negative and chest pain appears musculoskeletal in etiology which makes acute coronary syndrome less likely. No overt signs of volume overload. SARS, flu and RSV negative. No pulmonary embolism on CT chest.      Review of all strips showed NO ATRIAL FIBRILLATION--- has had a few APCs in a row    Pt stable   Continue Rx of CAD and pneumonia as outlined.  Continue monitoring  No further cardiac work up or Rx necessary at present.   FU with [facility]

## 2021-01-01 NOTE — SWALLOW BEDSIDE ASSESSMENT ADULT - CONSISTENCIES ADMINISTERED
puree mech soft soft solid pt declined, stated that would be too difficult for him to chew w/o FL denture/solid

## 2021-01-01 NOTE — SWALLOW BEDSIDE ASSESSMENT ADULT - SLP PERTINENT HISTORY OF CURRENT PROBLEM
This75 y/o male who was seen at ID office for follow up , was recently admitted at [facility] for pneumonia and mentioned about on and off pressure type chest pain for past 2 -3 days , lasting for a minute and then getting better, no aggravating factor, no radiation , no diaphoresis, no dizziness, no syncopy. some sob with cough, no orthopnea, pt. still has some cough and phlegm, last night had fever of 100.2 at home. no abd. pain. no n/v/d. no sick contact. pt. reports point tenderness on left side of his chest. pt. reports that he is an active person , not on home o2. pt. reports no trouble swallowing. As per pt. he finished his po steroids 3 days ago which he was given on his recent discharge from [facility].

## 2021-01-01 NOTE — PROGRESS NOTE ADULT - SUBJECTIVE AND OBJECTIVE BOX
PULMONARY PROGRESS NOTE      [id number]    Patient is a 75y old  Male who presents with a chief complaint of pneumonia / chest pain (01 Jan 2021 11:02)      INTERVAL HPI/OVERNIGHT EVENTS:    Feeling better  raising scant sputum  no chest pain  96% sat on room air    MEDICATIONS  (STANDING):  ALBUTerol    90 MICROgram(s) HFA Inhaler 1 Puff(s) Inhalation every 4 hours  aspirin enteric coated 81 milliGRAM(s) Oral daily  atorvastatin 40 milliGRAM(s) Oral at bedtime  azithromycin   Tablet 250 milliGRAM(s) Oral daily  cholecalciferol 2000 Unit(s) Oral daily  enoxaparin Injectable 72 milliGRAM(s) SubCutaneous two times a day  gabapentin 400 milliGRAM(s) Oral three times a day  guaiFENesin   Syrup  (Sugar-Free) 100 milliGRAM(s) Oral every 6 hours  lactobacillus acidophilus 1 Tablet(s) Oral three times a day with meals  meropenem  IVPB 1000 milliGRAM(s) IV Intermittent every 8 hours  metoprolol tartrate 25 milliGRAM(s) Oral two times a day  predniSONE   Tablet   Oral   predniSONE   Tablet 30 milliGRAM(s) Oral daily  tamsulosin 0.4 milliGRAM(s) Oral at bedtime  tiotropium 18 MICROgram(s) Capsule 1 Capsule(s) Inhalation daily      MEDICATIONS  (PRN):  nitroglycerin     SubLingual 0.4 milliGRAM(s) SubLingual every 5 minutes PRN Chest Pain  oxycodone    5 mG/acetaminophen 325 mG 2 Tablet(s) Oral every 8 hours PRN Severe Pain (7 - 10)  polyethylene glycol 3350 17 Gram(s) Oral daily PRN Constipation      Allergies    codeine (Urticaria)  ibuprofen (Anaphylaxis)    Intolerances    amoxicillin (Diarrhea)      PAST MEDICAL & SURGICAL HISTORY:  HLD (hyperlipidemia)    HTN (hypertension)    Lumbosacral disc disease  has nerve stimulator    Arthritis    CAD in native artery  RCA 3 YAYA 1 in 2002 and 2 in 2019    S/P CABG (coronary artery bypass graft)    S/P cataract surgery  b/l eyes 2016    H/O heart artery stent  RCA        SOCIAL HISTORY  Smoking History:       REVIEW OF SYSTEMS:    CONSTITUTIONAL:  No distress    HEENT:  Eyes:  No diplopia or blurred vision. ENT:  No earache, sore throat or runny nose.    CARDIOVASCULAR:  No pressure, squeezing, tightness, heaviness or aching about the chest; no palpitations.    RESPIRATORY:  above    GASTROINTESTINAL:  No nausea, vomiting or diarrhea.    GENITOURINARY:  No dysuria, frequency or urgency.    NEUROLOGIC:  No paresthesias, fasciculations, seizures or weakness.    Extremities: No cyanosis, clubbing or edema    PSYCHIATRIC:  No disorder of thought or mood.    Vital Signs Last 24 Hrs  T(C): 36.7 (01 Jan 2021 12:20), Max: 37.6 (31 Dec 2020 20:14)  T(F): 98 (01 Jan 2021 12:20), Max: 99.6 (31 Dec 2020 20:14)  HR: 84 (01 Jan 2021 12:20) (68 - 102)  BP: 154/82 (01 Jan 2021 12:20) (119/80 - 154/82)  BP(mean): 93 (01 Jan 2021 04:29) (93 - 95)  RR: 16 (01 Jan 2021 12:20) (16 - 18)  SpO2: 95% (01 Jan 2021 12:20) (92% - 95%)    PHYSICAL EXAMINATION:    GENERAL: The patient is awake and alert in no apparent distress.     HEENT: Head is normocephalic and atraumatic. Extraocular muscles are intact. Mucous membranes are moist.    NECK: Supple.    LUNGS: Clear to auscultation without wheezing, rales or rhonchi; respirations unlabored    HEART: Regular rate and rhythm without murmur.    ABDOMEN: Soft, nontender, and nondistended.      EXTREMITIES: Without any cyanosis, clubbing, rash, lesions or edema.    NEUROLOGIC: Grossly intact.    LABS:                        10.6   8.91  )-----------( 274      ( 31 Dec 2020 10:16 )             33.2     12-31    140  |  101  |  9.0  ----------------------------<  226<H>  5.2   |  27.0  |  0.42<L>    Ca    9.0      31 Dec 2020 10:16  Phos  1.9     12-31  Mg     2.0     12-31            CARDIAC MARKERS ( 31 Dec 2020 10:16 )  x     / <0.01 ng/mL / x     / x     / x                    MICROBIOLOGY:    RADIOLOGY & ADDITIONAL STUDIES:    < from: CT Angio Chest w/ IV Cont (12.29.20 @ 14:39) >     EXAM:  CT ANGIO CHEST (W)AW IC                          PROCEDURE DATE:  12/29/2020          INTERPRETATION:  CLINICAL INFORMATION: Tachycardia. Hypoxia. Evaluate for pulmonary embolism.    COMPARISON: CT chest 12/17/2020 and chest x-ray 12/29/2020    PROCEDURE:  CT Angiography of the Chest.  74 ml of Omnipaque 350 was injected intravenously. 26 ml were discarded.  Sagittal and coronal reformats were performed as well as 3D (MIP) reconstructions.    FINDINGS:    LUNGS AND AIRWAYS: Diffuse bronchial wall thickening with areas of mucous plugging within the peripheral aspect of the lower lobes. Patchy bilateral groundglass opacities, greater in the lower lobes. There are also scattered tree-in-bud opacities, greatest in the lower lobes. 6 mm right upper lobe nodule (series 5 image 46 with a small droplet of air centrally, likely infectious/inflammatory. Overall, the lung opacities have increased since 12/17/2020, however previously seen opacities in the lower lobes and lingula have improved/resolved. Emphysematous changes.  PLEURA: No pleural effusion.  MEDIASTINUM AND BRAD: No lymphadenopathy.  VESSELS: No pulmonary embolism. Thoracic aorta within normal limits for caliber with atherosclerotic changes. Coronary artery calcifications.  HEART: Heart size is normal. No pericardial effusion.  CHEST WALL AND LOWER NECK: No enlarged axillary lymph nodes.  VISUALIZED UPPER ABDOMEN: Within normal limits.  BONES: Partially imaged spine stimulator. Sternotomy. Compression deformities in the upper thoracic spine, unchanged.    IMPRESSION:  No pulmonary embolism.    Diffuse bronchial wall thickening, likely reactive airways disease or bronchitis.    Patchy bilateral groundglass opacities, greater in the lower lungs; differential includesmultifocal infection or inflammation including viral pneumonia.    Scattered tree-in-bud opacities, greater in the lower lobes, likely infectious/inflammatory in etiology.    Lung opacities have overall increased since 12/17/2020 with previously seenopacities in the lower lobes and lingula improved/resolved.            SHERIDAN ANGELES MD; Attending Radiologist  This document has been electronically signed. Dec 29 2020  3:45PM    < end of copied text >

## 2021-01-01 NOTE — PROGRESS NOTE ADULT - SUBJECTIVE AND OBJECTIVE BOX
CC: PNA     INTERVAL HPI/OVERNIGHT EVENTS: seen and examined , feels better , seen by speech , no concern for aspiration   seen by cardiology , no concern for afib   sputum showed normal virgilio           Vital Signs Last 24 Hrs  T(C): 36.9 (01 Jan 2021 16:05), Max: 37.6 (31 Dec 2020 20:14)  T(F): 98.4 (01 Jan 2021 16:05), Max: 99.6 (31 Dec 2020 20:14)  HR: 91 (01 Jan 2021 16:05) (68 - 93)  BP: 133/86 (01 Jan 2021 16:05) (119/80 - 154/82)  BP(mean): 93 (01 Jan 2021 04:29) (93 - 95)  RR: 16 (01 Jan 2021 12:20) (16 - 18)  SpO2: 96% (01 Jan 2021 16:05) (92% - 96%)    PHYSICAL EXAM:    GENERAL: NAD, resting comfortable in bed   CHEST/LUNG: CTAB , no wheezing , rales, rhonchi heard   HEART: S1S2+, Regular rate and rhythm; No murmurs, rubs, or gallops  ABDOMEN: Soft, Nontender, Nondistended; Bowel sounds present  EXTREMITIES:  no pitting edema , pulses palpated BL.        LABS:                        10.6   8.91  )-----------( 274      ( 31 Dec 2020 10:16 )             33.2     12-31    140  |  101  |  9.0  ----------------------------<  226<H>  5.2   |  27.0  |  0.42<L>    Ca    9.0      31 Dec 2020 10:16  Phos  1.9     12-31  Mg     2.0     12-31              MEDICATIONS  (STANDING):  ALBUTerol    90 MICROgram(s) HFA Inhaler 1 Puff(s) Inhalation every 4 hours  aspirin enteric coated 81 milliGRAM(s) Oral daily  atorvastatin 40 milliGRAM(s) Oral at bedtime  azithromycin   Tablet 250 milliGRAM(s) Oral daily  cholecalciferol 2000 Unit(s) Oral daily  enoxaparin Injectable 72 milliGRAM(s) SubCutaneous two times a day  gabapentin 400 milliGRAM(s) Oral three times a day  guaiFENesin   Syrup  (Sugar-Free) 100 milliGRAM(s) Oral every 6 hours  lactobacillus acidophilus 1 Tablet(s) Oral three times a day with meals  meropenem  IVPB 1000 milliGRAM(s) IV Intermittent every 8 hours  metoprolol tartrate 25 milliGRAM(s) Oral two times a day  predniSONE   Tablet   Oral   predniSONE   Tablet 30 milliGRAM(s) Oral daily  tamsulosin 0.4 milliGRAM(s) Oral at bedtime  tiotropium 18 MICROgram(s) Capsule 1 Capsule(s) Inhalation daily    MEDICATIONS  (PRN):  ALPRAZolam 0.5 milliGRAM(s) Oral every 6 hours PRN Agitation  nitroglycerin     SubLingual 0.4 milliGRAM(s) SubLingual every 5 minutes PRN Chest Pain  oxycodone    5 mG/acetaminophen 325 mG 2 Tablet(s) Oral every 8 hours PRN Severe Pain (7 - 10)  polyethylene glycol 3350 17 Gram(s) Oral daily PRN Constipation      RADIOLOGY & ADDITIONAL TESTS:

## 2021-01-02 LAB
ALBUMIN SERPL ELPH-MCNC: 4.3 G/DL
ALP BLD-CCNC: 240 U/L
ALT SERPL-CCNC: 65 U/L
ANION GAP SERPL CALC-SCNC: 12 MMOL/L — SIGNIFICANT CHANGE UP (ref 5–17)
ANION GAP SERPL CALC-SCNC: 16 MMOL/L
AST SERPL-CCNC: 44 U/L
BASOPHILS # BLD AUTO: 0.04 K/UL
BASOPHILS NFR BLD AUTO: 0.2 %
BILIRUB SERPL-MCNC: 0.7 MG/DL
BUN SERPL-MCNC: 13 MG/DL — SIGNIFICANT CHANGE UP (ref 8–20)
BUN SERPL-MCNC: 17 MG/DL
C PNEUM IGM TITR SER: SIGNIFICANT CHANGE UP
CALCIUM SERPL-MCNC: 9.3 MG/DL — SIGNIFICANT CHANGE UP (ref 8.6–10.2)
CALCIUM SERPL-MCNC: 9.5 MG/DL
CHLAMYDIA AB SER-ACNC: SIGNIFICANT CHANGE UP
CHLAMYDIA IGG SER-ACNC: HIGH
CHLAMYDIA PNEUMONIAE IGA: HIGH
CHLORIDE SERPL-SCNC: 102 MMOL/L — SIGNIFICANT CHANGE UP (ref 98–107)
CHLORIDE SERPL-SCNC: 97 MMOL/L
CO2 SERPL-SCNC: 25 MMOL/L
CO2 SERPL-SCNC: 28 MMOL/L — SIGNIFICANT CHANGE UP (ref 22–29)
CREAT SERPL-MCNC: 0.56 MG/DL — SIGNIFICANT CHANGE UP (ref 0.5–1.3)
CREAT SERPL-MCNC: 1.03 MG/DL
EOSINOPHIL # BLD AUTO: 0.45 K/UL
EOSINOPHIL NFR BLD AUTO: 2 %
GLUCOSE SERPL-MCNC: 127 MG/DL — HIGH (ref 70–99)
GLUCOSE SERPL-MCNC: 154 MG/DL
HCT VFR BLD CALC: 40.5 % — SIGNIFICANT CHANGE UP (ref 39–50)
HCT VFR BLD CALC: 41.6 %
HGB BLD-MCNC: 12.7 G/DL
HGB BLD-MCNC: 12.7 G/DL — LOW (ref 13–17)
IMM GRANULOCYTES NFR BLD AUTO: 0.6 %
LYMPHOCYTES # BLD AUTO: 0.81 K/UL
LYMPHOCYTES NFR BLD AUTO: 3.5 %
MAN DIFF?: NORMAL
MCHC RBC-ENTMCNC: 29.7 PG — SIGNIFICANT CHANGE UP (ref 27–34)
MCHC RBC-ENTMCNC: 30.5 GM/DL
MCHC RBC-ENTMCNC: 30.5 PG
MCHC RBC-ENTMCNC: 31.4 GM/DL — LOW (ref 32–36)
MCV RBC AUTO: 100 FL
MCV RBC AUTO: 94.6 FL — SIGNIFICANT CHANGE UP (ref 80–100)
MONOCYTES # BLD AUTO: 1.22 K/UL
MONOCYTES NFR BLD AUTO: 5.3 %
NEUTROPHILS # BLD AUTO: 20.24 K/UL
NEUTROPHILS NFR BLD AUTO: 88.4 %
PLATELET # BLD AUTO: 289 K/UL
PLATELET # BLD AUTO: 374 K/UL — SIGNIFICANT CHANGE UP (ref 150–400)
POTASSIUM SERPL-MCNC: 3.9 MMOL/L — SIGNIFICANT CHANGE UP (ref 3.5–5.3)
POTASSIUM SERPL-SCNC: 3.9 MMOL/L — SIGNIFICANT CHANGE UP (ref 3.5–5.3)
POTASSIUM SERPL-SCNC: 5.2 MMOL/L
PROCALCITONIN SERPL-MCNC: 0.33 NG/ML
PROT SERPL-MCNC: 6.6 G/DL
RBC # BLD: 4.16 M/UL
RBC # BLD: 4.28 M/UL — SIGNIFICANT CHANGE UP (ref 4.2–5.8)
RBC # FLD: 14.3 % — SIGNIFICANT CHANGE UP (ref 10.3–14.5)
RBC # FLD: 14.9 %
SODIUM SERPL-SCNC: 138 MMOL/L
SODIUM SERPL-SCNC: 142 MMOL/L — SIGNIFICANT CHANGE UP (ref 135–145)
TSH SERPL-ACNC: 0.96 UIU/ML
WBC # BLD: 9.4 K/UL — SIGNIFICANT CHANGE UP (ref 3.8–10.5)
WBC # FLD AUTO: 22.89 K/UL
WBC # FLD AUTO: 9.4 K/UL — SIGNIFICANT CHANGE UP (ref 3.8–10.5)

## 2021-01-02 PROCEDURE — 99233 SBSQ HOSP IP/OBS HIGH 50: CPT

## 2021-01-02 PROCEDURE — 99232 SBSQ HOSP IP/OBS MODERATE 35: CPT

## 2021-01-02 RX ORDER — BUDESONIDE AND FORMOTEROL FUMARATE DIHYDRATE 160; 4.5 UG/1; UG/1
2 AEROSOL RESPIRATORY (INHALATION)
Refills: 0 | Status: DISCONTINUED | OUTPATIENT
Start: 2021-01-02 | End: 2021-01-05

## 2021-01-02 RX ORDER — ACETAMINOPHEN 500 MG
650 TABLET ORAL EVERY 6 HOURS
Refills: 0 | Status: DISCONTINUED | OUTPATIENT
Start: 2021-01-02 | End: 2021-01-05

## 2021-01-02 RX ADMIN — Medication 1 TABLET(S): at 12:58

## 2021-01-02 RX ADMIN — OXYCODONE AND ACETAMINOPHEN 2 TABLET(S): 5; 325 TABLET ORAL at 00:36

## 2021-01-02 RX ADMIN — MEROPENEM 100 MILLIGRAM(S): 1 INJECTION INTRAVENOUS at 05:19

## 2021-01-02 RX ADMIN — Medication 2000 UNIT(S): at 12:58

## 2021-01-02 RX ADMIN — OXYCODONE AND ACETAMINOPHEN 2 TABLET(S): 5; 325 TABLET ORAL at 01:36

## 2021-01-02 RX ADMIN — OXYCODONE AND ACETAMINOPHEN 2 TABLET(S): 5; 325 TABLET ORAL at 16:14

## 2021-01-02 RX ADMIN — Medication 81 MILLIGRAM(S): at 12:59

## 2021-01-02 RX ADMIN — GABAPENTIN 400 MILLIGRAM(S): 400 CAPSULE ORAL at 21:10

## 2021-01-02 RX ADMIN — MEROPENEM 100 MILLIGRAM(S): 1 INJECTION INTRAVENOUS at 13:04

## 2021-01-02 RX ADMIN — Medication 0.5 MILLIGRAM(S): at 21:10

## 2021-01-02 RX ADMIN — GABAPENTIN 400 MILLIGRAM(S): 400 CAPSULE ORAL at 05:19

## 2021-01-02 RX ADMIN — Medication 100 MILLIGRAM(S): at 05:19

## 2021-01-02 RX ADMIN — Medication 25 MILLIGRAM(S): at 17:01

## 2021-01-02 RX ADMIN — ENOXAPARIN SODIUM 40 MILLIGRAM(S): 100 INJECTION SUBCUTANEOUS at 12:58

## 2021-01-02 RX ADMIN — Medication 25 MILLIGRAM(S): at 05:19

## 2021-01-02 RX ADMIN — OXYCODONE AND ACETAMINOPHEN 2 TABLET(S): 5; 325 TABLET ORAL at 09:15

## 2021-01-02 RX ADMIN — Medication 100 MILLIGRAM(S): at 13:27

## 2021-01-02 RX ADMIN — Medication 1 TABLET(S): at 17:01

## 2021-01-02 RX ADMIN — Medication 100 MILLIGRAM(S): at 00:43

## 2021-01-02 RX ADMIN — Medication 0.5 MILLIGRAM(S): at 11:09

## 2021-01-02 RX ADMIN — Medication 30 MILLIGRAM(S): at 05:19

## 2021-01-02 RX ADMIN — TAMSULOSIN HYDROCHLORIDE 0.4 MILLIGRAM(S): 0.4 CAPSULE ORAL at 21:11

## 2021-01-02 RX ADMIN — Medication 100 MILLIGRAM(S): at 17:02

## 2021-01-02 RX ADMIN — BUDESONIDE AND FORMOTEROL FUMARATE DIHYDRATE 2 PUFF(S): 160; 4.5 AEROSOL RESPIRATORY (INHALATION) at 21:16

## 2021-01-02 RX ADMIN — Medication 650 MILLIGRAM(S): at 13:54

## 2021-01-02 RX ADMIN — Medication 650 MILLIGRAM(S): at 14:30

## 2021-01-02 RX ADMIN — OXYCODONE AND ACETAMINOPHEN 2 TABLET(S): 5; 325 TABLET ORAL at 17:30

## 2021-01-02 RX ADMIN — OXYCODONE AND ACETAMINOPHEN 2 TABLET(S): 5; 325 TABLET ORAL at 08:11

## 2021-01-02 RX ADMIN — GABAPENTIN 400 MILLIGRAM(S): 400 CAPSULE ORAL at 13:03

## 2021-01-02 RX ADMIN — AZITHROMYCIN 250 MILLIGRAM(S): 500 TABLET, FILM COATED ORAL at 13:25

## 2021-01-02 RX ADMIN — Medication 1 TABLET(S): at 08:11

## 2021-01-02 RX ADMIN — ATORVASTATIN CALCIUM 40 MILLIGRAM(S): 80 TABLET, FILM COATED ORAL at 21:10

## 2021-01-02 RX ADMIN — MEROPENEM 100 MILLIGRAM(S): 1 INJECTION INTRAVENOUS at 21:11

## 2021-01-02 NOTE — PROGRESS NOTE ADULT - SUBJECTIVE AND OBJECTIVE BOX
PULMONARY PROGRESS NOTE      [id number]    Patient is a 75y old  Male who presents with a chief complaint of pneumonia / chest pain (01 Jan 2021 11:02)      INTERVAL HPI/OVERNIGHT EVENTS:    -improved   -tolerated  swallow evaluation  no dysphagia noted   -some sob with prednisone taper on anoro  at home spiriva started  - feeling better    room air sats 93%        PAST MEDICAL & SURGICAL HISTORY:  HLD (hyperlipidemia)    HTN (hypertension)    Lumbosacral disc disease  has nerve stimulator    Arthritis    CAD in native artery  RCA 3 YAYA 1 in 2002 and 2 in 2019    S/P CABG (coronary artery bypass graft)    S/P cataract surgery  b/l eyes 2016    H/O heart artery stent  RCA          Medications:  azithromycin   Tablet 250 milliGRAM(s) Oral daily  meropenem  IVPB 1000 milliGRAM(s) IV Intermittent every 8 hours    metoprolol tartrate 25 milliGRAM(s) Oral two times a day  nitroglycerin     SubLingual 0.4 milliGRAM(s) SubLingual every 5 minutes PRN  tamsulosin 0.4 milliGRAM(s) Oral at bedtime    ALBUTerol    90 MICROgram(s) HFA Inhaler 1 Puff(s) Inhalation every 4 hours  budesonide 160 MICROgram(s)/formoterol 4.5 MICROgram(s) Inhaler 2 Puff(s) Inhalation two times a day  guaiFENesin   Syrup  (Sugar-Free) 100 milliGRAM(s) Oral every 6 hours  tiotropium 18 MICROgram(s) Capsule 1 Capsule(s) Inhalation daily    ALPRAZolam 0.5 milliGRAM(s) Oral every 6 hours PRN  gabapentin 400 milliGRAM(s) Oral three times a day  oxycodone    5 mG/acetaminophen 325 mG 2 Tablet(s) Oral every 8 hours PRN      aspirin enteric coated 81 milliGRAM(s) Oral daily  enoxaparin Injectable 40 milliGRAM(s) SubCutaneous daily    polyethylene glycol 3350 17 Gram(s) Oral daily PRN      atorvastatin 40 milliGRAM(s) Oral at bedtime  predniSONE   Tablet   Oral     cholecalciferol 2000 Unit(s) Oral daily        lactobacillus acidophilus 1 Tablet(s) Oral three times a day with meals      ROS  constitutional  no fevers  heent no sore throat  resp above  CVS  no chest pain   GI  no vomiting  skin no rash   heme no bleeding  MSK  no joint pain     ICU Vital Signs Last 24 Hrs  T(C): 36.4 (02 Jan 2021 08:05), Max: 37.3 (02 Jan 2021 00:11)  T(F): 97.5 (02 Jan 2021 08:05), Max: 99.2 (02 Jan 2021 00:11)  HR: 95 (02 Jan 2021 08:05) (74 - 95)  BP: 130/841 (02 Jan 2021 08:05) (123/80 - 154/82)  BP(mean): 99 (02 Jan 2021 08:05) (94 - 103)  ABP: --  ABP(mean): --  RR: 16 (02 Jan 2021 08:05) (16 - 18)  SpO2: 93% (02 Jan 2021 08:05) (92% - 96%)          I&O's Detail    01 Jan 2021 07:01  -  02 Jan 2021 07:00  --------------------------------------------------------  IN:    IV PiggyBack: 100 mL  Total IN: 100 mL    OUT:  Total OUT: 0 mL    Total NET: 100 mL      02 Jan 2021 07:01  -  02 Jan 2021 10:56  --------------------------------------------------------  IN:  Total IN: 0 mL    OUT:    Voided (mL): 350 mL  Total OUT: 350 mL    Total NET: -350 mL            LABS:                        12.7   9.40  )-----------( 374      ( 02 Jan 2021 09:20 )             40.5     01-02    142  |  102  |  13.0  ----------------------------<  127<H>  3.9   |  28.0  |  0.56    Ca    9.3      02 Jan 2021 09:20            CAPILLARY BLOOD GLUCOSE            CULTURES:  Culture Results:   Normal Respiratory Doris present (12-30 @ 22:04)  Culture Results:   No growth (12-29 @ 22:01)  Culture Results:   No growth at 48 hours (12-29 @ 12:32)  Culture Results:   No growth at 48 hours (12-29 @ 12:32)      Physical Examination:    General: frail  male no distress     HEENT: Pupils equal, reactive to light.  Symmetric. no thrush     PULM:  b/l air entry   no wheezing rales  rhonchi   NECK: Supple, no lymphadenopathy, trachea midline    CVS: Regular rate and rhythm, no murmurs, rubs, or gallops    ABD: Soft, nondistended, nontender, normoactive bowel sounds, no masses    EXT: No edema, nontender    SKIN: Warm and well perfused, no rashes noted.    NEURO: Alert, oriented, interactive, nonfocal    PROCEDURE DATE:  12/29/2020          INTERPRETATION:  CLINICAL INFORMATION: Tachycardia. Hypoxia. Evaluate for pulmonary embolism.    COMPARISON: CT chest 12/17/2020 and chest x-ray 12/29/2020    PROCEDURE:  CT Angiography of the Chest.  74 ml of Omnipaque 350 was injected intravenously. 26 ml were discarded.  Sagittal and coronal reformats were performed as well as 3D (MIP) reconstructions.    FINDINGS:    LUNGS AND AIRWAYS: Diffuse bronchial wall thickening with areas of mucous plugging within the peripheral aspect of the lower lobes. Patchy bilateral groundglass opacities, greater in the lower lobes. There are also scattered tree-in-bud opacities, greatest in the lower lobes. 6 mm right upper lobe nodule (series 5 image 46 with a small droplet of air centrally, likely infectious/inflammatory. Overall, the lung opacities have increased since 12/17/2020, however previously seen opacities in the lower lobes and lingula have improved/resolved. Emphysematous changes.  PLEURA: No pleural effusion.  MEDIASTINUM AND BRAD: No lymphadenopathy.  VESSELS: No pulmonary embolism. Thoracic aorta within normal limits for caliber with atherosclerotic changes. Coronary artery calcifications.  HEART: Heart size is normal. No pericardial effusion.  CHEST WALL AND LOWER NECK: No enlarged axillary lymph nodes.  VISUALIZED UPPER ABDOMEN: Within normal limits.  BONES: Partially imaged spine stimulator. Sternotomy. Compression deformities in the upper thoracic spine, unchanged.    IMPRESSION:  No pulmonary embolism.    Diffuse bronchial wall thickening, likely reactive airways disease or bronchitis.    Patchy bilateral groundglass opacities, greater in the lower lungs; differential includesmultifocal infection or inflammation including viral pneumonia.    Scattered tree-in-bud opacities, greater in the lower lobes, likely infectious/inflammatory in etiology.    Lung opacities have overall increased since 12/17/2020 with previously seenopacities in the lower lobes and lingula improved/resolved.            SHERIDAN ANGELES MD; Attending Radiologist  This document has been electronically signed. Dec 29 2020  3:45PM    RADIOLOGY:

## 2021-01-02 NOTE — PROGRESS NOTE ADULT - SUBJECTIVE AND OBJECTIVE BOX
Patient is a 75y old  Male who presents with a chief complaint of pneumonia / chest pain (02 Jan 2021 10:54)    Pt seen and exam. SOB improving  No fever, chill, palpitation    SUBJECTIVE / OVERNIGHT EVENTS:  REVIEW OF SYSTEMS: All systems are reviewed and found to be negative except above    MEDICATIONS  (STANDING):  ALBUTerol    90 MICROgram(s) HFA Inhaler 1 Puff(s) Inhalation every 4 hours  aspirin enteric coated 81 milliGRAM(s) Oral daily  atorvastatin 40 milliGRAM(s) Oral at bedtime  azithromycin   Tablet 250 milliGRAM(s) Oral daily  budesonide 160 MICROgram(s)/formoterol 4.5 MICROgram(s) Inhaler 2 Puff(s) Inhalation two times a day  cholecalciferol 2000 Unit(s) Oral daily  enoxaparin Injectable 40 milliGRAM(s) SubCutaneous daily  gabapentin 400 milliGRAM(s) Oral three times a day  guaiFENesin   Syrup  (Sugar-Free) 100 milliGRAM(s) Oral every 6 hours  lactobacillus acidophilus 1 Tablet(s) Oral three times a day with meals  meropenem  IVPB 1000 milliGRAM(s) IV Intermittent every 8 hours  metoprolol tartrate 25 milliGRAM(s) Oral two times a day  predniSONE   Tablet   Oral   tamsulosin 0.4 milliGRAM(s) Oral at bedtime  tiotropium 18 MICROgram(s) Capsule 1 Capsule(s) Inhalation daily    MEDICATIONS  (PRN):  ALPRAZolam 0.5 milliGRAM(s) Oral every 6 hours PRN Agitation  nitroglycerin     SubLingual 0.4 milliGRAM(s) SubLingual every 5 minutes PRN Chest Pain  oxycodone    5 mG/acetaminophen 325 mG 2 Tablet(s) Oral every 8 hours PRN Severe Pain (7 - 10)  polyethylene glycol 3350 17 Gram(s) Oral daily PRN Constipation      CAPILLARY BLOOD GLUCOSE        I&O's Summary    01 Jan 2021 07:01  -  02 Jan 2021 07:00  --------------------------------------------------------  IN: 100 mL / OUT: 0 mL / NET: 100 mL    02 Jan 2021 07:01  -  02 Jan 2021 11:56  --------------------------------------------------------  IN: 0 mL / OUT: 350 mL / NET: -350 mL        PHYSICAL EXAM:  Vital Signs Last 24 Hrs  T(C): 36.7 (02 Jan 2021 11:07), Max: 37.3 (02 Jan 2021 00:11)  T(F): 98 (02 Jan 2021 11:07), Max: 99.2 (02 Jan 2021 00:11)  HR: 101 (02 Jan 2021 11:07) (74 - 101)  BP: 105/70 (02 Jan 2021 11:07) (105/70 - 154/82)  BP(mean): 99 (02 Jan 2021 08:05) (94 - 103)  RR: 18 (02 Jan 2021 11:07) (16 - 18)  SpO2: 93% (02 Jan 2021 11:07) (92% - 96%)    CONSTITUTIONAL: NAD,   EYES: PERRLA; conjunctiva and sclera clear  ENMT: Moist oral mucosa,  RESPIRATORY: Normal respiratory effort; lungs are clear to auscultation bilaterally  CARDIOVASCULAR: Regular rate and rhythm, normal S1 and S2, no murmur   EXTS: No lower extremity edema; Peripheral pulses are 2+ bilaterally  ABDOMEN: Nontender to palpation, normoactive bowel sounds, no rebound/guarding;   MUSCLOSKELETAL:   no clubbing or cyanosis of digits; no joint swelling or tenderness to palpation  PSYCH: affect appropriate  NEUROLOGY: A+O to person, place,+m/s      LABS:                        12.7   9.40  )-----------( 374      ( 02 Jan 2021 09:20 )             40.5     01-02    142  |  102  |  13.0  ----------------------------<  127<H>  3.9   |  28.0  |  0.56    Ca    9.3      02 Jan 2021 09:20                Culture - Sputum (collected 30 Dec 2020 22:04)  Source: .Sputum Sputum  Gram Stain (31 Dec 2020 06:57):    No polymorphonuclear leukocytes per low power field    No Squamous epithelial cells per low power field    Rare Gram Positive Cocci seen per oil power field  Final Report (01 Jan 2021 20:44):    Normal Respiratory Doris present        RADIOLOGY & ADDITIONAL TESTS:  Results Reviewed:   Imaging Personally Reviewed:  Electrocardiogram Personally Reviewed:    COORDINATION OF CARE:  Care Discussed with Consultants/Other Providers [Y/N]:  Prior or Outpatient Records Reviewed [Y/N]:   Patient is a 75y old  Male who presents with a chief complaint of pneumonia / chest pain (02 Jan 2021 10:54)    Pt seen and exam. SOB improving. so2 91-95% at rest.  Pt states sob during ambulation earlier  No fever, chill, palpitation    SUBJECTIVE / OVERNIGHT EVENTS:  REVIEW OF SYSTEMS: All systems are reviewed and found to be negative except above    MEDICATIONS  (STANDING):  ALBUTerol    90 MICROgram(s) HFA Inhaler 1 Puff(s) Inhalation every 4 hours  aspirin enteric coated 81 milliGRAM(s) Oral daily  atorvastatin 40 milliGRAM(s) Oral at bedtime  azithromycin   Tablet 250 milliGRAM(s) Oral daily  budesonide 160 MICROgram(s)/formoterol 4.5 MICROgram(s) Inhaler 2 Puff(s) Inhalation two times a day  cholecalciferol 2000 Unit(s) Oral daily  enoxaparin Injectable 40 milliGRAM(s) SubCutaneous daily  gabapentin 400 milliGRAM(s) Oral three times a day  guaiFENesin   Syrup  (Sugar-Free) 100 milliGRAM(s) Oral every 6 hours  lactobacillus acidophilus 1 Tablet(s) Oral three times a day with meals  meropenem  IVPB 1000 milliGRAM(s) IV Intermittent every 8 hours  metoprolol tartrate 25 milliGRAM(s) Oral two times a day  predniSONE   Tablet   Oral   tamsulosin 0.4 milliGRAM(s) Oral at bedtime  tiotropium 18 MICROgram(s) Capsule 1 Capsule(s) Inhalation daily    MEDICATIONS  (PRN):  ALPRAZolam 0.5 milliGRAM(s) Oral every 6 hours PRN Agitation  nitroglycerin     SubLingual 0.4 milliGRAM(s) SubLingual every 5 minutes PRN Chest Pain  oxycodone    5 mG/acetaminophen 325 mG 2 Tablet(s) Oral every 8 hours PRN Severe Pain (7 - 10)  polyethylene glycol 3350 17 Gram(s) Oral daily PRN Constipation      CAPILLARY BLOOD GLUCOSE        I&O's Summary    01 Jan 2021 07:01  -  02 Jan 2021 07:00  --------------------------------------------------------  IN: 100 mL / OUT: 0 mL / NET: 100 mL    02 Jan 2021 07:01  -  02 Jan 2021 11:56  --------------------------------------------------------  IN: 0 mL / OUT: 350 mL / NET: -350 mL        PHYSICAL EXAM:  Vital Signs Last 24 Hrs  T(C): 36.7 (02 Jan 2021 11:07), Max: 37.3 (02 Jan 2021 00:11)  T(F): 98 (02 Jan 2021 11:07), Max: 99.2 (02 Jan 2021 00:11)  HR: 101 (02 Jan 2021 11:07) (74 - 101)  BP: 105/70 (02 Jan 2021 11:07) (105/70 - 154/82)  BP(mean): 99 (02 Jan 2021 08:05) (94 - 103)  RR: 18 (02 Jan 2021 11:07) (16 - 18)  SpO2: 93% (02 Jan 2021 11:07) (92% - 96%)    CONSTITUTIONAL: NAD,   EYES: PERRLA; conjunctiva and sclera clear  ENMT: Moist oral mucosa,  RESPIRATORY: Normal respiratory effort; lungs are clear to auscultation bilaterally  CARDIOVASCULAR: Regular rate and rhythm, normal S1 and S2, no murmur   EXTS: No lower extremity edema; Peripheral pulses are 2+ bilaterally  ABDOMEN: Nontender to palpation, normoactive bowel sounds, no rebound/guarding;   MUSCLOSKELETAL:   no clubbing or cyanosis of digits; no joint swelling or tenderness to palpation  PSYCH: affect appropriate  NEUROLOGY: A+O to person, place,time, +m/s      LABS:                        12.7   9.40  )-----------( 374      ( 02 Jan 2021 09:20 )             40.5     01-02    142  |  102  |  13.0  ----------------------------<  127<H>  3.9   |  28.0  |  0.56    Ca    9.3      02 Jan 2021 09:20                Culture - Sputum (collected 30 Dec 2020 22:04)  Source: .Sputum Sputum  Gram Stain (31 Dec 2020 06:57):    No polymorphonuclear leukocytes per low power field    No Squamous epithelial cells per low power field    Rare Gram Positive Cocci seen per oil power field  Final Report (01 Jan 2021 20:44):    Normal Respiratory Doris present        RADIOLOGY & ADDITIONAL TESTS:  Results Reviewed:   Imaging Personally Reviewed:  Electrocardiogram Personally Reviewed:    COORDINATION OF CARE:  Care Discussed with Consultants/Other Providers [Y/N]:  Prior or Outpatient Records Reviewed [Y/N]:

## 2021-01-03 LAB
ANION GAP SERPL CALC-SCNC: 9 MMOL/L — SIGNIFICANT CHANGE UP (ref 5–17)
BUN SERPL-MCNC: 17 MG/DL — SIGNIFICANT CHANGE UP (ref 8–20)
CALCIUM SERPL-MCNC: 8.7 MG/DL — SIGNIFICANT CHANGE UP (ref 8.6–10.2)
CHLORIDE SERPL-SCNC: 103 MMOL/L — SIGNIFICANT CHANGE UP (ref 98–107)
CO2 SERPL-SCNC: 28 MMOL/L — SIGNIFICANT CHANGE UP (ref 22–29)
CREAT SERPL-MCNC: 0.45 MG/DL — LOW (ref 0.5–1.3)
CULTURE RESULTS: SIGNIFICANT CHANGE UP
CULTURE RESULTS: SIGNIFICANT CHANGE UP
GLUCOSE SERPL-MCNC: 103 MG/DL — HIGH (ref 70–99)
HCT VFR BLD CALC: 33.4 % — LOW (ref 39–50)
HGB BLD-MCNC: 10.7 G/DL — LOW (ref 13–17)
MCHC RBC-ENTMCNC: 30.1 PG — SIGNIFICANT CHANGE UP (ref 27–34)
MCHC RBC-ENTMCNC: 32 GM/DL — SIGNIFICANT CHANGE UP (ref 32–36)
MCV RBC AUTO: 93.8 FL — SIGNIFICANT CHANGE UP (ref 80–100)
PLATELET # BLD AUTO: 314 K/UL — SIGNIFICANT CHANGE UP (ref 150–400)
POTASSIUM SERPL-MCNC: 3.8 MMOL/L — SIGNIFICANT CHANGE UP (ref 3.5–5.3)
POTASSIUM SERPL-SCNC: 3.8 MMOL/L — SIGNIFICANT CHANGE UP (ref 3.5–5.3)
RBC # BLD: 3.56 M/UL — LOW (ref 4.2–5.8)
RBC # FLD: 14.1 % — SIGNIFICANT CHANGE UP (ref 10.3–14.5)
SODIUM SERPL-SCNC: 140 MMOL/L — SIGNIFICANT CHANGE UP (ref 135–145)
SPECIMEN SOURCE: SIGNIFICANT CHANGE UP
SPECIMEN SOURCE: SIGNIFICANT CHANGE UP
WBC # BLD: 6.45 K/UL — SIGNIFICANT CHANGE UP (ref 3.8–10.5)
WBC # FLD AUTO: 6.45 K/UL — SIGNIFICANT CHANGE UP (ref 3.8–10.5)

## 2021-01-03 PROCEDURE — 99233 SBSQ HOSP IP/OBS HIGH 50: CPT

## 2021-01-03 RX ADMIN — Medication 100 MILLIGRAM(S): at 23:20

## 2021-01-03 RX ADMIN — Medication 25 MILLIGRAM(S): at 05:03

## 2021-01-03 RX ADMIN — GABAPENTIN 400 MILLIGRAM(S): 400 CAPSULE ORAL at 20:14

## 2021-01-03 RX ADMIN — Medication 100 MILLIGRAM(S): at 00:07

## 2021-01-03 RX ADMIN — Medication 100 MILLIGRAM(S): at 12:32

## 2021-01-03 RX ADMIN — TAMSULOSIN HYDROCHLORIDE 0.4 MILLIGRAM(S): 0.4 CAPSULE ORAL at 20:14

## 2021-01-03 RX ADMIN — Medication 650 MILLIGRAM(S): at 05:24

## 2021-01-03 RX ADMIN — MEROPENEM 100 MILLIGRAM(S): 1 INJECTION INTRAVENOUS at 05:03

## 2021-01-03 RX ADMIN — Medication 1 TABLET(S): at 12:31

## 2021-01-03 RX ADMIN — OXYCODONE AND ACETAMINOPHEN 2 TABLET(S): 5; 325 TABLET ORAL at 17:16

## 2021-01-03 RX ADMIN — ENOXAPARIN SODIUM 40 MILLIGRAM(S): 100 INJECTION SUBCUTANEOUS at 12:33

## 2021-01-03 RX ADMIN — GABAPENTIN 400 MILLIGRAM(S): 400 CAPSULE ORAL at 14:09

## 2021-01-03 RX ADMIN — Medication 1 TABLET(S): at 08:32

## 2021-01-03 RX ADMIN — Medication 0.5 MILLIGRAM(S): at 10:22

## 2021-01-03 RX ADMIN — OXYCODONE AND ACETAMINOPHEN 2 TABLET(S): 5; 325 TABLET ORAL at 01:07

## 2021-01-03 RX ADMIN — MEROPENEM 100 MILLIGRAM(S): 1 INJECTION INTRAVENOUS at 22:14

## 2021-01-03 RX ADMIN — OXYCODONE AND ACETAMINOPHEN 2 TABLET(S): 5; 325 TABLET ORAL at 08:31

## 2021-01-03 RX ADMIN — GABAPENTIN 400 MILLIGRAM(S): 400 CAPSULE ORAL at 05:02

## 2021-01-03 RX ADMIN — Medication 100 MILLIGRAM(S): at 17:10

## 2021-01-03 RX ADMIN — Medication 25 MILLIGRAM(S): at 17:15

## 2021-01-03 RX ADMIN — Medication 81 MILLIGRAM(S): at 12:32

## 2021-01-03 RX ADMIN — MEROPENEM 100 MILLIGRAM(S): 1 INJECTION INTRAVENOUS at 14:09

## 2021-01-03 RX ADMIN — Medication 0.5 MILLIGRAM(S): at 20:14

## 2021-01-03 RX ADMIN — Medication 100 MILLIGRAM(S): at 05:03

## 2021-01-03 RX ADMIN — OXYCODONE AND ACETAMINOPHEN 2 TABLET(S): 5; 325 TABLET ORAL at 16:16

## 2021-01-03 RX ADMIN — BUDESONIDE AND FORMOTEROL FUMARATE DIHYDRATE 2 PUFF(S): 160; 4.5 AEROSOL RESPIRATORY (INHALATION) at 08:08

## 2021-01-03 RX ADMIN — ATORVASTATIN CALCIUM 40 MILLIGRAM(S): 80 TABLET, FILM COATED ORAL at 20:14

## 2021-01-03 RX ADMIN — Medication 1 TABLET(S): at 17:10

## 2021-01-03 RX ADMIN — Medication 20 MILLIGRAM(S): at 05:03

## 2021-01-03 RX ADMIN — Medication 2000 UNIT(S): at 12:32

## 2021-01-03 RX ADMIN — BUDESONIDE AND FORMOTEROL FUMARATE DIHYDRATE 2 PUFF(S): 160; 4.5 AEROSOL RESPIRATORY (INHALATION) at 21:19

## 2021-01-03 RX ADMIN — OXYCODONE AND ACETAMINOPHEN 2 TABLET(S): 5; 325 TABLET ORAL at 10:00

## 2021-01-03 RX ADMIN — OXYCODONE AND ACETAMINOPHEN 2 TABLET(S): 5; 325 TABLET ORAL at 00:07

## 2021-01-03 NOTE — PROGRESS NOTE ADULT - SUBJECTIVE AND OBJECTIVE BOX
Patient is a 75y old  Male who presents with a chief complaint of pneumonia / chest pain (02 Jan 2021 11:56)  Pt seen ad exam. stable breathing. mild cough  no fever, chill    SUBJECTIVE / OVERNIGHT EVENTS:  REVIEW OF SYSTEMS: All systems are reviewed and found to be negative except above    MEDICATIONS  (STANDING):  ALBUTerol    90 MICROgram(s) HFA Inhaler 1 Puff(s) Inhalation every 4 hours  aspirin enteric coated 81 milliGRAM(s) Oral daily  atorvastatin 40 milliGRAM(s) Oral at bedtime  budesonide 160 MICROgram(s)/formoterol 4.5 MICROgram(s) Inhaler 2 Puff(s) Inhalation two times a day  cholecalciferol 2000 Unit(s) Oral daily  enoxaparin Injectable 40 milliGRAM(s) SubCutaneous daily  gabapentin 400 milliGRAM(s) Oral three times a day  guaiFENesin   Syrup  (Sugar-Free) 100 milliGRAM(s) Oral every 6 hours  lactobacillus acidophilus 1 Tablet(s) Oral three times a day with meals  meropenem  IVPB 1000 milliGRAM(s) IV Intermittent every 8 hours  metoprolol tartrate 25 milliGRAM(s) Oral two times a day  predniSONE   Tablet   Oral   predniSONE   Tablet 20 milliGRAM(s) Oral daily  tamsulosin 0.4 milliGRAM(s) Oral at bedtime  tiotropium 18 MICROgram(s) Capsule 1 Capsule(s) Inhalation daily    MEDICATIONS  (PRN):  acetaminophen   Tablet .. 650 milliGRAM(s) Oral every 6 hours PRN Moderate Pain (4 - 6)  ALPRAZolam 0.5 milliGRAM(s) Oral every 6 hours PRN Agitation  nitroglycerin     SubLingual 0.4 milliGRAM(s) SubLingual every 5 minutes PRN Chest Pain  oxycodone    5 mG/acetaminophen 325 mG 2 Tablet(s) Oral every 8 hours PRN Severe Pain (7 - 10)  polyethylene glycol 3350 17 Gram(s) Oral daily PRN Constipation      CAPILLARY BLOOD GLUCOSE        I&O's Summary    02 Jan 2021 07:01  -  03 Jan 2021 07:00  --------------------------------------------------------  IN: 0 mL / OUT: 700 mL / NET: -700 mL        PHYSICAL EXAM:  Vital Signs Last 24 Hrs  T(C): 36.7 (03 Jan 2021 08:27), Max: 37.2 (03 Jan 2021 04:24)  T(F): 98 (03 Jan 2021 08:27), Max: 98.9 (03 Jan 2021 04:24)  HR: 91 (03 Jan 2021 08:27) (79 - 97)  BP: 102/67 (03 Jan 2021 08:27) (102/67 - 150/84)  BP(mean): 78 (03 Jan 2021 08:27) (78 - 106)  RR: 17 (03 Jan 2021 08:27) (16 - 18)  SpO2: 97% (03 Jan 2021 08:27) (93% - 97%)    CONSTITUTIONAL: NAD,  EYES: PERRLA; conjunctiva and sclera clear  ENMT: Moist oral mucosa,  RESPIRATORY: Normal respiratory effort; lungs are clear to auscultation bilaterally  CARDIOVASCULAR: Regular rate and rhythm, normal S1 and S2, no murmur   EXTS: No lower extremity edema; Peripheral pulses are 2+ bilaterally  ABDOMEN: Nontender to palpation, normoactive bowel sounds, no rebound/guarding;   MUSCLOSKELETAL:   no clubbing or cyanosis of digits; no joint swelling or tenderness to palpation  PSYCH: affect appropriate  NEUROLOGY: A+O to person, place, and time; CN 2-12 are intact and symmetric; no gross sensory deficits;     LABS:                        10.7   6.45  )-----------( 314      ( 03 Jan 2021 06:37 )             33.4     01-03    140  |  103  |  17.0  ----------------------------<  103<H>  3.8   |  28.0  |  0.45<L>    Ca    8.7      03 Jan 2021 06:37                  RADIOLOGY & ADDITIONAL TESTS:  Results Reviewed:   Imaging Personally Reviewed:  Electrocardiogram Personally Reviewed:    COORDINATION OF CARE:  Care Discussed with Consultants/Other Providers [Y/N]:  Prior or Outpatient Records Reviewed [Y/N]:

## 2021-01-04 ENCOUNTER — TRANSCRIPTION ENCOUNTER (OUTPATIENT)
Age: 76
End: 2021-01-04

## 2021-01-04 LAB
ANION GAP SERPL CALC-SCNC: 11 MMOL/L — SIGNIFICANT CHANGE UP (ref 5–17)
BUN SERPL-MCNC: 19 MG/DL — SIGNIFICANT CHANGE UP (ref 8–20)
CALCIUM SERPL-MCNC: 8.9 MG/DL — SIGNIFICANT CHANGE UP (ref 8.6–10.2)
CHLORIDE SERPL-SCNC: 103 MMOL/L — SIGNIFICANT CHANGE UP (ref 98–107)
CO2 SERPL-SCNC: 28 MMOL/L — SIGNIFICANT CHANGE UP (ref 22–29)
CREAT SERPL-MCNC: 0.49 MG/DL — LOW (ref 0.5–1.3)
GLUCOSE SERPL-MCNC: 111 MG/DL — HIGH (ref 70–99)
POTASSIUM SERPL-MCNC: 4.5 MMOL/L — SIGNIFICANT CHANGE UP (ref 3.5–5.3)
POTASSIUM SERPL-SCNC: 4.5 MMOL/L — SIGNIFICANT CHANGE UP (ref 3.5–5.3)
SODIUM SERPL-SCNC: 142 MMOL/L — SIGNIFICANT CHANGE UP (ref 135–145)

## 2021-01-04 PROCEDURE — 99233 SBSQ HOSP IP/OBS HIGH 50: CPT

## 2021-01-04 PROCEDURE — 71275 CT ANGIOGRAPHY CHEST: CPT | Mod: 26

## 2021-01-04 PROCEDURE — 74230 X-RAY XM SWLNG FUNCJ C+: CPT | Mod: 26

## 2021-01-04 PROCEDURE — ZZZZZ: CPT

## 2021-01-04 PROCEDURE — 99232 SBSQ HOSP IP/OBS MODERATE 35: CPT

## 2021-01-04 RX ORDER — LACTOBACILLUS ACIDOPHILUS 100MM CELL
2 CAPSULE ORAL
Qty: 30 | Refills: 0
Start: 2021-01-04 | End: 2021-01-18

## 2021-01-04 RX ORDER — MEROPENEM 1 G/30ML
1000 INJECTION INTRAVENOUS
Qty: 0 | Refills: 0 | DISCHARGE
Start: 2021-01-04

## 2021-01-04 RX ORDER — MEROPENEM 1 G/30ML
1000 INJECTION INTRAVENOUS EVERY 8 HOURS
Refills: 0 | Status: DISCONTINUED | OUTPATIENT
Start: 2021-01-04 | End: 2021-01-05

## 2021-01-04 RX ORDER — MEROPENEM 1 G/30ML
1000 INJECTION INTRAVENOUS
Qty: 12000 | Refills: 0
Start: 2021-01-04 | End: 2021-01-07

## 2021-01-04 RX ADMIN — TAMSULOSIN HYDROCHLORIDE 0.4 MILLIGRAM(S): 0.4 CAPSULE ORAL at 21:23

## 2021-01-04 RX ADMIN — BUDESONIDE AND FORMOTEROL FUMARATE DIHYDRATE 2 PUFF(S): 160; 4.5 AEROSOL RESPIRATORY (INHALATION) at 21:33

## 2021-01-04 RX ADMIN — Medication 25 MILLIGRAM(S): at 18:15

## 2021-01-04 RX ADMIN — ENOXAPARIN SODIUM 40 MILLIGRAM(S): 100 INJECTION SUBCUTANEOUS at 15:20

## 2021-01-04 RX ADMIN — OXYCODONE AND ACETAMINOPHEN 2 TABLET(S): 5; 325 TABLET ORAL at 00:29

## 2021-01-04 RX ADMIN — Medication 100 MILLIGRAM(S): at 05:09

## 2021-01-04 RX ADMIN — MEROPENEM 100 MILLIGRAM(S): 1 INJECTION INTRAVENOUS at 05:10

## 2021-01-04 RX ADMIN — OXYCODONE AND ACETAMINOPHEN 2 TABLET(S): 5; 325 TABLET ORAL at 09:05

## 2021-01-04 RX ADMIN — OXYCODONE AND ACETAMINOPHEN 2 TABLET(S): 5; 325 TABLET ORAL at 01:29

## 2021-01-04 RX ADMIN — Medication 0.5 MILLIGRAM(S): at 04:11

## 2021-01-04 RX ADMIN — OXYCODONE AND ACETAMINOPHEN 2 TABLET(S): 5; 325 TABLET ORAL at 18:16

## 2021-01-04 RX ADMIN — Medication 1 TABLET(S): at 18:15

## 2021-01-04 RX ADMIN — Medication 0.5 MILLIGRAM(S): at 21:23

## 2021-01-04 RX ADMIN — GABAPENTIN 400 MILLIGRAM(S): 400 CAPSULE ORAL at 05:09

## 2021-01-04 RX ADMIN — Medication 0.5 MILLIGRAM(S): at 11:37

## 2021-01-04 RX ADMIN — Medication 100 MILLIGRAM(S): at 23:00

## 2021-01-04 RX ADMIN — ATORVASTATIN CALCIUM 40 MILLIGRAM(S): 80 TABLET, FILM COATED ORAL at 21:23

## 2021-01-04 RX ADMIN — MEROPENEM 100 MILLIGRAM(S): 1 INJECTION INTRAVENOUS at 21:25

## 2021-01-04 RX ADMIN — Medication 20 MILLIGRAM(S): at 05:09

## 2021-01-04 RX ADMIN — Medication 100 MILLIGRAM(S): at 18:16

## 2021-01-04 RX ADMIN — OXYCODONE AND ACETAMINOPHEN 2 TABLET(S): 5; 325 TABLET ORAL at 11:30

## 2021-01-04 RX ADMIN — BUDESONIDE AND FORMOTEROL FUMARATE DIHYDRATE 2 PUFF(S): 160; 4.5 AEROSOL RESPIRATORY (INHALATION) at 08:30

## 2021-01-04 RX ADMIN — GABAPENTIN 400 MILLIGRAM(S): 400 CAPSULE ORAL at 21:23

## 2021-01-04 RX ADMIN — GABAPENTIN 400 MILLIGRAM(S): 400 CAPSULE ORAL at 15:21

## 2021-01-04 RX ADMIN — Medication 25 MILLIGRAM(S): at 05:10

## 2021-01-04 RX ADMIN — MEROPENEM 100 MILLIGRAM(S): 1 INJECTION INTRAVENOUS at 15:21

## 2021-01-04 RX ADMIN — Medication 81 MILLIGRAM(S): at 15:20

## 2021-01-04 NOTE — SWALLOW VFSS/MBS ASSESSMENT ADULT - RECOMMENDED CONSISTENCY
Regular solids WITH CHIN TUCK POSTURE   Thin liquids WITH HEAD NEUTRAL POSITION  100% supervision for all meals to ensure appropriate posture use   Meds whole in liquid or puree  Upright for all meals, >30 minutes following  STRICT aspiration precautions, monitor pulmonary function closely   Small bites/sips, slow rate, alternate solids & liquids  ENSURE CHIN TUCK WITH SOLIDS & HEAD NEUTRAL WITH LIQUIDS  SLP to follow for dysphagia tx

## 2021-01-04 NOTE — SWALLOW VFSS/MBS ASSESSMENT ADULT - DIAGNOSTIC IMPRESSIONS
Pt presents w/moderate pharyngeal dysphagia. Oral stage overall functional, despite intermittent premature pharyngeal entry between the regina & hypo pharynx, across all trials.      Pharyngeal phase of swallow marked by reduced pharyngeal squeeze & UES opening, resultant min-mod vallecular/PPW/pyriform sinus residue following all semi-solid & solid PO. Reduced to min w/chin tuck posture & subsequent swallow. Pt w/reduced epiglottic deflection & upper/lower airway protection, w/consistent penetration above the level of the vocal cords, following puree, mech soft, soft & hard solids, without sensation, or clearance. Chin tuck posture serves as adequate airway protection for SOLID PO ONLY, w/no further penetration observed w/use of strategy.    Additional penetration of thin liquids x 1 only, observed in head neutral position. Postural strategies not beneficial, w/resultant +aspiration (w/sensation), of thin liquids, w/use of L head turn posture. Pt does not aspirate thin liquids in head neutral position, w/small & single sips.

## 2021-01-04 NOTE — PROGRESS NOTE ADULT - REASON FOR ADMISSION
pneumonia / chest pain

## 2021-01-04 NOTE — SWALLOW VFSS/MBS ASSESSMENT ADULT - UNSUCCESSFUL STRATEGIES TRIALED DURING PROCEDURE
continued penetration/chin tuck/head turn to the right/head turn to the left chin tuck/head turn to the right/head turn to the left

## 2021-01-04 NOTE — DISCHARGE NOTE PROVIDER - NSDCFUSCHEDAPPT_GEN_ALL_CORE_FT
JENNIFER MOORE ; 01/08/2021 ; NPP [facility] [address] [facility]  JENNIFER MOORE ; 01/14/2021 ; NPP [facility] [address]

## 2021-01-04 NOTE — DISCHARGE NOTE PROVIDER - NSDCCPCAREPLAN_GEN_ALL_CORE_FT
PRINCIPAL DISCHARGE DIAGNOSIS  Diagnosis: Pneumonia  Assessment and Plan of Treatment:       SECONDARY DISCHARGE DIAGNOSES  Diagnosis: Chronic obstructive pulmonary disease, unspecified COPD type  Assessment and Plan of Treatment: Chronic obstructive pulmonary disease, unspecified COPD type     PRINCIPAL DISCHARGE DIAGNOSIS  Diagnosis: Pneumonia  Assessment and Plan of Treatment:       SECONDARY DISCHARGE DIAGNOSES  Diagnosis: Lung nodules  Assessment and Plan of Treatment:     Diagnosis: CAD (coronary artery disease)  Assessment and Plan of Treatment:     Diagnosis: Chronic obstructive pulmonary disease, unspecified COPD type  Assessment and Plan of Treatment: Chronic obstructive pulmonary disease, unspecified COPD type

## 2021-01-04 NOTE — SWALLOW VFSS/MBS ASSESSMENT ADULT - SLP GENERAL OBSERVATIONS
Pt recd awake & upright in stretcher in radiology, A&A Ox4, pleasant/cooperative, tolerating RA SpO2 93-95% throughout, 0/10 pain, Dr. Barajas reports ok Pt to complete study despite s/p CT angio of chest to r/o PE

## 2021-01-04 NOTE — PROGRESS NOTE ADULT - SUBJECTIVE AND OBJECTIVE BOX
Patient is a 75y old  Male who presents with a chief complaint of pneumonia / chest pain (03 Jan 2021 11:48)    No sob, cp  feeling better  so2 remained 94-95% RA ambulation. mild tachycardia  SUBJECTIVE / OVERNIGHT EVENTS:  REVIEW OF SYSTEMS: All systems are reviewed and found to be negative except above    MEDICATIONS  (STANDING):  ALBUTerol    90 MICROgram(s) HFA Inhaler 1 Puff(s) Inhalation every 4 hours  aspirin enteric coated 81 milliGRAM(s) Oral daily  atorvastatin 40 milliGRAM(s) Oral at bedtime  budesonide 160 MICROgram(s)/formoterol 4.5 MICROgram(s) Inhaler 2 Puff(s) Inhalation two times a day  cholecalciferol 2000 Unit(s) Oral daily  enoxaparin Injectable 40 milliGRAM(s) SubCutaneous daily  gabapentin 400 milliGRAM(s) Oral three times a day  guaiFENesin   Syrup  (Sugar-Free) 100 milliGRAM(s) Oral every 6 hours  lactobacillus acidophilus 1 Tablet(s) Oral three times a day with meals  meropenem  IVPB 1000 milliGRAM(s) IV Intermittent every 8 hours  metoprolol tartrate 25 milliGRAM(s) Oral two times a day  predniSONE   Tablet 20 milliGRAM(s) Oral daily  predniSONE   Tablet   Oral   tamsulosin 0.4 milliGRAM(s) Oral at bedtime  tiotropium 18 MICROgram(s) Capsule 1 Capsule(s) Inhalation daily    MEDICATIONS  (PRN):  acetaminophen   Tablet .. 650 milliGRAM(s) Oral every 6 hours PRN Moderate Pain (4 - 6)  ALPRAZolam 0.5 milliGRAM(s) Oral every 6 hours PRN Agitation  nitroglycerin     SubLingual 0.4 milliGRAM(s) SubLingual every 5 minutes PRN Chest Pain  oxycodone    5 mG/acetaminophen 325 mG 2 Tablet(s) Oral every 8 hours PRN Severe Pain (7 - 10)  polyethylene glycol 3350 17 Gram(s) Oral daily PRN Constipation      CAPILLARY BLOOD GLUCOSE        I&O's Summary    03 Jan 2021 07:01  -  04 Jan 2021 07:00  --------------------------------------------------------  IN: 100 mL / OUT: 700 mL / NET: -600 mL        PHYSICAL EXAM:  Vital Signs Last 24 Hrs  T(C): 36.8 (04 Jan 2021 10:39), Max: 36.8 (03 Jan 2021 20:00)  T(F): 98.3 (04 Jan 2021 10:39), Max: 98.3 (03 Jan 2021 20:00)  HR: 108 (04 Jan 2021 10:39) (80 - 108)  BP: 104/70 (04 Jan 2021 10:39) (104/70 - 142/80)  BP(mean): 101 (03 Jan 2021 12:01) (101 - 101)  RR: 18 (04 Jan 2021 10:39) (17 - 18)  SpO2: 97% (04 Jan 2021 10:39) (94% - 97%)    CONSTITUTIONAL: NAD, well-developed,  EYES: PERRLA; conjunctiva and sclera clear  ENMT: Moist oral mucosa,   RESPIRATORY: Normal respiratory effort; lungs are clear to auscultation bilaterally  CARDIOVASCULAR: Regular rate and rhythm, normal S1 and S2, no murmur   EXTS: No lower extremity edema; Peripheral pulses are 2+ bilaterally  ABDOMEN: Nontender to palpation, normoactive bowel sounds, no rebound/guarding;   MUSCLOSKELETAL:   no clubbing or cyanosis of digits; no joint swelling or tenderness to palpation  PSYCH: affect appropriate  NEUROLOGY: A+O to person, place, and time; CN 2-12 are intact and symmetric; no gross sensory deficits;       LABS:                        10.7   6.45  )-----------( 314      ( 03 Jan 2021 06:37 )             33.4     01-04    142  |  103  |  19.0  ----------------------------<  111<H>  4.5   |  28.0  |  0.49<L>    Ca    8.9      04 Jan 2021 07:52                  RADIOLOGY & ADDITIONAL TESTS:  Results Reviewed:   Imaging Personally Reviewed:  Electrocardiogram Personally Reviewed:    COORDINATION OF CARE:  Care Discussed with Consultants/Other Providers [Y/N]:  Prior or Outpatient Records Reviewed [Y/N]:

## 2021-01-04 NOTE — SWALLOW VFSS/MBS ASSESSMENT ADULT - RECOMMENDED FEEDING/EATING TECHNIQUES
CHIN TUCK W/SOLIDS; HEAD NEUTRAL W/LIQUIDS/allow for swallow between intakes/alternate food with liquid/hard swallow w/ each bite or sip/maintain upright posture during/after eating for 30 mins/oral hygiene/position upright (90 degrees)/provide rest periods between swallows/small sips/bites/tuck chin

## 2021-01-04 NOTE — SWALLOW VFSS/MBS ASSESSMENT ADULT - ROSENBEK'S PENETRATION ASPIRATION SCALE
(3) contrast remains above the vocal cords, visible residue remains (penetration) (w/L head turn); 3= contrast remains above the vocal cords, visible residue remains (penetration): thins in head neutral/(7) contrast passes glottis, visible subglottic residue remains despite patient’s response (aspiration)

## 2021-01-04 NOTE — SWALLOW VFSS/MBS ASSESSMENT ADULT - SLP PERTINENT HISTORY OF CURRENT PROBLEM
As per MD note, "75 year old male with hx of CAD , HTN , CABG , recent PNA who got admitted with recurrent PNA . seen by ID , sputum culture in process. during the course of hospital stay patient developed afib , being followed by cardiology as well. but as per cardio no afib seen".

## 2021-01-04 NOTE — SWALLOW VFSS/MBS ASSESSMENT ADULT - ORAL PHASE
Uncontrolled bolus / spillover in regina-pharynx Uncontrolled bolus / spillover in regina-pharynx/Uncontrolled bolus / spillover in hypopharynx

## 2021-01-04 NOTE — PROGRESS NOTE ADULT - PROVIDER SPECIALTY LIST ADULT
Infectious Disease
Cardiology
Cardiology
Hospitalist
Infectious Disease
Hospitalist
Infectious Disease
Infectious Disease
Pulmonology

## 2021-01-04 NOTE — DISCHARGE NOTE PROVIDER - HOSPITAL COURSE
75 year old male with hx of CAD , HTN , CABG , recent PNA who got admitted with recurrent PNA . seen by ID , sputum culture in process. during the course of hospital stay patient developed afib , being followed by cardiology as well. but as per cardio no afib seen     # Recurrent PNA likely GNR  unclear etiology . S/P ZITHOMAX, ON MERREN, PLAN FOR MIDLINE TOMORROW. SWALLOW Study tomorrow to r/o aspiration. passed bedside swallow eval  was recently treated   remains on broad spectrum antibiotics  zithromax, merrem. taper steroid   ID following,PER ID MIDLINE placed. dc with iv merrem per id rec  sputum culture showed normal virgilio   seen by speech no concern for aspiration , modified barium study stable  no need for bronch as per pulmonary     # ? New onset AFIB   NST  AS PT has persistent on/off tachycardia with recent hospitalization  CTAchest done ruled out PE  seen by cardio , no afib on strips   dc weight based lovenox    hx lung nodule  cta has lung nodule. pt and wife aware.following pulm out pt  rec f/u out pt      # COPD exacerbation  seen by pulmonary   started on steroid tapered.    duoneb as needed     # Chest pain likely   costochondritis  symptomatic management       # CAD   ASA,STATIN, BB    # Mild AS   follow up outpatient     # abnormal TFT   discussed with endocrine   probably sick euthyroid syndrome   will need to be rechecked in 6 weeks 75 year old male with hx of CAD , HTN , CABG , recent PNA who got admitted with recurrent PNA . seen by ID , sputum culture in process. during the course of hospital stay patient developed afib , being followed by cardiology as well. but as per cardio no afib seen     # Recurrent PNA likely GNR  unclear etiology . S/P ZITHOMAX, ON MERREN, PLAN FOR MIDLINE TOMORROW. SWALLOW Study tomorrow to r/o aspiration. passed bedside swallow eval  was recently treated   remains on broad spectrum antibiotics  zithromax, merrem. taper steroid   ID following,PER ID MIDLINE placed. dc with iv merrem per id rec  sputum culture showed normal virgilio   seen by speech no concern for aspiration , modified barium study stable  no need for bronch as per pulmonary     # ? New onset AFIB   NST  AS PT has persistent on/off tachycardia with recent hospitalization  CTAchest done ruled out PE  seen by cardio , no afib on strips   dc weight based lovenox    hx lung nodule  cta has lung nodule. pt and wife aware.following pulm out pt  rec f/u out pt      # COPD exacerbation  seen by pulmonary   started on steroid tapered.    duoneb as needed     # Chest pain likely   costochondritis  symptomatic management       # CAD   ASA,STATIN, BB    # Mild AS   follow up outpatient     # abnormal TFT   discussed with endocrine   probably sick euthyroid syndrome   will need to be rechecked in 6 weeks      T(C): 36.7 (01-05-21 @ 04:52), Max: 36.8 (01-04-21 @ 10:39)  HR: 91 (01-05-21 @ 04:52) (78 - 116)  BP: 113/72 (01-05-21 @ 04:52) (104/70 - 126/71)  RR: 18 (01-05-21 @ 04:52) (18 - 19)  SpO2: 95%    GEN - NAD  HEENT - NCAT, EOMI, NIKOLAI,   RESP - CTA BL, no wheeze/stridor/rhonchi/crackles. not on supplemental O2.  CARDIO - NS1S2, RRR. No murmurs/rubs/gallops.  ABD - Soft/Non tender/Non distended. Normal BS x4 quadrants.   Ext - No ANJEL. no signs of venous/arterial stasis ulcers  MSK - full ROM of BL upper and lower extremities without pain or restriction. BL 5/5 strength on upper and lower extremities.   Neuro - cn 2-12 grossly intact.  no visible seizure activity appreciated. no tremor. gait not observed.   Skin - clean, dry, intact.   Psych- AAOx3. appropriate behaviour. attentive. normal affect.    Discharge home  Time spent >35 min 75 year old male with hx of CAD , HTN , CABG , recent PNA who got admitted with recurrent PNA . seen by ID , sputum culture in process. during the course of hospital stay patient developed afib , being followed by cardiology as well. but as per cardio no afib seen     # Recurrent PNA likely GNR  unclear etiology . S/P ZITHOMAX, ON MERREN, PLAN FOR MIDLINE TOMORROW. SWALLOW Study tomorrow to r/o aspiration. passed bedside swallow eval  was recently treated   remains on broad spectrum antibiotics  zithromax, merrem. taper steroid   ID following,PER ID MIDLINE placed. dc with iv merrem per id rec  sputum culture showed normal virgilio   seen by speech no concern for aspiration , modified barium study stable  no need for bronch as per pulmonary     # ? New onset AFIB   NST  AS PT has persistent on/off tachycardia with recent hospitalization  CTAchest done ruled out PE  seen by cardio , no afib on strips   dc weight based lovenox    hx lung nodule  cta has lung nodule. pt and wife aware.following pulm out pt  rec f/u out pt      # COPD exacerbation  seen by pulmonary   started on steroid tapered.    duoneb as needed     # Chest pain likely   costochondritis  symptomatic management       # CAD   ASA,STATIN, BB    # Mild AS   follow up outpatient     # abnormal TFT   discussed with endocrine   probably sick euthyroid syndrome   will need to be rechecked in 6 weeks      T(C): 36.7 (01-05-21 @ 04:52), Max: 36.8 (01-04-21 @ 10:39)  HR: 91 (01-05-21 @ 04:52) (78 - 116)  BP: 113/72 (01-05-21 @ 04:52) (104/70 - 126/71)  RR: 18 (01-05-21 @ 04:52) (18 - 19)  SpO2: 95-96% at rest RA  Pt ambulates with me so2 92% RA, HR 98    GEN - NAD  HEENT - NCAT, EOMI, NIKOLAI,   RESP - CTA BL, no wheeze/stridor/rhonchi/crackles. not on supplemental O2.  CARDIO - NS1S2, RRR. No murmurs/rubs/gallops.  ABD - Soft/Non tender/Non distended. Normal BS x4 quadrants.   Ext - No ANJEL. no signs of venous/arterial stasis ulcers  MSK - full ROM of BL upper and lower extremities without pain or restriction. BL 5/5 strength on upper and lower extremities.   Neuro - cn 2-12 grossly intact.  no visible seizure activity appreciated. no tremor. gait not observed.   Skin - clean, dry, intact.   Psych- AAOx3. appropriate behaviour. attentive. normal affect.    Discharge home  Time spent >35 min

## 2021-01-04 NOTE — PROGRESS NOTE ADULT - ASSESSMENT
# Recurrent PNA   unclear etiology   was recently treated   remains on broad spectrum antibiotics   ID following   will follow inflammatory markers   follow sputum culture   might need bronch ?    # COPD  seen by pulmonary   started on steroid taper again   duoneb     # Chest pain   costochondritis  symptomatic management   dc tele   cardio signed off     # CAD   on home regimen     # Mild AS   follow up outpatient     # DVT prophylaxis  lovenox     wife called and updated    
75 year old male with hx of CAD , HTN , CABG , recent PNA who got admitted with recurrent PNA . seen by ID , sputum culture in process. during the course of hospital stay patient developed afib , being followed by cardiology as well.     # Recurrent PNA   unclear etiology   was recently treated   remains on broad spectrum antibiotics   ID following   will follow inflammatory markers   follow sputum culture , showing gram positive cocci   will involve speech to do swallow evaluation   no need for bronch as per pulmonary     # New onset AFIB   could be due to PNA   high CHADSVASC , started on lovenox   cardio to see again , will follow recs   currently in sinus rhythm , monitor on tele     # COPD  seen by pulmonary   started on steroid taper again   duoneb as needed     # Chest pain   costochondritis  symptomatic management       # CAD   on home regimen     # Mild AS   follow up outpatient     # abnormal TFT   discussed with endocrine   probably sick euthyroid syndrome   will need to be rechecked in 6 weeks     # DVT prophylaxis  lovenox     wife called and updated    
75 year old male with hx of CAD , HTN , CABG , recent PNA who got admitted with recurrent PNA . seen by ID , sputum culture in process. during the course of hospital stay patient developed afib , being followed by cardiology as well. but as per cardio no afib seen     # Recurrent PNA   unclear etiology   was recently treated   remains on broad spectrum antibiotics  zithromax, merrem. taper steroid   ID following   sputum culture showed normal virgilio   seen by speech no concern for aspiration , will do modified barium   no need for bronch as per pulmonary     # New onset AFIB   NSR   seen by cardio , no afib on strips   dc weight based lovenox      # COPD exacerbation  seen by pulmonary   started on steroid taper again   duoneb as needed     # Chest pain likely   costochondritis  symptomatic management       # CAD   ASA,STATIN, BB    # Mild AS   follow up outpatient     # abnormal TFT   discussed with endocrine   probably sick euthyroid syndrome   will need to be rechecked in 6 weeks     # DVT prophylaxis  lovenox     SPOKE WITH PT IN DETAILED ABOVE CARE OF PLAN  
76y/o male with     1- new  infiltrate cough  pneumonia recurrent  2- copd moderate   3-  cad s/p cabg     - continue  antibiotics merrem  azithromycin improved wbc procalcitonin   - swallow   evaluation for ? silent aspiration and upper airway inspection   - induction of sputum sample   -budesonide  -prednisone taper      will follow      MOLINA 
Stage 2 copd without O2 at baseline  New multilobar Pna  Doing well    Plan:  continue BD's  wean pred  per ID  f/u CCT 8 wks
This75 y/o male who was seen at ID office for follow up , was recently admitted at [facility] for pneumonia and mentioned about on and off pressure type chest pain for past 2 -3 days , lasting for a minute and then getting better, no aggravating factor, no radiation , no diaphoresis, no dizziness, no syncopy. some sob with cough, no orthopnea, pt. still has some cough and phlegm, last night had fever of 100.2 at home. no abd. pain. no n/v/d. no sick contact. pt. reports point tenderness on left side of his chest. pt. reports that he is an active person , not on home o2. pt. reports no trouble swallowing. As per pt. he finished his po steroids 3 days ago which he was given on his recent discharge from [facility].  (29 Dec 2020 16:31)    Recent CT scan showed minimal infiltrates prior to discharge:  No pulmonary embolism.  Diffuse bronchial wall thickening, likely reactive airways disease or bronchitis.  Patchy bilateral groundglass opacities, greater in the lower lungs; differential includesmultifocal infection or inflammation including viral pneumonia.  Scattered tree-in-bud opacities, greater in the lower lobes, likely infectious/inflammatory in etiology.  Lung opacities have overall increased since 12/17/2020 with previously seenopacities in the lower lobes and lingula improved/resolved.        New CT with worsened lower lobe infiltrate.   + SOB  + sputum  denies sick contacts.   COVID testing negative here.         IMpression:  SOB  multifocal PNA  WBC elevation  acute febrile illness        Plan:    NOT collected; REORDERED the following:  check sputum cx  check legionella    - follow up all outstanding cultures  - trend temperature and WBC curve  - repeat cultures from blood and all sources if febrile.    continue Antibiotics:  azithromycin   Tablet 250 milliGRAM(s) Oral daily  meropenem  IVPB 1000 milliGRAM(s) IV Intermittent every 8 hours        WBC reactive,  will trend      
This75 y/o male who was seen at ID office for follow up , was recently admitted at [facility] for pneumonia and mentioned about on and off pressure type chest pain for past 2 -3 days , lasting for a minute and then getting better, no aggravating factor, no radiation , no diaphoresis, no dizziness, no syncopy. some sob with cough, no orthopnea, pt. still has some cough and phlegm, last night had fever of 100.2 at home. no abd. pain. no n/v/d. no sick contact. pt. reports point tenderness on left side of his chest. pt. reports that he is an active person , not on home o2. pt. reports no trouble swallowing. As per pt. he finished his po steroids 3 days ago which he was given on his recent discharge from [facility].  (29 Dec 2020 16:31)    Recent CT scan showed minimal infiltrates prior to discharge:  No pulmonary embolism.  Diffuse bronchial wall thickening, likely reactive airways disease or bronchitis.  Patchy bilateral groundglass opacities, greater in the lower lungs; differential includesmultifocal infection or inflammation including viral pneumonia.  Scattered tree-in-bud opacities, greater in the lower lobes, likely infectious/inflammatory in etiology.  Lung opacities have overall increased since 12/17/2020 with previously seenopacities in the lower lobes and lingula improved/resolved.        New CT with worsened lower lobe infiltrate.   + SOB  + sputum  denies sick contacts.   COVID testing negative here.        Impression  SOB  multifocal PNA  WBC elevation  acute febrile illness       Plan:    SPUTUM SENT  LEGIONELLA SENT  CHLAMYDIA PNUEMONIAE SENT    - follow up all outstanding cultures  - trend temperature and WBC curve  - repeat cultures from blood and all sources if febrile.    continue Antibiotics:  azithromycin   Tablet 250 milliGRAM(s) Oral daily  meropenem  IVPB 1000 milliGRAM(s) IV Intermittent every 8 hours    WBC reactive, DOWN GOING,  will trend      
75 year old male with hx of CAD , HTN , CABG , recent PNA who got admitted with recurrent PNA . seen by ID , sputum culture in process. during the course of hospital stay patient developed afib , being followed by cardiology as well. but as per cardio no afib seen     # Recurrent PNA likely GNR  unclear etiology . S/P ZITHOMAX, ON MERREN, PLAN FOR MIDLINE TOMORROW. SWALLOW Study tomorrow to r/o aspiration. passed bedside swallow eval  was recently treated   remains on broad spectrum antibiotics  zithromax, merrem. taper steroid   ID following   sputum culture showed normal virgilio   seen by speech no concern for aspiration , will do modified barium   no need for bronch as per pulmonary     # ? New onset AFIB   NSR   seen by cardio , no afib on strips   dc weight based lovenox      # COPD exacerbation  seen by pulmonary   started on steroid taper again   duoneb as needed     # Chest pain likely   costochondritis  symptomatic management       # CAD   ASA,STATIN, BB    # Mild AS   follow up outpatient     # abnormal TFT   discussed with endocrine   probably sick euthyroid syndrome   will need to be rechecked in 6 weeks     # DVT prophylaxis  lovenox   PT eval- check ambulatory so2, HR  SPOKE WITH PT IN DETAILED ABOVE CARE OF PLAN  dw rn  
75 year old male with hx of CAD , HTN , CABG , recent PNA who got admitted with recurrent PNA . seen by ID , sputum culture in process. during the course of hospital stay patient developed afib , being followed by cardiology as well. but as per cardio no afib seen     # Recurrent PNA   unclear etiology   was recently treated   remains on broad spectrum antibiotics   ID following   will follow inflammatory markers   sputum culture showed normal virgilio   seen by speech no concern for aspiration , will do modified barium   no need for bronch as per pulmonary     # questionable New onset AFIB   seen by cardio , no afib on strips   will dc weight based lovenox      # COPD  seen by pulmonary   started on steroid taper again   duoneb as needed     # Chest pain   costochondritis  symptomatic management       # CAD   on home regimen     # Mild AS   follow up outpatient     # abnormal TFT   discussed with endocrine   probably sick euthyroid syndrome   will need to be rechecked in 6 weeks     # DVT prophylaxis  lovenox     wife called and updated    
75 year old male with hx of CAD , HTN , CABG , recent PNA who got admitted with recurrent PNA . seen by ID , sputum culture in process. during the course of hospital stay patient developed afib , being followed by cardiology as well. but as per cardio no afib seen     # Recurrent PNA likely GNR  unclear etiology . S/P ZITHOMAX, ON MERREN, PLAN FOR MIDLINE TOMORROW. SWALLOW Study tomorrow to r/o aspiration. passed bedside swallow eval  was recently treated   remains on broad spectrum antibiotics  zithromax, merrem. taper steroid   ID following,PER ID MIDLINE FOR home  ABX . f/u rec  sputum culture showed normal virgilio   seen by speech no concern for aspiration , will do modified barium   no need for bronch as per pulmonary     # ? New onset AFIB   NST  AS PT has persistent on/off tachycardia with recent hospitalization will get CTA r/o PE  seen by cardio , no afib on strips   dc weight based lovenox      # COPD exacerbation  seen by pulmonary   started on steroid taper again   duoneb as needed     # Chest pain likely   costochondritis  symptomatic management       # CAD   ASA,STATIN, BB    # Mild AS   follow up outpatient     # abnormal TFT   discussed with endocrine   probably sick euthyroid syndrome   will need to be rechecked in 6 weeks     # DVT prophylaxis  lovenox     SPOKE WITH PT IN DETAILED ABOVE CARE OF PLAN  dw rn  
This75 y/o male who was seen at ID office for follow up , was recently admitted at [facility] for pneumonia and mentioned about on and off pressure type chest pain for past 2 -3 days , lasting for a minute and then getting better, no aggravating factor, no radiation , no diaphoresis, no dizziness, no syncopy. some sob with cough, no orthopnea, pt. still has some cough and phlegm, last night had fever of 100.2 at home. no abd. pain. no n/v/d. no sick contact. pt. reports point tenderness on left side of his chest. pt. reports that he is an active person , not on home o2. pt. reports no trouble swallowing. As per pt. he finished his po steroids 3 days ago which he was given on his recent discharge from [facility].  (29 Dec 2020 16:31)    Recent CT scan showed minimal infiltrates prior to discharge:  No pulmonary embolism.  Diffuse bronchial wall thickening, likely reactive airways disease or bronchitis.  Patchy bilateral groundglass opacities, greater in the lower lungs; differential includesmultifocal infection or inflammation including viral pneumonia.  Scattered tree-in-bud opacities, greater in the lower lobes, likely infectious/inflammatory in etiology.  Lung opacities have overall increased since 12/17/2020 with previously seenopacities in the lower lobes and lingula improved/resolved.        New CT with worsened lower lobe infiltrate.   + SOB  + sputum  denies sick contacts.   COVID testing negative here.        Impression  SOB  multifocal PNA  WBC elevation  acute febrile illness       Plan:    SPUTUM - normal virgilio  LEGIONELLA - negative  CHLAMYDIA PNUEMONIAE SENT, pending    Clinically improving on broad spectrum antibiotics  cultures unrevealing at this time      - follow up all outstanding cultures  - trend temperature and WBC curve  - repeat cultures from blood and all sources if febrile.    to complete 5 days of azithromycin   Tablet 250 milliGRAM(s) Oral daily    Will arrange for a course of meropenem  IVPB 1000 milliGRAM(s) IV Intermittent every 8 hours  on MONDAY after the NEW YEAR's HOLIDAY  will need MIDLINE 1/4/21      WBC normalized now    
Assessment:  Robert Henson is a 75 year old man with past medical history of Coronary artery disease (s/p RCA stent in 2002 and recent CABG x3 vessel; LIMA-LAD, SVG-OM1 & PDA in March 2020), Hypertension, Hyperlipidemia, COPD and recent hospitalizations for pneumonia who presents with dyspnea, cough and episodes of chest pain, overall suggestive of recurrent pneumonia. Patient reports fever of 100F at home, also here with leukocytosis and infiltrates on CT chest all suggestive of recurrent pneumonia. ECG with no new acute ischemic ST/T wave abnormalities, troponin negative and chest pain appears musculoskeletal in etiology which makes acute coronary syndrome less likely. No overt signs of volume overload. SARS, flu and RSV negative. No pulmonary embolism on CT chest.      Recommendations:  [] Chest pain: Has reproducible chest wall pain on physical exam. Likely musculoskeletal in etiology, may be after surgery and due to sternotomy/nerves. Serial troponins negative. Echocardiogram reviewed and normal LV systolic function, mild RV dysfunction (similar to prior), no LV regional wall motion abnormalities.   [] Dyspnea/Cough/Leukocytosis: Concerning for recurrent pneumonia, management per primary team and Infectious Diseases and Pulmonary.   [] CAD, s/p CABG: Stable. Continue home Metoprolol tartrate 25 mg BID, Atorvastatin 40 mg daily & Aspirin 81 mg daily     Thank you for the consult. We will sign off, please re-consult if needed. Has cardiology appointment on 1/14/2020.     Berenice Moncada MD  Cardiology          
This75 y/o male who was seen at ID office for follow up , was recently admitted at [facility] for pneumonia and mentioned about on and off pressure type chest pain for past 2 -3 days , lasting for a minute and then getting better, no aggravating factor, no radiation , no diaphoresis, no dizziness, no syncopy. some sob with cough, no orthopnea, pt. still has some cough and phlegm, last night had fever of 100.2 at home. no abd. pain. no n/v/d. no sick contact. pt. reports point tenderness on left side of his chest. pt. reports that he is an active person , not on home o2. pt. reports no trouble swallowing. As per pt. he finished his po steroids 3 days ago which he was given on his recent discharge from [facility].  (29 Dec 2020 16:31)    Recent CT scan showed minimal infiltrates prior to discharge:  No pulmonary embolism.  Diffuse bronchial wall thickening, likely reactive airways disease or bronchitis.  Patchy bilateral groundglass opacities, greater in the lower lungs; differential includesmultifocal infection or inflammation including viral pneumonia.  Scattered tree-in-bud opacities, greater in the lower lobes, likely infectious/inflammatory in etiology.  Lung opacities have overall increased since 12/17/2020 with previously seenopacities in the lower lobes and lingula improved/resolved.      New CT with worsened lower lobe infiltrate.   + SOB  + sputum  denies sick contacts.   COVID testing negative here.        Impression  SOB  multifocal PNA  WBC elevation  acute febrile illness     Plan:    SPUTUM - normal virgilio  LEGIONELLA - negative  CHLAMYDIA PNUEMONIAE - old infection    to complete 5 days of azithromycin   Tablet 250 milliGRAM(s) Oral daily    clinically better on Merrem  MIDLINE placed    will send home on MERREM 1 gram Q8H thru 1/7/2021  PULL line after last dose.    
74y/o male     74y/o male with     1- new  infiltrate cough  pneumonia recurrent  2- copd moderate   3-  cad s/p cabg     - continue  antibiotics merrem  azithromycin improved wbc procalcitonin   -add symibcort  bid  -spiriva  --prednisone taper  -exercise oximetry prior to discharge      will follow      MOLINA

## 2021-01-04 NOTE — DISCHARGE NOTE PROVIDER - CARE PROVIDER_API CALL
Mari Fonseca)  Cardiovascular Disease; Critical Care Medicine; Internal Medicine  [address] [place] [address]  Phone: [phone]  Fax: [phone]  Follow Up Time: 1 week

## 2021-01-04 NOTE — DISCHARGE NOTE PROVIDER - NSDCMRMEDTOKEN_GEN_ALL_CORE_FT
acetaminophen 325 mg oral tablet: 2 tab(s) orally every 6 hours, As needed, Temp greater or equal to 38C (100.4F), Mild Pain (1 - 3), Moderate Pain (4 - 6)  albuterol 90 mcg/inh inhalation aerosol: 1 puff(s) inhaled 4 times a day, As Needed   Anoro Ellipta 62.5 mcg-25 mcg/inh inhalation powder: 1 puff(s) inhaled once a day  aspirin 81 mg oral tablet, chewable: 1 tab(s) orally once a day MDD:1  atorvastatin 40 mg oral tablet: 1 tab(s) orally once a day (at bedtime)  cholecalciferol oral tablet: 5000 unit(s) orally once a day  Flomax 0.4 mg oral capsule: 1 cap(s) orally once a day  gabapentin 400 mg oral capsule: 1 cap(s) orally 3 times a day  lactobacillus acidophilus oral capsule: 2 tab(s) orally once a day   meropenem 1000 mg intravenous injection: 1000 milligram(s) intravenous every 8 hours  Merrem 1000 mg intravenous injection: 1000 milligram(s) intravenously every 8 hours MDD:thru 1/7/2021;  can pull line after last dose.  metoprolol tartrate 25 mg oral tablet: 1 tab(s) orally 2 times a day  Percocet 10/325 oral tablet: 1 tab(s) orally 3 times a day, As Needed  predniSONE 50 mg oral tablet: 1 tab(s) orally once a day   Pulmicort Flexhaler 90 mcg/inh inhalation powder: 180 microgram(s) inhaled 2 times a day    albuterol 90 mcg/inh inhalation aerosol: 1 puff(s) inhaled every 4 hours  Anoro Ellipta 62.5 mcg-25 mcg/inh inhalation powder: 1 puff(s) inhaled once a day  aspirin 81 mg oral tablet, chewable: 1 tab(s) orally once a day MDD:1  atorvastatin 40 mg oral tablet: 1 tab(s) orally once a day (at bedtime)  budesonide-formoterol 160 mcg-4.5 mcg/inh inhalation aerosol: 1 puff(s) inhaled 2 times a day  cholecalciferol oral tablet: 5000 unit(s) orally once a day  Flomax 0.4 mg oral capsule: 1 cap(s) orally once a day  gabapentin 400 mg oral capsule: 1 cap(s) orally 3 times a day  lactobacillus acidophilus oral capsule: 2 tab(s) orally once a day   meropenem 1000 mg intravenous injection: 1000 milligram(s) intravenous every 8 hours  metoprolol tartrate 25 mg oral tablet: 1 tab(s) orally 2 times a day  Percocet 10/325 oral tablet: 1 tab(s) orally 3 times a day, As Needed  predniSONE 10 mg oral tablet: 3 tab x2 daysthen 2tabx3 day,1 tabx3 day  tiotropium 18 mcg inhalation capsule: 1 cap(s) inhaled once a day

## 2021-01-04 NOTE — PROGRESS NOTE ADULT - SUBJECTIVE AND OBJECTIVE BOX
[facility]  INFECTIOUS DISEASES AND INTERNAL MEDICINE at [place]  =======================================================  Stephen aLla MD  Diplomates American Board of Internal Medicine and Infectious Diseases  Tel  [phone]  Fax [phone]  =======================================================    [id number]  JENNIFER MOORE   follow up:  PNA  SPUTUM SENT FOR CULTURES,  normal doris identified  legionella is Negative  WBC now normalized  BREATHING BETTER      all cultures negative.         =======================================================    REVIEW OF SYSTEMS:  CONSTITUTIONAL:  No Fever or chills  HEENT:  No diplopia or blurred vision.  No earache, sore throat or runny nose.  CARDIOVASCULAR:  No pressure, squeezing, strangling, tightness, heaviness or aching about the chest, neck, axilla or epigastrium.  RESPIRATORY:  as per HPI  GASTROINTESTINAL:  No nausea, vomiting or diarrhea.  GENITOURINARY:  No dysuria, frequency or urgency. No Blood in urine  MUSCULOSKELETAL:  no joint aches, no muscle pain  SKIN:  No change in skin, hair or nails.  NEUROLOGIC:  No Headaches, seizures or weakness.  PSYCHIATRIC:  No disorder of thought or mood.  ENDOCRINE:  No heat or cold intolerance  HEMATOLOGICAL:  No easy bruising or bleeding.    =======================================================  Allergies  codeine (Urticaria)  ibuprofen (Anaphylaxis)     ======================================================  Physical Exam:  ============     General:  No acute distress.  Eye: Pupils are equal, round and reactive to light, Extraocular movements are intact, Normal conjunctiva.  HENT: Normocephalic, Oral mucosa is moist, No pharyngeal erythema, No sinus tenderness.  Neck: Supple, No lymphadenopathy.  Respiratory: Lungs with good air entry.   Cardiovascular: Normal rate, Regular rhythm,   MIDLINE in the left arm  Gastrointestinal: Soft, Non-tender, Non-distended, Normal bowel sounds.  Genitourinary: No costovertebral angle tenderness.  Lymphatics: No lymphadenopathy neck,   Musculoskeletal: Normal range of motion, Normal strength.  Integumentary: No rash.  Neurologic: Alert, Oriented, No focal deficits, Cranial Nerves II-XII are grossly intact.  Psychiatric: Appropriate mood & affect.    =======================================================     Vitals:  ============  T(F): 98.3 (04 Jan 2021 10:39), Max: 98.3 (03 Jan 2021 20:00)  HR: 108 (04 Jan 2021 10:39)  BP: 104/70 (04 Jan 2021 10:39)  RR: 18 (04 Jan 2021 10:39)  SpO2: 97% (04 Jan 2021 10:39) (94% - 97%)  temp max in last 48H T(F): , Max: 98.9 (01-03-21 @ 04:24)    =======================================================  Current Antibiotics:  meropenem  IVPB 1000 milliGRAM(s) IV Intermittent every 8 hours    Other medications:  ALBUTerol    90 MICROgram(s) HFA Inhaler 1 Puff(s) Inhalation every 4 hours  aspirin enteric coated 81 milliGRAM(s) Oral daily  atorvastatin 40 milliGRAM(s) Oral at bedtime  budesonide 160 MICROgram(s)/formoterol 4.5 MICROgram(s) Inhaler 2 Puff(s) Inhalation two times a day  cholecalciferol 2000 Unit(s) Oral daily  enoxaparin Injectable 40 milliGRAM(s) SubCutaneous daily  gabapentin 400 milliGRAM(s) Oral three times a day  guaiFENesin   Syrup  (Sugar-Free) 100 milliGRAM(s) Oral every 6 hours  lactobacillus acidophilus 1 Tablet(s) Oral three times a day with meals  metoprolol tartrate 25 milliGRAM(s) Oral two times a day  predniSONE   Tablet 20 milliGRAM(s) Oral daily  predniSONE   Tablet   Oral   tamsulosin 0.4 milliGRAM(s) Oral at bedtime  tiotropium 18 MICROgram(s) Capsule 1 Capsule(s) Inhalation daily      =======================================================  Labs:                        10.7   6.45  )-----------( 314      ( 03 Jan 2021 06:37 )             33.4      01-04    142  |  103  |  19.0  ----------------------------<  111<H>  4.5   |  28.0  |  0.49<L>    Ca    8.9      04 Jan 2021 07:52        Culture - Sputum (collected 12-30-20 @ 22:04)  Source: .Sputum Sputum  Gram Stain (12-31-20 @ 06:57):    No polymorphonuclear leukocytes per low power field    No Squamous epithelial cells per low power field    Rare Gram Positive Cocci seen per oil power field  Final Report (01-01-21 @ 20:44):    Normal Respiratory Doris present    Culture - Urine (collected 12-29-20 @ 22:01)  Source: .Urine Clean Catch (Midstream)  Final Report (12-30-20 @ 17:34):    No growth    Culture - Blood (collected 12-29-20 @ 12:32)  Source: .Blood Blood-Peripheral  Final Report (01-03-21 @ 13:00):    No growth at 5 days.    Culture - Blood (collected 12-29-20 @ 12:32)  Source: .Blood Blood-Peripheral  Final Report (01-03-21 @ 13:00):    No growth at 5 days.      Creatinine, Serum: 0.49 mg/dL (01-04-21 @ 07:52)  Creatinine, Serum: 0.45 mg/dL (01-03-21 @ 06:37)  Creatinine, Serum: 0.56 mg/dL (01-02-21 @ 09:20)  Creatinine, Serum: 0.42 mg/dL (12-31-20 @ 10:16)    Ferritin, Serum: 212 ng/mL (12-17-20 @ 18:14)      WBC Count: 6.45 K/uL (01-03-21 @ 06:37)  WBC Count: 9.40 K/uL (01-02-21 @ 09:20)  WBC Count: 8.91 K/uL (12-31-20 @ 10:16)    SARS-CoV-2: NotDetec (12-18-20 @ 10:24)  Rapid RVP Result: NotDetec (12-18-20 @ 10:24)  SARS-CoV-2: NotDetec (12-17-20 @ 18:16)  Rapid RVP Result: NotDetec (12-17-20 @ 18:16)    COVID-19 IgG Antibody Interpretation: Negative (12-30-20 @ 04:49)  COVID-19 IgG Antibody Index: 0.06 Index (12-30-20 @ 04:49)  COVID-19 IgG Antibody Index: <0.10 Index (12-18-20 @ 07:18)  COVID-19 IgG Antibody Interpretation: Negative (12-18-20 @ 07:18)    Lactate Dehydrogenase, Serum: 231 U/L (12-17-20 @ 18:14)

## 2021-01-05 VITALS — SYSTOLIC BLOOD PRESSURE: 133 MMHG | DIASTOLIC BLOOD PRESSURE: 78 MMHG | TEMPERATURE: 98 F

## 2021-01-05 LAB
ANION GAP SERPL CALC-SCNC: 10 MMOL/L — SIGNIFICANT CHANGE UP (ref 5–17)
BUN SERPL-MCNC: 18 MG/DL — SIGNIFICANT CHANGE UP (ref 8–20)
CALCIUM SERPL-MCNC: 8.9 MG/DL — SIGNIFICANT CHANGE UP (ref 8.6–10.2)
CHLORIDE SERPL-SCNC: 103 MMOL/L — SIGNIFICANT CHANGE UP (ref 98–107)
CO2 SERPL-SCNC: 28 MMOL/L — SIGNIFICANT CHANGE UP (ref 22–29)
CREAT SERPL-MCNC: 0.49 MG/DL — LOW (ref 0.5–1.3)
GLUCOSE SERPL-MCNC: 103 MG/DL — HIGH (ref 70–99)
POTASSIUM SERPL-MCNC: 4.2 MMOL/L — SIGNIFICANT CHANGE UP (ref 3.5–5.3)
POTASSIUM SERPL-SCNC: 4.2 MMOL/L — SIGNIFICANT CHANGE UP (ref 3.5–5.3)
SODIUM SERPL-SCNC: 141 MMOL/L — SIGNIFICANT CHANGE UP (ref 135–145)

## 2021-01-05 PROCEDURE — 84295 ASSAY OF SERUM SODIUM: CPT

## 2021-01-05 PROCEDURE — 93005 ELECTROCARDIOGRAM TRACING: CPT

## 2021-01-05 PROCEDURE — 86631 CHLAMYDIA ANTIBODY: CPT

## 2021-01-05 PROCEDURE — 84480 ASSAY TRIIODOTHYRONINE (T3): CPT

## 2021-01-05 PROCEDURE — 84484 ASSAY OF TROPONIN QUANT: CPT

## 2021-01-05 PROCEDURE — 84436 ASSAY OF TOTAL THYROXINE: CPT

## 2021-01-05 PROCEDURE — 87641 MR-STAPH DNA AMP PROBE: CPT

## 2021-01-05 PROCEDURE — 87640 STAPH A DNA AMP PROBE: CPT

## 2021-01-05 PROCEDURE — 82435 ASSAY OF BLOOD CHLORIDE: CPT

## 2021-01-05 PROCEDURE — 96365 THER/PROPH/DIAG IV INF INIT: CPT | Mod: XU

## 2021-01-05 PROCEDURE — 74230 X-RAY XM SWLNG FUNCJ C+: CPT

## 2021-01-05 PROCEDURE — 83735 ASSAY OF MAGNESIUM: CPT

## 2021-01-05 PROCEDURE — C8929: CPT

## 2021-01-05 PROCEDURE — 80053 COMPREHEN METABOLIC PANEL: CPT

## 2021-01-05 PROCEDURE — 84100 ASSAY OF PHOSPHORUS: CPT

## 2021-01-05 PROCEDURE — 80048 BASIC METABOLIC PNL TOTAL CA: CPT

## 2021-01-05 PROCEDURE — 87070 CULTURE OTHR SPECIMN AEROBIC: CPT

## 2021-01-05 PROCEDURE — 85730 THROMBOPLASTIN TIME PARTIAL: CPT

## 2021-01-05 PROCEDURE — 84443 ASSAY THYROID STIM HORMONE: CPT

## 2021-01-05 PROCEDURE — 36415 COLL VENOUS BLD VENIPUNCTURE: CPT

## 2021-01-05 PROCEDURE — 85025 COMPLETE CBC W/AUTO DIFF WBC: CPT

## 2021-01-05 PROCEDURE — 71275 CT ANGIOGRAPHY CHEST: CPT

## 2021-01-05 PROCEDURE — 82947 ASSAY GLUCOSE BLOOD QUANT: CPT

## 2021-01-05 PROCEDURE — 99239 HOSP IP/OBS DSCHRG MGMT >30: CPT

## 2021-01-05 PROCEDURE — 96375 TX/PRO/DX INJ NEW DRUG ADDON: CPT | Mod: XU

## 2021-01-05 PROCEDURE — 83605 ASSAY OF LACTIC ACID: CPT

## 2021-01-05 PROCEDURE — 99285 EMERGENCY DEPT VISIT HI MDM: CPT | Mod: 25

## 2021-01-05 PROCEDURE — 85610 PROTHROMBIN TIME: CPT

## 2021-01-05 PROCEDURE — 92610 EVALUATE SWALLOWING FUNCTION: CPT

## 2021-01-05 PROCEDURE — 81003 URINALYSIS AUTO W/O SCOPE: CPT

## 2021-01-05 PROCEDURE — 86769 SARS-COV-2 COVID-19 ANTIBODY: CPT

## 2021-01-05 PROCEDURE — 94760 N-INVAS EAR/PLS OXIMETRY 1: CPT

## 2021-01-05 PROCEDURE — 82330 ASSAY OF CALCIUM: CPT

## 2021-01-05 PROCEDURE — 97163 PT EVAL HIGH COMPLEX 45 MIN: CPT

## 2021-01-05 PROCEDURE — 82803 BLOOD GASES ANY COMBINATION: CPT

## 2021-01-05 PROCEDURE — 87086 URINE CULTURE/COLONY COUNT: CPT

## 2021-01-05 PROCEDURE — 85018 HEMOGLOBIN: CPT

## 2021-01-05 PROCEDURE — 85014 HEMATOCRIT: CPT

## 2021-01-05 PROCEDURE — 84132 ASSAY OF SERUM POTASSIUM: CPT

## 2021-01-05 PROCEDURE — 87449 NOS EACH ORGANISM AG IA: CPT

## 2021-01-05 PROCEDURE — 87637 SARSCOV2&INF A&B&RSV AMP PRB: CPT

## 2021-01-05 PROCEDURE — 87040 BLOOD CULTURE FOR BACTERIA: CPT

## 2021-01-05 PROCEDURE — 94664 DEMO&/EVAL PT USE INHALER: CPT

## 2021-01-05 PROCEDURE — 71045 X-RAY EXAM CHEST 1 VIEW: CPT

## 2021-01-05 PROCEDURE — 94640 AIRWAY INHALATION TREATMENT: CPT

## 2021-01-05 PROCEDURE — 85027 COMPLETE CBC AUTOMATED: CPT

## 2021-01-05 RX ORDER — ALBUTEROL 90 UG/1
1 AEROSOL, METERED ORAL
Qty: 0 | Refills: 0 | DISCHARGE
Start: 2021-01-05

## 2021-01-05 RX ORDER — TIOTROPIUM BROMIDE 18 UG/1
1 CAPSULE ORAL; RESPIRATORY (INHALATION)
Qty: 1 | Refills: 0
Start: 2021-01-05

## 2021-01-05 RX ORDER — METOPROLOL TARTRATE 50 MG
1 TABLET ORAL
Qty: 0 | Refills: 0 | DISCHARGE
Start: 2021-01-05

## 2021-01-05 RX ORDER — METOPROLOL TARTRATE 50 MG
0.5 TABLET ORAL
Qty: 0 | Refills: 0 | DISCHARGE
Start: 2021-01-05

## 2021-01-05 RX ORDER — BUDESONIDE AND FORMOTEROL FUMARATE DIHYDRATE 160; 4.5 UG/1; UG/1
1 AEROSOL RESPIRATORY (INHALATION)
Qty: 1 | Refills: 0
Start: 2021-01-05

## 2021-01-05 RX ADMIN — GABAPENTIN 400 MILLIGRAM(S): 400 CAPSULE ORAL at 13:53

## 2021-01-05 RX ADMIN — Medication 0.5 MILLIGRAM(S): at 08:20

## 2021-01-05 RX ADMIN — OXYCODONE AND ACETAMINOPHEN 2 TABLET(S): 5; 325 TABLET ORAL at 02:40

## 2021-01-05 RX ADMIN — MEROPENEM 100 MILLIGRAM(S): 1 INJECTION INTRAVENOUS at 13:53

## 2021-01-05 RX ADMIN — OXYCODONE AND ACETAMINOPHEN 2 TABLET(S): 5; 325 TABLET ORAL at 11:09

## 2021-01-05 RX ADMIN — Medication 2000 UNIT(S): at 08:20

## 2021-01-05 RX ADMIN — GABAPENTIN 400 MILLIGRAM(S): 400 CAPSULE ORAL at 05:04

## 2021-01-05 RX ADMIN — Medication 1 TABLET(S): at 11:10

## 2021-01-05 RX ADMIN — Medication 100 MILLIGRAM(S): at 11:08

## 2021-01-05 RX ADMIN — Medication 25 MILLIGRAM(S): at 05:05

## 2021-01-05 RX ADMIN — Medication 1 TABLET(S): at 08:20

## 2021-01-05 RX ADMIN — Medication 100 MILLIGRAM(S): at 05:07

## 2021-01-05 RX ADMIN — MEROPENEM 100 MILLIGRAM(S): 1 INJECTION INTRAVENOUS at 05:06

## 2021-01-05 RX ADMIN — OXYCODONE AND ACETAMINOPHEN 2 TABLET(S): 5; 325 TABLET ORAL at 11:55

## 2021-01-05 RX ADMIN — Medication 0.5 MILLIGRAM(S): at 15:50

## 2021-01-05 RX ADMIN — Medication 81 MILLIGRAM(S): at 08:20

## 2021-01-05 RX ADMIN — Medication 650 MILLIGRAM(S): at 08:20

## 2021-01-05 RX ADMIN — Medication 20 MILLIGRAM(S): at 05:05

## 2021-01-05 RX ADMIN — OXYCODONE AND ACETAMINOPHEN 2 TABLET(S): 5; 325 TABLET ORAL at 03:25

## 2021-01-05 RX ADMIN — BUDESONIDE AND FORMOTEROL FUMARATE DIHYDRATE 2 PUFF(S): 160; 4.5 AEROSOL RESPIRATORY (INHALATION) at 08:34

## 2021-01-05 RX ADMIN — Medication 650 MILLIGRAM(S): at 08:00

## 2021-01-08 ENCOUNTER — APPOINTMENT (OUTPATIENT)
Dept: PULMONOLOGY | Facility: CLINIC | Age: 76
End: 2021-01-08
Payer: MEDICARE

## 2021-01-08 VITALS
HEART RATE: 103 BPM | OXYGEN SATURATION: 93 % | SYSTOLIC BLOOD PRESSURE: 128 MMHG | TEMPERATURE: 97.7 F | WEIGHT: 154 LBS | DIASTOLIC BLOOD PRESSURE: 68 MMHG | BODY MASS INDEX: 24.12 KG/M2

## 2021-01-08 DIAGNOSIS — Z92.89 PERSONAL HISTORY OF OTHER MEDICAL TREATMENT: ICD-10-CM

## 2021-01-08 PROCEDURE — 99496 TRANSJ CARE MGMT HIGH F2F 7D: CPT

## 2021-01-08 RX ORDER — UMECLIDINIUM BROMIDE AND VILANTEROL TRIFENATATE 62.5; 25 UG/1; UG/1
62.5-25 POWDER RESPIRATORY (INHALATION) DAILY
Qty: 3 | Refills: 3 | Status: DISCONTINUED | COMMUNITY
Start: 2019-11-14 | End: 2021-01-08

## 2021-01-08 RX ORDER — BUDESONIDE AND FORMOTEROL FUMARATE DIHYDRATE 160; 4.5 UG/1; UG/1
160-4.5 AEROSOL RESPIRATORY (INHALATION)
Refills: 0 | Status: DISCONTINUED | COMMUNITY
End: 2021-01-08

## 2021-01-08 RX ORDER — TIOTROPIUM BROMIDE 18 UG/1
18 CAPSULE ORAL; RESPIRATORY (INHALATION)
Refills: 0 | Status: DISCONTINUED | COMMUNITY
End: 2021-01-08

## 2021-01-08 NOTE — DISCUSSION/SUMMARY
[FreeTextEntry1] : 75-year-old male with a history of stage II chronic obstructive lung disease seen today status post discharge from [facility] for recurrent left lower lobe pneumonia. No evidence of endocarditis versus aspiration. Etiology still remains unclear, but patient has responded well to empiric antibiotics.\par \par Medications are reviewed and simplified.

## 2021-01-08 NOTE — HISTORY OF PRESENT ILLNESS
[Former] : former [>= 30 pack years] : >= 30 pack years [TextBox_4] : 75-year-old male with a history of stage II chronic obstructive lung disease, coronary artery disease, status post CABG, hypertension, seen today status post recent readmission to [facility] for worsening gram-negative pneumonia. Patient was admitted on 12/29/20 and discharged on 1/4/21. He underwent an evaluation including swallow study for aspiration, which was unremarkable. His course was complicated by new onset atrial fibrillation with spontaneous resolution. Chest CT with contrast was done to rule out pulmonary embolus. He was treated with a course of Zithromax and meropenem which he completed yesterday. He does feel weaker than he has in the past, although has no complaints of cough, wheeze, or sputum. He is completing a steroid taper. His long-acting bronchodilators were changed from anoro to Spiriva and Symbicort. [ESS] : 0

## 2021-01-09 ENCOUNTER — LABORATORY RESULT (OUTPATIENT)
Age: 76
End: 2021-01-09

## 2021-01-15 ENCOUNTER — TRANSCRIPTION ENCOUNTER (OUTPATIENT)
Age: 76
End: 2021-01-15

## 2021-01-20 ENCOUNTER — APPOINTMENT (OUTPATIENT)
Dept: CARDIOLOGY | Facility: CLINIC | Age: 76
End: 2021-01-20
Payer: MEDICARE

## 2021-01-20 ENCOUNTER — NON-APPOINTMENT (OUTPATIENT)
Age: 76
End: 2021-01-20

## 2021-01-20 VITALS
RESPIRATION RATE: 14 BRPM | HEART RATE: 89 BPM | SYSTOLIC BLOOD PRESSURE: 120 MMHG | TEMPERATURE: 97.8 F | HEIGHT: 67 IN | WEIGHT: 151 LBS | BODY MASS INDEX: 23.7 KG/M2 | DIASTOLIC BLOOD PRESSURE: 78 MMHG

## 2021-01-20 PROCEDURE — 93000 ELECTROCARDIOGRAM COMPLETE: CPT

## 2021-01-20 PROCEDURE — 99213 OFFICE O/P EST LOW 20 MIN: CPT

## 2021-01-20 RX ORDER — PREDNISONE 10 MG
TABLET ORAL
Refills: 0 | Status: DISCONTINUED | COMMUNITY
End: 2021-01-20

## 2021-01-20 RX ORDER — MIDODRINE HYDROCHLORIDE 5 MG/1
5 TABLET ORAL
Qty: 180 | Refills: 3 | Status: DISCONTINUED | COMMUNITY
End: 2021-01-20

## 2021-01-20 RX ORDER — TIOTROPIUM BROMIDE 18 UG/1
18 CAPSULE ORAL; RESPIRATORY (INHALATION) DAILY
Qty: 30 | Refills: 0 | Status: DISCONTINUED | COMMUNITY
Start: 2021-01-20 | End: 2021-01-20

## 2021-01-20 RX ORDER — FLUTICASONE FUROATE, UMECLIDINIUM BROMIDE AND VILANTEROL TRIFENATATE 100; 62.5; 25 UG/1; UG/1; UG/1
100-62.5-25 POWDER RESPIRATORY (INHALATION) DAILY
Qty: 1 | Refills: 5 | Status: DISCONTINUED | COMMUNITY
Start: 2021-01-08 | End: 2021-01-20

## 2021-01-21 ENCOUNTER — APPOINTMENT (OUTPATIENT)
Dept: INTERNAL MEDICINE | Facility: CLINIC | Age: 76
End: 2021-01-21
Payer: MEDICARE

## 2021-01-21 VITALS
WEIGHT: 151 LBS | TEMPERATURE: 97.5 F | HEIGHT: 67 IN | DIASTOLIC BLOOD PRESSURE: 72 MMHG | BODY MASS INDEX: 23.7 KG/M2 | SYSTOLIC BLOOD PRESSURE: 120 MMHG | HEART RATE: 100 BPM

## 2021-01-21 PROCEDURE — 99214 OFFICE O/P EST MOD 30 MIN: CPT | Mod: 25

## 2021-01-21 PROCEDURE — 36415 COLL VENOUS BLD VENIPUNCTURE: CPT

## 2021-01-21 NOTE — DISCUSSION/SUMMARY
[FreeTextEntry1] : Case and plan discussed with Dr. Patterson.\par \par 1 - Pneumonia:  recently readmitted for pneumonia at [facility].  Was treated with prednisone and antibiotics.  Presently feeling better.  No dyspnea on exertion or cough.  Experiencing fatigue.  Will continue to follow up with his pulmonologist.\par \par 2 - ? Atrial fibrillation with rapid ventricular response on EKG at [facility]:  Reviewed records from admission to [facility] with no other indication of significant AF.  Today's EKG reveals sinus rhythm with right bundle branch block.  Mr. Henson denies complaints of palpitations, shortness of breath, or lightheadedness.  Will have him undergo 14-day ambulatory event monitor to assess rhythm.  Hold off on anticoagulation unless there is other indication of more atrial fibrillation.\par \par 3 - Hypertension:  blood pressure well controlled on current medications.  Advised to follow low sodium diet.\par \par 4 - Coronary artery disease status-post stenting (2002), CABG x 3 (3/2020):  stable at this time.  Denies complaints of exertional chest pain, shortness of breath, palpitations, lightheadedness or syncope.   \par \par 5 - Follow up in 4 weeks.\par \par

## 2021-01-21 NOTE — PHYSICAL EXAM
[General Appearance - Well Developed] : well developed [General Appearance - In No Acute Distress] : no acute distress [Normal Conjunctiva] : the conjunctiva exhibited no abnormalities [Normal Oral Mucosa] : normal oral mucosa [Heart Rate And Rhythm] : heart rate and rhythm were normal [Arterial Pulses Normal] : the arterial pulses were normal [Bowel Sounds] : normal bowel sounds [Abnormal Walk] : normal gait [Skin Color & Pigmentation] : normal skin color and pigmentation [Skin Turgor] : normal skin turgor [Oriented To Time, Place, And Person] : oriented to person, place, and time [Affect] : the affect was normal [Mood] : the mood was normal [] : no respiratory distress [Auscultation Breath Sounds / Voice Sounds] : lungs were clear to auscultation bilaterally [FreeTextEntry1] : No edema

## 2021-01-21 NOTE — REASON FOR VISIT
[FreeTextEntry1] : The patient is a pleasant 75-year-old white male with a past medical history significant for hypertension, hyperlipidemia, and coronary artery disease status-post RCA stent insertion in 2002 as well as more recently CABG x 3 (LIMA to LAD, SVG to OM1 and PDA) performed at [facility] on March 23, 2020 by Dr. Hutchison.

## 2021-01-21 NOTE — HISTORY OF PRESENT ILLNESS
[FreeTextEntry1] : This 74 y/o male who was seen at ID office for follow up , was recently admitted at [facility] for pneumonia and mentioned about on and off pressure type chest pain for past 2 -3 days ,  fever of 100.2 at home. \par found with worsened PNA, with New CT with worsened lower lobe infiltrate.\par + SOB\par + sputum\par \par He was last seen on  1/4/21 for \par SOB\par multifocal PNA\par WBC elevation\par acute febrile illness\par \par \par SPUTUM - normal virgilio\par LEGIONELLA - negative\par CHLAMYDIA PNUEMONIAE - old infection\par \par completed 5 days of azithromycin Tablet 250 milliGRAM(s) Oral daily\par \par He was clinically better on Merrem, and was  recommended to continue MERREM 1 gram Q8H thru 1/7/2021\par \par he completed course of antibiotics\par \par doing better\par \par here for follow up. \par \par Still with some cough. \par \par wife  - miguel  present for interview exam\par \par reports that patient is a 50 pack year smoker\par \par also has habit of mid-night snacks, and sleeping with a vitamin C lozenge in mouth. \par \par \par \par REcent covid-19 PCR is negative on 1/15/2021 at [facility]. labs

## 2021-01-21 NOTE — HISTORY OF PRESENT ILLNESS
[FreeTextEntry1] : Mr. Henson presents today for follow up evaluation status-post a recent [facility] readmission for recurrent pneumonia.  Patient had been experiencing fever of 100, dyspnea, cough and episodes of chest pain.  Workup in the hospital included echocardiogram - EF 65-70%, mild aortic stenosis.  Lower extremity venous duplex - negative for DVT.  CT angio chest was negative for PE.  New left lower lobe nodules/opacities.  Negative troponins.  One EKG with questionable atrial fibrillation (145bpm).  No further workup was recommended.  He is presently without complaints of exertional chest pain, shortness of breath, palpitations, lightheadedness or syncope.  He is however feeling fatigued.  Has mild chest discomfort which is reproducible with palpation.

## 2021-01-21 NOTE — PHYSICAL EXAM
[General Appearance - Alert] : alert [General Appearance - In No Acute Distress] : in no acute distress [PERRL With Normal Accommodation] : pupils were equal in size, round, reactive to light [Sclera] : the sclera and conjunctiva were normal [Extraocular Movements] : extraocular movements were intact [Outer Ear] : the ears and nose were normal in appearance [Oropharynx] : the oropharynx was normal with no thrush [Neck Appearance] : the appearance of the neck was normal [Jugular Venous Distention Increased] : there was no jugular-venous distention [Neck Cervical Mass (___cm)] : no neck mass was observed [Thyroid Diffuse Enlargement] : the thyroid was not enlarged [Auscultation Breath Sounds / Voice Sounds] : lungs were clear to auscultation bilaterally [FreeTextEntry1] : prolong exp phase.  no EGOPHONY [Heart Rate And Rhythm] : heart rate was normal and rhythm regular [Heart Sounds] : normal S1 and S2 [Heart Sounds Gallop] : no gallops [Murmurs] : no murmurs [Heart Sounds Pericardial Friction Rub] : no pericardial rub [Full Pulse] : the pedal pulses are present [Edema] : there was no peripheral edema [Abdomen Soft] : soft [Bowel Sounds] : normal bowel sounds [Abdomen Tenderness] : non-tender [Abdomen Mass (___ Cm)] : no abdominal mass palpated [Costovertebral Angle Tenderness] : no CVA tenderness [No Palpable Adenopathy] : no palpable adenopathy [Musculoskeletal - Swelling] : no joint swelling [Motor Tone] : muscle strength and tone were normal [Nail Clubbing] : no clubbing  or cyanosis of the fingernails [Skin Color & Pigmentation] : normal skin color and pigmentation [] : no rash [Cranial Nerves] : cranial nerves 2-12 were intact [Sensation] : the sensory exam was normal to light touch and pinprick [No Focal Deficits] : no focal deficits [Oriented To Time, Place, And Person] : oriented to person, place, and time [Affect] : the affect was normal

## 2021-01-21 NOTE — DATA REVIEWED
[FreeTextEntry1] : Labs:\par \par  10.7\par 6.45 )-----------( 314 ( 03 Jan 2021 06:37 )\par  33.4\par \par  01-04\par \par 142 | 103 | 19.0\par ----------------------------< 111<H>\par 4.5 | 28.0 | 0.49<L>\par \par Ca 8.9 04 Jan 2021 07:52\par \par \par \par Culture - Sputum (collected 12-30-20 @ 22:04)\par Source: .Sputum Sputum\par Gram Stain (12-31-20 @ 06:57):\par  No polymorphonuclear leukocytes per low power field\par  No Squamous epithelial cells per low power field\par  Rare Gram Positive Cocci seen per oil power field\par Final Report (01-01-21 @ 20:44):\par  Normal Respiratory Doris present\par \par Culture - Urine (collected 12-29-20 @ 22:01)\par Source: .Urine Clean Catch (Midstream)\par Final Report (12-30-20 @ 17:34):\par  No growth\par \par Culture - Blood (collected 12-29-20 @ 12:32)\par Source: .Blood Blood-Peripheral\par Final Report (01-03-21 @ 13:00):\par  No growth at 5 days.\par \par Culture - Blood (collected 12-29-20 @ 12:32)\par Source: .Blood Blood-Peripheral\par Final Report (01-03-21 @ 13:00):\par  No growth at 5 days.\par \par \par Creatinine, Serum: 0.49 mg/dL (01-04-21 @ 07:52)\par Creatinine, Serum: 0.45 mg/dL (01-03-21 @ 06:37)\par Creatinine, Serum: 0.56 mg/dL (01-02-21 @ 09:20)\par Creatinine, Serum: 0.42 mg/dL (12-31-20 @ 10:16)\par \par Ferritin, Serum: 212 ng/mL (12-17-20 @ 18:14)\par \par \par WBC Count: 6.45 K/uL (01-03-21 @ 06:37)\par WBC Count: 9.40 K/uL (01-02-21 @ 09:20)\par WBC Count: 8.91 K/uL (12-31-20 @ 10:16)\par \par SARS-CoV-2: NotDetec (12-18-20 @ 10:24)\par Rapid RVP Result: NotDetec (12-18-20 @ 10:24)\par SARS-CoV-2: NotDetec (12-17-20 @ 18:16)\par Rapid RVP Result: NotDetec (12-17-20 @ 18:16)\par \par COVID-19 IgG Antibody Interpretation: Negative (12-30-20 @ 04:49)\par COVID-19 IgG Antibody Index: 0.06 Index (12-30-20 @ 04:49)\par COVID-19 IgG Antibody Index: <0.10 Index (12-18-20 @ 07:18)\par COVID-19 IgG Antibody Interpretation: Negative (12-18-20 @ 07:18)\par \par Lactate Dehydrogenase, Serum: 231 U/L (12-17-20 @ 18:14)

## 2021-01-21 NOTE — ASSESSMENT
[FreeTextEntry1] : This 75 y.o. man\par recent episodes of PNA\par \par \par concern for recurrent aspirations\par risk factors include\par - late night snacks - sandwich, grapes, cookies\par - lozenges in mouth at bedtime\par - COPD\par \par \par - suggest to modify habits\par - defer additional antibiotics\par - check CBC and CMP today. \par \par follow up with pulm for COPD\par \par \par will call with results.  [Treatment Education] : treatment education [Treatment Adherence] : treatment adherence [Risk Reduction] : risk reduction [Anticipatory Guidance] : anticipatory guidance

## 2021-01-22 LAB
ALBUMIN SERPL ELPH-MCNC: 3.9 G/DL
ALP BLD-CCNC: 130 U/L
ALT SERPL-CCNC: 20 U/L
ANION GAP SERPL CALC-SCNC: 13 MMOL/L
AST SERPL-CCNC: 16 U/L
BASOPHILS # BLD AUTO: 0.06 K/UL
BASOPHILS NFR BLD AUTO: 0.5 %
BILIRUB SERPL-MCNC: 0.4 MG/DL
BUN SERPL-MCNC: 5 MG/DL
CALCIUM SERPL-MCNC: 9.6 MG/DL
CHLORIDE SERPL-SCNC: 102 MMOL/L
CO2 SERPL-SCNC: 28 MMOL/L
CREAT SERPL-MCNC: 0.69 MG/DL
EOSINOPHIL # BLD AUTO: 0.36 K/UL
EOSINOPHIL NFR BLD AUTO: 3.1 %
GLUCOSE SERPL-MCNC: 115 MG/DL
HCT VFR BLD CALC: 36.6 %
HGB BLD-MCNC: 11.1 G/DL
IMM GRANULOCYTES NFR BLD AUTO: 0.5 %
LYMPHOCYTES # BLD AUTO: 1.08 K/UL
LYMPHOCYTES NFR BLD AUTO: 9.2 %
MAN DIFF?: NORMAL
MCHC RBC-ENTMCNC: 29.7 PG
MCHC RBC-ENTMCNC: 30.3 GM/DL
MCV RBC AUTO: 97.9 FL
MONOCYTES # BLD AUTO: 0.66 K/UL
MONOCYTES NFR BLD AUTO: 5.6 %
NEUTROPHILS # BLD AUTO: 9.56 K/UL
NEUTROPHILS NFR BLD AUTO: 81.1 %
PLATELET # BLD AUTO: 410 K/UL
POTASSIUM SERPL-SCNC: 4.7 MMOL/L
PROT SERPL-MCNC: 6.2 G/DL
RBC # BLD: 3.74 M/UL
RBC # FLD: 14.2 %
SODIUM SERPL-SCNC: 142 MMOL/L
WBC # FLD AUTO: 11.78 K/UL

## 2021-02-02 ENCOUNTER — RX RENEWAL (OUTPATIENT)
Age: 76
End: 2021-02-02

## 2021-02-03 ENCOUNTER — TRANSCRIPTION ENCOUNTER (OUTPATIENT)
Age: 76
End: 2021-02-03

## 2021-02-11 ENCOUNTER — OUTPATIENT (OUTPATIENT)
Dept: OUTPATIENT SERVICES | Facility: HOSPITAL | Age: 76
LOS: 1 days | End: 2021-02-11
Payer: MEDICARE

## 2021-02-11 ENCOUNTER — APPOINTMENT (OUTPATIENT)
Dept: RADIOLOGY | Facility: CLINIC | Age: 76
End: 2021-02-11
Payer: MEDICARE

## 2021-02-11 DIAGNOSIS — Z95.5 PRESENCE OF CORONARY ANGIOPLASTY IMPLANT AND GRAFT: Chronic | ICD-10-CM

## 2021-02-11 DIAGNOSIS — Z98.49 CATARACT EXTRACTION STATUS, UNSPECIFIED EYE: Chronic | ICD-10-CM

## 2021-02-11 DIAGNOSIS — Z95.1 PRESENCE OF AORTOCORONARY BYPASS GRAFT: Chronic | ICD-10-CM

## 2021-02-11 DIAGNOSIS — J44.9 CHRONIC OBSTRUCTIVE PULMONARY DISEASE, UNSPECIFIED: ICD-10-CM

## 2021-02-11 PROCEDURE — 71046 X-RAY EXAM CHEST 2 VIEWS: CPT

## 2021-02-11 PROCEDURE — 71046 X-RAY EXAM CHEST 2 VIEWS: CPT | Mod: 26

## 2021-02-13 ENCOUNTER — APPOINTMENT (OUTPATIENT)
Dept: DISASTER EMERGENCY | Facility: CLINIC | Age: 76
End: 2021-02-13

## 2021-02-13 ENCOUNTER — LABORATORY RESULT (OUTPATIENT)
Age: 76
End: 2021-02-13

## 2021-02-16 ENCOUNTER — APPOINTMENT (OUTPATIENT)
Dept: PULMONOLOGY | Facility: CLINIC | Age: 76
End: 2021-02-16
Payer: MEDICARE

## 2021-02-16 VITALS — WEIGHT: 152 LBS | BODY MASS INDEX: 23.86 KG/M2 | HEIGHT: 67 IN | TEMPERATURE: 98 F

## 2021-02-16 VITALS
BODY MASS INDEX: 23.49 KG/M2 | SYSTOLIC BLOOD PRESSURE: 100 MMHG | DIASTOLIC BLOOD PRESSURE: 56 MMHG | HEART RATE: 78 BPM | TEMPERATURE: 98 F | OXYGEN SATURATION: 93 % | WEIGHT: 150 LBS

## 2021-02-16 DIAGNOSIS — Z87.891 PERSONAL HISTORY OF NICOTINE DEPENDENCE: ICD-10-CM

## 2021-02-16 PROCEDURE — 94010 BREATHING CAPACITY TEST: CPT

## 2021-02-16 PROCEDURE — G0296 VISIT TO DETERM LDCT ELIG: CPT

## 2021-02-16 PROCEDURE — 99214 OFFICE O/P EST MOD 30 MIN: CPT | Mod: 25

## 2021-02-16 NOTE — HISTORY OF PRESENT ILLNESS
[Former] : former [TextBox_4] : 75-year-old male with a history of stage II chronic obstructive lung disease, coronary artery disease, status post coronary artery bypass graft, hypertension, seen today in continued followup following readmission to [facility] with gram-negative pneumonia. Patient was successfully treated with azithromycin and later with meropenem with complete completion of antibiotics. He denies any current complaints of cough, wheeze, or sputum production. History is negative for fevers, chills, chest pains. [ESS] : 0

## 2021-02-16 NOTE — QUALITY MEASURES
[Spirometry performed within] : spirometry performed within the last 12 months [Patient has reason to be excluded] : patient has reason to be excluded from screening for tobacco use (eg: limited life expectancy, other medical reason)

## 2021-02-16 NOTE — CONSULT LETTER
[Dear  ___] : Dear  [unfilled], [Consult Letter:] : I had the pleasure of evaluating your patient, [unfilled]. [Please see my note below.] : Please see my note below. [Consult Closing:] : Thank you very much for allowing me to participate in the care of this patient.  If you have any questions, please do not hesitate to contact me. [Sincerely,] : Sincerely, [FreeTextEntry3] : Epi Fuller MD FCCP\par Pulmonary/Critical Care/Sleep Medicine\par Department of Internal Medicine\par \par [employer]\par

## 2021-02-16 NOTE — DISCUSSION/SUMMARY
[FreeTextEntry1] : 75-year-old male with a history of stage II chronic obstructive lung disease seen today in followup. Patient is presently recovering from a multilobar pneumonia without any residual symptoms following completion of parenteral antibiotics. He has been advised to continue his current drug regimen and will undergo a followup CT in 2 months time which will satisfy his cancer screening protocol

## 2021-02-23 ENCOUNTER — NON-APPOINTMENT (OUTPATIENT)
Age: 76
End: 2021-02-23

## 2021-02-23 ENCOUNTER — APPOINTMENT (OUTPATIENT)
Dept: CARDIOLOGY | Facility: CLINIC | Age: 76
End: 2021-02-23
Payer: MEDICARE

## 2021-02-23 VITALS
SYSTOLIC BLOOD PRESSURE: 112 MMHG | DIASTOLIC BLOOD PRESSURE: 70 MMHG | TEMPERATURE: 97.6 F | BODY MASS INDEX: 24.48 KG/M2 | HEART RATE: 75 BPM | HEIGHT: 67 IN | WEIGHT: 156 LBS | RESPIRATION RATE: 14 BRPM

## 2021-02-23 PROCEDURE — 99214 OFFICE O/P EST MOD 30 MIN: CPT

## 2021-02-23 PROCEDURE — 93000 ELECTROCARDIOGRAM COMPLETE: CPT

## 2021-02-25 NOTE — DISCUSSION/SUMMARY
[FreeTextEntry1] : Case and plan discussed with Dr. Fink.\par \par 1 - Hypertension:  blood pressure well controlled on current medications.  Advised to continue to follow low sodium diet.\par \par 2 - Paroxysmal atrial fibrillation: today's EKG reveals Sinus rhythm at 75 bpm with right bundle branch block.  Patient without chest pain, shortness of breath, palpitations, or lightheadedness.  14-day ambulatory event monitor demonstrates no atrial fibrillation, pauses, heart block, or VT.  SVT occurred 1 time with fastest run at 151 bpm.  PAC burden <1% and PVC burden 1%.  Mr. Henson with 1 EKG showing atrial fibrillation with rapid ventricular response at [facility].  Will start on Eliquis 5mg BID as CHADS-VASc = 3, and refer to EP for further evaluation.\par \par 3 - Coronary artery disease status-post stenting (2002) and more recently CABG x 3 (3/2020):  clinically stable at this time.  Denies exerional chest pain, palpitations, lightheadedness or syncope.  Occasional mild shortness of breath with significant exertion.\par \par 4 - Follow up with Dr. Fink in 3 months.

## 2021-02-25 NOTE — HISTORY OF PRESENT ILLNESS
[FreeTextEntry1] : Mr. Henson presents today for follow up evaluation of his paroxysmal atrial fibrillation.  Patient had one EKG during his last [place] hospitalization that demonstrated atrial fibrillation with a rapid ventricular response.  Had him undergo 14-day ambulatory event monitor to assess his rhythm and atrial fibrillation burden.  Mr. Henson denies complaints of exertional chest pain, palpitations, lightheadedness or syncope.  He may experience some mild shortness of breath if he overexerts himself.

## 2021-02-25 NOTE — REASON FOR VISIT
[FreeTextEntry1] : The patient is a pleasant 75-year-old white male with a past medical history significant for hypertension, hyperlipidemia, and coronary artery disease status-post RCA stent insertion in 2002 as well as more recently CABG x 3 (LIMA to LAD, SVG to OM1 and PDA) performed at [facility] on March 23, 2020 by Dr. Hutchison.

## 2021-02-25 NOTE — PHYSICAL EXAM
[General Appearance - Well Developed] : well developed [Normal Conjunctiva] : the conjunctiva exhibited no abnormalities [General Appearance - In No Acute Distress] : no acute distress [Normal Oral Mucosa] : normal oral mucosa [] : no respiratory distress [Auscultation Breath Sounds / Voice Sounds] : lungs were clear to auscultation bilaterally [Heart Rate And Rhythm] : heart rate and rhythm were normal [Arterial Pulses Normal] : the arterial pulses were normal [Bowel Sounds] : normal bowel sounds [Abnormal Walk] : normal gait [Skin Color & Pigmentation] : normal skin color and pigmentation [Skin Turgor] : normal skin turgor [Oriented To Time, Place, And Person] : oriented to person, place, and time [Affect] : the affect was normal [Mood] : the mood was normal [FreeTextEntry1] : No edema

## 2021-03-03 RX ORDER — TAMSULOSIN HYDROCHLORIDE 0.4 MG/1
0.4 CAPSULE ORAL
Qty: 30 | Refills: 5 | Status: ACTIVE | COMMUNITY

## 2021-03-03 RX ORDER — MEROPENEM 1 G/30ML
1 INJECTION INTRAVENOUS
Qty: 7 | Refills: 0 | Status: DISCONTINUED | COMMUNITY
Start: 2021-01-05 | End: 2021-03-03

## 2021-03-04 ENCOUNTER — NON-APPOINTMENT (OUTPATIENT)
Age: 76
End: 2021-03-04

## 2021-03-04 ENCOUNTER — APPOINTMENT (OUTPATIENT)
Dept: ELECTROPHYSIOLOGY | Facility: CLINIC | Age: 76
End: 2021-03-04
Payer: MEDICARE

## 2021-03-04 VITALS
BODY MASS INDEX: 23.75 KG/M2 | DIASTOLIC BLOOD PRESSURE: 70 MMHG | OXYGEN SATURATION: 93 % | SYSTOLIC BLOOD PRESSURE: 118 MMHG | HEIGHT: 67 IN | TEMPERATURE: 98.3 F | HEART RATE: 73 BPM | WEIGHT: 151.3 LBS

## 2021-03-04 VITALS — DIASTOLIC BLOOD PRESSURE: 72 MMHG | SYSTOLIC BLOOD PRESSURE: 120 MMHG

## 2021-03-04 PROCEDURE — 99204 OFFICE O/P NEW MOD 45 MIN: CPT

## 2021-03-04 PROCEDURE — 93000 ELECTROCARDIOGRAM COMPLETE: CPT

## 2021-03-04 NOTE — PHYSICAL EXAM
[General Appearance - Well Developed] : well developed [General Appearance - In No Acute Distress] : no acute distress [Normal Conjunctiva] : the conjunctiva exhibited no abnormalities [Normal Oral Mucosa] : normal oral mucosa [Heart Rate And Rhythm] : heart rate and rhythm were normal [Arterial Pulses Normal] : the arterial pulses were normal [Edema] : no peripheral edema present [] : no respiratory distress [Auscultation Breath Sounds / Voice Sounds] : lungs were clear to auscultation bilaterally [Bowel Sounds] : normal bowel sounds [Abnormal Walk] : normal gait [FreeTextEntry1] : No edema [Skin Color & Pigmentation] : normal skin color and pigmentation [Skin Turgor] : normal skin turgor [Oriented To Time, Place, And Person] : oriented to person, place, and time [Impaired Insight] : insight and judgment were intact [Affect] : the affect was normal [Mood] : the mood was normal

## 2021-03-04 NOTE — REASON FOR VISIT
The physician has been notified. [Consultation] : a consultation regarding [Atrial Fibrillation] : atrial fibrillation [FreeTextEntry1] : ref Dr Fink [Family Member] : family member

## 2021-03-04 NOTE — HISTORY OF PRESENT ILLNESS
[FreeTextEntry1] : 75 year old gentleman with history of HTN, HLD, CAD s/p PCI 2002 and CABGx3 3/23/20, COPD, presenting for evaluation of atrial fibrillation.   \par \par In 12/2020- 1/2021 he was admitted to [facility] with pneumonia- he was fairly sick at that time with dyspnea as well. There was concern for intermittent tachycardia and possibly atrial fibrillation with rapid rates during hospitalization.  ECG from 12/21/20 does reveal AF with rapid rates (and RBBB). He was seen by cardiology and apparently no other AF was noted on review. Subsequently he was noted to be in sinus rhythm. \par \par Event monitor 1/22/21- 2/4/1 revealed sinus rhythm with average HR 81 bpm (range  bpm), with one brief run of irregular SVT lasting 15 beats.  \par \par Due to concern for AF, he was started on Eliquis 5mg bid, in addition to ASA (and plavix has been stopped).  \par \par He reports no known prior history of AF. HE does report occasional mild brief palpitation, as well as occasional lightheadedness.  \par \par About two years ago he had an episode of syncope, which occurred after being on the water on a hot day, and was ultimately thought to be due to dehydration.   \par \par Current meds include ASA 81, Eliquis 5mg bid, metoprolol 25mg bid

## 2021-03-04 NOTE — DISCUSSION/SUMMARY
[FreeTextEntry1] :  75 year old gentleman with history of HTN, HLD, CAD s/p PCI 2002 and CABGx3 3/23/20, COPD, presenting for evaluation of atrial fibrillation.  He had an episode of atrial fibrillation with rapid rates during an acute illness and hospitalization for pna. He has since been in sinus rhythm, with no sustained arrhythmia on recent ambulatory event monitor. He has been started on anticoagulation (CHADSVASc=3), which he is generally tolerating, but does have a history of minor bleeding on DAPT in the past. We did discuss AF in detail, and associated risks, including stroke/thromboembolism. We did also discuss that AF can be transient in the setting of acute illness, and its unclear at this point if his AF will be recurrent or was just a result of his acute medical illness.  I have suggested long-term monitoring to clarify whether he has recurrent AF- this would be best accomplished with an implantable loop recorder. An ILR would also be useful to monitor for other tachy-erickson arrhythmias given his history of syncope. If no recurrent AF noted after prolonged monitoring (eg 6 mo or so) would then stop anticoagulation and continue antiplatelet therapy alone (with potential to reinitiate anticoagulation if AF is noted on ongoing monitoring). The risks and benefits of this approach were reviewed, including procedure related risks such as bleeding, infection- he expressed understanding, and does want to proceed.  \par \par -ILR implant \par \par -will continue current meds including eliquis for now \par \par

## 2021-03-12 ENCOUNTER — APPOINTMENT (OUTPATIENT)
Dept: DISASTER EMERGENCY | Facility: CLINIC | Age: 76
End: 2021-03-12

## 2021-03-13 LAB — SARS-COV-2 N GENE NPH QL NAA+PROBE: NOT DETECTED

## 2021-03-15 ENCOUNTER — TRANSCRIPTION ENCOUNTER (OUTPATIENT)
Age: 76
End: 2021-03-15

## 2021-03-15 ENCOUNTER — OUTPATIENT (OUTPATIENT)
Dept: OUTPATIENT SERVICES | Facility: HOSPITAL | Age: 76
LOS: 1 days | Discharge: HOME | End: 2021-03-15
Payer: MEDICARE

## 2021-03-15 VITALS
HEART RATE: 73 BPM | SYSTOLIC BLOOD PRESSURE: 131 MMHG | OXYGEN SATURATION: 95 % | DIASTOLIC BLOOD PRESSURE: 77 MMHG | TEMPERATURE: 98 F | HEIGHT: 67 IN | WEIGHT: 155.21 LBS | RESPIRATION RATE: 18 BRPM

## 2021-03-15 DIAGNOSIS — Z95.1 PRESENCE OF AORTOCORONARY BYPASS GRAFT: Chronic | ICD-10-CM

## 2021-03-15 DIAGNOSIS — Z98.49 CATARACT EXTRACTION STATUS, UNSPECIFIED EYE: Chronic | ICD-10-CM

## 2021-03-15 DIAGNOSIS — I48.91 UNSPECIFIED ATRIAL FIBRILLATION: ICD-10-CM

## 2021-03-15 DIAGNOSIS — Z95.5 PRESENCE OF CORONARY ANGIOPLASTY IMPLANT AND GRAFT: Chronic | ICD-10-CM

## 2021-03-15 PROCEDURE — 33285 INSJ SUBQ CAR RHYTHM MNTR: CPT

## 2021-03-15 PROCEDURE — C1764: CPT

## 2021-03-15 NOTE — DISCHARGE NOTE NURSING/CASE MANAGEMENT/SOCIAL WORK - PATIENT PORTAL LINK FT
You can access the FollowMyHealth Patient Portal offered by [facility] by registering at the following website: [email or web address]. By joining [facility]’s FollowMyHealth portal, you will also be able to view your health information using other applications (apps) compatible with our system.

## 2021-03-15 NOTE — DISCHARGE NOTE PROVIDER - NSDCMRMEDTOKEN_GEN_ALL_CORE_FT
Anoro Ellipta 62.5 mcg-25 mcg/inh inhalation powder: 1 puff(s) inhaled once a day  aspirin 81 mg oral tablet, chewable: 1 tab(s) orally once a day MDD:1  atorvastatin 40 mg oral tablet: 1 tab(s) orally once a day (at bedtime)  Eliquis 5 mg oral tablet: 1 tab(s) orally 2 times a day  Flomax 0.4 mg oral capsule: 1 cap(s) orally once a day  gabapentin 400 mg oral capsule: 1 cap(s) orally 3 times a day  metoprolol tartrate 25 mg oral tablet: 1 tab(s) orally 2 times a day  Percocet 10/325 oral tablet: 1 tab(s) orally 3 times a day, As Needed

## 2021-03-15 NOTE — DISCHARGE NOTE PROVIDER - HOSPITAL COURSE
75 year old gentleman with history of HTN, HLD, CAD s/p PCI 2002 and CABGx3 3/23/20, COPD, and paroxsymal atrial fibrillation with a CHADSVASC: 3 who presents is s/p successful loop recorder insertion for the surveillance of AFib and syncope.

## 2021-03-15 NOTE — DISCHARGE NOTE PROVIDER - NSDCFUSCHEDAPPT_GEN_ALL_CORE_FT
JENNIFER MOORE ; 03/18/2021 ; [facility] [address]  JENNIFER MOORE ; 05/15/2021 ; [facility] [address] [facility]  JENNIFER MOORE ; 05/19/2021 ; [facility] [address] JENNIFER Walker ; 05/19/2021 ; [facility] [address]  JENNIFER MOORE ; 05/25/2021 ; [facility] [address]

## 2021-03-15 NOTE — H&P PST ADULT - HISTORY OF PRESENT ILLNESS
75 year old gentleman with history of HTN, HLD, CAD s/p PCI 2002 and CABGx3 3/23/20, COPD, and paroxsymal atrial fibrillation with a CHADSVASC: 3. In 12/2020- 1/2021 he was admitted to [facility] with pneumonia. There was concern for intermittent tachycardia and possibly atrial fibrillation with rapid rates during hospitalization. ECG from 12/21/20 does reveal AF with rapid rates (and RBBB). He was seen by cardiology and apparently no other AF was noted on review. Due to concern for AF, he was started on Eliquis 5mg bid, in addition to ASA (and Plavix has been stopped). He reports no known prior history of AF. HE does report occasional mild brief palpitation, as well as occasional lightheadedness. About two years ago he had an episode of syncope, which occurred after being on the water on a hot day, and was ultimately thought to be due to dehydration.      Cardiology Summary  Echo: 12/29/20, LVEF 65-70%, nml LA size, sclerotic AV with normal opening, mild aortic stenosis   Cardiac Cath: 1/3/20, LM 30%, mLAD 70%, RCA 50%

## 2021-03-15 NOTE — DISCHARGE NOTE PROVIDER - CARE PROVIDER_API CALL
Elvin Mayorga)  Cardiology; Internal Medicine  [address] [facility], [address]  [place] [address]  Phone: [phone]  Fax: [phone]  Established Patient  Follow Up Time: 2 weeks    Jassi Fink)  Cardiovascular Disease; Internal Medicine  [address] [place] [address]  Phone: [phone]  Fax: [phone]  Established Patient  Follow Up Time: Routine

## 2021-03-15 NOTE — DISCHARGE NOTE PROVIDER - PROVIDER TOKENS
PROVIDER:[TOKEN:[[id number]],FOLLOWUP:[2 weeks],ESTABLISHEDPATIENT:[T]],PROVIDER:[TOKEN:[[id number]],FOLLOWUP:[Routine],ESTABLISHEDPATIENT:[T]]

## 2021-03-15 NOTE — DISCHARGE NOTE PROVIDER - CARE PROVIDERS DIRECT ADDRESSES
[email or web address]

## 2021-03-15 NOTE — H&P PST ADULT - ASSESSMENT
75 year old gentleman with history of HTN, HLD, CAD s/p PCI 2002 and CABGx3 3/23/20, COPD, and paroxsymal atrial fibrillation with a CHADSVASC: 3 who presents for loop recorder implantation for the surveillance of AFib and syncope.     - COVID negative on 3/12/21

## 2021-03-15 NOTE — DISCHARGE NOTE PROVIDER - NSDCCPTREATMENT_GEN_ALL_CORE_FT
PRINCIPAL PROCEDURE  Procedure: Insertion, loop recorder  Findings and Treatment: Loop Recorder Incision Care:     - Remove the plastic and gauze dressing after 24 hours.   - Do not touch the incision until it is completely healed.   - There is Dermabond (skin glue) on your incision, which will start to flake off on its own over the next 2-3 weeks. Do not pick at or peal off the Dermabond.   - Do not apply soaps, creams, lotions, ointments or powders to the incision until it is completely healed.  - You should call the doctor if you notice redness, drainage, swelling, increased tenderness, hot sensation around the  incision, bleeding or incision edges pulling apart.

## 2021-03-17 ENCOUNTER — APPOINTMENT (OUTPATIENT)
Dept: PULMONOLOGY | Facility: CLINIC | Age: 76
End: 2021-03-17

## 2021-03-18 ENCOUNTER — APPOINTMENT (OUTPATIENT)
Dept: PULMONOLOGY | Facility: CLINIC | Age: 76
End: 2021-03-18
Payer: MEDICARE

## 2021-03-18 VITALS
SYSTOLIC BLOOD PRESSURE: 112 MMHG | WEIGHT: 155 LBS | DIASTOLIC BLOOD PRESSURE: 64 MMHG | RESPIRATION RATE: 14 BRPM | BODY MASS INDEX: 24.33 KG/M2 | OXYGEN SATURATION: 93 % | HEART RATE: 71 BPM | HEIGHT: 67 IN

## 2021-03-18 PROCEDURE — 99214 OFFICE O/P EST MOD 30 MIN: CPT

## 2021-03-18 RX ORDER — GABAPENTIN 400 MG/1
400 CAPSULE ORAL 3 TIMES DAILY
Qty: 270 | Refills: 0 | Status: ACTIVE | COMMUNITY

## 2021-03-18 NOTE — DISCUSSION/SUMMARY
[COPD] : chronic obstructive pulmonary disease [Stable] : stable [Responding to Treatment] : responding to treatment [Stage II (Moderate)] : stage II (moderate) [PFTs] : pulmonary function tests [Chest CT] : chest CT [None] : There are no changes in medication management [FreeTextEntry1] : Recovered from multilobar Pneumonia

## 2021-03-18 NOTE — CONSULT LETTER
[Dear  ___] : Dear  [unfilled], [Consult Letter:] : I had the pleasure of evaluating your patient, [unfilled]. [Please see my note below.] : Please see my note below. [Consult Closing:] : Thank you very much for allowing me to participate in the care of this patient.  If you have any questions, please do not hesitate to contact me. [Sincerely,] : Sincerely, [FreeTextEntry3] : Epi Fuller MD FCCP\par Pulmonary/Critical Care/Sleep Medicine\par Department of Internal Medicine\par \par [employer] [employer]\par

## 2021-03-18 NOTE — HISTORY OF PRESENT ILLNESS
[Former] : former [Doing Well] : doing well [Difficulty Breathing During Exertion] : stable dyspnea on exertion [Feelings Of Weakness On Exertion] : stable exercise intolerance [Cough] : denies coughing [Coughing Up Sputum] : denies coughing up sputum [Wheezing] : denies wheezing [Regional Soft Tissue Swelling Both Lower Extremities] : denies lower extremity edema [Adherent] : the patient is adherent with ~his/her~ medication regimen [de-identified] : CAD, S/P CABG, Pnuemonia [ESS] : 0

## 2021-03-30 ENCOUNTER — NON-APPOINTMENT (OUTPATIENT)
Age: 76
End: 2021-03-30

## 2021-03-30 ENCOUNTER — APPOINTMENT (OUTPATIENT)
Dept: ELECTROPHYSIOLOGY | Facility: CLINIC | Age: 76
End: 2021-03-30
Payer: MEDICARE

## 2021-03-30 VITALS
BODY MASS INDEX: 24.17 KG/M2 | OXYGEN SATURATION: 94 % | HEIGHT: 67 IN | HEART RATE: 74 BPM | SYSTOLIC BLOOD PRESSURE: 122 MMHG | TEMPERATURE: 96.8 F | DIASTOLIC BLOOD PRESSURE: 79 MMHG | WEIGHT: 154 LBS

## 2021-03-30 PROCEDURE — 93000 ELECTROCARDIOGRAM COMPLETE: CPT

## 2021-03-30 PROCEDURE — 99214 OFFICE O/P EST MOD 30 MIN: CPT | Mod: 25

## 2021-03-30 RX ORDER — COLD-HOT PACK
125 MCG EACH MISCELLANEOUS
Qty: 90 | Refills: 1 | Status: DISCONTINUED | COMMUNITY
Start: 2020-10-16 | End: 2021-03-30

## 2021-03-30 NOTE — REVIEW OF SYSTEMS
[Fever] : no fever [Chills] : no chills [Feeling Fatigued] : not feeling fatigued [see HPI] : see HPI [Shortness Of Breath] : no shortness of breath [Dyspnea on exertion] : not dyspnea during exertion [Chest  Pressure] : no chest pressure [Chest Pain] : chest pain [Lower Ext Edema] : no extremity edema [Palpitations] : no palpitations [Dizziness] : no dizziness [Easy Bleeding] : no tendency for easy bleeding [Easy Bruising] : no tendency for easy bruising

## 2021-03-30 NOTE — HISTORY OF PRESENT ILLNESS
[de-identified] : 75 year old gentleman with history of HTN, HLD, CAD s/p PCI 2002 and CABGx3 3/23/20, COPD, and paroxsymal atrial fibrillation with a CHADSVASC: 3. In 12/2020- 1/2021 he was admitted to [facility] with pneumonia. There was concern for intermittent tachycardia and possibly atrial fibrillation with rapid rates during hospitalization. ECG from 12/21/20 does reveal AF with rapid rates (and RBBB). He was seen by cardiology and apparently no other AF was noted on review. He was started on Eliquis 5mg bid, in addition to ASA (and Plavix has been stopped). He reports no known prior history of AF. He does report occasional mild brief palpitation, as well as occasional lightheadedness. About two years ago he had an episode of syncope, which occurred after being on the water on a hot day, and was ultimately thought to be due to dehydration.\par \par Now s/p ILR implant for history of syncope and long term monitoring of atrial arrhythmias, allow symptom correlation. Presents for post implant device and wound check.

## 2021-03-30 NOTE — PROCEDURE
[No] : not [See Device Printout] : See device printout [None] : none [de-identified] : Reveal LINQ [de-identified] : Two symptom events c/w SR with rare PACS

## 2021-03-30 NOTE — PHYSICAL EXAM
[General Appearance - Well Developed] : well developed [] : no respiratory distress [Clean] : clean [Dry] : dry [Well-Healed] : well-healed [Erythema] : not erythematous [Warm] : not warm [Tender] : not tender [Indurated] : not indurated [FreeTextEntry1] : parasternal

## 2021-04-15 ENCOUNTER — NON-APPOINTMENT (OUTPATIENT)
Age: 76
End: 2021-04-15

## 2021-04-15 ENCOUNTER — APPOINTMENT (OUTPATIENT)
Dept: ELECTROPHYSIOLOGY | Facility: CLINIC | Age: 76
End: 2021-04-15
Payer: MEDICARE

## 2021-04-15 PROCEDURE — 93298 REM INTERROG DEV EVAL SCRMS: CPT

## 2021-04-15 PROCEDURE — G2066: CPT

## 2021-04-16 ENCOUNTER — OUTPATIENT (OUTPATIENT)
Dept: OUTPATIENT SERVICES | Facility: HOSPITAL | Age: 76
LOS: 1 days | End: 2021-04-16
Payer: MEDICARE

## 2021-04-16 ENCOUNTER — APPOINTMENT (OUTPATIENT)
Dept: CT IMAGING | Facility: CLINIC | Age: 76
End: 2021-04-16
Payer: MEDICARE

## 2021-04-16 DIAGNOSIS — Z95.1 PRESENCE OF AORTOCORONARY BYPASS GRAFT: Chronic | ICD-10-CM

## 2021-04-16 DIAGNOSIS — Z95.5 PRESENCE OF CORONARY ANGIOPLASTY IMPLANT AND GRAFT: Chronic | ICD-10-CM

## 2021-04-16 DIAGNOSIS — R91.8 OTHER NONSPECIFIC ABNORMAL FINDING OF LUNG FIELD: ICD-10-CM

## 2021-04-16 DIAGNOSIS — Z98.49 CATARACT EXTRACTION STATUS, UNSPECIFIED EYE: Chronic | ICD-10-CM

## 2021-04-16 PROCEDURE — G1004: CPT

## 2021-04-16 PROCEDURE — 71250 CT THORAX DX C-: CPT

## 2021-04-16 PROCEDURE — 71250 CT THORAX DX C-: CPT | Mod: 26,MG

## 2021-04-24 ENCOUNTER — INPATIENT (INPATIENT)
Facility: HOSPITAL | Age: 76
LOS: 2 days | Discharge: HOME | DRG: 195 | End: 2021-04-27
Attending: HOSPITALIST | Admitting: HOSPITALIST
Payer: MEDICARE

## 2021-04-24 ENCOUNTER — NON-APPOINTMENT (OUTPATIENT)
Age: 76
End: 2021-04-24

## 2021-04-24 ENCOUNTER — TRANSCRIPTION ENCOUNTER (OUTPATIENT)
Age: 76
End: 2021-04-24

## 2021-04-24 VITALS
RESPIRATION RATE: 20 BRPM | SYSTOLIC BLOOD PRESSURE: 128 MMHG | OXYGEN SATURATION: 95 % | WEIGHT: 156.97 LBS | HEART RATE: 90 BPM | HEIGHT: 67 IN | DIASTOLIC BLOOD PRESSURE: 79 MMHG | TEMPERATURE: 98 F

## 2021-04-24 DIAGNOSIS — J18.9 PNEUMONIA, UNSPECIFIED ORGANISM: ICD-10-CM

## 2021-04-24 DIAGNOSIS — Z98.49 CATARACT EXTRACTION STATUS, UNSPECIFIED EYE: Chronic | ICD-10-CM

## 2021-04-24 DIAGNOSIS — Z95.5 PRESENCE OF CORONARY ANGIOPLASTY IMPLANT AND GRAFT: Chronic | ICD-10-CM

## 2021-04-24 DIAGNOSIS — Z95.1 PRESENCE OF AORTOCORONARY BYPASS GRAFT: Chronic | ICD-10-CM

## 2021-04-24 LAB
ALBUMIN SERPL ELPH-MCNC: 4.2 G/DL — SIGNIFICANT CHANGE UP (ref 3.3–5.2)
ALP SERPL-CCNC: 91 U/L — SIGNIFICANT CHANGE UP (ref 40–120)
ALT FLD-CCNC: 21 U/L — SIGNIFICANT CHANGE UP
ANION GAP SERPL CALC-SCNC: 13 MMOL/L — SIGNIFICANT CHANGE UP (ref 5–17)
APTT BLD: 37.4 SEC — HIGH (ref 27.5–35.5)
AST SERPL-CCNC: 22 U/L — SIGNIFICANT CHANGE UP
BASOPHILS # BLD AUTO: 0.06 K/UL — SIGNIFICANT CHANGE UP (ref 0–0.2)
BASOPHILS NFR BLD AUTO: 0.3 % — SIGNIFICANT CHANGE UP (ref 0–2)
BILIRUB SERPL-MCNC: 0.7 MG/DL — SIGNIFICANT CHANGE UP (ref 0.4–2)
BUN SERPL-MCNC: 18 MG/DL — SIGNIFICANT CHANGE UP (ref 8–20)
CALCIUM SERPL-MCNC: 9.4 MG/DL — SIGNIFICANT CHANGE UP (ref 8.6–10.2)
CHLORIDE SERPL-SCNC: 96 MMOL/L — LOW (ref 98–107)
CO2 SERPL-SCNC: 28 MMOL/L — SIGNIFICANT CHANGE UP (ref 22–29)
CREAT SERPL-MCNC: 0.61 MG/DL — SIGNIFICANT CHANGE UP (ref 0.5–1.3)
EOSINOPHIL # BLD AUTO: 0.07 K/UL — SIGNIFICANT CHANGE UP (ref 0–0.5)
EOSINOPHIL NFR BLD AUTO: 0.3 % — SIGNIFICANT CHANGE UP (ref 0–6)
GLUCOSE SERPL-MCNC: 113 MG/DL — HIGH (ref 70–99)
HCT VFR BLD CALC: 35.9 % — LOW (ref 39–50)
HGB BLD-MCNC: 11.7 G/DL — LOW (ref 13–17)
IMM GRANULOCYTES NFR BLD AUTO: 0.7 % — SIGNIFICANT CHANGE UP (ref 0–1.5)
INR BLD: 1.84 RATIO — HIGH (ref 0.88–1.16)
LYMPHOCYTES # BLD AUTO: 1.18 K/UL — SIGNIFICANT CHANGE UP (ref 1–3.3)
LYMPHOCYTES # BLD AUTO: 5.5 % — LOW (ref 13–44)
MCHC RBC-ENTMCNC: 29.3 PG — SIGNIFICANT CHANGE UP (ref 27–34)
MCHC RBC-ENTMCNC: 32.6 GM/DL — SIGNIFICANT CHANGE UP (ref 32–36)
MCV RBC AUTO: 90 FL — SIGNIFICANT CHANGE UP (ref 80–100)
MONOCYTES # BLD AUTO: 0.84 K/UL — SIGNIFICANT CHANGE UP (ref 0–0.9)
MONOCYTES NFR BLD AUTO: 3.9 % — SIGNIFICANT CHANGE UP (ref 2–14)
NEUTROPHILS # BLD AUTO: 19.08 K/UL — HIGH (ref 1.8–7.4)
NEUTROPHILS NFR BLD AUTO: 89.3 % — HIGH (ref 43–77)
PLATELET # BLD AUTO: 264 K/UL — SIGNIFICANT CHANGE UP (ref 150–400)
POTASSIUM SERPL-MCNC: 4.3 MMOL/L — SIGNIFICANT CHANGE UP (ref 3.5–5.3)
POTASSIUM SERPL-SCNC: 4.3 MMOL/L — SIGNIFICANT CHANGE UP (ref 3.5–5.3)
PROT SERPL-MCNC: 6.8 G/DL — SIGNIFICANT CHANGE UP (ref 6.6–8.7)
PROTHROM AB SERPL-ACNC: 20.7 SEC — HIGH (ref 10.6–13.6)
RBC # BLD: 3.99 M/UL — LOW (ref 4.2–5.8)
RBC # FLD: 14.6 % — HIGH (ref 10.3–14.5)
SODIUM SERPL-SCNC: 136 MMOL/L — SIGNIFICANT CHANGE UP (ref 135–145)
WBC # BLD: 21.39 K/UL — HIGH (ref 3.8–10.5)
WBC # FLD AUTO: 21.39 K/UL — HIGH (ref 3.8–10.5)

## 2021-04-24 PROCEDURE — 99285 EMERGENCY DEPT VISIT HI MDM: CPT | Mod: CS

## 2021-04-24 PROCEDURE — 71045 X-RAY EXAM CHEST 1 VIEW: CPT | Mod: 26

## 2021-04-24 PROCEDURE — 93010 ELECTROCARDIOGRAM REPORT: CPT

## 2021-04-24 PROCEDURE — 99223 1ST HOSP IP/OBS HIGH 75: CPT | Mod: AI

## 2021-04-24 RX ORDER — APIXABAN 2.5 MG/1
5 TABLET, FILM COATED ORAL EVERY 12 HOURS
Refills: 0 | Status: DISCONTINUED | OUTPATIENT
Start: 2021-04-24 | End: 2021-04-24

## 2021-04-24 RX ORDER — SACCHAROMYCES BOULARDII 250 MG
250 POWDER IN PACKET (EA) ORAL
Refills: 0 | Status: DISCONTINUED | OUTPATIENT
Start: 2021-04-24 | End: 2021-04-27

## 2021-04-24 RX ORDER — SODIUM CHLORIDE 9 MG/ML
2200 INJECTION INTRAMUSCULAR; INTRAVENOUS; SUBCUTANEOUS ONCE
Refills: 0 | Status: COMPLETED | OUTPATIENT
Start: 2021-04-24 | End: 2021-04-24

## 2021-04-24 RX ORDER — AZITHROMYCIN 500 MG/1
500 TABLET, FILM COATED ORAL ONCE
Refills: 0 | Status: COMPLETED | OUTPATIENT
Start: 2021-04-24 | End: 2021-04-24

## 2021-04-24 RX ORDER — AZITHROMYCIN 500 MG/1
500 TABLET, FILM COATED ORAL EVERY 24 HOURS
Refills: 0 | Status: DISCONTINUED | OUTPATIENT
Start: 2021-04-25 | End: 2021-04-27

## 2021-04-24 RX ORDER — OXYCODONE AND ACETAMINOPHEN 5; 325 MG/1; MG/1
1 TABLET ORAL EVERY 6 HOURS
Refills: 0 | Status: DISCONTINUED | OUTPATIENT
Start: 2021-04-24 | End: 2021-04-25

## 2021-04-24 RX ORDER — TAMSULOSIN HYDROCHLORIDE 0.4 MG/1
0.4 CAPSULE ORAL AT BEDTIME
Refills: 0 | Status: DISCONTINUED | OUTPATIENT
Start: 2021-04-24 | End: 2021-04-27

## 2021-04-24 RX ORDER — METOPROLOL TARTRATE 50 MG
25 TABLET ORAL ONCE
Refills: 0 | Status: COMPLETED | OUTPATIENT
Start: 2021-04-24 | End: 2021-04-24

## 2021-04-24 RX ORDER — IPRATROPIUM/ALBUTEROL SULFATE 18-103MCG
3 AEROSOL WITH ADAPTER (GRAM) INHALATION EVERY 6 HOURS
Refills: 0 | Status: DISCONTINUED | OUTPATIENT
Start: 2021-04-24 | End: 2021-04-27

## 2021-04-24 RX ORDER — METOPROLOL TARTRATE 50 MG
25 TABLET ORAL
Refills: 0 | Status: DISCONTINUED | OUTPATIENT
Start: 2021-04-24 | End: 2021-04-27

## 2021-04-24 RX ORDER — GABAPENTIN 400 MG/1
400 CAPSULE ORAL THREE TIMES A DAY
Refills: 0 | Status: DISCONTINUED | OUTPATIENT
Start: 2021-04-24 | End: 2021-04-27

## 2021-04-24 RX ORDER — ACETAMINOPHEN 500 MG
650 TABLET ORAL ONCE
Refills: 0 | Status: COMPLETED | OUTPATIENT
Start: 2021-04-24 | End: 2021-04-24

## 2021-04-24 RX ORDER — CEFTRIAXONE 500 MG/1
1000 INJECTION, POWDER, FOR SOLUTION INTRAMUSCULAR; INTRAVENOUS ONCE
Refills: 0 | Status: COMPLETED | OUTPATIENT
Start: 2021-04-24 | End: 2021-04-24

## 2021-04-24 RX ORDER — CEFTRIAXONE 500 MG/1
1000 INJECTION, POWDER, FOR SOLUTION INTRAMUSCULAR; INTRAVENOUS EVERY 24 HOURS
Refills: 0 | Status: DISCONTINUED | OUTPATIENT
Start: 2021-04-25 | End: 2021-04-27

## 2021-04-24 RX ORDER — APIXABAN 2.5 MG/1
5 TABLET, FILM COATED ORAL EVERY 12 HOURS
Refills: 0 | Status: DISCONTINUED | OUTPATIENT
Start: 2021-04-24 | End: 2021-04-27

## 2021-04-24 RX ADMIN — AZITHROMYCIN 255 MILLIGRAM(S): 500 TABLET, FILM COATED ORAL at 19:00

## 2021-04-24 RX ADMIN — SODIUM CHLORIDE 2200 MILLILITER(S): 9 INJECTION INTRAMUSCULAR; INTRAVENOUS; SUBCUTANEOUS at 17:52

## 2021-04-24 RX ADMIN — TAMSULOSIN HYDROCHLORIDE 0.4 MILLIGRAM(S): 0.4 CAPSULE ORAL at 21:37

## 2021-04-24 RX ADMIN — CEFTRIAXONE 100 MILLIGRAM(S): 500 INJECTION, POWDER, FOR SOLUTION INTRAMUSCULAR; INTRAVENOUS at 17:52

## 2021-04-24 RX ADMIN — Medication 3 MILLILITER(S): at 19:49

## 2021-04-24 RX ADMIN — APIXABAN 5 MILLIGRAM(S): 2.5 TABLET, FILM COATED ORAL at 23:00

## 2021-04-24 RX ADMIN — OXYCODONE AND ACETAMINOPHEN 1 TABLET(S): 5; 325 TABLET ORAL at 19:15

## 2021-04-24 RX ADMIN — Medication 25 MILLIGRAM(S): at 23:00

## 2021-04-24 RX ADMIN — GABAPENTIN 400 MILLIGRAM(S): 400 CAPSULE ORAL at 21:37

## 2021-04-24 NOTE — H&P ADULT - ASSESSMENT
75M with a history of COPD, atrial fibrillation, coronary artery disease, and back pain who presented with fever, productive cough, dyspnea and wheezing, found to have leukocytosis and chest Xray noting infiltrate.    Pneumonia - On empiric antibiotics. Bood culture results to be reviewed when available.    COPD exacerbation - On prednisone with inhaled bronchodilator therapy. To taper supplemental oxygen as tolerated.    Coronary artery disease - No chest pain. On metoprolol.    Atrial fibrillation - On apixaban for anticoagulation. On metoprolol.    Back pain - Analgesic medications as needed. 75M with a history of COPD, atrial fibrillation, coronary artery disease, and back pain who presented with fever, productive cough, dyspnea and wheezing, found to have leukocytosis and chest Xray noting infiltrate.    Pneumonia - On empiric antibiotics. Blood culture results to be reviewed when available.    COPD exacerbation - On prednisone with inhaled bronchodilator therapy. To taper supplemental oxygen as tolerated.    Coronary artery disease - No chest pain. On metoprolol.    Atrial fibrillation - On apixaban for anticoagulation. On metoprolol.    Back pain - Analgesic medications as needed.

## 2021-04-24 NOTE — ED PROVIDER NOTE - OBJECTIVE STATEMENT
The patient is a 75 year old male presents with cough SOB and fever and went to urgent care showing L sided pneumonia and sent in for further evaluation  The patient has history of COPD, HLD, CAD, HTN  The received covid-19 vaccine

## 2021-04-24 NOTE — ED PROVIDER NOTE - CARE PLAN
Principal Discharge DX:	Pneumonia  Secondary Diagnosis:	COPD (chronic obstructive pulmonary disease)

## 2021-04-24 NOTE — ED ADULT NURSE NOTE - NSIMPLEMENTINTERV_GEN_ALL_ED
Implemented All Universal Safety Interventions:  [place] to call system. Call bell, personal items and telephone within reach. Instruct patient to call for assistance. Room bathroom lighting operational. Non-slip footwear when patient is off stretcher. Physically safe environment: no spills, clutter or unnecessary equipment. Stretcher in lowest position, wheels locked, appropriate side rails in place.

## 2021-04-24 NOTE — ED ADULT NURSE NOTE - OBJECTIVE STATEMENT
Pt A&OX3, amb ad johnson, has had a cough fro a few weeks, went to urgent care where he had a chest xray done and was told to come to ED for PN.  Pt denies any fevers.  Clear bsb, abd soft nondistended, nontender, moving all ext well.  Pt in NAD.  Telemetry monitor  in place, NSR.

## 2021-04-24 NOTE — PATIENT PROFILE ADULT - FUNCTIONAL SCREEN CURRENT LEVEL: COMMUNICATION, MLM
PAST MEDICAL HISTORY:  No pertinent past medical history
0 = understands/communicates without difficulty

## 2021-04-24 NOTE — H&P ADULT - NSHPPHYSICALEXAM_GEN_ALL_CORE
Vital Signs Last 24 Hrs  T(C): 36.8 (24 Apr 2021 15:23), Max: 36.8 (24 Apr 2021 15:23)  T(F): 98.3 (24 Apr 2021 15:23), Max: 98.3 (24 Apr 2021 15:23)  HR: 90 (24 Apr 2021 15:23) (90 - 90)  BP: 128/79 (24 Apr 2021 15:23) (128/79 - 128/79)  BP(mean): --  RR: 20 (24 Apr 2021 15:23) (20 - 20)  SpO2: 95% (24 Apr 2021 15:23) (95% - 95%)    General appearance: No acute distress, Awake, Alert  HEENT: Normocephalic, Atraumatic, Conjunctiva clear, EOMI  Neck: Supple, No JVD, No tenderness  Lungs: Breath sound equal bilaterally, Basilar rales, Minimal expiratory wheeses  Cardiovascular: S1S2, Regular rhythm  Abdomen: Soft, Nontender, Nondistended, No guarding/rebound, Positive bowel sounds  Extremities: No clubbing, No cyanosis, No edema, No calf tenderness  Neuro: Strength equal bilaterally, No tremors  Psychiatric: Appropriate mood, Normal affect

## 2021-04-24 NOTE — ED ADULT NURSE REASSESSMENT NOTE - NS ED NURSE REASSESS COMMENT FT1
Assumed pt care @ this time. Report received from day YANG linares. Pt A&Ox4 w/no complaints @ this time. Respirations even & unlabored. NAD present. Pt made aware of plan of care and verbalized understanding.

## 2021-04-24 NOTE — ED ADULT NURSE NOTE - ED STAT RN HANDOFF DETAILS
Report given to Corine, holding room RN. Pt brought to B10R in stable condition. Pt made aware of plan of care and verbalized understanding.

## 2021-04-24 NOTE — H&P ADULT - HISTORY OF PRESENT ILLNESS
75M referred to the hospital [facility] with pneumonia. The patient reported that he first developed dyspnea and wheezing two days prior associated with a productive cough. He then noted a fever of 101 today and sought medical evaluation. The patient denied any associated chest pain or palpitation. He did note similar symptoms in the past with admission for pneumonia four months prior. He is unaware of any aggravating or relieving factors. He denied any recent sick contacts and had previously had both COVID-19 vaccinations.

## 2021-04-24 NOTE — ED ADULT TRIAGE NOTE - CHIEF COMPLAINT QUOTE
Patient A&Ox4 complaining of lightheadedness, cough, shortness of breath, chest pain when coughs, fever of 101 this morning. Took 2 Percocet @1130 with relief. Comes from UC, reports of left lower lobe infiltrate.

## 2021-04-25 LAB
COVID-19 SPIKE DOMAIN AB INTERP: POSITIVE
COVID-19 SPIKE DOMAIN ANTIBODY RESULT: >250 U/ML — HIGH
SARS-COV-2 IGG+IGM SERPL QL IA: >250 U/ML — HIGH
SARS-COV-2 IGG+IGM SERPL QL IA: POSITIVE
SARS-COV-2 RNA SPEC QL NAA+PROBE: SIGNIFICANT CHANGE UP

## 2021-04-25 PROCEDURE — 99232 SBSQ HOSP IP/OBS MODERATE 35: CPT

## 2021-04-25 RX ORDER — OXYCODONE AND ACETAMINOPHEN 5; 325 MG/1; MG/1
2 TABLET ORAL EVERY 6 HOURS
Refills: 0 | Status: DISCONTINUED | OUTPATIENT
Start: 2021-04-25 | End: 2021-04-27

## 2021-04-25 RX ORDER — ALPRAZOLAM 0.25 MG
0.25 TABLET ORAL ONCE
Refills: 0 | Status: DISCONTINUED | OUTPATIENT
Start: 2021-04-25 | End: 2021-04-25

## 2021-04-25 RX ADMIN — Medication 0.25 MILLIGRAM(S): at 23:23

## 2021-04-25 RX ADMIN — OXYCODONE AND ACETAMINOPHEN 2 TABLET(S): 5; 325 TABLET ORAL at 20:41

## 2021-04-25 RX ADMIN — OXYCODONE AND ACETAMINOPHEN 1 TABLET(S): 5; 325 TABLET ORAL at 10:49

## 2021-04-25 RX ADMIN — GABAPENTIN 400 MILLIGRAM(S): 400 CAPSULE ORAL at 22:24

## 2021-04-25 RX ADMIN — APIXABAN 5 MILLIGRAM(S): 2.5 TABLET, FILM COATED ORAL at 16:34

## 2021-04-25 RX ADMIN — Medication 3 MILLILITER(S): at 20:45

## 2021-04-25 RX ADMIN — TAMSULOSIN HYDROCHLORIDE 0.4 MILLIGRAM(S): 0.4 CAPSULE ORAL at 22:28

## 2021-04-25 RX ADMIN — GABAPENTIN 400 MILLIGRAM(S): 400 CAPSULE ORAL at 13:12

## 2021-04-25 RX ADMIN — Medication 40 MILLIGRAM(S): at 05:28

## 2021-04-25 RX ADMIN — OXYCODONE AND ACETAMINOPHEN 1 TABLET(S): 5; 325 TABLET ORAL at 09:43

## 2021-04-25 RX ADMIN — Medication 3 MILLILITER(S): at 02:17

## 2021-04-25 RX ADMIN — AZITHROMYCIN 255 MILLIGRAM(S): 500 TABLET, FILM COATED ORAL at 20:41

## 2021-04-25 RX ADMIN — CEFTRIAXONE 100 MILLIGRAM(S): 500 INJECTION, POWDER, FOR SOLUTION INTRAMUSCULAR; INTRAVENOUS at 16:33

## 2021-04-25 RX ADMIN — OXYCODONE AND ACETAMINOPHEN 2 TABLET(S): 5; 325 TABLET ORAL at 13:12

## 2021-04-25 RX ADMIN — APIXABAN 5 MILLIGRAM(S): 2.5 TABLET, FILM COATED ORAL at 05:28

## 2021-04-25 RX ADMIN — Medication 250 MILLIGRAM(S): at 16:37

## 2021-04-25 RX ADMIN — Medication 3 MILLILITER(S): at 15:37

## 2021-04-25 RX ADMIN — OXYCODONE AND ACETAMINOPHEN 1 TABLET(S): 5; 325 TABLET ORAL at 02:47

## 2021-04-25 RX ADMIN — OXYCODONE AND ACETAMINOPHEN 1 TABLET(S): 5; 325 TABLET ORAL at 01:47

## 2021-04-25 RX ADMIN — GABAPENTIN 400 MILLIGRAM(S): 400 CAPSULE ORAL at 05:28

## 2021-04-25 RX ADMIN — Medication 25 MILLIGRAM(S): at 16:37

## 2021-04-25 RX ADMIN — OXYCODONE AND ACETAMINOPHEN 2 TABLET(S): 5; 325 TABLET ORAL at 14:15

## 2021-04-25 RX ADMIN — Medication 25 MILLIGRAM(S): at 05:28

## 2021-04-25 RX ADMIN — Medication 250 MILLIGRAM(S): at 05:28

## 2021-04-25 RX ADMIN — Medication 3 MILLILITER(S): at 12:28

## 2021-04-25 RX ADMIN — OXYCODONE AND ACETAMINOPHEN 2 TABLET(S): 5; 325 TABLET ORAL at 21:41

## 2021-04-25 NOTE — PROGRESS NOTE ADULT - SUBJECTIVE AND OBJECTIVE BOX
JENNIFER MOORE  ----------------------------------------  The patient was seen at bedside. Patient with pneumonia. Reports productive cough and back pain.    Vital Signs Last 24 Hrs  T(C): 36.9 (25 Apr 2021 10:23), Max: 37 (24 Apr 2021 21:03)  T(F): 98.4 (25 Apr 2021 10:23), Max: 98.6 (24 Apr 2021 21:03)  HR: 92 (25 Apr 2021 10:23) (78 - 93)  BP: 107/70 (25 Apr 2021 10:23) (105/89 - 128/79)  BP(mean): --  RR: 18 (25 Apr 2021 10:23) (18 - 20)  SpO2: 96% (25 Apr 2021 10:23) (94% - 96%)    PHYSICAL EXAMINATION:  ----------------------------------------  General appearance: No acute distress, Awake, Alert  HEENT: Normocephalic, Atraumatic, Conjunctiva clear, EOMI  Neck: Supple, No JVD, No tenderness  Lungs: Breath sound equal bilaterally, No wheezes, Basilar rales  Cardiovascular: S1S2, Regular rhythm  Abdomen: Soft, Nontender, Nondistended, No guarding/rebound, Positive bowel sounds  Extremities: No clubbing, No cyanosis, No edema, No calf tenderness  Neuro: Strength equal bilaterally, No tremors  Psychiatric: Appropriate mood, Normal affect    LABORATORY STUDIES:  ----------------------------------------             11.7   21.39 )-----------( 264      ( 24 Apr 2021 16:39 )             35.9     04-24    136  |  96<L>  |  18.0  ----------------------------<  113<H>  4.3   |  28.0  |  0.61    Ca    9.4      24 Apr 2021 16:39    TPro  6.8  /  Alb  4.2  /  TBili  0.7  /  DBili  x   /  AST  22  /  ALT  21  /  AlkPhos  91  04-24    LIVER FUNCTIONS - ( 24 Apr 2021 16:39 )  Alb: 4.2 g/dL / Pro: 6.8 g/dL / ALK PHOS: 91 U/L / ALT: 21 U/L / AST: 22 U/L / GGT: x           PT/INR - ( 24 Apr 2021 16:39 )   PT: 20.7 sec;   INR: 1.84 ratio    PTT - ( 24 Apr 2021 16:39 )  PTT:37.4 sec    MEDICATIONS  (STANDING):  albuterol/ipratropium for Nebulization 3 milliLiter(s) Nebulizer every 6 hours  apixaban 5 milliGRAM(s) Oral every 12 hours  azithromycin  IVPB 500 milliGRAM(s) IV Intermittent every 24 hours  cefTRIAXone   IVPB 1000 milliGRAM(s) IV Intermittent every 24 hours  gabapentin 400 milliGRAM(s) Oral three times a day  metoprolol tartrate 25 milliGRAM(s) Oral two times a day  predniSONE   Tablet 40 milliGRAM(s) Oral daily  saccharomyces boulardii 250 milliGRAM(s) Oral two times a day  tamsulosin 0.4 milliGRAM(s) Oral at bedtime    MEDICATIONS  (PRN):  oxycodone    5 mG/acetaminophen 325 mG 2 Tablet(s) Oral every 6 hours PRN Moderate Pain (4 - 6)      ASSESSMENT / PLAN:  ----------------------------------------  75M with a history of COPD, atrial fibrillation, coronary artery disease, and back pain who presented with fever, productive cough, dyspnea and wheezing, found to have leukocytosis and chest Xray noting infiltrate.    Pneumonia - On empiric antibiotics. Blood culture results to be reviewed when available.    COPD exacerbation - On prednisone with inhaled bronchodilator therapy. Wheezing resolved. To taper supplemental oxygen as tolerated.    Coronary artery disease - No chest pain. On metoprolol.    Atrial fibrillation - On apixaban for anticoagulation. On metoprolol.    Back pain - Analgesic medications as needed.        Anticipate discharge in 24-48 hours JENNIFER MOORE  ----------------------------------------  The patient was seen at bedside. Patient with pneumonia. Reports productive cough and back pain.    Vital Signs Last 24 Hrs  T(C): 36.9 (25 Apr 2021 10:23), Max: 37 (24 Apr 2021 21:03)  T(F): 98.4 (25 Apr 2021 10:23), Max: 98.6 (24 Apr 2021 21:03)  HR: 92 (25 Apr 2021 10:23) (78 - 93)  BP: 107/70 (25 Apr 2021 10:23) (105/89 - 128/79)  BP(mean): --  RR: 18 (25 Apr 2021 10:23) (18 - 20)  SpO2: 96% (25 Apr 2021 10:23) (94% - 96%)    PHYSICAL EXAMINATION:  ----------------------------------------  General appearance: No acute distress, Awake, Alert  HEENT: Normocephalic, Atraumatic, Conjunctiva clear, EOMI  Neck: Supple, No JVD, No tenderness  Lungs: Breath sound equal bilaterally, No wheezes, Basilar rales  Cardiovascular: S1S2, Regular rhythm  Abdomen: Soft, Nontender, Nondistended, No guarding/rebound, Positive bowel sounds  Extremities: No clubbing, No cyanosis, No edema, No calf tenderness  Neuro: Strength equal bilaterally, No tremors  Psychiatric: Appropriate mood, Normal affect    LABORATORY STUDIES:  ----------------------------------------             11.7   21.39 )-----------( 264      ( 24 Apr 2021 16:39 )             35.9     04-24    136  |  96<L>  |  18.0  ----------------------------<  113<H>  4.3   |  28.0  |  0.61    Ca    9.4      24 Apr 2021 16:39    TPro  6.8  /  Alb  4.2  /  TBili  0.7  /  DBili  x   /  AST  22  /  ALT  21  /  AlkPhos  91  04-24    LIVER FUNCTIONS - ( 24 Apr 2021 16:39 )  Alb: 4.2 g/dL / Pro: 6.8 g/dL / ALK PHOS: 91 U/L / ALT: 21 U/L / AST: 22 U/L / GGT: x           PT/INR - ( 24 Apr 2021 16:39 )   PT: 20.7 sec;   INR: 1.84 ratio    PTT - ( 24 Apr 2021 16:39 )  PTT:37.4 sec    MEDICATIONS  (STANDING):  albuterol/ipratropium for Nebulization 3 milliLiter(s) Nebulizer every 6 hours  apixaban 5 milliGRAM(s) Oral every 12 hours  azithromycin  IVPB 500 milliGRAM(s) IV Intermittent every 24 hours  cefTRIAXone   IVPB 1000 milliGRAM(s) IV Intermittent every 24 hours  gabapentin 400 milliGRAM(s) Oral three times a day  metoprolol tartrate 25 milliGRAM(s) Oral two times a day  predniSONE   Tablet 40 milliGRAM(s) Oral daily  saccharomyces boulardii 250 milliGRAM(s) Oral two times a day  tamsulosin 0.4 milliGRAM(s) Oral at bedtime    MEDICATIONS  (PRN):  oxycodone    5 mG/acetaminophen 325 mG 2 Tablet(s) Oral every 6 hours PRN Moderate Pain (4 - 6)      ASSESSMENT / PLAN:  ----------------------------------------  75M with a history of COPD, atrial fibrillation, coronary artery disease, and back pain who presented with fever, productive cough, dyspnea and wheezing, found to have leukocytosis and chest Xray noting infiltrate.    Pneumonia - On empiric antibiotics. Blood culture results to be reviewed when available.    COPD exacerbation - On prednisone with inhaled bronchodilator therapy. Wheezing resolved. To taper supplemental oxygen as tolerated. CT results discussed with the patient who reported the known history of lung nodules for which he follows with his pulmonologist and has yearly CT scans.    Coronary artery disease - No chest pain. On metoprolol.    Atrial fibrillation - On apixaban for anticoagulation. On metoprolol.    Back pain - Analgesic medications as needed.        Anticipate discharge in 24-48 hours JENNIFER MOORE  ----------------------------------------  The patient was seen at bedside. Patient with pneumonia. Reports productive cough and back pain.    Vital Signs Last 24 Hrs  T(C): 36.9 (25 Apr 2021 10:23), Max: 37 (24 Apr 2021 21:03)  T(F): 98.4 (25 Apr 2021 10:23), Max: 98.6 (24 Apr 2021 21:03)  HR: 92 (25 Apr 2021 10:23) (78 - 93)  BP: 107/70 (25 Apr 2021 10:23) (105/89 - 128/79)  BP(mean): --  RR: 18 (25 Apr 2021 10:23) (18 - 20)  SpO2: 96% (25 Apr 2021 10:23) (94% - 96%)    PHYSICAL EXAMINATION:  ----------------------------------------  General appearance: No acute distress, Awake, Alert  HEENT: Normocephalic, Atraumatic, Conjunctiva clear, EOMI  Neck: Supple, No JVD, No tenderness  Lungs: Breath sound equal bilaterally, No wheezes, Basilar rales  Cardiovascular: S1S2, Regular rhythm  Abdomen: Soft, Nontender, Nondistended, No guarding/rebound, Positive bowel sounds  Extremities: No clubbing, No cyanosis, No edema, No calf tenderness  Neuro: Strength equal bilaterally, No tremors  Psychiatric: Appropriate mood, Normal affect    LABORATORY STUDIES:  ----------------------------------------             11.7   21.39 )-----------( 264      ( 24 Apr 2021 16:39 )             35.9     04-24    136  |  96<L>  |  18.0  ----------------------------<  113<H>  4.3   |  28.0  |  0.61    Ca    9.4      24 Apr 2021 16:39    TPro  6.8  /  Alb  4.2  /  TBili  0.7  /  DBili  x   /  AST  22  /  ALT  21  /  AlkPhos  91  04-24    LIVER FUNCTIONS - ( 24 Apr 2021 16:39 )  Alb: 4.2 g/dL / Pro: 6.8 g/dL / ALK PHOS: 91 U/L / ALT: 21 U/L / AST: 22 U/L / GGT: x           PT/INR - ( 24 Apr 2021 16:39 )   PT: 20.7 sec;   INR: 1.84 ratio    PTT - ( 24 Apr 2021 16:39 )  PTT:37.4 sec    MEDICATIONS  (STANDING):  albuterol/ipratropium for Nebulization 3 milliLiter(s) Nebulizer every 6 hours  apixaban 5 milliGRAM(s) Oral every 12 hours  azithromycin  IVPB 500 milliGRAM(s) IV Intermittent every 24 hours  cefTRIAXone   IVPB 1000 milliGRAM(s) IV Intermittent every 24 hours  gabapentin 400 milliGRAM(s) Oral three times a day  metoprolol tartrate 25 milliGRAM(s) Oral two times a day  predniSONE   Tablet 40 milliGRAM(s) Oral daily  saccharomyces boulardii 250 milliGRAM(s) Oral two times a day  tamsulosin 0.4 milliGRAM(s) Oral at bedtime    MEDICATIONS  (PRN):  oxycodone    5 mG/acetaminophen 325 mG 2 Tablet(s) Oral every 6 hours PRN Moderate Pain (4 - 6)      ASSESSMENT / PLAN:  ----------------------------------------  75M with a history of COPD, atrial fibrillation, coronary artery disease, and back pain who presented with fever, productive cough, dyspnea and wheezing, found to have leukocytosis and chest Xray noting infiltrate.    Pneumonia - On empiric antibiotics. Blood culture results to be reviewed when available.    COPD exacerbation - On prednisone with inhaled bronchodilator therapy. Wheezing resolved. Hypoxia improved. CT results discussed with the patient who reported the known history of lung nodules for which he follows with his pulmonologist and has yearly CT scans.    Coronary artery disease - No chest pain. On metoprolol.    Atrial fibrillation - On apixaban for anticoagulation. On metoprolol.    Back pain - Analgesic medications as needed.        Anticipate discharge in 24-48 hours

## 2021-04-26 LAB
APPEARANCE UR: CLEAR — SIGNIFICANT CHANGE UP
BILIRUB UR-MCNC: NEGATIVE — SIGNIFICANT CHANGE UP
COLOR SPEC: YELLOW — SIGNIFICANT CHANGE UP
DIFF PNL FLD: NEGATIVE — SIGNIFICANT CHANGE UP
GLUCOSE UR QL: NEGATIVE MG/DL — SIGNIFICANT CHANGE UP
HCT VFR BLD CALC: 31.3 % — LOW (ref 39–50)
HGB BLD-MCNC: 10.2 G/DL — LOW (ref 13–17)
KETONES UR-MCNC: NEGATIVE — SIGNIFICANT CHANGE UP
LEUKOCYTE ESTERASE UR-ACNC: NEGATIVE — SIGNIFICANT CHANGE UP
MCHC RBC-ENTMCNC: 29.8 PG — SIGNIFICANT CHANGE UP (ref 27–34)
MCHC RBC-ENTMCNC: 32.6 GM/DL — SIGNIFICANT CHANGE UP (ref 32–36)
MCV RBC AUTO: 91.5 FL — SIGNIFICANT CHANGE UP (ref 80–100)
NITRITE UR-MCNC: NEGATIVE — SIGNIFICANT CHANGE UP
PH UR: 6.5 — SIGNIFICANT CHANGE UP (ref 5–8)
PLATELET # BLD AUTO: 254 K/UL — SIGNIFICANT CHANGE UP (ref 150–400)
PROT UR-MCNC: NEGATIVE MG/DL — SIGNIFICANT CHANGE UP
RBC # BLD: 3.42 M/UL — LOW (ref 4.2–5.8)
RBC # FLD: 14.9 % — HIGH (ref 10.3–14.5)
SP GR SPEC: 1.01 — SIGNIFICANT CHANGE UP (ref 1.01–1.02)
UROBILINOGEN FLD QL: NEGATIVE MG/DL — SIGNIFICANT CHANGE UP
WBC # BLD: 7.28 K/UL — SIGNIFICANT CHANGE UP (ref 3.8–10.5)
WBC # FLD AUTO: 7.28 K/UL — SIGNIFICANT CHANGE UP (ref 3.8–10.5)

## 2021-04-26 PROCEDURE — 99232 SBSQ HOSP IP/OBS MODERATE 35: CPT

## 2021-04-26 PROCEDURE — 99222 1ST HOSP IP/OBS MODERATE 55: CPT

## 2021-04-26 RX ADMIN — OXYCODONE AND ACETAMINOPHEN 2 TABLET(S): 5; 325 TABLET ORAL at 04:13

## 2021-04-26 RX ADMIN — Medication 25 MILLIGRAM(S): at 17:27

## 2021-04-26 RX ADMIN — OXYCODONE AND ACETAMINOPHEN 2 TABLET(S): 5; 325 TABLET ORAL at 22:28

## 2021-04-26 RX ADMIN — Medication 3 MILLILITER(S): at 03:23

## 2021-04-26 RX ADMIN — OXYCODONE AND ACETAMINOPHEN 2 TABLET(S): 5; 325 TABLET ORAL at 03:13

## 2021-04-26 RX ADMIN — OXYCODONE AND ACETAMINOPHEN 2 TABLET(S): 5; 325 TABLET ORAL at 09:46

## 2021-04-26 RX ADMIN — Medication 20 MILLIGRAM(S): at 05:56

## 2021-04-26 RX ADMIN — OXYCODONE AND ACETAMINOPHEN 2 TABLET(S): 5; 325 TABLET ORAL at 21:28

## 2021-04-26 RX ADMIN — Medication 25 MILLIGRAM(S): at 05:56

## 2021-04-26 RX ADMIN — Medication 3 MILLILITER(S): at 08:31

## 2021-04-26 RX ADMIN — Medication 3 MILLILITER(S): at 21:09

## 2021-04-26 RX ADMIN — CEFTRIAXONE 100 MILLIGRAM(S): 500 INJECTION, POWDER, FOR SOLUTION INTRAMUSCULAR; INTRAVENOUS at 15:08

## 2021-04-26 RX ADMIN — OXYCODONE AND ACETAMINOPHEN 2 TABLET(S): 5; 325 TABLET ORAL at 15:09

## 2021-04-26 RX ADMIN — OXYCODONE AND ACETAMINOPHEN 2 TABLET(S): 5; 325 TABLET ORAL at 16:54

## 2021-04-26 RX ADMIN — AZITHROMYCIN 255 MILLIGRAM(S): 500 TABLET, FILM COATED ORAL at 20:00

## 2021-04-26 RX ADMIN — APIXABAN 5 MILLIGRAM(S): 2.5 TABLET, FILM COATED ORAL at 17:27

## 2021-04-26 RX ADMIN — GABAPENTIN 400 MILLIGRAM(S): 400 CAPSULE ORAL at 21:28

## 2021-04-26 RX ADMIN — OXYCODONE AND ACETAMINOPHEN 2 TABLET(S): 5; 325 TABLET ORAL at 08:52

## 2021-04-26 RX ADMIN — GABAPENTIN 400 MILLIGRAM(S): 400 CAPSULE ORAL at 05:56

## 2021-04-26 RX ADMIN — APIXABAN 5 MILLIGRAM(S): 2.5 TABLET, FILM COATED ORAL at 05:56

## 2021-04-26 RX ADMIN — TAMSULOSIN HYDROCHLORIDE 0.4 MILLIGRAM(S): 0.4 CAPSULE ORAL at 21:28

## 2021-04-26 RX ADMIN — Medication 250 MILLIGRAM(S): at 17:27

## 2021-04-26 RX ADMIN — GABAPENTIN 400 MILLIGRAM(S): 400 CAPSULE ORAL at 13:25

## 2021-04-26 RX ADMIN — Medication 3 MILLILITER(S): at 14:59

## 2021-04-26 RX ADMIN — Medication 250 MILLIGRAM(S): at 05:56

## 2021-04-26 NOTE — CONSULT NOTE ADULT - SUBJECTIVE AND OBJECTIVE BOX
[facility]  INFECTIOUS DISEASES AND INTERNAL MEDICINE at [place]  =======================================================  Stephen Lala MD  Diplomates American Board of Internal Medicine and Infectious Diseases  Tel  [phone]  Fax [phone]  =======================================================    [id number]  JENNIFER MOORE   HPI:  This 75M [facility] the [facility] with [facility]. The patient reported that he first developed dyspnea and wheezing two days prior associated with a productive cough. He then noted a fever of 101 today and sought medical evaluation. The patient denied any associated chest pain or palpitation. He did note similar symptoms in the past with admission for pneumonia four months prior. He is unaware of any aggravating or relieving factors. He denied any recent sick contacts and had previously had both COVID-19 vaccinations. (24 Apr 2021 19:24)    he was last seen in my office on 1/21/2021 following a hospital visit for PNA.  cultures were negative at that time and he completed a course of IV merrem at home.  On further review of his history, there was a concern for recurrent aspirations - risk factors included:  - late night snacks - sandwich, grapes, cookies  - lozenges in mouth at bedtime  - COPD.  At that visit, it was suggest to modify habits.    he reports that this episode, he fell asleeps with Halls lozenges in his mouth.  reports coughing as well.   he normally uses [employer]s Methol-lyptus when he has urge to smoke.  He had quit a few years back.   This time, he was using [employer], with vitamins.     I have personally reviewed the labs and data; pertinent labs and data are listed in this note; please see below.   =======================================================  Past Medical & Surgical Hx:  =====================  PAST MEDICAL & SURGICAL HISTORY:  CAD in native artery  RCA 3 YAYA 1 in 2002 and 2 in 2019    Arthritis    Lumbosacral disc disease  has nerve stimulator    HTN (hypertension)    HLD (hyperlipidemia)    H/O heart artery stent  RCA    S/P cataract surgery  b/l eyes 2016    S/P CABG (coronary artery bypass graft)      Problem List:  ==========  HEALTH ISSUES - PROBLEM Dx:        Social Hx:  =======  no toxic habits currently    FAMILY HISTORY:  FH: colon cancer    no significant family history of immunosuppressive disorders in mother or father   =======================================================    REVIEW OF SYSTEMS:  CONSTITUTIONAL:  No Fever or chills  HEENT:  No diplopia or blurred vision.  No earache, sore throat or runny nose.  CARDIOVASCULAR:  No pressure, squeezing, strangling, tightness, heaviness or aching about the chest, neck, axilla or epigastrium.  RESPIRATORY:  as per HPI  GASTROINTESTINAL:  No nausea, vomiting or diarrhea.  GENITOURINARY:  No dysuria, frequency or urgency. No Blood in urine  MUSCULOSKELETAL:  no joint aches, no muscle pain  SKIN:  No change in skin, hair or nails.  NEUROLOGIC:  No Headaches, seizures or weakness.  PSYCHIATRIC:  No disorder of thought or mood.  ENDOCRINE:  No heat or cold intolerance  HEMATOLOGICAL:  No easy bruising or bleeding.    =======================================================  Allergies    codeine (Urticaria)  ibuprofen (Anaphylaxis)    Intolerances    amoxicillin (Diarrhea)    Antibiotics:  azithromycin  IVPB 500 milliGRAM(s) IV Intermittent every 24 hours  cefTRIAXone   IVPB 1000 milliGRAM(s) IV Intermittent every 24 hours    Other medications:  albuterol/ipratropium for Nebulization 3 milliLiter(s) Nebulizer every 6 hours  apixaban 5 milliGRAM(s) Oral every 12 hours  gabapentin 400 milliGRAM(s) Oral three times a day  metoprolol tartrate 25 milliGRAM(s) Oral two times a day  predniSONE   Tablet 20 milliGRAM(s) Oral daily  saccharomyces boulardii 250 milliGRAM(s) Oral two times a day  tamsulosin 0.4 milliGRAM(s) Oral at bedtime     azithromycin  IVPB   255 mL/Hr IV Intermittent (04-24-21 @ 19:00)    azithromycin  IVPB   255 mL/Hr IV Intermittent (04-25-21 @ 20:41)    cefTRIAXone   IVPB   100 mL/Hr IV Intermittent (04-24-21 @ 17:52)    cefTRIAXone   IVPB   100 mL/Hr IV Intermittent (04-25-21 @ 16:33)      ======================================================  Physical Exam:  ============  T(F): 97.4 (26 Apr 2021 09:14), Max: 98 (25 Apr 2021 16:32)  HR: 84 (26 Apr 2021 09:14)  BP: 133/82 (26 Apr 2021 09:14)  RR: 18 (26 Apr 2021 09:14)  SpO2: 98% (26 Apr 2021 09:14) (93% - 98%)  temp max in last 48H T(F): , Max: 98.6 (04-24-21 @ 21:03)    General:  No acute distress.  Eye: Pupils are equal, round and reactive to light, Extraocular movements are intact, Normal conjunctiva.  HENT: Normocephalic, Oral mucosa is moist, No pharyngeal erythema, No sinus tenderness.  Neck: Supple, No lymphadenopathy.  Respiratory: Lungs are clear to auscultation, Respirations are non-labored.  Cardiovascular: Normal rate, Regular rhythm,   Gastrointestinal: Soft, Non-tender, Non-distended, Normal bowel sounds.  Genitourinary: No costovertebral angle tenderness.  Lymphatics: No lymphadenopathy neck,   Musculoskeletal: Normal range of motion, Normal strength.  Integumentary: No rash.  Neurologic: Alert, Oriented, No focal deficits, Cranial Nerves II-XII are grossly intact.  Psychiatric: Appropriate mood & affect.    =======================================================  Labs:                        10.2   7.28  )-----------( 254      ( 26 Apr 2021 07:42 )             31.3     04-24    136  |  96<L>  |  18.0  ----------------------------<  113<H>  4.3   |  28.0  |  0.61    Ca    9.4      24 Apr 2021 16:39    TPro  6.8  /  Alb  4.2  /  TBili  0.7  /  DBili  x   /  AST  22  /  ALT  21  /  AlkPhos  91  04-24      Creatinine, Serum: 0.61 mg/dL (04-24-21 @ 16:39)             COVID-19 PCR: NotDetec (04-24-21 @ 21:09)      < from: CT Chest No Cont (04.16.21 @ 10:42) >    EXAM:  CT CHEST        PROCEDURE DATE:  04/16/2021           INTERPRETATION:  CT CHEST WITHOUT CONTRAST    INDICATION: History of pulmonary nodules.    TECHNIQUE: Unenhanced helical images were obtained of the chest. Coronal and sagittal images were reconstructed. Maximum intensity projection images were generated.    COMPARISON: CT chest 1/4/2021, 12/29/2020, and 12/17/2012.    FINDINGS:    Tubes/Lines: Spinal stimulator.    Lungs, airways and pleura: The 6 x 6 mm left lower lobe nodule (series 2 image 64) is new.    The 9 x 6 mm left lower lobe nodule (series 2 image 96) is unchanged allowing for differences in technique.    A 5 x 5 mm right upper lobe nodule (series 3 image 63) is unchanged allowing for differences in technique.    The additional, bilateral groundglass opacities and patchy foci of consolidation have nearly completely resolved. There are bilateral reticular opacities which are unchanged.    The area of cystic change/emphysema is unchanged. The calcified nodules are unchanged. The airways are unremarkable. No pleural effusion or pneumothorax.    Mediastinum: The chest lymph nodes measure less than 10 mm in the short axis. The visualized thyroid gland is unremarkable. The esophagus is unremarkable.    The heart is normal in size. No pericardial effusion. Coronary artery calcifications. Left-sided aortic arch and left-sided descending thoracic aorta. Aortic calcifications. Aorta is ectatic.    Upper Abdomen: The upper abdomen is unremarkable.    Bones And Soft Tissues: Degenerative change of the spine. Sternotomy.  The soft tissues are unremarkable.      IMPRESSION:    1.  The left lower lobe nodule is new.  2.  The right upper and left lower lobe nodule is unchanged.  3.  The additional, bilateral groundglass opacities and patchy foci of consolidation have nearly completely resolved.            REYNA DUMONT MD; Attending Radiologist   This document has been electronically signed. Apr 16 2021  2:27PM    < end of copied text >

## 2021-04-26 NOTE — PROGRESS NOTE ADULT - SUBJECTIVE AND OBJECTIVE BOX
JENNIFER MOORE  ----------------------------------------  The patient was seen at bedside. Patient with pneumonia. Reports improvement in dyspnea with intermittent cough.    Vital Signs Last 24 Hrs  T(C): 36.3 (2021 09:14), Max: 36.9 (2021 10:23)  T(F): 97.4 (2021 09:14), Max: 98.4 (2021 10:23)  HR: 84 (2021 09:14) (77 - 105)  BP: 133/82 (2021 09:14) (107/70 - 133/82)  BP(mean): --  RR: 18 (2021 09:14) (18 - 18)  SpO2: 98% (2021 09:14) (93% - 98%)    PHYSICAL EXAMINATION:  ----------------------------------------  General appearance: No acute distress, Awake, Alert  HEENT: Normocephalic, Atraumatic, Conjunctiva clear, EOMI  Neck: Supple, No JVD, No tenderness  Lungs: Breath sound equal bilaterally, No wheezes, Basilar rales  Cardiovascular: S1S2, Regular rhythm  Abdomen: Soft, Nontender, Nondistended, No guarding/rebound, Positive bowel sounds  Extremities: No clubbing, No cyanosis, No edema, No calf tenderness  Neuro: Strength equal bilaterally, No tremors  Psychiatric: Appropriate mood, Normal affect    LABORATORY STUDIES:  ----------------------------------------             10.2   7.28  )-----------( 254      ( 2021 07:42 )             31.3     04-24    136  |  96<L>  |  18.0  ----------------------------<  113<H>  4.3   |  28.0  |  0.61    Ca    9.4      2021 16:39    TPro  6.8  /  Alb  4.2  /  TBili  0.7  /  DBili  x   /  AST  22  /  ALT  21  /  AlkPhos  91  04-24    LIVER FUNCTIONS - ( 2021 16:39 )  Alb: 4.2 g/dL / Pro: 6.8 g/dL / ALK PHOS: 91 U/L / ALT: 21 U/L / AST: 22 U/L / GGT: x           PT/INR - ( 2021 16:39 )   PT: 20.7 sec;   INR: 1.84 ratio    PTT - ( 2021 16:39 )  PTT:37.4 sec    Urinalysis Basic - ( 2021 03:01 )  Color: Yellow / Appearance: Clear / S.010 / pH: x  Gluc: x / Ketone: Negative  / Bili: Negative / Urobili: Negative mg/dL   Blood: x / Protein: Negative mg/dL / Nitrite: Negative   Leuk Esterase: Negative / RBC: x / WBC x   Sq Epi: x / Non Sq Epi: x / Bacteria: x    MEDICATIONS  (STANDING):  albuterol/ipratropium for Nebulization 3 milliLiter(s) Nebulizer every 6 hours  apixaban 5 milliGRAM(s) Oral every 12 hours  azithromycin  IVPB 500 milliGRAM(s) IV Intermittent every 24 hours  cefTRIAXone   IVPB 1000 milliGRAM(s) IV Intermittent every 24 hours  gabapentin 400 milliGRAM(s) Oral three times a day  metoprolol tartrate 25 milliGRAM(s) Oral two times a day  predniSONE   Tablet 20 milliGRAM(s) Oral daily  saccharomyces boulardii 250 milliGRAM(s) Oral two times a day  tamsulosin 0.4 milliGRAM(s) Oral at bedtime    MEDICATIONS  (PRN):  oxycodone    5 mG/acetaminophen 325 mG 2 Tablet(s) Oral every 6 hours PRN Moderate Pain (4 - 6)      ASSESSMENT / PLAN:  ----------------------------------------  75M with a history of COPD, atrial fibrillation, coronary artery disease, and back pain who presented with fever, productive cough, dyspnea and wheezing, found to have leukocytosis and chest Xray noting infiltrate.    Pneumonia - On empiric antibiotics. Blood culture results to be reviewed when available. Afebrile.    COPD exacerbation - On prednisone with inhaled bronchodilator therapy. To taper supplemental oxygen as tolerated and determine if there is need for supplemental oxygen at home. CT results discussed with the patient who reported the known history of lung nodules for which he follows with his pulmonologist and has yearly CT scans.    Coronary artery disease - No chest pain. On metoprolol.    Atrial fibrillation - On apixaban for anticoagulation. On metoprolol.    Back pain - Analgesic medications as needed.      Anticipate discharge in 24-48 hours pending clinical course

## 2021-04-26 NOTE — CONSULT NOTE ADULT - ASSESSMENT
This 75M referred [facility] the [facility] with pneumonia. The patient reported that he first developed dyspnea and wheezing two days prior associated with a productive cough. He then noted a fever of 101 today and sought medical evaluation. The patient denied any associated chest pain or palpitation. He did note similar symptoms in the past with admission for pneumonia four months prior. He is unaware of any aggravating or relieving factors. He denied any recent sick contacts and had previously had both COVID-19 vaccinations. (24 Apr 2021 19:24)    he was last seen in my office on 1/21/2021 following a hospital visit for PNA.  cultures were negative at that time and he completed a course of IV merrem at home.  On further review of his history, there was a concern for recurrent aspirations - risk factors included:  - late night snacks - sandwich, grapes, cookies  - lozenges in mouth at bedtime  - COPD.  At that visit, it was suggest to modify habits.    he reports that this episode, he fell asleeps with [employer] lozenges in his mouth.  reports coughing as well.   he normally uses [employer] Methol-lyptus when he has urge to smoke.  He had quit a few years back.   This time, he was using [employer], with vitamins.       impression:  WBC elevation  hx of Pneumonia  aspiration episode      Plan:  - agree with  current antibiotics:  azithromycin  IVPB 500 milliGRAM(s) IV Intermittent every 24 hours  cefTRIAXone   IVPB 1000 milliGRAM(s) IV Intermittent every 24 hours    WBC elevation is reactive  - will follow and trend  - likely related to aspiration event, and aspiration of dissolved lozenges into airway.     CT scan shows near resolution of prior infiltrates    - anticipate a 7 day course of antibiotics   can be changed to PO Vantin and PO azithromycin when ready for discharge.       No further specific infectious disease recommendations. Will be available as needed.      Thank you for allowing me to participate in the care of your patient.   Feel free to call me back for any new concerns.

## 2021-04-27 ENCOUNTER — TRANSCRIPTION ENCOUNTER (OUTPATIENT)
Age: 76
End: 2021-04-27

## 2021-04-27 VITALS
TEMPERATURE: 98 F | SYSTOLIC BLOOD PRESSURE: 109 MMHG | HEART RATE: 97 BPM | RESPIRATION RATE: 18 BRPM | OXYGEN SATURATION: 97 % | DIASTOLIC BLOOD PRESSURE: 67 MMHG

## 2021-04-27 LAB
CULTURE RESULTS: NO GROWTH — SIGNIFICANT CHANGE UP
SPECIMEN SOURCE: SIGNIFICANT CHANGE UP

## 2021-04-27 PROCEDURE — 93005 ELECTROCARDIOGRAM TRACING: CPT

## 2021-04-27 PROCEDURE — 85730 THROMBOPLASTIN TIME PARTIAL: CPT

## 2021-04-27 PROCEDURE — 94760 N-INVAS EAR/PLS OXIMETRY 1: CPT

## 2021-04-27 PROCEDURE — 96374 THER/PROPH/DIAG INJ IV PUSH: CPT

## 2021-04-27 PROCEDURE — 99285 EMERGENCY DEPT VISIT HI MDM: CPT | Mod: 25

## 2021-04-27 PROCEDURE — 87086 URINE CULTURE/COLONY COUNT: CPT

## 2021-04-27 PROCEDURE — 85027 COMPLETE CBC AUTOMATED: CPT

## 2021-04-27 PROCEDURE — 85025 COMPLETE CBC W/AUTO DIFF WBC: CPT

## 2021-04-27 PROCEDURE — 71045 X-RAY EXAM CHEST 1 VIEW: CPT

## 2021-04-27 PROCEDURE — U0003: CPT

## 2021-04-27 PROCEDURE — 85610 PROTHROMBIN TIME: CPT

## 2021-04-27 PROCEDURE — 36415 COLL VENOUS BLD VENIPUNCTURE: CPT

## 2021-04-27 PROCEDURE — 87040 BLOOD CULTURE FOR BACTERIA: CPT

## 2021-04-27 PROCEDURE — 81003 URINALYSIS AUTO W/O SCOPE: CPT

## 2021-04-27 PROCEDURE — 80053 COMPREHEN METABOLIC PANEL: CPT

## 2021-04-27 PROCEDURE — 83605 ASSAY OF LACTIC ACID: CPT

## 2021-04-27 PROCEDURE — 99239 HOSP IP/OBS DSCHRG MGMT >30: CPT

## 2021-04-27 PROCEDURE — 86769 SARS-COV-2 COVID-19 ANTIBODY: CPT

## 2021-04-27 PROCEDURE — 94640 AIRWAY INHALATION TREATMENT: CPT

## 2021-04-27 PROCEDURE — U0005: CPT

## 2021-04-27 RX ORDER — AZITHROMYCIN 500 MG/1
1 TABLET, FILM COATED ORAL
Qty: 1 | Refills: 0
Start: 2021-04-27 | End: 2021-04-27

## 2021-04-27 RX ORDER — CEFPODOXIME PROXETIL 100 MG
1 TABLET ORAL
Qty: 6 | Refills: 0
Start: 2021-04-27 | End: 2021-04-29

## 2021-04-27 RX ADMIN — Medication 250 MILLIGRAM(S): at 05:24

## 2021-04-27 RX ADMIN — Medication 25 MILLIGRAM(S): at 05:24

## 2021-04-27 RX ADMIN — CEFTRIAXONE 100 MILLIGRAM(S): 500 INJECTION, POWDER, FOR SOLUTION INTRAMUSCULAR; INTRAVENOUS at 12:29

## 2021-04-27 RX ADMIN — Medication 20 MILLIGRAM(S): at 05:24

## 2021-04-27 RX ADMIN — APIXABAN 5 MILLIGRAM(S): 2.5 TABLET, FILM COATED ORAL at 05:24

## 2021-04-27 RX ADMIN — GABAPENTIN 400 MILLIGRAM(S): 400 CAPSULE ORAL at 12:30

## 2021-04-27 RX ADMIN — OXYCODONE AND ACETAMINOPHEN 2 TABLET(S): 5; 325 TABLET ORAL at 12:30

## 2021-04-27 RX ADMIN — GABAPENTIN 400 MILLIGRAM(S): 400 CAPSULE ORAL at 05:25

## 2021-04-27 RX ADMIN — Medication 3 MILLILITER(S): at 14:26

## 2021-04-27 RX ADMIN — Medication 3 MILLILITER(S): at 08:47

## 2021-04-27 RX ADMIN — OXYCODONE AND ACETAMINOPHEN 2 TABLET(S): 5; 325 TABLET ORAL at 05:24

## 2021-04-27 NOTE — DISCHARGE NOTE PROVIDER - HOSPITAL COURSE
75M who was referred from the urgent care center after being evaluated for fever, productive cough, and dyspnea. On presentation, WBC(21.39), H/H(11.7/35.9), Lactate(1.3). CT of the chest noted lung nodules, previously seen bilateral groundglass opacities and patchy foci of consolidation have nearly completely resolved, unchanged bilateral reticular opacities, unchanged area of cystic change/emphysema, no pleural effusion or pneumothorax. Empiric antibiotics were initiated for sepsis and pneumonia. Prednisone and inhaled bronchodilator therapy were initiated for COPD exacerbation. The patient was seen by Infectious Disease in consultation and continued on antibiotics. Blood cultures were without growth. The patient had resolution of the hypoxia and was discharged home with instructions to follow up with his primary care physician and pulmonologist for further management.        35 minutes total time

## 2021-04-27 NOTE — DISCHARGE NOTE PROVIDER - NSDCMRMEDTOKEN_GEN_ALL_CORE_FT
Anoro Ellipta 62.5 mcg-25 mcg/inh inhalation powder: 1 puff(s) inhaled once a day  aspirin 81 mg oral tablet, chewable: 1 tab(s) orally once a day MDD:1  atorvastatin 40 mg oral tablet: 1 tab(s) orally once a day (at bedtime)  cefpodoxime 200 mg oral tablet: 1 tab(s) orally every 12 hours   cholecalciferol 5000 intl units (125 mcg) oral tablet: 1 tab(s) orally once a day  Eliquis 5 mg oral tablet: 1 tab(s) orally 2 times a day  Flomax 0.4 mg oral capsule: 1 cap(s) orally once a day  gabapentin 400 mg oral capsule: 1 cap(s) orally 3 times a day  metoprolol tartrate 25 mg oral tablet: 1 tab(s) orally 2 times a day  Percocet 10/325 oral tablet: 1 tab(s) orally 3 times a day, As Needed  predniSONE 10 mg oral tablet: 1 tab(s) orally once a day   Zithromax 500 mg oral tablet: 1 tab(s) orally once a day

## 2021-04-27 NOTE — DISCHARGE NOTE PROVIDER - CARE PROVIDER_API CALL
Epi Fuller)  Critical Care Medicine; Internal Medicine; Pulmonary Disease  [address] [place] [address]  Phone: [phone]  Fax: [phone]  Follow Up Time:

## 2021-04-27 NOTE — DISCHARGE NOTE PROVIDER - NSDCCPCAREPLAN_GEN_ALL_CORE_FT
PRINCIPAL DISCHARGE DIAGNOSIS  Diagnosis: Pneumonia  Assessment and Plan of Treatment: Complete the course of antibiotics.      SECONDARY DISCHARGE DIAGNOSES  Diagnosis: COPD (chronic obstructive pulmonary disease)  Assessment and Plan of Treatment: Follow up with your pulmonologist for further management.    Diagnosis: Atrial fibrillation  Assessment and Plan of Treatment: Continue on apixaban.

## 2021-04-27 NOTE — DISCHARGE NOTE NURSING/CASE MANAGEMENT/SOCIAL WORK - PATIENT PORTAL LINK FT
You can access the FollowMyHealth Patient Portal offered by [facility] by registering at the following website: [email or web address]. By joining [facility]’s FollowMyHealth portal, you will also be able to view your health information using other applications (apps) compatible with our system.

## 2021-04-27 NOTE — DISCHARGE NOTE PROVIDER - NSDCFUSCHEDAPPT_GEN_ALL_CORE_FT
JENNIFER MOORE ; 05/15/2021 ; [facility]PP DisEmerg [address]  JENNIFER MOORE ; 05/19/2021 ; NPP PulmMed [address]  JENNIFER MOORE ; 05/19/2021 ; NPP PulmMed [address]  JENNIFER MOORE ; 05/20/2021 ; NPP Cardio Electro [address]  JENNIFER MOORE ; 05/25/2021 ; NPP Cardiology [address]  JENNIFER MOORE ; 06/24/2021 ; NPP Cardio Electro [address]

## 2021-04-30 ENCOUNTER — APPOINTMENT (OUTPATIENT)
Dept: CARE COORDINATION | Facility: HOME HEALTH | Age: 76
End: 2021-04-30
Payer: MEDICARE

## 2021-04-30 PROCEDURE — 99348 HOME/RES VST EST LOW MDM 30: CPT

## 2021-05-03 VITALS
OXYGEN SATURATION: 93 % | RESPIRATION RATE: 14 BRPM | SYSTOLIC BLOOD PRESSURE: 138 MMHG | TEMPERATURE: 96.5 F | HEART RATE: 90 BPM | DIASTOLIC BLOOD PRESSURE: 70 MMHG

## 2021-05-03 NOTE — HEALTH RISK ASSESSMENT
[No falls in past year] : Patient reported no falls in the past year [0] : 2) Feeling down, depressed, or hopeless: Not at all (0) [[id number]] : 0

## 2021-05-03 NOTE — CURRENT MEDS
[Takes medication as prescribed] : takes [None] : Patient does not have any barriers to medication adherence [Yes] : Reviewed medication list for presence of high-risk medications. [Opioids] : opioids [Blood Thinners] : blood thinners

## 2021-05-03 NOTE — PHYSICAL EXAM
[No Acute Distress] : no acute distress [Well Nourished] : well nourished [Well Developed] : well developed [Well-Appearing] : well-appearing [Normal Sclera/Conjunctiva] : normal sclera/conjunctiva [Normal Outer Ear/Nose] : the outer ears and nose were normal in appearance [No Respiratory Distress] : no respiratory distress  [Rhonchi Bilateral Bases] : rhonchi were heard over both bases [Decreased Breath Sounds Bilaterally] : breath sounds were diminished over both lungs [Normal Rate] : normal rate  [Regular Rhythm] : with a regular rhythm [Normal S1, S2] : normal S1 and S2 [No Murmur] : no murmur heard [Pedal Pulses Present] : the pedal pulses are present [No Edema] : there was no peripheral edema [No Rash] : no rash [Normal Gait] : normal gait [Coordination Grossly Intact] : coordination grossly intact [No Focal Deficits] : no focal deficits [Speech Grossly Normal] : speech grossly normal [Normal Affect] : the affect was normal [Alert and Oriented x3] : oriented to person, place, and time [Normal Mood] : the mood was normal [Dry Cough] : no dry cough

## 2021-05-03 NOTE — REVIEW OF SYSTEMS
[Fatigue] : fatigue [Dyspnea on Exertion] : dyspnea on exertion [Back Pain] : back pain [Negative] : Psychiatric [Shortness Of Breath] : no shortness of breath [Wheezing] : no wheezing [FreeTextEntry9] : chronic-pt uses Percocet prn for back pain.

## 2021-05-03 NOTE — ASSESSMENT
[FreeTextEntry1] : Robert Henson  is being seen after discharge home from [facility]. He was admitted on 4/24/21 for SOB, pna and discharged home on 4/27/21.  Discharge medications were reviewed and reconciled with the current medication list and medications in home.\par \par 1)Pneumonia-Completed Zithromax and Prednisone taper\par On Cefpodoxime BID\par Diminished breath sounds with fine rhonchi noted\par p/ox on r/a 93%\par \par Advised patient to adhere to all medications including demonstrating proper use of inhalers and nebulizers. Encourage the use of proper breathing techniques such as pursed lip breathing or leaning forward during exhalation. Patient understands proper use of oxygen therapy. Increase activity as tolerated and maintain optimal activity levels. Continue coughing and deep breathing exercises including use of Incentive Spirometry. Receive routine pneumococcal and influenza vaccinations. Identify early signs and sx of disease and notify NP/MD. Maintain proper nutrition and hydration. Follow up with Pulmonologist within 7 days of discharge. Contact information given, patient advised to call with any questions/concerns. \par \par 2)COPD-pt on Anoro Ellipta daily\par +compliance and using neblizer prn\par Enc albuterol neb q6hrs during the day as pt has fine rhonchi noted and has completed Prednisone taper after treatment for pna.\par \par 3)Chronic lower back pain-pt not in acute pain\par -Percocet prn\par Follow up with pain management\par \par \par \par \par

## 2021-05-03 NOTE — HISTORY OF PRESENT ILLNESS
[Post-hospitalization from ___ Hospital] : Post-hospitalization from [unfilled] Hospital [Admitted on: ___] : The patient was admitted on [unfilled] [Discharged on ___] : discharged on [unfilled] [FreeTextEntry2] : Copied from EMR, "Hospital Course\par 75M who was referred from the urgent care center after being evaluated for fever, productive cough, and dyspnea. On presentation, WBC(21.39), H/H(11.7/35.9), Lactate(1.3). CT of the chest noted lung nodules, previously seen bilateral groundglass opacities and patchy foci of consolidation have nearly completely resolved, unchanged bilateral reticular opacities, unchanged area of cystic change/emphysema, no pleural effusion or pneumothorax. Empiric\par antibiotics were initiated for sepsis and pneumonia. Prednisone and inhaled bronchodilator therapy were initiated for COPD exacerbation. The patient was seen by Infectious Disease in consultation and continued on antibiotics. Blood\par cultures were without growth. The patient had resolution of the hypoxia and was discharged home with instructions to follow up with his primary care physician and pulmonologist for further management."\par \par Pt seen in his home today for transitional care management w/spouse present. He reports fatigue, compliance with inhalers, nebulizer, completed 1 antibiotic and prednisone taper complete. He has no cough, fever, chills. He is KELLY, no orthopnea, SOB worsening. He is increasing his activity post-hospitalization. \par

## 2021-05-15 ENCOUNTER — APPOINTMENT (OUTPATIENT)
Dept: DISASTER EMERGENCY | Facility: CLINIC | Age: 76
End: 2021-05-15

## 2021-05-15 ENCOUNTER — LABORATORY RESULT (OUTPATIENT)
Age: 76
End: 2021-05-15

## 2021-05-19 ENCOUNTER — APPOINTMENT (OUTPATIENT)
Dept: PULMONOLOGY | Facility: CLINIC | Age: 76
End: 2021-05-19
Payer: MEDICARE

## 2021-05-19 VITALS — OXYGEN SATURATION: 93 % | HEART RATE: 71 BPM | RESPIRATION RATE: 16 BRPM

## 2021-05-19 VITALS — DIASTOLIC BLOOD PRESSURE: 60 MMHG | SYSTOLIC BLOOD PRESSURE: 115 MMHG

## 2021-05-19 PROCEDURE — 99215 OFFICE O/P EST HI 40 MIN: CPT

## 2021-05-19 NOTE — DISCUSSION/SUMMARY
[COPD] : chronic obstructive pulmonary disease [Stable] : stable [Stage III (Severe)] : stage III (severe) [Chest CT] : chest CT [de-identified] : 10/2021 [FreeTextEntry1] : Episodes of pneumonia and exacerbations maybe on the basis of aspiration and is secondary to reflux. No evidence of swallowing deficits. Patient has been advised to avoid caffeinated foods and beverages such as coffee, tea, chocolates. Raise the head of her bed by about 3 inches or use a foam wedge. Avoid alcohol and alcohol-based products. Reduce the size of meals with the last meal being approximately 2-3 hours before bedtime. Lastly, continue the use of your proton pump inhibitor or H2 blocker.\par

## 2021-05-19 NOTE — CONSULT LETTER
[Dear  ___] : Dear  [unfilled], [Consult Letter:] : I had the pleasure of evaluating your patient, [unfilled]. [Please see my note below.] : Please see my note below. [Consult Closing:] : Thank you very much for allowing me to participate in the care of this patient.  If you have any questions, please do not hesitate to contact me. [Sincerely,] : Sincerely, [FreeTextEntry3] : Epi Fuller MD FCCP\par Pulmonary/Critical Care/Sleep Medicine\par Department of Internal Medicine\par \par [facility] [employer]\par

## 2021-05-19 NOTE — HISTORY OF PRESENT ILLNESS
[Former] : former [TextBox_4] : 75-year-old male with a history of stage III chronic obstructive lung disease seen today status post hospitalization at [facility] from 4/24-4/27/21. Patient was admitted there from the urgent care with complaints of fever and productive cough with increased shortness of breath. A followup CT, which was performed at the time of admission had demonstrated resolution of previously noted bilateral groundglass opacities with unchanged. Bilateral reticular opacifications and unchanged areas of emphysema. Patient was treated with empiric antibiotics, prednisone, and bronchodilators with complete resolution. Cultures were negative.\par \par Patient is being seen today and has no complaints of increased cough, wheeze, sputum production, fevers, chills, chest pains, palpitations, lightheadedness, or dizziness.

## 2021-05-20 ENCOUNTER — NON-APPOINTMENT (OUTPATIENT)
Age: 76
End: 2021-05-20

## 2021-05-20 ENCOUNTER — APPOINTMENT (OUTPATIENT)
Dept: ELECTROPHYSIOLOGY | Facility: CLINIC | Age: 76
End: 2021-05-20
Payer: MEDICARE

## 2021-05-20 PROCEDURE — 93298 REM INTERROG DEV EVAL SCRMS: CPT

## 2021-05-20 PROCEDURE — G2066: CPT

## 2021-05-25 ENCOUNTER — APPOINTMENT (OUTPATIENT)
Dept: CARDIOLOGY | Facility: CLINIC | Age: 76
End: 2021-05-25
Payer: MEDICARE

## 2021-05-25 VITALS
SYSTOLIC BLOOD PRESSURE: 132 MMHG | DIASTOLIC BLOOD PRESSURE: 82 MMHG | TEMPERATURE: 97.7 F | BODY MASS INDEX: 23.86 KG/M2 | HEIGHT: 67 IN | HEART RATE: 71 BPM | WEIGHT: 152 LBS | RESPIRATION RATE: 16 BRPM

## 2021-05-25 PROCEDURE — 99214 OFFICE O/P EST MOD 30 MIN: CPT

## 2021-05-25 PROCEDURE — 93000 ELECTROCARDIOGRAM COMPLETE: CPT

## 2021-05-27 NOTE — HISTORY OF PRESENT ILLNESS
[FreeTextEntry1] :  From a cardiac standpoint, Mr. Henson presents today without complaints of exertional chest pain, shortness of breath, palpitations, lightheadedness, or syncope.  He appears to be tolerating current medications well including Eliquis.

## 2021-05-27 NOTE — ASSESSMENT
[FreeTextEntry1] : 1.  EKG today reveals normal sinus rhythm at 71 bpm.  Right bundle branch block.  No ischemic changes. \par \par 2.  Hypertension:  Blood pressure reasonably well controlled on current medications.  A low-salt diet is advised. \par \par 3.  Hyperlipidemia:  Lipid profile from January of 2021 reveals a total cholesterol of 164, HDL 66, LDL 66, triglycerides 160.  Patient advised to continue current statin therapy as well as follow a strict low-fat / low-cholesterol diet.     \par \par 4.  Coronary artery disease status-post CABG x3 in 2020:  Patient clinically stable without complaints of chest pain or shortness of breath.  Advised to walk as much as possible and follow a prudent diet.  \par \par 5.  Paroxysmal atrial fibrillation:  Patient currently utilizing implantable loop recorder to track his arrhythmia under the care of EP.  To date, he appears to be doing well, and tolerating Eliquis therapy well. \par \par 6.  COPD:  Patient with recent recurrent pneumonia requiring admission to [facility] for four days.   Patient has recovered uneventfully.  If clinically stable from a cardiac standpoint, office visit six months.    \par   \par

## 2021-05-27 NOTE — REASON FOR VISIT
[FreeTextEntry1] : Mr. Henson is a pleasant 76-year-old white male with a past medical history significant for hypertension, hyperlipidemia, and coronary artery disease status-post PCI in 2002 followed by CABG x3 in March of 2020, as well as underlying COPD and paroxysmal atrial fibrillation, who presents for follow up evaluation.   \par

## 2021-06-15 ENCOUNTER — NON-APPOINTMENT (OUTPATIENT)
Age: 76
End: 2021-06-15

## 2021-06-24 ENCOUNTER — NON-APPOINTMENT (OUTPATIENT)
Age: 76
End: 2021-06-24

## 2021-06-24 ENCOUNTER — APPOINTMENT (OUTPATIENT)
Dept: ELECTROPHYSIOLOGY | Facility: CLINIC | Age: 76
End: 2021-06-24
Payer: MEDICARE

## 2021-06-24 PROCEDURE — G2066: CPT

## 2021-06-24 PROCEDURE — 93298 REM INTERROG DEV EVAL SCRMS: CPT

## 2021-06-26 ENCOUNTER — APPOINTMENT (OUTPATIENT)
Dept: DISASTER EMERGENCY | Facility: CLINIC | Age: 76
End: 2021-06-26

## 2021-06-26 ENCOUNTER — LABORATORY RESULT (OUTPATIENT)
Age: 76
End: 2021-06-26

## 2021-06-28 ENCOUNTER — TRANSCRIPTION ENCOUNTER (OUTPATIENT)
Age: 76
End: 2021-06-28

## 2021-06-29 ENCOUNTER — OUTPATIENT (OUTPATIENT)
Dept: OUTPATIENT SERVICES | Facility: HOSPITAL | Age: 76
LOS: 1 days | Discharge: HOME | End: 2021-06-29
Payer: MEDICARE

## 2021-06-29 DIAGNOSIS — Z95.1 PRESENCE OF AORTOCORONARY BYPASS GRAFT: Chronic | ICD-10-CM

## 2021-06-29 DIAGNOSIS — Z98.49 CATARACT EXTRACTION STATUS, UNSPECIFIED EYE: Chronic | ICD-10-CM

## 2021-06-29 DIAGNOSIS — Z95.5 PRESENCE OF CORONARY ANGIOPLASTY IMPLANT AND GRAFT: Chronic | ICD-10-CM

## 2021-06-29 DIAGNOSIS — M54.16 RADICULOPATHY, LUMBAR REGION: ICD-10-CM

## 2021-06-29 PROCEDURE — 64484 NJX AA&/STRD TFRM EPI L/S EA: CPT | Mod: LT,XU

## 2021-06-29 PROCEDURE — 64483 NJX AA&/STRD TFRM EPI L/S 1: CPT | Mod: LT,XU

## 2021-06-29 PROCEDURE — 76000 FLUOROSCOPY <1 HR PHYS/QHP: CPT

## 2021-07-06 ENCOUNTER — NON-APPOINTMENT (OUTPATIENT)
Age: 76
End: 2021-07-06

## 2021-07-06 NOTE — SWALLOW BEDSIDE ASSESSMENT ADULT - DIET PRIOR TO ADMI
regular with thin Detail Level: Zone Introduction Text (Please End With A Colon): The following procedure was deferred: Procedure To Be Performed At Next Visit: Biopsy by punch method

## 2021-07-29 ENCOUNTER — NON-APPOINTMENT (OUTPATIENT)
Age: 76
End: 2021-07-29

## 2021-07-29 ENCOUNTER — APPOINTMENT (OUTPATIENT)
Dept: ELECTROPHYSIOLOGY | Facility: CLINIC | Age: 76
End: 2021-07-29
Payer: MEDICARE

## 2021-07-29 PROCEDURE — G2066: CPT

## 2021-07-29 PROCEDURE — 93298 REM INTERROG DEV EVAL SCRMS: CPT

## 2021-08-03 ENCOUNTER — RX RENEWAL (OUTPATIENT)
Age: 76
End: 2021-08-03

## 2021-08-16 ENCOUNTER — APPOINTMENT (OUTPATIENT)
Dept: DISASTER EMERGENCY | Facility: CLINIC | Age: 76
End: 2021-08-16

## 2021-08-17 LAB — SARS-COV-2 N GENE NPH QL NAA+PROBE: NOT DETECTED

## 2021-08-18 RX ORDER — METOPROLOL TARTRATE 25 MG/1
25 TABLET, FILM COATED ORAL TWICE DAILY
Qty: 180 | Refills: 3 | Status: DISCONTINUED | COMMUNITY
Start: 2019-06-26 | End: 2021-08-18

## 2021-08-19 ENCOUNTER — APPOINTMENT (OUTPATIENT)
Dept: PULMONOLOGY | Facility: CLINIC | Age: 76
End: 2021-08-19
Payer: MEDICARE

## 2021-08-19 VITALS — HEART RATE: 79 BPM | OXYGEN SATURATION: 94 % | SYSTOLIC BLOOD PRESSURE: 110 MMHG | DIASTOLIC BLOOD PRESSURE: 60 MMHG

## 2021-08-19 VITALS — BODY MASS INDEX: 23.82 KG/M2 | WEIGHT: 150 LBS | HEIGHT: 66.5 IN

## 2021-08-19 PROCEDURE — 99214 OFFICE O/P EST MOD 30 MIN: CPT | Mod: 25

## 2021-08-19 PROCEDURE — 94010 BREATHING CAPACITY TEST: CPT

## 2021-08-19 NOTE — DISCUSSION/SUMMARY
[COPD] : chronic obstructive pulmonary disease [Stable] : stable [Stage III (Severe)] : stage III (severe) [Chest CT] : chest CT [de-identified] : 10/2021

## 2021-08-19 NOTE — CONSULT LETTER
[Dear  ___] : Dear  [unfilled], [Consult Letter:] : I had the pleasure of evaluating your patient, [unfilled]. [Please see my note below.] : Please see my note below. [Consult Closing:] : Thank you very much for allowing me to participate in the care of this patient.  If you have any questions, please do not hesitate to contact me. [Sincerely,] : Sincerely, [FreeTextEntry3] : Epi Fuller MD FCCP\par Pulmonary/Critical Care/Sleep Medicine\par Department of Internal Medicine\par \par [facility] [employer]\par

## 2021-09-02 ENCOUNTER — NON-APPOINTMENT (OUTPATIENT)
Age: 76
End: 2021-09-02

## 2021-09-02 ENCOUNTER — APPOINTMENT (OUTPATIENT)
Dept: ELECTROPHYSIOLOGY | Facility: CLINIC | Age: 76
End: 2021-09-02
Payer: MEDICARE

## 2021-09-02 PROCEDURE — 93298 REM INTERROG DEV EVAL SCRMS: CPT

## 2021-09-02 PROCEDURE — G2066: CPT

## 2021-09-02 NOTE — ED ADULT NURSE NOTE - CADM POA CENTRAL LINE
corky arroyo aware unable to draw am labs. lab was notified and asked to draw am labs. as per lab, they will send a tech to attempt to draw labs. at this time pa is aware no labs have been sent No

## 2021-09-15 ENCOUNTER — RX RENEWAL (OUTPATIENT)
Age: 76
End: 2021-09-15

## 2021-09-30 ENCOUNTER — APPOINTMENT (OUTPATIENT)
Dept: ELECTROPHYSIOLOGY | Facility: CLINIC | Age: 76
End: 2021-09-30
Payer: MEDICARE

## 2021-09-30 ENCOUNTER — NON-APPOINTMENT (OUTPATIENT)
Age: 76
End: 2021-09-30

## 2021-09-30 VITALS
DIASTOLIC BLOOD PRESSURE: 52 MMHG | BODY MASS INDEX: 50.03 KG/M2 | TEMPERATURE: 98.3 F | OXYGEN SATURATION: 97 % | WEIGHT: 315 LBS | HEART RATE: 84 BPM | HEIGHT: 66.5 IN | SYSTOLIC BLOOD PRESSURE: 96 MMHG

## 2021-09-30 PROCEDURE — 99214 OFFICE O/P EST MOD 30 MIN: CPT

## 2021-09-30 PROCEDURE — 93000 ELECTROCARDIOGRAM COMPLETE: CPT

## 2021-09-30 NOTE — REVIEW OF SYSTEMS
[Dizziness] : dizziness [Headache] : no headache [Feeling Fatigued] : not feeling fatigued [SOB] : no shortness of breath [Dyspnea on exertion] : not dyspnea during exertion [Chest Discomfort] : no chest discomfort [Palpitations] : no palpitations [Orthopnea] : no orthopnea [Syncope] : no syncope

## 2021-09-30 NOTE — HISTORY OF PRESENT ILLNESS
[de-identified] : 76 year old gentleman with history of HTN, HLD, CAD s/p PCI 2002 and CABGx3 3/23/20, COPD, and paroxysmal atrial fibrillation with a CHADSVASC: 3. In 12/2020- 1/2021 he was admitted to [facility] with pneumonia. He presents today for management of PAF.\par \par TO summarize his history, There was concern for intermittent tachycardia and possibly atrial fibrillation with rapid rates during hospitalization. ECG from 12/21/20 does reveal AF with rapid rates (and RBBB). He was seen by cardiology and apparently no other AF was noted on review. He was started on Eliquis 5mg bid, in addition to ASA (and Plavix has been stopped). He reports no known prior history of AF. He does report occasional mild brief palpitation, as well as occasional lightheadedness. About two years ago he had an episode of syncope, which occurred after being on the water on a hot day, and was ultimately thought to be due to dehydration.\par \par Now s/p ILR implant for history of syncope and long term monitoring of atrial arrhythmias, allow symptom correlation. \par  \par Today, he complaints of dizziness upon standing abruptly. He d/w Dr. Fink who reduced his beta blocker dosage and encouraged adequate hydration and more liberal salt intake. While he has had some ecchymosis while on Eliquis he denies easy bleeding, no falls. ILR has revealed two episodes of SVT which are brief but slightly irregular at times, cannot rule out bursts of PAF. Longest episode 13 seconds in duration. \par \par Ref: Dr. Fink\par

## 2021-10-07 ENCOUNTER — APPOINTMENT (OUTPATIENT)
Dept: ELECTROPHYSIOLOGY | Facility: CLINIC | Age: 76
End: 2021-10-07
Payer: MEDICARE

## 2021-10-07 ENCOUNTER — NON-APPOINTMENT (OUTPATIENT)
Age: 76
End: 2021-10-07

## 2021-10-07 PROCEDURE — G2066: CPT

## 2021-10-07 PROCEDURE — 93298 REM INTERROG DEV EVAL SCRMS: CPT

## 2021-10-18 ENCOUNTER — OUTPATIENT (OUTPATIENT)
Dept: OUTPATIENT SERVICES | Facility: HOSPITAL | Age: 76
LOS: 1 days | End: 2021-10-18
Payer: MEDICARE

## 2021-10-18 ENCOUNTER — APPOINTMENT (OUTPATIENT)
Dept: CT IMAGING | Facility: CLINIC | Age: 76
End: 2021-10-18
Payer: MEDICARE

## 2021-10-18 DIAGNOSIS — Z95.5 PRESENCE OF CORONARY ANGIOPLASTY IMPLANT AND GRAFT: Chronic | ICD-10-CM

## 2021-10-18 DIAGNOSIS — R91.8 OTHER NONSPECIFIC ABNORMAL FINDING OF LUNG FIELD: ICD-10-CM

## 2021-10-18 DIAGNOSIS — Z95.1 PRESENCE OF AORTOCORONARY BYPASS GRAFT: Chronic | ICD-10-CM

## 2021-10-18 DIAGNOSIS — Z98.49 CATARACT EXTRACTION STATUS, UNSPECIFIED EYE: Chronic | ICD-10-CM

## 2021-10-18 PROCEDURE — G1004: CPT

## 2021-10-18 PROCEDURE — 71250 CT THORAX DX C-: CPT | Mod: 26,MG

## 2021-10-18 PROCEDURE — 71250 CT THORAX DX C-: CPT | Mod: MG

## 2021-10-20 ENCOUNTER — NON-APPOINTMENT (OUTPATIENT)
Age: 76
End: 2021-10-20

## 2021-10-25 ENCOUNTER — APPOINTMENT (OUTPATIENT)
Dept: THORACIC SURGERY | Facility: CLINIC | Age: 76
End: 2021-10-25
Payer: MEDICARE

## 2021-10-25 PROCEDURE — 99214 OFFICE O/P EST MOD 30 MIN: CPT

## 2021-10-25 NOTE — ASSESSMENT
[FreeTextEntry1] : Robert is a 76-year-old male with severe COPD, and several lung nodules, including a right upper lobe nodule that has grown from 4-8 mm. The etiology remains unclear, but malignancy is certainly a concern. This is likely too small and central for needle biopsy, and thoracoscopy, and lung removal may not be tolerated well. Based on his lung function. He is quite anxious about the possible progressive respiratory deterioration, it may occur postoperatively. Based on the above I have asked him to see radiation oncology for evaluation and consideration for SBRT even without diagnosis. The other option would be continued observation with a CT in 6 months time based on the small size.\par \par Thank you for allowing me to participate in the care of your patient.\par \par 45 minutes was spent during this encounter.\par \par Venkatesh Babb MD\par Department of Cardiovascular and Thoracic Surgery\par \par [facility]\par [facility] at [facility]\par

## 2021-10-25 NOTE — DATA REVIEWED
[FreeTextEntry1] : \par PROCEDURE DATE:  10/18/2021\par INTERPRETATION:  .\par ACC: [id number]\par INDICATION: Multiple lung nodules, follow-up\par TECHNIQUE: Unenhanced CT of the chest. Coronal, sagittal, and MIP images were reconstructed and reviewed.\par COMPARISON: Chest CTs between 4/16/2021, 7/28/2016.\par \par FINDINGS:\par \par AIRWAYS, LUNGS and PLEURA: Patent central airways. Mild emphysema. An 8 mm slowly growing nodule within right upper lobe (series 4 image 78) is suspicious. This nodule measured 4 mm on 2/6/2020 exam. 4 mm right middle lobe nodule (series 4 image 112) is stable from 2016, likely benign in light of greater than 2 years stability. Stable small calcified granulomas. The other lung nodules mentioned on prior exam are resolved. No pleural effusion.\par \par MEDIASTINUM AND BRAD: No lymphadenopathy.\par \par HEART AND VESSELS: Heart size is normal. CABG. No pericardial effusion. Thoracic aorta and pulmonary artery normal in diameter.\par \par VISUALIZED UPPER ABDOMEN: High attenuation of the liver parenchyma can be secondary to amiodarone use or hemosiderin deposition.\par \par CHEST WALL AND BONES: No aggressive osseous lesion. Sternotomy. Spinal stimulation device. Subcutaneous loop recorder.\par \par LOWER NECK: Within normal limits.\par \par IMPRESSION:\par \par 8 x 5 mm slowly growing nodule within right upper lobe (series 4 image 78) is suspicious for a lung neoplasm. Further evaluation can be performed with PET-CT.\par \par This report was faxed to Dr. Fuller office on 10/20/2021 at 9:00 AM.\par \par --- End of Report ---\par \par 4.25.21 XR CHEST PORTABLE IMMED 1V\par PROCEDURE DATE: 04/24/2021\par INTERPRETATION: AP chest on April 24, 2021 at 5:18 PM. Patient has sepsis.\par \par Heart size is within normal limits. Sternotomy, loop recorder, and spinal stimulator from below ending in the mid lower thoracic area again seen.\par \par There is a persistent linear atelectatic type process at left base.\par \par COPD hyperexpansion of the lungs is again suggested.\par \par Chest is similar to earlier in the day.\par \par IMPRESSION: Unchanged chest as above.\par \par \par 4.26.21 Non- Contrast CT Chest Scan from [facility] \par 1. The left lower lobe nodule is new.\par 2. The right upper and left lower lobe nodule is unchanged.\par 3. The additional, bilateral groundglass opacities and patchy foci of consolidation have nearly completely resolved.\par \par \par

## 2021-10-25 NOTE — HISTORY OF PRESENT ILLNESS
[FreeTextEntry1] : Mr. MOORE is a 76 year old former smoker (2PPD for 30 years; quit 2016) who was  referred by Dr.Ronald Fuller  for a abnormal  initial consultation. This showed a 8 x 5 mm slowly growing nodule within right upper lobe  that  is suspicious for a lung neoplasm. This was endorsed from his chief complaint of shortness of breath on exertion. \par \par Today he reports a chronic cough  and shortness of breath, however he denies  dizziness,unintentional weight loss,   fever or chills. His past medical history includes anemia, atrial fibrillation, CAD (S/P CABG x 3), chronic lower back pain,COPD with exacerbation, former cigarette smoker, hypercholesterolemia, hypertension, MSSA bacteremia, multiple lung nodules, paroxysmal atrial fibrillation, pneumonia,status post placement of implantable loop recorder,   arthritis  and S/P coronary artery stent placement. \par

## 2021-10-28 ENCOUNTER — OUTPATIENT (OUTPATIENT)
Dept: OUTPATIENT SERVICES | Facility: HOSPITAL | Age: 76
LOS: 1 days | Discharge: HOME | End: 2021-10-28
Payer: MEDICARE

## 2021-10-28 ENCOUNTER — APPOINTMENT (OUTPATIENT)
Dept: RADIATION ONCOLOGY | Facility: CLINIC | Age: 76
End: 2021-10-28
Payer: MEDICARE

## 2021-10-28 VITALS
WEIGHT: 150 LBS | OXYGEN SATURATION: 96 % | HEART RATE: 69 BPM | SYSTOLIC BLOOD PRESSURE: 132 MMHG | RESPIRATION RATE: 16 BRPM | DIASTOLIC BLOOD PRESSURE: 83 MMHG | BODY MASS INDEX: 23.85 KG/M2

## 2021-10-28 DIAGNOSIS — Z98.49 CATARACT EXTRACTION STATUS, UNSPECIFIED EYE: Chronic | ICD-10-CM

## 2021-10-28 DIAGNOSIS — Z95.5 PRESENCE OF CORONARY ANGIOPLASTY IMPLANT AND GRAFT: Chronic | ICD-10-CM

## 2021-10-28 DIAGNOSIS — Z95.1 PRESENCE OF AORTOCORONARY BYPASS GRAFT: Chronic | ICD-10-CM

## 2021-10-28 PROCEDURE — 99205 OFFICE O/P NEW HI 60 MIN: CPT | Mod: 25

## 2021-10-28 PROCEDURE — 77263 THER RADIOLOGY TX PLNG CPLX: CPT

## 2021-10-28 NOTE — LETTER CLOSING
[Consult Closing:] : Thank you for allowing me to participate in the care of this patient.  If you have any questions, please do not hesitate to contact me. [Sincerely yours,] : Sincerely yours, [FreeTextEntry3] : Emanuel Tapia MD\par  of Radiation Medicine, Quality and Safety\par  of Radiation Medicine\par [facility] at [facility]\par [facility]\par [facility]\par

## 2021-10-28 NOTE — DISEASE MANAGEMENT
[Clinical] : TNM Stage: c [IA1] : IA1 [FreeTextEntry4] : RUL nodule [TTNM] : 1a [NTNM] : 0 [MTNM] : 0

## 2021-10-28 NOTE — REVIEW OF SYSTEMS
[Shortness Of Breath] : shortness of breath [SOB on Exertion] : shortness of breath during exertion [Insomnia] : insomnia [Anxiety] : anxiety [Negative] : Allergic/Immunologic [Wheezing] : no wheezing [Cough] : no cough [Suicidal] : not suicidal

## 2021-10-28 NOTE — VITALS
[Maximal Pain Intensity: 0/10] : 0/10 [Least Pain Intensity: 0/10] : 0/10 [NoTreatment Scheduled] : no treatment scheduled [70: Cares for self; unalbe to carry on normal activity or do active work.] : 72: Cares for self; unable to carry on normal activity or do active work.

## 2021-10-28 NOTE — PHYSICAL EXAM
[] : no respiratory distress [Respiration, Rhythm And Depth] : normal respiratory rhythm and effort [Normal] : oriented to person, place and time, the affect was normal, the mood was normal and not anxious [Exaggerated Use Of Accessory Muscles For Inspiration] : no accessory muscle use [Bowel Sounds] : normal bowel sounds [Abdomen Soft] : soft [Nondistended] : nondistended [Abdomen Tenderness] : non-tender [de-identified] : diminished breath sounds throughout lung fields.  Mild intermittent inspiratory wheeze [de-identified] : tenderness lumbar back

## 2021-10-28 NOTE — HISTORY OF PRESENT ILLNESS
[FreeTextEntry1] : 76-year-old gentleman presents for consultation regarding irradiation therapy for suspected lung cancer.\par \par He has a history of stage III chronic obstructive lung disease, and bilateral groundglass opacities which have been closely monitored radiographically.\par \par 10/18/21 CT chest noted a 0.8 x 0.5 CM nodule in the right upper lobe, which had been slowly growing compared to prior, and suspicious for a lung neoplasm.  Also noted was a 0.4 CM nodule in the right middle lobe, which had been stable from 2016, and stable small calcified granulomas.  There was no mediastinal or hilar adenopathy.\par \par He was referred by Dr. Epi Cunningham to Dr. Venkatesh Babb.  His past medical history includes paroxysmal atrial fibrillation, CAD (s/p CABG x 3), coronary stent placement, chronic low back pain, COPD, former cigarette smoker, hypercholesterolemia, hypertension, implanted loop recorder, and implanted spinal cord stimulator.  The nodule was felt to be too small for a needle biopsy with risk of pneumothorax, and surgical resection may not be well tolerated based on his lung function.\par

## 2021-10-28 NOTE — REASON FOR VISIT
[Consideration of Curative Therapy] : consideration of curative therapy for [Lung Cancer] : lung cancer [Spouse] : spouse

## 2021-11-02 ENCOUNTER — NON-APPOINTMENT (OUTPATIENT)
Age: 76
End: 2021-11-02

## 2021-11-03 ENCOUNTER — NON-APPOINTMENT (OUTPATIENT)
Age: 76
End: 2021-11-03

## 2021-11-03 PROCEDURE — 77290 THER RAD SIMULAJ FIELD CPLX: CPT | Mod: 26

## 2021-11-03 PROCEDURE — 77334 RADIATION TREATMENT AID(S): CPT | Mod: 26

## 2021-11-05 ENCOUNTER — LABORATORY RESULT (OUTPATIENT)
Age: 76
End: 2021-11-05

## 2021-11-10 PROCEDURE — 77295 3-D RADIOTHERAPY PLAN: CPT | Mod: 26

## 2021-11-10 PROCEDURE — 77300 RADIATION THERAPY DOSE PLAN: CPT | Mod: 26

## 2021-11-10 PROCEDURE — 77293 RESPIRATOR MOTION MGMT SIMUL: CPT | Mod: 26

## 2021-11-10 PROCEDURE — 77334 RADIATION TREATMENT AID(S): CPT | Mod: 26

## 2021-11-11 ENCOUNTER — NON-APPOINTMENT (OUTPATIENT)
Age: 76
End: 2021-11-11

## 2021-11-11 ENCOUNTER — APPOINTMENT (OUTPATIENT)
Dept: ELECTROPHYSIOLOGY | Facility: CLINIC | Age: 76
End: 2021-11-11
Payer: MEDICARE

## 2021-11-11 PROCEDURE — G2066: CPT | Mod: NC

## 2021-11-11 PROCEDURE — 93298 REM INTERROG DEV EVAL SCRMS: CPT

## 2021-11-16 ENCOUNTER — APPOINTMENT (OUTPATIENT)
Dept: CARDIOLOGY | Facility: CLINIC | Age: 76
End: 2021-11-16
Payer: MEDICARE

## 2021-11-16 VITALS
WEIGHT: 150 LBS | HEIGHT: 66.5 IN | RESPIRATION RATE: 16 BRPM | HEART RATE: 72 BPM | BODY MASS INDEX: 23.82 KG/M2 | SYSTOLIC BLOOD PRESSURE: 104 MMHG | DIASTOLIC BLOOD PRESSURE: 68 MMHG

## 2021-11-16 PROCEDURE — 99214 OFFICE O/P EST MOD 30 MIN: CPT

## 2021-11-16 PROCEDURE — 93000 ELECTROCARDIOGRAM COMPLETE: CPT

## 2021-11-18 NOTE — REASON FOR VISIT
[FreeTextEntry1] : Mr. Henson is a pleasant 76-year-old white male with a past medical history significant for hypertension, hyperlipidemia, coronary artery disease status-post PCI in 2002 followed by CABG x3 in March of 2020 as well as COPD and paroxysmal atrial fibrillation with subsequent ILR implantation who presents for follow up evaluation.  \par \par \par

## 2021-11-18 NOTE — HISTORY OF PRESENT ILLNESS
[FreeTextEntry1] : \par From a cardiac standpoint, Mr. Henson denies exertional chest pain, shortness of breath, or palpitations.  He does admit to some lightheadedness when getting out of bed quickly as his blood pressure tends to run low.  \par \par \par Patient recently noted to have a slow-growing nodule in the right upper lobe suspicious for lung neoplasm.  Because of his underlying COPD, the patient is deemed to be not a good candidate for biopsy and therefore empiric radiation therapy will be given to this area.

## 2021-11-18 NOTE — ASSESSMENT
[FreeTextEntry1] : 1.  EKG today demonstrates normal sinus rhythm at 72 bpm.  Right bundle branch block.  No acute ischemic changes.  \par \par 2.  Hypertension:  Blood pressure on the low side.  Patient experiencing some lightheadedness when arising quickly out of bed or from a chair.  Will reduce Metoprolol from 25 to 12.5 mg daily.  Patient advised to hydrate.  \par \par 3.  Hyperlipidemia:  Review of recent lipoid profile revealed a total cholesterol of 122, HDL 52, LDL 60, triglycerides 46.  Patient advised to continue his low-fat / low-cholesterol diet and current medications.  \par \par 4.  Coronary artery disease:  Clinically stable without chest pain or significant shortness of breath.  Advised to walk as much as possible.  \par \par 5.  COPD right upper lobe nodule:  No cardiac contraindications to proposed radiation therapy to the area.  \par \par 6.  Lower back pain:  Patient with chronic lower back pain in need of epidural pain management therapy in the near future.  No significant contraindications to this either.  Patient will have to discontinue current Eliquis therapy three days (6 doses) prior to proceeding with therapy.  Aspirin will have to be discontinued for approximately seven days.  If clinically stable from a cardiac standpoint, follow up office visit here three months. \par

## 2021-11-18 NOTE — PHYSICAL EXAM
[General Appearance - Well Developed] : well developed [General Appearance - In No Acute Distress] : no acute distress [Normal Conjunctiva] : the conjunctiva exhibited no abnormalities [Normal Oral Mucosa] : normal oral mucosa [] : no respiratory distress [Auscultation Breath Sounds / Voice Sounds] : lungs were clear to auscultation bilaterally [Heart Rate And Rhythm] : heart rate and rhythm were normal [Arterial Pulses Normal] : the arterial pulses were normal [Bowel Sounds] : normal bowel sounds [Abnormal Walk] : normal gait [Skin Color & Pigmentation] : normal skin color and pigmentation [Skin Turgor] : normal skin turgor [Oriented To Time, Place, And Person] : oriented to person, place, and time [Affect] : the affect was normal [Mood] : the mood was normal [FreeTextEntry1] : No edema

## 2021-11-19 ENCOUNTER — NON-APPOINTMENT (OUTPATIENT)
Age: 76
End: 2021-11-19

## 2021-11-19 VITALS
RESPIRATION RATE: 17 BRPM | OXYGEN SATURATION: 92 % | DIASTOLIC BLOOD PRESSURE: 67 MMHG | HEIGHT: 66 IN | SYSTOLIC BLOOD PRESSURE: 110 MMHG | BODY MASS INDEX: 24.75 KG/M2 | HEART RATE: 75 BPM | WEIGHT: 154 LBS | TEMPERATURE: 97 F

## 2021-11-19 NOTE — DISEASE MANAGEMENT
[Clinical] : TNM Stage: c [IA1] : IA1 [FreeTextEntry4] : RUL nodule [TTNM] : 1a [NTNM] : 0 [MTNM] : 0 [de-identified] : 8495 [de-identified] : 6567 [de-identified] : right lung

## 2021-11-19 NOTE — PHYSICAL EXAM
[Extraocular Movements] : extraocular movements were intact [Outer Ear] : the ears and nose were normal in appearance [] : no respiratory distress [Respiration, Rhythm And Depth] : normal respiratory rhythm and effort [Exaggerated Use Of Accessory Muscles For Inspiration] : no accessory muscle use [Heart Rate And Rhythm] : heart rate and rhythm were normal [Bowel Sounds] : normal bowel sounds [Abdomen Soft] : soft [Normal] : oriented to person, place and time, the affect was normal, the mood was normal and not anxious

## 2021-11-22 ENCOUNTER — TRANSCRIPTION ENCOUNTER (OUTPATIENT)
Age: 76
End: 2021-11-22

## 2021-11-23 ENCOUNTER — OUTPATIENT (OUTPATIENT)
Dept: OUTPATIENT SERVICES | Facility: HOSPITAL | Age: 76
LOS: 1 days | End: 2021-11-23
Payer: MEDICARE

## 2021-11-23 DIAGNOSIS — Z95.5 PRESENCE OF CORONARY ANGIOPLASTY IMPLANT AND GRAFT: Chronic | ICD-10-CM

## 2021-11-23 DIAGNOSIS — M54.16 RADICULOPATHY, LUMBAR REGION: ICD-10-CM

## 2021-11-23 DIAGNOSIS — Z98.49 CATARACT EXTRACTION STATUS, UNSPECIFIED EYE: Chronic | ICD-10-CM

## 2021-11-23 DIAGNOSIS — Z95.1 PRESENCE OF AORTOCORONARY BYPASS GRAFT: Chronic | ICD-10-CM

## 2021-11-24 PROCEDURE — 77435 SBRT MANAGEMENT: CPT

## 2021-11-29 ENCOUNTER — NON-APPOINTMENT (OUTPATIENT)
Age: 76
End: 2021-11-29

## 2021-12-01 NOTE — HISTORY OF PRESENT ILLNESS
[Follow up with Radiation MD in _____] : Follow up with Radiation MD in [unfilled] [Follow up with Surgeon in _____] : Follow up with Surgeon in [unfilled] [Primary care physician] : primary care physician [Lung screening] : lung screening [FreeTextEntry8] : Clarisa Ramos

## 2021-12-03 PROCEDURE — 64484 NJX AA&/STRD TFRM EPI L/S EA: CPT

## 2021-12-03 PROCEDURE — 64483 NJX AA&/STRD TFRM EPI L/S 1: CPT | Mod: LT

## 2021-12-03 PROCEDURE — 76000 FLUOROSCOPY <1 HR PHYS/QHP: CPT

## 2021-12-16 ENCOUNTER — NON-APPOINTMENT (OUTPATIENT)
Age: 76
End: 2021-12-16

## 2021-12-16 ENCOUNTER — APPOINTMENT (OUTPATIENT)
Dept: ELECTROPHYSIOLOGY | Facility: CLINIC | Age: 76
End: 2021-12-16
Payer: MEDICARE

## 2021-12-16 PROCEDURE — G2066: CPT

## 2021-12-16 PROCEDURE — 93298 REM INTERROG DEV EVAL SCRMS: CPT

## 2021-12-22 ENCOUNTER — NON-APPOINTMENT (OUTPATIENT)
Age: 76
End: 2021-12-22

## 2021-12-22 ENCOUNTER — APPOINTMENT (OUTPATIENT)
Dept: RADIATION ONCOLOGY | Facility: CLINIC | Age: 76
End: 2021-12-22
Payer: MEDICARE

## 2021-12-22 VITALS
DIASTOLIC BLOOD PRESSURE: 64 MMHG | HEART RATE: 87 BPM | HEIGHT: 66 IN | WEIGHT: 149 LBS | SYSTOLIC BLOOD PRESSURE: 100 MMHG | TEMPERATURE: 97 F | RESPIRATION RATE: 16 BRPM | BODY MASS INDEX: 23.95 KG/M2

## 2021-12-22 PROCEDURE — 99024 POSTOP FOLLOW-UP VISIT: CPT

## 2021-12-22 NOTE — REVIEW OF SYSTEMS
[Shortness Of Breath] : shortness of breath [Cough] : cough [Joint Pain] : joint pain [Negative] : Allergic/Immunologic [Edema Limbs: Grade 0] : Edema Limbs: Grade 0  [Fatigue: Grade 0] : Fatigue: Grade 0 [Localized Edema: Grade 0] : Localized Edema: Grade 0  [Neck Edema: Grade 0] : Neck Edema: Grade 0 [Anxiety: Grade 1 - Mild symptoms; intervention not indicated] : Anxiety: Grade 1 - Mild symptoms; intervention not indicated [Cough: Grade 1 - Mild symptoms; nonprescription intervention indicated] : Cough: Grade 1 - Mild symptoms; nonprescription intervention indicated [Dyspnea: Grade 1 - Shortness of breath with moderate exertion] : Dyspnea: Grade 1 - Shortness of breath with moderate exertion [Skin Hyperpigmentation: Grade 0] : Skin Hyperpigmentation: Grade 0 [FreeTextEntry9] : back

## 2021-12-22 NOTE — VITALS
[Maximal Pain Intensity: 3/10] : 3/10 [Least Pain Intensity: 3/10] : 3/10 [Pain Description/Quality: ___] : Pain description/quality: [unfilled] [Pain Duration: ___] : Pain duration: [unfilled] [Pain Location: ___] : Pain Location: [unfilled] [Pain Interferes with ADLs] : Pain interferes with activities of daily living. [Opioid] : opioid [80: Normal activity with effort; some signs or symptoms of disease.] : 80: Normal activity with effort; some signs or symptoms of disease.

## 2021-12-22 NOTE — PHYSICAL EXAM
[Normal] : oriented to person, place and time, the affect was normal, the mood was normal and not anxious [Heart Rate And Rhythm] : heart rate and rhythm were normal [Heart Sounds] : normal S1 and S2 [de-identified] : decreased but clear

## 2021-12-22 NOTE — HISTORY OF PRESENT ILLNESS
[FreeTextEntry1] : 76 year old gentleman with clinical lung cancer (RUL, cT1a N0), status post SBRT completed 11/24/21.\par \par Reports mild right lateral chest wall aches toward completion of RT, which have since resolved.\par \par Reports ongoing dyspnea and anterior chest wall discomfort since CABG.  Has noted some cough with clear/yellow sputum x 1 week.\par Denies  odynophagia, dysphagia, fatigue or weight loss.\par \par Dr Fuller (pulm) 2/17/22\par Dr. Fink (card) 3/9/22

## 2022-01-10 ENCOUNTER — OUTPATIENT (OUTPATIENT)
Dept: OUTPATIENT SERVICES | Facility: HOSPITAL | Age: 77
LOS: 1 days | End: 2022-01-10

## 2022-01-10 ENCOUNTER — APPOINTMENT (OUTPATIENT)
Dept: CT IMAGING | Facility: CLINIC | Age: 77
End: 2022-01-10
Payer: MEDICARE

## 2022-01-10 DIAGNOSIS — Z95.1 PRESENCE OF AORTOCORONARY BYPASS GRAFT: Chronic | ICD-10-CM

## 2022-01-10 DIAGNOSIS — R91.1 SOLITARY PULMONARY NODULE: ICD-10-CM

## 2022-01-10 DIAGNOSIS — Z98.49 CATARACT EXTRACTION STATUS, UNSPECIFIED EYE: Chronic | ICD-10-CM

## 2022-01-10 DIAGNOSIS — Z95.5 PRESENCE OF CORONARY ANGIOPLASTY IMPLANT AND GRAFT: Chronic | ICD-10-CM

## 2022-01-10 PROCEDURE — G1004: CPT

## 2022-01-10 PROCEDURE — 71260 CT THORAX DX C+: CPT | Mod: 26,MG

## 2022-01-20 ENCOUNTER — NON-APPOINTMENT (OUTPATIENT)
Age: 77
End: 2022-01-20

## 2022-01-20 ENCOUNTER — APPOINTMENT (OUTPATIENT)
Dept: ELECTROPHYSIOLOGY | Facility: CLINIC | Age: 77
End: 2022-01-20
Payer: MEDICARE

## 2022-01-20 PROCEDURE — 93298 REM INTERROG DEV EVAL SCRMS: CPT

## 2022-01-20 PROCEDURE — G2066: CPT

## 2022-01-21 ENCOUNTER — APPOINTMENT (OUTPATIENT)
Dept: RADIATION ONCOLOGY | Facility: CLINIC | Age: 77
End: 2022-01-21
Payer: MEDICARE

## 2022-01-21 VITALS
OXYGEN SATURATION: 90 % | BODY MASS INDEX: 24.59 KG/M2 | WEIGHT: 153 LBS | SYSTOLIC BLOOD PRESSURE: 119 MMHG | HEIGHT: 66 IN | DIASTOLIC BLOOD PRESSURE: 73 MMHG | HEART RATE: 88 BPM

## 2022-01-21 PROCEDURE — 99215 OFFICE O/P EST HI 40 MIN: CPT | Mod: 25

## 2022-01-21 NOTE — REVIEW OF SYSTEMS
[Dysphagia] : dysphagia [Chest Pain] : chest pain [Cough] : cough [SOB on Exertion] : shortness of breath during exertion [Negative] : Allergic/Immunologic [Dysphagia: Grade 1 - Symptomatic, able to eat regular diet] : Dysphagia: Grade 1 - Symptomatic, able to eat regular diet [Edema Limbs: Grade 0] : Edema Limbs: Grade 0  [Fatigue: Grade 0] : Fatigue: Grade 0 [Localized Edema: Grade 0] : Localized Edema: Grade 0  [Neck Edema: Grade 0] : Neck Edema: Grade 0 [Cough: Grade 1 - Mild symptoms; nonprescription intervention indicated] : Cough: Grade 1 - Mild symptoms; nonprescription intervention indicated [Dyspnea: Grade 1 - Shortness of breath with moderate exertion] : Dyspnea: Grade 1 - Shortness of breath with moderate exertion

## 2022-01-21 NOTE — HISTORY OF PRESENT ILLNESS
[FreeTextEntry1] : 76 year old gentleman with clinical lung cancer (RUL, cT1a N0), status post SBRT completed 11/24/21.\par \par Reports exertional dyspnea, intermittent cough with yellow to green sputum,  intermittent  dysphagia with his medications, ongoing chest pain since heart surgery, with right chest 4/10.\par Denies fatigue or weight loss.\par \par 1/10/22 CT chest showed stable treated right upper lobe nodule; no evidence of new or progressive disease.\par \par Dr Fuller (pulm) 2/17/22\par Dr Fink (card) 3/9/22\par

## 2022-01-21 NOTE — PHYSICAL EXAM
[Extraocular Movements] : extraocular movements were intact [Outer Ear] : the ears and nose were normal in appearance [Respiration, Rhythm And Depth] : normal respiratory rhythm and effort [Exaggerated Use Of Accessory Muscles For Inspiration] : no accessory muscle use [Heart Rate And Rhythm] : heart rate and rhythm were normal [Heart Sounds] : normal S1 and S2 [Bowel Sounds] : normal bowel sounds [Abdomen Soft] : soft [Nondistended] : nondistended [Abdomen Tenderness] : non-tender [] : no rash [Normal] : oriented to person, place and time, the affect was normal, the mood was normal and not anxious

## 2022-01-21 NOTE — VITALS
[Maximal Pain Intensity: 4/10] : 4/10 [Least Pain Intensity: 4/10] : 4/10 [Pain Description/Quality: ___] : Pain description/quality: [unfilled] [Pain Duration: ___] : Pain duration: [unfilled] [Pain Location: ___] : Pain Location: [unfilled] [Pain Interferes with ADLs] : Pain interferes with activities of daily living. [Opioid] : opioid [80: Normal activity with effort; some signs or symptoms of disease.] : 80: Normal activity with effort; some signs or symptoms of disease.

## 2022-01-25 ENCOUNTER — RX RENEWAL (OUTPATIENT)
Age: 77
End: 2022-01-25

## 2022-02-13 ENCOUNTER — APPOINTMENT (OUTPATIENT)
Dept: DISASTER EMERGENCY | Facility: CLINIC | Age: 77
End: 2022-02-13

## 2022-02-17 ENCOUNTER — APPOINTMENT (OUTPATIENT)
Dept: PULMONOLOGY | Facility: CLINIC | Age: 77
End: 2022-02-17

## 2022-02-24 ENCOUNTER — APPOINTMENT (OUTPATIENT)
Dept: PULMONOLOGY | Facility: CLINIC | Age: 77
End: 2022-02-24
Payer: MEDICARE

## 2022-02-24 ENCOUNTER — NON-APPOINTMENT (OUTPATIENT)
Age: 77
End: 2022-02-24

## 2022-02-24 ENCOUNTER — APPOINTMENT (OUTPATIENT)
Dept: ELECTROPHYSIOLOGY | Facility: CLINIC | Age: 77
End: 2022-02-24
Payer: MEDICARE

## 2022-02-24 VITALS — HEART RATE: 88 BPM | DIASTOLIC BLOOD PRESSURE: 70 MMHG | OXYGEN SATURATION: 97 % | SYSTOLIC BLOOD PRESSURE: 130 MMHG

## 2022-02-24 VITALS — BODY MASS INDEX: 23.24 KG/M2 | WEIGHT: 144 LBS

## 2022-02-24 PROCEDURE — G2066: CPT

## 2022-02-24 PROCEDURE — 93298 REM INTERROG DEV EVAL SCRMS: CPT

## 2022-02-24 PROCEDURE — 99214 OFFICE O/P EST MOD 30 MIN: CPT

## 2022-02-24 NOTE — HISTORY OF PRESENT ILLNESS
[Doing Well] : doing well [Difficulty Breathing During Exertion] : stable dyspnea on exertion [Feelings Of Weakness On Exertion] : stable exercise intolerance [Cough] : denies coughing [Coughing Up Sputum] : denies coughing up sputum [Wheezing] : denies wheezing [Regional Soft Tissue Swelling Both Lower Extremities] : denies lower extremity edema [Adherent] : the patient is adherent with ~his/her~ medication regimen [de-identified] : Non-small cell carcinoma TNM stage I a N0 M0, cad [de-identified] : Status post SBRT completed 11/24/2021 [de-identified] : Noted PND and worse in supine position

## 2022-02-24 NOTE — CONSULT LETTER
[Dear  ___] : Dear  [unfilled], [Consult Letter:] : I had the pleasure of evaluating your patient, [unfilled]. [Please see my note below.] : Please see my note below. [Consult Closing:] : Thank you very much for allowing me to participate in the care of this patient.  If you have any questions, please do not hesitate to contact me. [Sincerely,] : Sincerely, [FreeTextEntry3] : Epi Fuller MD FCCP\par Pulmonary/Critical Care/Sleep Medicine\par Department of Internal Medicine\par \par [facility] [employer]\par

## 2022-02-24 NOTE — DISCUSSION/SUMMARY
[COPD] : chronic obstructive pulmonary disease [Stable] : stable [Stage III (Severe)] : stage III (severe) [Chest CT] : chest CT [de-identified] : 10/2021 [None] : There are no changes in medication management

## 2022-02-25 ENCOUNTER — NON-APPOINTMENT (OUTPATIENT)
Age: 77
End: 2022-02-25

## 2022-03-09 ENCOUNTER — NON-APPOINTMENT (OUTPATIENT)
Age: 77
End: 2022-03-09

## 2022-03-09 ENCOUNTER — APPOINTMENT (OUTPATIENT)
Dept: CARDIOLOGY | Facility: CLINIC | Age: 77
End: 2022-03-09
Payer: MEDICARE

## 2022-03-09 VITALS
RESPIRATION RATE: 16 BRPM | HEART RATE: 88 BPM | WEIGHT: 150 LBS | HEIGHT: 67 IN | DIASTOLIC BLOOD PRESSURE: 66 MMHG | SYSTOLIC BLOOD PRESSURE: 130 MMHG | BODY MASS INDEX: 23.54 KG/M2

## 2022-03-09 PROCEDURE — 93000 ELECTROCARDIOGRAM COMPLETE: CPT

## 2022-03-09 PROCEDURE — 99214 OFFICE O/P EST MOD 30 MIN: CPT

## 2022-03-09 RX ORDER — FLUTICASONE FUROATE 100 UG/1
100 POWDER RESPIRATORY (INHALATION) DAILY
Qty: 1 | Refills: 5 | Status: DISCONTINUED | COMMUNITY
Start: 2021-01-20 | End: 2022-03-09

## 2022-03-09 NOTE — HISTORY OF PRESENT ILLNESS
[FreeTextEntry1] : Mr. Henson presents today without complaints of exertional chest pain, shortness of breath, lightheadedness or syncope.  He has not had any further episodes of lightheadedness since decreasing metoprolol succinate to 12.5mg daily.  Has had a few episodes of "palpitations".  On ILR sinus rhythm, sinus tachycardia noted.   Recently completed radiation therapy and has follow up with oncology in April.  Has noted "pins and needles" in his right arm.  Denies pain.  Presently in need of cardiac clearance for left lower back epidural pain management.

## 2022-03-09 NOTE — DISCUSSION/SUMMARY
[FreeTextEntry1] : 1 - Hypertension:  blood pressure well controlled on current medications.  Has not experienced any further lightheadedness since decreasing metoprolol succinate to 12.5mg daily.  Advised to hydrate well.\par \par 2 - Coronary artery disease status-post PCI and CABG x 3 (March 2020):  clinically stable at this time.  Denies complaints of exertional chest pain, shortness of breath, lightheadedness or syncope.  Will have Mr. Henson undergo echocardiogram and nuclear stress testing prior to his 6 month follow up .\par \par 3 - Paroxysmal atrial fibrillation, status-post ILR implantation:  intermittent palpitations.  Most recent episode was noted to be sinus rhythm/sinus tachycardia  bpm.  Tolerating Eliquis 5mg BID well.\par \par 4 - Peripheral artery disease:  patient with known moderate plaque in the left carotid bulb and proximal left internal carotid artery with less than 50% left internal carotid artery stenosis.  Mild to moderate plaque in the right carotid bulb and proximal right internal carotid artery with  than 50 % right internal carotid artery stenosis.  Schedule carotid duplex prior to next visit.\par \par 5 - Hyperlipidemia:  continue Atorvastatin 40mg daily.  Follow low fat, low cholesterol diet.  Fasting blood work prior to follow up visit.\par \par 6 - Paresthesias in right arm:  patient noted "pins and needles" in his right arm.  Denies pain.  On exam, hand cool, equal with left hand.  Good radial pulse.  Advised to follow up with PCP/neurology for further evaluation.\par \par 7 - COPD, right upper lobe nodule:  Has completed radiation therapy and has follow up visit with oncology next month.\par \par 8 - There are no absolute cardiac contraindication for Mr. Henson to proceed with his needed left lower back epidural injection.  Advised to hold Eliquis therapy three days (6 doses) and aspirin 7 days prior to injection.  Both should be restarted as soon as it is deemed safe by Dr. Chris.\par \par 9 - Follow up with Dr. Fink in 6 months.\par \par \par

## 2022-03-14 ENCOUNTER — TRANSCRIPTION ENCOUNTER (OUTPATIENT)
Age: 77
End: 2022-03-14

## 2022-03-15 ENCOUNTER — OUTPATIENT (OUTPATIENT)
Dept: OUTPATIENT SERVICES | Facility: HOSPITAL | Age: 77
LOS: 1 days | End: 2022-03-15
Payer: MEDICARE

## 2022-03-15 DIAGNOSIS — M54.16 RADICULOPATHY, LUMBAR REGION: ICD-10-CM

## 2022-03-15 DIAGNOSIS — Z98.49 CATARACT EXTRACTION STATUS, UNSPECIFIED EYE: Chronic | ICD-10-CM

## 2022-03-15 DIAGNOSIS — Z95.1 PRESENCE OF AORTOCORONARY BYPASS GRAFT: Chronic | ICD-10-CM

## 2022-03-15 DIAGNOSIS — Z95.5 PRESENCE OF CORONARY ANGIOPLASTY IMPLANT AND GRAFT: Chronic | ICD-10-CM

## 2022-03-15 PROCEDURE — 76000 FLUOROSCOPY <1 HR PHYS/QHP: CPT

## 2022-03-15 PROCEDURE — 64494 INJ PARAVERT F JNT L/S 2 LEV: CPT | Mod: LT

## 2022-03-15 PROCEDURE — 64483 NJX AA&/STRD TFRM EPI L/S 1: CPT | Mod: LT

## 2022-03-18 NOTE — REVIEW OF SYSTEMS
[Dysphagia: Grade 0] : Dysphagia: Grade 0 negative - no chest pain [Edema Limbs: Grade 0] : Edema Limbs: Grade 0  [Fatigue: Grade 1 - Fatigue relieved by rest] : Fatigue: Grade 1 - Fatigue relieved by rest [Localized Edema: Grade 0] : Localized Edema: Grade 0  [Neck Edema: Grade 0] : Neck Edema: Grade 0 [Cough: Grade 1 - Mild symptoms; nonprescription intervention indicated] : Cough: Grade 1 - Mild symptoms; nonprescription intervention indicated [Dyspnea: Grade 0] : Dyspnea: Grade 0 [Skin Hyperpigmentation: Grade 0] : Skin Hyperpigmentation: Grade 0

## 2022-03-21 NOTE — PROGRESS NOTE ADULT - SUBJECTIVE AND OBJECTIVE BOX
JENNIFER MOORE  ----------------------------------------  The patient was seen at bedside. Patient with pneumonia. Noted intermittent cough.    Vital Signs Last 24 Hrs  T(C): 36.5 (2021 11:29), Max: 36.6 (2021 21:01)  T(F): 97.7 (2021 11:29), Max: 97.9 (2021 21:01)  HR: 97 (:29) (79 - 105)  BP: 109/67 (2021 11:29) (109/67 - 133/72)  BP(mean): --  RR: 18 (2021 11:29) (18 - 20)  SpO2: 97% (2021 11:29) (92% - 98%)    PHYSICAL EXAMINATION:  ----------------------------------------  General appearance: No acute distress, Awake, Alert  HEENT: Normocephalic, Atraumatic, Conjunctiva clear, EOMI  Neck: Supple, No JVD, No tenderness  Lungs: Breath sound equal bilaterally, No wheezes, Basilar rales  Cardiovascular: S1S2, Regular rhythm  Abdomen: Soft, Nontender, Nondistended, No guarding/rebound, Positive bowel sounds  Extremities: No clubbing, No cyanosis, No edema, No calf tenderness  Neuro: Strength equal bilaterally, No tremors  Psychiatric: Appropriate mood, Normal affect    LABORATORY STUDIES:  ----------------------------------------             10.2   7.28  )-----------( 254      ( 2021 07:42 )             31.3     Urinalysis Basic - ( 2021 03:01 )  Color: Yellow / Appearance: Clear / S.010 / pH: x  Gluc: x / Ketone: Negative  / Bili: Negative / Urobili: Negative mg/dL   Blood: x / Protein: Negative mg/dL / Nitrite: Negative   Leuk Esterase: Negative / RBC: x / WBC x   Sq Epi: x / Non Sq Epi: x / Bacteria: x    Culture - Urine (collected 2021 10:16)  Source: .Urine Clean Catch (Midstream)  Final Report (2021 05:52):    No growth    Culture - Blood (collected 2021 16:52)  Source: .Blood Blood-Peripheral  Preliminary Report (2021 17:00):    No growth at 48 hours    Culture - Blood (collected 2021 16:51)  Source: .Blood Blood-Peripheral  Preliminary Report (2021 17:00):    No growth at 48 hours    MEDICATIONS  (STANDING):  albuterol/ipratropium for Nebulization 3 milliLiter(s) Nebulizer every 6 hours  apixaban 5 milliGRAM(s) Oral every 12 hours  azithromycin  IVPB 500 milliGRAM(s) IV Intermittent every 24 hours  cefTRIAXone   IVPB 1000 milliGRAM(s) IV Intermittent every 24 hours  gabapentin 400 milliGRAM(s) Oral three times a day  metoprolol tartrate 25 milliGRAM(s) Oral two times a day  predniSONE   Tablet 20 milliGRAM(s) Oral daily  saccharomyces boulardii 250 milliGRAM(s) Oral two times a day  tamsulosin 0.4 milliGRAM(s) Oral at bedtime    MEDICATIONS  (PRN):  oxycodone    5 mG/acetaminophen 325 mG 2 Tablet(s) Oral every 6 hours PRN Moderate Pain (4 - 6)      ASSESSMENT / PLAN:  ----------------------------------------  75M with a history of COPD, atrial fibrillation, coronary artery disease, and back pain who presented with fever, productive cough, dyspnea and wheezing, found to have leukocytosis and chest Xray noting infiltrate and antibiotics were initiated for pneumonia.    Pneumonia - On empiric antibiotics. Blood culture were without growth. Afebrile. Infectious Disease consultation noted, suspected aspiration event at home.    COPD exacerbation - On prednisone with inhaled bronchodilator therapy. CT results discussed with the patient who reported the known history of lung nodules for which he follows with his pulmonologist and has yearly CT scans.    Coronary artery disease - No chest pain. On metoprolol.    Atrial fibrillation - On apixaban for anticoagulation. On metoprolol.    Back pain - Analgesic medications as needed. 21-Mar-2022

## 2022-03-28 ENCOUNTER — NON-APPOINTMENT (OUTPATIENT)
Age: 77
End: 2022-03-28

## 2022-03-28 ENCOUNTER — APPOINTMENT (OUTPATIENT)
Dept: ELECTROPHYSIOLOGY | Facility: CLINIC | Age: 77
End: 2022-03-28
Payer: MEDICARE

## 2022-03-28 PROCEDURE — 93298 REM INTERROG DEV EVAL SCRMS: CPT

## 2022-03-28 PROCEDURE — G2066: CPT

## 2022-04-05 ENCOUNTER — APPOINTMENT (OUTPATIENT)
Dept: CARDIOLOGY | Facility: CLINIC | Age: 77
End: 2022-04-05
Payer: MEDICARE

## 2022-04-05 PROCEDURE — 93880 EXTRACRANIAL BILAT STUDY: CPT

## 2022-04-05 PROCEDURE — 93306 TTE W/DOPPLER COMPLETE: CPT

## 2022-04-07 ENCOUNTER — APPOINTMENT (OUTPATIENT)
Dept: ELECTROPHYSIOLOGY | Facility: CLINIC | Age: 77
End: 2022-04-07
Payer: MEDICARE

## 2022-04-07 VITALS
SYSTOLIC BLOOD PRESSURE: 98 MMHG | BODY MASS INDEX: 22.79 KG/M2 | HEIGHT: 67 IN | RESPIRATION RATE: 14 BRPM | WEIGHT: 145.2 LBS | HEART RATE: 88 BPM | DIASTOLIC BLOOD PRESSURE: 64 MMHG | OXYGEN SATURATION: 95 % | TEMPERATURE: 98.6 F

## 2022-04-07 VITALS — DIASTOLIC BLOOD PRESSURE: 68 MMHG | SYSTOLIC BLOOD PRESSURE: 112 MMHG

## 2022-04-07 PROCEDURE — 93000 ELECTROCARDIOGRAM COMPLETE: CPT

## 2022-04-07 PROCEDURE — 99214 OFFICE O/P EST MOD 30 MIN: CPT

## 2022-04-07 RX ORDER — HYDROMORPHONE HYDROCHLORIDE 2 MG/1
2 TABLET ORAL EVERY 4 HOURS
Refills: 0 | Status: ACTIVE | COMMUNITY
Start: 2022-04-07

## 2022-04-11 ENCOUNTER — APPOINTMENT (OUTPATIENT)
Dept: CARDIOLOGY | Facility: CLINIC | Age: 77
End: 2022-04-11
Payer: MEDICARE

## 2022-04-11 PROCEDURE — A9500: CPT

## 2022-04-11 PROCEDURE — 93015 CV STRESS TEST SUPVJ I&R: CPT

## 2022-04-11 PROCEDURE — 78452 HT MUSCLE IMAGE SPECT MULT: CPT

## 2022-04-11 RX ORDER — REGADENOSON 0.08 MG/ML
0.4 INJECTION, SOLUTION INTRAVENOUS
Qty: 4 | Refills: 0 | Status: COMPLETED | OUTPATIENT
Start: 2022-04-11

## 2022-04-11 RX ADMIN — REGADENOSON 0 MG/5ML: 0.08 INJECTION, SOLUTION INTRAVENOUS at 00:00

## 2022-04-19 ENCOUNTER — OUTPATIENT (OUTPATIENT)
Dept: OUTPATIENT SERVICES | Facility: HOSPITAL | Age: 77
LOS: 1 days | End: 2022-04-19
Payer: MEDICARE

## 2022-04-19 ENCOUNTER — APPOINTMENT (OUTPATIENT)
Dept: CT IMAGING | Facility: CLINIC | Age: 77
End: 2022-04-19
Payer: MEDICARE

## 2022-04-19 DIAGNOSIS — Z98.49 CATARACT EXTRACTION STATUS, UNSPECIFIED EYE: Chronic | ICD-10-CM

## 2022-04-19 DIAGNOSIS — C34.11 MALIGNANT NEOPLASM OF UPPER LOBE, RIGHT BRONCHUS OR LUNG: ICD-10-CM

## 2022-04-19 DIAGNOSIS — Z00.8 ENCOUNTER FOR OTHER GENERAL EXAMINATION: ICD-10-CM

## 2022-04-19 DIAGNOSIS — Z95.1 PRESENCE OF AORTOCORONARY BYPASS GRAFT: Chronic | ICD-10-CM

## 2022-04-19 DIAGNOSIS — Z95.5 PRESENCE OF CORONARY ANGIOPLASTY IMPLANT AND GRAFT: Chronic | ICD-10-CM

## 2022-04-19 PROCEDURE — 71260 CT THORAX DX C+: CPT | Mod: 26,MG

## 2022-04-19 PROCEDURE — G1004: CPT

## 2022-04-19 PROCEDURE — 71260 CT THORAX DX C+: CPT | Mod: MG

## 2022-04-21 ENCOUNTER — APPOINTMENT (OUTPATIENT)
Dept: CT IMAGING | Facility: CLINIC | Age: 77
End: 2022-04-21

## 2022-04-25 NOTE — ED PROVIDER NOTE - GASTROINTESTINAL NEGATIVE STATEMENT, MLM
no abdominal pain, no bloating, no constipation, no diarrhea, no nausea and no vomiting.
[FreeTextEntry1] : #DM II: discussed at length with patient, he insists to come off Metformin--dc'd. Add Glimepiride 2 mg daily, after discussing risks/benefits c/w Jardiance 25 mg daily. Low carb diet reviewed and recommended. Advised him to check fingersticks and to call me in 2 weeks to update.\par #HTN: controlled, on Lisinopril 5mg, Chlorthalidone 12.5 mg daily, low salt diet.\par F/u with cards re: Afib on holter--on Eliquis\par F/u 3m.prn, labs.

## 2022-04-29 ENCOUNTER — NON-APPOINTMENT (OUTPATIENT)
Age: 77
End: 2022-04-29

## 2022-04-29 ENCOUNTER — APPOINTMENT (OUTPATIENT)
Dept: RADIATION ONCOLOGY | Facility: CLINIC | Age: 77
End: 2022-04-29
Payer: MEDICARE

## 2022-04-29 VITALS
DIASTOLIC BLOOD PRESSURE: 70 MMHG | BODY MASS INDEX: 22.4 KG/M2 | HEART RATE: 102 BPM | RESPIRATION RATE: 16 BRPM | OXYGEN SATURATION: 90 % | SYSTOLIC BLOOD PRESSURE: 108 MMHG | WEIGHT: 143 LBS

## 2022-04-29 PROCEDURE — 99214 OFFICE O/P EST MOD 30 MIN: CPT

## 2022-04-29 NOTE — HISTORY OF PRESENT ILLNESS
[FreeTextEntry1] : 76 year old gentleman with clinical lung cancer (RUL, cT1a N0), status post SBRT completed 11/24/21.\par \par Interval history:\par Underwent placement of loop recorder\par No recent increased dyspnea or cough.\par 4/19/22 CT chest: Stable treated neoplasm in the posterior right upper lobe. New adjacent radiotherapy changes. No evidence of new or progressive disease.\par \par Pulmonary: Epi Fuller\par Cardiology: Jassi Fink\par Electrophysiology: Elvin Mayorga\par

## 2022-04-29 NOTE — PHYSICAL EXAM
[Extraocular Movements] : extraocular movements were intact [Outer Ear] : the ears and nose were normal in appearance [Heart Rate And Rhythm] : heart rate and rhythm were normal [Bowel Sounds] : normal bowel sounds [Abdomen Soft] : soft [Nondistended] : nondistended [Abdomen Tenderness] : non-tender [Normal] : oriented to person, place and time, the affect was normal, the mood was normal and not anxious

## 2022-04-29 NOTE — REVIEW OF SYSTEMS
[Cough: Grade 0] : Cough: Grade 0 [Dyspnea: Grade 0] : Dyspnea: Grade 0 [Hypoxia: Grade 0] : Hypoxia: Grade 0 [Skin Hyperpigmentation: Grade 0] : Skin Hyperpigmentation: Grade 0 [Dermatitis Radiation: Grade 0] : Dermatitis Radiation: Grade 0

## 2022-05-02 ENCOUNTER — APPOINTMENT (OUTPATIENT)
Dept: ELECTROPHYSIOLOGY | Facility: CLINIC | Age: 77
End: 2022-05-02
Payer: MEDICARE

## 2022-05-02 ENCOUNTER — NON-APPOINTMENT (OUTPATIENT)
Age: 77
End: 2022-05-02

## 2022-05-02 PROCEDURE — 93298 REM INTERROG DEV EVAL SCRMS: CPT

## 2022-05-02 PROCEDURE — G2066: CPT

## 2022-05-25 NOTE — H&P PST ADULT - CARDIOVASCULAR
Is This A New Presentation, Or A Follow-Up?: Growth Has Your Skin Lesion Been Treated?: been treated Additional History: Patient declined to remove pants for full skin exam details… detailed exam

## 2022-06-06 ENCOUNTER — NON-APPOINTMENT (OUTPATIENT)
Age: 77
End: 2022-06-06

## 2022-06-06 ENCOUNTER — APPOINTMENT (OUTPATIENT)
Dept: ELECTROPHYSIOLOGY | Facility: CLINIC | Age: 77
End: 2022-06-06
Payer: MEDICARE

## 2022-06-06 PROCEDURE — G2066: CPT

## 2022-06-06 PROCEDURE — 93298 REM INTERROG DEV EVAL SCRMS: CPT

## 2022-06-06 NOTE — HISTORY OF PRESENT ILLNESS
[FreeTextEntry1] : Mr. MOORE is a 74 year old male referred by Dr. Mueller who presents for consultation. His past medical history includes HTN, HLD, CAD (PCI RCA 2002 and again in 2019).\par \par He reports today with complaints of chest discomfort since June and feeling fatigued and generally poor.  He began feeling better after stent placed in June and then in January he began experiencing the symptoms again.  A cardiac cath was performed by Dr Mueller revealing mutivessel disease.  He is now having shortness of breath accompanied by chest pain at rest \par \par \par \par  "for seizure monitoring"

## 2022-06-20 ENCOUNTER — APPOINTMENT (OUTPATIENT)
Dept: PULMONOLOGY | Facility: CLINIC | Age: 77
End: 2022-06-20
Payer: MEDICARE

## 2022-06-20 VITALS
HEIGHT: 67 IN | WEIGHT: 145 LBS | SYSTOLIC BLOOD PRESSURE: 116 MMHG | RESPIRATION RATE: 16 BRPM | OXYGEN SATURATION: 93 % | DIASTOLIC BLOOD PRESSURE: 72 MMHG | HEART RATE: 79 BPM | BODY MASS INDEX: 22.76 KG/M2

## 2022-06-20 PROCEDURE — 99214 OFFICE O/P EST MOD 30 MIN: CPT

## 2022-06-20 NOTE — HISTORY OF PRESENT ILLNESS
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

## 2022-06-20 NOTE — CONSULT LETTER
[Dear  ___] : Dear  [unfilled], [Consult Letter:] : I had the pleasure of evaluating your patient, [unfilled]. [Please see my note below.] : Please see my note below. [Consult Closing:] : Thank you very much for allowing me to participate in the care of this patient.  If you have any questions, please do not hesitate to contact me. [Sincerely,] : Sincerely, [FreeTextEntry3] : Epi Fuller MD FCCP\par Pulmonary/Critical Care/Sleep Medicine\par Department of Internal Medicine\par \par [facility] [employer]\par

## 2022-06-20 NOTE — PHYSICAL EXAM
[Surgical scars] : surgical scars [No Acute Distress] : no acute distress [Normal Oropharynx] : normal oropharynx [Normal Appearance] : normal appearance [No Neck Mass] : no neck mass [Normal Rate/Rhythm] : normal rate/rhythm [Normal S1, S2] : normal s1, s2 [No Murmurs] : no murmurs [No Resp Distress] : no resp distress [Clear to Auscultation Bilaterally] : clear to auscultation bilaterally [No Abnormalities] : no abnormalities [Benign] : benign [Normal Gait] : normal gait [No Clubbing] : no clubbing [No Cyanosis] : no cyanosis [No Edema] : no edema [FROM] : FROM [Normal Color/ Pigmentation] : normal color/ pigmentation [No Focal Deficits] : no focal deficits [Oriented x3] : oriented x3 [Normal Affect] : normal affect

## 2022-06-20 NOTE — DISCUSSION/SUMMARY
[COPD] : chronic obstructive pulmonary disease [Stable] : stable [Stage III (Severe)] : stage III (severe) [None] : There are no changes in medication management

## 2022-06-27 ENCOUNTER — NON-APPOINTMENT (OUTPATIENT)
Age: 77
End: 2022-06-27

## 2022-06-30 ENCOUNTER — EMERGENCY (EMERGENCY)
Facility: HOSPITAL | Age: 77
LOS: 1 days | Discharge: HOME | End: 2022-06-30
Attending: EMERGENCY MEDICINE
Payer: MEDICARE

## 2022-06-30 VITALS
WEIGHT: 143.96 LBS | HEIGHT: 67 IN | SYSTOLIC BLOOD PRESSURE: 127 MMHG | OXYGEN SATURATION: 94 % | HEART RATE: 81 BPM | DIASTOLIC BLOOD PRESSURE: 71 MMHG | TEMPERATURE: 98 F | RESPIRATION RATE: 18 BRPM

## 2022-06-30 DIAGNOSIS — Z95.1 PRESENCE OF AORTOCORONARY BYPASS GRAFT: Chronic | ICD-10-CM

## 2022-06-30 DIAGNOSIS — Z95.5 PRESENCE OF CORONARY ANGIOPLASTY IMPLANT AND GRAFT: Chronic | ICD-10-CM

## 2022-06-30 DIAGNOSIS — Z98.49 CATARACT EXTRACTION STATUS, UNSPECIFIED EYE: Chronic | ICD-10-CM

## 2022-06-30 LAB
ALBUMIN SERPL ELPH-MCNC: 4.5 G/DL — SIGNIFICANT CHANGE UP (ref 3.3–5.2)
ALP SERPL-CCNC: 83 U/L — SIGNIFICANT CHANGE UP (ref 40–120)
ALT FLD-CCNC: 29 U/L — SIGNIFICANT CHANGE UP
ANION GAP SERPL CALC-SCNC: 10 MMOL/L — SIGNIFICANT CHANGE UP (ref 5–17)
APTT BLD: 32.5 SEC — SIGNIFICANT CHANGE UP (ref 27.5–35.5)
AST SERPL-CCNC: 19 U/L — SIGNIFICANT CHANGE UP
BASOPHILS # BLD AUTO: 0 K/UL — SIGNIFICANT CHANGE UP (ref 0–0.2)
BASOPHILS NFR BLD AUTO: 0 % — SIGNIFICANT CHANGE UP (ref 0–2)
BILIRUB SERPL-MCNC: 0.3 MG/DL — LOW (ref 0.4–2)
BUN SERPL-MCNC: 10.6 MG/DL — SIGNIFICANT CHANGE UP (ref 8–20)
CALCIUM SERPL-MCNC: 9.5 MG/DL — SIGNIFICANT CHANGE UP (ref 8.6–10.2)
CHLORIDE SERPL-SCNC: 102 MMOL/L — SIGNIFICANT CHANGE UP (ref 98–107)
CO2 SERPL-SCNC: 29 MMOL/L — SIGNIFICANT CHANGE UP (ref 22–29)
CREAT SERPL-MCNC: 0.52 MG/DL — SIGNIFICANT CHANGE UP (ref 0.5–1.3)
EGFR: 105 ML/MIN/1.73M2 — SIGNIFICANT CHANGE UP
EOSINOPHIL # BLD AUTO: 0 K/UL — SIGNIFICANT CHANGE UP (ref 0–0.5)
EOSINOPHIL NFR BLD AUTO: 0 % — SIGNIFICANT CHANGE UP (ref 0–6)
GLUCOSE SERPL-MCNC: 142 MG/DL — HIGH (ref 70–99)
HCT VFR BLD CALC: 34.6 % — LOW (ref 39–50)
HGB BLD-MCNC: 11.3 G/DL — LOW (ref 13–17)
IMM GRANULOCYTES NFR BLD AUTO: 0.5 % — SIGNIFICANT CHANGE UP (ref 0–1.5)
INR BLD: 1.41 RATIO — HIGH (ref 0.88–1.16)
LYMPHOCYTES # BLD AUTO: 0.6 K/UL — LOW (ref 1–3.3)
LYMPHOCYTES # BLD AUTO: 10.1 % — LOW (ref 13–44)
MAGNESIUM SERPL-MCNC: 1.7 MG/DL — SIGNIFICANT CHANGE UP (ref 1.6–2.6)
MCHC RBC-ENTMCNC: 29.1 PG — SIGNIFICANT CHANGE UP (ref 27–34)
MCHC RBC-ENTMCNC: 32.7 GM/DL — SIGNIFICANT CHANGE UP (ref 32–36)
MCV RBC AUTO: 89.2 FL — SIGNIFICANT CHANGE UP (ref 80–100)
MONOCYTES # BLD AUTO: 0.3 K/UL — SIGNIFICANT CHANGE UP (ref 0–0.9)
MONOCYTES NFR BLD AUTO: 5.1 % — SIGNIFICANT CHANGE UP (ref 2–14)
NEUTROPHILS # BLD AUTO: 4.99 K/UL — SIGNIFICANT CHANGE UP (ref 1.8–7.4)
NEUTROPHILS NFR BLD AUTO: 84.3 % — HIGH (ref 43–77)
NT-PROBNP SERPL-SCNC: 383 PG/ML — HIGH (ref 0–300)
PLATELET # BLD AUTO: 242 K/UL — SIGNIFICANT CHANGE UP (ref 150–400)
POTASSIUM SERPL-MCNC: 4.5 MMOL/L — SIGNIFICANT CHANGE UP (ref 3.5–5.3)
POTASSIUM SERPL-SCNC: 4.5 MMOL/L — SIGNIFICANT CHANGE UP (ref 3.5–5.3)
PROT SERPL-MCNC: 7 G/DL — SIGNIFICANT CHANGE UP (ref 6.6–8.7)
PROTHROM AB SERPL-ACNC: 16.4 SEC — HIGH (ref 10.5–13.4)
RBC # BLD: 3.88 M/UL — LOW (ref 4.2–5.8)
RBC # FLD: 15.4 % — HIGH (ref 10.3–14.5)
SODIUM SERPL-SCNC: 141 MMOL/L — SIGNIFICANT CHANGE UP (ref 135–145)
TROPONIN T SERPL-MCNC: <0.01 NG/ML — SIGNIFICANT CHANGE UP (ref 0–0.06)
WBC # BLD: 5.92 K/UL — SIGNIFICANT CHANGE UP (ref 3.8–10.5)
WBC # FLD AUTO: 5.92 K/UL — SIGNIFICANT CHANGE UP (ref 3.8–10.5)

## 2022-06-30 PROCEDURE — 99284 EMERGENCY DEPT VISIT MOD MDM: CPT

## 2022-06-30 PROCEDURE — 93010 ELECTROCARDIOGRAM REPORT: CPT

## 2022-06-30 PROCEDURE — 71045 X-RAY EXAM CHEST 1 VIEW: CPT | Mod: 26

## 2022-06-30 RX ORDER — FUROSEMIDE 40 MG
20 TABLET ORAL ONCE
Refills: 0 | Status: COMPLETED | OUTPATIENT
Start: 2022-06-30 | End: 2022-06-30

## 2022-06-30 RX ORDER — MAGNESIUM SULFATE 500 MG/ML
2 VIAL (ML) INJECTION ONCE
Refills: 0 | Status: COMPLETED | OUTPATIENT
Start: 2022-06-30 | End: 2022-06-30

## 2022-06-30 RX ORDER — IPRATROPIUM/ALBUTEROL SULFATE 18-103MCG
3 AEROSOL WITH ADAPTER (GRAM) INHALATION EVERY 4 HOURS
Refills: 0 | Status: COMPLETED | OUTPATIENT
Start: 2022-06-30 | End: 2022-07-01

## 2022-06-30 RX ADMIN — Medication 150 GRAM(S): at 20:37

## 2022-06-30 RX ADMIN — Medication 125 MILLIGRAM(S): at 20:37

## 2022-06-30 NOTE — ED PROVIDER NOTE - CLINICAL SUMMARY MEDICAL DECISION MAKING FREE TEXT BOX
patient with chest pain and cough. labs normal, but will tx for copd given decreased air entry. will also do serial enzymes and place in obs for cardiology consult.

## 2022-06-30 NOTE — ED ADULT NURSE NOTE - CAS ELECT INFOMATION PROVIDED
DC instructions provided and reviewed with pt. Patient in understanding of all dc instructions. No further questions for the RN. VSS. Ambulatory with steady gait./DC instructions

## 2022-06-30 NOTE — ED PROVIDER NOTE - PHYSICAL EXAMINATION
General:     NAD, well-nourished, well-appearing  Head:     NC/AT, EOMI, oral mucosa moist  Neck:     trachea midline  Lungs:    bilateral breath sounds bases increasingly diminished, left middle and lower wheeze, occasional expiratory wheeze  CVS:     S1S2, RRR, no m/g/r,   Abd:     +BS, s/nt/nd, no organomegaly  Ext:    2+ radial and pedal pulses, trace lower extremity  edema   Neuro: AAOx3, no sensory/motor deficits

## 2022-06-30 NOTE — ED ADULT TRIAGE NOTE - CHIEF COMPLAINT QUOTE
sent by MD; presents w/ multiple complaints; low o2 x 1-2 weeks ago (hx right lung CA s/p radiation, COPD.), flu like symptoms w/ productive cough has XR + PNA , intermittent dizziness x 1 week.  intermittent chest pain onset Wednesday; pt reports he also had abnormal head CT.

## 2022-06-30 NOTE — ED PROVIDER NOTE - NS ED ROS FT
Constitutional: (-) fever  (-)chills  (-)sweats  Eyes/ENT: (-)   Cardiovascular: (+) chest pain, (+) palpitations (-) edema   Respiratory: (+) cough, (+) shortness of breath   Gastrointestinal: (-)nausea  (-)vomiting, (-) diarrhea  (-) abdominal pain   :  (-)dysuria, (-)frequency, (-)urgency, (-)hematuria  Musculoskeletal: (-) neck pain, (-) back pain, (-) joint pain  Integumentary: (-) rash, (-) edema  Neurological: (-) headache, (-) altered mental status  (-)LOC

## 2022-07-01 VITALS
DIASTOLIC BLOOD PRESSURE: 61 MMHG | TEMPERATURE: 98 F | SYSTOLIC BLOOD PRESSURE: 105 MMHG | HEART RATE: 88 BPM | OXYGEN SATURATION: 98 % | RESPIRATION RATE: 18 BRPM

## 2022-07-01 LAB
RAPID RVP RESULT: SIGNIFICANT CHANGE UP
SARS-COV-2 RNA SPEC QL NAA+PROBE: SIGNIFICANT CHANGE UP
TROPONIN T SERPL-MCNC: <0.01 NG/ML — SIGNIFICANT CHANGE UP (ref 0–0.06)
TROPONIN T SERPL-MCNC: <0.01 NG/ML — SIGNIFICANT CHANGE UP (ref 0–0.06)

## 2022-07-01 PROCEDURE — 99222 1ST HOSP IP/OBS MODERATE 55: CPT

## 2022-07-01 PROCEDURE — 93306 TTE W/DOPPLER COMPLETE: CPT | Mod: 26

## 2022-07-01 PROCEDURE — 99285 EMERGENCY DEPT VISIT HI MDM: CPT | Mod: 25

## 2022-07-01 PROCEDURE — 93880 EXTRACRANIAL BILAT STUDY: CPT

## 2022-07-01 PROCEDURE — 93880 EXTRACRANIAL BILAT STUDY: CPT | Mod: 26

## 2022-07-01 PROCEDURE — 84484 ASSAY OF TROPONIN QUANT: CPT

## 2022-07-01 PROCEDURE — 85025 COMPLETE CBC W/AUTO DIFF WBC: CPT

## 2022-07-01 PROCEDURE — 0225U NFCT DS DNA&RNA 21 SARSCOV2: CPT

## 2022-07-01 PROCEDURE — 94640 AIRWAY INHALATION TREATMENT: CPT

## 2022-07-01 PROCEDURE — 96375 TX/PRO/DX INJ NEW DRUG ADDON: CPT | Mod: XU

## 2022-07-01 PROCEDURE — 85610 PROTHROMBIN TIME: CPT

## 2022-07-01 PROCEDURE — 71045 X-RAY EXAM CHEST 1 VIEW: CPT

## 2022-07-01 PROCEDURE — 99236 HOSP IP/OBS SAME DATE HI 85: CPT

## 2022-07-01 PROCEDURE — G0378: CPT

## 2022-07-01 PROCEDURE — 36415 COLL VENOUS BLD VENIPUNCTURE: CPT

## 2022-07-01 PROCEDURE — 85730 THROMBOPLASTIN TIME PARTIAL: CPT

## 2022-07-01 PROCEDURE — 87040 BLOOD CULTURE FOR BACTERIA: CPT

## 2022-07-01 PROCEDURE — 83880 ASSAY OF NATRIURETIC PEPTIDE: CPT

## 2022-07-01 PROCEDURE — 83735 ASSAY OF MAGNESIUM: CPT

## 2022-07-01 PROCEDURE — 93005 ELECTROCARDIOGRAM TRACING: CPT

## 2022-07-01 PROCEDURE — 93306 TTE W/DOPPLER COMPLETE: CPT

## 2022-07-01 PROCEDURE — 80053 COMPREHEN METABOLIC PANEL: CPT

## 2022-07-01 PROCEDURE — 96374 THER/PROPH/DIAG INJ IV PUSH: CPT

## 2022-07-01 RX ORDER — GABAPENTIN 400 MG/1
400 CAPSULE ORAL THREE TIMES A DAY
Refills: 0 | Status: DISCONTINUED | OUTPATIENT
Start: 2022-07-01 | End: 2022-07-05

## 2022-07-01 RX ORDER — APIXABAN 2.5 MG/1
5 TABLET, FILM COATED ORAL EVERY 12 HOURS
Refills: 0 | Status: DISCONTINUED | OUTPATIENT
Start: 2022-06-30 | End: 2022-07-05

## 2022-07-01 RX ORDER — HYDROMORPHONE HYDROCHLORIDE 2 MG/ML
2 INJECTION INTRAMUSCULAR; INTRAVENOUS; SUBCUTANEOUS THREE TIMES A DAY
Refills: 0 | Status: DISCONTINUED | OUTPATIENT
Start: 2022-07-01 | End: 2022-07-01

## 2022-07-01 RX ORDER — ALPRAZOLAM 0.25 MG
0.5 TABLET ORAL ONCE
Refills: 0 | Status: DISCONTINUED | OUTPATIENT
Start: 2022-07-01 | End: 2022-07-01

## 2022-07-01 RX ORDER — TAMSULOSIN HYDROCHLORIDE 0.4 MG/1
0.4 CAPSULE ORAL AT BEDTIME
Refills: 0 | Status: DISCONTINUED | OUTPATIENT
Start: 2022-07-01 | End: 2022-07-05

## 2022-07-01 RX ORDER — TRAMADOL HYDROCHLORIDE 50 MG/1
100 TABLET ORAL ONCE
Refills: 0 | Status: DISCONTINUED | OUTPATIENT
Start: 2022-07-01 | End: 2022-07-01

## 2022-07-01 RX ORDER — ATORVASTATIN CALCIUM 80 MG/1
40 TABLET, FILM COATED ORAL AT BEDTIME
Refills: 0 | Status: DISCONTINUED | OUTPATIENT
Start: 2022-07-01 | End: 2022-07-05

## 2022-07-01 RX ORDER — METOPROLOL TARTRATE 50 MG
12.5 TABLET ORAL
Refills: 0 | Status: DISCONTINUED | OUTPATIENT
Start: 2022-07-01 | End: 2022-07-05

## 2022-07-01 RX ADMIN — Medication 0.5 MILLIGRAM(S): at 04:08

## 2022-07-01 RX ADMIN — HYDROMORPHONE HYDROCHLORIDE 2 MILLIGRAM(S): 2 INJECTION INTRAMUSCULAR; INTRAVENOUS; SUBCUTANEOUS at 10:09

## 2022-07-01 RX ADMIN — APIXABAN 5 MILLIGRAM(S): 2.5 TABLET, FILM COATED ORAL at 08:03

## 2022-07-01 RX ADMIN — Medication 3 MILLILITER(S): at 08:05

## 2022-07-01 RX ADMIN — GABAPENTIN 400 MILLIGRAM(S): 400 CAPSULE ORAL at 15:36

## 2022-07-01 RX ADMIN — Medication 12.5 MILLIGRAM(S): at 08:05

## 2022-07-01 RX ADMIN — HYDROMORPHONE HYDROCHLORIDE 2 MILLIGRAM(S): 2 INJECTION INTRAMUSCULAR; INTRAVENOUS; SUBCUTANEOUS at 09:19

## 2022-07-01 RX ADMIN — TRAMADOL HYDROCHLORIDE 100 MILLIGRAM(S): 50 TABLET ORAL at 07:00

## 2022-07-01 RX ADMIN — Medication 20 MILLIGRAM(S): at 00:12

## 2022-07-01 RX ADMIN — Medication 3 MILLILITER(S): at 00:13

## 2022-07-01 RX ADMIN — GABAPENTIN 400 MILLIGRAM(S): 400 CAPSULE ORAL at 08:03

## 2022-07-01 RX ADMIN — TRAMADOL HYDROCHLORIDE 100 MILLIGRAM(S): 50 TABLET ORAL at 00:42

## 2022-07-01 NOTE — ED CDU PROVIDER INITIAL DAY NOTE - NS ED ATTENDING STATEMENT MOD
This was a shared visit with the ZAHRA. I reviewed and verified the documentation and independently performed the documented:

## 2022-07-01 NOTE — ED CDU PROVIDER DISPOSITION NOTE - PATIENT PORTAL LINK FT
You can access the FollowMyHealth Patient Portal offered by [facility] by registering at the following website: [email or web address]. By joining [employer]’s FollowMyHealth portal, you will also be able to view your health information using other applications (apps) compatible with our system.

## 2022-07-01 NOTE — ED CDU PROVIDER DISPOSITION NOTE - CLINICAL COURSE
Pt is a 77y M with PMH of a-fib(on Eliquis), CAD, CABGx3 COPD ,HTN, HLD presenting for chest pain and lightheadedness. He reports chest pain intermittently for 2 week. Chest pain is substernal, non-radiating associated with SOB, denies palpitations. Seen by PMD who did CT head with IV contrast and chest x-ray, pt was told that he has worsening COPD and placed on prednisone reports increased lightheadedness chills and fatigue came to ED for further eval. Pt placed in obs for cardiology consult. Cardiology saw pt and recommended tele, ECHO, carotid US and continuation of cv meds. Pts VSS xray reviewed. US carotid shows significant stenosis and vascular surgery consulted. They do not recommend any urgent intervention but to continue meds and f/u with Dr. Ta in 1-2 weeks as out pt. Spokew with Dr. Byrnes from [employer] and advised him of results of ECHO and if there was any other recommendations or interventions needed. He has no further recs from cardiac standpoint. Will dc with cardiology followup within 1 week, vascular f/u within 1-2 weeks and PMD f/u. Advised of strict return precautions.

## 2022-07-01 NOTE — ED ADULT NURSE REASSESSMENT NOTE - NS ED NURSE REASSESS COMMENT FT1
Assumed care of the patient at 1300. Patient transferred to [facility]. Patient A&Ox4. No s/s of acute distress or pain. NSR on CM. VSS. Patient with mild persistent chest discomfort. Ambulatory with steady gait. Patient pending TTE and US testing. Patient in understanding of plan of care. Patient with no further questions for the RN. Resting in comfort. Call bell within reach and encouraged to use when assistance needed. Will continue to monitor.

## 2022-07-01 NOTE — CONSULT NOTE ADULT - ASSESSMENT
77y Male with PMHx of Afib (on eliquis BID), CAD, CABG x3, COPD, HTN presented to ED of dizziness and SOB, Neck US showed 70% stenosis on L carotid bulb (systolic velocity of 274) , 50-69% stenosis of R carotid bulb (systolic velocity of 145) , no motor or sensory deficit in Upper or lower extremity     Plan:   No urgent surgical intervention required at this moment   recommend medical management   Recommend to continue 81 mg daily   Recommend to continue statin   medical optimization   patient may benefit from elective CEA , patient to follow up outpatient with Dr Ta     Plan discussed with the attending on call

## 2022-07-01 NOTE — ED CDU PROVIDER INITIAL DAY NOTE - OBJECTIVE STATEMENT
HPI: 78 y/o M; with PMH signif for a-fib(on Eliquis), CAD, CABGx3 COPD ,HTN, HLD p/w chest pain and lightheadedness reports chest pain intermittently for 2 week. Chest pain is substernal, non-radiating associated with SOB, denies palpitations. Pt c/o chills today, seen by PMD who did CT head with IV contrast and chest x-ray, pt was told that he has worsening COPD and placed on prednisone reports today increased lightheadedness chills and fatigue came to ED for further eval.   Foster: surgeon  Yanick Seo: janee  Pulm: Octavio     PMH: CABGx3, a-fib, HLD, HTN, COPD  SOCIAL: +social EtOH use, denies tobacco/illicit drug use

## 2022-07-01 NOTE — ED CDU PROVIDER DISPOSITION NOTE - CARE PROVIDERS DIRECT ADDRESSES
[email or web address]

## 2022-07-01 NOTE — ED CDU PROVIDER INITIAL DAY NOTE - PROGRESS NOTE DETAILS
Seen on morning rounds: offer no complaints at present.  Reports lightheadedness and decreased SpO2 over the past week.  Has been experiencing intermittent chest discomfort, lasting 5 minutes, similar in nature to chest pain experienced prior to cardiac bypass.   Had NST within past month at cardiologists office which was reportedly negative.   Labs and ECG reviewed:  NSR at 70 with no ominous ST/T changes, troponin negative.  Awaiting cardiology consultation. Pt seen by cardiology and recommends ECHO and carotid US. Carotid US shows 70% or more stenosis at L carotid bulb and 50-69% stenosis of the R carotid bulb. Vascular surgery consulted for findings.

## 2022-07-01 NOTE — CONSULT NOTE ADULT - SUBJECTIVE AND OBJECTIVE BOX
Vascular SURGERY CONSULT     HPI: 77y Male with PMHx of Afib (on eliquis BID), CAD, CABG x3, COPD, HTN presented to ED of dizziness and SOB, patient states he started to feel chest discomfort and dizziness few days ago , his primary cardiologist send him to ED for evaluation , on presentation patient complain of generalized weakness and fatigue. Denies neurological symptoms, visual changes. Neck US showed 70% stenosis on L carotid bulb , 50-69% stenosis of R carotid bulb vascular surgery was consulted for further evaluation.     ROS: 10-system review is otherwise negative except HPI above.      PAST MEDICAL & SURGICAL HISTORY:  CAD in native artery  RCA 3 YAYA 1 in 2002 and 2 in 2019      Arthritis      Lumbosacral disc disease  has nerve stimulator      HTN (hypertension)      HLD (hyperlipidemia)      H/O heart artery stent  RCA      S/P cataract surgery  b/l eyes 2016      S/P CABG (coronary artery bypass graft)        FAMILY HISTORY:  FH: colon cancer      Family history not pertinent as reviewed with the patient.    SOCIAL HISTORY:  Denies any toxic habits    ALLERGIES: NKA amoxicillin (Diarrhea)  codeine (Urticaria)  ibuprofen (Anaphylaxis)      Home Medications:  cholecalciferol 5000 intl units (125 mcg) oral tablet: 1 tab(s) orally once a day (26 Apr 2021 13:48)  Eliquis 5 mg oral tablet: 1 tab(s) orally 2 times a day (26 Apr 2021 13:45)  gabapentin 400 mg oral capsule: 1 cap(s) orally 3 times a day (26 Apr 2021 13:48)  metoprolol tartrate 25 mg oral tablet: 1 tab(s) orally 2 times a day (26 Apr 2021 13:48)  Percocet 10/325 oral tablet: 1 tab(s) orally 3 times a day, As Needed (26 Apr 2021 13:48)      --------------------------------------------------------------------------------------------    PHYSICAL EXAM:   General: NAD, Lying in bed comfortably  Neuro: A+Ox3  HEENT: MMM  Cardio: RRR  Resp: Non labored breathing on RA  GI/Abd: Soft, NT/ND  Vascular: All 4 extremities warm and well perfused.   Musculoskeletal: All 4 extremities moving spontaneously, B/L UE and LE strength 5/5, motor and sensory intact   --------------------------------------------------------------------------------------------    LABS                 11.3   5.92   )----------(  242       ( 30 Jun 2022 20:14 )               34.6      141    |  102    |  10.6   ----------------------------<  142        ( 30 Jun 2022 20:14 )  4.5     |  29.0   |  0.52     Ca    9.5        ( 30 Jun 2022 20:14 )  Mg     1.7       ( 30 Jun 2022 20:14 )    TPro  7.0    /  Alb  4.5    /  TBili  0.3    /  DBili  x      /  AST  19     /  ALT  29     /  AlkPhos  83     ( 30 Jun 2022 20:14 )    LIVER FUNCTIONS - ( 30 Jun 2022 20:14 )  Alb: 4.5 g/dL / Pro: 7.0 g/dL / ALK PHOS: 83 U/L / ALT: 29 U/L / AST: 19 U/L / GGT: x           PT/INR -  16.4 sec / 1.41 ratio   ( 30 Jun 2022 20:14 )       PTT -  32.5 sec   ( 30 Jun 2022 20:14 )  CAPILLARY BLOOD GLUCOSE    CARDIAC MARKERS ( 01 Jul 2022 04:02 )  x     / <0.01 ng/mL / x     / x     / x      CARDIAC MARKERS ( 01 Jul 2022 02:37 )  x     / <0.01 ng/mL / x     / x     / x      CARDIAC MARKERS ( 30 Jun 2022 20:14 )  x     / <0.01 ng/mL / x     / x     / x                --------------------------------------------------------------------------------------------    < from: US Duplex Carotid Arteries Complete, Bilateral (07.01.22 @ 14:05) >  Peak systolic velocities are as follows:    RIGHT:  PROX CCA = 91 cm/s  DIST CCA = 64 cm/s  CAROTID BULB: 145 cm/s  PROX ICA = 110 cm/s  DIST ICA = 62 cm/s  ECA = 131 cm/s    LEFT:  PROX CCA = 87 cm/s  DIST CCA = 81 cm/s  CAROTID BULB: 274 cm/s  PROX ICA = 104 cm/s  DIST ICA = 98 cm/s  ECA = 255 cm/s    Antegrade flow is noted within both vertebral arteries.    IMPRESSION: Evidence of 70% or more stenosis involving the left carotid   bulb and 50-69% stenosis involving the right carotid bulb.    Measurement of carotid stenosis is based on velocity parameters that   correlate the residual internal carotid diameter with that of the more   distal vessel in accordance with a method such as the North American   Symptomatic Carotid Endarterectomy Trial (NASCET).    < end of copied text >  
JENNIFER MOORE  [id number]      HPI:  76 y/o male PMHx CAD s/p CABG 2020, COPD, PAF, HTN, HLD p/w worsening SOB, cough and CP.  Patient reports symptoms for the last week and a half.  He went to his PMD and had a CXR with ?PNA.  Also had CTH and neck with ?abnormality in neck b/c he is also having dizziness.  Caroline describes CP as pressure in chest, occurring for secs to minutes, occurring generally at rest.  No clear agg/rel factors.  SOB occurs at rest and worse with exertion, no orthopnea.  Patient c/o some SOB in bed and also of cough and dizziness.  Denies palps, syncope.      ALLERGIES:  amoxicillin (Diarrhea)  codeine (Urticaria)  ibuprofen (Anaphylaxis)      PAST MEDICAL & SURGICAL HISTORY:  Arthritis  Lumbosacral disc disease  S/P cataract surgery  Otherwise, as noted above      MEDICATIONS (HOME):  Home Medications:  cholecalciferol 5000 intl units (125 mcg) oral tablet: 1 tab(s) orally once a day (26 Apr 2021 13:48)  Eliquis 5 mg oral tablet: 1 tab(s) orally 2 times a day (26 Apr 2021 13:45)  gabapentin 400 mg oral capsule: 1 cap(s) orally 3 times a day (26 Apr 2021 13:48)  metoprolol succinate 25 mg 1/2 tab oral tablet qd  asa 81mg qd  atorvastatin 40 milliGRAM(s) Oral at bedtime  tamsulosin 0.4 milliGRAM(s) Oral at bedtime  anoro  ventolin      SOCIAL HISTORY:  Patient denies alcohol, tobacco, drug use    FAMILY HISTORY:  FH: colon cancer        ROS:  Patient denies cough, and other than noted above full ROS is unremarkable      PHYSICAL EXAM:  Vital Signs Last 24 Hrs  T(C): 36.6 (01 Jul 2022 07:32), Max: 36.7 (30 Jun 2022 18:39)  T(F): 97.8 (01 Jul 2022 07:32), Max: 98.1 (30 Jun 2022 18:39)  HR: 85 (01 Jul 2022 07:32) (70 - 89)  BP: 94/58 (01 Jul 2022 07:32) (94/58 - 154/73)  BP(mean): --  RR: 18 (01 Jul 2022 07:32) (18 - 18)  SpO2: 93% (01 Jul 2022 07:32) (93% - 100%)  General: Patient comfortable in NAD  HEENT: NCAT, mmm, EOMI  Neck: no JVD, no carotid bruits  CVS: nl s1, split s2, no s3, +s4, no murmur or rubs, RRR  Chest: CTA b/l  Abdomen: soft, nt/nd  Extremities: No c/c/e  Neuro: A&O x3  Psych: Normal affect      ECG:  SR with RBBB.  PAC.  No ST-T changes.      LABS:                        11.3   5.92  )-----------( 242      ( 30 Jun 2022 20:14 )             34.6     06-30    141  |  102  |  10.6  ----------------------------<  142<H>  4.5   |  29.0  |  0.52    Ca    9.5      30 Jun 2022 20:14  Mg     1.7     06-30    TPro  7.0  /  Alb  4.5  /  TBili  0.3<L>  /  DBili  x   /  AST  19  /  ALT  29  /  AlkPhos  83  06-30    CARDIAC MARKERS ( 01 Jul 2022 04:02 )  x     / <0.01 ng/mL / x     / x     / x      CARDIAC MARKERS ( 01 Jul 2022 02:37 )  x     / <0.01 ng/mL / x     / x     / x      CARDIAC MARKERS ( 30 Jun 2022 20:14 )  x     / <0.01 ng/mL / x     / x     / x          PT/INR - ( 30 Jun 2022 20:14 )   PT: 16.4 sec;   INR: 1.41 ratio         PTT - ( 30 Jun 2022 20:14 )  PTT:32.5 sec      RADIOLOGY:  Pending    Echo (4/5/22):  1. LV normal size and fxn, EF 60-65%.  2. Normal RV.  3. Normal atria.  4. No AS/AI.  5. Trace MR.  6. No effusion.    Nuclear Stress Test (4/11/2022):  1. No evidence of ischemic or infarct.  2. LVEF 80%.      Assessment:  76 y/o male PMHx CAD s/p CABG 2020, COPD, PAF, HTN, HLD p/w worsening SOB, cough and CP.  Symptoms likely more 2/2 COPD and ?PNA.  Does not appear to be in CHF at this time.  Trops negative and EKG nonischemic.  Stress test in April and echo were normal.  CP is atypical as well.   Patient also with dizziness, etiology unclear as well, but had ?CTA of neck that was abnormal, will check doppler.  Echo and stress test unremarkable in April.    Plan:  1. Tele  2. Check echo.  3. Check carotid.  4. Pulm eval.  5. Continue current CV meds at current doses as PTA.  6. No additional CV testing now.    Will follow.  Thanks!

## 2022-07-01 NOTE — ED CDU PROVIDER DISPOSITION NOTE - NSFOLLOWUPINSTRUCTIONS_ED_ALL_ED_FT
Follow up with vascular surgery within 1-2 weeks.  Continue home medications.  Follow up with cardiology within 1 week.  Come back with new or worsening symptoms.  Chest Pain    Chest pain can be caused by many different conditions which may or may not be dangerous. Causes include heartburn, lung infections, heart attack, blood clot in lungs, skin infections, strain or damage to muscle, cartilage, or bones, etc. In addition to a history and physical examination, an electrocardiogram (ECG) or other lab tests may have been performed to determine the cause of your chest pain. Follow up with your primary care provider or with a cardiologist as instructed.     SEEK IMMEDIATE MEDICAL CARE IF YOU HAVE ANY OF THE FOLLOWING SYMPTOMS: worsening chest pain, coughing up blood, unexplained back/neck/jaw pain, severe abdominal pain, dizziness or lightheadedness, fainting, shortness of breath, sweaty or clammy skin, vomiting, or racing heart beat. These symptoms may represent a serious problem that is an emergency. Do not wait to see if the symptoms will go away. Get medical help right away. Call 911 and do not drive yourself to the hospital.

## 2022-07-01 NOTE — ED CDU PROVIDER DISPOSITION NOTE - CARE PROVIDER_API CALL
Ken Ta)  Surgery  [address], [address] [place] [address]  Phone: [phone]  Fax: [phone]  Follow Up Time:     Iraj Patterson)  [facility]  [address] [place] [address]  Phone: [phone]  Fax: [phone]  Follow Up Time:

## 2022-07-06 ENCOUNTER — APPOINTMENT (OUTPATIENT)
Dept: CARDIOLOGY | Facility: CLINIC | Age: 77
End: 2022-07-06

## 2022-07-06 ENCOUNTER — INPATIENT (INPATIENT)
Facility: HOSPITAL | Age: 77
LOS: 6 days | Discharge: HOME | DRG: 193 | End: 2022-07-13
Attending: FAMILY MEDICINE | Admitting: INTERNAL MEDICINE
Payer: MEDICARE

## 2022-07-06 VITALS — SYSTOLIC BLOOD PRESSURE: 120 MMHG | OXYGEN SATURATION: 93 % | DIASTOLIC BLOOD PRESSURE: 80 MMHG | HEART RATE: 120 BPM

## 2022-07-06 VITALS
TEMPERATURE: 99 F | RESPIRATION RATE: 20 BRPM | DIASTOLIC BLOOD PRESSURE: 69 MMHG | HEIGHT: 67 IN | OXYGEN SATURATION: 91 % | SYSTOLIC BLOOD PRESSURE: 107 MMHG | HEART RATE: 114 BPM

## 2022-07-06 VITALS — DIASTOLIC BLOOD PRESSURE: 59 MMHG | OXYGEN SATURATION: 63 % | HEART RATE: 180 BPM | SYSTOLIC BLOOD PRESSURE: 74 MMHG

## 2022-07-06 DIAGNOSIS — R09.89 OTHER SPECIFIED SYMPTOMS AND SIGNS INVOLVING THE CIRCULATORY AND RESPIRATORY SYSTEMS: ICD-10-CM

## 2022-07-06 DIAGNOSIS — J96.01 ACUTE RESPIRATORY FAILURE WITH HYPOXIA: ICD-10-CM

## 2022-07-06 DIAGNOSIS — Z95.5 PRESENCE OF CORONARY ANGIOPLASTY IMPLANT AND GRAFT: Chronic | ICD-10-CM

## 2022-07-06 DIAGNOSIS — T50.901A POISONING BY UNSPECIFIED DRUGS, MEDICAMENTS AND BIOLOGICAL SUBSTANCES, ACCIDENTAL (UNINTENTIONAL), INITIAL ENCOUNTER: ICD-10-CM

## 2022-07-06 DIAGNOSIS — Z95.1 PRESENCE OF AORTOCORONARY BYPASS GRAFT: Chronic | ICD-10-CM

## 2022-07-06 DIAGNOSIS — Z98.49 CATARACT EXTRACTION STATUS, UNSPECIFIED EYE: Chronic | ICD-10-CM

## 2022-07-06 LAB
ALBUMIN SERPL ELPH-MCNC: 3.9 G/DL — SIGNIFICANT CHANGE UP (ref 3.3–5.2)
ALP SERPL-CCNC: 104 U/L — SIGNIFICANT CHANGE UP (ref 40–120)
ALT FLD-CCNC: 17 U/L — SIGNIFICANT CHANGE UP
AMPHET UR-MCNC: NEGATIVE — SIGNIFICANT CHANGE UP
ANION GAP SERPL CALC-SCNC: 10 MMOL/L — SIGNIFICANT CHANGE UP (ref 5–17)
APAP SERPL-MCNC: <3 UG/ML — LOW (ref 10–26)
APPEARANCE UR: CLEAR — SIGNIFICANT CHANGE UP
AST SERPL-CCNC: 15 U/L — SIGNIFICANT CHANGE UP
BARBITURATES UR SCN-MCNC: NEGATIVE — SIGNIFICANT CHANGE UP
BASOPHILS # BLD AUTO: 0.03 K/UL — SIGNIFICANT CHANGE UP (ref 0–0.2)
BASOPHILS NFR BLD AUTO: 0.2 % — SIGNIFICANT CHANGE UP (ref 0–2)
BENZODIAZ UR-MCNC: NEGATIVE — SIGNIFICANT CHANGE UP
BILIRUB SERPL-MCNC: 0.8 MG/DL — SIGNIFICANT CHANGE UP (ref 0.4–2)
BILIRUB UR-MCNC: NEGATIVE — SIGNIFICANT CHANGE UP
BUN SERPL-MCNC: 15.8 MG/DL — SIGNIFICANT CHANGE UP (ref 8–20)
CALCIUM SERPL-MCNC: 8.9 MG/DL — SIGNIFICANT CHANGE UP (ref 8.6–10.2)
CHLORIDE SERPL-SCNC: 98 MMOL/L — SIGNIFICANT CHANGE UP (ref 98–107)
CO2 SERPL-SCNC: 29 MMOL/L — SIGNIFICANT CHANGE UP (ref 22–29)
COCAINE METAB.OTHER UR-MCNC: NEGATIVE — SIGNIFICANT CHANGE UP
COLOR SPEC: YELLOW — SIGNIFICANT CHANGE UP
CREAT SERPL-MCNC: 0.7 MG/DL — SIGNIFICANT CHANGE UP (ref 0.5–1.3)
CULTURE RESULTS: SIGNIFICANT CHANGE UP
CULTURE RESULTS: SIGNIFICANT CHANGE UP
DIFF PNL FLD: ABNORMAL
EGFR: 96 ML/MIN/1.73M2 — SIGNIFICANT CHANGE UP
EOSINOPHIL # BLD AUTO: 0.2 K/UL — SIGNIFICANT CHANGE UP (ref 0–0.5)
EOSINOPHIL NFR BLD AUTO: 1.6 % — SIGNIFICANT CHANGE UP (ref 0–6)
EPI CELLS # UR: SIGNIFICANT CHANGE UP
ETHANOL SERPL-MCNC: <10 MG/DL — SIGNIFICANT CHANGE UP (ref 0–9)
GAS PNL BLDA: SIGNIFICANT CHANGE UP
GLUCOSE SERPL-MCNC: 118 MG/DL — HIGH (ref 70–99)
GLUCOSE UR QL: NEGATIVE MG/DL — SIGNIFICANT CHANGE UP
HCT VFR BLD CALC: 32 % — LOW (ref 39–50)
HGB BLD-MCNC: 10.5 G/DL — LOW (ref 13–17)
IMM GRANULOCYTES NFR BLD AUTO: 0.6 % — SIGNIFICANT CHANGE UP (ref 0–1.5)
KETONES UR-MCNC: NEGATIVE — SIGNIFICANT CHANGE UP
LEUKOCYTE ESTERASE UR-ACNC: ABNORMAL
LYMPHOCYTES # BLD AUTO: 0.55 K/UL — LOW (ref 1–3.3)
LYMPHOCYTES # BLD AUTO: 4.4 % — LOW (ref 13–44)
MCHC RBC-ENTMCNC: 29.8 PG — SIGNIFICANT CHANGE UP (ref 27–34)
MCHC RBC-ENTMCNC: 32.8 GM/DL — SIGNIFICANT CHANGE UP (ref 32–36)
MCV RBC AUTO: 90.9 FL — SIGNIFICANT CHANGE UP (ref 80–100)
METHADONE UR-MCNC: POSITIVE
MONOCYTES # BLD AUTO: 1 K/UL — HIGH (ref 0–0.9)
MONOCYTES NFR BLD AUTO: 7.9 % — SIGNIFICANT CHANGE UP (ref 2–14)
NEUTROPHILS # BLD AUTO: 10.77 K/UL — HIGH (ref 1.8–7.4)
NEUTROPHILS NFR BLD AUTO: 85.3 % — HIGH (ref 43–77)
NITRITE UR-MCNC: NEGATIVE — SIGNIFICANT CHANGE UP
NT-PROBNP SERPL-SCNC: 186 PG/ML — SIGNIFICANT CHANGE UP (ref 0–300)
OPIATES UR-MCNC: POSITIVE
PCP SPEC-MCNC: SIGNIFICANT CHANGE UP
PCP UR-MCNC: NEGATIVE — SIGNIFICANT CHANGE UP
PH UR: 6.5 — SIGNIFICANT CHANGE UP (ref 5–8)
PLATELET # BLD AUTO: 217 K/UL — SIGNIFICANT CHANGE UP (ref 150–400)
POTASSIUM SERPL-MCNC: 4.5 MMOL/L — SIGNIFICANT CHANGE UP (ref 3.5–5.3)
POTASSIUM SERPL-SCNC: 4.5 MMOL/L — SIGNIFICANT CHANGE UP (ref 3.5–5.3)
PROT SERPL-MCNC: 6.6 G/DL — SIGNIFICANT CHANGE UP (ref 6.6–8.7)
PROT UR-MCNC: 30 MG/DL
RBC # BLD: 3.52 M/UL — LOW (ref 4.2–5.8)
RBC # FLD: 15.7 % — HIGH (ref 10.3–14.5)
RBC CASTS # UR COMP ASSIST: SIGNIFICANT CHANGE UP /HPF (ref 0–4)
SALICYLATES SERPL-MCNC: <0.6 MG/DL — LOW (ref 10–20)
SARS-COV-2 RNA SPEC QL NAA+PROBE: SIGNIFICANT CHANGE UP
SODIUM SERPL-SCNC: 137 MMOL/L — SIGNIFICANT CHANGE UP (ref 135–145)
SP GR SPEC: 1.01 — SIGNIFICANT CHANGE UP (ref 1.01–1.02)
SPECIMEN SOURCE: SIGNIFICANT CHANGE UP
SPECIMEN SOURCE: SIGNIFICANT CHANGE UP
THC UR QL: NEGATIVE — SIGNIFICANT CHANGE UP
TROPONIN T SERPL-MCNC: <0.01 NG/ML — SIGNIFICANT CHANGE UP (ref 0–0.06)
UROBILINOGEN FLD QL: 1 MG/DL
WBC # BLD: 12.62 K/UL — HIGH (ref 3.8–10.5)
WBC # FLD AUTO: 12.62 K/UL — HIGH (ref 3.8–10.5)
WBC UR QL: SIGNIFICANT CHANGE UP /HPF (ref 0–5)

## 2022-07-06 PROCEDURE — 93010 ELECTROCARDIOGRAM REPORT: CPT

## 2022-07-06 PROCEDURE — 99215 OFFICE O/P EST HI 40 MIN: CPT

## 2022-07-06 PROCEDURE — G1004: CPT

## 2022-07-06 PROCEDURE — 99223 1ST HOSP IP/OBS HIGH 75: CPT

## 2022-07-06 PROCEDURE — 71275 CT ANGIOGRAPHY CHEST: CPT | Mod: 26,ME

## 2022-07-06 PROCEDURE — 99285 EMERGENCY DEPT VISIT HI MDM: CPT | Mod: CS

## 2022-07-06 PROCEDURE — 70450 CT HEAD/BRAIN W/O DYE: CPT | Mod: 26,MG

## 2022-07-06 PROCEDURE — 93000 ELECTROCARDIOGRAM COMPLETE: CPT

## 2022-07-06 PROCEDURE — 71045 X-RAY EXAM CHEST 1 VIEW: CPT | Mod: 26

## 2022-07-06 RX ORDER — CEFTRIAXONE 500 MG/1
1000 INJECTION, POWDER, FOR SOLUTION INTRAMUSCULAR; INTRAVENOUS EVERY 24 HOURS
Refills: 0 | Status: DISCONTINUED | OUTPATIENT
Start: 2022-07-06 | End: 2022-07-10

## 2022-07-06 RX ORDER — HYDROMORPHONE HYDROCHLORIDE 2 MG/ML
2 INJECTION INTRAMUSCULAR; INTRAVENOUS; SUBCUTANEOUS EVERY 8 HOURS
Refills: 0 | Status: DISCONTINUED | OUTPATIENT
Start: 2022-07-06 | End: 2022-07-13

## 2022-07-06 RX ORDER — APIXABAN 2.5 MG/1
5 TABLET, FILM COATED ORAL
Refills: 0 | Status: DISCONTINUED | OUTPATIENT
Start: 2022-07-06 | End: 2022-07-13

## 2022-07-06 RX ORDER — TRAMADOL HYDROCHLORIDE 50 MG/1
50 TABLET ORAL EVERY 8 HOURS
Refills: 0 | Status: DISCONTINUED | OUTPATIENT
Start: 2022-07-06 | End: 2022-07-13

## 2022-07-06 RX ORDER — OXYCODONE AND ACETAMINOPHEN 5; 325 MG/1; MG/1
2 TABLET ORAL ONCE
Refills: 0 | Status: DISCONTINUED | OUTPATIENT
Start: 2022-07-06 | End: 2022-07-08

## 2022-07-06 RX ORDER — CEFTRIAXONE 500 MG/1
1000 INJECTION, POWDER, FOR SOLUTION INTRAMUSCULAR; INTRAVENOUS ONCE
Refills: 0 | Status: COMPLETED | OUTPATIENT
Start: 2022-07-06 | End: 2022-07-06

## 2022-07-06 RX ORDER — METOPROLOL TARTRATE 50 MG
12.5 TABLET ORAL
Refills: 0 | Status: DISCONTINUED | OUTPATIENT
Start: 2022-07-06 | End: 2022-07-11

## 2022-07-06 RX ORDER — AZITHROMYCIN 500 MG/1
500 TABLET, FILM COATED ORAL EVERY 24 HOURS
Refills: 0 | Status: DISCONTINUED | OUTPATIENT
Start: 2022-07-06 | End: 2022-07-10

## 2022-07-06 RX ORDER — AZITHROMYCIN 500 MG/1
500 TABLET, FILM COATED ORAL ONCE
Refills: 0 | Status: COMPLETED | OUTPATIENT
Start: 2022-07-06 | End: 2022-07-06

## 2022-07-06 RX ORDER — GABAPENTIN 400 MG/1
400 CAPSULE ORAL THREE TIMES A DAY
Refills: 0 | Status: DISCONTINUED | OUTPATIENT
Start: 2022-07-06 | End: 2022-07-13

## 2022-07-06 RX ORDER — ATORVASTATIN CALCIUM 80 MG/1
40 TABLET, FILM COATED ORAL AT BEDTIME
Refills: 0 | Status: DISCONTINUED | OUTPATIENT
Start: 2022-07-06 | End: 2022-07-08

## 2022-07-06 RX ORDER — TAMSULOSIN HYDROCHLORIDE 0.4 MG/1
0.4 CAPSULE ORAL AT BEDTIME
Refills: 0 | Status: DISCONTINUED | OUTPATIENT
Start: 2022-07-06 | End: 2022-07-13

## 2022-07-06 RX ADMIN — AZITHROMYCIN 255 MILLIGRAM(S): 500 TABLET, FILM COATED ORAL at 21:05

## 2022-07-06 RX ADMIN — TAMSULOSIN HYDROCHLORIDE 0.4 MILLIGRAM(S): 0.4 CAPSULE ORAL at 23:23

## 2022-07-06 RX ADMIN — CEFTRIAXONE 100 MILLIGRAM(S): 500 INJECTION, POWDER, FOR SOLUTION INTRAMUSCULAR; INTRAVENOUS at 20:25

## 2022-07-06 RX ADMIN — ATORVASTATIN CALCIUM 40 MILLIGRAM(S): 80 TABLET, FILM COATED ORAL at 23:22

## 2022-07-06 RX ADMIN — GABAPENTIN 400 MILLIGRAM(S): 400 CAPSULE ORAL at 23:23

## 2022-07-06 RX ADMIN — CEFTRIAXONE 1000 MILLIGRAM(S): 500 INJECTION, POWDER, FOR SOLUTION INTRAMUSCULAR; INTRAVENOUS at 21:05

## 2022-07-06 RX ADMIN — Medication 12.5 MILLIGRAM(S): at 23:26

## 2022-07-06 RX ADMIN — TRAMADOL HYDROCHLORIDE 50 MILLIGRAM(S): 50 TABLET ORAL at 23:23

## 2022-07-06 NOTE — H&P ADULT - NSHPREVIEWOFSYSTEMS_GEN_ALL_CORE
REVIEW OF SYSTEMS  General: denies weakness, malaise  Skin/Breast: no new rash  Ophthalmologic: no change in vision  ENMT: no dysphagia, throat pain or change in hearing  Respiratory and Thorax: no difficulty breathing or chest pain  Cardiovascular: no palpitations or PND, orthopnea  Gastrointestinal: no abdominal pain, normal bowel movement, no dark stools  Genitourinary: no difficulty urinating, no burning urination  Musculoskeletal: no myalgia/arthrlagia  Neurological: no weakness, numbness, change in gait  Psychiatric: no depression, anxiety  Hematology/Lymphatics: denies easy bruising or bleeding  Endocrine: no polyuria, polydipsia REVIEW OF SYSTEMS  General: denies weakness, malaise  Skin/Breast: no new rash  Ophthalmologic: no change in vision  ENMT: no dysphagia, throat pain or change in hearing  Respiratory and Thorax: +difficulty breathing and chest pain  Cardiovascular: no palpitations or PND, orthopnea  Gastrointestinal: no abdominal pain, normal bowel movement, no dark stools  Genitourinary: no difficulty urinating, no burning urination  Musculoskeletal: no myalgia/arthrlagia  Neurological: no weakness, numbness, change in gait  Psychiatric: no depression, anxiety  Hematology/Lymphatics: denies easy bruising or bleeding  Endocrine: no polyuria, polydipsia

## 2022-07-06 NOTE — ED PROVIDER NOTE - OBJECTIVE STATEMENT
78yo male with PMH 78 y/o M; with PMH signif for a-fib(on Eliquis), CAD, CABGx3 COPD ,HTN, HLD presenting with AMS. Per wife, patient was last seen normal last night. This morning, she noticed that he was a little "loopy." He was seen in the ED over the weekend for chest pain and lightheadedness, and went to his follow up cardiology appointment today where he was found to be altered, hypoxic to 50s on room air and sent to the ED for evaluation. Patient states he feels loopy and is a poor historian due to mental status, history obtained from wife. He states that he does have some shortness of breath but denies chest pain. Per wife- patient has habit of taking additional medications (currently taking tramadol, his wife takes xanax which he has access to) and she thinks that this may be a drug effect. Denies trauma.

## 2022-07-06 NOTE — H&P ADULT - NSICDXPASTMEDICALHX_GEN_ALL_CORE_FT
PAST MEDICAL HISTORY:  Arthritis     CAD in native artery RCA 3 YAYA 1 in 2002 and 2 in 2019    COPD (chronic obstructive pulmonary disease) with emphysema     HLD (hyperlipidemia)     HTN (hypertension)     Lumbosacral disc disease has nerve stimulator    Lung cancer Stage I a1; RUL; s/p SBRT in 11/2021; stable disease as of 4/2022    Paroxysmal atrial fibrillation

## 2022-07-06 NOTE — H&P ADULT - ASSESSMENT
73 yo male with history of severe COPD on home O2 of 3LPM (follows with Dr. Fuller), multiple lung nodules with RUL lung nodule that was treated as malignant (stage Ia1) with radiation in 11/2021 (follow up CT scan in 4/2022 showed stable lesion), ex-smoker, ex alcoholic (last drink 5 years ago), HTN, HLD, paroxysmal a-fib on eliquis, CAD with CABG in 3/2020, chronic back pain from fall in 2016 with spinal cord stimulator and chronic opioid use, anxiety (recently started on zoloft) who presents from home today with altered mental status, was hallucinating and not making sense when talking according to his wife. Found to have new leukocytosis new LLL infiltrate on CXR compared to prior observation/visit in 7/1/2022. Still with episodic chest pain from that past visit. Medicine called to admit for pneumonia, chest pain and episodic AMS.    #acute hypoxemic resp failure with LLL infiltrate on CXR and present on CT chest to a more mild degree  #chest pain  - admit to  bed  - chest pain may be due to pneumonia on the same side  - patient states on his last visit here on July 1, 2022 was not sent home on medications but does appear to have been prescribed levofloxacin and prednisone on 6/24 - was being treated for COPD exacerbation by his pulmonologist at that time  - had a recent echo done for his chest pain and seen by cardiology on last 7/1 already  - currently troponin normal  - would treat pneumonia as above  - continue ceftriaxone and azithromycin  - check MRSA  - methylprednisolone 40mg BID for now - taper quickly if improves    #acute metabolic encephalopathy - now more baseline  - recent episodes likely due to polypharmacy and misuse of zoloft  - ok to resume meds at prescribed dose while here  - tramdol 50mg BID pRN as we dont have the ER formulation  -  consult to help with med management  - would avoid short acting benzos for now given severe COPD disease    #severe COPD with chronic hypoxemic resp failure    #paroxysmal a-fib on eliquis  - continue metoprolol  - continue eliquis  - sinus tachycardia at this time  - treat underlying cause (pneumonia)    #CAD with hx of CABG  - chest pain evaluated by cardiology recently  - preserved EF without WMA on last echo  - continue aspirin  - continue statin  - continue betablocker  - not on ACE/ARB - unsure why  - outpatient cardiology follow up    #chronic back pain  - continue tramadol as above  - continue dilaudid as prescribed  - spinal stimulator functioning  - continue gabapentin as prescribed    #anxiety  - continue zoloft  -  consult as above  - patient notes recent stressors (his daughter going through separation)    #mulitiple lung nodules  - outpatient follow up with rad onc and pulmonologist    #HTN  - continue antihypertensives    #DVT ppx  - eliquis as above    discussed with patient and daughter at bedside 73 yo male with history of severe COPD on home O2 of 3LPM (follows with Dr. Fuller), multiple lung nodules with RUL lung nodule that was treated as malignant (stage Ia1) with radiation in 11/2021 (follow up CT scan in 4/2022 showed stable lesion), ex-smoker, ex alcoholic (last drink 5 years ago), HTN, HLD, paroxysmal a-fib on eliquis, CAD with CABG in 3/2020, chronic back pain from fall in 2016 with spinal cord stimulator and chronic opioid use, anxiety (recently started on zoloft) who presents from home today with altered mental status, was hallucinating and not making sense when talking according to his wife. Found to have new leukocytosis new LLL infiltrate on CXR compared to prior observation/visit in 7/1/2022. Still with episodic chest pain from that past visit. Medicine called to admit for pneumonia, chest pain and episodic AMS.    #acute hypoxemic resp failure with LLL infiltrate on CXR and present on CT chest to a more mild degree  #chest pain  - admit to  bed  - chest pain may be due to pneumonia on the same side  - patient states on his last visit here on July 1, 2022 was not sent home on medications but does appear to have been prescribed levofloxacin and prednisone on 6/24 - was being treated for COPD exacerbation by his pulmonologist at that time  - had a recent echo done for his chest pain and seen by cardiology on last 7/1 already  - currently troponin normal  - would treat pneumonia as above - though atypical location for aspiration i believe may be aspiration from episodes of confusion recently  - would therefore give ceftriaxone and azithromycin  - check MRSA swab  - methylprednisolone 40mg BID for now - taper quickly if improves  - he is up to date on his pneumococcal/prevnar vaccinations    #acute metabolic encephalopathy - now more baseline  - recent episodes likely due to polypharmacy and misuse of zoloft  - ok to resume meds at prescribed dose while here  - tramdol 50mg BID pRN as we dont have the ER formulation  -  consult to help with med management  - would avoid short acting benzos for now given severe COPD disease    #severe COPD with chronic hypoxemic resp failure    #paroxysmal a-fib on eliquis  - continue metoprolol  - continue eliquis  - sinus tachycardia at this time  - treat underlying cause (pneumonia)    #CAD with hx of CABG  - chest pain evaluated by cardiology recently  - preserved EF without WMA on last echo  - continue aspirin  - continue statin  - continue betablocker  - not on ACE/ARB - unsure why  - outpatient cardiology follow up    #chronic back pain  - continue tramadol as above  - continue dilaudid as prescribed  - spinal stimulator functioning  - continue gabapentin as prescribed    #anxiety  - continue zoloft  -  consult as above  - patient notes recent stressors (his daughter going through separation)    #mulitiple lung nodules  - outpatient follow up with rad onc and pulmonologist    #HTN  - continue antihypertensives    #DVT ppx  - eliquis as above    discussed with patient and daughter at bedside

## 2022-07-06 NOTE — ED ADULT NURSE NOTE - OBJECTIVE STATEMENT
Patient bibems s/p SOB and altered as per family. Denies trauma, fall, CP. Patient on monitor. Priority CT complete. Awaiting further order. Will CTM.

## 2022-07-06 NOTE — ED PROVIDER NOTE - PROGRESS NOTE DETAILS
Patient now AOx4, no respiratory distress, currently on 6L NC. Discussed with hospitalist who will accept patient. Poly Llanos DO

## 2022-07-06 NOTE — ED ADULT NURSE NOTE - CHPI ED NUR SEVERITY2
49y F with nephrotic syndrome      #Nephrotic syndrome  -Has likely EVELYN, edema and UA showing proteinuria (could be all chronic) but given neg CT scan for endometrial finding and mostly showing anasarca, and pe showing edema and UA with protein- this could be first presentation of nephrotic syndrome and sudden onset could suggest minimal change, could also be a be paraprotein process in setting of anemia as well.   - Spot urine TP/CR  showing 3.5 gm of proteinuria  - Serologic work-up so far: Normal Electrophoresis, Complements WNL, Neg PLA2R, Negative ALVERTO, anti-DsDNA and ANCA serologies  - Renal Sono: Patent renal veins bilaterally. No evidence of thrombosis  Markedly increased renal cortical echogenicity bilaterally, which may be secondary to medical renal disease versus other infiltrative processes.  - Bumex 2mg IV BID for diuresis as most of this is anasarca.  - Renal biopsy 2/26/20  reviewed with pathologist preliminary with IgA Nephropathy, 70% Global Glomerulosclerosis    #Hypertension  - Blood pressure currently stable and at an acceptable range  - Keep SBP<160mmHg prior to renal biopsy    #Anemia  - Patient with iron studies consistent with iron deficiency  -  IV Iron 200mg very 48 hours x5 doses  - Transfuse as necessary  - Patient now with small left perinephric  hematoma post biopsy site ( 9 x 3 x 4.3 cm complex fluid collection.) on the left s/p DDAVP on 2/27/20  - Still having dropping Hgb today.   - Please give  PRBC and maintain goal Hgb>8  - Would  reconsult IR regarding post-biopsy renal hematoma      Manan Olmos  Nephrology Fellow  Pager: [phone] 49y F with nephrotic syndrome      #Nephrotic syndrome  -Has likely EVELYN, edema and UA showing proteinuria (could be all chronic) but given neg CT scan for endometrial finding and mostly showing anasarca, and pe showing edema and UA with protein- this could be first presentation of nephrotic syndrome and sudden onset could suggest minimal change, could also be a be paraprotein process in setting of anemia as well.   - Spot urine TP/CR  showing 3.5 gm of proteinuria  - Serologic work-up so far: Normal Electrophoresis, Complements WNL, Neg PLA2R, Negative ALVERTO, anti-DsDNA and ANCA serologies  - Renal Sono: Patent renal veins bilaterally. No evidence of thrombosis  Markedly increased renal cortical echogenicity bilaterally, which may be secondary to medical renal disease versus other infiltrative processes.  - Bumex 2mg IV BID for diuresis as most of this is anasarca.  - Renal biopsy 2/26/20  reviewed with pathologist preliminary with IgA Nephropathy, 70% Global Glomerulosclerosis    #Hypertension  - Blood pressure currently stable and at an acceptable range    #Anemia  - Patient with iron studies consistent with iron deficiency  -  IV Iron 200mg very 48 hours x5 doses  - Transfuse as necessary  - Patient now with small left perinephric  hematoma post biopsy site ( 9 x 3 x 4.3 cm complex fluid collection.) on the left s/p DDAVP on 2/27/20  - Still having dropping Hgb today.   - Please give  PRBC and maintain goal Hgb>8  - Would  reconsult IR regarding post-biopsy renal hematoma      Manan Olmos  Nephrology Fellow  Pager: [phone] 49y F with nephrotic syndrome      #Nephrotic syndrome  -Has likely EVELYN, edema and UA showing proteinuria (could be all chronic) but given neg CT scan for endometrial finding and mostly showing anasarca, and pe showing edema and UA with protein- this could be first presentation of nephrotic syndrome and sudden onset could suggest minimal change, could also be a be paraprotein process in setting of anemia as well.   - Spot urine TP/CR  showing 3.5 gm of proteinuria  - Serologic work-up so far: Normal Electrophoresis, Complements WNL, Neg PLA2R, Negative ALVERTO, anti-DsDNA and ANCA serologies  - Renal Sono: Patent renal veins bilaterally. No evidence of thrombosis  Markedly increased renal cortical echogenicity bilaterally, which may be secondary to medical renal disease versus other infiltrative processes.  - Bumex 2mg IV BID for diuresis as most of this is anasarca.  - Renal biopsy 2/26/20  reviewed with pathologist preliminary with IgA Nephropathy, 70% Global Glomerulosclerosis    #Hypertension  - Blood pressure currently stable and at an acceptable range    #Anemia  - Patient with iron studies consistent with iron deficiency  -  IV Iron 200mg very 48 hours x5 doses  - Transfuse as necessary  - Patient now with small left perinephric  hematoma post biopsy site ( 9 x 3 x 4.3 cm complex fluid collection.) on the left s/p DDAVP on 2/27/20  - Still having dropping Hgb today.   - Give another dose of DDAVP 20mcg IVP once  - Check UA  - Please give  PRBC and maintain goal Hgb>8  - Would  reconsult IR regarding post-biopsy renal hematoma      Manan Olmos  Nephrology Fellow  Pager: [phone] 49y F with nephrotic syndrome      #Nephrotic syndrome  -Has likely EVELYN, edema and UA showing proteinuria (could be all chronic) but given neg CT scan for endometrial finding and mostly showing anasarca, and pe showing edema and UA with protein- this could be first presentation of nephrotic syndrome and sudden onset could suggest minimal change, could also be a be paraprotein process in setting of anemia as well.   - Spot urine TP/CR  showing 3.5 gm of proteinuria  - Serologic work-up so far: Normal Electrophoresis, Complements WNL, Neg PLA2R, Negative ALVERTO, anti-DsDNA and ANCA serologies  - Renal Sono: Patent renal veins bilaterally. No evidence of thrombosis  Markedly increased renal cortical echogenicity bilaterally, which may be secondary to medical renal disease versus other infiltrative processes.  - Bumex 2mg IV BID for diuresis as most of this is anasarca.  - Renal biopsy 2/26/20  reviewed with pathologist preliminary with IgA Nephropathy, 70% Global Glomerulosclerosis    #Hypertension  - Blood pressure currently stable and at an acceptable range    #Anemia  - Patient with iron studies consistent with iron deficiency  -  IV Iron 200mg very 48 hours x5 doses  - Transfuse as necessary  - Patient now with small left perinephric  hematoma post biopsy site ( 9 x 3 x 4.3 cm complex fluid collection.) on the left s/p DDAVP on 2/27/20  - Still having dropping Hgb today.   - Give another dose of DDAVP 20mcg IVP once  - Check UA  - Please give  PRBC and maintain goal Hgb>8  - Would  reconsult IR regarding post-biopsy renal hematoma  - Monitor Hgb Every hours      Manan Olmos  Nephrology Fellow  Pager: [phone] 49y F with nephrotic syndrome      #Nephrotic syndrome  -Has likely EVELYN, edema and UA showing proteinuria (could be all chronic) but given neg CT scan for endometrial finding and mostly showing anasarca, and pe showing edema and UA with protein- this could be first presentation of nephrotic syndrome and sudden onset could suggest minimal change, could also be a be paraprotein process in setting of anemia as well.   - Spot urine TP/CR  showing 3.5 gm of proteinuria  - Serologic work-up so far: Normal Electrophoresis, Complements WNL, Neg PLA2R, Negative ALVERTO, anti-DsDNA and ANCA serologies  - Renal Sono: Patent renal veins bilaterally. No evidence of thrombosis  Markedly increased renal cortical echogenicity bilaterally, which may be secondary to medical renal disease versus other infiltrative processes.  - Bumex 2mg IV BID for diuresis as most of this is anasarca.  - Renal biopsy 2/26/20  reviewed with pathologist preliminary with IgA Nephropathy, 70% Global Glomerulosclerosis    #Hypertension  - Blood pressure currently stable and at an acceptable range    #Anemia  - Patient with iron studies consistent with iron deficiency  -  IV Iron 200mg very 48 hours x5 doses  - Transfuse as necessary  - Patient now with small left perinephric  hematoma post biopsy site ( 9 x 3 x 4.3 cm complex fluid collection.) on the left s/p DDAVP on 2/27/20  - Still having dropping Hgb today.   - Give another dose of DDAVP 20mcg IVP once  - Check UA  - Please give  PRBC and maintain goal Hgb>8  - Would  reconsult IR regarding post-biopsy renal hematoma  - Monitor Hgb Every 6 hours      Manan Olmos  Nephrology Fellow  Pager: [phone] PAIN SCALE 4 OF 10.

## 2022-07-06 NOTE — ED ADULT TRIAGE NOTE - CHIEF COMPLAINT QUOTE
Pt with hx of lung CA presents from PMD with c/o chest pain, SOB and lethargy and EMS reports pt was at 70% on RA, 91% on NRB. Pt reports improvement to SOB at this time.

## 2022-07-06 NOTE — ED CLERICAL - DIVISION
[facility] 40 Male with PMH of Type 2 DM, Migraine, ESRD on HD (left arm graft, M/W/F), Morbid obesity, HTN, Chr dyspnea (uses home O2 prn), Schizophrenia, Bacteremia due to foot cellulitis presented to hospital for shortness of breath and orthopnea after missing dialysis yesterday when he overslept. Patient was also complaining of painful R testicular nodule. Patient was taken to OR today 05/02 for incision and drainage of scrotal abscess. In the PACU he was noticed to have ~30 sec episode of NSVT. Patient was asymptomatic during the episode patient's BP was 139/89, HR 84, and Oxygen Saturation of 100% on 3L NC. ICU was consulted for closer monitoring. Patient denies any complaints of chest pain, palpitations, or SOB. He denies any prior episode. He does complaint of orthopnea and b/l LE swelling x 1 month. He had an ECHO on 03/04/19 with EF 55-65% and normal diastolic function. He is also scheduled for sleep study at Dr. Thurston's office on 05/08. Currently he is not on O2/BiPAP at home. Denies any other complaints at this time.     Dr. Bob on bedside evaluated the patient, and reviewed the telemetry with the ICU team. As per Dr. Bob, patient was noted to have WCT, and review of telemetry shows that WCT appears to ne an artifact. EKG shows normal sinus rythm, HR: 82, QRS 82ms, QTc-469. Patient will be admitted to telemetry for closer monitoring. 40 Male with PMHX of Type 2 DM, Migraine, ESRD on HD (left arm graft, M/W/F), Morbid obesity, HTN, Chr dyspnea (uses home O2 prn), Schizophrenia, Bacteremia due to foot cellulitis presented to hospital for shortness of breath and orthopnea after missing dialysis, scrotal abcess- s/p I and D.  1.Tele monitoring 24 hrs.  2.ABX as per ID.  3.ESRD and hyperkalemia-HD today.  4.DM-insulin.  5.Lipid d/o-statin.  6.HTN-cont bp medication.  7.Schizophrenia-psych medication.  8.Sleep study as outpatient-R/O KRYSTIN.  9.GI and DVT prophylaxis. scrotal mass, tender, r/o sebaceous cyst vs early abcess. no evidence or erythema or purulence presently  await sono scrotum    advise warm sits baths bid , if pt can sit in bathtub with warm soapy water twice daily and then dry area well, it is better than sits baths.  Warm compress to scroptal/perineal area may also be of value.  Will discuss with Dr Beckford who will see pt today  observation for now Infected Cyst vs very small Abscess of right side of ecrotum    plan - as cultures are growing out enterococcus and the pt is allergic to PCN and he is going home today  will treat with Zyvox 600mgs po bid x 5 days  reconsult prn. Tremfya Counseling: I discussed with the patient the risks of guselkumab including but not limited to immunosuppression, serious infections, worsening of inflammatory bowel disease and drug reactions.  The patient understands that monitoring is required including a PPD at baseline and must alert us or the primary physician if symptoms of infection or other concerning signs are noted.

## 2022-07-06 NOTE — H&P ADULT - NSHPPHYSICALEXAM_GEN_ALL_CORE
PHYSICAL EXAM:    General: in no acute distress  Eyes: PERRLA, EOMI; conjunctiva and sclera clear  Head: Normocephalic; atraumatic  ENMT: No nasal discharge; airway clear; NC in place  Neck: Supple; non tender; no masses  Respiratory: decreased breath sounds bilaterally  Cardiovascular: Regular rate and rhythm. S1 and S2 Normal; No murmurs, gallops or rubs  Gastrointestinal: Soft non-tender non-distended; Normal bowel sounds  Genitourinary: No costovertebral angle tenderness  Extremities: Normal range of motion, No clubbing, cyanosis or edema  Vascular: Peripheral pulses palpable 2+ bilaterally  Neurological: Alert and oriented x4  Skin: Warm and dry. No acute rash  Lymph Nodes: No acute cervical adenopathy  Musculoskeletal: Normal gait, tone, without deformities  Psychiatric: Cooperative and appropriate

## 2022-07-06 NOTE — H&P ADULT - HISTORY OF PRESENT ILLNESS
73 yo male with history of severe COPD on home O2 of 3LPM (follows with Dr. Fuller), multiple lung nodules with RUL lung nodule that was treated as malignant (stage Ia1) with radiation in 11/2021 (follow up CT scan in 4/2022 showed stable lesion), ex-smoker, ex alcoholic (last drink 5 years ago), HTN, HLD, paroxysmal a-fib on eliquis, CAD with CABG in 3/2020, chronic back pain from fall in 2016 with spinal cord stimulator and chronic opioid use, anxiety (recently started on zoloft) who presents from home today with altered mental status, was hallucinating and not making sense when talking according to his wife. Patient was supposed to see cardiologist for follow up for a recent ED visit/observation but was brought here. The ambulance noted that he was hypoxic and they placed him on NRB. In the ED here was placed on 6 LPM.    Work up showed leukocytosis with new LLL infiltrate on CXR. Patient was with sinus tachycardia and underwent CTA which showed no clot and scattered pulmonary infiltrate. COVID negative. Medicine called to admit.    Patient wife concerned off overdose as patient often will take 300mg of his zoloft when having an 'episode of anxiety'. he has done this several times since starting this medication he reports. At bedside is his daughter who states that her mother has been calling to report these similar behavior changes to today for the past few months. Patient additionally takes tramadol, dilaudid at home for chronic pain of the lower back. U tox was positive for opioids and methadone.    On 7/1 patient was here for chest pain and SOB and dizziness. At that time was placed in obs and seen by cardiology who recommended echo which was normal. He had a CTA head and neck done by the ED which showed some carotid stenosis confirmed on US of the carotid however not thought to correlate with symptoms. Of note patient has had a loop recorder in place since hospital visit of last year when he was here for pneumonia, was placed due to episode of 'elevated HR' as per patient. Patient reports he is still having stabbing chest pain located just off the center of his chest to the left without radiation, lasts only a second, no other associated symptoms. At this time denies increased cough, SOB, lightheadedness, dizziness. Denies leg pain, swelling. Denies abdominal pain, constipation/diarrhea.    In the ED he received azithromycin and ceftriaxone. Medicine called to admit for pneumonia, chest pain and episodic AMS. 75 yo male with history of severe COPD on home O2 of 3LPM (follows with Dr. Fuller), multiple lung nodules with RUL lung nodule that was treated as malignant (stage Ia1) with radiation in 11/2021 (follow up CT scan in 4/2022 showed stable lesion), ex-smoker, ex alcoholic (last drink 5 years ago), HTN, HLD, paroxysmal a-fib on eliquis, CAD with CABG in 3/2020, chronic back pain from fall in 2016 with spinal cord stimulator and chronic opioid use, anxiety (recently started on zoloft) who presents from home today with altered mental status, was hallucinating and not making sense when talking according to his wife. Patient was supposed to see cardiologist for follow up for a recent ED visit/observation but was brought here. The ambulance noted that he was hypoxic and they placed him on NRB. In the ED here was placed on 6 LPM.    Work up so far shows leukocytosis with new LLL infiltrate on CXR. Patient was with sinus tachycardia and underwent CTA which showed no clot and scattered pulmonary infiltrate. COVID negative. Medicine called to admit.    Patient wife concerned off overdose as patient often will take 300mg of his zoloft when having an 'episode of anxiety'. he has done this several times since starting this medication he reports. At bedside is his daughter who states that her mother has been calling to report these similar behavior changes to today for the past few months. Patient additionally takes tramadol, dilaudid at home for chronic pain of the lower back. U tox was positive for opioids and methadone.    On 7/1 patient was here for chest pain and SOB and dizziness. At that time was placed in obs and seen by cardiology who recommended echo which was normal. He had a CTA head and neck done by the ED which showed some carotid stenosis confirmed on US of the carotid however not thought to correlate with symptoms. Of note patient has had a loop recorder in place since hospital visit of last year when he was here for pneumonia, was placed due to episode of 'elevated HR' as per patient. Patient reports he is still having stabbing chest pain located just off the center of his chest to the left without radiation, lasts only a second, no other associated symptoms. At this time denies increased cough, SOB, lightheadedness, dizziness. Denies leg pain, swelling. Denies abdominal pain, constipation/diarrhea.    In the ED he received azithromycin and ceftriaxone. Medicine called to admit for pneumonia, chest pain and episodic AMS.

## 2022-07-06 NOTE — ED PROVIDER NOTE - PHYSICAL EXAMINATION
Gen: tired appearing, awakens to voice, confused  Head: NCAT  HEENT: PERRL, EOMI, oral mucosa moist, normal conjunctiva, oropharynx clear without exudate or erythema  Lung: CTAB, no respiratory distress, no wheezing, rales, rhonchi  CV: normal s1/s2, rrr, no murmurs, Normal perfusion, pulses 2+ throughout  Abd: soft, NTND  MSK: No edema, no visible deformities, full range of motion in all 4 extremities  Neuro: No focal neurologic deficits  Skin: No rash   Psych: normal affect

## 2022-07-07 LAB
ANION GAP SERPL CALC-SCNC: 9 MMOL/L — SIGNIFICANT CHANGE UP (ref 5–17)
BUN SERPL-MCNC: 13.8 MG/DL — SIGNIFICANT CHANGE UP (ref 8–20)
CALCIUM SERPL-MCNC: 8.7 MG/DL — SIGNIFICANT CHANGE UP (ref 8.6–10.2)
CHLORIDE SERPL-SCNC: 98 MMOL/L — SIGNIFICANT CHANGE UP (ref 98–107)
CO2 SERPL-SCNC: 31 MMOL/L — HIGH (ref 22–29)
CREAT SERPL-MCNC: 0.53 MG/DL — SIGNIFICANT CHANGE UP (ref 0.5–1.3)
EGFR: 105 ML/MIN/1.73M2 — SIGNIFICANT CHANGE UP
GLUCOSE SERPL-MCNC: 95 MG/DL — SIGNIFICANT CHANGE UP (ref 70–99)
HCT VFR BLD CALC: 32.3 % — LOW (ref 39–50)
HGB BLD-MCNC: 10.2 G/DL — LOW (ref 13–17)
MCHC RBC-ENTMCNC: 28.9 PG — SIGNIFICANT CHANGE UP (ref 27–34)
MCHC RBC-ENTMCNC: 31.6 GM/DL — LOW (ref 32–36)
MCV RBC AUTO: 91.5 FL — SIGNIFICANT CHANGE UP (ref 80–100)
PLATELET # BLD AUTO: 202 K/UL — SIGNIFICANT CHANGE UP (ref 150–400)
POTASSIUM SERPL-MCNC: 4.6 MMOL/L — SIGNIFICANT CHANGE UP (ref 3.5–5.3)
POTASSIUM SERPL-SCNC: 4.6 MMOL/L — SIGNIFICANT CHANGE UP (ref 3.5–5.3)
RBC # BLD: 3.53 M/UL — LOW (ref 4.2–5.8)
RBC # FLD: 15.5 % — HIGH (ref 10.3–14.5)
SODIUM SERPL-SCNC: 138 MMOL/L — SIGNIFICANT CHANGE UP (ref 135–145)
WBC # BLD: 13.53 K/UL — HIGH (ref 3.8–10.5)
WBC # FLD AUTO: 13.53 K/UL — HIGH (ref 3.8–10.5)

## 2022-07-07 PROCEDURE — 99233 SBSQ HOSP IP/OBS HIGH 50: CPT

## 2022-07-07 RX ADMIN — CEFTRIAXONE 100 MILLIGRAM(S): 500 INJECTION, POWDER, FOR SOLUTION INTRAMUSCULAR; INTRAVENOUS at 19:40

## 2022-07-07 RX ADMIN — Medication 12.5 MILLIGRAM(S): at 05:50

## 2022-07-07 RX ADMIN — HYDROMORPHONE HYDROCHLORIDE 2 MILLIGRAM(S): 2 INJECTION INTRAMUSCULAR; INTRAVENOUS; SUBCUTANEOUS at 05:50

## 2022-07-07 RX ADMIN — HYDROMORPHONE HYDROCHLORIDE 2 MILLIGRAM(S): 2 INJECTION INTRAMUSCULAR; INTRAVENOUS; SUBCUTANEOUS at 21:54

## 2022-07-07 RX ADMIN — Medication 12.5 MILLIGRAM(S): at 18:01

## 2022-07-07 RX ADMIN — GABAPENTIN 400 MILLIGRAM(S): 400 CAPSULE ORAL at 21:24

## 2022-07-07 RX ADMIN — Medication 40 MILLIGRAM(S): at 18:01

## 2022-07-07 RX ADMIN — HYDROMORPHONE HYDROCHLORIDE 2 MILLIGRAM(S): 2 INJECTION INTRAMUSCULAR; INTRAVENOUS; SUBCUTANEOUS at 07:03

## 2022-07-07 RX ADMIN — AZITHROMYCIN 255 MILLIGRAM(S): 500 TABLET, FILM COATED ORAL at 21:24

## 2022-07-07 RX ADMIN — ATORVASTATIN CALCIUM 40 MILLIGRAM(S): 80 TABLET, FILM COATED ORAL at 21:24

## 2022-07-07 RX ADMIN — TRAMADOL HYDROCHLORIDE 50 MILLIGRAM(S): 50 TABLET ORAL at 04:51

## 2022-07-07 RX ADMIN — TAMSULOSIN HYDROCHLORIDE 0.4 MILLIGRAM(S): 0.4 CAPSULE ORAL at 21:24

## 2022-07-07 RX ADMIN — APIXABAN 5 MILLIGRAM(S): 2.5 TABLET, FILM COATED ORAL at 18:01

## 2022-07-07 RX ADMIN — APIXABAN 5 MILLIGRAM(S): 2.5 TABLET, FILM COATED ORAL at 05:50

## 2022-07-07 RX ADMIN — HYDROMORPHONE HYDROCHLORIDE 2 MILLIGRAM(S): 2 INJECTION INTRAMUSCULAR; INTRAVENOUS; SUBCUTANEOUS at 21:24

## 2022-07-07 RX ADMIN — GABAPENTIN 400 MILLIGRAM(S): 400 CAPSULE ORAL at 05:50

## 2022-07-07 RX ADMIN — GABAPENTIN 400 MILLIGRAM(S): 400 CAPSULE ORAL at 14:25

## 2022-07-07 RX ADMIN — TRAMADOL HYDROCHLORIDE 50 MILLIGRAM(S): 50 TABLET ORAL at 14:18

## 2022-07-07 NOTE — PROGRESS NOTE ADULT - ASSESSMENT
75 yo male with history of severe COPD on home O2 of 3LPM (follows with Dr. Fuller), multiple lung nodules with RUL lung nodule that was treated as malignant (stage Ia1) with radiation in 11/2021 (follow up CT scan in 4/2022 showed stable lesion), ex-smoker, ex alcoholic (last drink 5 years ago), HTN, HLD, paroxysmal a-fib on eliquis, CAD with CABG in 3/2020, chronic back pain from fall in 2016 with spinal cord stimulator and chronic opioid use, anxiety (recently started on zoloft) who presents from home today with altered mental status, was hallucinating and not making sense when talking according to his wife. Found to have new leukocytosis new LLL infiltrate on CXR compared to prior observation/visit in 7/1/2022. Still with episodic chest pain from that past visit. Medicine called to admit for pneumonia, chest pain and episodic AMS.    #acute hypoxemic resp failure with LLL infiltrate on CXR and present on CT chest to a more mild degree  #chest pain - resolved  - chest pain may be due to pneumonia on the same side  - patient states on his last visit here on July 1, 2022 was not sent home on medications but does appear to have been prescribed levofloxacin and prednisone on 6/24 - was being treated for COPD exacerbation by his pulmonologist at that time  - had a recent echo done for his chest pain and seen by cardiology on last 7/1 already  - currently troponin normal  - Continue Ceftriaxone, Azithromycin  - methylprednisolone 40mg BID for now - taper quickly if improves  - he is up to date on his pneumococcal/prevnar vaccinations  - Supplemental oxygen prn, currently seen on 2L NC.    #acute metabolic encephalopathy - now more baseline  - recent episodes likely due to polypharmacy and misuse of zoloft  - ok to resume meds at prescribed dose while here  - tramdol 50mg BID pRN as we dont have the ER formulation  -  consult to help with med management  - would avoid short acting benzos for now given severe COPD disease    #severe COPD with chronic hypoxemic resp failure  - as above    #paroxysmal a-fib on eliquis  - continue metoprolol  - continue eliquis  - sinus tachycardia at this time  - treat underlying cause (pneumonia)    #CAD with hx of CABG  - chest pain evaluated by cardiology recently  - preserved EF without WMA on last echo  - continue aspirin  - continue statin  - continue betablocker  - not on ACE/ARB - unsure why  - outpatient cardiology follow up    #chronic back pain  - continue tramadol as above  - continue dilaudid as prescribed  - spinal stimulator functioning  - continue gabapentin as prescribed    #anxiety  - continue zoloft  -  consult as above  - patient notes recent stressors (his daughter going through separation)    multiple lung nodules  - outpatient follow up with rad onc and pulmonologist    #HTN  - continue antihypertensives    #DVT ppx  - eliquis as above    PT consult 75 yo male with history of severe COPD on home O2 of 3LPM (follows with Dr. Fuller), multiple lung nodules with RUL lung nodule that was treated as malignant (stage Ia1) with radiation in 11/2021 (follow up CT scan in 4/2022 showed stable lesion), ex-smoker, ex alcoholic (last drink 5 years ago), HTN, HLD, paroxysmal a-fib on eliquis, CAD with CABG in 3/2020, chronic back pain from fall in 2016 with spinal cord stimulator and chronic opioid use, anxiety (recently started on zoloft) who presents from home today with altered mental status, was hallucinating and not making sense when talking according to his wife. Found to have new leukocytosis new LLL infiltrate on CXR compared to prior observation/visit in 7/1/2022. Still with episodic chest pain from that past visit. Medicine called to admit for pneumonia, chest pain and episodic AMS.    #acute hypoxemic resp failure with LLL infiltrate on CXR and present on CT chest to a more mild degree  #chest pain - resolved  - chest pain may be due to pneumonia on the same side  - patient states on his last visit here on July 1, 2022 was not sent home on medications but does appear to have been prescribed levofloxacin and prednisone on 6/24 - was being treated for COPD exacerbation by his pulmonologist at that time  - had a recent echo done for his chest pain and seen by cardiology on last 7/1 already  - currently troponin normal  - Continue Ceftriaxone, Azithromycin  - methylprednisolone 40mg BID for now - taper quickly if improves  - he is up to date on his pneumococcal/prevnar vaccinations  - Supplemental oxygen prn, currently seen on 2L NC. Documented history on O2 however both patient and daughter states that pt has never been on O2.     #acute metabolic encephalopathy - now more baseline  - recent episodes likely due to polypharmacy and misuse of zoloft  - ok to resume meds at prescribed dose while here  - tramdol 50mg BID pRN as we dont have the ER formulation  -  consult to help with med management  - would avoid short acting benzos for now given severe COPD disease    #severe COPD with chronic hypoxemic resp failure  - as above    #paroxysmal a-fib on eliquis  - continue metoprolol  - continue eliquis  - sinus tachycardia at this time  - treat underlying cause (pneumonia)    #CAD with hx of CABG  - chest pain evaluated by cardiology recently  - preserved EF without WMA on last echo  - continue aspirin  - continue statin  - continue betablocker  - not on ACE/ARB - unsure why  - outpatient cardiology follow up    #chronic back pain  - continue tramadol as above  - continue dilaudid as prescribed  - spinal stimulator functioning  - continue gabapentin as prescribed    #anxiety  - continue zoloft  -  consult as above  - patient notes recent stressors (his daughter going through separation)    multiple lung nodules  - outpatient follow up with rad onc and pulmonologist    #HTN  - continue antihypertensives    #DVT ppx  - eliquis as above    PT consult 75 yo male with history of severe COPD on home O2 of 3LPM (follows with Dr. Fuller), multiple lung nodules with RUL lung nodule that was treated as malignant (stage Ia1) with radiation in 11/2021 (follow up CT scan in 4/2022 showed stable lesion), ex-smoker, ex alcoholic (last drink 5 years ago), HTN, HLD, paroxysmal a-fib on eliquis, CAD with CABG in 3/2020, chronic back pain from fall in 2016 with spinal cord stimulator and chronic opioid use, anxiety (recently started on zoloft) who presents from home today with altered mental status, was hallucinating and not making sense when talking according to his wife. Found to have new leukocytosis new LLL infiltrate on CXR compared to prior observation/visit in 7/1/2022. Still with episodic chest pain from that past visit. Medicine called to admit for pneumonia, chest pain and episodic AMS.    #acute hypoxemic resp failure with LLL infiltrate on CXR and present on CT chest to a more mild degree  #chest pain - resolved  - chest pain may be due to pneumonia on the same side  - patient states on his last visit here on July 1, 2022 was not sent home on medications but does appear to have been prescribed levofloxacin and prednisone on 6/24 - was being treated for COPD exacerbation by his pulmonologist at that time  - had a recent echo done for his chest pain and seen by cardiology on last 7/1 already  - currently troponin normal  - Continue Ceftriaxone, Azithromycin  - methylprednisolone 40mg BID for now - taper quickly if improves  - he is up to date on his pneumococcal/prevnar vaccinations  - Supplemental oxygen prn, currently seen on 2L NC. Documented history on O2 however both patient and daughter states that pt has never been on O2.     #acute metabolic encephalopathy - now more baseline  - recent episodes likely due to polypharmacy and misuse of zoloft  - ok to resume meds at prescribed dose while here  - tramdol 50mg BID pRN as we dont have the ER formulation  -  consult to help with med management  - would avoid short acting benzos for now given severe COPD disease    #severe COPD with chronic hypoxemic resp failure  - as above    #paroxysmal a-fib on eliquis  - continue metoprolol  - continue eliquis  - sinus tachycardia at this time  - treat underlying cause (pneumonia)    #CAD with hx of CABG  - chest pain evaluated by cardiology recently  - preserved EF without WMA on last echo  - continue aspirin  - continue statin  - continue betablocker  - not on ACE/ARB - unsure why  - outpatient cardiology follow up    #chronic back pain  - continue tramadol as above  - continue dilaudid as prescribed  - spinal stimulator functioning  - continue gabapentin as prescribed    #anxiety  - continue zoloft  -  consult as above  - patient notes recent stressors (his daughter going through separation)    multiple lung nodules  - outpatient follow up with rad onc and pulmonologist    #HTN  - continue antihypertensives    #DVT ppx  - eliquis as above    PT consult    Discussed with daughter, who is requesting Pulm evaluation

## 2022-07-07 NOTE — HISTORY OF PRESENT ILLNESS
[FreeTextEntry1] : Mr. Henson is brought into this office today by his wife.  The patient with considerable difficulty walking and disoriented.  He states that he may have been taking excessive amounts of his pain medication.  The patient appeared to be in moderate distress at the time of presentation.  \par \par

## 2022-07-07 NOTE — ASSESSMENT
[FreeTextEntry1] : 1.  EKG today reveals probable sinus tachycardia at 148 bpm.  Right bundle branch block.  No acute ischemic changes.  \par \par 2.  Disorientation:  Patient presents with hypoxia, profound diaphoresis, noted hypotension.  Patient pulse ox in the 70s.  Received a non-rebreather oxygen mask with brisk improvement in oxygenation to a pulse ox in the low 90s.  Blood pressure initially 70/50 with subsequent blood pressure 130/80.  His disorientation improved and he was able to answer questions more appropriately.  At that point, ambulance was summoned, and the patient was safely taken to [facility]’s emergency room for further evaluation.  Suspect overdose of pain medications.  \par  \par

## 2022-07-07 NOTE — PROGRESS NOTE ADULT - SUBJECTIVE AND OBJECTIVE BOX
[facility] Division of Hospital Medicine  Zachary Andrade MD    Chief Complaint:  Patient is a 77y old  Male who presents with a chief complaint of     SUBJECTIVE / OVERNIGHT EVENTS:  Patient seen and examined at bedside. No acute events reported overnight. Feels a little better but SOB persists.     Patient denies chest pain, abd pain, N/V, fever, chills, dysuria or any other complaints. All remainder ROS negative.     MEDICATIONS  (STANDING):  apixaban 5 milliGRAM(s) Oral two times a day  atorvastatin 40 milliGRAM(s) Oral at bedtime  azithromycin  IVPB 500 milliGRAM(s) IV Intermittent every 24 hours  cefTRIAXone   IVPB 1000 milliGRAM(s) IV Intermittent every 24 hours  gabapentin 400 milliGRAM(s) Oral three times a day  metoprolol tartrate 12.5 milliGRAM(s) Oral two times a day  oxycodone    5 mG/acetaminophen 325 mG 2 Tablet(s) Oral once  tamsulosin 0.4 milliGRAM(s) Oral at bedtime    MEDICATIONS  (PRN):  HYDROmorphone   Tablet 2 milliGRAM(s) Oral every 8 hours PRN Severe Pain (7 - 10)  traMADol 50 milliGRAM(s) Oral every 8 hours PRN Moderate Pain (4 - 6)        I&O's Summary      PHYSICAL EXAM:  Vital Signs Last 24 Hrs  T(C): 37.5 (2022 07:48), Max: 37.6 (2022 19:42)  T(F): 99.5 (2022 07:48), Max: 99.7 (2022 05:34)  HR: 122 (2022 07:48) (97 - 122)  BP: 100/62 (2022 07:48) (100/62 - 126/78)  BP(mean): --  RR: 17 (2022 07:48) (16 - 20)  SpO2: 91% (2022 07:48) (91% - 98%)        CONSTITUTIONAL: Elderly male, NAD, mildly confused.   HEENT: NC/AT, PERRL, no JVD  RESPIRATORY: Decreased breath sounds bilaterally, mild exp wheezing  CARDIOVASCULAR: RRR, S1/S2+, no m/g/r  ABDOMEN: Nontender to palpation, normoactive bowel sounds, no rebound/guarding  MUSCULOSKELETAL: No edema, cyanosis or deformities.  PSYCH: Calm, affect appropriate.  NEUROLOGY: Awake, alert, no focal neurological deficits.   SKIN: No rashes; no palpable lesions  VASC: Distal pulses palpable    LABS:                        10.2   13.53 )-----------( 202      ( 2022 04:16 )             32.3     07-07    138  |  98  |  13.8  ----------------------------<  95  4.6   |  31.0<H>  |  0.53    Ca    8.7      2022 04:16    TPro  6.6  /  Alb  3.9  /  TBili  0.8  /  DBili  x   /  AST  15  /  ALT  17  /  AlkPhos  104  07-06      CARDIAC MARKERS ( 2022 17:20 )  x     / <0.01 ng/mL / x     / x     / x          Urinalysis Basic - ( 2022 19:35 )    Color: Yellow / Appearance: Clear / S.010 / pH: x  Gluc: x / Ketone: Negative  / Bili: Negative / Urobili: 1 mg/dL   Blood: x / Protein: 30 mg/dL / Nitrite: Negative   Leuk Esterase: Trace / RBC: 0-2 /HPF / WBC 0-2 /HPF   Sq Epi: x / Non Sq Epi: Few / Bacteria: x        CAPILLARY BLOOD GLUCOSE      POCT Blood Glucose.: 121 mg/dL (2022 16:59)        RADIOLOGY & ADDITIONAL TESTS:  Results Reviewed:   Imaging Personally Reviewed:  Electrocardiogram Personally Reviewed:

## 2022-07-07 NOTE — PHYSICAL EXAM
[General Appearance - Well Developed] : well developed [Normal Conjunctiva] : the conjunctiva exhibited no abnormalities [Normal Oral Mucosa] : normal oral mucosa [Shallow] : shallow respirations [Tachycardic ___] : the heart rate was tachycardic at [unfilled] bpm [Bowel Sounds] : normal bowel sounds [Abnormal Walk] : normal gait [FreeTextEntry1] : Disoriented

## 2022-07-07 NOTE — REASON FOR VISIT
[FreeTextEntry1] : Mr. Henson is a pleasant 77-year-old white male with a past medical history significant for hypertension, hyperlipidemia, coronary artery disease status-post PCI in 2002 followed by CABG x3 in March of 2020 as well as COPD and paroxysmal atrial fibrillation with subsequent ILR implantation who presents for follow up evaluation.  \par \par \par

## 2022-07-08 LAB
ALBUMIN SERPL ELPH-MCNC: 3.7 G/DL — SIGNIFICANT CHANGE UP (ref 3.3–5.2)
ALP SERPL-CCNC: 278 U/L — HIGH (ref 40–120)
ALT FLD-CCNC: 199 U/L — HIGH
ANION GAP SERPL CALC-SCNC: 10 MMOL/L — SIGNIFICANT CHANGE UP (ref 5–17)
AST SERPL-CCNC: 216 U/L — HIGH
BILIRUB SERPL-MCNC: 0.5 MG/DL — SIGNIFICANT CHANGE UP (ref 0.4–2)
BUN SERPL-MCNC: 14 MG/DL — SIGNIFICANT CHANGE UP (ref 8–20)
CALCIUM SERPL-MCNC: 9.3 MG/DL — SIGNIFICANT CHANGE UP (ref 8.6–10.2)
CHLORIDE SERPL-SCNC: 97 MMOL/L — LOW (ref 98–107)
CO2 SERPL-SCNC: 32 MMOL/L — HIGH (ref 22–29)
CREAT SERPL-MCNC: 0.44 MG/DL — LOW (ref 0.5–1.3)
CULTURE RESULTS: SIGNIFICANT CHANGE UP
EGFR: 111 ML/MIN/1.73M2 — SIGNIFICANT CHANGE UP
GLUCOSE SERPL-MCNC: 169 MG/DL — HIGH (ref 70–99)
HCT VFR BLD CALC: 32.6 % — LOW (ref 39–50)
HGB BLD-MCNC: 10.7 G/DL — LOW (ref 13–17)
MCHC RBC-ENTMCNC: 29.8 PG — SIGNIFICANT CHANGE UP (ref 27–34)
MCHC RBC-ENTMCNC: 32.8 GM/DL — SIGNIFICANT CHANGE UP (ref 32–36)
MCV RBC AUTO: 90.8 FL — SIGNIFICANT CHANGE UP (ref 80–100)
MRSA PCR RESULT.: SIGNIFICANT CHANGE UP
PLATELET # BLD AUTO: 257 K/UL — SIGNIFICANT CHANGE UP (ref 150–400)
POTASSIUM SERPL-MCNC: 5 MMOL/L — SIGNIFICANT CHANGE UP (ref 3.5–5.3)
POTASSIUM SERPL-SCNC: 5 MMOL/L — SIGNIFICANT CHANGE UP (ref 3.5–5.3)
PROCALCITONIN SERPL-MCNC: 0.24 NG/ML — HIGH (ref 0.02–0.1)
PROT SERPL-MCNC: 6.6 G/DL — SIGNIFICANT CHANGE UP (ref 6.6–8.7)
RBC # BLD: 3.59 M/UL — LOW (ref 4.2–5.8)
RBC # FLD: 15.2 % — HIGH (ref 10.3–14.5)
S AUREUS DNA NOSE QL NAA+PROBE: DETECTED
SODIUM SERPL-SCNC: 139 MMOL/L — SIGNIFICANT CHANGE UP (ref 135–145)
SPECIMEN SOURCE: SIGNIFICANT CHANGE UP
WBC # BLD: 5.22 K/UL — SIGNIFICANT CHANGE UP (ref 3.8–10.5)
WBC # FLD AUTO: 5.22 K/UL — SIGNIFICANT CHANGE UP (ref 3.8–10.5)

## 2022-07-08 PROCEDURE — 99233 SBSQ HOSP IP/OBS HIGH 50: CPT

## 2022-07-08 RX ADMIN — GABAPENTIN 400 MILLIGRAM(S): 400 CAPSULE ORAL at 13:29

## 2022-07-08 RX ADMIN — APIXABAN 5 MILLIGRAM(S): 2.5 TABLET, FILM COATED ORAL at 05:04

## 2022-07-08 RX ADMIN — TRAMADOL HYDROCHLORIDE 50 MILLIGRAM(S): 50 TABLET ORAL at 21:38

## 2022-07-08 RX ADMIN — HYDROMORPHONE HYDROCHLORIDE 2 MILLIGRAM(S): 2 INJECTION INTRAMUSCULAR; INTRAVENOUS; SUBCUTANEOUS at 08:26

## 2022-07-08 RX ADMIN — AZITHROMYCIN 255 MILLIGRAM(S): 500 TABLET, FILM COATED ORAL at 21:37

## 2022-07-08 RX ADMIN — Medication 40 MILLIGRAM(S): at 05:04

## 2022-07-08 RX ADMIN — HYDROMORPHONE HYDROCHLORIDE 2 MILLIGRAM(S): 2 INJECTION INTRAMUSCULAR; INTRAVENOUS; SUBCUTANEOUS at 09:00

## 2022-07-08 RX ADMIN — CEFTRIAXONE 100 MILLIGRAM(S): 500 INJECTION, POWDER, FOR SOLUTION INTRAMUSCULAR; INTRAVENOUS at 18:38

## 2022-07-08 RX ADMIN — GABAPENTIN 400 MILLIGRAM(S): 400 CAPSULE ORAL at 05:04

## 2022-07-08 RX ADMIN — Medication 12.5 MILLIGRAM(S): at 05:04

## 2022-07-08 RX ADMIN — GABAPENTIN 400 MILLIGRAM(S): 400 CAPSULE ORAL at 21:38

## 2022-07-08 RX ADMIN — TAMSULOSIN HYDROCHLORIDE 0.4 MILLIGRAM(S): 0.4 CAPSULE ORAL at 21:37

## 2022-07-08 RX ADMIN — APIXABAN 5 MILLIGRAM(S): 2.5 TABLET, FILM COATED ORAL at 17:16

## 2022-07-08 RX ADMIN — Medication 12.5 MILLIGRAM(S): at 17:16

## 2022-07-08 RX ADMIN — Medication 40 MILLIGRAM(S): at 17:16

## 2022-07-08 NOTE — PHYSICAL THERAPY INITIAL EVALUATION ADULT - ADDITIONAL COMMENTS
pt states he lives with his wife in a 1-story house with no steps to enter(just doorsill). he is independent amb without device prior to admit. has RW from cardiothoracic surgery. +driving.

## 2022-07-08 NOTE — PHYSICAL THERAPY INITIAL EVALUATION ADULT - PERTINENT HX OF CURRENT PROBLEM, REHAB EVAL
75 yo male with history of severe COPD on home O2 of 3LPM (follows with Dr. Fuller), multiple lung nodules with RUL lung nodule that was treated as malignant.ex-smoker, ex alcoholic, HTN, HLD, paroxysmal a-fib on eliquis, CAD with CABG in 3/2020, chronic back pain from fall in 2016 with spinal cord stimulator and chronic opioid use, anxiety,now with LLL PNA.

## 2022-07-08 NOTE — PROGRESS NOTE ADULT - SUBJECTIVE AND OBJECTIVE BOX
JENNIFER OSCAR  ----------------------------------------  The patient was seen at bedside. Patient with COPD and pneumonia. Noted some dyspnea with exertion.    Vital Signs Last 24 Hrs  T(C): 36.7 (2022 10:37), Max: 37 (2022 19:45)  T(F): 98.1 (2022 10:37), Max: 98.6 (2022 19:45)  HR: 82 (2022 10:37) (78 - 116)  BP: 103/64 (2022 10:37) (103/64 - 117/73)  BP(mean): --  RR: 18 (2022 10:37) (16 - 18)  SpO2: 95% (2022 10:37) (64% - 99%)    Parameters below as of 2022 10:37  Patient On (Oxygen Delivery Method): nasal cannula    PHYSICAL EXAMINATION:  ----------------------------------------  General appearance: No acute distress, Awake, Alert  HEENT: Normocephalic, Atraumatic, Conjunctiva clear, EOMI  Neck: Supple, No JVD, No tenderness  Lungs: No wheezes, No rales, Diminished breath sound at the bases  Cardiovascular: S1S2, Regular rhythm  Abdomen: Soft, Nontender, Nondistended, No guarding/rebound, Positive bowel sounds  Extremities: No clubbing, No cyanosis, No edema, No calf tenderness  Neuro: Strength equal bilaterally, No tremors  Psychiatric: Appropriate mood, Normal affect    LABORATORY STUDIES:  ----------------------------------------             10.7   5.22  )-----------( 257      ( 2022 06:24 )             32.6     07-08    139  |  97<L>  |  14.0  ----------------------------<  169<H>  5.0   |  32.0<H>  |  0.44<L>    Ca    9.3      2022 06:24    TPro  6.6  /  Alb  3.7  /  TBili  0.5  /  DBili  x   /  AST  216<H>  /  ALT  199<H>  /  AlkPhos  278<H>  07-08    LIVER FUNCTIONS - ( 2022 06:24 )  Alb: 3.7 g/dL / Pro: 6.6 g/dL / ALK PHOS: 278 U/L / ALT: 199 U/L / AST: 216 U/L / GGT: x           CARDIAC MARKERS ( 2022 17:20 )  x     / <0.01 ng/mL / x     / x     / x        Urinalysis Basic - ( 2022 19:35 )  Color: Yellow / Appearance: Clear / S.010 / pH: x  Gluc: x / Ketone: Negative  / Bili: Negative / Urobili: 1 mg/dL   Blood: x / Protein: 30 mg/dL / Nitrite: Negative   Leuk Esterase: Trace / RBC: 0-2 /HPF / WBC 0-2 /HPF   Sq Epi: x / Non Sq Epi: Few / Bacteria: x    Culture - Urine (collected 2022 01:00)  Source: Clean Catch Clean Catch (Midstream)  Final Report (2022 10:30):    <10,000 CFU/mL Normal Urogenital Doris    MEDICATIONS  (STANDING):  apixaban 5 milliGRAM(s) Oral two times a day  atorvastatin 40 milliGRAM(s) Oral at bedtime  azithromycin  IVPB 500 milliGRAM(s) IV Intermittent every 24 hours  cefTRIAXone   IVPB 1000 milliGRAM(s) IV Intermittent every 24 hours  gabapentin 400 milliGRAM(s) Oral three times a day  methylPREDNISolone sodium succinate Injectable 40 milliGRAM(s) IV Push every 12 hours  metoprolol tartrate 12.5 milliGRAM(s) Oral two times a day  oxycodone    5 mG/acetaminophen 325 mG 2 Tablet(s) Oral once  tamsulosin 0.4 milliGRAM(s) Oral at bedtime    MEDICATIONS  (PRN):  HYDROmorphone   Tablet 2 milliGRAM(s) Oral every 8 hours PRN Severe Pain (7 - 10)  traMADol 50 milliGRAM(s) Oral every 8 hours PRN Moderate Pain (4 - 6)      ASSESSMENT / PLAN:  ----------------------------------------  74M with a history of COPD, lung cancer, hypertension, atrial fibrillation, coronary artery disease, anxiety, and chronic back pain with a spinal cord stimulator who presented with altered mental status and hallucinations. Chest Xray was notable for a left sided infiltrate and antibiotic were initiated for pneumonia.     Acute hypoxic respiratory failure / COPD exacerbation - On intravenous methylprednisolone and inhaled bronchodilator therapy. Hypoxia appears improved, to titrate supplemental oxygen as tolerated.    Metabolic encephalopathy - Suspect secondary to COPD, pneumonia, and polypharmacy with sertraline and hydromorphone. Mentation appears improved.    Pneumonia - On azithromycin and ceftriaxone.    Coronary artery disease - Chest pain appears musculoskeletal in origin and associated with deep inspiration. Troponin level was not elevated. On atorvastatin and metoprolol.    Atrial fibrillation - On metoprolol with apixaban for anticoagulation.    Back pain - Noted to have a history of a spinal stimulator. Analgesic medications as needed.    Anxiety - On sertraline. JENNIFER OSCAR  ----------------------------------------  The patient was seen at bedside. Patient with COPD and pneumonia. Noted some dyspnea with exertion.    Vital Signs Last 24 Hrs  T(C): 36.7 (2022 10:37), Max: 37 (2022 19:45)  T(F): 98.1 (2022 10:37), Max: 98.6 (2022 19:45)  HR: 82 (2022 10:37) (78 - 116)  BP: 103/64 (2022 10:37) (103/64 - 117/73)  BP(mean): --  RR: 18 (2022 10:37) (16 - 18)  SpO2: 95% (2022 10:37) (64% - 99%)    Parameters below as of 2022 10:37  Patient On (Oxygen Delivery Method): nasal cannula    PHYSICAL EXAMINATION:  ----------------------------------------  General appearance: No acute distress, Awake, Alert  HEENT: Normocephalic, Atraumatic, Conjunctiva clear, EOMI  Neck: Supple, No JVD, No tenderness  Lungs: No wheezes, No rales, Diminished breath sound at the bases  Cardiovascular: S1S2, Regular rhythm  Abdomen: Soft, Nontender, Nondistended, No guarding/rebound, Positive bowel sounds  Extremities: No clubbing, No cyanosis, No edema, No calf tenderness  Neuro: Strength equal bilaterally, No tremors  Psychiatric: Appropriate mood, Normal affect    LABORATORY STUDIES:  ----------------------------------------             10.7   5.22  )-----------( 257      ( 2022 06:24 )             32.6     07-08    139  |  97<L>  |  14.0  ----------------------------<  169<H>  5.0   |  32.0<H>  |  0.44<L>    Ca    9.3      2022 06:24    TPro  6.6  /  Alb  3.7  /  TBili  0.5  /  DBili  x   /  AST  216<H>  /  ALT  199<H>  /  AlkPhos  278<H>  07-08    LIVER FUNCTIONS - ( 2022 06:24 )  Alb: 3.7 g/dL / Pro: 6.6 g/dL / ALK PHOS: 278 U/L / ALT: 199 U/L / AST: 216 U/L / GGT: x           CARDIAC MARKERS ( 2022 17:20 )  x     / <0.01 ng/mL / x     / x     / x        Urinalysis Basic - ( 2022 19:35 )  Color: Yellow / Appearance: Clear / S.010 / pH: x  Gluc: x / Ketone: Negative  / Bili: Negative / Urobili: 1 mg/dL   Blood: x / Protein: 30 mg/dL / Nitrite: Negative   Leuk Esterase: Trace / RBC: 0-2 /HPF / WBC 0-2 /HPF   Sq Epi: x / Non Sq Epi: Few / Bacteria: x    Culture - Urine (collected 2022 01:00)  Source: Clean Catch Clean Catch (Midstream)  Final Report (2022 10:30):    <10,000 CFU/mL Normal Urogenital Doris    MEDICATIONS  (STANDING):  apixaban 5 milliGRAM(s) Oral two times a day  atorvastatin 40 milliGRAM(s) Oral at bedtime  azithromycin  IVPB 500 milliGRAM(s) IV Intermittent every 24 hours  cefTRIAXone   IVPB 1000 milliGRAM(s) IV Intermittent every 24 hours  gabapentin 400 milliGRAM(s) Oral three times a day  methylPREDNISolone sodium succinate Injectable 40 milliGRAM(s) IV Push every 12 hours  metoprolol tartrate 12.5 milliGRAM(s) Oral two times a day  oxycodone    5 mG/acetaminophen 325 mG 2 Tablet(s) Oral once  tamsulosin 0.4 milliGRAM(s) Oral at bedtime    MEDICATIONS  (PRN):  HYDROmorphone   Tablet 2 milliGRAM(s) Oral every 8 hours PRN Severe Pain (7 - 10)  traMADol 50 milliGRAM(s) Oral every 8 hours PRN Moderate Pain (4 - 6)      ASSESSMENT / PLAN:  ----------------------------------------  74M with a history of COPD, lung cancer, hypertension, atrial fibrillation, coronary artery disease, anxiety, and chronic back pain with a spinal cord stimulator who presented with altered mental status and hallucinations. Chest Xray was notable for a left sided infiltrate and antibiotic were initiated for pneumonia.     Acute hypoxic respiratory failure / COPD exacerbation - On intravenous methylprednisolone and inhaled bronchodilator therapy. Hypoxia appears improved, to titrate supplemental oxygen as tolerated.    Metabolic encephalopathy - Suspect secondary to COPD, pneumonia, and polypharmacy with sertraline and hydromorphone. Mentation appears improved.    Pneumonia - On azithromycin and ceftriaxone.    Coronary artery disease - Chest pain appears musculoskeletal in origin and associated with deep inspiration. Troponin level was not elevated. On metoprolol.    Transaminitis - Not presented on admission. Atorvastatin to be held for now. Repeat studies to monitor.    Atrial fibrillation - On metoprolol with apixaban for anticoagulation.    Back pain - Noted to have a history of a spinal stimulator. Analgesic medications as needed.    Anxiety - On sertraline.

## 2022-07-09 DIAGNOSIS — J18.9 PNEUMONIA, UNSPECIFIED ORGANISM: ICD-10-CM

## 2022-07-09 DIAGNOSIS — J44.1 CHRONIC OBSTRUCTIVE PULMONARY DISEASE WITH (ACUTE) EXACERBATION: ICD-10-CM

## 2022-07-09 DIAGNOSIS — F41.9 ANXIETY DISORDER, UNSPECIFIED: ICD-10-CM

## 2022-07-09 DIAGNOSIS — Z29.9 ENCOUNTER FOR PROPHYLACTIC MEASURES, UNSPECIFIED: ICD-10-CM

## 2022-07-09 DIAGNOSIS — I48.91 UNSPECIFIED ATRIAL FIBRILLATION: ICD-10-CM

## 2022-07-09 DIAGNOSIS — Z02.9 ENCOUNTER FOR ADMINISTRATIVE EXAMINATIONS, UNSPECIFIED: ICD-10-CM

## 2022-07-09 DIAGNOSIS — R74.01 ELEVATION OF LEVELS OF LIVER TRANSAMINASE LEVELS: ICD-10-CM

## 2022-07-09 DIAGNOSIS — N40.0 BENIGN PROSTATIC HYPERPLASIA WITHOUT LOWER URINARY TRACT SYMPTOMS: ICD-10-CM

## 2022-07-09 DIAGNOSIS — M54.9 DORSALGIA, UNSPECIFIED: ICD-10-CM

## 2022-07-09 LAB
ALBUMIN SERPL ELPH-MCNC: 3.6 G/DL — SIGNIFICANT CHANGE UP (ref 3.3–5.2)
ALP SERPL-CCNC: 294 U/L — HIGH (ref 40–120)
ALT FLD-CCNC: 250 U/L — HIGH
ANION GAP SERPL CALC-SCNC: 11 MMOL/L — SIGNIFICANT CHANGE UP (ref 5–17)
AST SERPL-CCNC: 177 U/L — HIGH
BILIRUB SERPL-MCNC: 0.3 MG/DL — LOW (ref 0.4–2)
BUN SERPL-MCNC: 15.8 MG/DL — SIGNIFICANT CHANGE UP (ref 8–20)
CALCIUM SERPL-MCNC: 9.3 MG/DL — SIGNIFICANT CHANGE UP (ref 8.6–10.2)
CHLORIDE SERPL-SCNC: 101 MMOL/L — SIGNIFICANT CHANGE UP (ref 98–107)
CO2 SERPL-SCNC: 31 MMOL/L — HIGH (ref 22–29)
CREAT SERPL-MCNC: 0.47 MG/DL — LOW (ref 0.5–1.3)
EGFR: 108 ML/MIN/1.73M2 — SIGNIFICANT CHANGE UP
GLUCOSE SERPL-MCNC: 141 MG/DL — HIGH (ref 70–99)
HCT VFR BLD CALC: 35.2 % — LOW (ref 39–50)
HGB BLD-MCNC: 11.2 G/DL — LOW (ref 13–17)
MCHC RBC-ENTMCNC: 28.5 PG — SIGNIFICANT CHANGE UP (ref 27–34)
MCHC RBC-ENTMCNC: 31.8 GM/DL — LOW (ref 32–36)
MCV RBC AUTO: 89.6 FL — SIGNIFICANT CHANGE UP (ref 80–100)
PLATELET # BLD AUTO: 333 K/UL — SIGNIFICANT CHANGE UP (ref 150–400)
POTASSIUM SERPL-MCNC: 4.5 MMOL/L — SIGNIFICANT CHANGE UP (ref 3.5–5.3)
POTASSIUM SERPL-SCNC: 4.5 MMOL/L — SIGNIFICANT CHANGE UP (ref 3.5–5.3)
PROT SERPL-MCNC: 6.5 G/DL — LOW (ref 6.6–8.7)
RBC # BLD: 3.93 M/UL — LOW (ref 4.2–5.8)
RBC # FLD: 15.3 % — HIGH (ref 10.3–14.5)
SODIUM SERPL-SCNC: 143 MMOL/L — SIGNIFICANT CHANGE UP (ref 135–145)
WBC # BLD: 8.45 K/UL — SIGNIFICANT CHANGE UP (ref 3.8–10.5)
WBC # FLD AUTO: 8.45 K/UL — SIGNIFICANT CHANGE UP (ref 3.8–10.5)

## 2022-07-09 PROCEDURE — 99233 SBSQ HOSP IP/OBS HIGH 50: CPT

## 2022-07-09 RX ADMIN — HYDROMORPHONE HYDROCHLORIDE 2 MILLIGRAM(S): 2 INJECTION INTRAMUSCULAR; INTRAVENOUS; SUBCUTANEOUS at 12:28

## 2022-07-09 RX ADMIN — Medication 40 MILLIGRAM(S): at 17:30

## 2022-07-09 RX ADMIN — GABAPENTIN 400 MILLIGRAM(S): 400 CAPSULE ORAL at 05:25

## 2022-07-09 RX ADMIN — TRAMADOL HYDROCHLORIDE 50 MILLIGRAM(S): 50 TABLET ORAL at 05:25

## 2022-07-09 RX ADMIN — APIXABAN 5 MILLIGRAM(S): 2.5 TABLET, FILM COATED ORAL at 05:29

## 2022-07-09 RX ADMIN — Medication 40 MILLIGRAM(S): at 05:29

## 2022-07-09 RX ADMIN — CEFTRIAXONE 100 MILLIGRAM(S): 500 INJECTION, POWDER, FOR SOLUTION INTRAMUSCULAR; INTRAVENOUS at 18:44

## 2022-07-09 RX ADMIN — AZITHROMYCIN 255 MILLIGRAM(S): 500 TABLET, FILM COATED ORAL at 22:07

## 2022-07-09 RX ADMIN — GABAPENTIN 400 MILLIGRAM(S): 400 CAPSULE ORAL at 13:57

## 2022-07-09 RX ADMIN — GABAPENTIN 400 MILLIGRAM(S): 400 CAPSULE ORAL at 22:06

## 2022-07-09 RX ADMIN — HYDROMORPHONE HYDROCHLORIDE 2 MILLIGRAM(S): 2 INJECTION INTRAMUSCULAR; INTRAVENOUS; SUBCUTANEOUS at 13:49

## 2022-07-09 RX ADMIN — APIXABAN 5 MILLIGRAM(S): 2.5 TABLET, FILM COATED ORAL at 17:28

## 2022-07-09 RX ADMIN — Medication 12.5 MILLIGRAM(S): at 05:29

## 2022-07-09 RX ADMIN — Medication 12.5 MILLIGRAM(S): at 17:29

## 2022-07-09 RX ADMIN — TAMSULOSIN HYDROCHLORIDE 0.4 MILLIGRAM(S): 0.4 CAPSULE ORAL at 22:07

## 2022-07-09 RX ADMIN — TRAMADOL HYDROCHLORIDE 50 MILLIGRAM(S): 50 TABLET ORAL at 20:23

## 2022-07-09 NOTE — PROGRESS NOTE ADULT - ASSESSMENT
74M with a history of COPD, lung cancer, hypertension, atrial fibrillation, coronary artery disease, anxiety, and chronic back pain with a spinal cord stimulator who presented with altered mental status and hallucinations. Chest Xray was notable for a left sided infiltrate and antibiotic were initiated for pneumonia.

## 2022-07-09 NOTE — PROGRESS NOTE ADULT - SUBJECTIVE AND OBJECTIVE BOX
[facility] Division of Hospital Medicine    Chief Complaint:  back pain  SUBJECTIVE / OVERNIGHT EVENTS:    Patient denies chest pain, SOB, abd pain, N/V, fever, chills, dysuria or any other complaints except per above. All remainder ROS negative.     I&O's Summary      PHYSICAL EXAM:  Vital Signs Last 24 Hrs  T(C): 36.3 (09 Jul 2022 17:15), Max: 37.1 (09 Jul 2022 10:36)  T(F): 97.4 (09 Jul 2022 17:15), Max: 98.7 (09 Jul 2022 10:36)  HR: 95 (09 Jul 2022 17:15) (91 - 100)  BP: 138/82 (09 Jul 2022 17:15) (113/72 - 138/82)  BP(mean): --  RR: 18 (09 Jul 2022 17:15) (16 - 18)  SpO2: 95% (09 Jul 2022 17:15) (91% - 95%)    Parameters below as of 09 Jul 2022 17:15  Patient On (Oxygen Delivery Method): room air    Patient is calm and comfortable, alert and oriented, pleasant and corportative.  Patient is able speak fluently and effortless. Patient laying in bed.    CONSTITUTIONAL: NAD, well-developed, well-groomed  ENMT: Moist oral mucosa, no pharyngeal injection or exudates; normal dentition  RESPIRATORY: Normal respiratory effort; lungs are clear to auscultation bilaterally  CARDIOVASCULAR: Regular rate and rhythm, normal S1 and S2, no murmur/rub/gallop; No lower extremity edema; Peripheral pulses are 2+ bilaterally  ABDOMEN: Nontender to palpation, normoactive bowel sounds, no rebound/guarding; No hepatosplenomegaly  MUSCLOSKELETAL:  Normal gait; no clubbing or cyanosis of digits; no joint swelling or tenderness to palpation  PSYCH: A+O to person, place, and time; affect appropriate  NEUROLOGY: CN 2-12 are intact and symmetric; no gross sensory deficits;   SKIN: No rashes; no palpable lesions    LABS:                        11.2   8.45  )-----------( 333      ( 09 Jul 2022 07:53 )             35.2     07-09    143  |  101  |  15.8  ----------------------------<  141<H>  4.5   |  31.0<H>  |  0.47<L>    Ca    9.3      09 Jul 2022 07:53    TPro  6.5<L>  /  Alb  3.6  /  TBili  0.3<L>  /  DBili  x   /  AST  177<H>  /  ALT  250<H>  /  AlkPhos  294<H>  07-09      Culture - Urine (collected 07 Jul 2022 01:00)  Source: Clean Catch Clean Catch (Midstream)  Final Report (08 Jul 2022 10:30):    <10,000 CFU/mL Normal Urogenital Doris    MEDICATIONS  (STANDING):  apixaban 5 milliGRAM(s) Oral two times a day  azithromycin  IVPB 500 milliGRAM(s) IV Intermittent every 24 hours  cefTRIAXone   IVPB 1000 milliGRAM(s) IV Intermittent every 24 hours  gabapentin 400 milliGRAM(s) Oral three times a day  methylPREDNISolone sodium succinate Injectable 40 milliGRAM(s) IV Push every 12 hours  metoprolol tartrate 12.5 milliGRAM(s) Oral two times a day  tamsulosin 0.4 milliGRAM(s) Oral at bedtime    MEDICATIONS  (PRN):  HYDROmorphone   Tablet 2 milliGRAM(s) Oral every 8 hours PRN Severe Pain (7 - 10)  traMADol 50 milliGRAM(s) Oral every 8 hours PRN Moderate Pain (4 - 6)

## 2022-07-10 ENCOUNTER — NON-APPOINTMENT (OUTPATIENT)
Age: 77
End: 2022-07-10

## 2022-07-10 LAB
ALBUMIN SERPL ELPH-MCNC: 3.6 G/DL — SIGNIFICANT CHANGE UP (ref 3.3–5.2)
ALP SERPL-CCNC: 273 U/L — HIGH (ref 40–120)
ALT FLD-CCNC: 233 U/L — HIGH
ANION GAP SERPL CALC-SCNC: 11 MMOL/L — SIGNIFICANT CHANGE UP (ref 5–17)
AST SERPL-CCNC: 90 U/L — HIGH
BASOPHILS # BLD AUTO: 0.01 K/UL — SIGNIFICANT CHANGE UP (ref 0–0.2)
BASOPHILS NFR BLD AUTO: 0.1 % — SIGNIFICANT CHANGE UP (ref 0–2)
BILIRUB SERPL-MCNC: 0.4 MG/DL — SIGNIFICANT CHANGE UP (ref 0.4–2)
BUN SERPL-MCNC: 15.5 MG/DL — SIGNIFICANT CHANGE UP (ref 8–20)
CALCIUM SERPL-MCNC: 9.5 MG/DL — SIGNIFICANT CHANGE UP (ref 8.6–10.2)
CHLORIDE SERPL-SCNC: 99 MMOL/L — SIGNIFICANT CHANGE UP (ref 98–107)
CO2 SERPL-SCNC: 30 MMOL/L — HIGH (ref 22–29)
CREAT SERPL-MCNC: 0.54 MG/DL — SIGNIFICANT CHANGE UP (ref 0.5–1.3)
EGFR: 104 ML/MIN/1.73M2 — SIGNIFICANT CHANGE UP
EOSINOPHIL # BLD AUTO: 0.02 K/UL — SIGNIFICANT CHANGE UP (ref 0–0.5)
EOSINOPHIL NFR BLD AUTO: 0.2 % — SIGNIFICANT CHANGE UP (ref 0–6)
GLUCOSE SERPL-MCNC: 126 MG/DL — HIGH (ref 70–99)
HCT VFR BLD CALC: 36.2 % — LOW (ref 39–50)
HGB BLD-MCNC: 11.9 G/DL — LOW (ref 13–17)
IMM GRANULOCYTES NFR BLD AUTO: 0.6 % — SIGNIFICANT CHANGE UP (ref 0–1.5)
LYMPHOCYTES # BLD AUTO: 1.07 K/UL — SIGNIFICANT CHANGE UP (ref 1–3.3)
LYMPHOCYTES # BLD AUTO: 11.9 % — LOW (ref 13–44)
MCHC RBC-ENTMCNC: 29.7 PG — SIGNIFICANT CHANGE UP (ref 27–34)
MCHC RBC-ENTMCNC: 32.9 GM/DL — SIGNIFICANT CHANGE UP (ref 32–36)
MCV RBC AUTO: 90.3 FL — SIGNIFICANT CHANGE UP (ref 80–100)
MONOCYTES # BLD AUTO: 0.59 K/UL — SIGNIFICANT CHANGE UP (ref 0–0.9)
MONOCYTES NFR BLD AUTO: 6.6 % — SIGNIFICANT CHANGE UP (ref 2–14)
NEUTROPHILS # BLD AUTO: 7.25 K/UL — SIGNIFICANT CHANGE UP (ref 1.8–7.4)
NEUTROPHILS NFR BLD AUTO: 80.6 % — HIGH (ref 43–77)
PLATELET # BLD AUTO: 372 K/UL — SIGNIFICANT CHANGE UP (ref 150–400)
POTASSIUM SERPL-MCNC: 5.3 MMOL/L — SIGNIFICANT CHANGE UP (ref 3.5–5.3)
POTASSIUM SERPL-SCNC: 5.3 MMOL/L — SIGNIFICANT CHANGE UP (ref 3.5–5.3)
PROT SERPL-MCNC: 6.9 G/DL — SIGNIFICANT CHANGE UP (ref 6.6–8.7)
RBC # BLD: 4.01 M/UL — LOW (ref 4.2–5.8)
RBC # FLD: 15.5 % — HIGH (ref 10.3–14.5)
SODIUM SERPL-SCNC: 140 MMOL/L — SIGNIFICANT CHANGE UP (ref 135–145)
WBC # BLD: 8.99 K/UL — SIGNIFICANT CHANGE UP (ref 3.8–10.5)
WBC # FLD AUTO: 8.99 K/UL — SIGNIFICANT CHANGE UP (ref 3.8–10.5)

## 2022-07-10 PROCEDURE — 99233 SBSQ HOSP IP/OBS HIGH 50: CPT

## 2022-07-10 RX ORDER — CEFPODOXIME PROXETIL 100 MG
200 TABLET ORAL EVERY 12 HOURS
Refills: 0 | Status: DISCONTINUED | OUTPATIENT
Start: 2022-07-10 | End: 2022-07-11

## 2022-07-10 RX ADMIN — Medication 40 MILLIGRAM(S): at 17:29

## 2022-07-10 RX ADMIN — HYDROMORPHONE HYDROCHLORIDE 2 MILLIGRAM(S): 2 INJECTION INTRAMUSCULAR; INTRAVENOUS; SUBCUTANEOUS at 06:13

## 2022-07-10 RX ADMIN — TRAMADOL HYDROCHLORIDE 50 MILLIGRAM(S): 50 TABLET ORAL at 20:06

## 2022-07-10 RX ADMIN — TRAMADOL HYDROCHLORIDE 50 MILLIGRAM(S): 50 TABLET ORAL at 20:52

## 2022-07-10 RX ADMIN — GABAPENTIN 400 MILLIGRAM(S): 400 CAPSULE ORAL at 21:51

## 2022-07-10 RX ADMIN — Medication 12.5 MILLIGRAM(S): at 17:26

## 2022-07-10 RX ADMIN — Medication 40 MILLIGRAM(S): at 06:14

## 2022-07-10 RX ADMIN — Medication 12.5 MILLIGRAM(S): at 06:13

## 2022-07-10 RX ADMIN — APIXABAN 5 MILLIGRAM(S): 2.5 TABLET, FILM COATED ORAL at 06:13

## 2022-07-10 RX ADMIN — HYDROMORPHONE HYDROCHLORIDE 2 MILLIGRAM(S): 2 INJECTION INTRAMUSCULAR; INTRAVENOUS; SUBCUTANEOUS at 14:14

## 2022-07-10 RX ADMIN — GABAPENTIN 400 MILLIGRAM(S): 400 CAPSULE ORAL at 06:13

## 2022-07-10 RX ADMIN — GABAPENTIN 400 MILLIGRAM(S): 400 CAPSULE ORAL at 13:49

## 2022-07-10 RX ADMIN — CEFTRIAXONE 100 MILLIGRAM(S): 500 INJECTION, POWDER, FOR SOLUTION INTRAMUSCULAR; INTRAVENOUS at 17:29

## 2022-07-10 RX ADMIN — APIXABAN 5 MILLIGRAM(S): 2.5 TABLET, FILM COATED ORAL at 17:26

## 2022-07-10 RX ADMIN — TAMSULOSIN HYDROCHLORIDE 0.4 MILLIGRAM(S): 0.4 CAPSULE ORAL at 21:51

## 2022-07-10 RX ADMIN — HYDROMORPHONE HYDROCHLORIDE 2 MILLIGRAM(S): 2 INJECTION INTRAMUSCULAR; INTRAVENOUS; SUBCUTANEOUS at 16:08

## 2022-07-10 NOTE — PATIENT PROFILE ADULT - NSPROPOAPRESSUREINJURY_GEN_A_NUR
Message   Recorded as Task   Date: 01/18/2017 01:49 PM, Created By: Mac Chang   Task Name: Result Follow Up   Assigned To: Brody Ma   Regarding Patient: Vane Fuller, Status: Active   CommentRobina Gonzales - 18 Jan 2017 1:49 PM     TASK CREATED  Urine culture and urinalysis neg  Please notify patient   Thanks! 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)   4  Pelvic pain in female (625 9) (R10 2)   5  Right rotator cuff tear (840 4) (M75 101)   6  Symptoms involving urinary system (788 99) (R39 9)   7  Urinary frequency (788 41) (R35 0)   8  Urinary hesitancy (788 64) (R39 11)    Current Meds   1  Acidophilus CAPS; Therapy: (Recorded:31Oct2013) to Recorded   2  DiphenhydrAMINE HCl - 25 MG Oral Capsule; Therapy: (Recorded:31Oct2013) to Recorded   3  Niacin  MG Oral Capsule Extended Release; Therapy: (Recorded:31Oct2013) to Recorded    Allergies    1  Penicillins    Signatures   Electronically signed by :  Bindu Gipson, ; Jan 18 2017  1:55PM EST                       (Author)
no

## 2022-07-10 NOTE — PROGRESS NOTE ADULT - SUBJECTIVE AND OBJECTIVE BOX
[facility] Division of Hospital Medicine    Chief Complaint:  SOB, back pain    SUBJECTIVE / OVERNIGHT EVENTS:    Patient denies chest pain,  abd pain, N/V, fever, chills, dysuria or any other complaints. All remainder ROS negative.     PHYSICAL EXAM:  Vital Signs Last 24 Hrs  T(C): 36.8 (10 Jul 2022 17:53), Max: 36.9 (10 Jul 2022 09:46)  T(F): 98.3 (10 Jul 2022 17:53), Max: 98.5 (10 Jul 2022 09:46)  HR: 110 (10 Jul 2022 17:53) (109 - 110)  BP: 131/85 (10 Jul 2022 17:53) (131/85 - 134/84)  BP(mean): --  RR: 18 (10 Jul 2022 17:53) (18 - 18)  SpO2: 94% (10 Jul 2022 17:53) (93% - 94%)    Parameters below as of 10 Jul 2022 17:53  Patient On (Oxygen Delivery Method): room air      Patient is calm and comfortable, alert and oriented, pleasant and corportative.  Patient is able speak fluently and effortless. Patient is laying in  bed.  CONSTITUTIONAL: NAD, well-developed, well-groomed  ENMT: Moist oral mucosa, no pharyngeal injection or exudates; normal dentition  RESPIRATORY: Normal respiratory effort; lungs are clear to auscultation bilaterally  CARDIOVASCULAR: Regular rate and rhythm, normal S1 and S2, no murmur/rub/gallop; No lower extremity edema; Peripheral pulses are 2+ bilaterally  ABDOMEN: Nontender to palpation, normoactive bowel sounds, no rebound/guarding; No hepatosplenomegaly  MUSCLOSKELETAL:  Normal gait; no clubbing or cyanosis of digits; no joint swelling or tenderness to palpation  PSYCH: A+O to person, place, and time; affect appropriate  NEUROLOGY: CN 2-12 are intact and symmetric; no gross sensory deficits;   SKIN: No rashes; no palpable lesions    LABS:                        11.9   8.99  )-----------( 372      ( 10 Jul 2022 07:48 )             36.2     07-10    140  |  99  |  15.5  ----------------------------<  126<H>  5.3   |  30.0<H>  |  0.54    Ca    9.5      10 Jul 2022 07:48    TPro  6.9  /  Alb  3.6  /  TBili  0.4  /  DBili  x   /  AST  90<H>  /  ALT  233<H>  /  AlkPhos  273<H>  07-10      RADIOLOGY & ADDITIONAL TESTS:  Results Reviewed:   Imaging Personally Reviewed:  Electrocardiogram Personally Reviewed:    MEDICATIONS  (STANDING):  apixaban 5 milliGRAM(s) Oral two times a day  azithromycin  IVPB 500 milliGRAM(s) IV Intermittent every 24 hours  cefTRIAXone   IVPB 1000 milliGRAM(s) IV Intermittent every 24 hours  gabapentin 400 milliGRAM(s) Oral three times a day  methylPREDNISolone sodium succinate Injectable 40 milliGRAM(s) IV Push every 12 hours  metoprolol tartrate 12.5 milliGRAM(s) Oral two times a day  tamsulosin 0.4 milliGRAM(s) Oral at bedtime    MEDICATIONS  (PRN):  HYDROmorphone   Tablet 2 milliGRAM(s) Oral every 8 hours PRN Severe Pain (7 - 10)  traMADol 50 milliGRAM(s) Oral every 8 hours PRN Moderate Pain (4 - 6)

## 2022-07-11 ENCOUNTER — APPOINTMENT (OUTPATIENT)
Dept: ELECTROPHYSIOLOGY | Facility: CLINIC | Age: 77
End: 2022-07-11

## 2022-07-11 ENCOUNTER — TRANSCRIPTION ENCOUNTER (OUTPATIENT)
Age: 77
End: 2022-07-11

## 2022-07-11 ENCOUNTER — NON-APPOINTMENT (OUTPATIENT)
Age: 77
End: 2022-07-11

## 2022-07-11 DIAGNOSIS — J44.9 CHRONIC OBSTRUCTIVE PULMONARY DISEASE, UNSPECIFIED: ICD-10-CM

## 2022-07-11 LAB
ALBUMIN SERPL ELPH-MCNC: 3.9 G/DL — SIGNIFICANT CHANGE UP (ref 3.3–5.2)
ALP SERPL-CCNC: 247 U/L — HIGH (ref 40–120)
ALT FLD-CCNC: 161 U/L — HIGH
ANION GAP SERPL CALC-SCNC: 14 MMOL/L — SIGNIFICANT CHANGE UP (ref 5–17)
AST SERPL-CCNC: 32 U/L — SIGNIFICANT CHANGE UP
BILIRUB SERPL-MCNC: 0.3 MG/DL — LOW (ref 0.4–2)
BUN SERPL-MCNC: 18.4 MG/DL — SIGNIFICANT CHANGE UP (ref 8–20)
CALCIUM SERPL-MCNC: 9.7 MG/DL — SIGNIFICANT CHANGE UP (ref 8.6–10.2)
CHLORIDE SERPL-SCNC: 100 MMOL/L — SIGNIFICANT CHANGE UP (ref 98–107)
CO2 SERPL-SCNC: 28 MMOL/L — SIGNIFICANT CHANGE UP (ref 22–29)
CREAT SERPL-MCNC: 0.65 MG/DL — SIGNIFICANT CHANGE UP (ref 0.5–1.3)
EGFR: 98 ML/MIN/1.73M2 — SIGNIFICANT CHANGE UP
GLUCOSE SERPL-MCNC: 130 MG/DL — HIGH (ref 70–99)
POTASSIUM SERPL-MCNC: 4.1 MMOL/L — SIGNIFICANT CHANGE UP (ref 3.5–5.3)
POTASSIUM SERPL-SCNC: 4.1 MMOL/L — SIGNIFICANT CHANGE UP (ref 3.5–5.3)
PROT SERPL-MCNC: 7.1 G/DL — SIGNIFICANT CHANGE UP (ref 6.6–8.7)
SODIUM SERPL-SCNC: 142 MMOL/L — SIGNIFICANT CHANGE UP (ref 135–145)

## 2022-07-11 PROCEDURE — 93298 REM INTERROG DEV EVAL SCRMS: CPT

## 2022-07-11 PROCEDURE — 93010 ELECTROCARDIOGRAM REPORT: CPT

## 2022-07-11 PROCEDURE — 99223 1ST HOSP IP/OBS HIGH 75: CPT

## 2022-07-11 PROCEDURE — 99233 SBSQ HOSP IP/OBS HIGH 50: CPT

## 2022-07-11 PROCEDURE — G2066: CPT

## 2022-07-11 RX ORDER — LEVALBUTEROL 1.25 MG/.5ML
0.63 SOLUTION, CONCENTRATE RESPIRATORY (INHALATION) EVERY 6 HOURS
Refills: 0 | Status: DISCONTINUED | OUTPATIENT
Start: 2022-07-11 | End: 2022-07-13

## 2022-07-11 RX ORDER — DILTIAZEM HCL 120 MG
10 CAPSULE, EXT RELEASE 24 HR ORAL ONCE
Refills: 0 | Status: COMPLETED | OUTPATIENT
Start: 2022-07-11 | End: 2022-07-11

## 2022-07-11 RX ORDER — TIOTROPIUM BROMIDE 18 UG/1
1 CAPSULE ORAL; RESPIRATORY (INHALATION) DAILY
Refills: 0 | Status: DISCONTINUED | OUTPATIENT
Start: 2022-07-11 | End: 2022-07-13

## 2022-07-11 RX ORDER — CEFTRIAXONE 500 MG/1
1000 INJECTION, POWDER, FOR SOLUTION INTRAMUSCULAR; INTRAVENOUS EVERY 24 HOURS
Refills: 0 | Status: DISCONTINUED | OUTPATIENT
Start: 2022-07-11 | End: 2022-07-13

## 2022-07-11 RX ORDER — DILTIAZEM HCL 120 MG
60 CAPSULE, EXT RELEASE 24 HR ORAL EVERY 12 HOURS
Refills: 0 | Status: DISCONTINUED | OUTPATIENT
Start: 2022-07-11 | End: 2022-07-12

## 2022-07-11 RX ORDER — BUDESONIDE AND FORMOTEROL FUMARATE DIHYDRATE 160; 4.5 UG/1; UG/1
2 AEROSOL RESPIRATORY (INHALATION)
Refills: 0 | Status: DISCONTINUED | OUTPATIENT
Start: 2022-07-11 | End: 2022-07-13

## 2022-07-11 RX ADMIN — TAMSULOSIN HYDROCHLORIDE 0.4 MILLIGRAM(S): 0.4 CAPSULE ORAL at 21:48

## 2022-07-11 RX ADMIN — HYDROMORPHONE HYDROCHLORIDE 2 MILLIGRAM(S): 2 INJECTION INTRAMUSCULAR; INTRAVENOUS; SUBCUTANEOUS at 13:34

## 2022-07-11 RX ADMIN — HYDROMORPHONE HYDROCHLORIDE 2 MILLIGRAM(S): 2 INJECTION INTRAMUSCULAR; INTRAVENOUS; SUBCUTANEOUS at 21:45

## 2022-07-11 RX ADMIN — GABAPENTIN 400 MILLIGRAM(S): 400 CAPSULE ORAL at 13:03

## 2022-07-11 RX ADMIN — APIXABAN 5 MILLIGRAM(S): 2.5 TABLET, FILM COATED ORAL at 05:04

## 2022-07-11 RX ADMIN — Medication 12.5 MILLIGRAM(S): at 17:23

## 2022-07-11 RX ADMIN — HYDROMORPHONE HYDROCHLORIDE 2 MILLIGRAM(S): 2 INJECTION INTRAMUSCULAR; INTRAVENOUS; SUBCUTANEOUS at 04:25

## 2022-07-11 RX ADMIN — HYDROMORPHONE HYDROCHLORIDE 2 MILLIGRAM(S): 2 INJECTION INTRAMUSCULAR; INTRAVENOUS; SUBCUTANEOUS at 12:34

## 2022-07-11 RX ADMIN — Medication 200 MILLIGRAM(S): at 05:03

## 2022-07-11 RX ADMIN — HYDROMORPHONE HYDROCHLORIDE 2 MILLIGRAM(S): 2 INJECTION INTRAMUSCULAR; INTRAVENOUS; SUBCUTANEOUS at 04:52

## 2022-07-11 RX ADMIN — Medication 10 MILLIGRAM(S): at 18:30

## 2022-07-11 RX ADMIN — Medication 200 MILLIGRAM(S): at 17:24

## 2022-07-11 RX ADMIN — Medication 60 MILLIGRAM(S): at 21:50

## 2022-07-11 RX ADMIN — BUDESONIDE AND FORMOTEROL FUMARATE DIHYDRATE 2 PUFF(S): 160; 4.5 AEROSOL RESPIRATORY (INHALATION) at 20:53

## 2022-07-11 RX ADMIN — HYDROMORPHONE HYDROCHLORIDE 2 MILLIGRAM(S): 2 INJECTION INTRAMUSCULAR; INTRAVENOUS; SUBCUTANEOUS at 20:36

## 2022-07-11 RX ADMIN — Medication 12.5 MILLIGRAM(S): at 05:03

## 2022-07-11 RX ADMIN — GABAPENTIN 400 MILLIGRAM(S): 400 CAPSULE ORAL at 21:48

## 2022-07-11 RX ADMIN — Medication 50 MILLIGRAM(S): at 05:04

## 2022-07-11 RX ADMIN — CEFTRIAXONE 100 MILLIGRAM(S): 500 INJECTION, POWDER, FOR SOLUTION INTRAMUSCULAR; INTRAVENOUS at 18:31

## 2022-07-11 RX ADMIN — GABAPENTIN 400 MILLIGRAM(S): 400 CAPSULE ORAL at 05:03

## 2022-07-11 RX ADMIN — APIXABAN 5 MILLIGRAM(S): 2.5 TABLET, FILM COATED ORAL at 17:23

## 2022-07-11 NOTE — DISCHARGE NOTE PROVIDER - NSDCCPCAREPLAN_GEN_ALL_CORE_FT
PRINCIPAL DISCHARGE DIAGNOSIS  Diagnosis: Acute respiratory failure with hypoxia  Assessment and Plan of Treatment:       SECONDARY DISCHARGE DIAGNOSES  Diagnosis: Pneumonia  Assessment and Plan of Treatment:     Diagnosis: COPD exacerbation  Assessment and Plan of Treatment:     Diagnosis: Back pain  Assessment and Plan of Treatment:     Diagnosis: Transaminitis  Assessment and Plan of Treatment:     Diagnosis: Afib  Assessment and Plan of Treatment:

## 2022-07-11 NOTE — DISCHARGE NOTE PROVIDER - HOSPITAL COURSE
74M with a history of COPD, lung cancer, hypertension, atrial fibrillation, coronary artery disease, anxiety, and chronic back pain with a spinal cord stimulator who presented with altered mental status and hallucinations. Chest Xray was notable for a left sided infiltrate and antibiotic were initiated for pneumonia.   Pulmonology consulted. Antibiotics were switched  to IV rocephin. O2 requirements much improved, on RA. Has home O2. COPD exacerbation improved IV steroids switched to PO.  Transaminitis/Cholestatic enzymes elevated, were normal on admission, no sig GB stone seen on CT chest, check hepatitis panel and f/u on abdominal US.  off statin for now. AFIB with rate control on Metoprolol and Eliquis.           Disposition: Stable for DC Home 74M with a history of COPD, lung cancer, hypertension, atrial fibrillation, coronary artery disease, anxiety, and chronic back pain with a spinal cord stimulator who presented with altered mental status and hallucinations. Chest Xray was notable for a left sided infiltrate and antibiotic were initiated for pneumonia.   Pulmonology consulted. Antibiotics were switched  to IV rocephin. O2 requirements much improved, on RA. Has home O2. COPD exacerbation improved IV steroids switched to PO.  Transaminitis/Cholestatic enzymes elevated, were normal on admission, no sig GB stone seen on CT chest, check hepatitis panel and f/u on abdominal US.  off statin for now. AFIB with rate control on Metoprolol and Eliquis. Patient was tested positive today for COVID-19 but is asymptomatic.    Disposition: Stable for DC Home

## 2022-07-11 NOTE — DISCHARGE NOTE PROVIDER - NSDCFUSCHEDAPPT_GEN_ALL_CORE_FT
Danika Deleon  [facility]  CARDIOLOGY [address]  Scheduled Appointment: 07/15/2022    Jassi Fink  [facility]  Cardiology [address]  Scheduled Appointment: 08/30/2022    [facility]  CATSCAN  [address]  Scheduled Appointment: 09/01/2022    Emanuel Tapia  [facility]  Rad Med [address]  Scheduled Appointment: 09/13/2022    Elvin Mayorga  [facility]  CardioElectro [address]  Scheduled Appointment: 09/22/2022

## 2022-07-11 NOTE — CONSULT NOTE ADULT - SUBJECTIVE AND OBJECTIVE BOX
PULMONARY CONSULT NOTE      JENNIFER MOOREN-[id number]    Patient is a 77y old  Male who presents with a chief complaint of SOB (10 Jul 2022 18:15)  75 yo male with history of severe COPD on home O2 of 3LPM (follows with Dr. Fuller), multiple lung nodules with RUL lung nodule that was treated as malignant (stage Ia1) with radiation in 11/2021 (follow up CT scan in 4/2022 showed stable lesion), ex-smoker, ex alcoholic (last drink 5 years ago), HTN, HLD, paroxysmal a-fib on eliquis, CAD with CABG in 3/2020, chronic back pain from fall in 2016 with spinal cord stimulator and chronic opioid use, anxiety (recently started on zoloft) who presents from home today with altered mental status, was hallucinating and not making sense when talking according to his wife. Patient was supposed to see cardiologist for follow up for a recent ED visit/observation but was brought here. The ambulance noted that he was hypoxic and they placed him on NRB. In the ED here was placed on 6 LPM.    Work up so far shows leukocytosis with new LLL infiltrate on CXR. Patient was with sinus tachycardia and underwent CTA which showed no clot and scattered pulmonary infiltrate. COVID negative. Medicine called to admit.    Patient wife concerned off overdose as patient often will take 300mg of his zoloft when having an 'episode of anxiety'. he has done this several times since starting this medication he reports. At bedside is his daughter who states that her mother has been calling to report these similar behavior changes to today for the past few months. Patient additionally takes tramadol, dilaudid at home for chronic pain of the lower back. U tox was positive for opioids and methadone.    On 7/1 patient was here for chest pain and SOB and dizziness. At that time was placed in obs and seen by cardiology who recommended echo which was normal. He had a CTA head and neck done by the ED which showed some carotid stenosis confirmed on US of the carotid however not thought to correlate with symptoms. Of note patient has had a loop recorder in place since hospital visit of last year when he was here for pneumonia, was placed due to episode of 'elevated HR' as per patient. Patient reports he is still having stabbing chest pain located just off the center of his chest to the left without radiation, lasts only a second, no other associated symptoms. At this time denies increased cough, SOB, lightheadedness, dizziness. Denies leg pain, swelling. Denies abdominal pain, constipation/diarrhea.    In the ED he received azithromycin and ceftriaxone. Medicine called to admit for pneumonia, chest pain and episodic AMS.      HISTORY OF PRESENT ILLNESS:  no cp or SOB  minimal sputum  feels heart beating fast  MEDICATIONS  (STANDING):  apixaban 5 milliGRAM(s) Oral two times a day  cefpodoxime 200 milliGRAM(s) Oral every 12 hours  gabapentin 400 milliGRAM(s) Oral three times a day  metoprolol tartrate 12.5 milliGRAM(s) Oral two times a day  predniSONE   Tablet 50 milliGRAM(s) Oral daily  tamsulosin 0.4 milliGRAM(s) Oral at bedtime      MEDICATIONS  (PRN):  HYDROmorphone   Tablet 2 milliGRAM(s) Oral every 8 hours PRN Severe Pain (7 - 10)  traMADol 50 milliGRAM(s) Oral every 8 hours PRN Moderate Pain (4 - 6)      Allergies    codeine (Urticaria)  ibuprofen (Anaphylaxis)    Intolerances    amoxicillin (Diarrhea)      PAST MEDICAL & SURGICAL HISTORY:  CAD in native artery  RCA 3 YAYA 1 in 2002 and 2 in 2019      Arthritis      Lumbosacral disc disease  has nerve stimulator      HTN (hypertension)      HLD (hyperlipidemia)      Lung cancer  Stage I a1; RUL; s/p SBRT in 11/2021; stable disease as of 4/2022      Paroxysmal atrial fibrillation      COPD (chronic obstructive pulmonary disease) with emphysema      H/O heart artery stent  RCA      S/P cataract surgery  b/l eyes 2016      S/P CABG (coronary artery bypass graft)          FAMILY HISTORY:  FH: colon cancer        SOCIAL HISTORY  Smoking History:     REVIEW OF SYSTEMS:    CONSTITUTIONAL:  No fevers, chills, sweats    HEENT:  Eyes:  No diplopia or blurred vision. ENT:  No earache, sore throat or runny nose.    CARDIOVASCULAR:  No pressure, squeezing, tightness, or heaviness about the chest; no palpitations.    RESPIRATORY:  see HPI    GASTROINTESTINAL:  No abdominal pain, nausea, vomiting or diarrhea.    GENITOURINARY:  No dysuria, frequency or urgency.    NEUROLOGIC:  No paresthesias, fasciculations, seizures or weakness.    PSYCHIATRIC:  No disorder of thought or mood.    Vital Signs Last 24 Hrs  T(C): 36.9 (11 Jul 2022 16:36), Max: 36.9 (11 Jul 2022 16:36)  T(F): 98.4 (11 Jul 2022 16:36), Max: 98.4 (11 Jul 2022 16:36)  HR: 127 (11 Jul 2022 16:36) (85 - 127)  BP: 142/92 (11 Jul 2022 16:36) (122/79 - 152/87)  BP(mean): --  RR: 18 (11 Jul 2022 16:36) (18 - 18)  SpO2: 95% (11 Jul 2022 16:36) (94% - 96%)    Parameters below as of 11 Jul 2022 16:36  Patient On (Oxygen Delivery Method): room air        PHYSICAL EXAMINATION:    GENERAL: The patient is_in no apparent distress.     HEENT: Head is normocephalic and atraumatic. Extraocular muscles are intact. Mucous membranes are moist.     NECK: Supple.     LUNGS: moderate air  entry bilat  without wheezing, rales, or rhonchi. Respirations unlabored    HEART: Regular rate and rhythm without murmur.    ABDOMEN: Soft, nontender, and nondistended.  No hepatosplenomegaly is noted.    EXTREMITIES: Without any cyanosis, clubbing, rash, lesions or edema.    NEUROLOGIC: Grossly intact.      LABS:                        11.9   8.99  )-----------( 372      ( 10 Jul 2022 07:48 )             36.2     07-10    140  |  99  |  15.5  ----------------------------<  126<H>  5.3   |  30.0<H>  |  0.54    Ca    9.5      10 Jul 2022 07:48    TPro  6.9  /  Alb  3.6  /  TBili  0.4  /  DBili  x   /  AST  90<H>  /  ALT  233<H>  /  AlkPhos  273<H>  07-10                        MICROBIOLOGY:    RADIOLOGY & ADDITIONAL STUDIES:  CXR and CT scan reviewed

## 2022-07-11 NOTE — DISCHARGE NOTE PROVIDER - NSDCMRMEDTOKEN_GEN_ALL_CORE_FT
Anoro Ellipta 62.5 mcg-25 mcg/inh inhalation powder: 1 puff(s) inhaled once a day  aspirin 81 mg oral tablet, chewable: 1 tab(s) orally once a day MDD:1  atorvastatin 40 mg oral tablet: 1 tab(s) orally once a day (at bedtime)  cholecalciferol 5000 intl units (125 mcg) oral tablet: 1 tab(s) orally once a day  Eliquis 5 mg oral tablet: 1 tab(s) orally 2 times a day  Flomax 0.4 mg oral capsule: 1 cap(s) orally once a day  gabapentin 400 mg oral capsule: 1 cap(s) orally 3 times a day  Ginkgo oral tablet:   HYDROmorphone 2 mg oral tablet: 1 tab(s) orally every 8 hours, As Needed  metoprolol tartrate 25 mg oral tablet: 0.5 tab(s) orally 2 times a day  Percocet 10/325 oral tablet: 1 tab(s) orally 3 times a day, As Needed  traMADol 100 mg/24 hours oral tablet, extended release: 1 tab(s) orally once a day   Anoro Ellipta 62.5 mcg-25 mcg/inh inhalation powder: 1 puff(s) inhaled once a day  aspirin 81 mg oral tablet, chewable: 1 tab(s) orally once a day MDD:1  atorvastatin 40 mg oral tablet: 1 tab(s) orally once a day (at bedtime)  cholecalciferol 5000 intl units (125 mcg) oral tablet: 1 tab(s) orally once a day  Eliquis 5 mg oral tablet: 1 tab(s) orally 2 times a day  Flomax 0.4 mg oral capsule: 1 cap(s) orally once a day  gabapentin 400 mg oral capsule: 1 cap(s) orally 3 times a day  Ginkgo oral tablet:   HYDROmorphone 2 mg oral tablet: 1 tab(s) orally every 8 hours, As Needed  levoFLOXacin 750 mg oral tablet: 1 tab(s) orally every 24 hours   metoprolol tartrate 25 mg oral tablet: 0.5 tab(s) orally 2 times a day  Metoprolol Tartrate 25 mg oral tablet: 1 tab(s) orally 2 times a day  Percocet 10/325 oral tablet: 1 tab(s) orally 3 times a day, As Needed  predniSONE 20 mg oral tablet: 1 tab(s) orally once a day  traMADol 100 mg/24 hours oral tablet, extended release: 1 tab(s) orally once a day

## 2022-07-11 NOTE — DISCHARGE NOTE PROVIDER - CARE PROVIDERS DIRECT ADDRESSES
[email or web address]

## 2022-07-11 NOTE — CONSULT NOTE ADULT - ASSESSMENT
Severe COPD with Patchy infiltrates predominantly  L base   Hx PAF, on full AC , CAD, post SBRT for increasing lung nodule 11/21 - clinical lung Ca  Currently tachycardic, denies any chest pain, sp02 94-96% room air ( has home 02)  Spoke with medical team , needs monitored bed, rate  control, troponin-Cardiology input  Spiriva and symbicort, prn nebs  Mucinex  Cholestatic enzymes elevated, were normal on admission, no sig GB stone seen on CT chest, ? Vantin related  Change to IV rocephin  Keep HOB elevated, avoid late night eating , prognosis guarded   Severe COPD with Patchy infiltrates predominantly  L base   Hx PAF, on full AC , CAD, post SBRT for increasing lung nodule 11/21 - clinical lung Ca  Currently tachycardic, denies any chest pain, sp02 94-96% room air ( has home 02)  Spoke with medical team , needs monitored bed, rate  control, troponin-Cardiology input  Spiriva and symbicort, prn nebs  Mucinex  Cholestatic enzymes elevated, were normal on admission, no sig GB stone seen on CT chest, ? Vantin related  RUQ sono  Change to IV rocephin, spoke with pharmacy  Keep HOB elevated, avoid late night eating , prognosis guarded   Severe COPD with Patchy infiltrates predominantly  L base   Hx PAF, on full AC , CAD, post SBRT for increasing lung nodule 11/21 - clinical lung Ca  Currently tachycardic, denies any chest pain, sp02 94-96% room air ( has home 02), which is much improved from admission  Spoke with medical team , needs monitored bed, rate  control, troponin-Cardiology input  Spiriva and symbicort, prn nebs  Mucinex  Cholestatic enzymes elevated, were normal on admission, no sig GB stone seen on CT chest, ? Vantin related  RUQ sono  Change to IV rocephin, spoke with pharmacy  Keep HOB elevated, avoid late night eating , prognosis guarded

## 2022-07-11 NOTE — CHART NOTE - NSCHARTNOTEFT_GEN_A_CORE
Asked by CCC to speak with the family. Pt. and the wife is updated about plan of care.   Re-called Pulmonology consult. Pt's wife requested hospitalist not to DC her  before clearance from Pulmonology.  Pt. was tapered down to PO Prednisone, doesn't require any O2, and feels better today.  Pt states that they live far from the hospital and that when cleared pt. needs to be dced before it gets dark.  All questions answered to the pt's satisfaction. Emotional support offered.  the above discussed with the hospitalist and agreed upon the plan.

## 2022-07-11 NOTE — DISCHARGE NOTE PROVIDER - PROVIDER TOKENS
PROVIDER:[TOKEN:[[id number]],FOLLOWUP:[2 weeks]],PROVIDER:[TOKEN:[[id number]],FOLLOWUP:[2 weeks]],PROVIDER:[TOKEN:[[id number]],FOLLOWUP:[2 weeks]]

## 2022-07-11 NOTE — CONSULT NOTE ADULT - TIME BILLING
greater than 50% of time spent reviewing labs, notes, orders and radiographs, coordinating care  discussed with pt, family at bedside, PMD called

## 2022-07-11 NOTE — DISCHARGE NOTE PROVIDER - ATTENDING DISCHARGE PHYSICAL EXAMINATION:
Today patient states that he is feeling better. States that his dizziness has resolved.  Denies fevers, chills, nausea, vomiting, chest pain, sob, palpitations, abdominal pain, diarrhea, dysuria. All other ROS negative     SUBJECTIVE & OBJECTIVE: Pt seen and examined at bedside.   PHYSICAL EXAM:  Vital Signs Last 24 Hrs  T(C): 36.7 (13 Jul 2022 10:35), Max: 36.9 (12 Jul 2022 20:12)  T(F): 98 (13 Jul 2022 10:35), Max: 98.5 (12 Jul 2022 20:12)  HR: 91 (13 Jul 2022 10:35) (83 - 113)  BP: 100/65 (13 Jul 2022 10:35) (100/65 - 144/80)  BP(mean): --  RR: 18 (13 Jul 2022 10:35) (18 - 18)  SpO2: 92% (13 Jul 2022 10:35) (92% - 97%)    Parameters below as of 13 Jul 2022 10:35  Patient On (Oxygen Delivery Method): room air       Daily     Daily I&O's Detail    GENERAL: NAD, well-groomed, well-developed  HEAD:  Atraumatic, Normocephalic  EYES: EOMI, PERRLA, conjunctiva and sclera clear  ENMT: Moist mucous membranes  NECK: Supple, No JVD  NERVOUS SYSTEM:  Alert & Oriented X3, Motor Strength 5/5 B/L upper and lower extremities; DTRs 2+ intact and symmetric  CHEST/LUNG: Clear to auscultation bilaterally; No rales, rhonchi, wheezing, or rubs  HEART: Regular rate and rhythm; No murmurs, rubs, or gallops  ABDOMEN: Soft, Nontender, Nondistended; Bowel sounds present  EXTREMITIES:  2+ Peripheral Pulses, No clubbing, cyanosis, or edema  LABS:  CAPILLARY BLOOD GLUCOSE

## 2022-07-12 LAB
HAV IGM SER-ACNC: SIGNIFICANT CHANGE UP
HBV CORE IGM SER-ACNC: SIGNIFICANT CHANGE UP
HBV SURFACE AG SER-ACNC: SIGNIFICANT CHANGE UP
HCV AB S/CO SERPL IA: 0.11 S/CO — SIGNIFICANT CHANGE UP (ref 0–0.99)
HCV AB SERPL-IMP: SIGNIFICANT CHANGE UP

## 2022-07-12 PROCEDURE — 76705 ECHO EXAM OF ABDOMEN: CPT | Mod: 26

## 2022-07-12 PROCEDURE — 99233 SBSQ HOSP IP/OBS HIGH 50: CPT

## 2022-07-12 PROCEDURE — 99222 1ST HOSP IP/OBS MODERATE 55: CPT

## 2022-07-12 RX ORDER — METOPROLOL TARTRATE 50 MG
5 TABLET ORAL EVERY 4 HOURS
Refills: 0 | Status: DISCONTINUED | OUTPATIENT
Start: 2022-07-12 | End: 2022-07-13

## 2022-07-12 RX ORDER — METOPROLOL TARTRATE 50 MG
25 TABLET ORAL
Refills: 0 | Status: DISCONTINUED | OUTPATIENT
Start: 2022-07-12 | End: 2022-07-13

## 2022-07-12 RX ORDER — SODIUM CHLORIDE 9 MG/ML
1000 INJECTION INTRAMUSCULAR; INTRAVENOUS; SUBCUTANEOUS ONCE
Refills: 0 | Status: COMPLETED | OUTPATIENT
Start: 2022-07-12 | End: 2022-07-12

## 2022-07-12 RX ADMIN — SODIUM CHLORIDE 1000 MILLILITER(S): 9 INJECTION INTRAMUSCULAR; INTRAVENOUS; SUBCUTANEOUS at 12:09

## 2022-07-12 RX ADMIN — HYDROMORPHONE HYDROCHLORIDE 2 MILLIGRAM(S): 2 INJECTION INTRAMUSCULAR; INTRAVENOUS; SUBCUTANEOUS at 05:26

## 2022-07-12 RX ADMIN — APIXABAN 5 MILLIGRAM(S): 2.5 TABLET, FILM COATED ORAL at 18:11

## 2022-07-12 RX ADMIN — Medication 25 MILLIGRAM(S): at 18:11

## 2022-07-12 RX ADMIN — CEFTRIAXONE 100 MILLIGRAM(S): 500 INJECTION, POWDER, FOR SOLUTION INTRAMUSCULAR; INTRAVENOUS at 18:11

## 2022-07-12 RX ADMIN — GABAPENTIN 400 MILLIGRAM(S): 400 CAPSULE ORAL at 05:27

## 2022-07-12 RX ADMIN — TRAMADOL HYDROCHLORIDE 50 MILLIGRAM(S): 50 TABLET ORAL at 15:29

## 2022-07-12 RX ADMIN — Medication 25 MILLIGRAM(S): at 12:10

## 2022-07-12 RX ADMIN — TRAMADOL HYDROCHLORIDE 50 MILLIGRAM(S): 50 TABLET ORAL at 16:30

## 2022-07-12 RX ADMIN — HYDROMORPHONE HYDROCHLORIDE 2 MILLIGRAM(S): 2 INJECTION INTRAMUSCULAR; INTRAVENOUS; SUBCUTANEOUS at 06:05

## 2022-07-12 RX ADMIN — APIXABAN 5 MILLIGRAM(S): 2.5 TABLET, FILM COATED ORAL at 05:26

## 2022-07-12 RX ADMIN — BUDESONIDE AND FORMOTEROL FUMARATE DIHYDRATE 2 PUFF(S): 160; 4.5 AEROSOL RESPIRATORY (INHALATION) at 20:08

## 2022-07-12 RX ADMIN — HYDROMORPHONE HYDROCHLORIDE 2 MILLIGRAM(S): 2 INJECTION INTRAMUSCULAR; INTRAVENOUS; SUBCUTANEOUS at 18:11

## 2022-07-12 RX ADMIN — TIOTROPIUM BROMIDE 1 CAPSULE(S): 18 CAPSULE ORAL; RESPIRATORY (INHALATION) at 09:36

## 2022-07-12 RX ADMIN — GABAPENTIN 400 MILLIGRAM(S): 400 CAPSULE ORAL at 21:29

## 2022-07-12 RX ADMIN — BUDESONIDE AND FORMOTEROL FUMARATE DIHYDRATE 2 PUFF(S): 160; 4.5 AEROSOL RESPIRATORY (INHALATION) at 09:37

## 2022-07-12 RX ADMIN — Medication 50 MILLIGRAM(S): at 05:26

## 2022-07-12 RX ADMIN — Medication 60 MILLIGRAM(S): at 05:30

## 2022-07-12 RX ADMIN — TAMSULOSIN HYDROCHLORIDE 0.4 MILLIGRAM(S): 0.4 CAPSULE ORAL at 21:29

## 2022-07-12 RX ADMIN — GABAPENTIN 400 MILLIGRAM(S): 400 CAPSULE ORAL at 13:42

## 2022-07-12 NOTE — PROGRESS NOTE ADULT - SUBJECTIVE AND OBJECTIVE BOX
[facility] Division of Hospital Medicine    Chief Complaint:  Dizzy    SUBJECTIVE / OVERNIGHT EVENTS:  Patient states that he is feeling dizzy, specially as he is ambulating.  Patient denies chest pain, SOB, abd pain, N/V, fever, chills, dysuria or any other complaints. All remainder ROS negative.       I&O's Summary      PHYSICAL EXAM:  Vital Signs Last 24 Hrs  T(C): 36.7 (12 Jul 2022 11:08), Max: 36.9 (11 Jul 2022 16:36)  T(F): 98.1 (12 Jul 2022 11:08), Max: 98.4 (11 Jul 2022 16:36)  HR: 115 (12 Jul 2022 11:08) (96 - 127)  BP: 107/73 (12 Jul 2022 11:08) (104/70 - 142/92)  BP(mean): --  RR: 18 (12 Jul 2022 11:08) (18 - 18)  SpO2: 99% (12 Jul 2022 11:08) (94% - 99%)    Parameters below as of 12 Jul 2022 11:08  Patient On (Oxygen Delivery Method): room air      Patient is calm and comfortable, alert and oriented, pleasant and corportative.  Patient is able speak fluently and effortless. Patient is lying in bed.  CONSTITUTIONAL: NAD, well-developed, well-groomed  ENMT: Moist oral mucosa, no pharyngeal injection or exudates; normal dentition  RESPIRATORY: Normal respiratory effort; lungs are clear to auscultation bilaterally  CARDIOVASCULAR: Regular rate and rhythm, normal S1 and S2, no murmur/rub/gallop; No lower extremity edema; Peripheral pulses are 2+ bilaterally  ABDOMEN: Nontender to palpation, normoactive bowel sounds, no rebound/guarding; No hepatosplenomegaly  MUSCLOSKELETAL:  Normal gait; no clubbing or cyanosis of digits; no joint swelling or tenderness to palpation  PSYCH: A+O to person, place, and time; affect appropriate  NEUROLOGY: CN 2-12 are intact and symmetric; no gross sensory deficits;   SKIN: No rashes; no palpable lesions    LABS:    07-11    142  |  100  |  18.4  ----------------------------<  130<H>  4.1   |  28.0  |  0.65    Ca    9.7      11 Jul 2022 21:14    TPro  7.1  /  Alb  3.9  /  TBili  0.3<L>  /  DBili  x   /  AST  32  /  ALT  161<H>  /  AlkPhos  247<H>  07-11    RADIOLOGY & ADDITIONAL TESTS:  Results Reviewed:   Imaging Personally Reviewed:  Electrocardiogram Personally Reviewed:      MEDICATIONS  (STANDING):  apixaban 5 milliGRAM(s) Oral two times a day  budesonide 160 MICROgram(s)/formoterol 4.5 MICROgram(s) Inhaler 2 Puff(s) Inhalation two times a day  cefTRIAXone   IVPB 1000 milliGRAM(s) IV Intermittent every 24 hours  gabapentin 400 milliGRAM(s) Oral three times a day  metoprolol tartrate 25 milliGRAM(s) Oral two times a day  predniSONE   Tablet 50 milliGRAM(s) Oral daily  tamsulosin 0.4 milliGRAM(s) Oral at bedtime  tiotropium 18 MICROgram(s) Capsule 1 Capsule(s) Inhalation daily    MEDICATIONS  (PRN):  HYDROmorphone   Tablet 2 milliGRAM(s) Oral every 8 hours PRN Severe Pain (7 - 10)  levalbuterol Inhalation 0.63 milliGRAM(s) Inhalation every 6 hours PRN bronchospasms  metoprolol tartrate Injectable 5 milliGRAM(s) IV Push every 4 hours PRN for HR > 120  traMADol 50 milliGRAM(s) Oral every 8 hours PRN Moderate Pain (4 - 6)

## 2022-07-12 NOTE — DIETITIAN INITIAL EVALUATION ADULT - PERTINENT MEDS FT
MEDICATIONS  (STANDING):  apixaban 5 milliGRAM(s) Oral two times a day  budesonide 160 MICROgram(s)/formoterol 4.5 MICROgram(s) Inhaler 2 Puff(s) Inhalation two times a day  cefTRIAXone   IVPB 1000 milliGRAM(s) IV Intermittent every 24 hours  gabapentin 400 milliGRAM(s) Oral three times a day  metoprolol tartrate 25 milliGRAM(s) Oral two times a day  predniSONE   Tablet 50 milliGRAM(s) Oral daily  tamsulosin 0.4 milliGRAM(s) Oral at bedtime  tiotropium 18 MICROgram(s) Capsule 1 Capsule(s) Inhalation daily    MEDICATIONS  (PRN):  HYDROmorphone   Tablet 2 milliGRAM(s) Oral every 8 hours PRN Severe Pain (7 - 10)  levalbuterol Inhalation 0.63 milliGRAM(s) Inhalation every 6 hours PRN bronchospasms  metoprolol tartrate Injectable 5 milliGRAM(s) IV Push every 4 hours PRN for HR > 120  traMADol 50 milliGRAM(s) Oral every 8 hours PRN Moderate Pain (4 - 6)

## 2022-07-12 NOTE — PROVIDER CONTACT NOTE (OTHER) - SITUATION
DR. Fierro Made aware of pt heart rate of 137 pt was ambulating to the bathroom VSS. pt complained to headache and on and off chest discomfort.

## 2022-07-12 NOTE — DIETITIAN NUTRITION RISK NOTIFICATION - TREATMENT: THE FOLLOWING DIET HAS BEEN RECOMMENDED
Diet, DASH/TLC:   Sodium & Cholesterol Restricted  Supplement Feeding Modality:  Oral  Ensure Clear Cans or Servings Per Day:  1       Frequency:  Three Times a day (07-12-22 @ 12:19) [Active]

## 2022-07-12 NOTE — PROGRESS NOTE ADULT - PROBLEM SELECTOR PLAN 4
continue  apixaban 5 milliGRAM(s) Oral two times a day  metoprolol tartrate 12.5 milliGRAM(s) Oral two times a day
continue  cardio input appreciated  apixaban 5 milliGRAM(s) Oral two times a day  metoprolol tartrate 25.0 milliGRAM(s) Oral two times a day
continue  apixaban 5 milliGRAM(s) Oral two times a day  metoprolol tartrate 12.5 milliGRAM(s) Oral two times a day
continue  apixaban 5 milliGRAM(s) Oral two times a day  metoprolol tartrate 12.5 milliGRAM(s) Oral two times a day

## 2022-07-12 NOTE — PROGRESS NOTE ADULT - PROBLEM SELECTOR PLAN 7
already on apixaban 5 milliGRAM(s) Oral two times a day

## 2022-07-12 NOTE — PROGRESS NOTE ADULT - PROBLEM SELECTOR PLAN 6
f/u am levels, off statin for now.
improving, check hepatitis panel and f/u on abdominal US.  f/u am levels, off statin for now.
improving  f/u am levels, off statin for now.
improving,   hepatitis panel unremarkable

## 2022-07-12 NOTE — DIETITIAN INITIAL EVALUATION ADULT - PERTINENT LABORATORY DATA
07-11    142  |  100  |  18.4  ----------------------------<  130<H>  4.1   |  28.0  |  0.65    Ca    9.7      11 Jul 2022 21:14    TPro  7.1  /  Alb  3.9  /  TBili  0.3<L>  /  DBili  x   /  AST  32  /  ALT  161<H>  /  AlkPhos  247<H>  07-11

## 2022-07-12 NOTE — PROGRESS NOTE ADULT - PROBLEM SELECTOR PLAN 5
continue  tamsulosin 0.4 milliGRAM(s) Oral at bedtime

## 2022-07-12 NOTE — PROGRESS NOTE ADULT - PROBLEM SELECTOR PLAN 2
continue  methylPREDNISolone sodium succinate Injectable 40 milliGRAM(s) IV Push every 12 hours
methylPREDNISolone sodium succinate Injectable 40 milliGRAM(s) IV Push every 12 hours  switch to PO prednisone 60 mg daily for now

## 2022-07-12 NOTE — DIETITIAN INITIAL EVALUATION ADULT - ADD RECOMMEND
Ensure TID (van or strawb) to optimize po intake and provide an additional 350 kcal, 20g protein per serving  Ensure TID (van or strawb) to optimize po intake and provide an additional 350 kcal, 20g protein per serving   Discontinue soft and bite sized diet.

## 2022-07-12 NOTE — DIETITIAN INITIAL EVALUATION ADULT - ORAL INTAKE PTA/DIET HISTORY
Pt reports 10# weight loss x 6 mo with decreased po intake. Pt agreed to try Ensure TID. Nikia britton, v, d, c.

## 2022-07-12 NOTE — PROGRESS NOTE ADULT - PROBLEM SELECTOR PLAN 1
continue  azithromycin  IVPB 500 milliGRAM(s) IV Intermittent every 24 hours  cefTRIAXone   IVPB 1000 milliGRAM(s) IV Intermittent every 24 hours

## 2022-07-12 NOTE — PROGRESS NOTE ADULT - PROBLEM SELECTOR PLAN 3
continue  gabapentin 400 milliGRAM(s) Oral three times a day  HYDROmorphone   Tablet 2 milliGRAM(s) Oral every 8 hours PRN Severe Pain (7 - 10)  traMADol 50 milliGRAM(s) Oral every 8 hours PRN Moderate Pain (4 - 6)  stimulator in place.

## 2022-07-12 NOTE — PROGRESS NOTE ADULT - PROBLEM SELECTOR PLAN 8
no need for home O2 at this time.
no need for home O2 at this time.  Wife declined for patient to be discharged today, states that she lives far away, about one hour away and will not accept home because was told by someone that patient was not ready to go home yet.

## 2022-07-12 NOTE — PROGRESS NOTE ADULT - PROBLEM SELECTOR PROBLEM 5
BPH without urinary obstruction

## 2022-07-12 NOTE — H&P ADULT - NSHPSOURCEINFORD_GEN_ALL_CORE
[facility]   Interventional Cardiology   History and Physical     Chika Ferguson MRN# [id number]   YOB: 1956 Age: 66 year old     Chief Complaint: DE JESUS    HPI: She is a very pleasant 65-year-old female with past medical history significant for morbid obesity, obstructive sleep apnea, hypertension, coronary artery disease, status post PCI of the proximal LAD and mild COPD.     She is here for wok-up of her shortness of breath. She is followed in pulmonary HTN clinic. She is in her usual state of health although very anxious today. She feels DE JESUS. She has orthopnea and sleeps sitting straight up. No fevers or chills, no chest pain, no abdominal pain. The swellin in her legs is stable. No other complaints.    She will take valium after being consented. She is wondering if she will need more sedation.    She is not on a blood thinner.    ROS: See HPI    Consenting/Education for Cardiac Cath Lab Procedure: Right Heart Catheterization     Patient understands we would like to perform .Right Heart Catheterization due to DE JESUS. This procedure will be performed by Dr. Hughes.    The patient understands the following:     Right Heart Catheterization: A fine tube (catheter) is put into the vein of the groin/neck.  It is carefully passed along until it reaches the heart and then goes up into the blood vessels of the lungs. This is done to measure a variety of pressures in your heart and can tell us how well the heart is filling and emptying, as well as monitor fluid status.     Will plan to do the procedure with PO valium alone, but if not adequate she has been consented for as well. Patient understands risks and complications of the procedure which include, but are not limited to bruising/swelling around the incision site, infection, bleeding, allergic reaction to local anesthetic, air embolism, arterial puncture, stroke, heart attack.       Patient verbalized understanding of  Patient/Physician/Provider risks and benefits and has elected to proceed with the procedure or procedures listed above.      History reviewed. No pertinent past medical history.    Past Surgical History:   Procedure Laterality Date      SECTION      X3     COLONOSCOPY N/A 2019    Procedure: COLONOSCOPY;  Surgeon: Alondra Parra MD;  Location: [facility];  Service: Gastroenterology     CORONARY STENT PLACEMENT  01/2016    X1     DILATION AND CURETTAGE      X2     DILATION AND CURETTAGE, OPERATIVE HYSTEROSCOPY, COMBINED N/A 2016    Procedure: DILATION AND CURETTAGE WITH HYSTEROSCOPY;  Surgeon: Víctor Tabor MD;  Location: [facility];  Service:      ENDOMETRIAL ABLATION  2016     HC REDUCTION OF LARGE BREAST Bilateral 2018    Procedure: BILATERAL REDUCTION MAMMOPLASTY;  Surgeon: Jaya Leong MD;  Location: [facility];  Service: Plastics     STRIP VEIN Bilateral     bilateral legs     TUBAL LIGATION         No current facility-administered medications on file prior to encounter.  albuterol (PROAIR HFA/PROVENTIL HFA/VENTOLIN HFA) 108 (90 Base) MCG/ACT inhaler, Inhale 2 puffs into the lungs every 6 hours as needed for wheezing  albuterol (PROVENTIL HFA;VENTOLIN HFA) 90 mcg/actuation inhaler, [ALBUTEROL (PROVENTIL HFA;VENTOLIN HFA) 90 MCG/ACTUATION INHALER] Inhale 2 puffs every 4 (four) hours as needed for wheezing or shortness of breath.  ANORO ELLIPTA 62.5-25 mcg/actuation inhaler, [ANORO ELLIPTA 62.5-25 MCG/ACTUATION INHALER]   aspirin (ASA) 81 MG chewable tablet, Take 1 tablet (81 mg) by mouth daily  bumetanide (BUMEX) 1 MG tablet, Take 1 tablet (1 mg) by mouth 2 times daily  bumetanide (BUMEX) 1 MG tablet, [BUMETANIDE (BUMEX) 1 MG TABLET] Take 1 tablet (1 mg total) by mouth 2 (two) times a day at 9am and 6pm.  calcium carbonate (OS-NAE) 1500 (600 Ca) MG tablet, Take 600 mg by mouth daily  carvedilol (COREG) 3.125 MG tablet, Take 1 tablet (3.125 mg) by mouth 2 times  daily (with meals) (Patient taking differently: Take 3.125 mg by mouth daily)  fluticasone (FLONASE) 50 mcg/actuation nasal spray, [FLUTICASONE (FLONASE) 50 MCG/ACTUATION NASAL SPRAY] 1 spray into each nostril daily.  hydrocortisone (CORTAID) 1 % external cream, Apply topically 2 times daily To facial rash  hydrOXYzine (ATARAX) 25 MG tablet, Take 1 tablet (25 mg) by mouth At Bedtime  lisinopril (PRINIVIL,ZESTRIL) 20 MG tablet, [LISINOPRIL (PRINIVIL,ZESTRIL) 20 MG TABLET] Take 1 tablet (20 mg total) by mouth daily.  melatonin 3 MG tablet, 1 tablet (3 mg) by Oral or Feeding Tube route every evening  multivitamin (CENTRUM SILVER) tablet, Take 1 tablet by mouth daily  pantoprazole (PROTONIX) 40 MG EC tablet, Take 1 tablet (40 mg) by mouth daily  PARoxetine (PAXIL) 40 MG tablet, [PAROXETINE (PAXIL) 40 MG TABLET] Take 40 mg by mouth daily.   potassium chloride ER (K-TAB/KLOR-CON) 10 MEQ CR tablet, Take 10 mEq by mouth  simvastatin (ZOCOR) 20 MG tablet, [SIMVASTATIN (ZOCOR) 20 MG TABLET] Take 20 mg by mouth bedtime.  spironolactone (ALDACTONE) 25 MG tablet, Take 1 tablet (25 mg) by mouth daily  tiotropium-olodaterol (STIOLTO RESPIMAT) 2.5-2.5 MCG/ACT AERS, Inhale 2 puffs into the lungs  vitamin D3 (CHOLECALCIFEROL) 50 mcg (2000 units) tablet, Take 1 tablet by mouth daily  aspirin 81 mg chewable tablet, [ASPIRIN 81 MG CHEWABLE TABLET] Chew 81 mg daily.  bumetanide (BUMEX) 1 MG tablet, Take 1 tablet (1 mg) by mouth daily (Patient taking differently: Take 2 mg by mouth 2 times daily )        Family History   Problem Relation Age of Onset     Snoring Mother      Cancer Mother         lung     Snoring Sister      ALS Father      Snoring Brother      Atrial fibrillation Brother      Diabetes Maternal Uncle      Diabetes Maternal Grandfather      Diabetes Maternal Uncle        Social History     Tobacco Use     Smoking status: Former Smoker     Quit date: 2015     Years since quittin.8     Smokeless tobacco: Never Used  "  Substance Use Topics     Alcohol use: No     Comment: Alcoholic Drinks/day: occ       Allergies   Allergen Reactions     Penicillins Rash         Physical Examination:  Vitals: BP (!) 163/89 (BP Location: Left arm)   Pulse 94   Temp 99  F (37.2  C) (Oral)   Resp 16   Ht 1.702 m (5' 7\")   Wt (!) 152 kg (335 lb)   SpO2 94%   BMI 52.47 kg/m    BMI= Body mass index is 52.47 kg/m .    Constitutional: Alert and awake, chronicall yill appearing appearing, in no significant pain or respiratory distress  ENT: Mallampati II  Skin: No erythema or signs of infection  Cardiac: RRR, no r/m/g  Pulmonary: Normal respiratory effort, good air movement, no adventitious lung sounds, no areas of decreased lung sounds  GI: +BS, NTTP  Extremities: 2-3+ b/l LE edema, all extremities are warm and well-perfused    Laboratory:  CMP  Recent Labs   Lab 07/12/22  1221      POTASSIUM 3.3*   CHLORIDE 98   CO2 29   ANIONGAP 13   *   BUN 16.5   CR 1.05*   GFRESTIMATED 59*   NAE 9.6   PROTTOTAL 7.7   ALBUMIN 3.7   BILITOTAL 0.6   ALKPHOS 129*   AST 18   ALT 11     CBC  Recent Labs   Lab 07/12/22  1221   WBC 13.5*   RBC 4.52   HGB 11.1*   HCT 36.1   MCV 80   MCH 24.6*   MCHC 30.7*   RDW 16.2*          Lab Results   Component Value Date    TROPONIN 0.028 10/09/2021    TROPONIN 0.018 10/09/2021    TROPONIN 0.027 10/09/2021             Assessment and plan:   She is a very pleasant 65-year-old female with past medical history significant for morbid obesity, obstructive sleep apnea, hypertension, coronary artery disease, status post PCI of the proximal LAD and mild COPD.     # DE JESUS  # Pulmonary HTN  - Proceed with RHC with possible sedation as planned  - Patient not on anticoagulation  - K today is 3.3, will give 40 meq of PO Kcl prior to procedure  - Continue PTA bumex  And aldactone unless changes after procedure    # CAD  - Management per primary cardiologist  - Continue PCA ASA, coreg    # Anxiety  - Will give PO Valium " prior to the procedure  - Have consented for additional sedation if needed    # HTN  - Continue lisinorpril    Tracy Hernandez PA-C  [facility] Cardiology

## 2022-07-12 NOTE — CONSULT NOTE ADULT - SUBJECTIVE AND OBJECTIVE BOX
CHIEF COMPLAINT: ALOC     HPI: Patient is a  77y Male severe COPD on home O2 (3L), damaris nodules/lung cancer, HTN, HLD, CAD s/p CABG 2020, PAF, here with ALOC and hallucinations. Noted to be hypoxic and with new infiltrate, treated for PNA.  Today noted to be mildly tachy while ambulating. Reviewed and all sinus rhythm.     PAST MEDICAL & SURGICAL HISTORY:  CAD in native artery  RCA 3 YAYA 1 in 2002 and 2 in 2019      Arthritis      Lumbosacral disc disease  has nerve stimulator      HTN (hypertension)      HLD (hyperlipidemia)      Lung cancer  Stage I a1; RUL; s/p SBRT in 11/2021; stable disease as of 4/2022      Paroxysmal atrial fibrillation      COPD (chronic obstructive pulmonary disease) with emphysema      H/O heart artery stent  RCA      S/P cataract surgery  b/l eyes 2016      S/P CABG (coronary artery bypass graft)          MEDICATIONS:  MEDICATIONS  (STANDING):  apixaban 5 milliGRAM(s) Oral two times a day  budesonide 160 MICROgram(s)/formoterol 4.5 MICROgram(s) Inhaler 2 Puff(s) Inhalation two times a day  cefTRIAXone   IVPB 1000 milliGRAM(s) IV Intermittent every 24 hours  diltiazem    Tablet 60 milliGRAM(s) Oral every 12 hours  gabapentin 400 milliGRAM(s) Oral three times a day  predniSONE   Tablet 50 milliGRAM(s) Oral daily  tamsulosin 0.4 milliGRAM(s) Oral at bedtime  tiotropium 18 MICROgram(s) Capsule 1 Capsule(s) Inhalation daily    MEDICATIONS  (PRN):  HYDROmorphone   Tablet 2 milliGRAM(s) Oral every 8 hours PRN Severe Pain (7 - 10)  levalbuterol Inhalation 0.63 milliGRAM(s) Inhalation every 6 hours PRN bronchospasms  metoprolol tartrate Injectable 5 milliGRAM(s) IV Push every 4 hours PRN for HR > 120  traMADol 50 milliGRAM(s) Oral every 8 hours PRN Moderate Pain (4 - 6)    apixaban 5 milliGRAM(s) Oral two times a day  budesonide 160 MICROgram(s)/formoterol 4.5 MICROgram(s) Inhaler 2 Puff(s) Inhalation two times a day  cefTRIAXone   IVPB 1000 milliGRAM(s) IV Intermittent every 24 hours  diltiazem    Tablet 60 milliGRAM(s) Oral every 12 hours  gabapentin 400 milliGRAM(s) Oral three times a day  HYDROmorphone   Tablet 2 milliGRAM(s) Oral every 8 hours PRN  levalbuterol Inhalation 0.63 milliGRAM(s) Inhalation every 6 hours PRN  metoprolol tartrate Injectable 5 milliGRAM(s) IV Push every 4 hours PRN  predniSONE   Tablet 50 milliGRAM(s) Oral daily  tamsulosin 0.4 milliGRAM(s) Oral at bedtime  tiotropium 18 MICROgram(s) Capsule 1 Capsule(s) Inhalation daily  traMADol 50 milliGRAM(s) Oral every 8 hours PRN      FAMILY HISTORY:  FAMILY HISTORY:  FH: colon cancer        SOCIAL HISTORY: no EtOH, drugs or tobacco    ROS: All others negative    PHYSCIAL EXAM:  Vital Signs Last 24 Hrs  T(C): 36.7 (12 Jul 2022 05:16), Max: 36.9 (11 Jul 2022 16:36)  T(F): 98 (12 Jul 2022 05:16), Max: 98.4 (11 Jul 2022 16:36)  HR: 96 (12 Jul 2022 05:16) (96 - 127)  BP: 125/76 (12 Jul 2022 05:16) (108/72 - 142/92)  BP(mean): --  RR: 18 (12 Jul 2022 05:16) (18 - 18)  SpO2: 98% (12 Jul 2022 05:16) (94% - 99%)    Parameters below as of 12 Jul 2022 05:16  Patient On (Oxygen Delivery Method): room air      I&O's Summary    GEN: NAD  HEENT: MMM, sclera anicteric  RESP: CTA bilaterally  CVS: S1S2, RRR, no JVD, no M/R/G  GI: Soft, NT, ND, BS+  EXT: no C/C/E  NEURO: AAOX3  PSYCH: Normal affect    ECG:    LABS:    07-11    142  |  100  |  18.4  ----------------------------<  130<H>  4.1   |  28.0  |  0.65    Ca    9.7      11 Jul 2022 21:14    TPro  7.1  /  Alb  3.9  /  TBili  0.3<L>  /  DBili  x   /  AST  32  /  ALT  161<H>  /  AlkPhos  247<H>  07-11    Nuclear Stress Test (4/11/2022):  1. No evidence of ischemic or infarct.  2. LVEF 80%.      ECG 7/6/2022: ST, RBBB    ECHO 7/1/2022:   1. Left ventricular ejection fraction, by visual estimation, is 65 to 70%.   2. Normal global left ventricular systolic function.   3. Abnormal septal motion consistent with post-operative status.   4. Spectral Doppler shows impaired relaxation pattern of left   ventricular myocardial filling (Grade I diastolic dysfunction).   5. Trace mitral valve regurgitation.   6. Sclerotic aortic valve with normal opening.    CTA Chest 7/6/2022:  IMPRESSION:  No pulmonary embolism.    Emphysema with patchy bilateral lung opacities that may be infectious or   inflammatory.    Assessment:     Patient is a  77y Male severe COPD on home O2 (3L), damaris nodules/lung cancer, HTN, HLD, CAD s/p CABG 2020, PAF, here with ALOC and hallucinations. Noted to be hypoxic and with new infiltrate, treated for PNA. Today noted to be mildly tachy while ambulating, all sinus rhythm and no recurrent AF  -REcent echo with normal function and negative nuclear stress test 4/2022    Plan: (INCOMPLETE, TO BE SEEN)  1.       Hakeem Saxena MD CHIEF COMPLAINT: ALOC     HPI: Patient is a  77y Male severe COPD on home O2 (3L), damaris nodules/lung cancer, HTN, HLD, CAD s/p CABG 2020, PAF, here with ALOC and hallucinations. Noted to be hypoxic and with new infiltrate, treated for PNA.  Today noted to be mildly tachy while ambulating. Reviewed and all sinus rhythm. States feels much better, denies palps/CP and was able to ambulate without significant dyspnea. No PND/orthopnea, no recent syncope. No nausea/vomiting/diaphoresis. Has not eaten/drank today yet.     PAST MEDICAL & SURGICAL HISTORY:  CAD in native artery  RCA 3 YAYA 1 in 2002 and 2 in 2019      Arthritis      Lumbosacral disc disease  has nerve stimulator      HTN (hypertension)      HLD (hyperlipidemia)      Lung cancer  Stage I a1; RUL; s/p SBRT in 11/2021; stable disease as of 4/2022      Paroxysmal atrial fibrillation      COPD (chronic obstructive pulmonary disease) with emphysema      H/O heart artery stent  RCA      S/P cataract surgery  b/l eyes 2016      S/P CABG (coronary artery bypass graft)          MEDICATIONS:  MEDICATIONS  (STANDING):  apixaban 5 milliGRAM(s) Oral two times a day  budesonide 160 MICROgram(s)/formoterol 4.5 MICROgram(s) Inhaler 2 Puff(s) Inhalation two times a day  cefTRIAXone   IVPB 1000 milliGRAM(s) IV Intermittent every 24 hours  diltiazem    Tablet 60 milliGRAM(s) Oral every 12 hours  gabapentin 400 milliGRAM(s) Oral three times a day  predniSONE   Tablet 50 milliGRAM(s) Oral daily  tamsulosin 0.4 milliGRAM(s) Oral at bedtime  tiotropium 18 MICROgram(s) Capsule 1 Capsule(s) Inhalation daily    MEDICATIONS  (PRN):  HYDROmorphone   Tablet 2 milliGRAM(s) Oral every 8 hours PRN Severe Pain (7 - 10)  levalbuterol Inhalation 0.63 milliGRAM(s) Inhalation every 6 hours PRN bronchospasms  metoprolol tartrate Injectable 5 milliGRAM(s) IV Push every 4 hours PRN for HR > 120  traMADol 50 milliGRAM(s) Oral every 8 hours PRN Moderate Pain (4 - 6)    apixaban 5 milliGRAM(s) Oral two times a day  budesonide 160 MICROgram(s)/formoterol 4.5 MICROgram(s) Inhaler 2 Puff(s) Inhalation two times a day  cefTRIAXone   IVPB 1000 milliGRAM(s) IV Intermittent every 24 hours  diltiazem    Tablet 60 milliGRAM(s) Oral every 12 hours  gabapentin 400 milliGRAM(s) Oral three times a day  HYDROmorphone   Tablet 2 milliGRAM(s) Oral every 8 hours PRN  levalbuterol Inhalation 0.63 milliGRAM(s) Inhalation every 6 hours PRN  metoprolol tartrate Injectable 5 milliGRAM(s) IV Push every 4 hours PRN  predniSONE   Tablet 50 milliGRAM(s) Oral daily  tamsulosin 0.4 milliGRAM(s) Oral at bedtime  tiotropium 18 MICROgram(s) Capsule 1 Capsule(s) Inhalation daily  traMADol 50 milliGRAM(s) Oral every 8 hours PRN      FAMILY HISTORY:  FAMILY HISTORY:  FH: colon cancer  Non contributory for CV disease      SOCIAL HISTORY: no EtOH, drugs or tobacco    ROS: GI negative, All others negative    PHYSCIAL EXAM:  Vital Signs Last 24 Hrs  T(C): 36.7 (12 Jul 2022 05:16), Max: 36.9 (11 Jul 2022 16:36)  T(F): 98 (12 Jul 2022 05:16), Max: 98.4 (11 Jul 2022 16:36)  HR: 96 (12 Jul 2022 05:16) (96 - 127)  BP: 125/76 (12 Jul 2022 05:16) (108/72 - 142/92)  BP(mean): --  RR: 18 (12 Jul 2022 05:16) (18 - 18)  SpO2: 98% (12 Jul 2022 05:16) (94% - 99%)    Parameters below as of 12 Jul 2022 05:16  Patient On (Oxygen Delivery Method): room air      I&O's Summary    GEN: NAD  HEENT: dry MM, pale,  sclera anicteric  RESP: CTA bilaterally  CVS: S1S2, RRR, no JVD, no M/R/G  GI: Soft, NT, ND, BS+  EXT: no C/C/E  NEURO: AAOX3  PSYCH: Normal affect      LABS:    07-11    142  |  100  |  18.4  ----------------------------<  130<H>  4.1   |  28.0  |  0.65    Ca    9.7      11 Jul 2022 21:14    TPro  7.1  /  Alb  3.9  /  TBili  0.3<L>  /  DBili  x   /  AST  32  /  ALT  161<H>  /  AlkPhos  247<H>  07-11    Nuclear Stress Test (4/11/2022):  1. No evidence of ischemic or infarct.  2. LVEF 80%.      ECG 7/6/2022: ST, RBBB    ECHO 7/1/2022:   1. Left ventricular ejection fraction, by visual estimation, is 65 to 70%.   2. Normal global left ventricular systolic function.   3. Abnormal septal motion consistent with post-operative status.   4. Spectral Doppler shows impaired relaxation pattern of left   ventricular myocardial filling (Grade I diastolic dysfunction).   5. Trace mitral valve regurgitation.   6. Sclerotic aortic valve with normal opening.    CTA Chest 7/6/2022:  IMPRESSION:  No pulmonary embolism.    Emphysema with patchy bilateral lung opacities that may be infectious or   inflammatory.    Assessment:     Patient is a  77y Male severe COPD on home O2 (3L), damaris nodules/lung cancer, HTN, HLD, CAD s/p CABG 2020, PAF, here with ALOC and hallucinations. Noted to be hypoxic and with new infiltrate, treated for PNA. Today noted to be mildly tachy while ambulating, all sinus rhythm and no recurrent AF  -REcent echo with normal function and negative nuclear stress test 4/2022  -Predominantly in SR/ST, very brief run (5 beats ) SVT after seeing patient. Suspect related to deconditioning and dehydration. Also has been off beta blocker and likely mild withdrawal. Will dc diltiazem and change back to metoprolol at mildly increased dose from home. Was on 12.5 bid and will put on 25mg bid    Plan:   1. DC dilt, start metoprolol 25 mg bid. Continue A/C with eliquis  2. INcrease PO intake/hydration  3. Further work up transaminitis per primary team  4. Recovering well from PNA  5. No further cardiac work up at this time  6. CV stable, dc planning. Thank you !    Hakeem Saxena MD

## 2022-07-12 NOTE — PROGRESS NOTE ADULT - NUTRITIONAL ASSESSMENT
This patient has been assessed with a concern for Malnutrition and has been determined to have a diagnosis/diagnoses of Moderate protein-calorie malnutrition.    This patient is being managed with:   Diet DASH/TLC-  Sodium & Cholesterol Restricted  Supplement Feeding Modality:  Oral  Ensure Enlive Cans or Servings Per Day:  1       Frequency:  Three Times a day  Entered: Jul 12 2022  1:59PM

## 2022-07-13 ENCOUNTER — TRANSCRIPTION ENCOUNTER (OUTPATIENT)
Age: 77
End: 2022-07-13

## 2022-07-13 VITALS
HEART RATE: 116 BPM | RESPIRATION RATE: 18 BRPM | SYSTOLIC BLOOD PRESSURE: 111 MMHG | DIASTOLIC BLOOD PRESSURE: 72 MMHG | OXYGEN SATURATION: 98 %

## 2022-07-13 LAB
ALBUMIN SERPL ELPH-MCNC: 3.2 G/DL — LOW (ref 3.3–5.2)
ALP SERPL-CCNC: 178 U/L — HIGH (ref 40–120)
ALT FLD-CCNC: 90 U/L — HIGH
ANION GAP SERPL CALC-SCNC: 11 MMOL/L — SIGNIFICANT CHANGE UP (ref 5–17)
AST SERPL-CCNC: 16 U/L — SIGNIFICANT CHANGE UP
BILIRUB SERPL-MCNC: 0.3 MG/DL — LOW (ref 0.4–2)
BUN SERPL-MCNC: 18.2 MG/DL — SIGNIFICANT CHANGE UP (ref 8–20)
CALCIUM SERPL-MCNC: 8.7 MG/DL — SIGNIFICANT CHANGE UP (ref 8.6–10.2)
CHLORIDE SERPL-SCNC: 104 MMOL/L — SIGNIFICANT CHANGE UP (ref 98–107)
CO2 SERPL-SCNC: 25 MMOL/L — SIGNIFICANT CHANGE UP (ref 22–29)
CREAT SERPL-MCNC: 0.49 MG/DL — LOW (ref 0.5–1.3)
EGFR: 107 ML/MIN/1.73M2 — SIGNIFICANT CHANGE UP
GLUCOSE SERPL-MCNC: 90 MG/DL — SIGNIFICANT CHANGE UP (ref 70–99)
POTASSIUM SERPL-MCNC: 4.2 MMOL/L — SIGNIFICANT CHANGE UP (ref 3.5–5.3)
POTASSIUM SERPL-SCNC: 4.2 MMOL/L — SIGNIFICANT CHANGE UP (ref 3.5–5.3)
PROT SERPL-MCNC: 5.8 G/DL — LOW (ref 6.6–8.7)
SARS-COV-2 RNA SPEC QL NAA+PROBE: DETECTED
SODIUM SERPL-SCNC: 140 MMOL/L — SIGNIFICANT CHANGE UP (ref 135–145)

## 2022-07-13 PROCEDURE — 84295 ASSAY OF SERUM SODIUM: CPT

## 2022-07-13 PROCEDURE — 99232 SBSQ HOSP IP/OBS MODERATE 35: CPT

## 2022-07-13 PROCEDURE — 82330 ASSAY OF CALCIUM: CPT

## 2022-07-13 PROCEDURE — G1004: CPT

## 2022-07-13 PROCEDURE — 80048 BASIC METABOLIC PNL TOTAL CA: CPT

## 2022-07-13 PROCEDURE — 83880 ASSAY OF NATRIURETIC PEPTIDE: CPT

## 2022-07-13 PROCEDURE — 85025 COMPLETE CBC W/AUTO DIFF WBC: CPT

## 2022-07-13 PROCEDURE — 93005 ELECTROCARDIOGRAM TRACING: CPT

## 2022-07-13 PROCEDURE — 96365 THER/PROPH/DIAG IV INF INIT: CPT

## 2022-07-13 PROCEDURE — 80307 DRUG TEST PRSMV CHEM ANLYZR: CPT

## 2022-07-13 PROCEDURE — 84145 PROCALCITONIN (PCT): CPT

## 2022-07-13 PROCEDURE — 80074 ACUTE HEPATITIS PANEL: CPT

## 2022-07-13 PROCEDURE — 82803 BLOOD GASES ANY COMBINATION: CPT

## 2022-07-13 PROCEDURE — 87641 MR-STAPH DNA AMP PROBE: CPT

## 2022-07-13 PROCEDURE — 85014 HEMATOCRIT: CPT

## 2022-07-13 PROCEDURE — 87086 URINE CULTURE/COLONY COUNT: CPT

## 2022-07-13 PROCEDURE — 85027 COMPLETE CBC AUTOMATED: CPT

## 2022-07-13 PROCEDURE — 71275 CT ANGIOGRAPHY CHEST: CPT | Mod: ME

## 2022-07-13 PROCEDURE — 97163 PT EVAL HIGH COMPLEX 45 MIN: CPT

## 2022-07-13 PROCEDURE — 82435 ASSAY OF BLOOD CHLORIDE: CPT

## 2022-07-13 PROCEDURE — U0005: CPT

## 2022-07-13 PROCEDURE — 82947 ASSAY GLUCOSE BLOOD QUANT: CPT

## 2022-07-13 PROCEDURE — 76705 ECHO EXAM OF ABDOMEN: CPT

## 2022-07-13 PROCEDURE — 84132 ASSAY OF SERUM POTASSIUM: CPT

## 2022-07-13 PROCEDURE — 85018 HEMOGLOBIN: CPT

## 2022-07-13 PROCEDURE — 71045 X-RAY EXAM CHEST 1 VIEW: CPT

## 2022-07-13 PROCEDURE — 94640 AIRWAY INHALATION TREATMENT: CPT

## 2022-07-13 PROCEDURE — 87640 STAPH A DNA AMP PROBE: CPT

## 2022-07-13 PROCEDURE — 83605 ASSAY OF LACTIC ACID: CPT

## 2022-07-13 PROCEDURE — 81001 URINALYSIS AUTO W/SCOPE: CPT

## 2022-07-13 PROCEDURE — 99239 HOSP IP/OBS DSCHRG MGMT >30: CPT

## 2022-07-13 PROCEDURE — 82962 GLUCOSE BLOOD TEST: CPT

## 2022-07-13 PROCEDURE — 80053 COMPREHEN METABOLIC PANEL: CPT

## 2022-07-13 PROCEDURE — 99285 EMERGENCY DEPT VISIT HI MDM: CPT | Mod: 25

## 2022-07-13 PROCEDURE — 96375 TX/PRO/DX INJ NEW DRUG ADDON: CPT

## 2022-07-13 PROCEDURE — 84484 ASSAY OF TROPONIN QUANT: CPT

## 2022-07-13 PROCEDURE — 36415 COLL VENOUS BLD VENIPUNCTURE: CPT

## 2022-07-13 PROCEDURE — 70450 CT HEAD/BRAIN W/O DYE: CPT | Mod: MG

## 2022-07-13 PROCEDURE — U0003: CPT

## 2022-07-13 RX ORDER — METOPROLOL TARTRATE 50 MG
1 TABLET ORAL
Qty: 60 | Refills: 0
Start: 2022-07-13 | End: 2022-08-11

## 2022-07-13 RX ORDER — LEVOFLOXACIN 5 MG/ML
1 INJECTION, SOLUTION INTRAVENOUS
Qty: 3 | Refills: 0
Start: 2022-07-13 | End: 2022-07-15

## 2022-07-13 RX ORDER — ALPRAZOLAM 0.25 MG
0.25 TABLET ORAL ONCE
Refills: 0 | Status: DISCONTINUED | OUTPATIENT
Start: 2022-07-13 | End: 2022-07-13

## 2022-07-13 RX ADMIN — APIXABAN 5 MILLIGRAM(S): 2.5 TABLET, FILM COATED ORAL at 06:15

## 2022-07-13 RX ADMIN — TIOTROPIUM BROMIDE 1 CAPSULE(S): 18 CAPSULE ORAL; RESPIRATORY (INHALATION) at 08:42

## 2022-07-13 RX ADMIN — Medication 25 MILLIGRAM(S): at 06:14

## 2022-07-13 RX ADMIN — HYDROMORPHONE HYDROCHLORIDE 2 MILLIGRAM(S): 2 INJECTION INTRAMUSCULAR; INTRAVENOUS; SUBCUTANEOUS at 06:19

## 2022-07-13 RX ADMIN — TRAMADOL HYDROCHLORIDE 50 MILLIGRAM(S): 50 TABLET ORAL at 01:34

## 2022-07-13 RX ADMIN — GABAPENTIN 400 MILLIGRAM(S): 400 CAPSULE ORAL at 12:55

## 2022-07-13 RX ADMIN — TRAMADOL HYDROCHLORIDE 50 MILLIGRAM(S): 50 TABLET ORAL at 00:21

## 2022-07-13 RX ADMIN — Medication 0.25 MILLIGRAM(S): at 00:21

## 2022-07-13 RX ADMIN — HYDROMORPHONE HYDROCHLORIDE 2 MILLIGRAM(S): 2 INJECTION INTRAMUSCULAR; INTRAVENOUS; SUBCUTANEOUS at 07:00

## 2022-07-13 RX ADMIN — Medication 50 MILLIGRAM(S): at 06:14

## 2022-07-13 RX ADMIN — GABAPENTIN 400 MILLIGRAM(S): 400 CAPSULE ORAL at 06:18

## 2022-07-13 RX ADMIN — BUDESONIDE AND FORMOTEROL FUMARATE DIHYDRATE 2 PUFF(S): 160; 4.5 AEROSOL RESPIRATORY (INHALATION) at 08:43

## 2022-07-13 NOTE — DISCHARGE NOTE NURSING/CASE MANAGEMENT/SOCIAL WORK - NSDCPEFALRISK_GEN_ALL_CORE
For information on Fall & Injury Prevention, visit: [email or web address] OR  [email or web address] OR  https://www.cdc.gov/steadi/patient.html

## 2022-07-13 NOTE — PROGRESS NOTE ADULT - SUBJECTIVE AND OBJECTIVE BOX
JENNIFER OSCAR  [id number]        Chief Complaint: follow up PNA/ST/PAF      Subjective: feeling better today      24 hour Tele: currently off monitor, was in SR        apixaban 5 milliGRAM(s) Oral two times a day  budesonide 160 MICROgram(s)/formoterol 4.5 MICROgram(s) Inhaler 2 Puff(s) Inhalation two times a day  cefTRIAXone   IVPB 1000 milliGRAM(s) IV Intermittent every 24 hours  gabapentin 400 milliGRAM(s) Oral three times a day  HYDROmorphone   Tablet 2 milliGRAM(s) Oral every 8 hours PRN  levalbuterol Inhalation 0.63 milliGRAM(s) Inhalation every 6 hours PRN  metoprolol tartrate 25 milliGRAM(s) Oral two times a day  metoprolol tartrate Injectable 5 milliGRAM(s) IV Push every 4 hours PRN  predniSONE   Tablet 50 milliGRAM(s) Oral daily  tamsulosin 0.4 milliGRAM(s) Oral at bedtime  tiotropium 18 MICROgram(s) Capsule 1 Capsule(s) Inhalation daily  traMADol 50 milliGRAM(s) Oral every 8 hours PRN          Physical Exam:  T(C): 36.7 (07-13-22 @ 10:35), Max: 36.9 (07-12-22 @ 20:12)  HR: 91 (07-13-22 @ 10:35) (83 - 113)  BP: 100/65 (07-13-22 @ 10:35) (100/65 - 144/80)  RR: 18 (07-13-22 @ 10:35) (18 - 18)  SpO2: 92% (07-13-22 @ 10:35) (92% - 97%)  Wt(kg): --  GEN: NAD  HEENT: dry MM, pale,  sclera anicteric  RESP: CTA bilaterally  CVS: S1S2, RRR, no JVD, no M/R/G  GI: Soft, NT, ND, BS+  EXT: no C/C/E  NEURO: AAOX3  PSYCH: Normal affect    I&O's Summary        Labs:   13 Jul 2022 06:47    140    |  104    |  18.2   ----------------------------<  90     4.2     |  25.0   |  0.49     Ca    8.7        13 Jul 2022 06:47    TPro  5.8    /  Alb  3.2    /  TBili  0.3    /  DBili  x      /  AST  16     /  ALT  90     /  AlkPhos  178    13 Jul 2022 06:47        Nuclear Stress Test (4/11/2022):  1. No evidence of ischemic or infarct.  2. LVEF 80%.      ECG 7/6/2022: ST, RBBB    ECHO 7/1/2022:   1. Left ventricular ejection fraction, by visual estimation, is 65 to 70%.   2. Normal global left ventricular systolic function.   3. Abnormal septal motion consistent with post-operative status.   4. Spectral Doppler shows impaired relaxation pattern of left   ventricular myocardial filling (Grade I diastolic dysfunction).   5. Trace mitral valve regurgitation.   6. Sclerotic aortic valve with normal opening.    CTA Chest 7/6/2022:  IMPRESSION:  No pulmonary embolism.    Emphysema with patchy bilateral lung opacities that may be infectious or   inflammatory.    Assessment:    Patient is a  77y Male severe COPD on home O2 (3L), damaris nodules/lung cancer, HTN, HLD, CAD s/p CABG 2020, PAF, here with ALOC and hallucinations. Noted to be hypoxic and with new infiltrate, treated for PNA. Today noted to be mildly tachy while ambulating, all sinus rhythm and no recurrent AF  -REcent echo with normal function and negative nuclear stress test 4/2022  -Predominantly in SR/ST, very brief run (5 beats ) SVT. Suspect related to deconditioning and dehydration. Also has been off beta blocker and likely mild withdrawal. Had been on low dose as outpatient, cut back in past couple years due to low BP but tolerating current dose 25 bid well here    Plan:   1. Continue metoporlol tartrate 25mg bid, may need to cut back as outpatient again if BP low. Follow up Dr Fink 1 week.  Continue A/C with eliquis  2. INcrease PO intake/hydration  3. CV stable for discharge, thanks!      Hakeem Saxena MD

## 2022-07-13 NOTE — DISCHARGE NOTE NURSING/CASE MANAGEMENT/SOCIAL WORK - PATIENT PORTAL LINK FT
You can access the FollowMyHealth Patient Portal offered by [facility] by registering at the following website: [email or web address]. By joining [employer]’s FollowMyHealth portal, you will also be able to view your health information using other applications (apps) compatible with our system.

## 2022-07-14 ENCOUNTER — TRANSCRIPTION ENCOUNTER (OUTPATIENT)
Age: 77
End: 2022-07-14

## 2022-07-14 PROBLEM — C34.90 MALIGNANT NEOPLASM OF UNSPECIFIED PART OF UNSPECIFIED BRONCHUS OR LUNG: Chronic | Status: ACTIVE | Noted: 2022-07-06

## 2022-07-14 PROBLEM — I48.0 PAROXYSMAL ATRIAL FIBRILLATION: Chronic | Status: ACTIVE | Noted: 2022-07-06

## 2022-07-14 PROBLEM — J43.9 EMPHYSEMA, UNSPECIFIED: Chronic | Status: ACTIVE | Noted: 2022-07-06

## 2022-07-27 ENCOUNTER — APPOINTMENT (OUTPATIENT)
Dept: CARDIOLOGY | Facility: CLINIC | Age: 77
End: 2022-07-27

## 2022-07-27 VITALS
WEIGHT: 137 LBS | HEIGHT: 67 IN | SYSTOLIC BLOOD PRESSURE: 118 MMHG | DIASTOLIC BLOOD PRESSURE: 80 MMHG | BODY MASS INDEX: 21.5 KG/M2 | RESPIRATION RATE: 16 BRPM | HEART RATE: 79 BPM

## 2022-07-27 PROCEDURE — 93000 ELECTROCARDIOGRAM COMPLETE: CPT

## 2022-07-27 PROCEDURE — 99214 OFFICE O/P EST MOD 30 MIN: CPT

## 2022-07-28 NOTE — HISTORY OF PRESENT ILLNESS
[FreeTextEntry1] : Mr. Henson presents today without complaints chest pain or shortness of breath.  His main complaint is fatigue.  He was recently admitted acutely to [facility] with altered mental status and hallucinations.  The patient was found to have pneumonia along with exacerbation of his COPD with considerable hypoxia, and improved with intravenous antibiotics and oxygen therapy. \par

## 2022-07-28 NOTE — ASSESSMENT
[FreeTextEntry1] : 1.  EKG today reveals sinus rhythm at 79 bpm.  Right bundle branch block.  No ischemic changes. \par \par 2.  Hypertension:  Blood pressure reasonable well controlled at this time on current medications.  Patient advised to hydrate.  \par \par 3.  Hyperlipidemia:  No recent bloodwork available for review.  Patient advised to continue current statin therapy and follow a strict low-fat / low-cholesterol diet.   \par \par 4.  Coronary artery disease:  Clinically stable without chest pain or shortness of breath.  Advised to walk as much as possible.  \par \par 5.  Peripheral artery disease:  Patient with known bilateral carotid artery plaquing left greater than right.  Recent carotid duplex at [facility] revealed a 70% or more stenosis of the left carotid bulb as well as a 50-69% stenosis of the right carotid bulb.  Patient will undergo a follow up CTA to further assess this.  Will likely be referred to vascular surgery.  If clinically stable, follow up office visit 4-6 weeks.  \par   \par

## 2022-08-07 ENCOUNTER — NON-APPOINTMENT (OUTPATIENT)
Age: 77
End: 2022-08-07

## 2022-08-15 ENCOUNTER — APPOINTMENT (OUTPATIENT)
Dept: ELECTROPHYSIOLOGY | Facility: CLINIC | Age: 77
End: 2022-08-15

## 2022-08-15 ENCOUNTER — NON-APPOINTMENT (OUTPATIENT)
Age: 77
End: 2022-08-15

## 2022-08-15 ENCOUNTER — RX RENEWAL (OUTPATIENT)
Age: 77
End: 2022-08-15

## 2022-08-15 PROCEDURE — 93298 REM INTERROG DEV EVAL SCRMS: CPT

## 2022-08-15 PROCEDURE — G2066: CPT

## 2022-08-18 ENCOUNTER — LABORATORY RESULT (OUTPATIENT)
Age: 77
End: 2022-08-18

## 2022-08-24 ENCOUNTER — OUTPATIENT (OUTPATIENT)
Dept: OUTPATIENT SERVICES | Facility: HOSPITAL | Age: 77
LOS: 1 days | End: 2022-08-24
Payer: MEDICARE

## 2022-08-24 ENCOUNTER — APPOINTMENT (OUTPATIENT)
Dept: CT IMAGING | Facility: CLINIC | Age: 77
End: 2022-08-24

## 2022-08-24 DIAGNOSIS — Z98.49 CATARACT EXTRACTION STATUS, UNSPECIFIED EYE: Chronic | ICD-10-CM

## 2022-08-24 DIAGNOSIS — Z95.5 PRESENCE OF CORONARY ANGIOPLASTY IMPLANT AND GRAFT: Chronic | ICD-10-CM

## 2022-08-24 DIAGNOSIS — I65.29 OCCLUSION AND STENOSIS OF UNSPECIFIED CAROTID ARTERY: ICD-10-CM

## 2022-08-24 DIAGNOSIS — Z95.1 PRESENCE OF AORTOCORONARY BYPASS GRAFT: Chronic | ICD-10-CM

## 2022-08-24 PROCEDURE — 70498 CT ANGIOGRAPHY NECK: CPT | Mod: 26,MH

## 2022-08-24 PROCEDURE — 70498 CT ANGIOGRAPHY NECK: CPT

## 2022-08-30 ENCOUNTER — APPOINTMENT (OUTPATIENT)
Dept: CARDIOLOGY | Facility: CLINIC | Age: 77
End: 2022-08-30

## 2022-08-30 VITALS
HEIGHT: 67 IN | DIASTOLIC BLOOD PRESSURE: 64 MMHG | RESPIRATION RATE: 16 BRPM | HEART RATE: 94 BPM | BODY MASS INDEX: 23.39 KG/M2 | WEIGHT: 149 LBS | SYSTOLIC BLOOD PRESSURE: 116 MMHG

## 2022-08-30 DIAGNOSIS — G89.29 LOW BACK PAIN, UNSPECIFIED: ICD-10-CM

## 2022-08-30 DIAGNOSIS — M54.50 LOW BACK PAIN, UNSPECIFIED: ICD-10-CM

## 2022-08-30 PROCEDURE — 93000 ELECTROCARDIOGRAM COMPLETE: CPT

## 2022-08-30 PROCEDURE — 99214 OFFICE O/P EST MOD 30 MIN: CPT

## 2022-08-31 NOTE — ASSESSMENT
[FreeTextEntry1] : 1.  EKG today reveals normal sinus rhythm at 94 bpm.  Right bundle branch block.  No ischemic changes.  \par \par \par 2.  Back pain:  Patient in need of epidural pain management therapy.  There are no absolute contraindications from a cardiac standpoint.  Patient will require discontinuation of low-dose aspirin and multivitamins for seven days as well as Eliquis for three days (six doses).  Clearance cover letter under separate cover.  If clinically stable from a cardiac standpoint, follow up office visit six weeks.  \par

## 2022-08-31 NOTE — HISTORY OF PRESENT ILLNESS
[FreeTextEntry1] : From a cardiac standpoint, Mr. Goncalves denies exertional chest pain or shortness of breath.  He is currently in need of lower back epidural pain management therapy.  He also recently underwent a carotid duplex which revealed significant stenosis in the left carotid bulb of approximately 70% as well as a 50-60% stenosis of the right carotid bulb.  Patient status-post MRA in recent days.  Results pending.  Will refer to vascular surgery at this time as well. \par

## 2022-08-31 NOTE — REASON FOR VISIT
[FreeTextEntry1] : Mr. Henson is a pleasant 77-year-old white male with a past medical history significant for hypertension, hyperlipidemia, coronary artery disease status-post PCI in 2002 followed by CABG x3 in March of 2020 as well as COPD, chronic back pain status-post spinal simulator insertion, and paroxysmal atrial fibrillation status-post ILR implantation, and peripheral artery disease, who presents for follow up evaluation.  \par \par \par

## 2022-09-01 ENCOUNTER — OUTPATIENT (OUTPATIENT)
Dept: OUTPATIENT SERVICES | Facility: HOSPITAL | Age: 77
LOS: 1 days | End: 2022-09-01

## 2022-09-01 ENCOUNTER — APPOINTMENT (OUTPATIENT)
Dept: CT IMAGING | Facility: CLINIC | Age: 77
End: 2022-09-01

## 2022-09-01 DIAGNOSIS — C34.11 MALIGNANT NEOPLASM OF UPPER LOBE, RIGHT BRONCHUS OR LUNG: ICD-10-CM

## 2022-09-01 DIAGNOSIS — Z98.49 CATARACT EXTRACTION STATUS, UNSPECIFIED EYE: Chronic | ICD-10-CM

## 2022-09-01 DIAGNOSIS — Z95.5 PRESENCE OF CORONARY ANGIOPLASTY IMPLANT AND GRAFT: Chronic | ICD-10-CM

## 2022-09-01 DIAGNOSIS — Z95.1 PRESENCE OF AORTOCORONARY BYPASS GRAFT: Chronic | ICD-10-CM

## 2022-09-01 PROCEDURE — 71260 CT THORAX DX C+: CPT | Mod: 26,ME

## 2022-09-01 PROCEDURE — G1004: CPT

## 2022-09-08 ENCOUNTER — APPOINTMENT (OUTPATIENT)
Dept: CARDIOLOGY | Facility: CLINIC | Age: 77
End: 2022-09-08

## 2022-09-09 NOTE — H&P ADULT - NSICDXPASTSURGICALHX_GEN_ALL_CORE_FT
Pt was playing football at school yard today, when he was pushed by another child and fell to ground and hit head on concrete. No LOC. +vomiting x2 episodes. +linear laceration to R posterior parietal region. Pt is A&Ox4. Easy work of breathing, lungs clear. Coloring appropriate. JIN. PMH asthma. NKDA. VUTD.
PAST SURGICAL HISTORY:  H/O heart artery stent RCA    S/P CABG (coronary artery bypass graft)     S/P cataract surgery b/l eyes 2016

## 2022-09-12 RX ORDER — BENZONATATE 200 MG/1
200 CAPSULE ORAL
Qty: 21 | Refills: 0 | Status: COMPLETED | COMMUNITY
Start: 2022-07-25

## 2022-09-12 RX ORDER — LEVOFLOXACIN 750 MG/1
750 TABLET, FILM COATED ORAL
Qty: 3 | Refills: 0 | Status: COMPLETED | COMMUNITY
Start: 2022-07-13

## 2022-09-13 ENCOUNTER — NON-APPOINTMENT (OUTPATIENT)
Age: 77
End: 2022-09-13

## 2022-09-13 ENCOUNTER — APPOINTMENT (OUTPATIENT)
Dept: RADIATION ONCOLOGY | Facility: CLINIC | Age: 77
End: 2022-09-13

## 2022-09-13 VITALS
SYSTOLIC BLOOD PRESSURE: 137 MMHG | DIASTOLIC BLOOD PRESSURE: 79 MMHG | BODY MASS INDEX: 23.02 KG/M2 | OXYGEN SATURATION: 92 % | RESPIRATION RATE: 16 BRPM | HEART RATE: 89 BPM | WEIGHT: 147 LBS

## 2022-09-13 PROCEDURE — 99214 OFFICE O/P EST MOD 30 MIN: CPT

## 2022-09-13 NOTE — HISTORY OF PRESENT ILLNESS
[FreeTextEntry1] : 77 year old gentleman with clinical lung cancer (RUL, cT1a N0), status post SBRT completed 11/24/21.\par \par Interval history:\par Reports stable exertional dyspnea.  No increased cough.\par No orthopnea, esophagitis, or unintentional weight loss.\par Was admitted [facility] with 7/6/22-7/13/22 with pneumonia and COVID-19.\par \par 9/1/22 CT chest: Stable patchy opacity in the posterior segment of the right upper lobe when compared to previous exam.

## 2022-09-13 NOTE — PHYSICAL EXAM
[Normal] : oriented to person, place and time, the affect was normal, the mood was normal and not anxious [Sclera] : the sclera and conjunctiva were normal [Extraocular Movements] : extraocular movements were intact [Outer Ear] : the ears and nose were normal in appearance [] : no respiratory distress [Respiration, Rhythm And Depth] : normal respiratory rhythm and effort [Exaggerated Use Of Accessory Muscles For Inspiration] : no accessory muscle use [Bowel Sounds] : normal bowel sounds [Abdomen Soft] : soft [Nondistended] : nondistended [Abdomen Tenderness] : non-tender [No Spine Tenderness] : no tenderness to palpation of the vertebral spine [de-identified] : decreased but clear

## 2022-09-13 NOTE — REVIEW OF SYSTEMS
[SOB on Exertion] : shortness of breath during exertion [Negative] : Allergic/Immunologic [Dysphagia: Grade 0] : Dysphagia: Grade 0 [Edema Limbs: Grade 0] : Edema Limbs: Grade 0  [Fatigue: Grade 0] : Fatigue: Grade 0 [Localized Edema: Grade 0] : Localized Edema: Grade 0  [Neck Edema: Grade 0] : Neck Edema: Grade 0 [Cough: Grade 0] : Cough: Grade 0 [Dyspnea: Grade 1 - Shortness of breath with moderate exertion] : Dyspnea: Grade 1 - Shortness of breath with moderate exertion

## 2022-09-19 ENCOUNTER — TRANSCRIPTION ENCOUNTER (OUTPATIENT)
Age: 77
End: 2022-09-19

## 2022-09-19 NOTE — DISCUSSION/SUMMARY
[FreeTextEntry1] : 76 year old gentleman with history of HTN, HLD, CAD s/p PCI 2002 and CABGx3 3/23/20, COPD, and paroxysmal atrial fibrillation with a CHADSVASC: 3. In 12/2020- 1/2021 he was admitted to [facility] with pneumonia. He presents today for management of PAF.\par \par To summarize his history, There was concern for intermittent tachycardia and possibly atrial fibrillation with rapid rates during hospitalization. ECG from 12/21/20 revealed AF with rapid rates (and RBBB). He was seen by cardiology and apparently no other AF was noted on review. He was started on Eliquis 5mg bid, in addition to ASA (and Plavix has been stopped). About two years prior he had an episode of syncope, which occurred after being on the water on a hot day, and was ultimately thought to be due to dehydration.\par \par Now s/p ILR implant for history of syncope and long term monitoring of atrial arrhythmias, allow symptom correlation. \par  \par During previous f/u ,ILR had revealed two episodes of SVT which are brief but slightly irregular at times, could not rule out bursts of PAF. Longest episode 13 seconds in duration. Continued AC recommended. \par \par Today, he reports he has been feeling well since last follow up. Symptomatic improvement since metoprolol reduced to 12.5mg daily with less lightheadedness. Denies palpitations, near syncope, syncope. Maintained on Eliquis. Denies easy bruising, bleeding, or falls. ECG reveals SR with RBBB. ILR reveals no events since last follow up. \par \par Recommendation: \par \par - ILR with no events since last follow up. Symptom recordings have correlated with SR/ST thus far. Brief bursts of probable PAF noted in past with chads2 vasc 3, soon to be 4 5/6. Recommend continued Eliquis 5mg BID, for stroke prophylaxis. \par -EP follow up in 6 months or sooner as needed. Remote ILR monitoring in place to continue to monitor for symptom/rhythm correlation and arrhythmia burden.\par \par Nayeli Diaz ANP-C. \par

## 2022-09-19 NOTE — HISTORY OF PRESENT ILLNESS
[FreeTextEntry1] : 76 year old gentleman with history of HTN, HLD, CAD s/p PCI 2002 and CABGx3 3/23/20, COPD, and paroxysmal atrial fibrillation with a CHADSVASC: 3. In 12/2020- 1/2021 he was admitted to [facility] with pneumonia. He presents today for management of PAF.\par \par To summarize his history, There was concern for intermittent tachycardia and possibly atrial fibrillation with rapid rates during hospitalization. ECG from 12/21/20 revealed AF with rapid rates (and RBBB). He was seen by cardiology and apparently no other AF was noted on review. He was started on Eliquis 5mg bid, in addition to ASA (and Plavix has been stopped). About two years prior he had an episode of syncope, which occurred after being on the water on a hot day, and was ultimately thought to be due to dehydration.\par \par Now s/p ILR implant for history of syncope and long term monitoring of atrial arrhythmias, allow symptom correlation. \par  \par During previous f/u ,ILR had revealed two episodes of SVT which are brief but slightly irregular at times, could not rule out bursts of PAF. Longest episode 13 seconds in duration. Continued AC recommended. \par \par Today, he reports he has been feeling well since last follow up. Symptomatic improvement since metoprolol reduced to 12.5mg daily with less lightheadedness. Denies palpitations, near syncope, syncope. Maintained on Eliquis. Denies easy bruising, bleeding, or falls. ECG reveals SR with RBBB. ILR reveals no events since last follow up. \par

## 2022-09-20 ENCOUNTER — OUTPATIENT (OUTPATIENT)
Dept: OUTPATIENT SERVICES | Facility: HOSPITAL | Age: 77
LOS: 1 days | End: 2022-09-20
Payer: MEDICARE

## 2022-09-20 DIAGNOSIS — Z98.49 CATARACT EXTRACTION STATUS, UNSPECIFIED EYE: Chronic | ICD-10-CM

## 2022-09-20 DIAGNOSIS — M54.16 RADICULOPATHY, LUMBAR REGION: ICD-10-CM

## 2022-09-20 DIAGNOSIS — Z95.1 PRESENCE OF AORTOCORONARY BYPASS GRAFT: Chronic | ICD-10-CM

## 2022-09-20 DIAGNOSIS — Z95.5 PRESENCE OF CORONARY ANGIOPLASTY IMPLANT AND GRAFT: Chronic | ICD-10-CM

## 2022-09-20 PROCEDURE — 64483 NJX AA&/STRD TFRM EPI L/S 1: CPT | Mod: LT

## 2022-09-20 PROCEDURE — 76000 FLUOROSCOPY <1 HR PHYS/QHP: CPT

## 2022-09-20 PROCEDURE — 64484 NJX AA&/STRD TFRM EPI L/S EA: CPT | Mod: LT

## 2022-09-22 ENCOUNTER — NON-APPOINTMENT (OUTPATIENT)
Age: 77
End: 2022-09-22

## 2022-09-22 ENCOUNTER — APPOINTMENT (OUTPATIENT)
Dept: ELECTROPHYSIOLOGY | Facility: CLINIC | Age: 77
End: 2022-09-22

## 2022-09-22 VITALS
OXYGEN SATURATION: 94 % | HEIGHT: 67 IN | SYSTOLIC BLOOD PRESSURE: 122 MMHG | BODY MASS INDEX: 23.39 KG/M2 | HEART RATE: 75 BPM | DIASTOLIC BLOOD PRESSURE: 60 MMHG | TEMPERATURE: 98.8 F | WEIGHT: 149 LBS

## 2022-09-22 PROCEDURE — 99215 OFFICE O/P EST HI 40 MIN: CPT

## 2022-09-22 PROCEDURE — 93000 ELECTROCARDIOGRAM COMPLETE: CPT

## 2022-09-22 RX ORDER — PREDNISONE 20 MG/1
20 TABLET ORAL DAILY
Qty: 10 | Refills: 0 | Status: DISCONTINUED | COMMUNITY
Start: 2022-06-25 | End: 2022-09-22

## 2022-09-22 RX ORDER — SERTRALINE HYDROCHLORIDE 100 MG/1
100 TABLET, FILM COATED ORAL
Qty: 135 | Refills: 0 | Status: DISCONTINUED | COMMUNITY
Start: 2022-03-16 | End: 2022-09-22

## 2022-09-22 NOTE — REASON FOR VISIT
[Arrhythmia/ECG Abnorrmalities] : arrhythmia/ECG abnormalities [Spouse] : spouse [FreeTextEntry3] : Dr Fink

## 2022-09-22 NOTE — DISCUSSION/SUMMARY
[FreeTextEntry1] : 76 year old gentleman with history of HTN, HLD, CAD s/p PCI 2002 and CABGx3 3/23/20, COPD, and paroxysmal atrial fibrillation, presenting for followup of pAF. He has had episodes of AF, and recently rapid atrial tachycardia, noted in the setting of recurrent pneumonia. Recently he had a rapid atrial tachycardia resulting in syncope, which occurred in setting of taking too much Zoloft as well as subsequently was found to have pneumonia. He has a CHADSVASc=3-4, and is currently tolerating anticoagulation with Eliquis, in addition to ASA. He will continue this for now given his elevated risk profile, and likelihood of further AF recurrence. However, this can be continuously reevaluated if no further AF episodes are noted and his bleeding risk is suspected to be higher. For now he is comfortable continuing current management.  \par \par -follow up with Dr Fink for cardiovascular follow-up.  \par \par -vascular eval pending for carotid disease. Would try to avoid triple anticoagulant therapy- if plavix is considered necessary, would then likely stop OAC unless further AF noted.  \par \par -ILR monitoring and EP follow-up in 6 months or as needed.  \par \par   [EKG obtained to assist in diagnosis and management of assessed problem(s)] : EKG obtained to assist in diagnosis and management of assessed problem(s)

## 2022-09-22 NOTE — REVIEW OF SYSTEMS
[SOB] : no shortness of breath [Dyspnea on exertion] : not dyspnea during exertion [Lower Ext Edema] : no extremity edema [Leg Claudication] : no intermittent leg claudication [Palpitations] : no palpitations [Syncope] : syncope [Cough] : cough [Dizziness] : no dizziness [Easy Bleeding] : no tendency for easy bleeding [Easy Bruising] : no tendency for easy bruising [Negative] : Neurological

## 2022-09-22 NOTE — HISTORY OF PRESENT ILLNESS
[FreeTextEntry1] : 76 year old gentleman with history of HTN, HLD, CAD s/p PCI 2002 and CABGx3 3/23/20, COPD, and paroxysmal atrial fibrillation, presenting for follow-up of pAF. \par \par To summarize his history, He had atrial fibrillation with rapid rates during hospitalization for pneumonia in 12/2020- 1/2021. ECG from 12/21/20 revealed AF with rapid rates (and RBBB). He was seen by cardiology and had no other AF was noted on review. He was started on Eliquis 5mg bid, in addition to ASA (and Plavix has been stopped). About two years prior he had an episode of syncope, which occurred after being on the water on a hot day, and was ultimately thought to be due to dehydration. \par \par He underwent ILR implant 3/15/21 for history of syncope and long term monitoring of atrial arrhythmias, allow symptom correlation. \par \par He has generally been feeling well. On 7/6/22 he had syncope when going to see Dr Fink, and this correlated with an episode of rapid atrial tachycardia, with regular ventricular rate of 167 bpm, which lasted about 1 minute. Apparently he had overdosed on Zoloft inadvertently at that time, and was then found to have pneumonia and again was hospitalized for this.  \par \par No other AF episodes have been noted. Previously short episodes of irregular SVT were noted lasting up to 13 seconds in duration.  He has remained on anticoagulation with Eliquis + ASA, and has been on low dose metoprolol 12.5mg qd given lightheadedness. \par \par He denies any significant bleeding on anticoagulation.  \par \par He has been found to have carotid disease and CT 7/27/22 revealed carotid calcification, and moderate stenosis of the LICA 50-69%.  \par \par ECG reveals sinus rhythm at 73 bpm, with RBBB.

## 2022-10-12 ENCOUNTER — APPOINTMENT (OUTPATIENT)
Dept: VASCULAR SURGERY | Facility: CLINIC | Age: 77
End: 2022-10-12

## 2022-10-12 VITALS
DIASTOLIC BLOOD PRESSURE: 75 MMHG | RESPIRATION RATE: 16 BRPM | SYSTOLIC BLOOD PRESSURE: 128 MMHG | TEMPERATURE: 97.2 F | HEIGHT: 67 IN | OXYGEN SATURATION: 94 % | BODY MASS INDEX: 23.07 KG/M2 | WEIGHT: 147 LBS | HEART RATE: 98 BPM

## 2022-10-12 PROCEDURE — 93880 EXTRACRANIAL BILAT STUDY: CPT

## 2022-10-12 PROCEDURE — 99213 OFFICE O/P EST LOW 20 MIN: CPT

## 2022-10-12 NOTE — PROCEDURE
[FreeTextEntry1] : 8/24/22 CT neck:  Significant calcification of the carotid bifurcations bilaterally. No significant right carotid stenosis. Moderate stenosis left internal carotid artery measuring 50-69% using NASCET criteria.\par \par 10/12/22 BL carotid artery duplex: \par right: <50% ICA stenosis \par left: <50% ICA stenosis

## 2022-10-12 NOTE — ASSESSMENT
[FreeTextEntry1] : 78 yo male with less than 50% ICA stenosis bilaterally. Pt is asymptomatic. Pt complains of dizziness which is unlikely related to carotid disease \par \par Pt counseled on results of duplex and above diagnosis.\par Continue ASA, atorvastatin and Eliquis. \par RTC in 6 months for repeat carotid artery duplex \par \par A total of 30 minutes was spent with patient and coordinating care\par

## 2022-10-12 NOTE — HISTORY OF PRESENT ILLNESS
[FreeTextEntry1] : 76 yo male PMHx COPD, CAD, HTN, HLD, presents for evaluation of carotid artery stenosis. Pt has been having dizziness recently and went to the ER. He had a CT of his neck which demonstrated left internal carotid artery stenosis 50-69%. He denies any hx of TIA or stroke. Pt is on ASA, atorvastatin and Eliquis.

## 2022-10-18 ENCOUNTER — NON-APPOINTMENT (OUTPATIENT)
Age: 77
End: 2022-10-18

## 2022-10-18 ENCOUNTER — APPOINTMENT (OUTPATIENT)
Dept: CARDIOLOGY | Facility: CLINIC | Age: 77
End: 2022-10-18

## 2022-10-18 VITALS
RESPIRATION RATE: 16 BRPM | WEIGHT: 152 LBS | SYSTOLIC BLOOD PRESSURE: 120 MMHG | HEART RATE: 84 BPM | HEIGHT: 67 IN | DIASTOLIC BLOOD PRESSURE: 60 MMHG | BODY MASS INDEX: 23.86 KG/M2

## 2022-10-18 DIAGNOSIS — R07.9 CHEST PAIN, UNSPECIFIED: ICD-10-CM

## 2022-10-18 PROCEDURE — 93000 ELECTROCARDIOGRAM COMPLETE: CPT

## 2022-10-18 PROCEDURE — 99214 OFFICE O/P EST MOD 30 MIN: CPT

## 2022-10-18 NOTE — CARDIOLOGY SUMMARY
[de-identified] : Sinus rhythm at 84 bpm.  Right bundle branch block.  No acute ischemic changes.

## 2022-10-18 NOTE — HISTORY OF PRESENT ILLNESS
[FreeTextEntry1] : Mr. Henson presents today stating that yesterday he was doing a lot of physical work around the house and became short of breath.  His pulse oximetry was 95%.  Felt better after resting.  He experiences occasional mild "tightness" in his chest that may last a few minutes.  States it does not happen often.  He is feeling a mild chest discomfort today that is reproducible with palpation and movement.  Has not experienced palpitations, lightheadedness or syncope.

## 2022-10-18 NOTE — DISCUSSION/SUMMARY
[FreeTextEntry1] : Case and plan discussed with Dr. Coombs.\par \par 1 - Hypertension:  blood pressure well controlled on current medications.  Advised to follow low sodium diet.\par \par 2 - Coronary artery disease status-post PCI 2003/CABG x 3 3/2020):  presents today stating that yesterday he was doing a lot of physical work around the house and became short of breath.  His pulse oximetry was 95%.  Felt better after resting.  He experiences occasional mild "tightness" in his chest that may last a few minutes.  States it does not happen often.  He is feeling a mild chest discomfort today that is reproducible with palpation and movement.  Has not experienced palpitations, lightheadedness or syncope.  Today's pulse oximetry is 97%.  Patient recently underwent non-invasive cardiac work up including echocardiography (July 2022) which revealed EF of 65-70%,  and normal nuclear stress testing (4/2022) with SPECT myocardial perfusion imaging failed to reveal evidence of pharmacologically induced reversible ischemia or fixed defects to suggest an antecedent infarction.  Suggesting starting Ranexa 500mg BID.  Patient states that he would like to hold off for now as he is already taking a lot of medications and is occuring sporadically.  He will monitor his symptoms and will contact us should the discomfort become more frequent, severe and bothersome.\par \par 3 - Paroxysmal atrial fibrillation:  today's EKG reveals sinus rhythm with right bundle branch block.  Tolerating Eliquis 5mg BID well.  Most recent ILR report from Dr. Mayorga 9/22/2022 showed no atrial fibrillation.\par \par 4 - Peripheral artery disease:  patient recently seen by Dr. Chaves for evaluation of his carotid artery disease.  Patient had repeat carotid doppler which revealed right with  <50% ICA stenosis and left with <50% ICA stenosis.  Will continue with low dose aspirin, atorvastatin.  Will see Dr. Chaves again in 6 months.\par \par 5 - Follow up with Dr. Fink in 2 months. [EKG obtained to assist in diagnosis and management of assessed problem(s)] : EKG obtained to assist in diagnosis and management of assessed problem(s)

## 2022-10-19 NOTE — H&P PST ADULT - AIRWAY
[FreeTextEntry1] : This is a repeat visit for this 75-year-old patient with a known history of kidney stones who underwent ESWL and had postoperative hemorrhage he states that with right lower quadrant pain but no blood in his urine\par I suspect this is not kidney a CAT scan is the only way to know for sure\par We will also get a followup for an adrenal nodule present since 2009 normal

## 2022-10-27 ENCOUNTER — APPOINTMENT (OUTPATIENT)
Dept: ELECTROPHYSIOLOGY | Facility: CLINIC | Age: 77
End: 2022-10-27

## 2022-10-27 ENCOUNTER — NON-APPOINTMENT (OUTPATIENT)
Age: 77
End: 2022-10-27

## 2022-10-27 PROCEDURE — 93298 REM INTERROG DEV EVAL SCRMS: CPT

## 2022-10-27 PROCEDURE — G2066: CPT

## 2022-10-31 RX ORDER — KIT FOR THE PREPARATION OF TECHNETIUM TC99M SESTAMIBI 1 MG/5ML
INJECTION, POWDER, LYOPHILIZED, FOR SOLUTION PARENTERAL
Refills: 0 | Status: COMPLETED | OUTPATIENT
Start: 2022-10-31

## 2022-10-31 RX ADMIN — KIT FOR THE PREPARATION OF TECHNETIUM TC99M SESTAMIBI 0: 1 INJECTION, POWDER, LYOPHILIZED, FOR SOLUTION PARENTERAL at 00:00

## 2022-12-01 ENCOUNTER — NON-APPOINTMENT (OUTPATIENT)
Age: 77
End: 2022-12-01

## 2022-12-01 ENCOUNTER — APPOINTMENT (OUTPATIENT)
Dept: ELECTROPHYSIOLOGY | Facility: CLINIC | Age: 77
End: 2022-12-01

## 2022-12-01 PROCEDURE — 93298 REM INTERROG DEV EVAL SCRMS: CPT

## 2022-12-01 PROCEDURE — G2066: CPT

## 2022-12-05 LAB — SARS-COV-2 N GENE NPH QL NAA+PROBE: NOT DETECTED

## 2022-12-08 ENCOUNTER — APPOINTMENT (OUTPATIENT)
Dept: PULMONOLOGY | Facility: CLINIC | Age: 77
End: 2022-12-08

## 2022-12-08 VITALS — HEIGHT: 67 IN | BODY MASS INDEX: 24.8 KG/M2 | WEIGHT: 158 LBS

## 2022-12-08 VITALS
HEART RATE: 87 BPM | DIASTOLIC BLOOD PRESSURE: 70 MMHG | RESPIRATION RATE: 16 BRPM | OXYGEN SATURATION: 95 % | SYSTOLIC BLOOD PRESSURE: 122 MMHG

## 2022-12-08 PROCEDURE — 99215 OFFICE O/P EST HI 40 MIN: CPT | Mod: 25

## 2022-12-08 PROCEDURE — 94010 BREATHING CAPACITY TEST: CPT

## 2022-12-08 RX ORDER — UMECLIDINIUM BROMIDE AND VILANTEROL TRIFENATATE 62.5; 25 UG/1; UG/1
62.5-25 POWDER RESPIRATORY (INHALATION) DAILY
Qty: 3 | Refills: 3 | Status: DISCONTINUED | COMMUNITY
Start: 2021-01-20 | End: 2022-12-08

## 2022-12-08 NOTE — HISTORY OF PRESENT ILLNESS
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

## 2022-12-08 NOTE — END OF VISIT
[FreeTextEntry3] : CAT scan reviewed on PACS with patient and wife [Time Spent: ___ minutes] : I have spent [unfilled] minutes of time on the encounter.

## 2022-12-08 NOTE — DISCUSSION/SUMMARY
[COPD] : chronic obstructive pulmonary disease [Stable] : stable [Stage III (Severe)] : stage III (severe) [None] : There are no changes in medication management [FreeTextEntry1] : Findings on CAT scan appear to be chronic scar.  Follow up CT as per radiation oncology for prior stage I lung cancer [de-identified] : Follow-up oncology

## 2022-12-08 NOTE — DISCUSSION/SUMMARY
[COPD] : chronic obstructive pulmonary disease [Stable] : stable [Stage III (Severe)] : stage III (severe) [None] : There are no changes in medication management [FreeTextEntry1] : Findings on CAT scan appear to be chronic scar.  Follow up CT as per radiation oncology for prior stage I lung cancer [de-identified] : Follow-up oncology

## 2022-12-08 NOTE — CONSULT LETTER
[Dear  ___] : Dear  [unfilled], [Consult Letter:] : I had the pleasure of evaluating your patient, [unfilled]. [Please see my note below.] : Please see my note below. [Consult Closing:] : Thank you very much for allowing me to participate in the care of this patient.  If you have any questions, please do not hesitate to contact me. [Sincerely,] : Sincerely, [FreeTextEntry3] : Epi Fuller MD FCCP\par Pulmonary/Critical Care/Sleep Medicine\par Department of Internal Medicine\par \par [facility]\par

## 2022-12-08 NOTE — HISTORY OF PRESENT ILLNESS
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

## 2022-12-08 NOTE — CONSULT LETTER
[Dear  ___] : Dear  [unfilled], [Consult Letter:] : I had the pleasure of evaluating your patient, [unfilled]. [Please see my note below.] : Please see my note below. [Consult Closing:] : Thank you very much for allowing me to participate in the care of this patient.  If you have any questions, please do not hesitate to contact me. [Sincerely,] : Sincerely, [FreeTextEntry3] : Epi Fuller MD FCCP\par Pulmonary/Critical Care/Sleep Medicine\par Department of Internal Medicine\par \par [facility]\par

## 2022-12-09 ENCOUNTER — APPOINTMENT (OUTPATIENT)
Dept: CARDIOLOGY | Facility: CLINIC | Age: 77
End: 2022-12-09

## 2022-12-09 VITALS
WEIGHT: 161 LBS | SYSTOLIC BLOOD PRESSURE: 110 MMHG | HEIGHT: 67 IN | BODY MASS INDEX: 25.27 KG/M2 | RESPIRATION RATE: 16 BRPM | DIASTOLIC BLOOD PRESSURE: 60 MMHG | HEART RATE: 85 BPM

## 2022-12-09 PROCEDURE — 93000 ELECTROCARDIOGRAM COMPLETE: CPT

## 2022-12-09 PROCEDURE — 99214 OFFICE O/P EST MOD 30 MIN: CPT

## 2022-12-19 ENCOUNTER — APPOINTMENT (OUTPATIENT)
Dept: CT IMAGING | Facility: CLINIC | Age: 77
End: 2022-12-19

## 2022-12-19 ENCOUNTER — OUTPATIENT (OUTPATIENT)
Dept: OUTPATIENT SERVICES | Facility: HOSPITAL | Age: 77
LOS: 1 days | End: 2022-12-19

## 2022-12-19 DIAGNOSIS — C34.11 MALIGNANT NEOPLASM OF UPPER LOBE, RIGHT BRONCHUS OR LUNG: ICD-10-CM

## 2022-12-19 PROCEDURE — 71260 CT THORAX DX C+: CPT | Mod: 26

## 2022-12-22 NOTE — REASON FOR VISIT
[FreeTextEntry1] : Mr. Henson is a pleasant 77-year-old white male with a past medical history significant for hypertension, hyperlipidemia, coronary artery disease status-post PCI in 2002 followed by CABG x3 in March of 2020 as well as a history of COPD, chronic back pain status-post spinal stimulator insertion, and paroxysmal atrial fibrillation status-post ILR implantation, who presents for follow up evaluation.  \par \par \par

## 2022-12-22 NOTE — ASSESSMENT
[FreeTextEntry1] : 1.  EKG today reveals normal sinus rhythm at 85 bpm.  Right bundle branch block.  No ischemic changes.  \par \par 2.  Hypertension:  Blood pressure well controlled at this time on current medications.  \par \par 3.  Hyperlipidemia:  The most recent lipid profile revealed a total cholesterol of 160, HDL 59, LDL 84, triglycerides 89.  Patient advised on a stricter low-fat / low-cholesterol diet given the need for an LDL target of 70.  Repeat bloodwork in three months.  \par \par 4.  Coronary artery disease status-post PCI as well as CABG:  Clinically stable without chest pain or shortness of breath.  Advised to walk as much as possible. \par \par 5.  Lung cancer:  Patient clinically stable at this time but undergoing aggressive surveillance.  If clinically stable from a  cardiac standpoint, office visit three months.  \par

## 2022-12-22 NOTE — HISTORY OF PRESENT ILLNESS
[FreeTextEntry1] : From a cardiac standpoint, Mr. Henson is doing reasonably well denying exertional chest pain, shortness of breath, or other cardiac symptoms. \par \par

## 2022-12-28 NOTE — DISCHARGE NOTE PROVIDER - CARE PROVIDER_API CALL
Iraj Patterson)  [facility]  [address] [place] [address]  Phone: [phone]  Fax: [phone]  Follow Up Time: 2 weeks    Ken Ta)  Surgery  [address], [address] [place] [address]  Phone: [phone]  Fax: [phone]  Follow Up Time: 2 weeks    Walter Arndt (DO)  Critical Care Medicine; Internal Medicine; Pulmonary Disease  [address], [address]  [place] [address]  Phone: [phone]  Fax: [phone]  Follow Up Time: 2 weeks  
No indicators present

## 2022-12-30 ENCOUNTER — APPOINTMENT (OUTPATIENT)
Dept: RADIATION ONCOLOGY | Facility: CLINIC | Age: 77
End: 2022-12-30
Payer: MEDICARE

## 2022-12-30 ENCOUNTER — NON-APPOINTMENT (OUTPATIENT)
Age: 77
End: 2022-12-30

## 2022-12-30 VITALS
RESPIRATION RATE: 16 BRPM | HEART RATE: 88 BPM | OXYGEN SATURATION: 95 % | BODY MASS INDEX: 25.27 KG/M2 | WEIGHT: 161 LBS | TEMPERATURE: 97 F | DIASTOLIC BLOOD PRESSURE: 76 MMHG | HEIGHT: 67 IN | SYSTOLIC BLOOD PRESSURE: 157 MMHG

## 2022-12-30 PROCEDURE — 99214 OFFICE O/P EST MOD 30 MIN: CPT

## 2022-12-30 NOTE — HISTORY OF PRESENT ILLNESS
[FreeTextEntry1] : 77 year old gentleman with clinical lung cancer (RUL, cT1a N0), status post SBRT completed 11/24/21.\par \par Interval history:\par Reports ongoing exertional dyspnea.\par Had episode of increased cough with blood-tinged sputum for a few days before Estephania; since resolved.\par No orthopnea, odynophagia, dysphagia, pain, or fatigue.\par \par CT chest 12/19/22:\par Stable post radiation changes in the right upper lobe and 3 mm noncalcified lung nodules. No new or enlarging lung nodule

## 2022-12-30 NOTE — PHYSICAL EXAM
[Extraocular Movements] : extraocular movements were intact [Normal] : no respiratory distress, lungs were clear to auscultation bilaterally [Bowel Sounds] : normal bowel sounds [Abdomen Soft] : soft [Nondistended] : nondistended [Abdomen Tenderness] : non-tender

## 2022-12-30 NOTE — REVIEW OF SYSTEMS
[Cough] : cough [SOB on Exertion] : shortness of breath during exertion [Negative] : Allergic/Immunologic [Edema Limbs: Grade 0] : Edema Limbs: Grade 0  [Fatigue: Grade 0] : Fatigue: Grade 0 [Localized Edema: Grade 0] : Localized Edema: Grade 0  [Neck Edema: Grade 0] : Neck Edema: Grade 0 [Cough: Grade 1 - Mild symptoms; nonprescription intervention indicated] : Cough: Grade 1 - Mild symptoms; nonprescription intervention indicated [Dyspnea: Grade 1 - Shortness of breath with moderate exertion] : Dyspnea: Grade 1 - Shortness of breath with moderate exertion

## 2023-01-01 ENCOUNTER — APPOINTMENT (OUTPATIENT)
Dept: ELECTROPHYSIOLOGY | Facility: CLINIC | Age: 78
End: 2023-01-01
Payer: MEDICARE

## 2023-01-01 ENCOUNTER — NON-APPOINTMENT (OUTPATIENT)
Age: 78
End: 2023-01-01

## 2023-01-01 ENCOUNTER — TRANSCRIPTION ENCOUNTER (OUTPATIENT)
Age: 78
End: 2023-01-01

## 2023-01-01 ENCOUNTER — RESULT REVIEW (OUTPATIENT)
Age: 78
End: 2023-01-01

## 2023-01-01 ENCOUNTER — OUTPATIENT (OUTPATIENT)
Dept: OUTPATIENT SERVICES | Facility: HOSPITAL | Age: 78
LOS: 1 days | Discharge: HOME | End: 2023-01-01
Payer: MEDICARE

## 2023-01-01 ENCOUNTER — APPOINTMENT (OUTPATIENT)
Dept: HEMATOLOGY ONCOLOGY | Facility: CLINIC | Age: 78
End: 2023-01-01

## 2023-01-01 ENCOUNTER — INPATIENT (INPATIENT)
Facility: HOSPITAL | Age: 78
LOS: 9 days | Discharge: HOME | DRG: 871 | End: 2023-12-10
Attending: GENERAL ACUTE CARE HOSPITAL | Admitting: HOSPITALIST
Payer: MEDICARE

## 2023-01-01 ENCOUNTER — OUTPATIENT (OUTPATIENT)
Dept: OUTPATIENT SERVICES | Facility: HOSPITAL | Age: 78
LOS: 1 days | End: 2023-01-01
Payer: MEDICARE

## 2023-01-01 ENCOUNTER — APPOINTMENT (OUTPATIENT)
Dept: CARDIOLOGY | Facility: CLINIC | Age: 78
End: 2023-01-01

## 2023-01-01 ENCOUNTER — APPOINTMENT (OUTPATIENT)
Dept: RADIATION ONCOLOGY | Facility: CLINIC | Age: 78
End: 2023-01-01
Payer: MEDICARE

## 2023-01-01 ENCOUNTER — LABORATORY RESULT (OUTPATIENT)
Age: 78
End: 2023-01-01

## 2023-01-01 ENCOUNTER — APPOINTMENT (OUTPATIENT)
Dept: CARDIOLOGY | Facility: CLINIC | Age: 78
End: 2023-01-01
Payer: MEDICARE

## 2023-01-01 ENCOUNTER — APPOINTMENT (OUTPATIENT)
Dept: VASCULAR SURGERY | Facility: CLINIC | Age: 78
End: 2023-01-01
Payer: MEDICARE

## 2023-01-01 ENCOUNTER — APPOINTMENT (OUTPATIENT)
Dept: PULMONOLOGY | Facility: CLINIC | Age: 78
End: 2023-01-01
Payer: MEDICARE

## 2023-01-01 ENCOUNTER — INPATIENT (INPATIENT)
Facility: HOSPITAL | Age: 78
LOS: 5 days | Discharge: HOME | DRG: 181 | End: 2023-10-16
Attending: STUDENT IN AN ORGANIZED HEALTH CARE EDUCATION/TRAINING PROGRAM | Admitting: HOSPITALIST
Payer: MEDICARE

## 2023-01-01 ENCOUNTER — APPOINTMENT (OUTPATIENT)
Age: 78
End: 2023-01-01

## 2023-01-01 ENCOUNTER — APPOINTMENT (OUTPATIENT)
Dept: NUCLEAR MEDICINE | Facility: CLINIC | Age: 78
End: 2023-01-01
Payer: MEDICARE

## 2023-01-01 ENCOUNTER — APPOINTMENT (OUTPATIENT)
Dept: CT IMAGING | Facility: CLINIC | Age: 78
End: 2023-01-01
Payer: MEDICARE

## 2023-01-01 ENCOUNTER — APPOINTMENT (OUTPATIENT)
Dept: PULMONOLOGY | Facility: CLINIC | Age: 78
End: 2023-01-01

## 2023-01-01 ENCOUNTER — RX CHANGE (OUTPATIENT)
Age: 78
End: 2023-01-01

## 2023-01-01 ENCOUNTER — APPOINTMENT (OUTPATIENT)
Dept: HEMATOLOGY ONCOLOGY | Facility: CLINIC | Age: 78
End: 2023-01-01
Payer: MEDICARE

## 2023-01-01 ENCOUNTER — APPOINTMENT (OUTPATIENT)
Dept: RADIATION ONCOLOGY | Facility: CLINIC | Age: 78
End: 2023-01-01

## 2023-01-01 ENCOUNTER — OUTPATIENT (OUTPATIENT)
Dept: OUTPATIENT SERVICES | Facility: HOSPITAL | Age: 78
LOS: 1 days | Discharge: HOME | End: 2023-01-01

## 2023-01-01 ENCOUNTER — OUTPATIENT (OUTPATIENT)
Dept: OUTPATIENT SERVICES | Facility: HOSPITAL | Age: 78
LOS: 1 days | End: 2023-01-01

## 2023-01-01 ENCOUNTER — APPOINTMENT (OUTPATIENT)
Dept: THORACIC SURGERY | Facility: CLINIC | Age: 78
End: 2023-01-01
Payer: MEDICARE

## 2023-01-01 VITALS
HEIGHT: 67 IN | WEIGHT: 160.06 LBS | OXYGEN SATURATION: 98 % | SYSTOLIC BLOOD PRESSURE: 162 MMHG | RESPIRATION RATE: 17 BRPM | DIASTOLIC BLOOD PRESSURE: 84 MMHG | TEMPERATURE: 98 F | HEART RATE: 89 BPM

## 2023-01-01 VITALS
HEIGHT: 67 IN | WEIGHT: 164 LBS | RESPIRATION RATE: 16 BRPM | SYSTOLIC BLOOD PRESSURE: 130 MMHG | HEART RATE: 97 BPM | BODY MASS INDEX: 25.74 KG/M2 | DIASTOLIC BLOOD PRESSURE: 72 MMHG

## 2023-01-01 VITALS
WEIGHT: 164 LBS | DIASTOLIC BLOOD PRESSURE: 64 MMHG | OXYGEN SATURATION: 95 % | HEART RATE: 98 BPM | TEMPERATURE: 96.8 F | SYSTOLIC BLOOD PRESSURE: 104 MMHG | HEIGHT: 67 IN | BODY MASS INDEX: 25.74 KG/M2

## 2023-01-01 VITALS
DIASTOLIC BLOOD PRESSURE: 60 MMHG | SYSTOLIC BLOOD PRESSURE: 102 MMHG | WEIGHT: 159 LBS | HEART RATE: 106 BPM | HEIGHT: 66.5 IN | BODY MASS INDEX: 25.25 KG/M2 | OXYGEN SATURATION: 87 %

## 2023-01-01 VITALS
WEIGHT: 158 LBS | DIASTOLIC BLOOD PRESSURE: 57 MMHG | OXYGEN SATURATION: 91 % | HEART RATE: 100 BPM | SYSTOLIC BLOOD PRESSURE: 96 MMHG | HEIGHT: 66 IN | TEMPERATURE: 97.1 F | RESPIRATION RATE: 16 BRPM | BODY MASS INDEX: 25.39 KG/M2

## 2023-01-01 VITALS
OXYGEN SATURATION: 96 % | RESPIRATION RATE: 16 BRPM | WEIGHT: 162 LBS | HEART RATE: 92 BPM | BODY MASS INDEX: 25.43 KG/M2 | DIASTOLIC BLOOD PRESSURE: 74 MMHG | SYSTOLIC BLOOD PRESSURE: 118 MMHG | HEIGHT: 67 IN | TEMPERATURE: 98.4 F

## 2023-01-01 VITALS
RESPIRATION RATE: 16 BRPM | SYSTOLIC BLOOD PRESSURE: 100 MMHG | DIASTOLIC BLOOD PRESSURE: 62 MMHG | OXYGEN SATURATION: 94 % | HEART RATE: 73 BPM

## 2023-01-01 VITALS
DIASTOLIC BLOOD PRESSURE: 59 MMHG | OXYGEN SATURATION: 91 % | SYSTOLIC BLOOD PRESSURE: 100 MMHG | HEART RATE: 89 BPM | BODY MASS INDEX: 26.2 KG/M2 | RESPIRATION RATE: 16 BRPM | HEIGHT: 66 IN | WEIGHT: 163 LBS | TEMPERATURE: 97 F

## 2023-01-01 VITALS
WEIGHT: 162.26 LBS | RESPIRATION RATE: 20 BRPM | TEMPERATURE: 100 F | OXYGEN SATURATION: 78 % | SYSTOLIC BLOOD PRESSURE: 97 MMHG | HEART RATE: 168 BPM | DIASTOLIC BLOOD PRESSURE: 62 MMHG | HEIGHT: 67 IN

## 2023-01-01 VITALS
WEIGHT: 172 LBS | BODY MASS INDEX: 27 KG/M2 | OXYGEN SATURATION: 91 % | RESPIRATION RATE: 15 BRPM | DIASTOLIC BLOOD PRESSURE: 64 MMHG | HEIGHT: 67 IN | HEART RATE: 84 BPM | SYSTOLIC BLOOD PRESSURE: 116 MMHG

## 2023-01-01 VITALS
DIASTOLIC BLOOD PRESSURE: 75 MMHG | OXYGEN SATURATION: 94 % | WEIGHT: 165.68 LBS | TEMPERATURE: 97.5 F | BODY MASS INDEX: 26 KG/M2 | SYSTOLIC BLOOD PRESSURE: 114 MMHG | HEART RATE: 97 BPM | OXYGEN SATURATION: 96 % | HEIGHT: 67 IN

## 2023-01-01 VITALS
HEIGHT: 67 IN | SYSTOLIC BLOOD PRESSURE: 106 MMHG | BODY MASS INDEX: 24.8 KG/M2 | OXYGEN SATURATION: 93 % | RESPIRATION RATE: 16 BRPM | DIASTOLIC BLOOD PRESSURE: 60 MMHG | WEIGHT: 158 LBS | HEART RATE: 95 BPM

## 2023-01-01 VITALS
RESPIRATION RATE: 16 BRPM | DIASTOLIC BLOOD PRESSURE: 78 MMHG | HEART RATE: 77 BPM | OXYGEN SATURATION: 93 % | SYSTOLIC BLOOD PRESSURE: 102 MMHG

## 2023-01-01 VITALS
WEIGHT: 162 LBS | SYSTOLIC BLOOD PRESSURE: 97 MMHG | HEART RATE: 69 BPM | DIASTOLIC BLOOD PRESSURE: 57 MMHG | OXYGEN SATURATION: 99 % | RESPIRATION RATE: 16 BRPM | BODY MASS INDEX: 25.43 KG/M2 | HEIGHT: 67 IN

## 2023-01-01 VITALS — SYSTOLIC BLOOD PRESSURE: 124 MMHG | DIASTOLIC BLOOD PRESSURE: 70 MMHG

## 2023-01-01 VITALS — HEIGHT: 66 IN | BODY MASS INDEX: 25.55 KG/M2 | WEIGHT: 159 LBS

## 2023-01-01 VITALS
HEART RATE: 69 BPM | OXYGEN SATURATION: 96 % | RESPIRATION RATE: 19 BRPM | SYSTOLIC BLOOD PRESSURE: 134 MMHG | DIASTOLIC BLOOD PRESSURE: 75 MMHG | TEMPERATURE: 98 F

## 2023-01-01 VITALS — WEIGHT: 152 LBS | HEIGHT: 66.5 IN | BODY MASS INDEX: 24.14 KG/M2

## 2023-01-01 VITALS — HEIGHT: 66.5 IN | BODY MASS INDEX: 24.46 KG/M2 | WEIGHT: 154 LBS

## 2023-01-01 VITALS — SYSTOLIC BLOOD PRESSURE: 110 MMHG | OXYGEN SATURATION: 95 % | DIASTOLIC BLOOD PRESSURE: 60 MMHG | HEART RATE: 62 BPM

## 2023-01-01 DIAGNOSIS — C34.11 MALIGNANT NEOPLASM OF UPPER LOBE, RIGHT BRONCHUS OR LUNG: ICD-10-CM

## 2023-01-01 DIAGNOSIS — Z51.11 ENCOUNTER FOR ANTINEOPLASTIC CHEMOTHERAPY: ICD-10-CM

## 2023-01-01 DIAGNOSIS — R09.02 HYPOXEMIA: ICD-10-CM

## 2023-01-01 DIAGNOSIS — I65.29 OCCLUSION AND STENOSIS OF UNSPECIFIED CAROTID ARTERY: ICD-10-CM

## 2023-01-01 DIAGNOSIS — Z98.49 CATARACT EXTRACTION STATUS, UNSPECIFIED EYE: Chronic | ICD-10-CM

## 2023-01-01 DIAGNOSIS — R04.2 HEMOPTYSIS: ICD-10-CM

## 2023-01-01 DIAGNOSIS — I10 ESSENTIAL (PRIMARY) HYPERTENSION: ICD-10-CM

## 2023-01-01 DIAGNOSIS — R91.8 OTHER NONSPECIFIC ABNORMAL FINDING OF LUNG FIELD: ICD-10-CM

## 2023-01-01 DIAGNOSIS — J44.9 CHRONIC OBSTRUCTIVE PULMONARY DISEASE, UNSPECIFIED: ICD-10-CM

## 2023-01-01 DIAGNOSIS — R79.9 ABNORMAL FINDING OF BLOOD CHEMISTRY, UNSPECIFIED: ICD-10-CM

## 2023-01-01 DIAGNOSIS — C34.90 MALIGNANT NEOPLASM OF UNSPECIFIED PART OF UNSPECIFIED BRONCHUS OR LUNG: ICD-10-CM

## 2023-01-01 DIAGNOSIS — R06.02 SHORTNESS OF BREATH: ICD-10-CM

## 2023-01-01 DIAGNOSIS — Z95.5 PRESENCE OF CORONARY ANGIOPLASTY IMPLANT AND GRAFT: Chronic | ICD-10-CM

## 2023-01-01 DIAGNOSIS — M54.16 RADICULOPATHY, LUMBAR REGION: ICD-10-CM

## 2023-01-01 DIAGNOSIS — Z95.1 PRESENCE OF AORTOCORONARY BYPASS GRAFT: Chronic | ICD-10-CM

## 2023-01-01 DIAGNOSIS — J18.9 PNEUMONIA, UNSPECIFIED ORGANISM: ICD-10-CM

## 2023-01-01 DIAGNOSIS — I48.0 PAROXYSMAL ATRIAL FIBRILLATION: ICD-10-CM

## 2023-01-01 DIAGNOSIS — J44.1 CHRONIC OBSTRUCTIVE PULMONARY DISEASE WITH (ACUTE) EXACERBATION: ICD-10-CM

## 2023-01-01 DIAGNOSIS — Z87.891 PERSONAL HISTORY OF NICOTINE DEPENDENCE: ICD-10-CM

## 2023-01-01 DIAGNOSIS — R53.83 OTHER FATIGUE: ICD-10-CM

## 2023-01-01 DIAGNOSIS — R91.1 SOLITARY PULMONARY NODULE: ICD-10-CM

## 2023-01-01 DIAGNOSIS — J43.9 EMPHYSEMA, UNSPECIFIED: ICD-10-CM

## 2023-01-01 DIAGNOSIS — R11.2 NAUSEA WITH VOMITING, UNSPECIFIED: ICD-10-CM

## 2023-01-01 DIAGNOSIS — Z09 ENCOUNTER FOR FOLLOW-UP EXAMINATION AFTER COMPLETED TREATMENT FOR CONDITIONS OTHER THAN MALIGNANT NEOPLASM: ICD-10-CM

## 2023-01-01 DIAGNOSIS — Z95.1 PRESENCE OF AORTOCORONARY BYPASS GRAFT: ICD-10-CM

## 2023-01-01 DIAGNOSIS — I48.91 UNSPECIFIED ATRIAL FIBRILLATION: ICD-10-CM

## 2023-01-01 DIAGNOSIS — I48.92 UNSPECIFIED ATRIAL FLUTTER: ICD-10-CM

## 2023-01-01 DIAGNOSIS — Z87.898 PERSONAL HISTORY OF OTHER SPECIFIED CONDITIONS: ICD-10-CM

## 2023-01-01 DIAGNOSIS — E78.00 PURE HYPERCHOLESTEROLEMIA, UNSPECIFIED: ICD-10-CM

## 2023-01-01 DIAGNOSIS — I25.10 ATHEROSCLEROTIC HEART DISEASE OF NATIVE CORONARY ARTERY W/OUT ANGINA PECTORIS: ICD-10-CM

## 2023-01-01 DIAGNOSIS — Z95.818 PRESENCE OF OTHER CARDIAC IMPLANTS AND GRAFTS: ICD-10-CM

## 2023-01-01 DIAGNOSIS — R05.9 COUGH, UNSPECIFIED: ICD-10-CM

## 2023-01-01 DIAGNOSIS — J96.11 CHRONIC RESPIRATORY FAILURE WITH HYPOXIA: ICD-10-CM

## 2023-01-01 LAB
-  AMIKACIN: SIGNIFICANT CHANGE UP
-  AMOXICILLIN/CLAVULANIC ACID: SIGNIFICANT CHANGE UP
-  AMPICILLIN/SULBACTAM: SIGNIFICANT CHANGE UP
-  AMPICILLIN: SIGNIFICANT CHANGE UP
-  AZTREONAM: SIGNIFICANT CHANGE UP
-  CEFAZOLIN: SIGNIFICANT CHANGE UP
-  CEFEPIME: SIGNIFICANT CHANGE UP
-  CEFOXITIN: SIGNIFICANT CHANGE UP
-  CEFTRIAXONE: SIGNIFICANT CHANGE UP
-  CIPROFLOXACIN: SIGNIFICANT CHANGE UP
-  CLINDAMYCIN: SIGNIFICANT CHANGE UP
-  CLINDAMYCIN: SIGNIFICANT CHANGE UP
-  ERTAPENEM: SIGNIFICANT CHANGE UP
-  ERYTHROMYCIN: SIGNIFICANT CHANGE UP
-  ERYTHROMYCIN: SIGNIFICANT CHANGE UP
-  GENTAMICIN: SIGNIFICANT CHANGE UP
-  IMIPENEM: SIGNIFICANT CHANGE UP
-  LEVOFLOXACIN: SIGNIFICANT CHANGE UP
-  MEROPENEM: SIGNIFICANT CHANGE UP
-  OXACILLIN: SIGNIFICANT CHANGE UP
-  OXACILLIN: SIGNIFICANT CHANGE UP
-  PENICILLIN: SIGNIFICANT CHANGE UP
-  PENICILLIN: SIGNIFICANT CHANGE UP
-  PIPERACILLIN/TAZOBACTAM: SIGNIFICANT CHANGE UP
-  RIFAMPIN: SIGNIFICANT CHANGE UP
-  RIFAMPIN: SIGNIFICANT CHANGE UP
-  TETRACYCLINE: SIGNIFICANT CHANGE UP
-  TETRACYCLINE: SIGNIFICANT CHANGE UP
-  TOBRAMYCIN: SIGNIFICANT CHANGE UP
-  TRIMETHOPRIM/SULFAMETHOXAZOLE: SIGNIFICANT CHANGE UP
-  VANCOMYCIN: SIGNIFICANT CHANGE UP
-  VANCOMYCIN: SIGNIFICANT CHANGE UP
ALBUMIN SERPL ELPH-MCNC: 2.8 G/DL — LOW (ref 3.3–5.2)
ALBUMIN SERPL ELPH-MCNC: 2.8 G/DL — LOW (ref 3.3–5.2)
ALBUMIN SERPL ELPH-MCNC: 3.1 G/DL — LOW (ref 3.3–5.2)
ALBUMIN SERPL ELPH-MCNC: 3.2 G/DL — LOW (ref 3.3–5.2)
ALBUMIN SERPL ELPH-MCNC: 3.4 G/DL — SIGNIFICANT CHANGE UP (ref 3.3–5.2)
ALBUMIN SERPL ELPH-MCNC: 3.6 G/DL — SIGNIFICANT CHANGE UP (ref 3.3–5.2)
ALBUMIN SERPL ELPH-MCNC: 3.6 G/DL — SIGNIFICANT CHANGE UP (ref 3.3–5.2)
ALBUMIN SERPL ELPH-MCNC: 3.7 G/DL — SIGNIFICANT CHANGE UP (ref 3.3–5.2)
ALBUMIN SERPL ELPH-MCNC: 3.8 G/DL — SIGNIFICANT CHANGE UP (ref 3.3–5.2)
ALBUMIN SERPL ELPH-MCNC: 3.8 G/DL — SIGNIFICANT CHANGE UP (ref 3.3–5.2)
ALBUMIN SERPL ELPH-MCNC: 4.2 G/DL — SIGNIFICANT CHANGE UP (ref 3.3–5.2)
ALBUMIN SERPL ELPH-MCNC: 4.3 G/DL — SIGNIFICANT CHANGE UP (ref 3.3–5.2)
ALBUMIN SERPL ELPH-MCNC: 4.4 G/DL
ALP BLD-CCNC: 116 U/L
ALP SERPL-CCNC: 105 U/L — SIGNIFICANT CHANGE UP (ref 40–120)
ALP SERPL-CCNC: 105 U/L — SIGNIFICANT CHANGE UP (ref 40–120)
ALP SERPL-CCNC: 165 U/L — HIGH (ref 40–120)
ALP SERPL-CCNC: 165 U/L — HIGH (ref 40–120)
ALP SERPL-CCNC: 187 U/L — HIGH (ref 40–120)
ALP SERPL-CCNC: 187 U/L — HIGH (ref 40–120)
ALP SERPL-CCNC: 217 U/L — HIGH (ref 40–120)
ALP SERPL-CCNC: 217 U/L — HIGH (ref 40–120)
ALP SERPL-CCNC: 233 U/L — HIGH (ref 40–120)
ALP SERPL-CCNC: 233 U/L — HIGH (ref 40–120)
ALP SERPL-CCNC: 269 U/L — HIGH (ref 40–120)
ALP SERPL-CCNC: 269 U/L — HIGH (ref 40–120)
ALP SERPL-CCNC: 73 U/L — SIGNIFICANT CHANGE UP (ref 40–120)
ALP SERPL-CCNC: 75 U/L — SIGNIFICANT CHANGE UP (ref 40–120)
ALP SERPL-CCNC: 75 U/L — SIGNIFICANT CHANGE UP (ref 40–120)
ALP SERPL-CCNC: 76 U/L — SIGNIFICANT CHANGE UP (ref 40–120)
ALP SERPL-CCNC: 86 U/L — SIGNIFICANT CHANGE UP (ref 40–120)
ALP SERPL-CCNC: 89 U/L — SIGNIFICANT CHANGE UP (ref 40–120)
ALP SERPL-CCNC: 89 U/L — SIGNIFICANT CHANGE UP (ref 40–120)
ALP SERPL-CCNC: 95 U/L — SIGNIFICANT CHANGE UP (ref 40–120)
ALT FLD-CCNC: 14 U/L — SIGNIFICANT CHANGE UP
ALT FLD-CCNC: 14 U/L — SIGNIFICANT CHANGE UP
ALT FLD-CCNC: 16 U/L — SIGNIFICANT CHANGE UP
ALT FLD-CCNC: 16 U/L — SIGNIFICANT CHANGE UP
ALT FLD-CCNC: 23 U/L — SIGNIFICANT CHANGE UP
ALT FLD-CCNC: 24 U/L — SIGNIFICANT CHANGE UP
ALT FLD-CCNC: 30 U/L — SIGNIFICANT CHANGE UP
ALT FLD-CCNC: 30 U/L — SIGNIFICANT CHANGE UP
ALT FLD-CCNC: 43 U/L — HIGH
ALT FLD-CCNC: 43 U/L — HIGH
ALT FLD-CCNC: 45 U/L — HIGH
ALT FLD-CCNC: 45 U/L — HIGH
ALT FLD-CCNC: 46 U/L — HIGH
ALT FLD-CCNC: 46 U/L — HIGH
ALT FLD-CCNC: 53 U/L — HIGH
ALT FLD-CCNC: 60 U/L — HIGH
ALT FLD-CCNC: 60 U/L — HIGH
ALT SERPL-CCNC: 32 U/L
ANION GAP SERPL CALC-SCNC: 10 MMOL/L — SIGNIFICANT CHANGE UP (ref 5–17)
ANION GAP SERPL CALC-SCNC: 11 MMOL/L — SIGNIFICANT CHANGE UP (ref 5–17)
ANION GAP SERPL CALC-SCNC: 13 MMOL/L — SIGNIFICANT CHANGE UP (ref 5–17)
ANION GAP SERPL CALC-SCNC: 6 MMOL/L — SIGNIFICANT CHANGE UP (ref 5–17)
ANION GAP SERPL CALC-SCNC: 6 MMOL/L — SIGNIFICANT CHANGE UP (ref 5–17)
ANION GAP SERPL CALC-SCNC: 7 MMOL/L — SIGNIFICANT CHANGE UP (ref 5–17)
ANION GAP SERPL CALC-SCNC: 8 MMOL/L — SIGNIFICANT CHANGE UP (ref 5–17)
ANION GAP SERPL CALC-SCNC: 9 MMOL/L
ANION GAP SERPL CALC-SCNC: 9 MMOL/L — SIGNIFICANT CHANGE UP (ref 5–17)
ANISOCYTOSIS BLD QL: SLIGHT — SIGNIFICANT CHANGE UP
ANISOCYTOSIS BLD QL: SLIGHT — SIGNIFICANT CHANGE UP
APPEARANCE UR: CLEAR — SIGNIFICANT CHANGE UP
APPEARANCE UR: CLEAR — SIGNIFICANT CHANGE UP
APTT BLD: 143.7 SEC — CRITICAL HIGH (ref 24.5–35.6)
APTT BLD: 143.7 SEC — CRITICAL HIGH (ref 24.5–35.6)
APTT BLD: 28.7 SEC — SIGNIFICANT CHANGE UP (ref 24.5–35.6)
APTT BLD: 28.7 SEC — SIGNIFICANT CHANGE UP (ref 24.5–35.6)
APTT BLD: 32.7 SEC
APTT BLD: 36.3 SEC — HIGH (ref 24.5–35.6)
APTT BLD: 36.3 SEC — HIGH (ref 24.5–35.6)
APTT BLD: 37 SEC — HIGH (ref 24.5–35.6)
APTT BLD: 42.7 SEC — HIGH (ref 24.5–35.6)
APTT BLD: 42.7 SEC — HIGH (ref 24.5–35.6)
APTT BLD: 46 SEC — HIGH (ref 24.5–35.6)
APTT BLD: 46 SEC — HIGH (ref 24.5–35.6)
APTT BLD: 49.1 SEC — HIGH (ref 24.5–35.6)
APTT BLD: 49.1 SEC — HIGH (ref 24.5–35.6)
APTT BLD: 68.4 SEC — HIGH (ref 24.5–35.6)
APTT BLD: 68.4 SEC — HIGH (ref 24.5–35.6)
APTT BLD: 72.3 SEC — HIGH (ref 24.5–35.6)
APTT BLD: 72.3 SEC — HIGH (ref 24.5–35.6)
APTT BLD: 73.6 SEC — HIGH (ref 24.5–35.6)
APTT BLD: 73.6 SEC — HIGH (ref 24.5–35.6)
APTT BLD: 74.4 SEC — HIGH (ref 24.5–35.6)
APTT BLD: 74.4 SEC — HIGH (ref 24.5–35.6)
APTT BLD: 78.9 SEC — HIGH (ref 24.5–35.6)
APTT BLD: 78.9 SEC — HIGH (ref 24.5–35.6)
AST SERPL-CCNC: 15 U/L — SIGNIFICANT CHANGE UP
AST SERPL-CCNC: 16 U/L — SIGNIFICANT CHANGE UP
AST SERPL-CCNC: 18 U/L — SIGNIFICANT CHANGE UP
AST SERPL-CCNC: 19 U/L — SIGNIFICANT CHANGE UP
AST SERPL-CCNC: 20 U/L — SIGNIFICANT CHANGE UP
AST SERPL-CCNC: 25 U/L
AST SERPL-CCNC: 25 U/L — SIGNIFICANT CHANGE UP
AST SERPL-CCNC: 25 U/L — SIGNIFICANT CHANGE UP
AST SERPL-CCNC: 28 U/L — SIGNIFICANT CHANGE UP
AST SERPL-CCNC: 28 U/L — SIGNIFICANT CHANGE UP
AST SERPL-CCNC: 29 U/L — SIGNIFICANT CHANGE UP
AST SERPL-CCNC: 29 U/L — SIGNIFICANT CHANGE UP
AST SERPL-CCNC: 34 U/L — SIGNIFICANT CHANGE UP
AST SERPL-CCNC: 34 U/L — SIGNIFICANT CHANGE UP
AST SERPL-CCNC: 41 U/L — HIGH
AST SERPL-CCNC: 41 U/L — HIGH
AST SERPL-CCNC: 51 U/L — HIGH
AST SERPL-CCNC: 51 U/L — HIGH
BASE EXCESS BLDV CALC-SCNC: 7.7 MMOL/L — HIGH (ref -2–3)
BASE EXCESS BLDV CALC-SCNC: 7.7 MMOL/L — HIGH (ref -2–3)
BASOPHILS # BLD AUTO: 0 K/UL — SIGNIFICANT CHANGE UP (ref 0–0.2)
BASOPHILS # BLD AUTO: 0 K/UL — SIGNIFICANT CHANGE UP (ref 0–0.2)
BASOPHILS # BLD AUTO: 0.01 K/UL — SIGNIFICANT CHANGE UP (ref 0–0.2)
BASOPHILS # BLD AUTO: 0.01 K/UL — SIGNIFICANT CHANGE UP (ref 0–0.2)
BASOPHILS # BLD AUTO: 0.02 K/UL — SIGNIFICANT CHANGE UP (ref 0–0.2)
BASOPHILS # BLD AUTO: 0.02 K/UL — SIGNIFICANT CHANGE UP (ref 0–0.2)
BASOPHILS # BLD AUTO: 0.03 K/UL — SIGNIFICANT CHANGE UP (ref 0–0.2)
BASOPHILS # BLD AUTO: 0.03 K/UL — SIGNIFICANT CHANGE UP (ref 0–0.2)
BASOPHILS # BLD AUTO: 0.06 K/UL — SIGNIFICANT CHANGE UP (ref 0–0.2)
BASOPHILS # BLD AUTO: 0.07 K/UL — SIGNIFICANT CHANGE UP (ref 0–0.2)
BASOPHILS # BLD AUTO: 0.1 K/UL — SIGNIFICANT CHANGE UP (ref 0–0.2)
BASOPHILS NFR BLD AUTO: 0 % — SIGNIFICANT CHANGE UP (ref 0–2)
BASOPHILS NFR BLD AUTO: 0 % — SIGNIFICANT CHANGE UP (ref 0–2)
BASOPHILS NFR BLD AUTO: 0.1 % — SIGNIFICANT CHANGE UP (ref 0–2)
BASOPHILS NFR BLD AUTO: 0.1 % — SIGNIFICANT CHANGE UP (ref 0–2)
BASOPHILS NFR BLD AUTO: 0.3 % — SIGNIFICANT CHANGE UP (ref 0–2)
BASOPHILS NFR BLD AUTO: 0.3 % — SIGNIFICANT CHANGE UP (ref 0–2)
BASOPHILS NFR BLD AUTO: 0.6 % — SIGNIFICANT CHANGE UP (ref 0–2)
BASOPHILS NFR BLD AUTO: 0.6 % — SIGNIFICANT CHANGE UP (ref 0–2)
BASOPHILS NFR BLD AUTO: 0.9 % — SIGNIFICANT CHANGE UP (ref 0–2)
BASOPHILS NFR BLD AUTO: 0.9 % — SIGNIFICANT CHANGE UP (ref 0–2)
BASOPHILS NFR BLD AUTO: 1.1 % — SIGNIFICANT CHANGE UP (ref 0–2)
BASOPHILS NFR BLD AUTO: 1.2 % — SIGNIFICANT CHANGE UP (ref 0–2)
BASOPHILS NFR BLD AUTO: 1.2 % — SIGNIFICANT CHANGE UP (ref 0–2)
BASOPHILS NFR BLD AUTO: 1.4 % — SIGNIFICANT CHANGE UP (ref 0–2)
BILIRUB SERPL-MCNC: 0.3 MG/DL
BILIRUB SERPL-MCNC: 0.3 MG/DL — LOW (ref 0.4–2)
BILIRUB SERPL-MCNC: 0.4 MG/DL — SIGNIFICANT CHANGE UP (ref 0.4–2)
BILIRUB SERPL-MCNC: 0.5 MG/DL — SIGNIFICANT CHANGE UP (ref 0.4–2)
BILIRUB SERPL-MCNC: 1 MG/DL — SIGNIFICANT CHANGE UP (ref 0.4–2)
BILIRUB SERPL-MCNC: 1.1 MG/DL — SIGNIFICANT CHANGE UP (ref 0.4–2)
BILIRUB SERPL-MCNC: 1.1 MG/DL — SIGNIFICANT CHANGE UP (ref 0.4–2)
BILIRUB SERPL-MCNC: 1.6 MG/DL — SIGNIFICANT CHANGE UP (ref 0.4–2)
BILIRUB SERPL-MCNC: 1.6 MG/DL — SIGNIFICANT CHANGE UP (ref 0.4–2)
BILIRUB UR-MCNC: NEGATIVE — SIGNIFICANT CHANGE UP
BILIRUB UR-MCNC: NEGATIVE — SIGNIFICANT CHANGE UP
BLD GP AB SCN SERPL QL: SIGNIFICANT CHANGE UP
BUN SERPL-MCNC: 10 MG/DL
BUN SERPL-MCNC: 10.2 MG/DL — SIGNIFICANT CHANGE UP (ref 8–20)
BUN SERPL-MCNC: 10.9 MG/DL — SIGNIFICANT CHANGE UP (ref 8–20)
BUN SERPL-MCNC: 11.1 MG/DL — SIGNIFICANT CHANGE UP (ref 8–20)
BUN SERPL-MCNC: 11.4 MG/DL — SIGNIFICANT CHANGE UP (ref 8–20)
BUN SERPL-MCNC: 11.6 MG/DL — SIGNIFICANT CHANGE UP (ref 8–20)
BUN SERPL-MCNC: 11.6 MG/DL — SIGNIFICANT CHANGE UP (ref 8–20)
BUN SERPL-MCNC: 12 MG/DL — SIGNIFICANT CHANGE UP (ref 8–20)
BUN SERPL-MCNC: 12.2 MG/DL — SIGNIFICANT CHANGE UP (ref 8–20)
BUN SERPL-MCNC: 12.2 MG/DL — SIGNIFICANT CHANGE UP (ref 8–20)
BUN SERPL-MCNC: 16.5 MG/DL — SIGNIFICANT CHANGE UP (ref 8–20)
BUN SERPL-MCNC: 16.5 MG/DL — SIGNIFICANT CHANGE UP (ref 8–20)
BUN SERPL-MCNC: 16.8 MG/DL — SIGNIFICANT CHANGE UP (ref 8–20)
BUN SERPL-MCNC: 16.8 MG/DL — SIGNIFICANT CHANGE UP (ref 8–20)
BUN SERPL-MCNC: 17.8 MG/DL — SIGNIFICANT CHANGE UP (ref 8–20)
BUN SERPL-MCNC: 17.8 MG/DL — SIGNIFICANT CHANGE UP (ref 8–20)
BUN SERPL-MCNC: 19.4 MG/DL — SIGNIFICANT CHANGE UP (ref 8–20)
BUN SERPL-MCNC: 19.4 MG/DL — SIGNIFICANT CHANGE UP (ref 8–20)
BUN SERPL-MCNC: 7.7 MG/DL — LOW (ref 8–20)
BUN SERPL-MCNC: 7.7 MG/DL — LOW (ref 8–20)
BUN SERPL-MCNC: 8.2 MG/DL — SIGNIFICANT CHANGE UP (ref 8–20)
BUN SERPL-MCNC: 9 MG/DL — SIGNIFICANT CHANGE UP (ref 8–20)
BUN SERPL-MCNC: 9 MG/DL — SIGNIFICANT CHANGE UP (ref 8–20)
BUN SERPL-MCNC: 9.3 MG/DL — SIGNIFICANT CHANGE UP (ref 8–20)
BUN SERPL-MCNC: 9.5 MG/DL — SIGNIFICANT CHANGE UP (ref 8–20)
BUN SERPL-MCNC: 9.9 MG/DL — SIGNIFICANT CHANGE UP (ref 8–20)
BUN SERPL-MCNC: 9.9 MG/DL — SIGNIFICANT CHANGE UP (ref 8–20)
CA-I SERPL-SCNC: 1.14 MMOL/L — LOW (ref 1.15–1.33)
CA-I SERPL-SCNC: 1.14 MMOL/L — LOW (ref 1.15–1.33)
CALCIUM SERPL-MCNC: 10.1 MG/DL
CALCIUM SERPL-MCNC: 8 MG/DL — LOW (ref 8.4–10.5)
CALCIUM SERPL-MCNC: 8 MG/DL — LOW (ref 8.4–10.5)
CALCIUM SERPL-MCNC: 8.1 MG/DL — LOW (ref 8.4–10.5)
CALCIUM SERPL-MCNC: 8.1 MG/DL — LOW (ref 8.4–10.5)
CALCIUM SERPL-MCNC: 8.4 MG/DL — SIGNIFICANT CHANGE UP (ref 8.4–10.5)
CALCIUM SERPL-MCNC: 8.5 MG/DL — SIGNIFICANT CHANGE UP (ref 8.4–10.5)
CALCIUM SERPL-MCNC: 8.6 MG/DL — SIGNIFICANT CHANGE UP (ref 8.4–10.5)
CALCIUM SERPL-MCNC: 8.6 MG/DL — SIGNIFICANT CHANGE UP (ref 8.4–10.5)
CALCIUM SERPL-MCNC: 8.7 MG/DL — SIGNIFICANT CHANGE UP (ref 8.4–10.5)
CALCIUM SERPL-MCNC: 8.8 MG/DL — SIGNIFICANT CHANGE UP (ref 8.4–10.5)
CALCIUM SERPL-MCNC: 8.8 MG/DL — SIGNIFICANT CHANGE UP (ref 8.4–10.5)
CALCIUM SERPL-MCNC: 8.9 MG/DL — SIGNIFICANT CHANGE UP (ref 8.4–10.5)
CALCIUM SERPL-MCNC: 9 MG/DL — SIGNIFICANT CHANGE UP (ref 8.4–10.5)
CALCIUM SERPL-MCNC: 9.2 MG/DL — SIGNIFICANT CHANGE UP (ref 8.4–10.5)
CALCIUM SERPL-MCNC: 9.4 MG/DL — SIGNIFICANT CHANGE UP (ref 8.4–10.5)
CALCIUM SERPL-MCNC: 9.5 MG/DL — SIGNIFICANT CHANGE UP (ref 8.4–10.5)
CEA SERPL-MCNC: 1.3 NG/ML
CHLORIDE BLDV-SCNC: 97 MMOL/L — SIGNIFICANT CHANGE UP (ref 96–108)
CHLORIDE BLDV-SCNC: 97 MMOL/L — SIGNIFICANT CHANGE UP (ref 96–108)
CHLORIDE SERPL-SCNC: 100 MMOL/L — SIGNIFICANT CHANGE UP (ref 96–108)
CHLORIDE SERPL-SCNC: 100 MMOL/L — SIGNIFICANT CHANGE UP (ref 96–108)
CHLORIDE SERPL-SCNC: 101 MMOL/L — SIGNIFICANT CHANGE UP (ref 96–108)
CHLORIDE SERPL-SCNC: 101 MMOL/L — SIGNIFICANT CHANGE UP (ref 96–108)
CHLORIDE SERPL-SCNC: 103 MMOL/L
CHLORIDE SERPL-SCNC: 103 MMOL/L — SIGNIFICANT CHANGE UP (ref 96–108)
CHLORIDE SERPL-SCNC: 103 MMOL/L — SIGNIFICANT CHANGE UP (ref 96–108)
CHLORIDE SERPL-SCNC: 105 MMOL/L — SIGNIFICANT CHANGE UP (ref 96–108)
CHLORIDE SERPL-SCNC: 108 MMOL/L — SIGNIFICANT CHANGE UP (ref 96–108)
CHLORIDE SERPL-SCNC: 94 MMOL/L — LOW (ref 96–108)
CHLORIDE SERPL-SCNC: 95 MMOL/L — LOW (ref 96–108)
CHLORIDE SERPL-SCNC: 96 MMOL/L — SIGNIFICANT CHANGE UP (ref 96–108)
CHLORIDE SERPL-SCNC: 96 MMOL/L — SIGNIFICANT CHANGE UP (ref 96–108)
CHLORIDE SERPL-SCNC: 97 MMOL/L — SIGNIFICANT CHANGE UP (ref 96–108)
CHLORIDE SERPL-SCNC: 97 MMOL/L — SIGNIFICANT CHANGE UP (ref 96–108)
CHLORIDE SERPL-SCNC: 98 MMOL/L — SIGNIFICANT CHANGE UP (ref 96–108)
CHLORIDE SERPL-SCNC: 99 MMOL/L — SIGNIFICANT CHANGE UP (ref 96–108)
CHLORIDE SERPL-SCNC: 99 MMOL/L — SIGNIFICANT CHANGE UP (ref 96–108)
CK SERPL-CCNC: 54 U/L — SIGNIFICANT CHANGE UP (ref 30–200)
CO2 SERPL-SCNC: 26 MMOL/L — SIGNIFICANT CHANGE UP (ref 22–29)
CO2 SERPL-SCNC: 26 MMOL/L — SIGNIFICANT CHANGE UP (ref 22–29)
CO2 SERPL-SCNC: 28 MMOL/L — SIGNIFICANT CHANGE UP (ref 22–29)
CO2 SERPL-SCNC: 28 MMOL/L — SIGNIFICANT CHANGE UP (ref 22–29)
CO2 SERPL-SCNC: 30 MMOL/L — HIGH (ref 22–29)
CO2 SERPL-SCNC: 31 MMOL/L — HIGH (ref 22–29)
CO2 SERPL-SCNC: 32 MMOL/L
CO2 SERPL-SCNC: 32 MMOL/L — HIGH (ref 22–29)
CO2 SERPL-SCNC: 33 MMOL/L — HIGH (ref 22–29)
CO2 SERPL-SCNC: 34 MMOL/L — HIGH (ref 22–29)
CO2 SERPL-SCNC: 34 MMOL/L — HIGH (ref 22–29)
CO2 SERPL-SCNC: 35 MMOL/L — HIGH (ref 22–29)
COLOR SPEC: YELLOW — SIGNIFICANT CHANGE UP
COLOR SPEC: YELLOW — SIGNIFICANT CHANGE UP
CREAT SERPL-MCNC: 0.4 MG/DL — LOW (ref 0.5–1.3)
CREAT SERPL-MCNC: 0.41 MG/DL — LOW (ref 0.5–1.3)
CREAT SERPL-MCNC: 0.41 MG/DL — LOW (ref 0.5–1.3)
CREAT SERPL-MCNC: 0.42 MG/DL — LOW (ref 0.5–1.3)
CREAT SERPL-MCNC: 0.44 MG/DL — LOW (ref 0.5–1.3)
CREAT SERPL-MCNC: 0.45 MG/DL — LOW (ref 0.5–1.3)
CREAT SERPL-MCNC: 0.47 MG/DL — LOW (ref 0.5–1.3)
CREAT SERPL-MCNC: 0.47 MG/DL — LOW (ref 0.5–1.3)
CREAT SERPL-MCNC: 0.49 MG/DL — LOW (ref 0.5–1.3)
CREAT SERPL-MCNC: 0.51 MG/DL — SIGNIFICANT CHANGE UP (ref 0.5–1.3)
CREAT SERPL-MCNC: 0.52 MG/DL — SIGNIFICANT CHANGE UP (ref 0.5–1.3)
CREAT SERPL-MCNC: 0.56 MG/DL — SIGNIFICANT CHANGE UP (ref 0.5–1.3)
CREAT SERPL-MCNC: 0.58 MG/DL — SIGNIFICANT CHANGE UP (ref 0.5–1.3)
CREAT SERPL-MCNC: 0.6 MG/DL — SIGNIFICANT CHANGE UP (ref 0.5–1.3)
CREAT SERPL-MCNC: 0.6 MG/DL — SIGNIFICANT CHANGE UP (ref 0.5–1.3)
CREAT SERPL-MCNC: 0.63 MG/DL
CULTURE RESULTS: SIGNIFICANT CHANGE UP
D DIMER BLD IA.RAPID-MCNC: <150 NG/ML DDU — SIGNIFICANT CHANGE UP
DIFF PNL FLD: NEGATIVE — SIGNIFICANT CHANGE UP
DIFF PNL FLD: NEGATIVE — SIGNIFICANT CHANGE UP
DIGOXIN SERPL-MCNC: 2.1 NG/ML — HIGH (ref 0.8–2)
DIGOXIN SERPL-MCNC: 2.1 NG/ML — HIGH (ref 0.8–2)
EGFR: 100 ML/MIN/1.73M2 — SIGNIFICANT CHANGE UP
EGFR: 100 ML/MIN/1.73M2 — SIGNIFICANT CHANGE UP
EGFR: 101 ML/MIN/1.73M2 — SIGNIFICANT CHANGE UP
EGFR: 102 ML/MIN/1.73M2 — SIGNIFICANT CHANGE UP
EGFR: 104 ML/MIN/1.73M2 — SIGNIFICANT CHANGE UP
EGFR: 105 ML/MIN/1.73M2 — SIGNIFICANT CHANGE UP
EGFR: 106 ML/MIN/1.73M2 — SIGNIFICANT CHANGE UP
EGFR: 108 ML/MIN/1.73M2 — SIGNIFICANT CHANGE UP
EGFR: 108 ML/MIN/1.73M2 — SIGNIFICANT CHANGE UP
EGFR: 109 ML/MIN/1.73M2 — SIGNIFICANT CHANGE UP
EGFR: 110 ML/MIN/1.73M2 — SIGNIFICANT CHANGE UP
EGFR: 111 ML/MIN/1.73M2 — SIGNIFICANT CHANGE UP
EGFR: 112 ML/MIN/1.73M2 — SIGNIFICANT CHANGE UP
EGFR: 112 ML/MIN/1.73M2 — SIGNIFICANT CHANGE UP
EGFR: 113 ML/MIN/1.73M2 — SIGNIFICANT CHANGE UP
EGFR: 99 ML/MIN/1.73M2
EOSINOPHIL # BLD AUTO: 0 K/UL — SIGNIFICANT CHANGE UP (ref 0–0.5)
EOSINOPHIL # BLD AUTO: 0 K/UL — SIGNIFICANT CHANGE UP (ref 0–0.5)
EOSINOPHIL # BLD AUTO: 0.11 K/UL — SIGNIFICANT CHANGE UP (ref 0–0.5)
EOSINOPHIL # BLD AUTO: 0.11 K/UL — SIGNIFICANT CHANGE UP (ref 0–0.5)
EOSINOPHIL # BLD AUTO: 0.26 K/UL — SIGNIFICANT CHANGE UP (ref 0–0.5)
EOSINOPHIL # BLD AUTO: 0.26 K/UL — SIGNIFICANT CHANGE UP (ref 0–0.5)
EOSINOPHIL # BLD AUTO: 0.3 K/UL — SIGNIFICANT CHANGE UP (ref 0–0.5)
EOSINOPHIL # BLD AUTO: 0.39 K/UL — SIGNIFICANT CHANGE UP (ref 0–0.5)
EOSINOPHIL # BLD AUTO: 0.43 K/UL — SIGNIFICANT CHANGE UP (ref 0–0.5)
EOSINOPHIL NFR BLD AUTO: 0 % — SIGNIFICANT CHANGE UP (ref 0–6)
EOSINOPHIL NFR BLD AUTO: 0 % — SIGNIFICANT CHANGE UP (ref 0–6)
EOSINOPHIL NFR BLD AUTO: 1.1 % — SIGNIFICANT CHANGE UP (ref 0–6)
EOSINOPHIL NFR BLD AUTO: 1.1 % — SIGNIFICANT CHANGE UP (ref 0–6)
EOSINOPHIL NFR BLD AUTO: 5 % — SIGNIFICANT CHANGE UP (ref 0–6)
EOSINOPHIL NFR BLD AUTO: 5 % — SIGNIFICANT CHANGE UP (ref 0–6)
EOSINOPHIL NFR BLD AUTO: 5.1 % — SIGNIFICANT CHANGE UP (ref 0–6)
EOSINOPHIL NFR BLD AUTO: 5.1 % — SIGNIFICANT CHANGE UP (ref 0–6)
EOSINOPHIL NFR BLD AUTO: 5.7 % — SIGNIFICANT CHANGE UP (ref 0–6)
EOSINOPHIL NFR BLD AUTO: 5.7 % — SIGNIFICANT CHANGE UP (ref 0–6)
EOSINOPHIL NFR BLD AUTO: 6 % — SIGNIFICANT CHANGE UP (ref 0–6)
EOSINOPHIL NFR BLD AUTO: 6 % — SIGNIFICANT CHANGE UP (ref 0–6)
EOSINOPHIL NFR BLD AUTO: 6.8 % — HIGH (ref 0–6)
EOSINOPHIL NFR BLD AUTO: 9.4 % — HIGH (ref 0–6)
FERRITIN SERPL-MCNC: 221 NG/ML — SIGNIFICANT CHANGE UP (ref 30–400)
FERRITIN SERPL-MCNC: 221 NG/ML — SIGNIFICANT CHANGE UP (ref 30–400)
GAS PNL BLDA: SIGNIFICANT CHANGE UP
GAS PNL BLDA: SIGNIFICANT CHANGE UP
GAS PNL BLDV: 133 MMOL/L — LOW (ref 136–145)
GAS PNL BLDV: 133 MMOL/L — LOW (ref 136–145)
GAS PNL BLDV: SIGNIFICANT CHANGE UP
GAS PNL BLDV: SIGNIFICANT CHANGE UP
GIANT PLATELETS BLD QL SMEAR: PRESENT — SIGNIFICANT CHANGE UP
GIANT PLATELETS BLD QL SMEAR: PRESENT — SIGNIFICANT CHANGE UP
GLUCOSE BLDV-MCNC: 91 MG/DL — SIGNIFICANT CHANGE UP (ref 70–99)
GLUCOSE BLDV-MCNC: 91 MG/DL — SIGNIFICANT CHANGE UP (ref 70–99)
GLUCOSE SERPL-MCNC: 101 MG/DL — HIGH (ref 70–99)
GLUCOSE SERPL-MCNC: 101 MG/DL — HIGH (ref 70–99)
GLUCOSE SERPL-MCNC: 103 MG/DL — HIGH (ref 70–99)
GLUCOSE SERPL-MCNC: 106 MG/DL — HIGH (ref 70–99)
GLUCOSE SERPL-MCNC: 106 MG/DL — HIGH (ref 70–99)
GLUCOSE SERPL-MCNC: 107 MG/DL — HIGH (ref 70–99)
GLUCOSE SERPL-MCNC: 107 MG/DL — HIGH (ref 70–99)
GLUCOSE SERPL-MCNC: 108 MG/DL — HIGH (ref 70–99)
GLUCOSE SERPL-MCNC: 108 MG/DL — HIGH (ref 70–99)
GLUCOSE SERPL-MCNC: 115 MG/DL — HIGH (ref 70–99)
GLUCOSE SERPL-MCNC: 115 MG/DL — HIGH (ref 70–99)
GLUCOSE SERPL-MCNC: 116 MG/DL
GLUCOSE SERPL-MCNC: 118 MG/DL — HIGH (ref 70–99)
GLUCOSE SERPL-MCNC: 118 MG/DL — HIGH (ref 70–99)
GLUCOSE SERPL-MCNC: 119 MG/DL — HIGH (ref 70–99)
GLUCOSE SERPL-MCNC: 119 MG/DL — HIGH (ref 70–99)
GLUCOSE SERPL-MCNC: 121 MG/DL — HIGH (ref 70–99)
GLUCOSE SERPL-MCNC: 134 MG/DL — HIGH (ref 70–99)
GLUCOSE SERPL-MCNC: 138 MG/DL — HIGH (ref 70–99)
GLUCOSE SERPL-MCNC: 138 MG/DL — HIGH (ref 70–99)
GLUCOSE SERPL-MCNC: 149 MG/DL — HIGH (ref 70–99)
GLUCOSE SERPL-MCNC: 158 MG/DL — HIGH (ref 70–99)
GLUCOSE SERPL-MCNC: 158 MG/DL — HIGH (ref 70–99)
GLUCOSE SERPL-MCNC: 164 MG/DL — HIGH (ref 70–99)
GLUCOSE SERPL-MCNC: 164 MG/DL — HIGH (ref 70–99)
GLUCOSE SERPL-MCNC: 93 MG/DL — SIGNIFICANT CHANGE UP (ref 70–99)
GLUCOSE SERPL-MCNC: 96 MG/DL — SIGNIFICANT CHANGE UP (ref 70–99)
GLUCOSE SERPL-MCNC: 98 MG/DL — SIGNIFICANT CHANGE UP (ref 70–99)
GLUCOSE UR QL: NEGATIVE MG/DL — SIGNIFICANT CHANGE UP
GLUCOSE UR QL: NEGATIVE MG/DL — SIGNIFICANT CHANGE UP
GRAM STN FLD: SIGNIFICANT CHANGE UP
HBV CORE IGG+IGM SER QL: NONREACTIVE
HBV SURFACE AB SER QL: NONREACTIVE
HBV SURFACE AG SER QL: NONREACTIVE
HCO3 BLDV-SCNC: 33 MMOL/L — HIGH (ref 22–29)
HCO3 BLDV-SCNC: 33 MMOL/L — HIGH (ref 22–29)
HCT VFR BLD CALC: 27.3 % — LOW (ref 39–50)
HCT VFR BLD CALC: 27.3 % — LOW (ref 39–50)
HCT VFR BLD CALC: 29.4 % — LOW (ref 39–50)
HCT VFR BLD CALC: 29.4 % — LOW (ref 39–50)
HCT VFR BLD CALC: 29.8 % — LOW (ref 39–50)
HCT VFR BLD CALC: 29.8 % — LOW (ref 39–50)
HCT VFR BLD CALC: 29.9 % — LOW (ref 39–50)
HCT VFR BLD CALC: 29.9 % — LOW (ref 39–50)
HCT VFR BLD CALC: 30 % — LOW (ref 39–50)
HCT VFR BLD CALC: 30 % — LOW (ref 39–50)
HCT VFR BLD CALC: 30.1 % — LOW (ref 39–50)
HCT VFR BLD CALC: 30.1 % — LOW (ref 39–50)
HCT VFR BLD CALC: 30.5 % — LOW (ref 39–50)
HCT VFR BLD CALC: 30.5 % — LOW (ref 39–50)
HCT VFR BLD CALC: 31.4 % — LOW (ref 39–50)
HCT VFR BLD CALC: 31.4 % — LOW (ref 39–50)
HCT VFR BLD CALC: 31.9 % — LOW (ref 39–50)
HCT VFR BLD CALC: 31.9 % — LOW (ref 39–50)
HCT VFR BLD CALC: 32 % — LOW (ref 39–50)
HCT VFR BLD CALC: 32 % — LOW (ref 39–50)
HCT VFR BLD CALC: 32.5 % — LOW (ref 39–50)
HCT VFR BLD CALC: 32.5 % — LOW (ref 39–50)
HCT VFR BLD CALC: 33 % — LOW (ref 39–50)
HCT VFR BLD CALC: 33.1 % — LOW (ref 39–50)
HCT VFR BLD CALC: 34.2 % — LOW (ref 39–50)
HCT VFR BLD CALC: 34.6 % — LOW (ref 39–50)
HCT VFR BLD CALC: 34.6 % — LOW (ref 39–50)
HCT VFR BLD CALC: 34.9 % — LOW (ref 39–50)
HCT VFR BLD CALC: 37.8 % — LOW (ref 39–50)
HCT VFR BLD CALC: 38.2 % — LOW (ref 39–50)
HCT VFR BLD CALC: 38.4 % — LOW (ref 39–50)
HCT VFR BLD CALC: 38.4 % — LOW (ref 39–50)
HCT VFR BLD CALC: 38.8 % — LOW (ref 39–50)
HCT VFR BLD CALC: 39.6 % — SIGNIFICANT CHANGE UP (ref 39–50)
HCT VFR BLD CALC: 39.6 % — SIGNIFICANT CHANGE UP (ref 39–50)
HCT VFR BLDA CALC: 36 % — SIGNIFICANT CHANGE UP
HCT VFR BLDA CALC: 36 % — SIGNIFICANT CHANGE UP
HCV AB SER QL: NONREACTIVE
HCV S/CO RATIO: 0.09 S/CO
HGB BLD CALC-MCNC: 12.1 G/DL — LOW (ref 12.6–17.4)
HGB BLD CALC-MCNC: 12.1 G/DL — LOW (ref 12.6–17.4)
HGB BLD-MCNC: 10.1 G/DL — LOW (ref 13–17)
HGB BLD-MCNC: 10.1 G/DL — LOW (ref 13–17)
HGB BLD-MCNC: 10.3 G/DL — LOW (ref 13–17)
HGB BLD-MCNC: 10.3 G/DL — LOW (ref 13–17)
HGB BLD-MCNC: 10.4 G/DL — LOW (ref 13–17)
HGB BLD-MCNC: 10.4 G/DL — LOW (ref 13–17)
HGB BLD-MCNC: 10.6 G/DL — LOW (ref 13–17)
HGB BLD-MCNC: 10.6 G/DL — LOW (ref 13–17)
HGB BLD-MCNC: 10.7 G/DL — LOW (ref 13–17)
HGB BLD-MCNC: 10.7 G/DL — LOW (ref 13–17)
HGB BLD-MCNC: 10.8 G/DL — LOW (ref 13–17)
HGB BLD-MCNC: 10.9 G/DL — LOW (ref 13–17)
HGB BLD-MCNC: 10.9 G/DL — LOW (ref 13–17)
HGB BLD-MCNC: 11.2 G/DL — LOW (ref 13–17)
HGB BLD-MCNC: 11.5 G/DL — LOW (ref 13–17)
HGB BLD-MCNC: 11.5 G/DL — LOW (ref 13–17)
HGB BLD-MCNC: 12.4 G/DL — LOW (ref 13–17)
HGB BLD-MCNC: 12.4 G/DL — LOW (ref 13–17)
HGB BLD-MCNC: 12.6 G/DL — LOW (ref 13–17)
HGB BLD-MCNC: 12.7 G/DL — LOW (ref 13–17)
HGB BLD-MCNC: 12.8 G/DL — LOW (ref 13–17)
HGB BLD-MCNC: 9.1 G/DL — LOW (ref 13–17)
HGB BLD-MCNC: 9.1 G/DL — LOW (ref 13–17)
HGB BLD-MCNC: 9.6 G/DL — LOW (ref 13–17)
HGB BLD-MCNC: 9.6 G/DL — LOW (ref 13–17)
HGB BLD-MCNC: 9.7 G/DL — LOW (ref 13–17)
HGB BLD-MCNC: 9.8 G/DL — LOW (ref 13–17)
HGB BLD-MCNC: 9.8 G/DL — LOW (ref 13–17)
HGB BLD-MCNC: 9.9 G/DL — LOW (ref 13–17)
HGB BLD-MCNC: 9.9 G/DL — LOW (ref 13–17)
HYPOCHROMIA BLD QL: SLIGHT — SIGNIFICANT CHANGE UP
HYPOCHROMIA BLD QL: SLIGHT — SIGNIFICANT CHANGE UP
IMM GRANULOCYTES NFR BLD AUTO: 0.2 % — SIGNIFICANT CHANGE UP (ref 0–0.9)
IMM GRANULOCYTES NFR BLD AUTO: 0.2 % — SIGNIFICANT CHANGE UP (ref 0–0.9)
IMM GRANULOCYTES NFR BLD AUTO: 0.6 % — SIGNIFICANT CHANGE UP (ref 0–0.9)
IMM GRANULOCYTES NFR BLD AUTO: 0.6 % — SIGNIFICANT CHANGE UP (ref 0–0.9)
IMM GRANULOCYTES NFR BLD AUTO: 0.8 % — SIGNIFICANT CHANGE UP (ref 0–0.9)
IMM GRANULOCYTES NFR BLD AUTO: 0.8 % — SIGNIFICANT CHANGE UP (ref 0–0.9)
IMM GRANULOCYTES NFR BLD AUTO: 1.1 % — HIGH (ref 0–0.9)
IMM GRANULOCYTES NFR BLD AUTO: 1.1 % — HIGH (ref 0–0.9)
INR BLD: 1.12 RATIO — SIGNIFICANT CHANGE UP (ref 0.85–1.18)
INR BLD: 1.12 RATIO — SIGNIFICANT CHANGE UP (ref 0.85–1.18)
INR BLD: 1.26 RATIO — HIGH (ref 0.85–1.18)
INR PPP: 0.96 RATIO
IRON SATN MFR SERPL: 18 % — SIGNIFICANT CHANGE UP (ref 16–55)
IRON SATN MFR SERPL: 18 % — SIGNIFICANT CHANGE UP (ref 16–55)
IRON SATN MFR SERPL: 52 UG/DL — LOW (ref 59–158)
IRON SATN MFR SERPL: 52 UG/DL — LOW (ref 59–158)
KETONES UR-MCNC: NEGATIVE MG/DL — SIGNIFICANT CHANGE UP
KETONES UR-MCNC: NEGATIVE MG/DL — SIGNIFICANT CHANGE UP
LACTATE BLDV-MCNC: 1.4 MMOL/L — SIGNIFICANT CHANGE UP (ref 0.5–2)
LACTATE BLDV-MCNC: 1.4 MMOL/L — SIGNIFICANT CHANGE UP (ref 0.5–2)
LEGIONELLA AG UR QL: NEGATIVE — SIGNIFICANT CHANGE UP
LEGIONELLA AG UR QL: NEGATIVE — SIGNIFICANT CHANGE UP
LEUKOCYTE ESTERASE UR-ACNC: NEGATIVE — SIGNIFICANT CHANGE UP
LEUKOCYTE ESTERASE UR-ACNC: NEGATIVE — SIGNIFICANT CHANGE UP
LYMPHOCYTES # BLD AUTO: 0.38 K/UL — LOW (ref 1–3.3)
LYMPHOCYTES # BLD AUTO: 0.38 K/UL — LOW (ref 1–3.3)
LYMPHOCYTES # BLD AUTO: 0.59 K/UL — LOW (ref 1–3.3)
LYMPHOCYTES # BLD AUTO: 0.59 K/UL — LOW (ref 1–3.3)
LYMPHOCYTES # BLD AUTO: 0.61 K/UL — LOW (ref 1–3.3)
LYMPHOCYTES # BLD AUTO: 0.61 K/UL — LOW (ref 1–3.3)
LYMPHOCYTES # BLD AUTO: 0.7 K/UL — LOW (ref 1–3.3)
LYMPHOCYTES # BLD AUTO: 0.7 K/UL — LOW (ref 1–3.3)
LYMPHOCYTES # BLD AUTO: 1 K/UL — SIGNIFICANT CHANGE UP (ref 1–3.3)
LYMPHOCYTES # BLD AUTO: 1 K/UL — SIGNIFICANT CHANGE UP (ref 1–3.3)
LYMPHOCYTES # BLD AUTO: 1.05 K/UL — SIGNIFICANT CHANGE UP (ref 1–3.3)
LYMPHOCYTES # BLD AUTO: 1.1 K/UL — SIGNIFICANT CHANGE UP (ref 1–3.3)
LYMPHOCYTES # BLD AUTO: 1.1 K/UL — SIGNIFICANT CHANGE UP (ref 1–3.3)
LYMPHOCYTES # BLD AUTO: 1.16 K/UL — SIGNIFICANT CHANGE UP (ref 1–3.3)
LYMPHOCYTES # BLD AUTO: 11.7 % — LOW (ref 13–44)
LYMPHOCYTES # BLD AUTO: 11.7 % — LOW (ref 13–44)
LYMPHOCYTES # BLD AUTO: 16.3 % — SIGNIFICANT CHANGE UP (ref 13–44)
LYMPHOCYTES # BLD AUTO: 16.3 % — SIGNIFICANT CHANGE UP (ref 13–44)
LYMPHOCYTES # BLD AUTO: 18.3 % — SIGNIFICANT CHANGE UP (ref 13–44)
LYMPHOCYTES # BLD AUTO: 18.9 % — SIGNIFICANT CHANGE UP (ref 13–44)
LYMPHOCYTES # BLD AUTO: 18.9 % — SIGNIFICANT CHANGE UP (ref 13–44)
LYMPHOCYTES # BLD AUTO: 25.2 % — SIGNIFICANT CHANGE UP (ref 13–44)
LYMPHOCYTES # BLD AUTO: 7.1 % — LOW (ref 13–44)
LYMPHOCYTES # BLD AUTO: 7.1 % — LOW (ref 13–44)
LYMPHOCYTES # BLD AUTO: 7.8 % — LOW (ref 13–44)
LYMPHOCYTES # BLD AUTO: 7.8 % — LOW (ref 13–44)
LYMPHOCYTES # BLD AUTO: 9.4 % — LOW (ref 13–44)
LYMPHOCYTES # BLD AUTO: 9.4 % — LOW (ref 13–44)
MAGNESIUM SERPL-MCNC: 1.3 MG/DL — LOW (ref 1.6–2.6)
MAGNESIUM SERPL-MCNC: 1.3 MG/DL — LOW (ref 1.6–2.6)
MAGNESIUM SERPL-MCNC: 1.4 MG/DL — LOW (ref 1.8–2.6)
MAGNESIUM SERPL-MCNC: 1.4 MG/DL — LOW (ref 1.8–2.6)
MAGNESIUM SERPL-MCNC: 1.5 MG/DL — LOW (ref 1.8–2.6)
MAGNESIUM SERPL-MCNC: 1.5 MG/DL — LOW (ref 1.8–2.6)
MAGNESIUM SERPL-MCNC: 1.6 MG/DL — SIGNIFICANT CHANGE UP (ref 1.6–2.6)
MAGNESIUM SERPL-MCNC: 1.7 MG/DL — SIGNIFICANT CHANGE UP (ref 1.6–2.6)
MAGNESIUM SERPL-MCNC: 1.9 MG/DL — SIGNIFICANT CHANGE UP (ref 1.6–2.6)
MAGNESIUM SERPL-MCNC: 1.9 MG/DL — SIGNIFICANT CHANGE UP (ref 1.8–2.6)
MAGNESIUM SERPL-MCNC: 1.9 MG/DL — SIGNIFICANT CHANGE UP (ref 1.8–2.6)
MAGNESIUM SERPL-MCNC: 2 MG/DL — SIGNIFICANT CHANGE UP (ref 1.6–2.6)
MAGNESIUM SERPL-MCNC: 2 MG/DL — SIGNIFICANT CHANGE UP (ref 1.6–2.6)
MAGNESIUM SERPL-MCNC: 2.1 MG/DL — SIGNIFICANT CHANGE UP (ref 1.6–2.6)
MAGNESIUM SERPL-MCNC: 2.1 MG/DL — SIGNIFICANT CHANGE UP (ref 1.6–2.6)
MAGNESIUM SERPL-MCNC: 2.4 MG/DL — SIGNIFICANT CHANGE UP (ref 1.6–2.6)
MAGNESIUM SERPL-MCNC: 2.4 MG/DL — SIGNIFICANT CHANGE UP (ref 1.6–2.6)
MANUAL SMEAR VERIFICATION: SIGNIFICANT CHANGE UP
MANUAL SMEAR VERIFICATION: SIGNIFICANT CHANGE UP
MCHC RBC-ENTMCNC: 29.9 PG — SIGNIFICANT CHANGE UP (ref 27–34)
MCHC RBC-ENTMCNC: 30.1 PG — SIGNIFICANT CHANGE UP (ref 27–34)
MCHC RBC-ENTMCNC: 30.1 PG — SIGNIFICANT CHANGE UP (ref 27–34)
MCHC RBC-ENTMCNC: 30.2 PG — SIGNIFICANT CHANGE UP (ref 27–34)
MCHC RBC-ENTMCNC: 30.3 PG — SIGNIFICANT CHANGE UP (ref 27–34)
MCHC RBC-ENTMCNC: 30.3 PG — SIGNIFICANT CHANGE UP (ref 27–34)
MCHC RBC-ENTMCNC: 30.4 PG — SIGNIFICANT CHANGE UP (ref 27–34)
MCHC RBC-ENTMCNC: 30.4 PG — SIGNIFICANT CHANGE UP (ref 27–34)
MCHC RBC-ENTMCNC: 30.5 PG — SIGNIFICANT CHANGE UP (ref 27–34)
MCHC RBC-ENTMCNC: 30.7 PG — SIGNIFICANT CHANGE UP (ref 27–34)
MCHC RBC-ENTMCNC: 30.7 PG — SIGNIFICANT CHANGE UP (ref 27–34)
MCHC RBC-ENTMCNC: 31.3 PG — SIGNIFICANT CHANGE UP (ref 27–34)
MCHC RBC-ENTMCNC: 31.3 PG — SIGNIFICANT CHANGE UP (ref 27–34)
MCHC RBC-ENTMCNC: 31.9 GM/DL — LOW (ref 32–36)
MCHC RBC-ENTMCNC: 32.1 GM/DL — SIGNIFICANT CHANGE UP (ref 32–36)
MCHC RBC-ENTMCNC: 32.2 G/DL — SIGNIFICANT CHANGE UP (ref 32–36)
MCHC RBC-ENTMCNC: 32.2 GM/DL — SIGNIFICANT CHANGE UP (ref 32–36)
MCHC RBC-ENTMCNC: 32.2 GM/DL — SIGNIFICANT CHANGE UP (ref 32–36)
MCHC RBC-ENTMCNC: 32.3 GM/DL — SIGNIFICANT CHANGE UP (ref 32–36)
MCHC RBC-ENTMCNC: 32.5 GM/DL — SIGNIFICANT CHANGE UP (ref 32–36)
MCHC RBC-ENTMCNC: 32.6 GM/DL — SIGNIFICANT CHANGE UP (ref 32–36)
MCHC RBC-ENTMCNC: 32.9 GM/DL — SIGNIFICANT CHANGE UP (ref 32–36)
MCHC RBC-ENTMCNC: 32.9 GM/DL — SIGNIFICANT CHANGE UP (ref 32–36)
MCHC RBC-ENTMCNC: 33 GM/DL — SIGNIFICANT CHANGE UP (ref 32–36)
MCHC RBC-ENTMCNC: 33.1 GM/DL — SIGNIFICANT CHANGE UP (ref 32–36)
MCHC RBC-ENTMCNC: 33.2 GM/DL — SIGNIFICANT CHANGE UP (ref 32–36)
MCHC RBC-ENTMCNC: 33.3 GM/DL — SIGNIFICANT CHANGE UP (ref 32–36)
MCHC RBC-ENTMCNC: 33.3 GM/DL — SIGNIFICANT CHANGE UP (ref 32–36)
MCHC RBC-ENTMCNC: 33.9 GM/DL — SIGNIFICANT CHANGE UP (ref 32–36)
MCV RBC AUTO: 90.2 FL — SIGNIFICANT CHANGE UP (ref 80–100)
MCV RBC AUTO: 90.7 FL — SIGNIFICANT CHANGE UP (ref 80–100)
MCV RBC AUTO: 90.7 FL — SIGNIFICANT CHANGE UP (ref 80–100)
MCV RBC AUTO: 91 FL — SIGNIFICANT CHANGE UP (ref 80–100)
MCV RBC AUTO: 91.3 FL — SIGNIFICANT CHANGE UP (ref 80–100)
MCV RBC AUTO: 91.3 FL — SIGNIFICANT CHANGE UP (ref 80–100)
MCV RBC AUTO: 91.4 FL — SIGNIFICANT CHANGE UP (ref 80–100)
MCV RBC AUTO: 91.7 FL — SIGNIFICANT CHANGE UP (ref 80–100)
MCV RBC AUTO: 91.8 FL — SIGNIFICANT CHANGE UP (ref 80–100)
MCV RBC AUTO: 91.8 FL — SIGNIFICANT CHANGE UP (ref 80–100)
MCV RBC AUTO: 92 FL — SIGNIFICANT CHANGE UP (ref 80–100)
MCV RBC AUTO: 92 FL — SIGNIFICANT CHANGE UP (ref 80–100)
MCV RBC AUTO: 92.1 FL — SIGNIFICANT CHANGE UP (ref 80–100)
MCV RBC AUTO: 92.1 FL — SIGNIFICANT CHANGE UP (ref 80–100)
MCV RBC AUTO: 92.2 FL — SIGNIFICANT CHANGE UP (ref 80–100)
MCV RBC AUTO: 92.3 FL — SIGNIFICANT CHANGE UP (ref 80–100)
MCV RBC AUTO: 92.5 FL — SIGNIFICANT CHANGE UP (ref 80–100)
MCV RBC AUTO: 92.5 FL — SIGNIFICANT CHANGE UP (ref 80–100)
MCV RBC AUTO: 92.6 FL — SIGNIFICANT CHANGE UP (ref 80–100)
MCV RBC AUTO: 92.6 FL — SIGNIFICANT CHANGE UP (ref 80–100)
MCV RBC AUTO: 92.7 FL — SIGNIFICANT CHANGE UP (ref 80–100)
MCV RBC AUTO: 92.7 FL — SIGNIFICANT CHANGE UP (ref 80–100)
MCV RBC AUTO: 93.3 FL — SIGNIFICANT CHANGE UP (ref 80–100)
MCV RBC AUTO: 93.6 FL — SIGNIFICANT CHANGE UP (ref 80–100)
MCV RBC AUTO: 93.6 FL — SIGNIFICANT CHANGE UP (ref 80–100)
MCV RBC AUTO: 93.7 FL — SIGNIFICANT CHANGE UP (ref 80–100)
MCV RBC AUTO: 97 FL — SIGNIFICANT CHANGE UP (ref 80–100)
MCV RBC AUTO: 97 FL — SIGNIFICANT CHANGE UP (ref 80–100)
METHOD TYPE: SIGNIFICANT CHANGE UP
MONOCYTES # BLD AUTO: 0.23 K/UL — SIGNIFICANT CHANGE UP (ref 0–0.9)
MONOCYTES # BLD AUTO: 0.23 K/UL — SIGNIFICANT CHANGE UP (ref 0–0.9)
MONOCYTES # BLD AUTO: 0.3 K/UL — SIGNIFICANT CHANGE UP (ref 0–0.9)
MONOCYTES # BLD AUTO: 0.3 K/UL — SIGNIFICANT CHANGE UP (ref 0–0.9)
MONOCYTES # BLD AUTO: 0.32 K/UL — SIGNIFICANT CHANGE UP (ref 0–0.9)
MONOCYTES # BLD AUTO: 0.32 K/UL — SIGNIFICANT CHANGE UP (ref 0–0.9)
MONOCYTES # BLD AUTO: 0.34 K/UL — SIGNIFICANT CHANGE UP (ref 0–0.9)
MONOCYTES # BLD AUTO: 0.34 K/UL — SIGNIFICANT CHANGE UP (ref 0–0.9)
MONOCYTES # BLD AUTO: 0.44 K/UL — SIGNIFICANT CHANGE UP (ref 0–0.9)
MONOCYTES # BLD AUTO: 0.5 K/UL — SIGNIFICANT CHANGE UP (ref 0–0.9)
MONOCYTES # BLD AUTO: 0.5 K/UL — SIGNIFICANT CHANGE UP (ref 0–0.9)
MONOCYTES # BLD AUTO: 0.57 K/UL — SIGNIFICANT CHANGE UP (ref 0–0.9)
MONOCYTES # BLD AUTO: 0.57 K/UL — SIGNIFICANT CHANGE UP (ref 0–0.9)
MONOCYTES # BLD AUTO: 0.66 K/UL — SIGNIFICANT CHANGE UP (ref 0–0.9)
MONOCYTES NFR BLD AUTO: 10.4 % — SIGNIFICANT CHANGE UP (ref 2–14)
MONOCYTES NFR BLD AUTO: 10.6 % — SIGNIFICANT CHANGE UP (ref 2–14)
MONOCYTES NFR BLD AUTO: 11.3 % — SIGNIFICANT CHANGE UP (ref 2–14)
MONOCYTES NFR BLD AUTO: 11.3 % — SIGNIFICANT CHANGE UP (ref 2–14)
MONOCYTES NFR BLD AUTO: 2.3 % — SIGNIFICANT CHANGE UP (ref 2–14)
MONOCYTES NFR BLD AUTO: 2.3 % — SIGNIFICANT CHANGE UP (ref 2–14)
MONOCYTES NFR BLD AUTO: 4.9 % — SIGNIFICANT CHANGE UP (ref 2–14)
MONOCYTES NFR BLD AUTO: 4.9 % — SIGNIFICANT CHANGE UP (ref 2–14)
MONOCYTES NFR BLD AUTO: 5.2 % — SIGNIFICANT CHANGE UP (ref 2–14)
MONOCYTES NFR BLD AUTO: 5.2 % — SIGNIFICANT CHANGE UP (ref 2–14)
MONOCYTES NFR BLD AUTO: 7 % — SIGNIFICANT CHANGE UP (ref 2–14)
MONOCYTES NFR BLD AUTO: 7 % — SIGNIFICANT CHANGE UP (ref 2–14)
MONOCYTES NFR BLD AUTO: 8.9 % — SIGNIFICANT CHANGE UP (ref 2–14)
MONOCYTES NFR BLD AUTO: 8.9 % — SIGNIFICANT CHANGE UP (ref 2–14)
MRSA PCR RESULT.: SIGNIFICANT CHANGE UP
NEUTROPHILS # BLD AUTO: 2.21 K/UL — SIGNIFICANT CHANGE UP (ref 1.8–7.4)
NEUTROPHILS # BLD AUTO: 3.57 K/UL — SIGNIFICANT CHANGE UP (ref 1.8–7.4)
NEUTROPHILS # BLD AUTO: 3.57 K/UL — SIGNIFICANT CHANGE UP (ref 1.8–7.4)
NEUTROPHILS # BLD AUTO: 3.9 K/UL — SIGNIFICANT CHANGE UP (ref 1.8–7.4)
NEUTROPHILS # BLD AUTO: 3.9 K/UL — SIGNIFICANT CHANGE UP (ref 1.8–7.4)
NEUTROPHILS # BLD AUTO: 4 K/UL — SIGNIFICANT CHANGE UP (ref 1.8–7.4)
NEUTROPHILS # BLD AUTO: 4.01 K/UL — SIGNIFICANT CHANGE UP (ref 1.8–7.4)
NEUTROPHILS # BLD AUTO: 4.01 K/UL — SIGNIFICANT CHANGE UP (ref 1.8–7.4)
NEUTROPHILS # BLD AUTO: 4.5 K/UL — SIGNIFICANT CHANGE UP (ref 1.8–7.4)
NEUTROPHILS # BLD AUTO: 4.5 K/UL — SIGNIFICANT CHANGE UP (ref 1.8–7.4)
NEUTROPHILS # BLD AUTO: 5.06 K/UL — SIGNIFICANT CHANGE UP (ref 1.8–7.4)
NEUTROPHILS # BLD AUTO: 5.06 K/UL — SIGNIFICANT CHANGE UP (ref 1.8–7.4)
NEUTROPHILS # BLD AUTO: 8.72 K/UL — HIGH (ref 1.8–7.4)
NEUTROPHILS # BLD AUTO: 8.72 K/UL — HIGH (ref 1.8–7.4)
NEUTROPHILS NFR BLD AUTO: 53.2 % — SIGNIFICANT CHANGE UP (ref 43–77)
NEUTROPHILS NFR BLD AUTO: 63.2 % — SIGNIFICANT CHANGE UP (ref 43–77)
NEUTROPHILS NFR BLD AUTO: 65.3 % — SIGNIFICANT CHANGE UP (ref 43–77)
NEUTROPHILS NFR BLD AUTO: 65.3 % — SIGNIFICANT CHANGE UP (ref 43–77)
NEUTROPHILS NFR BLD AUTO: 70.7 % — SIGNIFICANT CHANGE UP (ref 43–77)
NEUTROPHILS NFR BLD AUTO: 70.7 % — SIGNIFICANT CHANGE UP (ref 43–77)
NEUTROPHILS NFR BLD AUTO: 72.5 % — SIGNIFICANT CHANGE UP (ref 43–77)
NEUTROPHILS NFR BLD AUTO: 72.5 % — SIGNIFICANT CHANGE UP (ref 43–77)
NEUTROPHILS NFR BLD AUTO: 78.3 % — HIGH (ref 43–77)
NEUTROPHILS NFR BLD AUTO: 78.3 % — HIGH (ref 43–77)
NEUTROPHILS NFR BLD AUTO: 82.6 % — HIGH (ref 43–77)
NEUTROPHILS NFR BLD AUTO: 82.6 % — HIGH (ref 43–77)
NEUTROPHILS NFR BLD AUTO: 88.6 % — HIGH (ref 43–77)
NEUTROPHILS NFR BLD AUTO: 88.6 % — HIGH (ref 43–77)
NITRITE UR-MCNC: NEGATIVE — SIGNIFICANT CHANGE UP
NITRITE UR-MCNC: NEGATIVE — SIGNIFICANT CHANGE UP
NON-GYNECOLOGICAL CYTOLOGY STUDY: SIGNIFICANT CHANGE UP
NON-GYNECOLOGICAL CYTOLOGY STUDY: SIGNIFICANT CHANGE UP
ORGANISM # SPEC MICROSCOPIC CNT: SIGNIFICANT CHANGE UP
OVALOCYTES BLD QL SMEAR: SLIGHT — SIGNIFICANT CHANGE UP
OVALOCYTES BLD QL SMEAR: SLIGHT — SIGNIFICANT CHANGE UP
PCO2 BLDV: 54 MMHG — SIGNIFICANT CHANGE UP (ref 42–55)
PCO2 BLDV: 54 MMHG — SIGNIFICANT CHANGE UP (ref 42–55)
PH BLDV: 7.39 — SIGNIFICANT CHANGE UP (ref 7.32–7.43)
PH BLDV: 7.39 — SIGNIFICANT CHANGE UP (ref 7.32–7.43)
PH UR: 6.5 — SIGNIFICANT CHANGE UP (ref 5–8)
PH UR: 6.5 — SIGNIFICANT CHANGE UP (ref 5–8)
PHOSPHATE SERPL-MCNC: 3.2 MG/DL — SIGNIFICANT CHANGE UP (ref 2.4–4.7)
PHOSPHATE SERPL-MCNC: 3.2 MG/DL — SIGNIFICANT CHANGE UP (ref 2.4–4.7)
PHOSPHATE SERPL-MCNC: 3.3 MG/DL — SIGNIFICANT CHANGE UP (ref 2.4–4.7)
PHOSPHATE SERPL-MCNC: 3.3 MG/DL — SIGNIFICANT CHANGE UP (ref 2.4–4.7)
PHOSPHATE SERPL-MCNC: 3.5 MG/DL — SIGNIFICANT CHANGE UP (ref 2.4–4.7)
PHOSPHATE SERPL-MCNC: 3.5 MG/DL — SIGNIFICANT CHANGE UP (ref 2.4–4.7)
PHOSPHATE SERPL-MCNC: 3.8 MG/DL — SIGNIFICANT CHANGE UP (ref 2.4–4.7)
PHOSPHATE SERPL-MCNC: 3.8 MG/DL — SIGNIFICANT CHANGE UP (ref 2.4–4.7)
PLAT MORPH BLD: NORMAL — SIGNIFICANT CHANGE UP
PLAT MORPH BLD: NORMAL — SIGNIFICANT CHANGE UP
PLATELET # BLD AUTO: 182 K/UL — SIGNIFICANT CHANGE UP (ref 150–400)
PLATELET # BLD AUTO: 193 K/UL — SIGNIFICANT CHANGE UP (ref 150–400)
PLATELET # BLD AUTO: 193 K/UL — SIGNIFICANT CHANGE UP (ref 150–400)
PLATELET # BLD AUTO: 205 K/UL — SIGNIFICANT CHANGE UP (ref 150–400)
PLATELET # BLD AUTO: 205 K/UL — SIGNIFICANT CHANGE UP (ref 150–400)
PLATELET # BLD AUTO: 212 K/UL — SIGNIFICANT CHANGE UP (ref 150–400)
PLATELET # BLD AUTO: 218 K/UL — SIGNIFICANT CHANGE UP (ref 150–400)
PLATELET # BLD AUTO: 218 K/UL — SIGNIFICANT CHANGE UP (ref 150–400)
PLATELET # BLD AUTO: 220 K/UL — SIGNIFICANT CHANGE UP (ref 150–400)
PLATELET # BLD AUTO: 220 K/UL — SIGNIFICANT CHANGE UP (ref 150–400)
PLATELET # BLD AUTO: 227 K/UL — SIGNIFICANT CHANGE UP (ref 150–400)
PLATELET # BLD AUTO: 228 K/UL — SIGNIFICANT CHANGE UP (ref 150–400)
PLATELET # BLD AUTO: 228 K/UL — SIGNIFICANT CHANGE UP (ref 150–400)
PLATELET # BLD AUTO: 239 K/UL — SIGNIFICANT CHANGE UP (ref 150–400)
PLATELET # BLD AUTO: 239 K/UL — SIGNIFICANT CHANGE UP (ref 150–400)
PLATELET # BLD AUTO: 242 K/UL — SIGNIFICANT CHANGE UP (ref 150–400)
PLATELET # BLD AUTO: 251 K/UL — SIGNIFICANT CHANGE UP (ref 150–400)
PLATELET # BLD AUTO: 257 K/UL — SIGNIFICANT CHANGE UP (ref 150–400)
PLATELET # BLD AUTO: 310 K/UL — SIGNIFICANT CHANGE UP (ref 150–400)
PLATELET # BLD AUTO: 310 K/UL — SIGNIFICANT CHANGE UP (ref 150–400)
PLATELET # BLD AUTO: 355 K/UL — SIGNIFICANT CHANGE UP (ref 150–400)
PLATELET # BLD AUTO: 355 K/UL — SIGNIFICANT CHANGE UP (ref 150–400)
PLATELET # BLD AUTO: 362 K/UL — SIGNIFICANT CHANGE UP (ref 150–400)
PLATELET # BLD AUTO: 362 K/UL — SIGNIFICANT CHANGE UP (ref 150–400)
PLATELET # BLD AUTO: 400 K/UL — SIGNIFICANT CHANGE UP (ref 150–400)
PLATELET # BLD AUTO: 400 K/UL — SIGNIFICANT CHANGE UP (ref 150–400)
PLATELET # BLD AUTO: 481 K/UL — HIGH (ref 150–400)
PLATELET # BLD AUTO: 481 K/UL — HIGH (ref 150–400)
PLATELET # BLD AUTO: 552 K/UL — HIGH (ref 150–400)
PLATELET # BLD AUTO: 552 K/UL — HIGH (ref 150–400)
PLATELET # BLD AUTO: 584 K/UL — HIGH (ref 150–400)
PLATELET # BLD AUTO: 584 K/UL — HIGH (ref 150–400)
PO2 BLDV: 100 MMHG — HIGH (ref 25–45)
PO2 BLDV: 100 MMHG — HIGH (ref 25–45)
POIKILOCYTOSIS BLD QL AUTO: SLIGHT — SIGNIFICANT CHANGE UP
POIKILOCYTOSIS BLD QL AUTO: SLIGHT — SIGNIFICANT CHANGE UP
POLYCHROMASIA BLD QL SMEAR: SIGNIFICANT CHANGE UP
POLYCHROMASIA BLD QL SMEAR: SIGNIFICANT CHANGE UP
POTASSIUM BLDV-SCNC: 4.5 MMOL/L — SIGNIFICANT CHANGE UP (ref 3.5–5.1)
POTASSIUM BLDV-SCNC: 4.5 MMOL/L — SIGNIFICANT CHANGE UP (ref 3.5–5.1)
POTASSIUM SERPL-MCNC: 3.4 MMOL/L — LOW (ref 3.5–5.3)
POTASSIUM SERPL-MCNC: 3.6 MMOL/L — SIGNIFICANT CHANGE UP (ref 3.5–5.3)
POTASSIUM SERPL-MCNC: 3.7 MMOL/L — SIGNIFICANT CHANGE UP (ref 3.5–5.3)
POTASSIUM SERPL-MCNC: 3.9 MMOL/L — SIGNIFICANT CHANGE UP (ref 3.5–5.3)
POTASSIUM SERPL-MCNC: 4 MMOL/L — SIGNIFICANT CHANGE UP (ref 3.5–5.3)
POTASSIUM SERPL-MCNC: 4.1 MMOL/L — SIGNIFICANT CHANGE UP (ref 3.5–5.3)
POTASSIUM SERPL-MCNC: 4.4 MMOL/L — SIGNIFICANT CHANGE UP (ref 3.5–5.3)
POTASSIUM SERPL-MCNC: 4.4 MMOL/L — SIGNIFICANT CHANGE UP (ref 3.5–5.3)
POTASSIUM SERPL-MCNC: 4.6 MMOL/L — SIGNIFICANT CHANGE UP (ref 3.5–5.3)
POTASSIUM SERPL-MCNC: 4.6 MMOL/L — SIGNIFICANT CHANGE UP (ref 3.5–5.3)
POTASSIUM SERPL-MCNC: 4.7 MMOL/L — SIGNIFICANT CHANGE UP (ref 3.5–5.3)
POTASSIUM SERPL-MCNC: 4.8 MMOL/L — SIGNIFICANT CHANGE UP (ref 3.5–5.3)
POTASSIUM SERPL-MCNC: 4.8 MMOL/L — SIGNIFICANT CHANGE UP (ref 3.5–5.3)
POTASSIUM SERPL-MCNC: 5 MMOL/L — SIGNIFICANT CHANGE UP (ref 3.5–5.3)
POTASSIUM SERPL-MCNC: 5 MMOL/L — SIGNIFICANT CHANGE UP (ref 3.5–5.3)
POTASSIUM SERPL-MCNC: 5.1 MMOL/L — SIGNIFICANT CHANGE UP (ref 3.5–5.3)
POTASSIUM SERPL-MCNC: 5.1 MMOL/L — SIGNIFICANT CHANGE UP (ref 3.5–5.3)
POTASSIUM SERPL-MCNC: 5.2 MMOL/L — SIGNIFICANT CHANGE UP (ref 3.5–5.3)
POTASSIUM SERPL-MCNC: 5.5 MMOL/L — HIGH (ref 3.5–5.3)
POTASSIUM SERPL-MCNC: 5.5 MMOL/L — HIGH (ref 3.5–5.3)
POTASSIUM SERPL-SCNC: 3.4 MMOL/L — LOW (ref 3.5–5.3)
POTASSIUM SERPL-SCNC: 3.6 MMOL/L — SIGNIFICANT CHANGE UP (ref 3.5–5.3)
POTASSIUM SERPL-SCNC: 3.7 MMOL/L — SIGNIFICANT CHANGE UP (ref 3.5–5.3)
POTASSIUM SERPL-SCNC: 3.9 MMOL/L — SIGNIFICANT CHANGE UP (ref 3.5–5.3)
POTASSIUM SERPL-SCNC: 4 MMOL/L — SIGNIFICANT CHANGE UP (ref 3.5–5.3)
POTASSIUM SERPL-SCNC: 4.1 MMOL/L — SIGNIFICANT CHANGE UP (ref 3.5–5.3)
POTASSIUM SERPL-SCNC: 4.4 MMOL/L — SIGNIFICANT CHANGE UP (ref 3.5–5.3)
POTASSIUM SERPL-SCNC: 4.4 MMOL/L — SIGNIFICANT CHANGE UP (ref 3.5–5.3)
POTASSIUM SERPL-SCNC: 4.6 MMOL/L — SIGNIFICANT CHANGE UP (ref 3.5–5.3)
POTASSIUM SERPL-SCNC: 4.6 MMOL/L — SIGNIFICANT CHANGE UP (ref 3.5–5.3)
POTASSIUM SERPL-SCNC: 4.7 MMOL/L — SIGNIFICANT CHANGE UP (ref 3.5–5.3)
POTASSIUM SERPL-SCNC: 4.8 MMOL/L — SIGNIFICANT CHANGE UP (ref 3.5–5.3)
POTASSIUM SERPL-SCNC: 4.8 MMOL/L — SIGNIFICANT CHANGE UP (ref 3.5–5.3)
POTASSIUM SERPL-SCNC: 5 MMOL/L — SIGNIFICANT CHANGE UP (ref 3.5–5.3)
POTASSIUM SERPL-SCNC: 5 MMOL/L — SIGNIFICANT CHANGE UP (ref 3.5–5.3)
POTASSIUM SERPL-SCNC: 5.1 MMOL/L — SIGNIFICANT CHANGE UP (ref 3.5–5.3)
POTASSIUM SERPL-SCNC: 5.1 MMOL/L — SIGNIFICANT CHANGE UP (ref 3.5–5.3)
POTASSIUM SERPL-SCNC: 5.2 MMOL/L — SIGNIFICANT CHANGE UP (ref 3.5–5.3)
POTASSIUM SERPL-SCNC: 5.3 MMOL/L
POTASSIUM SERPL-SCNC: 5.5 MMOL/L — HIGH (ref 3.5–5.3)
POTASSIUM SERPL-SCNC: 5.5 MMOL/L — HIGH (ref 3.5–5.3)
PROCALCITONIN SERPL-MCNC: 0.07 NG/ML — SIGNIFICANT CHANGE UP (ref 0.02–0.1)
PROCALCITONIN SERPL-MCNC: 0.34 NG/ML — HIGH (ref 0.02–0.1)
PROCALCITONIN SERPL-MCNC: 0.34 NG/ML — HIGH (ref 0.02–0.1)
PROT SERPL-MCNC: 5.1 G/DL — LOW (ref 6.6–8.7)
PROT SERPL-MCNC: 5.1 G/DL — LOW (ref 6.6–8.7)
PROT SERPL-MCNC: 5.5 G/DL — LOW (ref 6.6–8.7)
PROT SERPL-MCNC: 5.6 G/DL — LOW (ref 6.6–8.7)
PROT SERPL-MCNC: 6 G/DL — LOW (ref 6.6–8.7)
PROT SERPL-MCNC: 6 G/DL — LOW (ref 6.6–8.7)
PROT SERPL-MCNC: 6.1 G/DL — LOW (ref 6.6–8.7)
PROT SERPL-MCNC: 6.3 G/DL — LOW (ref 6.6–8.7)
PROT SERPL-MCNC: 6.4 G/DL — LOW (ref 6.6–8.7)
PROT SERPL-MCNC: 6.7 G/DL — SIGNIFICANT CHANGE UP (ref 6.6–8.7)
PROT SERPL-MCNC: 6.8 G/DL
PROT SERPL-MCNC: 6.8 G/DL — SIGNIFICANT CHANGE UP (ref 6.6–8.7)
PROT UR-MCNC: NEGATIVE MG/DL — SIGNIFICANT CHANGE UP
PROT UR-MCNC: NEGATIVE MG/DL — SIGNIFICANT CHANGE UP
PROTHROM AB SERPL-ACNC: 12.4 SEC — SIGNIFICANT CHANGE UP (ref 9.5–13)
PROTHROM AB SERPL-ACNC: 12.4 SEC — SIGNIFICANT CHANGE UP (ref 9.5–13)
PROTHROM AB SERPL-ACNC: 13.9 SEC — HIGH (ref 9.5–13)
PT BLD: 10.9 SEC
RAPID RVP RESULT: SIGNIFICANT CHANGE UP
RAPID RVP RESULT: SIGNIFICANT CHANGE UP
RBC # BLD: 3.01 M/UL — LOW (ref 4.2–5.8)
RBC # BLD: 3.01 M/UL — LOW (ref 4.2–5.8)
RBC # BLD: 3.18 M/UL — LOW (ref 4.2–5.8)
RBC # BLD: 3.24 M/UL — LOW (ref 4.2–5.8)
RBC # BLD: 3.24 M/UL — LOW (ref 4.2–5.8)
RBC # BLD: 3.27 M/UL — LOW (ref 4.2–5.8)
RBC # BLD: 3.27 M/UL — LOW (ref 4.2–5.8)
RBC # BLD: 3.28 M/UL — LOW (ref 4.2–5.8)
RBC # BLD: 3.28 M/UL — LOW (ref 4.2–5.8)
RBC # BLD: 3.31 M/UL — LOW (ref 4.2–5.8)
RBC # BLD: 3.31 M/UL — LOW (ref 4.2–5.8)
RBC # BLD: 3.44 M/UL — LOW (ref 4.2–5.8)
RBC # BLD: 3.48 M/UL — LOW (ref 4.2–5.8)
RBC # BLD: 3.48 M/UL — LOW (ref 4.2–5.8)
RBC # BLD: 3.52 M/UL — LOW (ref 4.2–5.8)
RBC # BLD: 3.52 M/UL — LOW (ref 4.2–5.8)
RBC # BLD: 3.61 M/UL — LOW (ref 4.2–5.8)
RBC # BLD: 3.61 M/UL — LOW (ref 4.2–5.8)
RBC # BLD: 3.65 M/UL — LOW (ref 4.2–5.8)
RBC # BLD: 3.74 M/UL — LOW (ref 4.2–5.8)
RBC # BLD: 3.75 M/UL — LOW (ref 4.2–5.8)
RBC # BLD: 3.75 M/UL — LOW (ref 4.2–5.8)
RBC # BLD: 4.09 M/UL — LOW (ref 4.2–5.8)
RBC # BLD: 4.09 M/UL — LOW (ref 4.2–5.8)
RBC # BLD: 4.11 M/UL — LOW (ref 4.2–5.8)
RBC # BLD: 4.11 M/UL — LOW (ref 4.2–5.8)
RBC # BLD: 4.19 M/UL — LOW (ref 4.2–5.8)
RBC # BLD: 4.2 M/UL — SIGNIFICANT CHANGE UP (ref 4.2–5.8)
RBC # BLD: 4.21 M/UL — SIGNIFICANT CHANGE UP (ref 4.2–5.8)
RBC # FLD: 13.3 % — SIGNIFICANT CHANGE UP (ref 10.3–14.5)
RBC # FLD: 13.3 % — SIGNIFICANT CHANGE UP (ref 10.3–14.5)
RBC # FLD: 13.7 % — SIGNIFICANT CHANGE UP (ref 10.3–14.5)
RBC # FLD: 13.7 % — SIGNIFICANT CHANGE UP (ref 10.3–14.5)
RBC # FLD: 13.8 % — SIGNIFICANT CHANGE UP (ref 10.3–14.5)
RBC # FLD: 13.9 % — SIGNIFICANT CHANGE UP (ref 10.3–14.5)
RBC # FLD: 14 % — SIGNIFICANT CHANGE UP (ref 10.3–14.5)
RBC # FLD: 14.1 % — SIGNIFICANT CHANGE UP (ref 10.3–14.5)
RBC # FLD: 14.1 % — SIGNIFICANT CHANGE UP (ref 10.3–14.5)
RBC # FLD: 14.2 % — SIGNIFICANT CHANGE UP (ref 10.3–14.5)
RBC # FLD: 14.2 % — SIGNIFICANT CHANGE UP (ref 10.3–14.5)
RBC # FLD: 14.3 % — SIGNIFICANT CHANGE UP (ref 10.3–14.5)
RBC # FLD: 14.5 % — SIGNIFICANT CHANGE UP (ref 10.3–14.5)
RBC # FLD: 14.6 % — HIGH (ref 10.3–14.5)
RBC # FLD: 14.8 % — HIGH (ref 10.3–14.5)
RBC # FLD: 14.9 % — HIGH (ref 10.3–14.5)
RBC # FLD: 14.9 % — HIGH (ref 10.3–14.5)
RBC BLD AUTO: ABNORMAL
RBC BLD AUTO: ABNORMAL
S AUREUS DNA NOSE QL NAA+PROBE: DETECTED
S AUREUS DNA NOSE QL NAA+PROBE: DETECTED
S AUREUS DNA NOSE QL NAA+PROBE: SIGNIFICANT CHANGE UP
S AUREUS DNA NOSE QL NAA+PROBE: SIGNIFICANT CHANGE UP
S PNEUM AG UR QL: NEGATIVE — SIGNIFICANT CHANGE UP
SAO2 % BLDV: 98.2 % — SIGNIFICANT CHANGE UP
SAO2 % BLDV: 98.2 % — SIGNIFICANT CHANGE UP
SARS-COV-2 RNA SPEC QL NAA+PROBE: SIGNIFICANT CHANGE UP
SARS-COV-2 RNA SPEC QL NAA+PROBE: SIGNIFICANT CHANGE UP
SCHISTOCYTES BLD QL AUTO: SLIGHT — SIGNIFICANT CHANGE UP
SCHISTOCYTES BLD QL AUTO: SLIGHT — SIGNIFICANT CHANGE UP
SODIUM SERPL-SCNC: 134 MMOL/L — LOW (ref 135–145)
SODIUM SERPL-SCNC: 136 MMOL/L — SIGNIFICANT CHANGE UP (ref 135–145)
SODIUM SERPL-SCNC: 138 MMOL/L — SIGNIFICANT CHANGE UP (ref 135–145)
SODIUM SERPL-SCNC: 138 MMOL/L — SIGNIFICANT CHANGE UP (ref 135–145)
SODIUM SERPL-SCNC: 139 MMOL/L — SIGNIFICANT CHANGE UP (ref 135–145)
SODIUM SERPL-SCNC: 139 MMOL/L — SIGNIFICANT CHANGE UP (ref 135–145)
SODIUM SERPL-SCNC: 140 MMOL/L — SIGNIFICANT CHANGE UP (ref 135–145)
SODIUM SERPL-SCNC: 141 MMOL/L — SIGNIFICANT CHANGE UP (ref 135–145)
SODIUM SERPL-SCNC: 142 MMOL/L — SIGNIFICANT CHANGE UP (ref 135–145)
SODIUM SERPL-SCNC: 143 MMOL/L
SODIUM SERPL-SCNC: 144 MMOL/L — SIGNIFICANT CHANGE UP (ref 135–145)
SODIUM SERPL-SCNC: 146 MMOL/L — HIGH (ref 135–145)
SP GR SPEC: 1.01 — SIGNIFICANT CHANGE UP (ref 1–1.03)
SP GR SPEC: 1.01 — SIGNIFICANT CHANGE UP (ref 1–1.03)
SPECIMEN SOURCE: SIGNIFICANT CHANGE UP
TIBC SERPL-MCNC: 285 UG/DL — SIGNIFICANT CHANGE UP (ref 220–430)
TIBC SERPL-MCNC: 285 UG/DL — SIGNIFICANT CHANGE UP (ref 220–430)
TRANSFERRIN SERPL-MCNC: 199 MG/DL — SIGNIFICANT CHANGE UP (ref 180–329)
TRANSFERRIN SERPL-MCNC: 199 MG/DL — SIGNIFICANT CHANGE UP (ref 180–329)
TROPONIN T SERPL-MCNC: <0.01 NG/ML — SIGNIFICANT CHANGE UP (ref 0–0.06)
TROPONIN T SERPL-MCNC: <0.01 NG/ML — SIGNIFICANT CHANGE UP (ref 0–0.06)
TROPONIN T, HIGH SENSITIVITY RESULT: 13 NG/L — SIGNIFICANT CHANGE UP (ref 0–51)
TROPONIN T, HIGH SENSITIVITY RESULT: 13 NG/L — SIGNIFICANT CHANGE UP (ref 0–51)
UROBILINOGEN FLD QL: 1 MG/DL — SIGNIFICANT CHANGE UP (ref 0.2–1)
UROBILINOGEN FLD QL: 1 MG/DL — SIGNIFICANT CHANGE UP (ref 0.2–1)
VARIANT LYMPHS # BLD: 2.6 % — SIGNIFICANT CHANGE UP (ref 0–6)
VARIANT LYMPHS # BLD: 2.6 % — SIGNIFICANT CHANGE UP (ref 0–6)
WBC # BLD: 4.16 K/UL — SIGNIFICANT CHANGE UP (ref 3.8–10.5)
WBC # BLD: 4.8 K/UL — SIGNIFICANT CHANGE UP (ref 3.8–10.5)
WBC # BLD: 4.8 K/UL — SIGNIFICANT CHANGE UP (ref 3.8–10.5)
WBC # BLD: 4.85 K/UL — SIGNIFICANT CHANGE UP (ref 3.8–10.5)
WBC # BLD: 4.85 K/UL — SIGNIFICANT CHANGE UP (ref 3.8–10.5)
WBC # BLD: 5.05 K/UL — SIGNIFICANT CHANGE UP (ref 3.8–10.5)
WBC # BLD: 5.05 K/UL — SIGNIFICANT CHANGE UP (ref 3.8–10.5)
WBC # BLD: 5.08 K/UL — SIGNIFICANT CHANGE UP (ref 3.8–10.5)
WBC # BLD: 5.14 K/UL — SIGNIFICANT CHANGE UP (ref 3.8–10.5)
WBC # BLD: 5.14 K/UL — SIGNIFICANT CHANGE UP (ref 3.8–10.5)
WBC # BLD: 5.21 K/UL — SIGNIFICANT CHANGE UP (ref 3.8–10.5)
WBC # BLD: 5.66 K/UL — SIGNIFICANT CHANGE UP (ref 3.8–10.5)
WBC # BLD: 5.66 K/UL — SIGNIFICANT CHANGE UP (ref 3.8–10.5)
WBC # BLD: 5.76 K/UL — SIGNIFICANT CHANGE UP (ref 3.8–10.5)
WBC # BLD: 6 K/UL — SIGNIFICANT CHANGE UP (ref 3.8–10.5)
WBC # BLD: 6 K/UL — SIGNIFICANT CHANGE UP (ref 3.8–10.5)
WBC # BLD: 6.16 K/UL — SIGNIFICANT CHANGE UP (ref 3.8–10.5)
WBC # BLD: 6.16 K/UL — SIGNIFICANT CHANGE UP (ref 3.8–10.5)
WBC # BLD: 6.2 K/UL — SIGNIFICANT CHANGE UP (ref 3.8–10.5)
WBC # BLD: 6.2 K/UL — SIGNIFICANT CHANGE UP (ref 3.8–10.5)
WBC # BLD: 6.31 K/UL — SIGNIFICANT CHANGE UP (ref 3.8–10.5)
WBC # BLD: 6.31 K/UL — SIGNIFICANT CHANGE UP (ref 3.8–10.5)
WBC # BLD: 6.33 K/UL — SIGNIFICANT CHANGE UP (ref 3.8–10.5)
WBC # BLD: 6.47 K/UL — SIGNIFICANT CHANGE UP (ref 3.8–10.5)
WBC # BLD: 6.6 K/UL — SIGNIFICANT CHANGE UP (ref 3.8–10.5)
WBC # BLD: 6.68 K/UL — SIGNIFICANT CHANGE UP (ref 3.8–10.5)
WBC # BLD: 6.68 K/UL — SIGNIFICANT CHANGE UP (ref 3.8–10.5)
WBC # BLD: 6.71 K/UL — SIGNIFICANT CHANGE UP (ref 3.8–10.5)
WBC # BLD: 6.71 K/UL — SIGNIFICANT CHANGE UP (ref 3.8–10.5)
WBC # BLD: 7.12 K/UL — SIGNIFICANT CHANGE UP (ref 3.8–10.5)
WBC # BLD: 9.85 K/UL — SIGNIFICANT CHANGE UP (ref 3.8–10.5)
WBC # BLD: 9.85 K/UL — SIGNIFICANT CHANGE UP (ref 3.8–10.5)
WBC # FLD AUTO: 4.16 K/UL — SIGNIFICANT CHANGE UP (ref 3.8–10.5)
WBC # FLD AUTO: 4.8 K/UL — SIGNIFICANT CHANGE UP (ref 3.8–10.5)
WBC # FLD AUTO: 4.8 K/UL — SIGNIFICANT CHANGE UP (ref 3.8–10.5)
WBC # FLD AUTO: 4.85 K/UL — SIGNIFICANT CHANGE UP (ref 3.8–10.5)
WBC # FLD AUTO: 4.85 K/UL — SIGNIFICANT CHANGE UP (ref 3.8–10.5)
WBC # FLD AUTO: 5.05 K/UL — SIGNIFICANT CHANGE UP (ref 3.8–10.5)
WBC # FLD AUTO: 5.05 K/UL — SIGNIFICANT CHANGE UP (ref 3.8–10.5)
WBC # FLD AUTO: 5.08 K/UL — SIGNIFICANT CHANGE UP (ref 3.8–10.5)
WBC # FLD AUTO: 5.14 K/UL — SIGNIFICANT CHANGE UP (ref 3.8–10.5)
WBC # FLD AUTO: 5.14 K/UL — SIGNIFICANT CHANGE UP (ref 3.8–10.5)
WBC # FLD AUTO: 5.21 K/UL — SIGNIFICANT CHANGE UP (ref 3.8–10.5)
WBC # FLD AUTO: 5.66 K/UL — SIGNIFICANT CHANGE UP (ref 3.8–10.5)
WBC # FLD AUTO: 5.66 K/UL — SIGNIFICANT CHANGE UP (ref 3.8–10.5)
WBC # FLD AUTO: 5.76 K/UL — SIGNIFICANT CHANGE UP (ref 3.8–10.5)
WBC # FLD AUTO: 6 K/UL — SIGNIFICANT CHANGE UP (ref 3.8–10.5)
WBC # FLD AUTO: 6 K/UL — SIGNIFICANT CHANGE UP (ref 3.8–10.5)
WBC # FLD AUTO: 6.16 K/UL — SIGNIFICANT CHANGE UP (ref 3.8–10.5)
WBC # FLD AUTO: 6.16 K/UL — SIGNIFICANT CHANGE UP (ref 3.8–10.5)
WBC # FLD AUTO: 6.2 K/UL — SIGNIFICANT CHANGE UP (ref 3.8–10.5)
WBC # FLD AUTO: 6.2 K/UL — SIGNIFICANT CHANGE UP (ref 3.8–10.5)
WBC # FLD AUTO: 6.31 K/UL — SIGNIFICANT CHANGE UP (ref 3.8–10.5)
WBC # FLD AUTO: 6.31 K/UL — SIGNIFICANT CHANGE UP (ref 3.8–10.5)
WBC # FLD AUTO: 6.33 K/UL — SIGNIFICANT CHANGE UP (ref 3.8–10.5)
WBC # FLD AUTO: 6.47 K/UL — SIGNIFICANT CHANGE UP (ref 3.8–10.5)
WBC # FLD AUTO: 6.6 K/UL — SIGNIFICANT CHANGE UP (ref 3.8–10.5)
WBC # FLD AUTO: 6.68 K/UL — SIGNIFICANT CHANGE UP (ref 3.8–10.5)
WBC # FLD AUTO: 6.68 K/UL — SIGNIFICANT CHANGE UP (ref 3.8–10.5)
WBC # FLD AUTO: 6.71 K/UL — SIGNIFICANT CHANGE UP (ref 3.8–10.5)
WBC # FLD AUTO: 6.71 K/UL — SIGNIFICANT CHANGE UP (ref 3.8–10.5)
WBC # FLD AUTO: 7.12 K/UL — SIGNIFICANT CHANGE UP (ref 3.8–10.5)
WBC # FLD AUTO: 9.85 K/UL — SIGNIFICANT CHANGE UP (ref 3.8–10.5)
WBC # FLD AUTO: 9.85 K/UL — SIGNIFICANT CHANGE UP (ref 3.8–10.5)

## 2023-01-01 PROCEDURE — 85730 THROMBOPLASTIN TIME PARTIAL: CPT

## 2023-01-01 PROCEDURE — 87086 URINE CULTURE/COLONY COUNT: CPT

## 2023-01-01 PROCEDURE — 99292 CRITICAL CARE ADDL 30 MIN: CPT

## 2023-01-01 PROCEDURE — 87070 CULTURE OTHR SPECIMN AEROBIC: CPT

## 2023-01-01 PROCEDURE — 85027 COMPLETE CBC AUTOMATED: CPT

## 2023-01-01 PROCEDURE — 94640 AIRWAY INHALATION TREATMENT: CPT

## 2023-01-01 PROCEDURE — 77014: CPT | Mod: 26

## 2023-01-01 PROCEDURE — 99233 SBSQ HOSP IP/OBS HIGH 50: CPT

## 2023-01-01 PROCEDURE — 71275 CT ANGIOGRAPHY CHEST: CPT | Mod: 26

## 2023-01-01 PROCEDURE — 87640 STAPH A DNA AMP PROBE: CPT

## 2023-01-01 PROCEDURE — 85379 FIBRIN DEGRADATION QUANT: CPT

## 2023-01-01 PROCEDURE — 93880 EXTRACRANIAL BILAT STUDY: CPT

## 2023-01-01 PROCEDURE — 77338 DESIGN MLC DEVICE FOR IMRT: CPT | Mod: 26

## 2023-01-01 PROCEDURE — 99214 OFFICE O/P EST MOD 30 MIN: CPT

## 2023-01-01 PROCEDURE — 99291 CRITICAL CARE FIRST HOUR: CPT

## 2023-01-01 PROCEDURE — G2212 PROLONG OUTPT/OFFICE VIS: CPT

## 2023-01-01 PROCEDURE — 71045 X-RAY EXAM CHEST 1 VIEW: CPT | Mod: 26

## 2023-01-01 PROCEDURE — 93298 REM INTERROG DEV EVAL SCRMS: CPT | Mod: NC

## 2023-01-01 PROCEDURE — C9399: CPT

## 2023-01-01 PROCEDURE — 84145 PROCALCITONIN (PCT): CPT

## 2023-01-01 PROCEDURE — G2066: CPT

## 2023-01-01 PROCEDURE — 94010 BREATHING CAPACITY TEST: CPT

## 2023-01-01 PROCEDURE — 71250 CT THORAX DX C-: CPT | Mod: 26

## 2023-01-01 PROCEDURE — 93010 ELECTROCARDIOGRAM REPORT: CPT

## 2023-01-01 PROCEDURE — 84132 ASSAY OF SERUM POTASSIUM: CPT

## 2023-01-01 PROCEDURE — 85610 PROTHROMBIN TIME: CPT

## 2023-01-01 PROCEDURE — 85018 HEMOGLOBIN: CPT

## 2023-01-01 PROCEDURE — 93298 REM INTERROG DEV EVAL SCRMS: CPT

## 2023-01-01 PROCEDURE — 83540 ASSAY OF IRON: CPT

## 2023-01-01 PROCEDURE — 84466 ASSAY OF TRANSFERRIN: CPT

## 2023-01-01 PROCEDURE — 85014 HEMATOCRIT: CPT

## 2023-01-01 PROCEDURE — 76000 FLUOROSCOPY <1 HR PHYS/QHP: CPT

## 2023-01-01 PROCEDURE — C8929: CPT

## 2023-01-01 PROCEDURE — 83735 ASSAY OF MAGNESIUM: CPT

## 2023-01-01 PROCEDURE — 64484 NJX AA&/STRD TFRM EPI L/S EA: CPT

## 2023-01-01 PROCEDURE — 93010 ELECTROCARDIOGRAM REPORT: CPT | Mod: 76

## 2023-01-01 PROCEDURE — 99223 1ST HOSP IP/OBS HIGH 75: CPT

## 2023-01-01 PROCEDURE — 70450 CT HEAD/BRAIN W/O DYE: CPT | Mod: 26

## 2023-01-01 PROCEDURE — 70450 CT HEAD/BRAIN W/O DYE: CPT | Mod: MA

## 2023-01-01 PROCEDURE — 86901 BLOOD TYPING SEROLOGIC RH(D): CPT

## 2023-01-01 PROCEDURE — 87899 AGENT NOS ASSAY W/OPTIC: CPT

## 2023-01-01 PROCEDURE — 93000 ELECTROCARDIOGRAM COMPLETE: CPT

## 2023-01-01 PROCEDURE — 99232 SBSQ HOSP IP/OBS MODERATE 35: CPT

## 2023-01-01 PROCEDURE — 77263 THER RADIOLOGY TX PLNG CPLX: CPT

## 2023-01-01 PROCEDURE — 88173 CYTOPATH EVAL FNA REPORT: CPT | Mod: 26

## 2023-01-01 PROCEDURE — 88173 CYTOPATH EVAL FNA REPORT: CPT

## 2023-01-01 PROCEDURE — 71250 CT THORAX DX C-: CPT

## 2023-01-01 PROCEDURE — 87186 SC STD MICRODIL/AGAR DIL: CPT

## 2023-01-01 PROCEDURE — 99239 HOSP IP/OBS DSCHRG MGMT >30: CPT

## 2023-01-01 PROCEDURE — 84443 ASSAY THYROID STIM HORMONE: CPT

## 2023-01-01 PROCEDURE — 85025 COMPLETE CBC W/AUTO DIFF WBC: CPT

## 2023-01-01 PROCEDURE — 87040 BLOOD CULTURE FOR BACTERIA: CPT

## 2023-01-01 PROCEDURE — 80048 BASIC METABOLIC PNL TOTAL CA: CPT

## 2023-01-01 PROCEDURE — 99214 OFFICE O/P EST MOD 30 MIN: CPT | Mod: 25

## 2023-01-01 PROCEDURE — 77301 RADIOTHERAPY DOSE PLAN IMRT: CPT | Mod: 26

## 2023-01-01 PROCEDURE — 77300 RADIATION THERAPY DOSE PLAN: CPT | Mod: 26

## 2023-01-01 PROCEDURE — 31625 BRONCHOSCOPY W/BIOPSY(S): CPT

## 2023-01-01 PROCEDURE — 99222 1ST HOSP IP/OBS MODERATE 55: CPT

## 2023-01-01 PROCEDURE — 82803 BLOOD GASES ANY COMBINATION: CPT

## 2023-01-01 PROCEDURE — 93005 ELECTROCARDIOGRAM TRACING: CPT

## 2023-01-01 PROCEDURE — 64484 NJX AA&/STRD TFRM EPI L/S EA: CPT | Mod: LT

## 2023-01-01 PROCEDURE — 99215 OFFICE O/P EST HI 40 MIN: CPT

## 2023-01-01 PROCEDURE — 82550 ASSAY OF CK (CPK): CPT

## 2023-01-01 PROCEDURE — 71045 X-RAY EXAM CHEST 1 VIEW: CPT

## 2023-01-01 PROCEDURE — 36415 COLL VENOUS BLD VENIPUNCTURE: CPT

## 2023-01-01 PROCEDURE — 81003 URINALYSIS AUTO W/O SCOPE: CPT

## 2023-01-01 PROCEDURE — 70470 CT HEAD/BRAIN W/O & W/DYE: CPT

## 2023-01-01 PROCEDURE — 83880 ASSAY OF NATRIURETIC PEPTIDE: CPT

## 2023-01-01 PROCEDURE — 64483 NJX AA&/STRD TFRM EPI L/S 1: CPT | Mod: LT

## 2023-01-01 PROCEDURE — 99285 EMERGENCY DEPT VISIT HI MDM: CPT

## 2023-01-01 PROCEDURE — 80162 ASSAY OF DIGOXIN TOTAL: CPT

## 2023-01-01 PROCEDURE — 71275 CT ANGIOGRAPHY CHEST: CPT | Mod: MA

## 2023-01-01 PROCEDURE — 71275 CT ANGIOGRAPHY CHEST: CPT

## 2023-01-01 PROCEDURE — 70470 CT HEAD/BRAIN W/O & W/DYE: CPT | Mod: 26,MH

## 2023-01-01 PROCEDURE — 80053 COMPREHEN METABOLIC PANEL: CPT

## 2023-01-01 PROCEDURE — 84295 ASSAY OF SERUM SODIUM: CPT

## 2023-01-01 PROCEDURE — 99213 OFFICE O/P EST LOW 20 MIN: CPT

## 2023-01-01 PROCEDURE — 88172 CYTP DX EVAL FNA 1ST EA SITE: CPT

## 2023-01-01 PROCEDURE — 0225U NFCT DS DNA&RNA 21 SARSCOV2: CPT

## 2023-01-01 PROCEDURE — 88172 CYTP DX EVAL FNA 1ST EA SITE: CPT | Mod: 26

## 2023-01-01 PROCEDURE — 87641 MR-STAPH DNA AMP PROBE: CPT

## 2023-01-01 PROCEDURE — S2900: CPT

## 2023-01-01 PROCEDURE — 99215 OFFICE O/P EST HI 40 MIN: CPT | Mod: 25

## 2023-01-01 PROCEDURE — 88342 IMHCHEM/IMCYTCHM 1ST ANTB: CPT | Mod: 26

## 2023-01-01 PROCEDURE — 88360 TUMOR IMMUNOHISTOCHEM/MANUAL: CPT

## 2023-01-01 PROCEDURE — 77387B: CUSTOM | Mod: 26

## 2023-01-01 PROCEDURE — 82947 ASSAY GLUCOSE BLOOD QUANT: CPT

## 2023-01-01 PROCEDURE — 96374 THER/PROPH/DIAG INJ IV PUSH: CPT

## 2023-01-01 PROCEDURE — 88341 IMHCHEM/IMCYTCHM EA ADD ANTB: CPT | Mod: 26

## 2023-01-01 PROCEDURE — 99285 EMERGENCY DEPT VISIT HI MDM: CPT | Mod: 25

## 2023-01-01 PROCEDURE — 99233 SBSQ HOSP IP/OBS HIGH 50: CPT | Mod: GC

## 2023-01-01 PROCEDURE — 94760 N-INVAS EAR/PLS OXIMETRY 1: CPT

## 2023-01-01 PROCEDURE — 82330 ASSAY OF CALCIUM: CPT

## 2023-01-01 PROCEDURE — 84484 ASSAY OF TROPONIN QUANT: CPT

## 2023-01-01 PROCEDURE — 82435 ASSAY OF BLOOD CHLORIDE: CPT

## 2023-01-01 PROCEDURE — 76705 ECHO EXAM OF ABDOMEN: CPT | Mod: 26

## 2023-01-01 PROCEDURE — 96375 TX/PRO/DX INJ NEW DRUG ADDON: CPT

## 2023-01-01 PROCEDURE — 78815 PET IMAGE W/CT SKULL-THIGH: CPT | Mod: 26,PI

## 2023-01-01 PROCEDURE — 84100 ASSAY OF PHOSPHORUS: CPT

## 2023-01-01 PROCEDURE — 83550 IRON BINDING TEST: CPT

## 2023-01-01 PROCEDURE — 99231 SBSQ HOSP IP/OBS SF/LOW 25: CPT | Mod: FS

## 2023-01-01 PROCEDURE — 87077 CULTURE AEROBIC IDENTIFY: CPT

## 2023-01-01 PROCEDURE — 87449 NOS EACH ORGANISM AG IA: CPT

## 2023-01-01 PROCEDURE — 71250 CT THORAX DX C-: CPT | Mod: 26,MH

## 2023-01-01 PROCEDURE — 76705 ECHO EXAM OF ABDOMEN: CPT

## 2023-01-01 PROCEDURE — 93306 TTE W/DOPPLER COMPLETE: CPT

## 2023-01-01 PROCEDURE — 84436 ASSAY OF TOTAL THYROXINE: CPT

## 2023-01-01 PROCEDURE — 93306 TTE W/DOPPLER COMPLETE: CPT | Mod: 26

## 2023-01-01 PROCEDURE — 99205 OFFICE O/P NEW HI 60 MIN: CPT

## 2023-01-01 PROCEDURE — 86850 RBC ANTIBODY SCREEN: CPT

## 2023-01-01 PROCEDURE — 88305 TISSUE EXAM BY PATHOLOGIST: CPT | Mod: 26

## 2023-01-01 PROCEDURE — 83605 ASSAY OF LACTIC ACID: CPT

## 2023-01-01 PROCEDURE — 88305 TISSUE EXAM BY PATHOLOGIST: CPT

## 2023-01-01 PROCEDURE — 92610 EVALUATE SWALLOWING FUNCTION: CPT

## 2023-01-01 PROCEDURE — 88342 IMHCHEM/IMCYTCHM 1ST ANTB: CPT

## 2023-01-01 PROCEDURE — 88341 IMHCHEM/IMCYTCHM EA ADD ANTB: CPT

## 2023-01-01 PROCEDURE — 96376 TX/PRO/DX INJ SAME DRUG ADON: CPT

## 2023-01-01 PROCEDURE — 86900 BLOOD TYPING SEROLOGIC ABO: CPT

## 2023-01-01 PROCEDURE — 82728 ASSAY OF FERRITIN: CPT

## 2023-01-01 RX ORDER — PIPERACILLIN AND TAZOBACTAM 4; .5 G/20ML; G/20ML
3.38 INJECTION, POWDER, LYOPHILIZED, FOR SOLUTION INTRAVENOUS EVERY 8 HOURS
Refills: 0 | Status: COMPLETED | OUTPATIENT
Start: 2023-01-01 | End: 2023-01-01

## 2023-01-01 RX ORDER — APIXABAN 5 MG/1
5 TABLET, FILM COATED ORAL
Qty: 180 | Refills: 3 | Status: DISCONTINUED | COMMUNITY
Start: 2021-02-23 | End: 2023-01-01

## 2023-01-01 RX ORDER — FENTANYL CITRATE 50 UG/ML
25 INJECTION INTRAVENOUS ONCE
Refills: 0 | Status: DISCONTINUED | OUTPATIENT
Start: 2023-01-01 | End: 2023-01-01

## 2023-01-01 RX ORDER — DILTIAZEM HCL 120 MG
60 CAPSULE, EXT RELEASE 24 HR ORAL
Refills: 0 | Status: DISCONTINUED | OUTPATIENT
Start: 2023-01-01 | End: 2023-01-01

## 2023-01-01 RX ORDER — POTASSIUM CHLORIDE 20 MEQ
20 PACKET (EA) ORAL ONCE
Refills: 0 | Status: COMPLETED | OUTPATIENT
Start: 2023-01-01 | End: 2023-01-01

## 2023-01-01 RX ORDER — PIPERACILLIN AND TAZOBACTAM 4; .5 G/20ML; G/20ML
3.38 INJECTION, POWDER, LYOPHILIZED, FOR SOLUTION INTRAVENOUS ONCE
Refills: 0 | Status: COMPLETED | OUTPATIENT
Start: 2023-01-01 | End: 2023-01-01

## 2023-01-01 RX ORDER — BENZONATATE 200 MG/1
200 CAPSULE ORAL 3 TIMES DAILY
Qty: 1 | Refills: 1 | Status: ACTIVE | COMMUNITY
Start: 2023-01-01 | End: 1900-01-01

## 2023-01-01 RX ORDER — MIDODRINE HYDROCHLORIDE 2.5 MG/1
2.5 TABLET ORAL EVERY 8 HOURS
Refills: 0 | Status: DISCONTINUED | OUTPATIENT
Start: 2023-01-01 | End: 2023-01-01

## 2023-01-01 RX ORDER — UMECLIDINIUM BROMIDE AND VILANTEROL TRIFENATATE 62.5; 25 UG/1; UG/1
62.5-25 POWDER RESPIRATORY (INHALATION)
Qty: 3 | Refills: 3 | Status: DISCONTINUED | COMMUNITY
Start: 2022-11-18 | End: 2023-01-01

## 2023-01-01 RX ORDER — ALPRAZOLAM 0.25 MG/1
0.25 TABLET ORAL
Qty: 60 | Refills: 0 | Status: ACTIVE | COMMUNITY
Start: 2023-01-01 | End: 1900-01-01

## 2023-01-01 RX ORDER — DILTIAZEM HCL 120 MG
30 CAPSULE, EXT RELEASE 24 HR ORAL ONCE
Refills: 0 | Status: COMPLETED | OUTPATIENT
Start: 2023-01-01 | End: 2023-01-01

## 2023-01-01 RX ORDER — GUAIFENESIN 100 MG/5ML
100 LIQUID ORAL EVERY 4 HOURS
Qty: 1 | Refills: 1 | Status: ACTIVE | COMMUNITY
Start: 2023-01-01 | End: 1900-01-01

## 2023-01-01 RX ORDER — LEVALBUTEROL 1.25 MG/.5ML
1.25 SOLUTION, CONCENTRATE RESPIRATORY (INHALATION) EVERY 4 HOURS
Refills: 0 | Status: DISCONTINUED | OUTPATIENT
Start: 2023-01-01 | End: 2023-01-01

## 2023-01-01 RX ORDER — ACETAMINOPHEN 500 MG
650 TABLET ORAL EVERY 6 HOURS
Refills: 0 | Status: DISCONTINUED | OUTPATIENT
Start: 2023-01-01 | End: 2023-01-01

## 2023-01-01 RX ORDER — TRAMADOL HYDROCHLORIDE 50 MG/1
1 TABLET ORAL
Refills: 0 | DISCHARGE

## 2023-01-01 RX ORDER — DIGOXIN 250 MCG
250 TABLET ORAL ONCE
Refills: 0 | Status: COMPLETED | OUTPATIENT
Start: 2023-01-01 | End: 2023-01-01

## 2023-01-01 RX ORDER — TRANEXAMIC ACID 100 MG/ML
500 INJECTION, SOLUTION INTRAVENOUS EVERY 8 HOURS
Refills: 0 | Status: DISCONTINUED | OUTPATIENT
Start: 2023-01-01 | End: 2023-01-01

## 2023-01-01 RX ORDER — AMIODARONE HYDROCHLORIDE 400 MG/1
0.5 TABLET ORAL
Qty: 450 | Refills: 0 | Status: DISCONTINUED | OUTPATIENT
Start: 2023-01-01 | End: 2023-01-01

## 2023-01-01 RX ORDER — TAMSULOSIN HYDROCHLORIDE 0.4 MG/1
0.4 CAPSULE ORAL ONCE
Refills: 0 | Status: COMPLETED | OUTPATIENT
Start: 2023-01-01 | End: 2023-01-01

## 2023-01-01 RX ORDER — CHOLECALCIFEROL (VITAMIN D3) 125 MCG
1 CAPSULE ORAL
Qty: 0 | Refills: 0 | DISCHARGE

## 2023-01-01 RX ORDER — GABAPENTIN 400 MG/1
1 CAPSULE ORAL
Qty: 0 | Refills: 0 | DISCHARGE

## 2023-01-01 RX ORDER — DILTIAZEM HCL 120 MG
5 CAPSULE, EXT RELEASE 24 HR ORAL
Qty: 125 | Refills: 0 | Status: DISCONTINUED | OUTPATIENT
Start: 2023-01-01 | End: 2023-01-01

## 2023-01-01 RX ORDER — ALPRAZOLAM 0.25 MG
0.25 TABLET ORAL ONCE
Refills: 0 | Status: DISCONTINUED | OUTPATIENT
Start: 2023-01-01 | End: 2023-01-01

## 2023-01-01 RX ORDER — ATORVASTATIN CALCIUM 80 MG/1
20 TABLET, FILM COATED ORAL ONCE
Refills: 0 | Status: COMPLETED | OUTPATIENT
Start: 2023-01-01 | End: 2023-01-01

## 2023-01-01 RX ORDER — PHENYLEPHRINE HYDROCHLORIDE 10 MG/ML
0.5 INJECTION INTRAVENOUS
Qty: 40 | Refills: 0 | Status: DISCONTINUED | OUTPATIENT
Start: 2023-01-01 | End: 2023-01-01

## 2023-01-01 RX ORDER — HYDROMORPHONE HYDROCHLORIDE 2 MG/ML
2 INJECTION INTRAMUSCULAR; INTRAVENOUS; SUBCUTANEOUS EVERY 8 HOURS
Refills: 0 | Status: DISCONTINUED | OUTPATIENT
Start: 2023-01-01 | End: 2023-01-01

## 2023-01-01 RX ORDER — SOTALOL HCL 120 MG
120 TABLET ORAL EVERY 12 HOURS
Refills: 0 | Status: DISCONTINUED | OUTPATIENT
Start: 2023-01-01 | End: 2023-01-01

## 2023-01-01 RX ORDER — VANCOMYCIN HCL 1 G
1000 VIAL (EA) INTRAVENOUS EVERY 12 HOURS
Refills: 0 | Status: DISCONTINUED | OUTPATIENT
Start: 2023-01-01 | End: 2023-01-01

## 2023-01-01 RX ORDER — METOPROLOL TARTRATE 50 MG
12.5 TABLET ORAL
Refills: 0 | Status: DISCONTINUED | OUTPATIENT
Start: 2023-01-01 | End: 2023-01-01

## 2023-01-01 RX ORDER — DILTIAZEM HCL 120 MG
20 CAPSULE, EXT RELEASE 24 HR ORAL ONCE
Refills: 0 | Status: DISCONTINUED | OUTPATIENT
Start: 2023-01-01 | End: 2023-01-01

## 2023-01-01 RX ORDER — METOPROLOL TARTRATE 50 MG
25 TABLET ORAL
Refills: 0 | Status: DISCONTINUED | OUTPATIENT
Start: 2023-01-01 | End: 2023-01-01

## 2023-01-01 RX ORDER — INHALER, ASSIST DEVICES
SPACER (EA) MISCELLANEOUS
Qty: 1 | Refills: 1 | Status: ACTIVE | COMMUNITY
Start: 2023-01-01 | End: 1900-01-01

## 2023-01-01 RX ORDER — METOPROLOL TARTRATE 50 MG
5 TABLET ORAL ONCE
Refills: 0 | Status: COMPLETED | OUTPATIENT
Start: 2023-01-01 | End: 2023-01-01

## 2023-01-01 RX ORDER — DILTIAZEM HCL 120 MG
90 CAPSULE, EXT RELEASE 24 HR ORAL EVERY 6 HOURS
Refills: 0 | Status: DISCONTINUED | OUTPATIENT
Start: 2023-01-01 | End: 2023-01-01

## 2023-01-01 RX ORDER — MAGNESIUM SULFATE 500 MG/ML
2 VIAL (ML) INJECTION ONCE
Refills: 0 | Status: COMPLETED | OUTPATIENT
Start: 2023-01-01 | End: 2023-01-01

## 2023-01-01 RX ORDER — BUDESONIDE AND FORMOTEROL FUMARATE DIHYDRATE 160; 4.5 UG/1; UG/1
1 AEROSOL RESPIRATORY (INHALATION) DAILY
Refills: 0 | Status: DISCONTINUED | OUTPATIENT
Start: 2023-01-01 | End: 2023-01-01

## 2023-01-01 RX ORDER — METOPROLOL TARTRATE 50 MG
50 TABLET ORAL
Refills: 0 | Status: DISCONTINUED | OUTPATIENT
Start: 2023-01-01 | End: 2023-01-01

## 2023-01-01 RX ORDER — ATORVASTATIN CALCIUM 40 MG/1
40 TABLET, FILM COATED ORAL
Qty: 90 | Refills: 1 | Status: ACTIVE | COMMUNITY
Start: 2017-08-25 | End: 1900-01-01

## 2023-01-01 RX ORDER — GLYCOPYRROLATE AND FORMOTEROL FUMARATE 9; 4.8 UG/1; UG/1
9-4.8 AEROSOL, METERED RESPIRATORY (INHALATION) TWICE DAILY
Qty: 3 | Refills: 3 | Status: DISCONTINUED | COMMUNITY
Start: 2023-01-01 | End: 2023-01-01

## 2023-01-01 RX ORDER — DILTIAZEM HCL 120 MG
5 CAPSULE, EXT RELEASE 24 HR ORAL ONCE
Refills: 0 | Status: COMPLETED | OUTPATIENT
Start: 2023-01-01 | End: 2023-01-01

## 2023-01-01 RX ORDER — APIXABAN 2.5 MG/1
5 TABLET, FILM COATED ORAL EVERY 12 HOURS
Refills: 0 | Status: DISCONTINUED | OUTPATIENT
Start: 2023-01-01 | End: 2023-01-01

## 2023-01-01 RX ORDER — ONDANSETRON 8 MG/1
1 TABLET, FILM COATED ORAL
Refills: 0 | DISCHARGE

## 2023-01-01 RX ORDER — ONDANSETRON 8 MG/1
4 TABLET, FILM COATED ORAL EVERY 8 HOURS
Refills: 0 | Status: DISCONTINUED | OUTPATIENT
Start: 2023-01-01 | End: 2023-01-01

## 2023-01-01 RX ORDER — METOPROLOL TARTRATE 50 MG
50 TABLET ORAL ONCE
Refills: 0 | Status: COMPLETED | OUTPATIENT
Start: 2023-01-01 | End: 2023-01-01

## 2023-01-01 RX ORDER — APIXABAN 2.5 MG/1
1 TABLET, FILM COATED ORAL
Qty: 60 | Refills: 0
Start: 2023-01-01 | End: 2024-01-01

## 2023-01-01 RX ORDER — HYDROMORPHONE HYDROCHLORIDE 2 MG/ML
1 INJECTION INTRAMUSCULAR; INTRAVENOUS; SUBCUTANEOUS
Qty: 0 | Refills: 0 | DISCHARGE

## 2023-01-01 RX ORDER — HYDROMORPHONE HYDROCHLORIDE 2 MG/ML
1 INJECTION INTRAMUSCULAR; INTRAVENOUS; SUBCUTANEOUS EVERY 8 HOURS
Refills: 0 | Status: DISCONTINUED | OUTPATIENT
Start: 2023-01-01 | End: 2023-01-01

## 2023-01-01 RX ORDER — DILTIAZEM HCL 120 MG
240 CAPSULE, EXT RELEASE 24 HR ORAL DAILY
Refills: 0 | Status: DISCONTINUED | OUTPATIENT
Start: 2023-01-01 | End: 2023-01-01

## 2023-01-01 RX ORDER — MAGNESIUM SULFATE 500 MG/ML
1 VIAL (ML) INJECTION ONCE
Refills: 0 | Status: COMPLETED | OUTPATIENT
Start: 2023-01-01 | End: 2023-01-01

## 2023-01-01 RX ORDER — ALBUTEROL SULFATE 2.5 MG/3ML
(2.5 MG/3ML) SOLUTION RESPIRATORY (INHALATION)
Qty: 1 | Refills: 5 | Status: ACTIVE | COMMUNITY
Start: 2023-01-01 | End: 1900-01-01

## 2023-01-01 RX ORDER — VANCOMYCIN HCL 1 G
VIAL (EA) INTRAVENOUS
Refills: 0 | Status: DISCONTINUED | OUTPATIENT
Start: 2023-01-01 | End: 2023-01-01

## 2023-01-01 RX ORDER — IPRATROPIUM BROMIDE 0.2 MG/ML
500 SOLUTION, NON-ORAL INHALATION EVERY 6 HOURS
Refills: 0 | Status: DISCONTINUED | OUTPATIENT
Start: 2023-01-01 | End: 2023-01-01

## 2023-01-01 RX ORDER — DILTIAZEM HCL 120 MG
10 CAPSULE, EXT RELEASE 24 HR ORAL ONCE
Refills: 0 | Status: COMPLETED | OUTPATIENT
Start: 2023-01-01 | End: 2023-01-01

## 2023-01-01 RX ORDER — DILTIAZEM HCL 120 MG
120 CAPSULE, EXT RELEASE 24 HR ORAL ONCE
Refills: 0 | Status: COMPLETED | OUTPATIENT
Start: 2023-01-01 | End: 2023-01-01

## 2023-01-01 RX ORDER — AMIODARONE HYDROCHLORIDE 400 MG/1
TABLET ORAL
Refills: 0 | Status: DISCONTINUED | OUTPATIENT
Start: 2023-01-01 | End: 2023-01-01

## 2023-01-01 RX ORDER — SODIUM CHLORIDE 9 MG/ML
2300 INJECTION, SOLUTION INTRAVENOUS ONCE
Refills: 0 | Status: COMPLETED | OUTPATIENT
Start: 2023-01-01 | End: 2023-01-01

## 2023-01-01 RX ORDER — METOPROLOL TARTRATE 50 MG
1 TABLET ORAL
Qty: 60 | Refills: 2
Start: 2023-01-01 | End: 2024-01-01

## 2023-01-01 RX ORDER — ALPRAZOLAM 0.25 MG
0.25 TABLET ORAL THREE TIMES A DAY
Refills: 0 | Status: DISCONTINUED | OUTPATIENT
Start: 2023-01-01 | End: 2023-01-01

## 2023-01-01 RX ORDER — TRAMADOL HYDROCHLORIDE 50 MG/1
1 TABLET ORAL
Qty: 0 | Refills: 0 | DISCHARGE

## 2023-01-01 RX ORDER — DILTIAZEM HCL 120 MG
60 CAPSULE, EXT RELEASE 24 HR ORAL ONCE
Refills: 0 | Status: COMPLETED | OUTPATIENT
Start: 2023-01-01 | End: 2023-01-01

## 2023-01-01 RX ORDER — HYDROMORPHONE HYDROCHLORIDE 2 MG/ML
1 INJECTION INTRAMUSCULAR; INTRAVENOUS; SUBCUTANEOUS EVERY 6 HOURS
Refills: 0 | Status: DISCONTINUED | OUTPATIENT
Start: 2023-01-01 | End: 2023-01-01

## 2023-01-01 RX ORDER — METOPROLOL SUCCINATE 25 MG/1
25 TABLET, EXTENDED RELEASE ORAL DAILY
Qty: 45 | Refills: 3 | Status: DISCONTINUED | COMMUNITY
End: 2023-01-01

## 2023-01-01 RX ORDER — POTASSIUM CHLORIDE 20 MEQ
40 PACKET (EA) ORAL ONCE
Refills: 0 | Status: COMPLETED | OUTPATIENT
Start: 2023-01-01 | End: 2023-01-01

## 2023-01-01 RX ORDER — DILTIAZEM HCL 120 MG
1 CAPSULE, EXT RELEASE 24 HR ORAL
Qty: 30 | Refills: 0
Start: 2023-01-01 | End: 2024-01-01

## 2023-01-01 RX ORDER — PROCHLORPERAZINE MALEATE 10 MG/1
10 TABLET ORAL EVERY 6 HOURS
Qty: 120 | Refills: 3 | Status: ACTIVE | COMMUNITY
Start: 2023-01-01 | End: 1900-01-01

## 2023-01-01 RX ORDER — DEXAMETHASONE 4 MG/1
4 TABLET ORAL
Qty: 10 | Refills: 0 | Status: ACTIVE | COMMUNITY
Start: 2023-01-01 | End: 1900-01-01

## 2023-01-01 RX ORDER — DILTIAZEM HCL 120 MG
20 CAPSULE, EXT RELEASE 24 HR ORAL ONCE
Refills: 0 | Status: COMPLETED | OUTPATIENT
Start: 2023-01-01 | End: 2023-01-01

## 2023-01-01 RX ORDER — MORPHINE SULFATE 50 MG/1
1 CAPSULE, EXTENDED RELEASE ORAL EVERY 4 HOURS
Refills: 0 | Status: DISCONTINUED | OUTPATIENT
Start: 2023-01-01 | End: 2023-01-01

## 2023-01-01 RX ORDER — SOTALOL HCL 120 MG
1 TABLET ORAL
Qty: 60 | Refills: 0
Start: 2023-01-01 | End: 2024-01-01

## 2023-01-01 RX ORDER — APIXABAN 2.5 MG/1
1 TABLET, FILM COATED ORAL
Qty: 0 | Refills: 0 | DISCHARGE

## 2023-01-01 RX ORDER — AMIODARONE HYDROCHLORIDE 400 MG/1
1 TABLET ORAL
Qty: 450 | Refills: 0 | Status: DISCONTINUED | OUTPATIENT
Start: 2023-01-01 | End: 2023-01-01

## 2023-01-01 RX ORDER — FUROSEMIDE 40 MG
40 TABLET ORAL DAILY
Refills: 0 | Status: DISCONTINUED | OUTPATIENT
Start: 2023-01-01 | End: 2023-01-01

## 2023-01-01 RX ORDER — ATORVASTATIN CALCIUM 80 MG/1
40 TABLET, FILM COATED ORAL AT BEDTIME
Refills: 0 | Status: DISCONTINUED | OUTPATIENT
Start: 2023-01-01 | End: 2023-01-01

## 2023-01-01 RX ORDER — MAGNESIUM SULFATE 500 MG/ML
3 VIAL (ML) INJECTION ONCE
Refills: 0 | Status: COMPLETED | OUTPATIENT
Start: 2023-01-01 | End: 2023-01-01

## 2023-01-01 RX ORDER — ACETAMINOPHEN 500 MG
1000 TABLET ORAL ONCE
Refills: 0 | Status: COMPLETED | OUTPATIENT
Start: 2023-01-01 | End: 2023-01-01

## 2023-01-01 RX ORDER — MULTIVITAMIN
TABLET ORAL DAILY
Refills: 0 | Status: DISCONTINUED | COMMUNITY
End: 2023-01-01

## 2023-01-01 RX ORDER — UMECLIDINIUM BROMIDE AND VILANTEROL TRIFENATATE 62.5; 25 UG/1; UG/1
62.5-25 POWDER RESPIRATORY (INHALATION)
Qty: 3 | Refills: 3 | Status: DISCONTINUED | COMMUNITY
Start: 1900-01-01 | End: 2023-01-01

## 2023-01-01 RX ORDER — DIGOXIN 250 MCG
250 TABLET ORAL EVERY 6 HOURS
Refills: 0 | Status: DISCONTINUED | OUTPATIENT
Start: 2023-01-01 | End: 2023-01-01

## 2023-01-01 RX ORDER — METOPROLOL TARTRATE 50 MG
12.5 TABLET ORAL EVERY 12 HOURS
Refills: 0 | Status: DISCONTINUED | OUTPATIENT
Start: 2023-01-01 | End: 2023-01-01

## 2023-01-01 RX ORDER — SODIUM CHLORIDE 9 MG/ML
1000 INJECTION, SOLUTION INTRAVENOUS ONCE
Refills: 0 | Status: COMPLETED | OUTPATIENT
Start: 2023-01-01 | End: 2023-01-01

## 2023-01-01 RX ORDER — ALBUTEROL 90 UG/1
2 AEROSOL, METERED ORAL
Refills: 0 | DISCHARGE

## 2023-01-01 RX ORDER — MIDODRINE HYDROCHLORIDE 2.5 MG/1
5 TABLET ORAL EVERY 8 HOURS
Refills: 0 | Status: DISCONTINUED | OUTPATIENT
Start: 2023-01-01 | End: 2023-01-01

## 2023-01-01 RX ORDER — SODIUM CHLORIDE 9 MG/ML
1000 INJECTION, SOLUTION INTRAVENOUS
Refills: 0 | Status: DISCONTINUED | OUTPATIENT
Start: 2023-01-01 | End: 2023-01-01

## 2023-01-01 RX ORDER — LANOLIN ALCOHOL/MO/W.PET/CERES
3 CREAM (GRAM) TOPICAL AT BEDTIME
Refills: 0 | Status: DISCONTINUED | OUTPATIENT
Start: 2023-01-01 | End: 2023-01-01

## 2023-01-01 RX ORDER — FLUTICASONE FUROATE, UMECLIDINIUM BROMIDE AND VILANTEROL TRIFENATATE 200; 62.5; 25 UG/1; UG/1; UG/1
200-62.5-25 POWDER RESPIRATORY (INHALATION)
Qty: 1 | Refills: 5 | Status: ACTIVE | COMMUNITY
Start: 2023-01-01 | End: 1900-01-01

## 2023-01-01 RX ORDER — BUDESONIDE, GLYCOPYRROLATE, AND FORMOTEROL FUMARATE 160; 9; 4.8 UG/1; UG/1; UG/1
160-9-4.8 AEROSOL, METERED RESPIRATORY (INHALATION)
Qty: 1 | Refills: 5 | Status: DISCONTINUED | COMMUNITY
Start: 2023-01-01 | End: 2023-01-01

## 2023-01-01 RX ORDER — GABAPENTIN 400 MG/1
400 CAPSULE ORAL THREE TIMES A DAY
Refills: 0 | Status: DISCONTINUED | OUTPATIENT
Start: 2023-01-01 | End: 2023-01-01

## 2023-01-01 RX ORDER — ENOXAPARIN SODIUM 100 MG/ML
40 INJECTION SUBCUTANEOUS EVERY 24 HOURS
Refills: 0 | Status: DISCONTINUED | OUTPATIENT
Start: 2023-01-01 | End: 2023-01-01

## 2023-01-01 RX ORDER — ALPRAZOLAM 0.25 MG
0.25 TABLET ORAL AT BEDTIME
Refills: 0 | Status: DISCONTINUED | OUTPATIENT
Start: 2023-01-01 | End: 2023-01-01

## 2023-01-01 RX ORDER — AMIODARONE HYDROCHLORIDE 400 MG/1
150 TABLET ORAL ONCE
Refills: 0 | Status: COMPLETED | OUTPATIENT
Start: 2023-01-01 | End: 2023-01-01

## 2023-01-01 RX ORDER — TAMSULOSIN HYDROCHLORIDE 0.4 MG/1
0.4 CAPSULE ORAL AT BEDTIME
Refills: 0 | Status: DISCONTINUED | OUTPATIENT
Start: 2023-01-01 | End: 2023-01-01

## 2023-01-01 RX ORDER — TRAMADOL HYDROCHLORIDE 50 MG/1
50 TABLET ORAL EVERY 8 HOURS
Refills: 0 | Status: DISCONTINUED | OUTPATIENT
Start: 2023-01-01 | End: 2023-01-01

## 2023-01-01 RX ORDER — DIGOXIN 250 MCG
500 TABLET ORAL ONCE
Refills: 0 | Status: COMPLETED | OUTPATIENT
Start: 2023-01-01 | End: 2023-01-01

## 2023-01-01 RX ORDER — ADENOSINE 3 MG/ML
12 INJECTION INTRAVENOUS ONCE
Refills: 0 | Status: COMPLETED | OUTPATIENT
Start: 2023-01-01 | End: 2023-01-01

## 2023-01-01 RX ORDER — DILTIAZEM HCL 120 MG
30 CAPSULE, EXT RELEASE 24 HR ORAL ONCE
Refills: 0 | Status: DISCONTINUED | OUTPATIENT
Start: 2023-01-01 | End: 2023-01-01

## 2023-01-01 RX ORDER — SODIUM CHLORIDE 9 MG/ML
500 INJECTION INTRAMUSCULAR; INTRAVENOUS; SUBCUTANEOUS ONCE
Refills: 0 | Status: COMPLETED | OUTPATIENT
Start: 2023-01-01 | End: 2023-01-01

## 2023-01-01 RX ORDER — SODIUM CHLORIDE 9 MG/ML
3 INJECTION INTRAMUSCULAR; INTRAVENOUS; SUBCUTANEOUS ONCE
Refills: 0 | Status: COMPLETED | OUTPATIENT
Start: 2023-01-01 | End: 2023-01-01

## 2023-01-01 RX ORDER — ALPRAZOLAM 0.25 MG
1 TABLET ORAL
Qty: 15 | Refills: 0
Start: 2023-01-01 | End: 2023-01-01

## 2023-01-01 RX ORDER — CHLORHEXIDINE GLUCONATE 213 G/1000ML
1 SOLUTION TOPICAL DAILY
Refills: 0 | Status: DISCONTINUED | OUTPATIENT
Start: 2023-01-01 | End: 2023-01-01

## 2023-01-01 RX ORDER — VANCOMYCIN HCL 1 G
1000 VIAL (EA) INTRAVENOUS ONCE
Refills: 0 | Status: DISCONTINUED | OUTPATIENT
Start: 2023-01-01 | End: 2023-01-01

## 2023-01-01 RX ORDER — HYDROMORPHONE HYDROCHLORIDE 2 MG/ML
1 INJECTION INTRAMUSCULAR; INTRAVENOUS; SUBCUTANEOUS
Refills: 0 | DISCHARGE

## 2023-01-01 RX ORDER — MAGNESIUM OXIDE 400 MG ORAL TABLET 241.3 MG
400 TABLET ORAL
Refills: 0 | Status: DISCONTINUED | OUTPATIENT
Start: 2023-01-01 | End: 2023-01-01

## 2023-01-01 RX ORDER — ADENOSINE 3 MG/ML
6 INJECTION INTRAVENOUS ONCE
Refills: 0 | Status: COMPLETED | OUTPATIENT
Start: 2023-01-01 | End: 2023-01-01

## 2023-01-01 RX ORDER — ONDANSETRON 8 MG/1
8 TABLET ORAL EVERY 8 HOURS
Qty: 90 | Refills: 1 | Status: ACTIVE | COMMUNITY
Start: 2023-01-01 | End: 1900-01-01

## 2023-01-01 RX ORDER — AMIODARONE HYDROCHLORIDE 400 MG/1
400 TABLET ORAL EVERY 8 HOURS
Refills: 0 | Status: DISCONTINUED | OUTPATIENT
Start: 2023-01-01 | End: 2023-01-01

## 2023-01-01 RX ORDER — METOPROLOL TARTRATE 50 MG
10 TABLET ORAL ONCE
Refills: 0 | Status: COMPLETED | OUTPATIENT
Start: 2023-01-01 | End: 2023-01-01

## 2023-01-01 RX ORDER — LEVALBUTEROL 1.25 MG/.5ML
1.25 SOLUTION, CONCENTRATE RESPIRATORY (INHALATION) EVERY 6 HOURS
Refills: 0 | Status: DISCONTINUED | OUTPATIENT
Start: 2023-01-01 | End: 2023-01-01

## 2023-01-01 RX ORDER — MORPHINE SULFATE 50 MG/1
2 CAPSULE, EXTENDED RELEASE ORAL EVERY 8 HOURS
Refills: 0 | Status: DISCONTINUED | OUTPATIENT
Start: 2023-01-01 | End: 2023-01-01

## 2023-01-01 RX ORDER — VANCOMYCIN HCL 1 G
1500 VIAL (EA) INTRAVENOUS ONCE
Refills: 0 | Status: COMPLETED | OUTPATIENT
Start: 2023-01-01 | End: 2023-01-01

## 2023-01-01 RX ORDER — RESVER/WINE/BFL/GRPSD/PC/C/POM 200MG-60MG
125 MCG CAPSULE ORAL
Qty: 90 | Refills: 1 | Status: DISCONTINUED | COMMUNITY
Start: 2022-08-08 | End: 2023-01-01

## 2023-01-01 RX ORDER — METOPROLOL TARTRATE 50 MG/1
50 TABLET, FILM COATED ORAL
Qty: 60 | Refills: 0 | Status: ACTIVE | COMMUNITY
Start: 2023-01-01

## 2023-01-01 RX ORDER — HEPARIN SODIUM 5000 [USP'U]/ML
INJECTION INTRAVENOUS; SUBCUTANEOUS
Qty: 25000 | Refills: 0 | Status: DISCONTINUED | OUTPATIENT
Start: 2023-01-01 | End: 2023-01-01

## 2023-01-01 RX ORDER — VANCOMYCIN HCL 1 G
1000 VIAL (EA) INTRAVENOUS ONCE
Refills: 0 | Status: COMPLETED | OUTPATIENT
Start: 2023-01-01 | End: 2023-01-01

## 2023-01-01 RX ORDER — HYDROMORPHONE HYDROCHLORIDE 2 MG/ML
2 INJECTION INTRAMUSCULAR; INTRAVENOUS; SUBCUTANEOUS ONCE
Refills: 0 | Status: DISCONTINUED | OUTPATIENT
Start: 2023-01-01 | End: 2023-01-01

## 2023-01-01 RX ORDER — ENOXAPARIN SODIUM 100 MG/ML
40 INJECTION SUBCUTANEOUS EVERY 12 HOURS
Refills: 0 | Status: DISCONTINUED | OUTPATIENT
Start: 2023-01-01 | End: 2023-01-01

## 2023-01-01 RX ORDER — IPRATROPIUM/ALBUTEROL SULFATE 18-103MCG
3 AEROSOL WITH ADAPTER (GRAM) INHALATION EVERY 6 HOURS
Refills: 0 | Status: DISCONTINUED | OUTPATIENT
Start: 2023-01-01 | End: 2023-01-01

## 2023-01-01 RX ORDER — OXYCODONE AND ACETAMINOPHEN 5; 325 MG/1; MG/1
1 TABLET ORAL
Qty: 0 | Refills: 0 | DISCHARGE

## 2023-01-01 RX ORDER — DIGOXIN 250 MCG
500 TABLET ORAL ONCE
Refills: 0 | Status: DISCONTINUED | OUTPATIENT
Start: 2023-01-01 | End: 2023-01-01

## 2023-01-01 RX ORDER — METOPROLOL TARTRATE 50 MG
25 TABLET ORAL ONCE
Refills: 0 | Status: COMPLETED | OUTPATIENT
Start: 2023-01-01 | End: 2023-01-01

## 2023-01-01 RX ORDER — DILTIAZEM HCL 120 MG
120 CAPSULE, EXT RELEASE 24 HR ORAL DAILY
Refills: 0 | Status: DISCONTINUED | OUTPATIENT
Start: 2023-01-01 | End: 2023-01-01

## 2023-01-01 RX ORDER — ACETAMINOPHEN 500 MG
2 TABLET ORAL
Qty: 0 | Refills: 0 | DISCHARGE
Start: 2023-01-01

## 2023-01-01 RX ADMIN — HYDROMORPHONE HYDROCHLORIDE 1 MILLIGRAM(S): 2 INJECTION INTRAMUSCULAR; INTRAVENOUS; SUBCUTANEOUS at 12:02

## 2023-01-01 RX ADMIN — HEPARIN SODIUM 1700 UNIT(S)/HR: 5000 INJECTION INTRAVENOUS; SUBCUTANEOUS at 12:19

## 2023-01-01 RX ADMIN — ONDANSETRON 4 MILLIGRAM(S): 8 TABLET, FILM COATED ORAL at 22:14

## 2023-01-01 RX ADMIN — Medication 400 MILLIGRAM(S): at 13:32

## 2023-01-01 RX ADMIN — GABAPENTIN 400 MILLIGRAM(S): 400 CAPSULE ORAL at 21:31

## 2023-01-01 RX ADMIN — LEVALBUTEROL 1.25 MILLIGRAM(S): 1.25 SOLUTION, CONCENTRATE RESPIRATORY (INHALATION) at 09:12

## 2023-01-01 RX ADMIN — BUDESONIDE AND FORMOTEROL FUMARATE DIHYDRATE 1 PUFF(S): 160; 4.5 AEROSOL RESPIRATORY (INHALATION) at 08:51

## 2023-01-01 RX ADMIN — HYDROMORPHONE HYDROCHLORIDE 2 MILLIGRAM(S): 2 INJECTION INTRAMUSCULAR; INTRAVENOUS; SUBCUTANEOUS at 05:41

## 2023-01-01 RX ADMIN — GABAPENTIN 400 MILLIGRAM(S): 400 CAPSULE ORAL at 05:42

## 2023-01-01 RX ADMIN — BUDESONIDE AND FORMOTEROL FUMARATE DIHYDRATE 1 PUFF(S): 160; 4.5 AEROSOL RESPIRATORY (INHALATION) at 08:54

## 2023-01-01 RX ADMIN — TRAMADOL HYDROCHLORIDE 50 MILLIGRAM(S): 50 TABLET ORAL at 06:37

## 2023-01-01 RX ADMIN — HYDROMORPHONE HYDROCHLORIDE 2 MILLIGRAM(S): 2 INJECTION INTRAMUSCULAR; INTRAVENOUS; SUBCUTANEOUS at 03:13

## 2023-01-01 RX ADMIN — TRAMADOL HYDROCHLORIDE 50 MILLIGRAM(S): 50 TABLET ORAL at 00:16

## 2023-01-01 RX ADMIN — GABAPENTIN 400 MILLIGRAM(S): 400 CAPSULE ORAL at 21:36

## 2023-01-01 RX ADMIN — HEPARIN SODIUM 1700 UNIT(S)/HR: 5000 INJECTION INTRAVENOUS; SUBCUTANEOUS at 16:57

## 2023-01-01 RX ADMIN — Medication 5 MILLIGRAM(S): at 13:35

## 2023-01-01 RX ADMIN — Medication 3 MILLILITER(S): at 09:17

## 2023-01-01 RX ADMIN — Medication 50 MILLIGRAM(S): at 05:16

## 2023-01-01 RX ADMIN — Medication 1000 MILLIGRAM(S): at 02:59

## 2023-01-01 RX ADMIN — HEPARIN SODIUM 1700 UNIT(S)/HR: 5000 INJECTION INTRAVENOUS; SUBCUTANEOUS at 03:49

## 2023-01-01 RX ADMIN — MORPHINE SULFATE 1 MILLIGRAM(S): 50 CAPSULE, EXTENDED RELEASE ORAL at 18:20

## 2023-01-01 RX ADMIN — LEVALBUTEROL 1.25 MILLIGRAM(S): 1.25 SOLUTION, CONCENTRATE RESPIRATORY (INHALATION) at 20:29

## 2023-01-01 RX ADMIN — Medication 20 MILLIGRAM(S): at 04:42

## 2023-01-01 RX ADMIN — Medication 40 MILLIGRAM(S): at 14:44

## 2023-01-01 RX ADMIN — HEPARIN SODIUM 1700 UNIT(S)/HR: 5000 INJECTION INTRAVENOUS; SUBCUTANEOUS at 13:05

## 2023-01-01 RX ADMIN — HEPARIN SODIUM 1700 UNIT(S)/HR: 5000 INJECTION INTRAVENOUS; SUBCUTANEOUS at 22:52

## 2023-01-01 RX ADMIN — MAGNESIUM OXIDE 400 MG ORAL TABLET 400 MILLIGRAM(S): 241.3 TABLET ORAL at 08:40

## 2023-01-01 RX ADMIN — MORPHINE SULFATE 2 MILLIGRAM(S): 50 CAPSULE, EXTENDED RELEASE ORAL at 11:07

## 2023-01-01 RX ADMIN — Medication 25 GRAM(S): at 05:25

## 2023-01-01 RX ADMIN — Medication 50 GRAM(S): at 17:31

## 2023-01-01 RX ADMIN — HEPARIN SODIUM 1700 UNIT(S)/HR: 5000 INJECTION INTRAVENOUS; SUBCUTANEOUS at 07:30

## 2023-01-01 RX ADMIN — Medication 90 MILLIGRAM(S): at 00:43

## 2023-01-01 RX ADMIN — FENTANYL CITRATE 25 MICROGRAM(S): 50 INJECTION INTRAVENOUS at 19:28

## 2023-01-01 RX ADMIN — LEVALBUTEROL 1.25 MILLIGRAM(S): 1.25 SOLUTION, CONCENTRATE RESPIRATORY (INHALATION) at 21:20

## 2023-01-01 RX ADMIN — TAMSULOSIN HYDROCHLORIDE 0.4 MILLIGRAM(S): 0.4 CAPSULE ORAL at 22:20

## 2023-01-01 RX ADMIN — Medication 12.5 MILLIGRAM(S): at 05:19

## 2023-01-01 RX ADMIN — HYDROMORPHONE HYDROCHLORIDE 2 MILLIGRAM(S): 2 INJECTION INTRAMUSCULAR; INTRAVENOUS; SUBCUTANEOUS at 22:10

## 2023-01-01 RX ADMIN — GABAPENTIN 400 MILLIGRAM(S): 400 CAPSULE ORAL at 05:15

## 2023-01-01 RX ADMIN — GABAPENTIN 400 MILLIGRAM(S): 400 CAPSULE ORAL at 05:22

## 2023-01-01 RX ADMIN — MORPHINE SULFATE 1 MILLIGRAM(S): 50 CAPSULE, EXTENDED RELEASE ORAL at 03:54

## 2023-01-01 RX ADMIN — HYDROMORPHONE HYDROCHLORIDE 2 MILLIGRAM(S): 2 INJECTION INTRAMUSCULAR; INTRAVENOUS; SUBCUTANEOUS at 13:14

## 2023-01-01 RX ADMIN — Medication 500 MICROGRAM(S): at 04:33

## 2023-01-01 RX ADMIN — Medication 120 MILLIGRAM(S): at 21:30

## 2023-01-01 RX ADMIN — BUDESONIDE AND FORMOTEROL FUMARATE DIHYDRATE 1 PUFF(S): 160; 4.5 AEROSOL RESPIRATORY (INHALATION) at 08:33

## 2023-01-01 RX ADMIN — MORPHINE SULFATE 1 MILLIGRAM(S): 50 CAPSULE, EXTENDED RELEASE ORAL at 15:16

## 2023-01-01 RX ADMIN — TRAMADOL HYDROCHLORIDE 50 MILLIGRAM(S): 50 TABLET ORAL at 23:40

## 2023-01-01 RX ADMIN — Medication 1 MILLIGRAM(S): at 08:14

## 2023-01-01 RX ADMIN — MORPHINE SULFATE 2 MILLIGRAM(S): 50 CAPSULE, EXTENDED RELEASE ORAL at 04:41

## 2023-01-01 RX ADMIN — Medication 400 MILLIGRAM(S): at 02:39

## 2023-01-01 RX ADMIN — HYDROMORPHONE HYDROCHLORIDE 2 MILLIGRAM(S): 2 INJECTION INTRAMUSCULAR; INTRAVENOUS; SUBCUTANEOUS at 09:26

## 2023-01-01 RX ADMIN — MAGNESIUM OXIDE 400 MG ORAL TABLET 400 MILLIGRAM(S): 241.3 TABLET ORAL at 08:12

## 2023-01-01 RX ADMIN — Medication 120 MILLIGRAM(S): at 22:30

## 2023-01-01 RX ADMIN — GABAPENTIN 400 MILLIGRAM(S): 400 CAPSULE ORAL at 14:07

## 2023-01-01 RX ADMIN — Medication 25 MILLIGRAM(S): at 17:16

## 2023-01-01 RX ADMIN — Medication 12.5 MILLIGRAM(S): at 04:42

## 2023-01-01 RX ADMIN — PIPERACILLIN AND TAZOBACTAM 25 GRAM(S): 4; .5 INJECTION, POWDER, LYOPHILIZED, FOR SOLUTION INTRAVENOUS at 13:12

## 2023-01-01 RX ADMIN — HYDROMORPHONE HYDROCHLORIDE 2 MILLIGRAM(S): 2 INJECTION INTRAMUSCULAR; INTRAVENOUS; SUBCUTANEOUS at 21:01

## 2023-01-01 RX ADMIN — Medication 5 MILLIGRAM(S): at 13:20

## 2023-01-01 RX ADMIN — TRAMADOL HYDROCHLORIDE 50 MILLIGRAM(S): 50 TABLET ORAL at 03:49

## 2023-01-01 RX ADMIN — Medication 120 MILLIGRAM(S): at 09:30

## 2023-01-01 RX ADMIN — TAMSULOSIN HYDROCHLORIDE 0.4 MILLIGRAM(S): 0.4 CAPSULE ORAL at 21:53

## 2023-01-01 RX ADMIN — HYDROMORPHONE HYDROCHLORIDE 2 MILLIGRAM(S): 2 INJECTION INTRAMUSCULAR; INTRAVENOUS; SUBCUTANEOUS at 18:11

## 2023-01-01 RX ADMIN — LEVALBUTEROL 1.25 MILLIGRAM(S): 1.25 SOLUTION, CONCENTRATE RESPIRATORY (INHALATION) at 14:47

## 2023-01-01 RX ADMIN — Medication 0.25 MILLIGRAM(S): at 17:24

## 2023-01-01 RX ADMIN — APIXABAN 5 MILLIGRAM(S): 2.5 TABLET, FILM COATED ORAL at 17:16

## 2023-01-01 RX ADMIN — HYDROMORPHONE HYDROCHLORIDE 2 MILLIGRAM(S): 2 INJECTION INTRAMUSCULAR; INTRAVENOUS; SUBCUTANEOUS at 06:38

## 2023-01-01 RX ADMIN — ATORVASTATIN CALCIUM 40 MILLIGRAM(S): 80 TABLET, FILM COATED ORAL at 21:11

## 2023-01-01 RX ADMIN — Medication 250 MICROGRAM(S): at 10:01

## 2023-01-01 RX ADMIN — ATORVASTATIN CALCIUM 40 MILLIGRAM(S): 80 TABLET, FILM COATED ORAL at 22:00

## 2023-01-01 RX ADMIN — TRANEXAMIC ACID 500 MILLIGRAM(S): 100 INJECTION, SOLUTION INTRAVENOUS at 08:59

## 2023-01-01 RX ADMIN — LEVALBUTEROL 1.25 MILLIGRAM(S): 1.25 SOLUTION, CONCENTRATE RESPIRATORY (INHALATION) at 08:33

## 2023-01-01 RX ADMIN — Medication 250 MICROGRAM(S): at 19:06

## 2023-01-01 RX ADMIN — Medication 500 MICROGRAM(S): at 03:35

## 2023-01-01 RX ADMIN — TAMSULOSIN HYDROCHLORIDE 0.4 MILLIGRAM(S): 0.4 CAPSULE ORAL at 21:11

## 2023-01-01 RX ADMIN — MORPHINE SULFATE 1 MILLIGRAM(S): 50 CAPSULE, EXTENDED RELEASE ORAL at 20:04

## 2023-01-01 RX ADMIN — HYDROMORPHONE HYDROCHLORIDE 2 MILLIGRAM(S): 2 INJECTION INTRAMUSCULAR; INTRAVENOUS; SUBCUTANEOUS at 18:05

## 2023-01-01 RX ADMIN — HYDROMORPHONE HYDROCHLORIDE 2 MILLIGRAM(S): 2 INJECTION INTRAMUSCULAR; INTRAVENOUS; SUBCUTANEOUS at 07:22

## 2023-01-01 RX ADMIN — GABAPENTIN 400 MILLIGRAM(S): 400 CAPSULE ORAL at 21:11

## 2023-01-01 RX ADMIN — ATORVASTATIN CALCIUM 20 MILLIGRAM(S): 80 TABLET, FILM COATED ORAL at 03:39

## 2023-01-01 RX ADMIN — Medication 50 MILLIGRAM(S): at 17:05

## 2023-01-01 RX ADMIN — HYDROMORPHONE HYDROCHLORIDE 2 MILLIGRAM(S): 2 INJECTION INTRAMUSCULAR; INTRAVENOUS; SUBCUTANEOUS at 12:17

## 2023-01-01 RX ADMIN — Medication 240 MILLIGRAM(S): at 05:14

## 2023-01-01 RX ADMIN — Medication 3 MILLILITER(S): at 14:05

## 2023-01-01 RX ADMIN — Medication 3 MILLILITER(S): at 03:28

## 2023-01-01 RX ADMIN — MIDODRINE HYDROCHLORIDE 2.5 MILLIGRAM(S): 2.5 TABLET ORAL at 21:30

## 2023-01-01 RX ADMIN — Medication 30 MILLILITER(S): at 08:26

## 2023-01-01 RX ADMIN — MORPHINE SULFATE 1 MILLIGRAM(S): 50 CAPSULE, EXTENDED RELEASE ORAL at 10:00

## 2023-01-01 RX ADMIN — MORPHINE SULFATE 1 MILLIGRAM(S): 50 CAPSULE, EXTENDED RELEASE ORAL at 09:58

## 2023-01-01 RX ADMIN — ATORVASTATIN CALCIUM 40 MILLIGRAM(S): 80 TABLET, FILM COATED ORAL at 21:05

## 2023-01-01 RX ADMIN — MIDODRINE HYDROCHLORIDE 5 MILLIGRAM(S): 2.5 TABLET ORAL at 21:40

## 2023-01-01 RX ADMIN — TRAMADOL HYDROCHLORIDE 50 MILLIGRAM(S): 50 TABLET ORAL at 05:57

## 2023-01-01 RX ADMIN — TRAMADOL HYDROCHLORIDE 50 MILLIGRAM(S): 50 TABLET ORAL at 22:51

## 2023-01-01 RX ADMIN — BUDESONIDE AND FORMOTEROL FUMARATE DIHYDRATE 1 PUFF(S): 160; 4.5 AEROSOL RESPIRATORY (INHALATION) at 09:32

## 2023-01-01 RX ADMIN — SODIUM CHLORIDE 100 MILLILITER(S): 9 INJECTION, SOLUTION INTRAVENOUS at 12:23

## 2023-01-01 RX ADMIN — HEPARIN SODIUM 1700 UNIT(S)/HR: 5000 INJECTION INTRAVENOUS; SUBCUTANEOUS at 06:55

## 2023-01-01 RX ADMIN — Medication 1 MILLIGRAM(S): at 21:05

## 2023-01-01 RX ADMIN — MORPHINE SULFATE 1 MILLIGRAM(S): 50 CAPSULE, EXTENDED RELEASE ORAL at 20:45

## 2023-01-01 RX ADMIN — Medication 120 MILLIGRAM(S): at 05:24

## 2023-01-01 RX ADMIN — HYDROMORPHONE HYDROCHLORIDE 2 MILLIGRAM(S): 2 INJECTION INTRAMUSCULAR; INTRAVENOUS; SUBCUTANEOUS at 19:11

## 2023-01-01 RX ADMIN — CHLORHEXIDINE GLUCONATE 1 APPLICATION(S): 213 SOLUTION TOPICAL at 12:19

## 2023-01-01 RX ADMIN — SODIUM CHLORIDE 100 MILLILITER(S): 9 INJECTION, SOLUTION INTRAVENOUS at 13:12

## 2023-01-01 RX ADMIN — ADENOSINE 12 MILLIGRAM(S): 3 INJECTION INTRAVENOUS at 13:15

## 2023-01-01 RX ADMIN — ATORVASTATIN CALCIUM 40 MILLIGRAM(S): 80 TABLET, FILM COATED ORAL at 21:52

## 2023-01-01 RX ADMIN — GABAPENTIN 400 MILLIGRAM(S): 400 CAPSULE ORAL at 14:44

## 2023-01-01 RX ADMIN — Medication 90 MILLIGRAM(S): at 06:33

## 2023-01-01 RX ADMIN — GABAPENTIN 400 MILLIGRAM(S): 400 CAPSULE ORAL at 21:05

## 2023-01-01 RX ADMIN — Medication 20 MILLIEQUIVALENT(S): at 22:31

## 2023-01-01 RX ADMIN — TRAMADOL HYDROCHLORIDE 50 MILLIGRAM(S): 50 TABLET ORAL at 10:50

## 2023-01-01 RX ADMIN — MORPHINE SULFATE 1 MILLIGRAM(S): 50 CAPSULE, EXTENDED RELEASE ORAL at 13:17

## 2023-01-01 RX ADMIN — TAMSULOSIN HYDROCHLORIDE 0.4 MILLIGRAM(S): 0.4 CAPSULE ORAL at 03:39

## 2023-01-01 RX ADMIN — HEPARIN SODIUM 1700 UNIT(S)/HR: 5000 INJECTION INTRAVENOUS; SUBCUTANEOUS at 05:34

## 2023-01-01 RX ADMIN — PIPERACILLIN AND TAZOBACTAM 200 GRAM(S): 4; .5 INJECTION, POWDER, LYOPHILIZED, FOR SOLUTION INTRAVENOUS at 14:00

## 2023-01-01 RX ADMIN — TAMSULOSIN HYDROCHLORIDE 0.4 MILLIGRAM(S): 0.4 CAPSULE ORAL at 21:30

## 2023-01-01 RX ADMIN — BUDESONIDE AND FORMOTEROL FUMARATE DIHYDRATE 1 PUFF(S): 160; 4.5 AEROSOL RESPIRATORY (INHALATION) at 10:00

## 2023-01-01 RX ADMIN — MIDODRINE HYDROCHLORIDE 5 MILLIGRAM(S): 2.5 TABLET ORAL at 22:09

## 2023-01-01 RX ADMIN — Medication 40 MILLIGRAM(S): at 05:42

## 2023-01-01 RX ADMIN — GABAPENTIN 400 MILLIGRAM(S): 400 CAPSULE ORAL at 05:37

## 2023-01-01 RX ADMIN — MIDODRINE HYDROCHLORIDE 5 MILLIGRAM(S): 2.5 TABLET ORAL at 17:27

## 2023-01-01 RX ADMIN — Medication 650 MILLIGRAM(S): at 17:19

## 2023-01-01 RX ADMIN — MIDODRINE HYDROCHLORIDE 5 MILLIGRAM(S): 2.5 TABLET ORAL at 05:25

## 2023-01-01 RX ADMIN — Medication 60 MILLIGRAM(S): at 12:19

## 2023-01-01 RX ADMIN — Medication 100 MILLIGRAM(S): at 21:06

## 2023-01-01 RX ADMIN — HEPARIN SODIUM 1900 UNIT(S)/HR: 5000 INJECTION INTRAVENOUS; SUBCUTANEOUS at 05:08

## 2023-01-01 RX ADMIN — Medication 5 MILLIGRAM(S): at 21:24

## 2023-01-01 RX ADMIN — Medication 60 MILLIGRAM(S): at 17:02

## 2023-01-01 RX ADMIN — MAGNESIUM OXIDE 400 MG ORAL TABLET 400 MILLIGRAM(S): 241.3 TABLET ORAL at 18:11

## 2023-01-01 RX ADMIN — Medication 3 MILLILITER(S): at 14:54

## 2023-01-01 RX ADMIN — SODIUM CHLORIDE 3 MILLILITER(S): 9 INJECTION INTRAMUSCULAR; INTRAVENOUS; SUBCUTANEOUS at 18:21

## 2023-01-01 RX ADMIN — TRAMADOL HYDROCHLORIDE 50 MILLIGRAM(S): 50 TABLET ORAL at 04:57

## 2023-01-01 RX ADMIN — Medication 120 MILLIGRAM(S): at 17:10

## 2023-01-01 RX ADMIN — CHLORHEXIDINE GLUCONATE 1 APPLICATION(S): 213 SOLUTION TOPICAL at 11:23

## 2023-01-01 RX ADMIN — CHLORHEXIDINE GLUCONATE 1 APPLICATION(S): 213 SOLUTION TOPICAL at 12:31

## 2023-01-01 RX ADMIN — PIPERACILLIN AND TAZOBACTAM 25 GRAM(S): 4; .5 INJECTION, POWDER, LYOPHILIZED, FOR SOLUTION INTRAVENOUS at 21:34

## 2023-01-01 RX ADMIN — Medication 500 MICROGRAM(S): at 08:33

## 2023-01-01 RX ADMIN — CHLORHEXIDINE GLUCONATE 1 APPLICATION(S): 213 SOLUTION TOPICAL at 14:43

## 2023-01-01 RX ADMIN — TAMSULOSIN HYDROCHLORIDE 0.4 MILLIGRAM(S): 0.4 CAPSULE ORAL at 21:40

## 2023-01-01 RX ADMIN — GABAPENTIN 400 MILLIGRAM(S): 400 CAPSULE ORAL at 06:33

## 2023-01-01 RX ADMIN — CHLORHEXIDINE GLUCONATE 1 APPLICATION(S): 213 SOLUTION TOPICAL at 11:35

## 2023-01-01 RX ADMIN — PIPERACILLIN AND TAZOBACTAM 25 GRAM(S): 4; .5 INJECTION, POWDER, LYOPHILIZED, FOR SOLUTION INTRAVENOUS at 21:30

## 2023-01-01 RX ADMIN — HYDROMORPHONE HYDROCHLORIDE 2 MILLIGRAM(S): 2 INJECTION INTRAMUSCULAR; INTRAVENOUS; SUBCUTANEOUS at 10:07

## 2023-01-01 RX ADMIN — MAGNESIUM OXIDE 400 MG ORAL TABLET 400 MILLIGRAM(S): 241.3 TABLET ORAL at 12:25

## 2023-01-01 RX ADMIN — ATORVASTATIN CALCIUM 40 MILLIGRAM(S): 80 TABLET, FILM COATED ORAL at 21:39

## 2023-01-01 RX ADMIN — Medication 3 MILLILITER(S): at 11:01

## 2023-01-01 RX ADMIN — TRAMADOL HYDROCHLORIDE 50 MILLIGRAM(S): 50 TABLET ORAL at 11:12

## 2023-01-01 RX ADMIN — GABAPENTIN 400 MILLIGRAM(S): 400 CAPSULE ORAL at 05:01

## 2023-01-01 RX ADMIN — BUDESONIDE AND FORMOTEROL FUMARATE DIHYDRATE 1 PUFF(S): 160; 4.5 AEROSOL RESPIRATORY (INHALATION) at 08:52

## 2023-01-01 RX ADMIN — TAMSULOSIN HYDROCHLORIDE 0.4 MILLIGRAM(S): 0.4 CAPSULE ORAL at 21:06

## 2023-01-01 RX ADMIN — Medication 12.5 MILLIGRAM(S): at 12:17

## 2023-01-01 RX ADMIN — TAMSULOSIN HYDROCHLORIDE 0.4 MILLIGRAM(S): 0.4 CAPSULE ORAL at 22:14

## 2023-01-01 RX ADMIN — Medication 90 MILLIGRAM(S): at 05:41

## 2023-01-01 RX ADMIN — Medication 10 MILLIGRAM(S): at 23:17

## 2023-01-01 RX ADMIN — APIXABAN 5 MILLIGRAM(S): 2.5 TABLET, FILM COATED ORAL at 18:11

## 2023-01-01 RX ADMIN — SODIUM CHLORIDE 2300 MILLILITER(S): 9 INJECTION, SOLUTION INTRAVENOUS at 13:00

## 2023-01-01 RX ADMIN — LEVALBUTEROL 1.25 MILLIGRAM(S): 1.25 SOLUTION, CONCENTRATE RESPIRATORY (INHALATION) at 08:03

## 2023-01-01 RX ADMIN — HYDROMORPHONE HYDROCHLORIDE 2 MILLIGRAM(S): 2 INJECTION INTRAMUSCULAR; INTRAVENOUS; SUBCUTANEOUS at 03:49

## 2023-01-01 RX ADMIN — PHENYLEPHRINE HYDROCHLORIDE 13.8 MICROGRAM(S)/KG/MIN: 10 INJECTION INTRAVENOUS at 19:37

## 2023-01-01 RX ADMIN — MORPHINE SULFATE 2 MILLIGRAM(S): 50 CAPSULE, EXTENDED RELEASE ORAL at 05:41

## 2023-01-01 RX ADMIN — PIPERACILLIN AND TAZOBACTAM 25 GRAM(S): 4; .5 INJECTION, POWDER, LYOPHILIZED, FOR SOLUTION INTRAVENOUS at 13:15

## 2023-01-01 RX ADMIN — MORPHINE SULFATE 1 MILLIGRAM(S): 50 CAPSULE, EXTENDED RELEASE ORAL at 15:02

## 2023-01-01 RX ADMIN — Medication 3 MILLILITER(S): at 20:27

## 2023-01-01 RX ADMIN — Medication 40 MILLIEQUIVALENT(S): at 08:47

## 2023-01-01 RX ADMIN — GABAPENTIN 400 MILLIGRAM(S): 400 CAPSULE ORAL at 13:38

## 2023-01-01 RX ADMIN — Medication 3 MILLIGRAM(S): at 21:44

## 2023-01-01 RX ADMIN — HEPARIN SODIUM 1700 UNIT(S)/HR: 5000 INJECTION INTRAVENOUS; SUBCUTANEOUS at 00:11

## 2023-01-01 RX ADMIN — Medication 60 MILLIGRAM(S): at 11:21

## 2023-01-01 RX ADMIN — Medication 3 MILLILITER(S): at 20:39

## 2023-01-01 RX ADMIN — PIPERACILLIN AND TAZOBACTAM 25 GRAM(S): 4; .5 INJECTION, POWDER, LYOPHILIZED, FOR SOLUTION INTRAVENOUS at 21:16

## 2023-01-01 RX ADMIN — TRAMADOL HYDROCHLORIDE 50 MILLIGRAM(S): 50 TABLET ORAL at 20:45

## 2023-01-01 RX ADMIN — HYDROMORPHONE HYDROCHLORIDE 2 MILLIGRAM(S): 2 INJECTION INTRAMUSCULAR; INTRAVENOUS; SUBCUTANEOUS at 06:05

## 2023-01-01 RX ADMIN — Medication 40 MILLIGRAM(S): at 10:53

## 2023-01-01 RX ADMIN — AMIODARONE HYDROCHLORIDE 400 MILLIGRAM(S): 400 TABLET ORAL at 13:13

## 2023-01-01 RX ADMIN — Medication 120 MILLIGRAM(S): at 21:53

## 2023-01-01 RX ADMIN — Medication 3 MILLILITER(S): at 04:35

## 2023-01-01 RX ADMIN — Medication 120 MILLIGRAM(S): at 11:54

## 2023-01-01 RX ADMIN — Medication 500 MICROGRAM(S): at 08:02

## 2023-01-01 RX ADMIN — Medication 40 MILLIGRAM(S): at 14:33

## 2023-01-01 RX ADMIN — MAGNESIUM OXIDE 400 MG ORAL TABLET 400 MILLIGRAM(S): 241.3 TABLET ORAL at 08:14

## 2023-01-01 RX ADMIN — MORPHINE SULFATE 1 MILLIGRAM(S): 50 CAPSULE, EXTENDED RELEASE ORAL at 09:00

## 2023-01-01 RX ADMIN — Medication 120 MILLIGRAM(S): at 22:11

## 2023-01-01 RX ADMIN — APIXABAN 5 MILLIGRAM(S): 2.5 TABLET, FILM COATED ORAL at 19:52

## 2023-01-01 RX ADMIN — MIDODRINE HYDROCHLORIDE 5 MILLIGRAM(S): 2.5 TABLET ORAL at 14:42

## 2023-01-01 RX ADMIN — Medication 3 MILLILITER(S): at 14:42

## 2023-01-01 RX ADMIN — TAMSULOSIN HYDROCHLORIDE 0.4 MILLIGRAM(S): 0.4 CAPSULE ORAL at 22:01

## 2023-01-01 RX ADMIN — Medication 3 MILLILITER(S): at 09:00

## 2023-01-01 RX ADMIN — BUDESONIDE AND FORMOTEROL FUMARATE DIHYDRATE 1 PUFF(S): 160; 4.5 AEROSOL RESPIRATORY (INHALATION) at 08:55

## 2023-01-01 RX ADMIN — MORPHINE SULFATE 1 MILLIGRAM(S): 50 CAPSULE, EXTENDED RELEASE ORAL at 11:00

## 2023-01-01 RX ADMIN — PIPERACILLIN AND TAZOBACTAM 25 GRAM(S): 4; .5 INJECTION, POWDER, LYOPHILIZED, FOR SOLUTION INTRAVENOUS at 21:41

## 2023-01-01 RX ADMIN — Medication 40 MILLIGRAM(S): at 05:19

## 2023-01-01 RX ADMIN — Medication 500 MICROGRAM(S): at 20:52

## 2023-01-01 RX ADMIN — GABAPENTIN 400 MILLIGRAM(S): 400 CAPSULE ORAL at 21:30

## 2023-01-01 RX ADMIN — HEPARIN SODIUM 1700 UNIT(S)/HR: 5000 INJECTION INTRAVENOUS; SUBCUTANEOUS at 06:39

## 2023-01-01 RX ADMIN — TAMSULOSIN HYDROCHLORIDE 0.4 MILLIGRAM(S): 0.4 CAPSULE ORAL at 21:36

## 2023-01-01 RX ADMIN — Medication 3 MILLILITER(S): at 20:34

## 2023-01-01 RX ADMIN — BUDESONIDE AND FORMOTEROL FUMARATE DIHYDRATE 1 PUFF(S): 160; 4.5 AEROSOL RESPIRATORY (INHALATION) at 09:00

## 2023-01-01 RX ADMIN — Medication 0.25 MILLIGRAM(S): at 16:13

## 2023-01-01 RX ADMIN — FENTANYL CITRATE 25 MICROGRAM(S): 50 INJECTION INTRAVENOUS at 19:58

## 2023-01-01 RX ADMIN — HEPARIN SODIUM 1700 UNIT(S)/HR: 5000 INJECTION INTRAVENOUS; SUBCUTANEOUS at 15:08

## 2023-01-01 RX ADMIN — BUDESONIDE AND FORMOTEROL FUMARATE DIHYDRATE 1 PUFF(S): 160; 4.5 AEROSOL RESPIRATORY (INHALATION) at 10:31

## 2023-01-01 RX ADMIN — Medication 3 MILLILITER(S): at 21:28

## 2023-01-01 RX ADMIN — ATORVASTATIN CALCIUM 40 MILLIGRAM(S): 80 TABLET, FILM COATED ORAL at 21:44

## 2023-01-01 RX ADMIN — PIPERACILLIN AND TAZOBACTAM 25 GRAM(S): 4; .5 INJECTION, POWDER, LYOPHILIZED, FOR SOLUTION INTRAVENOUS at 12:25

## 2023-01-01 RX ADMIN — Medication 3 MILLILITER(S): at 08:52

## 2023-01-01 RX ADMIN — Medication 3 MILLILITER(S): at 14:59

## 2023-01-01 RX ADMIN — ATORVASTATIN CALCIUM 40 MILLIGRAM(S): 80 TABLET, FILM COATED ORAL at 22:10

## 2023-01-01 RX ADMIN — Medication 3 MILLILITER(S): at 21:02

## 2023-01-01 RX ADMIN — HYDROMORPHONE HYDROCHLORIDE 2 MILLIGRAM(S): 2 INJECTION INTRAMUSCULAR; INTRAVENOUS; SUBCUTANEOUS at 15:23

## 2023-01-01 RX ADMIN — MAGNESIUM OXIDE 400 MG ORAL TABLET 400 MILLIGRAM(S): 241.3 TABLET ORAL at 16:26

## 2023-01-01 RX ADMIN — Medication 90 MILLIGRAM(S): at 23:00

## 2023-01-01 RX ADMIN — Medication 0.25 MILLIGRAM(S): at 16:28

## 2023-01-01 RX ADMIN — MORPHINE SULFATE 1 MILLIGRAM(S): 50 CAPSULE, EXTENDED RELEASE ORAL at 23:13

## 2023-01-01 RX ADMIN — MIDODRINE HYDROCHLORIDE 5 MILLIGRAM(S): 2.5 TABLET ORAL at 21:11

## 2023-01-01 RX ADMIN — Medication 100 GRAM(S): at 10:00

## 2023-01-01 RX ADMIN — MORPHINE SULFATE 2 MILLIGRAM(S): 50 CAPSULE, EXTENDED RELEASE ORAL at 10:30

## 2023-01-01 RX ADMIN — Medication 25 GRAM(S): at 09:50

## 2023-01-01 RX ADMIN — Medication 500 MICROGRAM(S): at 08:53

## 2023-01-01 RX ADMIN — SODIUM CHLORIDE 100 MILLILITER(S): 9 INJECTION, SOLUTION INTRAVENOUS at 08:25

## 2023-01-01 RX ADMIN — GABAPENTIN 400 MILLIGRAM(S): 400 CAPSULE ORAL at 13:40

## 2023-01-01 RX ADMIN — Medication 500 MICROGRAM(S): at 21:19

## 2023-01-01 RX ADMIN — Medication 90 MILLIGRAM(S): at 13:36

## 2023-01-01 RX ADMIN — AMIODARONE HYDROCHLORIDE 33.3 MG/MIN: 400 TABLET ORAL at 19:30

## 2023-01-01 RX ADMIN — APIXABAN 5 MILLIGRAM(S): 2.5 TABLET, FILM COATED ORAL at 06:33

## 2023-01-01 RX ADMIN — TRAMADOL HYDROCHLORIDE 50 MILLIGRAM(S): 50 TABLET ORAL at 23:30

## 2023-01-01 RX ADMIN — Medication 90 MILLIGRAM(S): at 05:10

## 2023-01-01 RX ADMIN — MORPHINE SULFATE 1 MILLIGRAM(S): 50 CAPSULE, EXTENDED RELEASE ORAL at 12:50

## 2023-01-01 RX ADMIN — TRAMADOL HYDROCHLORIDE 50 MILLIGRAM(S): 50 TABLET ORAL at 09:32

## 2023-01-01 RX ADMIN — Medication 50 MILLIGRAM(S): at 10:43

## 2023-01-01 RX ADMIN — Medication 3 MILLILITER(S): at 03:59

## 2023-01-01 RX ADMIN — PIPERACILLIN AND TAZOBACTAM 25 GRAM(S): 4; .5 INJECTION, POWDER, LYOPHILIZED, FOR SOLUTION INTRAVENOUS at 17:06

## 2023-01-01 RX ADMIN — MIDODRINE HYDROCHLORIDE 5 MILLIGRAM(S): 2.5 TABLET ORAL at 21:31

## 2023-01-01 RX ADMIN — Medication 20 MILLIGRAM(S): at 05:01

## 2023-01-01 RX ADMIN — Medication 3 MILLILITER(S): at 16:37

## 2023-01-01 RX ADMIN — Medication 3 MILLILITER(S): at 20:06

## 2023-01-01 RX ADMIN — TRAMADOL HYDROCHLORIDE 50 MILLIGRAM(S): 50 TABLET ORAL at 22:42

## 2023-01-01 RX ADMIN — Medication 500 MICROGRAM(S): at 20:29

## 2023-01-01 RX ADMIN — BUDESONIDE AND FORMOTEROL FUMARATE DIHYDRATE 1 PUFF(S): 160; 4.5 AEROSOL RESPIRATORY (INHALATION) at 10:55

## 2023-01-01 RX ADMIN — HYDROMORPHONE HYDROCHLORIDE 2 MILLIGRAM(S): 2 INJECTION INTRAMUSCULAR; INTRAVENOUS; SUBCUTANEOUS at 17:05

## 2023-01-01 RX ADMIN — MIDODRINE HYDROCHLORIDE 5 MILLIGRAM(S): 2.5 TABLET ORAL at 14:17

## 2023-01-01 RX ADMIN — PIPERACILLIN AND TAZOBACTAM 25 GRAM(S): 4; .5 INJECTION, POWDER, LYOPHILIZED, FOR SOLUTION INTRAVENOUS at 14:33

## 2023-01-01 RX ADMIN — Medication 300 MILLIGRAM(S): at 14:00

## 2023-01-01 RX ADMIN — TRAMADOL HYDROCHLORIDE 50 MILLIGRAM(S): 50 TABLET ORAL at 02:49

## 2023-01-01 RX ADMIN — GABAPENTIN 400 MILLIGRAM(S): 400 CAPSULE ORAL at 21:52

## 2023-01-01 RX ADMIN — Medication 3 MILLILITER(S): at 19:40

## 2023-01-01 RX ADMIN — Medication 120 MILLIGRAM(S): at 11:22

## 2023-01-01 RX ADMIN — CHLORHEXIDINE GLUCONATE 1 APPLICATION(S): 213 SOLUTION TOPICAL at 13:15

## 2023-01-01 RX ADMIN — PIPERACILLIN AND TAZOBACTAM 25 GRAM(S): 4; .5 INJECTION, POWDER, LYOPHILIZED, FOR SOLUTION INTRAVENOUS at 05:25

## 2023-01-01 RX ADMIN — HYDROMORPHONE HYDROCHLORIDE 2 MILLIGRAM(S): 2 INJECTION INTRAMUSCULAR; INTRAVENOUS; SUBCUTANEOUS at 11:00

## 2023-01-01 RX ADMIN — Medication 3 MILLILITER(S): at 14:32

## 2023-01-01 RX ADMIN — HYDROMORPHONE HYDROCHLORIDE 2 MILLIGRAM(S): 2 INJECTION INTRAMUSCULAR; INTRAVENOUS; SUBCUTANEOUS at 08:26

## 2023-01-01 RX ADMIN — ATORVASTATIN CALCIUM 40 MILLIGRAM(S): 80 TABLET, FILM COATED ORAL at 21:30

## 2023-01-01 RX ADMIN — GABAPENTIN 400 MILLIGRAM(S): 400 CAPSULE ORAL at 13:13

## 2023-01-01 RX ADMIN — BUDESONIDE AND FORMOTEROL FUMARATE DIHYDRATE 1 PUFF(S): 160; 4.5 AEROSOL RESPIRATORY (INHALATION) at 09:17

## 2023-01-01 RX ADMIN — Medication 0.25 MILLIGRAM(S): at 22:20

## 2023-01-01 RX ADMIN — Medication 60 MILLIGRAM(S): at 23:03

## 2023-01-01 RX ADMIN — MAGNESIUM OXIDE 400 MG ORAL TABLET 400 MILLIGRAM(S): 241.3 TABLET ORAL at 14:07

## 2023-01-01 RX ADMIN — Medication 120 MILLIGRAM(S): at 09:04

## 2023-01-01 RX ADMIN — Medication 60 MILLIGRAM(S): at 10:09

## 2023-01-01 RX ADMIN — CHLORHEXIDINE GLUCONATE 1 APPLICATION(S): 213 SOLUTION TOPICAL at 12:51

## 2023-01-01 RX ADMIN — PIPERACILLIN AND TAZOBACTAM 25 GRAM(S): 4; .5 INJECTION, POWDER, LYOPHILIZED, FOR SOLUTION INTRAVENOUS at 05:07

## 2023-01-01 RX ADMIN — ATORVASTATIN CALCIUM 40 MILLIGRAM(S): 80 TABLET, FILM COATED ORAL at 22:20

## 2023-01-01 RX ADMIN — TAMSULOSIN HYDROCHLORIDE 0.4 MILLIGRAM(S): 0.4 CAPSULE ORAL at 21:44

## 2023-01-01 RX ADMIN — HYDROMORPHONE HYDROCHLORIDE 2 MILLIGRAM(S): 2 INJECTION INTRAMUSCULAR; INTRAVENOUS; SUBCUTANEOUS at 21:13

## 2023-01-01 RX ADMIN — Medication 3 MILLILITER(S): at 05:12

## 2023-01-01 RX ADMIN — TRAMADOL HYDROCHLORIDE 50 MILLIGRAM(S): 50 TABLET ORAL at 09:56

## 2023-01-01 RX ADMIN — HYDROMORPHONE HYDROCHLORIDE 2 MILLIGRAM(S): 2 INJECTION INTRAMUSCULAR; INTRAVENOUS; SUBCUTANEOUS at 18:49

## 2023-01-01 RX ADMIN — PIPERACILLIN AND TAZOBACTAM 25 GRAM(S): 4; .5 INJECTION, POWDER, LYOPHILIZED, FOR SOLUTION INTRAVENOUS at 20:23

## 2023-01-01 RX ADMIN — GABAPENTIN 400 MILLIGRAM(S): 400 CAPSULE ORAL at 05:25

## 2023-01-01 RX ADMIN — CHLORHEXIDINE GLUCONATE 1 APPLICATION(S): 213 SOLUTION TOPICAL at 11:58

## 2023-01-01 RX ADMIN — HEPARIN SODIUM 1700 UNIT(S)/HR: 5000 INJECTION INTRAVENOUS; SUBCUTANEOUS at 19:08

## 2023-01-01 RX ADMIN — HYDROMORPHONE HYDROCHLORIDE 2 MILLIGRAM(S): 2 INJECTION INTRAMUSCULAR; INTRAVENOUS; SUBCUTANEOUS at 21:48

## 2023-01-01 RX ADMIN — Medication 90 MILLIGRAM(S): at 12:25

## 2023-01-01 RX ADMIN — HYDROMORPHONE HYDROCHLORIDE 2 MILLIGRAM(S): 2 INJECTION INTRAMUSCULAR; INTRAVENOUS; SUBCUTANEOUS at 18:19

## 2023-01-01 RX ADMIN — MORPHINE SULFATE 1 MILLIGRAM(S): 50 CAPSULE, EXTENDED RELEASE ORAL at 16:10

## 2023-01-01 RX ADMIN — HEPARIN SODIUM 1700 UNIT(S)/HR: 5000 INJECTION INTRAVENOUS; SUBCUTANEOUS at 19:11

## 2023-01-01 RX ADMIN — HYDROMORPHONE HYDROCHLORIDE 2 MILLIGRAM(S): 2 INJECTION INTRAMUSCULAR; INTRAVENOUS; SUBCUTANEOUS at 04:34

## 2023-01-01 RX ADMIN — Medication 12.5 MILLIGRAM(S): at 17:01

## 2023-01-01 RX ADMIN — TAMSULOSIN HYDROCHLORIDE 0.4 MILLIGRAM(S): 0.4 CAPSULE ORAL at 21:13

## 2023-01-01 RX ADMIN — Medication 0.25 MILLIGRAM(S): at 22:23

## 2023-01-01 RX ADMIN — ATORVASTATIN CALCIUM 40 MILLIGRAM(S): 80 TABLET, FILM COATED ORAL at 21:36

## 2023-01-01 RX ADMIN — MORPHINE SULFATE 1 MILLIGRAM(S): 50 CAPSULE, EXTENDED RELEASE ORAL at 04:55

## 2023-01-01 RX ADMIN — PIPERACILLIN AND TAZOBACTAM 25 GRAM(S): 4; .5 INJECTION, POWDER, LYOPHILIZED, FOR SOLUTION INTRAVENOUS at 21:31

## 2023-01-01 RX ADMIN — MIDODRINE HYDROCHLORIDE 5 MILLIGRAM(S): 2.5 TABLET ORAL at 13:13

## 2023-01-01 RX ADMIN — Medication 50 MILLIGRAM(S): at 06:20

## 2023-01-01 RX ADMIN — GABAPENTIN 400 MILLIGRAM(S): 400 CAPSULE ORAL at 13:46

## 2023-01-01 RX ADMIN — MORPHINE SULFATE 1 MILLIGRAM(S): 50 CAPSULE, EXTENDED RELEASE ORAL at 05:55

## 2023-01-01 RX ADMIN — LEVALBUTEROL 1.25 MILLIGRAM(S): 1.25 SOLUTION, CONCENTRATE RESPIRATORY (INHALATION) at 04:33

## 2023-01-01 RX ADMIN — ADENOSINE 6 MILLIGRAM(S): 3 INJECTION INTRAVENOUS at 13:15

## 2023-01-01 RX ADMIN — Medication 90 MILLIGRAM(S): at 18:12

## 2023-01-01 RX ADMIN — Medication 1 MILLIGRAM(S): at 01:28

## 2023-01-01 RX ADMIN — AMIODARONE HYDROCHLORIDE 618 MILLIGRAM(S): 400 TABLET ORAL at 15:31

## 2023-01-01 RX ADMIN — Medication 3 MILLILITER(S): at 09:32

## 2023-01-01 RX ADMIN — Medication 3 MILLILITER(S): at 10:03

## 2023-01-01 RX ADMIN — LEVALBUTEROL 1.25 MILLIGRAM(S): 1.25 SOLUTION, CONCENTRATE RESPIRATORY (INHALATION) at 08:53

## 2023-01-01 RX ADMIN — HEPARIN SODIUM 1700 UNIT(S)/HR: 5000 INJECTION INTRAVENOUS; SUBCUTANEOUS at 22:29

## 2023-01-01 RX ADMIN — APIXABAN 5 MILLIGRAM(S): 2.5 TABLET, FILM COATED ORAL at 05:14

## 2023-01-01 RX ADMIN — Medication 500 MICROGRAM(S): at 20:20

## 2023-01-01 RX ADMIN — Medication 3 MILLILITER(S): at 15:33

## 2023-01-01 RX ADMIN — Medication 3 MILLILITER(S): at 09:38

## 2023-01-01 RX ADMIN — Medication 100 MILLIGRAM(S): at 21:31

## 2023-01-01 RX ADMIN — TAMSULOSIN HYDROCHLORIDE 0.4 MILLIGRAM(S): 0.4 CAPSULE ORAL at 22:09

## 2023-01-01 RX ADMIN — PIPERACILLIN AND TAZOBACTAM 25 GRAM(S): 4; .5 INJECTION, POWDER, LYOPHILIZED, FOR SOLUTION INTRAVENOUS at 05:38

## 2023-01-01 RX ADMIN — Medication 3 MILLILITER(S): at 10:54

## 2023-01-01 RX ADMIN — MORPHINE SULFATE 2 MILLIGRAM(S): 50 CAPSULE, EXTENDED RELEASE ORAL at 17:37

## 2023-01-01 RX ADMIN — Medication 20 MILLIGRAM(S): at 12:23

## 2023-01-01 RX ADMIN — Medication 0.25 MILLIGRAM(S): at 23:59

## 2023-01-01 RX ADMIN — Medication 500 MICROGRAM(S): at 15:29

## 2023-01-01 RX ADMIN — Medication 650 MILLIGRAM(S): at 00:10

## 2023-01-01 RX ADMIN — Medication 40 MILLIGRAM(S): at 05:15

## 2023-01-01 RX ADMIN — Medication 650 MILLIGRAM(S): at 08:25

## 2023-01-01 RX ADMIN — Medication 120 MILLIGRAM(S): at 21:14

## 2023-01-01 RX ADMIN — MORPHINE SULFATE 1 MILLIGRAM(S): 50 CAPSULE, EXTENDED RELEASE ORAL at 10:09

## 2023-01-01 RX ADMIN — SODIUM CHLORIDE 1000 MILLILITER(S): 9 INJECTION, SOLUTION INTRAVENOUS at 14:17

## 2023-01-01 RX ADMIN — TRAMADOL HYDROCHLORIDE 50 MILLIGRAM(S): 50 TABLET ORAL at 17:52

## 2023-01-01 RX ADMIN — HEPARIN SODIUM 1500 UNIT(S)/HR: 5000 INJECTION INTRAVENOUS; SUBCUTANEOUS at 17:10

## 2023-01-01 RX ADMIN — HYDROMORPHONE HYDROCHLORIDE 2 MILLIGRAM(S): 2 INJECTION INTRAMUSCULAR; INTRAVENOUS; SUBCUTANEOUS at 05:10

## 2023-01-01 RX ADMIN — APIXABAN 5 MILLIGRAM(S): 2.5 TABLET, FILM COATED ORAL at 05:41

## 2023-01-01 RX ADMIN — MIDODRINE HYDROCHLORIDE 5 MILLIGRAM(S): 2.5 TABLET ORAL at 05:42

## 2023-01-01 RX ADMIN — PIPERACILLIN AND TAZOBACTAM 25 GRAM(S): 4; .5 INJECTION, POWDER, LYOPHILIZED, FOR SOLUTION INTRAVENOUS at 20:09

## 2023-01-01 RX ADMIN — Medication 3 MILLILITER(S): at 08:54

## 2023-01-01 RX ADMIN — Medication 0.25 MILLIGRAM(S): at 03:43

## 2023-01-01 RX ADMIN — MIDODRINE HYDROCHLORIDE 5 MILLIGRAM(S): 2.5 TABLET ORAL at 13:38

## 2023-01-01 RX ADMIN — MIDODRINE HYDROCHLORIDE 5 MILLIGRAM(S): 2.5 TABLET ORAL at 05:37

## 2023-01-01 RX ADMIN — PIPERACILLIN AND TAZOBACTAM 25 GRAM(S): 4; .5 INJECTION, POWDER, LYOPHILIZED, FOR SOLUTION INTRAVENOUS at 23:18

## 2023-01-01 RX ADMIN — HYDROMORPHONE HYDROCHLORIDE 2 MILLIGRAM(S): 2 INJECTION INTRAMUSCULAR; INTRAVENOUS; SUBCUTANEOUS at 23:55

## 2023-01-01 RX ADMIN — TRAMADOL HYDROCHLORIDE 50 MILLIGRAM(S): 50 TABLET ORAL at 11:04

## 2023-01-01 RX ADMIN — Medication 25 MILLIGRAM(S): at 05:07

## 2023-01-01 RX ADMIN — APIXABAN 5 MILLIGRAM(S): 2.5 TABLET, FILM COATED ORAL at 17:52

## 2023-01-01 RX ADMIN — BUDESONIDE AND FORMOTEROL FUMARATE DIHYDRATE 1 PUFF(S): 160; 4.5 AEROSOL RESPIRATORY (INHALATION) at 09:43

## 2023-01-01 RX ADMIN — Medication 3 MILLILITER(S): at 21:58

## 2023-01-01 RX ADMIN — Medication 3 MILLILITER(S): at 04:28

## 2023-01-01 RX ADMIN — Medication 40 MILLIGRAM(S): at 21:35

## 2023-01-01 RX ADMIN — MAGNESIUM OXIDE 400 MG ORAL TABLET 400 MILLIGRAM(S): 241.3 TABLET ORAL at 17:05

## 2023-01-01 RX ADMIN — MORPHINE SULFATE 1 MILLIGRAM(S): 50 CAPSULE, EXTENDED RELEASE ORAL at 21:52

## 2023-01-01 RX ADMIN — LEVALBUTEROL 1.25 MILLIGRAM(S): 1.25 SOLUTION, CONCENTRATE RESPIRATORY (INHALATION) at 03:41

## 2023-01-01 RX ADMIN — MIDODRINE HYDROCHLORIDE 5 MILLIGRAM(S): 2.5 TABLET ORAL at 21:06

## 2023-01-01 RX ADMIN — HYDROMORPHONE HYDROCHLORIDE 2 MILLIGRAM(S): 2 INJECTION INTRAMUSCULAR; INTRAVENOUS; SUBCUTANEOUS at 11:54

## 2023-01-01 RX ADMIN — Medication 500 MICROGRAM(S): at 22:31

## 2023-01-01 RX ADMIN — Medication 40 MILLIGRAM(S): at 05:11

## 2023-01-01 RX ADMIN — HYDROMORPHONE HYDROCHLORIDE 2 MILLIGRAM(S): 2 INJECTION INTRAMUSCULAR; INTRAVENOUS; SUBCUTANEOUS at 13:20

## 2023-01-01 RX ADMIN — Medication 40 MILLIGRAM(S): at 13:40

## 2023-01-01 RX ADMIN — TRAMADOL HYDROCHLORIDE 50 MILLIGRAM(S): 50 TABLET ORAL at 22:36

## 2023-01-01 RX ADMIN — Medication 25 GRAM(S): at 22:30

## 2023-01-01 RX ADMIN — HYDROMORPHONE HYDROCHLORIDE 2 MILLIGRAM(S): 2 INJECTION INTRAMUSCULAR; INTRAVENOUS; SUBCUTANEOUS at 05:17

## 2023-01-01 RX ADMIN — HYDROMORPHONE HYDROCHLORIDE 1 MILLIGRAM(S): 2 INJECTION INTRAMUSCULAR; INTRAVENOUS; SUBCUTANEOUS at 11:32

## 2023-01-01 RX ADMIN — HYDROMORPHONE HYDROCHLORIDE 2 MILLIGRAM(S): 2 INJECTION INTRAMUSCULAR; INTRAVENOUS; SUBCUTANEOUS at 06:41

## 2023-01-01 RX ADMIN — TRAMADOL HYDROCHLORIDE 50 MILLIGRAM(S): 50 TABLET ORAL at 10:12

## 2023-01-01 RX ADMIN — MIDODRINE HYDROCHLORIDE 5 MILLIGRAM(S): 2.5 TABLET ORAL at 05:19

## 2023-01-01 RX ADMIN — ATORVASTATIN CALCIUM 40 MILLIGRAM(S): 80 TABLET, FILM COATED ORAL at 22:30

## 2023-01-01 RX ADMIN — Medication 1000 MILLIGRAM(S): at 14:02

## 2023-01-01 RX ADMIN — ATORVASTATIN CALCIUM 40 MILLIGRAM(S): 80 TABLET, FILM COATED ORAL at 21:13

## 2023-01-01 RX ADMIN — Medication 30 MILLIGRAM(S): at 16:01

## 2023-01-01 RX ADMIN — Medication 12.5 MILLIGRAM(S): at 17:38

## 2023-01-01 RX ADMIN — Medication 10 MILLIGRAM(S): at 05:07

## 2023-01-01 RX ADMIN — AMIODARONE HYDROCHLORIDE 33.3 MG/MIN: 400 TABLET ORAL at 02:45

## 2023-01-01 RX ADMIN — PIPERACILLIN AND TAZOBACTAM 25 GRAM(S): 4; .5 INJECTION, POWDER, LYOPHILIZED, FOR SOLUTION INTRAVENOUS at 13:36

## 2023-01-01 RX ADMIN — HYDROMORPHONE HYDROCHLORIDE 2 MILLIGRAM(S): 2 INJECTION INTRAMUSCULAR; INTRAVENOUS; SUBCUTANEOUS at 12:23

## 2023-01-01 RX ADMIN — GABAPENTIN 400 MILLIGRAM(S): 400 CAPSULE ORAL at 22:09

## 2023-01-01 RX ADMIN — Medication 3 MILLILITER(S): at 15:54

## 2023-01-01 RX ADMIN — APIXABAN 5 MILLIGRAM(S): 2.5 TABLET, FILM COATED ORAL at 05:10

## 2023-01-01 RX ADMIN — Medication 500 MICROGRAM(S): at 04:12

## 2023-01-01 RX ADMIN — MIDODRINE HYDROCHLORIDE 2.5 MILLIGRAM(S): 2.5 TABLET ORAL at 13:46

## 2023-01-01 RX ADMIN — Medication 33.33 GRAM(S): at 17:26

## 2023-01-01 RX ADMIN — BUDESONIDE AND FORMOTEROL FUMARATE DIHYDRATE 1 PUFF(S): 160; 4.5 AEROSOL RESPIRATORY (INHALATION) at 08:01

## 2023-01-01 RX ADMIN — MORPHINE SULFATE 1 MILLIGRAM(S): 50 CAPSULE, EXTENDED RELEASE ORAL at 05:16

## 2023-01-01 RX ADMIN — Medication 120 MILLIGRAM(S): at 21:36

## 2023-01-01 RX ADMIN — MORPHINE SULFATE 2 MILLIGRAM(S): 50 CAPSULE, EXTENDED RELEASE ORAL at 09:31

## 2023-01-01 RX ADMIN — Medication 500 MICROGRAM(S): at 14:46

## 2023-01-01 RX ADMIN — MIDODRINE HYDROCHLORIDE 5 MILLIGRAM(S): 2.5 TABLET ORAL at 22:31

## 2023-01-01 RX ADMIN — HYDROMORPHONE HYDROCHLORIDE 2 MILLIGRAM(S): 2 INJECTION INTRAMUSCULAR; INTRAVENOUS; SUBCUTANEOUS at 23:10

## 2023-01-01 RX ADMIN — Medication 650 MILLIGRAM(S): at 09:15

## 2023-01-01 RX ADMIN — APIXABAN 5 MILLIGRAM(S): 2.5 TABLET, FILM COATED ORAL at 05:01

## 2023-01-01 RX ADMIN — MAGNESIUM OXIDE 400 MG ORAL TABLET 400 MILLIGRAM(S): 241.3 TABLET ORAL at 11:34

## 2023-01-01 RX ADMIN — MAGNESIUM OXIDE 400 MG ORAL TABLET 400 MILLIGRAM(S): 241.3 TABLET ORAL at 17:52

## 2023-01-01 RX ADMIN — Medication 3 MILLILITER(S): at 03:49

## 2023-01-01 RX ADMIN — BUDESONIDE AND FORMOTEROL FUMARATE DIHYDRATE 1 PUFF(S): 160; 4.5 AEROSOL RESPIRATORY (INHALATION) at 10:03

## 2023-01-01 RX ADMIN — TRAMADOL HYDROCHLORIDE 50 MILLIGRAM(S): 50 TABLET ORAL at 05:50

## 2023-01-01 RX ADMIN — Medication 20 MILLIGRAM(S): at 13:40

## 2023-01-01 RX ADMIN — HYDROMORPHONE HYDROCHLORIDE 2 MILLIGRAM(S): 2 INJECTION INTRAMUSCULAR; INTRAVENOUS; SUBCUTANEOUS at 10:52

## 2023-01-01 RX ADMIN — HEPARIN SODIUM 1700 UNIT(S)/HR: 5000 INJECTION INTRAVENOUS; SUBCUTANEOUS at 15:05

## 2023-01-01 RX ADMIN — Medication 100 MILLIGRAM(S): at 12:47

## 2023-01-01 RX ADMIN — HYDROMORPHONE HYDROCHLORIDE 2 MILLIGRAM(S): 2 INJECTION INTRAMUSCULAR; INTRAVENOUS; SUBCUTANEOUS at 06:10

## 2023-01-01 RX ADMIN — Medication 120 MILLIGRAM(S): at 11:37

## 2023-01-01 RX ADMIN — TRAMADOL HYDROCHLORIDE 50 MILLIGRAM(S): 50 TABLET ORAL at 14:19

## 2023-01-01 RX ADMIN — MORPHINE SULFATE 1 MILLIGRAM(S): 50 CAPSULE, EXTENDED RELEASE ORAL at 16:23

## 2023-01-01 RX ADMIN — Medication 25 MILLIGRAM(S): at 17:17

## 2023-01-01 RX ADMIN — Medication 500 MICROGRAM(S): at 03:53

## 2023-01-01 RX ADMIN — Medication 5 MILLIGRAM(S): at 11:48

## 2023-01-01 RX ADMIN — Medication 90 MILLIGRAM(S): at 17:25

## 2023-01-01 RX ADMIN — GABAPENTIN 400 MILLIGRAM(S): 400 CAPSULE ORAL at 17:26

## 2023-01-01 RX ADMIN — PIPERACILLIN AND TAZOBACTAM 25 GRAM(S): 4; .5 INJECTION, POWDER, LYOPHILIZED, FOR SOLUTION INTRAVENOUS at 04:59

## 2023-01-01 RX ADMIN — PIPERACILLIN AND TAZOBACTAM 25 GRAM(S): 4; .5 INJECTION, POWDER, LYOPHILIZED, FOR SOLUTION INTRAVENOUS at 12:02

## 2023-01-01 RX ADMIN — HEPARIN SODIUM 1300 UNIT(S)/HR: 5000 INJECTION INTRAVENOUS; SUBCUTANEOUS at 10:43

## 2023-01-01 RX ADMIN — Medication 12.5 MILLIGRAM(S): at 07:50

## 2023-01-01 RX ADMIN — Medication 3 MILLILITER(S): at 09:41

## 2023-01-01 RX ADMIN — MORPHINE SULFATE 1 MILLIGRAM(S): 50 CAPSULE, EXTENDED RELEASE ORAL at 12:22

## 2023-01-01 RX ADMIN — TRAMADOL HYDROCHLORIDE 50 MILLIGRAM(S): 50 TABLET ORAL at 21:36

## 2023-01-01 RX ADMIN — ATORVASTATIN CALCIUM 40 MILLIGRAM(S): 80 TABLET, FILM COATED ORAL at 22:14

## 2023-01-01 RX ADMIN — MIDODRINE HYDROCHLORIDE 5 MILLIGRAM(S): 2.5 TABLET ORAL at 06:33

## 2023-01-01 RX ADMIN — LEVALBUTEROL 1.25 MILLIGRAM(S): 1.25 SOLUTION, CONCENTRATE RESPIRATORY (INHALATION) at 20:52

## 2023-01-01 RX ADMIN — Medication 90 MILLIGRAM(S): at 00:24

## 2023-01-01 RX ADMIN — BUDESONIDE AND FORMOTEROL FUMARATE DIHYDRATE 1 PUFF(S): 160; 4.5 AEROSOL RESPIRATORY (INHALATION) at 10:33

## 2023-01-01 RX ADMIN — CHLORHEXIDINE GLUCONATE 1 APPLICATION(S): 213 SOLUTION TOPICAL at 13:36

## 2023-01-01 RX ADMIN — MIDODRINE HYDROCHLORIDE 2.5 MILLIGRAM(S): 2.5 TABLET ORAL at 05:00

## 2023-01-01 RX ADMIN — Medication 0.25 MILLIGRAM(S): at 01:00

## 2023-01-01 RX ADMIN — Medication 250 MILLIGRAM(S): at 05:38

## 2023-01-01 RX ADMIN — MORPHINE SULFATE 1 MILLIGRAM(S): 50 CAPSULE, EXTENDED RELEASE ORAL at 22:00

## 2023-01-01 RX ADMIN — MORPHINE SULFATE 1 MILLIGRAM(S): 50 CAPSULE, EXTENDED RELEASE ORAL at 04:15

## 2023-01-01 RX ADMIN — Medication 3 MILLILITER(S): at 04:47

## 2023-01-01 RX ADMIN — MAGNESIUM OXIDE 400 MG ORAL TABLET 400 MILLIGRAM(S): 241.3 TABLET ORAL at 08:24

## 2023-01-01 RX ADMIN — Medication 90 MILLIGRAM(S): at 23:10

## 2023-01-01 RX ADMIN — APIXABAN 5 MILLIGRAM(S): 2.5 TABLET, FILM COATED ORAL at 17:05

## 2023-01-01 RX ADMIN — Medication 1 MILLIGRAM(S): at 22:42

## 2023-01-01 RX ADMIN — Medication 60 MILLIGRAM(S): at 06:26

## 2023-01-01 RX ADMIN — Medication 240 MILLIGRAM(S): at 06:07

## 2023-01-01 RX ADMIN — LEVALBUTEROL 1.25 MILLIGRAM(S): 1.25 SOLUTION, CONCENTRATE RESPIRATORY (INHALATION) at 04:12

## 2023-01-01 RX ADMIN — MAGNESIUM OXIDE 400 MG ORAL TABLET 400 MILLIGRAM(S): 241.3 TABLET ORAL at 11:11

## 2023-01-01 RX ADMIN — HYDROMORPHONE HYDROCHLORIDE 2 MILLIGRAM(S): 2 INJECTION INTRAMUSCULAR; INTRAVENOUS; SUBCUTANEOUS at 19:48

## 2023-01-01 RX ADMIN — TRAMADOL HYDROCHLORIDE 50 MILLIGRAM(S): 50 TABLET ORAL at 22:30

## 2023-01-01 RX ADMIN — TAMSULOSIN HYDROCHLORIDE 0.4 MILLIGRAM(S): 0.4 CAPSULE ORAL at 22:31

## 2023-01-01 RX ADMIN — HEPARIN SODIUM 1700 UNIT(S)/HR: 5000 INJECTION INTRAVENOUS; SUBCUTANEOUS at 21:22

## 2023-01-01 RX ADMIN — TRAMADOL HYDROCHLORIDE 50 MILLIGRAM(S): 50 TABLET ORAL at 23:42

## 2023-01-01 RX ADMIN — Medication 5 MILLIGRAM(S): at 02:55

## 2023-01-01 RX ADMIN — Medication 12.5 MILLIGRAM(S): at 17:19

## 2023-01-01 RX ADMIN — Medication 650 MILLIGRAM(S): at 18:00

## 2023-01-01 RX ADMIN — Medication 3 MILLILITER(S): at 15:24

## 2023-01-01 RX ADMIN — PIPERACILLIN AND TAZOBACTAM 25 GRAM(S): 4; .5 INJECTION, POWDER, LYOPHILIZED, FOR SOLUTION INTRAVENOUS at 05:19

## 2023-01-01 RX ADMIN — Medication 650 MILLIGRAM(S): at 08:08

## 2023-01-01 RX ADMIN — TRAMADOL HYDROCHLORIDE 50 MILLIGRAM(S): 50 TABLET ORAL at 21:52

## 2023-01-01 RX ADMIN — GABAPENTIN 400 MILLIGRAM(S): 400 CAPSULE ORAL at 21:40

## 2023-01-01 RX ADMIN — HYDROMORPHONE HYDROCHLORIDE 2 MILLIGRAM(S): 2 INJECTION INTRAMUSCULAR; INTRAVENOUS; SUBCUTANEOUS at 20:23

## 2023-01-01 RX ADMIN — Medication 3 MILLILITER(S): at 20:37

## 2023-01-01 RX ADMIN — Medication 240 MILLIGRAM(S): at 13:40

## 2023-01-01 RX ADMIN — Medication 250 MILLIGRAM(S): at 21:06

## 2023-01-01 RX ADMIN — PIPERACILLIN AND TAZOBACTAM 25 GRAM(S): 4; .5 INJECTION, POWDER, LYOPHILIZED, FOR SOLUTION INTRAVENOUS at 05:11

## 2023-01-01 RX ADMIN — TRAMADOL HYDROCHLORIDE 50 MILLIGRAM(S): 50 TABLET ORAL at 10:15

## 2023-01-01 RX ADMIN — Medication 100 MILLIGRAM(S): at 08:48

## 2023-01-01 RX ADMIN — Medication 3 MILLILITER(S): at 21:32

## 2023-01-01 RX ADMIN — TRAMADOL HYDROCHLORIDE 50 MILLIGRAM(S): 50 TABLET ORAL at 13:49

## 2023-01-01 RX ADMIN — BUDESONIDE AND FORMOTEROL FUMARATE DIHYDRATE 1 PUFF(S): 160; 4.5 AEROSOL RESPIRATORY (INHALATION) at 09:10

## 2023-01-01 RX ADMIN — LEVALBUTEROL 1.25 MILLIGRAM(S): 1.25 SOLUTION, CONCENTRATE RESPIRATORY (INHALATION) at 03:53

## 2023-01-01 RX ADMIN — MORPHINE SULFATE 1 MILLIGRAM(S): 50 CAPSULE, EXTENDED RELEASE ORAL at 17:05

## 2023-01-01 RX ADMIN — HEPARIN SODIUM 1700 UNIT(S)/HR: 5000 INJECTION INTRAVENOUS; SUBCUTANEOUS at 07:25

## 2023-01-01 RX ADMIN — Medication 90 MILLIGRAM(S): at 17:29

## 2023-01-01 RX ADMIN — Medication 100 GRAM(S): at 08:46

## 2023-01-01 RX ADMIN — Medication 60 MILLIGRAM(S): at 05:20

## 2023-01-01 RX ADMIN — Medication 25 MILLIGRAM(S): at 05:01

## 2023-01-01 RX ADMIN — GABAPENTIN 400 MILLIGRAM(S): 400 CAPSULE ORAL at 21:13

## 2023-01-01 RX ADMIN — LEVALBUTEROL 1.25 MILLIGRAM(S): 1.25 SOLUTION, CONCENTRATE RESPIRATORY (INHALATION) at 15:28

## 2023-01-01 RX ADMIN — MORPHINE SULFATE 1 MILLIGRAM(S): 50 CAPSULE, EXTENDED RELEASE ORAL at 10:50

## 2023-01-01 RX ADMIN — Medication 90 MILLIGRAM(S): at 05:01

## 2023-01-01 RX ADMIN — PIPERACILLIN AND TAZOBACTAM 25 GRAM(S): 4; .5 INJECTION, POWDER, LYOPHILIZED, FOR SOLUTION INTRAVENOUS at 05:22

## 2023-01-01 RX ADMIN — Medication 5 MILLIGRAM(S): at 13:25

## 2023-01-01 RX ADMIN — MORPHINE SULFATE 1 MILLIGRAM(S): 50 CAPSULE, EXTENDED RELEASE ORAL at 12:35

## 2023-01-01 RX ADMIN — HYDROMORPHONE HYDROCHLORIDE 2 MILLIGRAM(S): 2 INJECTION INTRAMUSCULAR; INTRAVENOUS; SUBCUTANEOUS at 16:25

## 2023-01-01 RX ADMIN — Medication 650 MILLIGRAM(S): at 08:40

## 2023-01-01 RX ADMIN — GABAPENTIN 400 MILLIGRAM(S): 400 CAPSULE ORAL at 22:31

## 2023-01-01 RX ADMIN — CHLORHEXIDINE GLUCONATE 1 APPLICATION(S): 213 SOLUTION TOPICAL at 11:08

## 2023-01-01 RX ADMIN — HYDROMORPHONE HYDROCHLORIDE 2 MILLIGRAM(S): 2 INJECTION INTRAMUSCULAR; INTRAVENOUS; SUBCUTANEOUS at 00:25

## 2023-01-01 RX ADMIN — MORPHINE SULFATE 2 MILLIGRAM(S): 50 CAPSULE, EXTENDED RELEASE ORAL at 01:43

## 2023-01-01 RX ADMIN — HYDROMORPHONE HYDROCHLORIDE 2 MILLIGRAM(S): 2 INJECTION INTRAMUSCULAR; INTRAVENOUS; SUBCUTANEOUS at 11:18

## 2023-01-01 RX ADMIN — MORPHINE SULFATE 1 MILLIGRAM(S): 50 CAPSULE, EXTENDED RELEASE ORAL at 22:13

## 2023-01-01 RX ADMIN — Medication 120 MILLIGRAM(S): at 09:59

## 2023-01-01 RX ADMIN — HYDROMORPHONE HYDROCHLORIDE 2 MILLIGRAM(S): 2 INJECTION INTRAMUSCULAR; INTRAVENOUS; SUBCUTANEOUS at 16:00

## 2023-01-01 RX ADMIN — Medication 650 MILLIGRAM(S): at 14:52

## 2023-01-01 RX ADMIN — Medication 10 MILLIGRAM(S): at 03:21

## 2023-01-01 RX ADMIN — Medication 5 MILLIGRAM(S): at 18:54

## 2023-01-01 RX ADMIN — MAGNESIUM OXIDE 400 MG ORAL TABLET 400 MILLIGRAM(S): 241.3 TABLET ORAL at 11:54

## 2023-01-01 RX ADMIN — Medication 500 MICROGRAM(S): at 03:40

## 2023-01-01 RX ADMIN — LEVALBUTEROL 1.25 MILLIGRAM(S): 1.25 SOLUTION, CONCENTRATE RESPIRATORY (INHALATION) at 20:20

## 2023-01-01 RX ADMIN — Medication 3 MILLILITER(S): at 04:36

## 2023-01-01 RX ADMIN — GABAPENTIN 400 MILLIGRAM(S): 400 CAPSULE ORAL at 05:10

## 2023-01-01 RX ADMIN — Medication 90 MILLIGRAM(S): at 17:05

## 2023-01-01 RX ADMIN — SODIUM CHLORIDE 100 MILLILITER(S): 9 INJECTION, SOLUTION INTRAVENOUS at 21:46

## 2023-01-01 RX ADMIN — HEPARIN SODIUM 0 UNIT(S)/HR: 5000 INJECTION INTRAVENOUS; SUBCUTANEOUS at 15:55

## 2023-01-01 RX ADMIN — SODIUM CHLORIDE 1000 MILLILITER(S): 9 INJECTION INTRAMUSCULAR; INTRAVENOUS; SUBCUTANEOUS at 03:35

## 2023-01-01 RX ADMIN — Medication 40 MILLIGRAM(S): at 21:53

## 2023-01-01 RX ADMIN — Medication 100 MILLIGRAM(S): at 13:40

## 2023-01-01 RX ADMIN — GABAPENTIN 400 MILLIGRAM(S): 400 CAPSULE ORAL at 14:42

## 2023-01-01 RX ADMIN — HEPARIN SODIUM 1900 UNIT(S)/HR: 5000 INJECTION INTRAVENOUS; SUBCUTANEOUS at 12:30

## 2023-01-01 RX ADMIN — HYDROMORPHONE HYDROCHLORIDE 2 MILLIGRAM(S): 2 INJECTION INTRAMUSCULAR; INTRAVENOUS; SUBCUTANEOUS at 21:43

## 2023-01-01 RX ADMIN — ATORVASTATIN CALCIUM 40 MILLIGRAM(S): 80 TABLET, FILM COATED ORAL at 21:53

## 2023-01-01 RX ADMIN — HYDROMORPHONE HYDROCHLORIDE 2 MILLIGRAM(S): 2 INJECTION INTRAMUSCULAR; INTRAVENOUS; SUBCUTANEOUS at 18:58

## 2023-01-01 RX ADMIN — SODIUM CHLORIDE 100 MILLILITER(S): 9 INJECTION, SOLUTION INTRAVENOUS at 22:29

## 2023-01-01 RX ADMIN — PHENYLEPHRINE HYDROCHLORIDE 13.8 MICROGRAM(S)/KG/MIN: 10 INJECTION INTRAVENOUS at 15:28

## 2023-01-01 RX ADMIN — PIPERACILLIN AND TAZOBACTAM 25 GRAM(S): 4; .5 INJECTION, POWDER, LYOPHILIZED, FOR SOLUTION INTRAVENOUS at 05:40

## 2023-01-01 RX ADMIN — TRAMADOL HYDROCHLORIDE 50 MILLIGRAM(S): 50 TABLET ORAL at 21:30

## 2023-01-01 RX ADMIN — HYDROMORPHONE HYDROCHLORIDE 2 MILLIGRAM(S): 2 INJECTION INTRAMUSCULAR; INTRAVENOUS; SUBCUTANEOUS at 20:33

## 2023-01-03 NOTE — ASU PATIENT PROFILE, ADULT - MEDICATION ADMINISTRATION INFO, PROFILE
no concerns Island Pedicle Flap-Requiring Vessel Identification Text: The defect edges were debeveled with a #15 scalpel blade.  Given the location of the defect, shape of the defect and the proximity to free margins an island pedicle advancement flap was deemed most appropriate.  Using a sterile surgical marker, an appropriate advancement flap was drawn, based on the axial vessel mentioned above, incorporating the defect, outlining the appropriate donor tissue and placing the expected incisions within the relaxed skin tension lines where possible.    The area thus outlined was incised deep to adipose tissue with a #15 scalpel blade.  The skin margins were undermined to an appropriate distance in all directions around the primary defect and laterally outward around the island pedicle utilizing iris scissors.  There was minimal undermining beneath the pedicle flap.

## 2023-01-05 ENCOUNTER — NON-APPOINTMENT (OUTPATIENT)
Age: 78
End: 2023-01-05

## 2023-01-05 ENCOUNTER — APPOINTMENT (OUTPATIENT)
Dept: ELECTROPHYSIOLOGY | Facility: CLINIC | Age: 78
End: 2023-01-05
Payer: MEDICARE

## 2023-01-05 PROCEDURE — 93298 REM INTERROG DEV EVAL SCRMS: CPT

## 2023-01-05 PROCEDURE — G2066: CPT

## 2023-01-18 NOTE — DISCHARGE NOTE PROVIDER - NSDCFUADDINST_GEN_ALL_CORE_FT
Xray bedside obtaining chest xray    Follow up with Dr. Mayorga in two weeks time. The office will call you in 3-5 days.

## 2023-01-23 NOTE — ED PROVIDER NOTE - OBJECTIVE STATEMENT
HPI: 78 y/o M; with PMH signif for a-fib, CAD, CABGx3 COPD ,HTN, HLD p/w chest pain and lightheadedness reports chest pain intermittently for 2 week. Chest pain is substernal, non-radiating associated with SOB, denies palpitations. Pt c/o chills today, seen by PMD who did CT head with IV contrast and chest x-ray, pt was told that he has worsening COPD and placed on prednisone reports today increased lightheadedness chills and fatigue came to ED for further eval.   Foster: surgeon Fink: janee  Pulm: Octavio     PMH: CABGx3, a-fib, HLD, HTN, COPD  SOCIAL: +social EtOH use, denies tobacco/illicit drug use HPI: 76 y/o M; with PMH signif for a-fib(on Eliquis), CAD, CABGx3 COPD ,HTN, HLD p/w chest pain and lightheadedness reports chest pain intermittently for 2 week. Chest pain is substernal, non-radiating associated with SOB, denies palpitations. Pt c/o chills today, seen by PMD who did CT head with IV contrast and chest x-ray, pt was told that he has worsening COPD and placed on prednisone reports today increased lightheadedness chills and fatigue came to ED for further eval.   Foster: surgeon  Yanick Seo: janee  Pulm: Octavio     PMH: CABGx3, a-fib, HLD, HTN, COPD  SOCIAL: +social EtOH use, denies tobacco/illicit drug use no hematuria

## 2023-01-30 NOTE — ED PROVIDER NOTE - CROS ED CONS ALL NEG
Final Anesthesia Post-op Assessment    Patient: Guillermo Orr  Procedure(s) Performed: EXCISION RIGHT SHOULDER LIPOMA - RIGHTEXCISION RIGHT NECK LIPOMA - RIGHT  Anesthesia type: General    Vitals Value Taken Time   Temp 36 °C (96.8 °F) 01/30/23 1000   Pulse 56 01/30/23 1045   Resp 21 01/30/23 1045   SpO2 96 % 01/30/23 1045   /69 01/30/23 1045         Patient Location: Phase II  Post-op Vital Signs:stable  Level of Consciousness: awake and alert  Respiratory Status: spontaneous ventilation  Cardiovascular stable  Hydration: euvolemic  Pain Management: adequately controlled  Vomiting: none  Nausea: None  Airway Patency:patent  Post-op Assessment: awake, alert, appropriately conversant, or baseline, no complications, patient tolerated procedure well with no complications, no evidence of recall and dentition within defined limits      No notable events documented.   
negative...

## 2023-02-15 NOTE — SWALLOW BEDSIDE ASSESSMENT ADULT - ASR SWALLOW ASPIRATION MONITOR
Your current Orthopaedic problem we are working together to treat is:  right knee meniscus tear.    Activites as tolerated.       It is recommended you schedule a follow-up appointment with Aaron Duarte MD  2 weeks.      Office hours are 8:00 am to 5:00 pm Monday through Friday.  If it is urgent that you speak with someone outside of these hours, our [facility] Call Center will be able to assist you.  You can reach the office by calling the:  [address]  [address] [phone]    We do highly recommend [employer], if you do not already have this.  You can request access via the internet or by simply talking with a  at any of the clinics.   [email or web address].        Thank you for choosing [facility] as your Orthopaedic provider!    change of breathing pattern/oral hygiene/position upright (90Y)/cough/gurgly voice/fever/pneumonia/throat clearing/upper respiratory infection

## 2023-03-23 PROBLEM — Z87.898 HISTORY OF SYNCOPE: Status: ACTIVE | Noted: 2022-04-07

## 2023-03-23 NOTE — PHYSICAL EXAM
[Well Developed] : well developed [No Acute Distress] : no acute distress [No Respiratory Distress] : no respiratory distress  [Soft] : abdomen soft [Normal Gait] : normal gait [No Edema] : no edema [Moves all extremities] : moves all extremities [Alert and Oriented] : alert and oriented

## 2023-03-23 NOTE — REVIEW OF SYSTEMS
[Fever] : no fever [Chills] : no chills [Feeling Fatigued] : not feeling fatigued [SOB] : no shortness of breath [Dyspnea on exertion] : not dyspnea during exertion [Leg Claudication] : no intermittent leg claudication [Palpitations] : no palpitations [Syncope] : no syncope [Dizziness] : no dizziness [Easy Bleeding] : no tendency for easy bleeding [Easy Bruising] : no tendency for easy bruising

## 2023-03-23 NOTE — HISTORY OF PRESENT ILLNESS
[FreeTextEntry1] : 77 year old gentleman with history of HTN, HLD, CAD s/p PCI 2002 and CABGx3 3/23/20, COPD, and paroxysmal atrial fibrillation, presenting for follow-up of pAF. \par \par To summarize his history, He had atrial fibrillation with rapid rates during hospitalization for pneumonia in 12/2020- 1/2021. ECG from 12/21/20 revealed AF with rapid rates (and RBBB). He was seen by cardiology and had no other AF was noted on review. He was started on Eliquis 5mg bid, in addition to ASA (and Plavix has been stopped). About two years prior he had an episode of syncope, which occurred after being on the water on a hot day, and was ultimately thought to be due to dehydration. \par \par He underwent ILR implant 3/15/21 for history of syncope and long term monitoring of atrial arrhythmias, allow symptom correlation. \par \par On 7/6/22 he had syncope when going to see Dr Fink, and this correlated with an episode of rapid atrial tachycardia, with regular ventricular rate of 167 bpm, which lasted about 1 minute. Apparently he had overdosed on Zoloft inadvertently at that time, and was then found to have pneumonia and again was hospitalized for this. \par \par No other AF episodes had been noted. Has had infrequent and short episodes of irregular SVT were noted lasting up to 13 seconds in duration. He has remained on anticoagulation with Eliquis + ASA, and has been on low dose metoprolol 12.5mg qd given lightheadedness. He denies any significant bleeding on anticoagulation.  \par Complaints of rare palpitations c/w with stress and moments of anger. ILR shows symptom events c/w SR with PAC. Overall doing well and denies CP, SOB, KELLY, syncope or presyncope.\par

## 2023-03-23 NOTE — DISCUSSION/SUMMARY
[EKG obtained to assist in diagnosis and management of assessed problem(s)] : EKG obtained to assist in diagnosis and management of assessed problem(s) [FreeTextEntry1] : 77 year old gentleman with history of HTN, HLD, CAD s/p PCI 2002 and CABGx3 3/23/20, COPD, and paroxysmal atrial fibrillation and sycnope s/p ILR implant who presenting for followup. Has had prior episodes of AF, and recently rapid atrial tachycardia, noted in the setting of recurrent pneumonia. ILR previously showed one episode of rapid atrial tachycardia resulting in syncope, which occurred in setting of taking too much Zoloft as well as subsequently was found to have pneumonia (7/2022).  \par \par Since last f/up, he continues to have infrequent runs of nonsustained PAT up to 10 seconds duration. Remains on a/c and currently tolerating anticoagulation with Eliquis, in addition to ASA. \par Will continue a/c for now given his elevated risk profile (CHADSVASc 4), and likelihood of further AF recurrence. If no further AF episodes are noted and his bleeding risk is suspected to be higher, can reconsider role of anticoagulation. For now he is comfortable continuing current management. \par \par - Follow up with Dr Fink for cardiovascular follow-up. \par - Continue current medications including Eliquis, ASA and low dose Metoprolol. \par - Annual EP f/up, sooner PRN\par \par Ivana Chua PAC\par \par

## 2023-04-12 NOTE — HISTORY OF PRESENT ILLNESS
[FreeTextEntry1] : 10/12/22: 76 yo male PMHx COPD, CAD, HTN, HLD, presents for evaluation of carotid artery stenosis. Pt has been having dizziness recently and went to the ER. He had a CT of his neck which demonstrated left internal carotid artery stenosis 50-69%. He denies any hx of TIA or stroke. Pt is on ASA, atorvastatin and Eliquis. \par \par 4/12/23: Pt here for 6 month FU carotid artery stenosis. Denies any TIA or stroke. He is still having some dizziness and hypotension. He follows up with Dr. Negron from cardiology.

## 2023-04-12 NOTE — PROCEDURE
[FreeTextEntry1] : 8/24/22 CT neck:  Significant calcification of the carotid bifurcations bilaterally. No significant right carotid stenosis. Moderate stenosis left internal carotid artery measuring 50-69% using NASCET criteria.\par \par 10/12/22 BL carotid artery duplex: \par right: <50% ICA stenosis \par left: <50% ICA stenosis \par \par 4/12/23 BL carotid artery duplex: \par right: <50% ICA stenosis \par left: <50% ICA stenosis

## 2023-04-12 NOTE — ASSESSMENT
[FreeTextEntry1] : 76 yo male with less than 50% ICA stenosis bilaterally. Pt is asymptomatic. Pt complains of dizziness which is unlikely related to carotid disease \par \par Pt counseled on results of duplex and above diagnosis.\par Continue ASA, atorvastatin and Eliquis. \par RTC in 12 months for repeat carotid artery duplex \par \par A total of 20 minutes was spent with patient and coordinating care\par

## 2023-05-10 NOTE — REASON FOR VISIT
[FreeTextEntry1] : Mr. Henson is a pleasant 77-year-old white male with a past medical history significant for hypertension, hyperlipidemia, coronary artery disease status-post PCI in 2002 followed by CABG x3 in March of 2020 as well as a history of COPD, chronic back pain status-post spinal stimulator insertion, and paroxysmal atrial fibrillation status-post ILR implantation, who presents for follow up evaluation.

## 2023-05-10 NOTE — ADDENDUM
[FreeTextEntry1] : Patient is now in need of cardiac clearance regarding hernia repair surgery scheduled to be done on May 19, 2023 with Dr. Adonay Hernandez at [facility].\par \par Most recent Transthoracic Echocardiogram (07/01/2022):  Global LV systolic function was normal with LVEF of 65 to 70%.  Abnormal (paradoxical) septal motion consistent with post-operative status.  Grade I diastolic dysfunciton.  The left and right atria normal in size and structure.  There was no evidence for pericardial effusion.  Trace MR. Trivial TR. Trace FL.\par \par Most recent Pharmacologic Nuclear Stress Test (04/11/2022):  Patient developed rare PACs during exercise.  The EKG was negative for ischemia.  Normal Sestamibi myocardial perfusion imaging with hyperdynamic LV function; LVEF of 80%  (there had been no significant interval change when compared to prior study dated (2016)).\par \par PLAN:\par \par 1).  Based upon Mr. Robert Henson's otherwise stable cardiac pattern, there is no absolute cardiac contraindication for him to undergo the proposed hernia repair surgery.\par \par He may hold the Eilquis 48 hours prior to surgery and aspirin five to seven days prior to surgery and restart thereafter if you deem it safe.  \par \par Recommend full cardiac precautions with pre- and post-op cardiac monitoring until stable. \par \par Has follow up with Dr. Fink on June 30th or PRN.\par \par If I may be of additional assistance, please do not hesitate to call.

## 2023-05-10 NOTE — PHYSICAL EXAM
[Well Developed] : well developed [Well Nourished] : well nourished [No Acute Distress] : no acute distress [Normal Conjunctiva] : normal conjunctiva [Normal Venous Pressure] : normal venous pressure [No Carotid Bruit] : no carotid bruit [Normal S1, S2] : normal S1, S2 [No Murmur] : no murmur [No Rub] : no rub [No Gallop] : no gallop [Clear Lung Fields] : clear lung fields [No Respiratory Distress] : no respiratory distress  [Soft] : abdomen soft [Non Tender] : non-tender [No Masses/organomegaly] : no masses/organomegaly [Normal Gait] : normal gait [No Edema] : no edema [No Cyanosis] : no cyanosis [No Clubbing] : no clubbing [No Rash] : no rash [No Skin Lesions] : no skin lesions [Moves all extremities] : moves all extremities [No Focal Deficits] : no focal deficits [Normal Speech] : normal speech [Alert and Oriented] : alert and oriented [Normal memory] : normal memory [de-identified] : Decreased breath sounds throughout

## 2023-05-10 NOTE — HISTORY OF PRESENT ILLNESS
[FreeTextEntry1] : From a cardiac standpoint, Mr. Henson is doing reasonably well denying exertional chest pain, shortness of breath, or other cardiac symptoms.

## 2023-05-10 NOTE — ASSESSMENT
[FreeTextEntry1] : 1. EKG today reveals normal sinus rhythm at 97 bpm. Right bundle branch block. No ischemic changes. \par \par 2. Hypertension: Blood pressure well controlled at this time on current medications. \par \par 3. Hyperlipidemia: The most recent lipid profile revealed a total cholesterol of 162, HDL 53, LDL 90, triglycerides 94. Patient advised on a stricter low-fat / low-cholesterol diet given the need for an LDL target of 70. Repeat bloodwork in three months. \par \par 4. Coronary artery disease status-post PCI as well as CABG: Clinically stable without chest pain or shortness of breath. Advised to walk as much as possible. \par \par Recommend he complete a Transthoracic Echocardiogram prior to follow up to assess overall cardiac function and valvulopathy. \par \par 5. Lung cancer: Patient clinically stable at this time but undergoing aggressive surveillance. If clinically stable from a cardiac standpoint, office visit three months with Dr. Fink.

## 2023-06-15 NOTE — PATIENT PROFILE ADULT - VISION (WITH CORRECTIVE LENSES IF THE PATIENT USUALLY WEARS THEM):
Partially impaired: cannot see medication labels or newsprint, but can see obstacles in path, and the surrounding layout; can count fingers at arm's length .

## 2023-06-30 PROBLEM — I65.29 CAROTID STENOSIS, ASYMPTOMATIC: Status: ACTIVE | Noted: 2017-12-05

## 2023-06-30 NOTE — HISTORY OF PRESENT ILLNESS
[FreeTextEntry1] : 78 year old gentleman with clinical lung cancer (RUL, cT1a N0), status post SBRT completed 11/24/21.\par \par Pt accompanied with wife. He reports dyspnea on exertion, mild cough at night, mild loss of appetite. \par No nausea, no increased dyspnea, or other changes.\par and vomitting, edema,pain\par \par 6/24/23 CT chest; report not available.

## 2023-06-30 NOTE — PHYSICAL EXAM
[Sclera] : the sclera and conjunctiva were normal [Extraocular Movements] : extraocular movements were intact [Outer Ear] : the ears and nose were normal in appearance [Heart Rate And Rhythm] : heart rate and rhythm were normal [Skin Color & Pigmentation] : normal skin color and pigmentation [Normal] : oriented to person, place and time, the affect was normal, the mood was normal and not anxious

## 2023-07-03 NOTE — REASON FOR VISIT
[FreeTextEntry1] : Mr. Henson is a pleasant 78-year-old white male with a past medical history significant for hypertension, hyperlipidemia, coronary artery disease status-post PCI in 2002 followed by CABG x3 in March of 2020 as well as a history of COPD, chronic back pain status-post spinal stimulator insertion, and paroxysmal atrial fibrillation status-post ILR implantation, who presents for follow up evaluation.  \par \par \par

## 2023-07-03 NOTE — ASSESSMENT
[FreeTextEntry1] : \par 1. EKG today reveals normal sinus rhythm at 84 bpm. Right bundle branch block. No ischemic changes. \par \par 2. Hypertension: Blood pressure well controlled at this time on current medications. \par \par 3. Hyperlipidemia:  Recent lipid profile reveals a total cholesterol of 146, HDL 52, LDL 72, triglycerides 112. Patient is advised to continue with current statin therapy as well as follow a strict low-fat / low-cholesterol diet.  \par \par 4. Coronary artery disease status-post PCI as well as CABG x3: Clinically stable without significant chest pain or shortness of breath. Advised to walk as much as possible.\par \par 5. COPD/lung cancer: Patient with known right upper lobe nodule status-post radiation therapy. Appears to have a chronic cough. Will follow up with oncology. \par \par 6. Paroxysmal atrial fibrillation status-post ILR implantation: No episodes of atrial fibrillation detected at this time. Patient will continue with Eliquis and close ILR monitoring. \par \par 7. Lightheadedness: Patient recently underwent vascular evaluation and was found to have mild to moderate bilateral carotid plaquing. Does not appear to be a correlation between this finding and his symptoms which have since abated. \par \par 8. Patient advised on a low-salt / low-fat / low-cholesterol diet as well as regular aerobic exercise. If clinically stable, follow up office visit four months.  \par   \par

## 2023-07-03 NOTE — PHYSICAL EXAM
[General Appearance - Well Developed] : well developed [Normal Conjunctiva] : the conjunctiva exhibited no abnormalities [Normal Oral Mucosa] : normal oral mucosa [Shallow] : shallow respirations [Tachycardic ___] : the heart rate was tachycardic at [unfilled] bpm [Bowel Sounds] : normal bowel sounds [Abnormal Walk] : normal gait [FreeTextEntry1] : Disoriented Strong peripheral pulses

## 2023-07-03 NOTE — HISTORY OF PRESENT ILLNESS
[FreeTextEntry1] : From a cardiac standpoint, Mr. Henson feels well denying exertional chest pain, shortness of breath, or significant palpitations. He states that his lightheadedness has improved.\par

## 2023-07-10 NOTE — PROGRESS NOTE ADULT - PROBLEM SELECTOR PROBLEM 4
[FreeTextEntry1] : Urine from scope for bacterial and fungal culture.\par Continue Diflucan tonight and tomorrow.\par Will get KUB and PEDRO in 4 wks\par IF normal, will proceed with 24 hr urine \par F/u 2 wks after that for review and serum stone panel.\par 
Atrial fibrillation

## 2023-07-18 NOTE — H&P PST ADULT - MS GEN HX ROS MEA POS PC
67F hx HTN, asymptomatic AFib (not on rate-control or AC), RUT on CPAP, morbid obesity s/p lap band 2013 and PSH cholecystectomy, right meniscus repair. back pain

## 2023-08-20 NOTE — ED PROVIDER NOTE - WR ORDER ID 1
Monitor summary:  Atrial fibrillation with rates from the 80s to 110s  Bundle branch block  Frequent PVCs (couplets, triplets, bigem)  - /.13 / -         [id number]

## 2023-08-23 NOTE — HISTORY OF PRESENT ILLNESS
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

## 2023-08-23 NOTE — DISCUSSION/SUMMARY
[COPD] : chronic obstructive pulmonary disease [Decompensated] : decompensated [Stage III (Severe)] : stage III (severe) [FreeTextEntry1] : Slight change in recent CAT scan.  Further therapy as per radiation oncology. [Medication Changes Per Orders] : Medication changes are as documented in orders [Patient] : the patient [de-identified] : Due to insurance concerns [de-identified] : Follow-up oncology

## 2023-08-23 NOTE — CONSULT LETTER
[Dear  ___] : Dear  [unfilled], [Consult Letter:] : I had the pleasure of evaluating your patient, [unfilled]. [Please see my note below.] : Please see my note below. [Consult Closing:] : Thank you very much for allowing me to participate in the care of this patient.  If you have any questions, please do not hesitate to contact me. [Sincerely,] : Sincerely, [FreeTextEntry3] : Epi Fuller MD FCCP\par  Pulmonary/Critical Care/Sleep Medicine\par  Department of Internal Medicine\par  \par  [facility] [employer] [facility]\par

## 2023-08-23 NOTE — END OF VISIT
[Time Spent: ___ minutes] : I have spent [unfilled] minutes of time on the encounter. [FreeTextEntry3] : CAT scan reviewed on PACS with patient and wife

## 2023-09-07 NOTE — ED ADULT TRIAGE NOTE - ARRIVAL FROM
Subjective   Patient ID: Daniel Vaughan is a 19 y.o. female who presents for Mass (Noticed x 1 week/Same in size and no pain ).    HPI     She noticed a lump in the area of her right underarm 1 to 2 weeks ago.  No pain.  She thinks that it has been getting smaller.  She denies any recent fever, cough, or rash.  Denies any breast pain or breast masses.      Review of Systems   Constitutional:  Negative for fever.   Respiratory:  Negative for cough.    Skin:  Positive for rash.       Objective   /68   Pulse (!) 104   Temp 36.8 °C (98.2 °F) (Temporal)   Wt (!) 103 kg   SpO2 98%     Physical Exam  Vitals reviewed.   Constitutional:       General: She is not in acute distress.     Appearance: Normal appearance. She is well-developed.   HENT:      Head: Normocephalic.   Eyes:      Conjunctiva/sclera: Conjunctivae normal.   Cardiovascular:      Rate and Rhythm: Normal rate and regular rhythm.      Heart sounds: Normal heart sounds.   Pulmonary:      Effort: Pulmonary effort is normal.      Breath sounds: Normal breath sounds.   Skin:     Findings: No rash.      Comments: Right axilla: In the area of concern in anterior aspect of axilla, there is a very small cyst/nodule located just under the skin which measures approximately 2 mm in diameter.  This is firm.  There is no rash.  There is no fluctuance.  There are no deep masses palpated or any lymphadenopathy present   Neurological:      Mental Status: She is alert.   Psychiatric:         Mood and Affect: Mood normal.         Behavior: Behavior normal.         Assessment/Plan   Problem List Items Addressed This Visit    None  Visit Diagnoses       Skin cyst    -  Primary        She presents today with a 1 to 2-week history of a lump in her right axilla.  On exam today this does not have any worrisome features-it is very small and measures approximately 2 mm, firm, and is located just under the skin.  I suspect that this is either an ingrown hair or a  very small sebaceous cyst.  Recommended applying topical Neosporin and monitoring.  If this does not resolve completely in the next 3 to 4 weeks recommended follow-up.  Also recommend calling if becomes any larger or develops new symptoms.  Otherwise recommend following up in late December when due for next annual physical   Doctor's office

## 2023-10-10 NOTE — ED PROVIDER NOTE - PROGRESS NOTE DETAILS
CT results as noted.  Pt still occ coughing BRB.  Case d/w Pulmonary/DR. Gagnon and recommending admission for probable bronchoscopy.  Case d/w Hospitalist and will admit to Tele

## 2023-10-10 NOTE — ED ADULT NURSE NOTE - OBJECTIVE STATEMENT
Aox4. Pt presenting to Emergency Department complaining of coughing up blood x 1 week. Pt reporting being seen PCP in which CT was performed. Pt reports the sputum is bright red. Denies any blood in stool or urine. On eliquis at home for a-fib. Denies chest pain, SOB, abd pain, back pain, headaches, dizziness, lightheadedness, fevers, chills, nausea, vomitting, diarrhea, constipation and dysuria.

## 2023-10-10 NOTE — ED ADULT NURSE REASSESSMENT NOTE - NS ED NURSE REASSESS COMMENT FT1
Pt A&Ox4, breathing with ease, no signs of acute distress, pt complains of coughing with bloody sputum, denies pain at this time, will continue to monitor and assess.

## 2023-10-10 NOTE — ED ADULT NURSE NOTE - NS ED NURSE REPORT GIVEN TO FT
Verbal report given to Maria M SORIA. pt currently @ [facility]. to  be transported to [address].

## 2023-10-10 NOTE — ED PROVIDER NOTE - OBJECTIVE STATEMENT
77 yo M with hx of CAD, s/p CABG x 3 vessels, HTN and lung Ca followed by Radiation Oncology/Regina Gomez at [facility] c/o intermittent hemoptysis with BRB and occ clots  x 1 week.  Pt had outpt CT chest 3 days ago, but has not received results as of yet.  Pt c/o chest tightness.  No assoc fever, chills, abd pain, N/V or syncope.  Pt denies any dark stools.  Pt seen at [facility] with reported O2 sat 91% on R/A

## 2023-10-10 NOTE — ED PROVIDER NOTE - CLINICAL SUMMARY MEDICAL DECISION MAKING FREE TEXT BOX
77 yo M with hx of CAD, s/p CABG x 3 vessels, HTN and lung Ca followed by Radiation Oncology/Regina Gomez at [facility] c/o intermittent hemoptysis with BRB and occ clots  x 1 week.  Pt had outpt CT chest 3 days ago, but has not received results as of yet.  Pt c/o chest tightness.  No assoc fever, chills, abd pain, N/V or syncope.  Pt denies any dark stools.  Pt seen at [email or web address] with reported O2 sat 91% on R/A.  will check labs, EKG, CE, obtain results of outpt CT chest and re-eval

## 2023-10-11 NOTE — ED ADULT NURSE REASSESSMENT NOTE - NSFALLHARMRISKINTERV_ED_ALL_ED

## 2023-10-11 NOTE — PATIENT PROFILE ADULT - FALL HARM RISK - HARM RISK INTERVENTIONS

## 2023-10-11 NOTE — H&P ADULT - HISTORY OF PRESENT ILLNESS
seen on 10/10/23       78, M, pmh of  HTN, CAD s/p CABG x3v, COPD, Afib, Lung nodules/ Cancer? follows w/ rad onc (Dr. valencia) here c/o intermittent hemoptysis w/ BRB and blood clots.     pt says that sx started ~ 4 days ago w/ hemoptysis, SOB, and chest pressure. Chest pressure described as being on L side- not assoicated with exertion. lasts a few minutes and resolves by itself. Hemoptysis occured ~3x/day; noted to have suptum w/ blood clots (seems to describe little quantity of clot). clots initially improved to only blood tinged sputum- now worsened again today (noted to have clots of blood in sputum) so came to ed   denies fever chills recently. He did have covid ~3 weeks ago but has not had fevers since then.     As per ED discussion w/ Dr. Gagnon/ Pulm- pt to be admitted and have probable bronch.    According to pt, he was never formally dx with lung ca- only wiht a lung nodule that was thought to be cancerous- he underwent Lung radiation therapy (as biposy was thought to be too dangerous for him). And has been getting lung CT q6 months. More recently q3 months given some chagnes seen on imaging.         vitals-   160/80   on 3L nc- spo2 95- 97  afebrile   hr 80s   home meds       Home meds   -metop 25 daily   gabapentin 400 tid   lipitor 40 daily   anoro ellipta 1 puff a day   tamsulosin .4 daily   asa 81   eliquis 5 bid

## 2023-10-11 NOTE — H&P ADULT - ASSESSMENT
78, M, pmh of  HTN, CAD s/p CABG x3v, COPD, Afib, Lung nodules/ Cancer? follows w/ rad onc (Dr. valencia) here c/o intermittent hemoptysis w/ BRB and blood clots x 4 days.       #Hemoptysis  #SOB/ Chest pressure   #RUL 1.5 cm nodule/ Lung ca?   trop neg x1   Hb 12.6   INR 1.2  -trend cbc   -pulm consult   -to give nebulized tranexamic acid if pt starts having hemoptysis of blood clots- have placed PRN   -hold home eliquis and asa for now   -check repeat trop and d dimer (chest "pressure"/ lung nodule; but no evidence of DVT on exam; not tachycardia)  -pain control   -tele       #Afib  #CAD s/p CABG  #HTN   -metop   -hold eliquis and asa for now     #copd  Symbicort     #BPH- flomax     #SCD for dvt ppx   #full code   #NPO overnight         78, M, pmh of  HTN, CAD s/p CABG x3v, COPD, Afib, Lung nodules/ Cancer? follows w/ rad onc (Dr. valencia) here c/o intermittent hemoptysis w/ BRB and blood clots x 4 days.       #Hemoptysis  #SOB/ Chest pressure   #RUL 1.5 cm nodule/ Lung ca?   trop neg x1   Hb 12.6   INR 1.2  -trend cbc   -pulm consult   -to give nebulized tranexamic acid if pt starts having hemoptysis of blood clots- have placed PRN   -hold home eliquis and asa for now   -check repeat trop and d dimer (moderate risk as per wells score- hemoptysis and cancer; but no evidence of DVT on exam; no tachycardia- will consider CTA if d dimer elevate)  -pain control   -tele       #Afib  #CAD s/p CABG  #HTN   -metop   -hold eliquis and asa for now     #copd  Symbicort     #BPH- flomax     #SCD for dvt ppx   #full code   #NPO overnight         78, M, pmh of  HTN, CAD s/p CABG x3v, COPD, Afib, Lung nodules/ Cancer? follows w/ rad onc (Dr. valencia) here c/o intermittent hemoptysis w/ BRB and blood clots x 4 days.       #Hemoptysis  #SOB/ Chest pressure   #RUL 1.5 cm nodule/ Lung ca?   trop neg x1   Hb 12.6   INR 1.2  -trend cbc   -pulm consult- called by ED as per note  -to give nebulized tranexamic acid if pt starts having hemoptysis of blood clots- have placed PRN   -hold home eliquis and asa for now   -check repeat trop and d dimer (moderate risk as per wells score- hemoptysis and cancer; but no evidence of DVT on exam; no tachycardia- will consider CTA if d dimer elevate)  -pain control   -tele       #Afib  #CAD s/p CABG  #HTN   -metop   -hold eliquis and asa for now     #copd  Symbicort     #BPH- flomax     #SCD for dvt ppx   #full code   #NPO overnight

## 2023-10-11 NOTE — H&P ADULT - NSHPPHYSICALEXAM_GEN_ALL_CORE
exam-   gen- well appearing; nad; in bed   resp- no resp distress while lying in bed; b/l equal chest rise; on RA- spo2 94   cvs-s1s2;   ext- no LE edema or tenderness   abd- soft; NT   neuro- no focal defecets

## 2023-10-11 NOTE — CONSULT NOTE ADULT - PROBLEM SELECTOR RECOMMENDATION 9
Pt with 4 days of hemoptysis and chest pressure.  Patient is hemodynamically stable, continue to trend CBC   Plan for patient to go for bronchoscopy with Dr. Bhatti on Friday 10/13.   Continue to hold Eliquis and ASA for procedure.   Thoracic surgery to follow.   Plan discussed with Dr. Bhatti Pt with 4 days of hemoptysis and chest pressure.  Patient is hemodynamically stable, continue to trend CBC   Plan for patient to go for bronchoscopy/ION with Dr. Bhatti on Friday 10/13.   Continue to hold Eliquis and ASA for procedure.   Thoracic surgery to follow.   Plan discussed with Dr. Bhatti Pt with 4 days of hemoptysis and chest pressure.  Patient is hemodynamically stable, continue to trend CBC and monitor hemodynamics.   Continuous SpO2 monitoring, currently stable on room air with sat 95%   Plan for patient to go for bronchoscopy/ION with Dr. Bhatti on Friday 10/13.   Continue to hold Eliquis and ASA for procedure.   Thoracic surgery to follow.   Plan discussed with Dr. Bhatti

## 2023-10-11 NOTE — CONSULT NOTE ADULT - ASSESSMENT
78M PMHx of HTN, CAD s/p CABG x3, COPD, Afib, Lung nodules/?Cancer (follows w/ rad onc (Dr. Tapia), has been treated with lung radiation therapy but did not have a biopsy because it was thought to be too dangerous for him), presented to [facility] ED 10/10 with c/o hemoptysis with BRB and clots. Pt reports for the past 4 days he has been experiencing hemoptysis, SOB, and chest pressure. He reports the chest pressure is intermittent on his left side, not associated with exertion. He reports the hemoptysis occurred roughly 3x/day; noted to have sputum w/ blood clots, he reports the clots had initially improved to only blood tinged sputum but worsened today to clots again. Pt reports he had COVID 3 weeks ago but has not had any fevers since. According to pt, he was never formally dx with lung ca- only with a lung nodule that was thought to be cancerous- he underwent Lung radiation therapy (as biopsy was thought to be too dangerous for him). He has been getting lung CT q6 months and more recently q3 months given some chagnes seen on imaging. Thoracic surgery consulted for possible bronchoscopy. 78M PMHx of HTN, CAD s/p CABG x3 (03/2020 with Dr. Hutchison), COPD, Afib (on Eliquis), Lung nodules/?Cancer (follows w/ rad onc (Dr. Tapia), has been treated with lung radiation therapy 2 years ago but did not have a biopsy because it was thought to be too dangerous for him), presented to [facility] ED 10/10 with c/o hemoptysis with BRB and clots. Pt reports for the past 4 days he has been experiencing hemoptysis, SOB, and chest pressure. He reports the chest pressure is intermittent on his left side, not associated with exertion. He reports the hemoptysis occurred roughly 3x/day, he reports the clots had initially improved to only blood tinged sputum but worsened yesterday to clots again. Pt reports he had COVID 3 weeks ago but has not had any fevers since. According to pt, he was never formally dx with lung ca- only with a lung nodule that was thought to be cancerous- he underwent Lung radiation therapy (as biopsy was thought to be too dangerous for him due to his COPD). He has been getting lung CT q6 months and more recently q3 months given some changes seen on imaging. Thoracic surgery consulted for possible bronchoscopy.

## 2023-10-11 NOTE — ED ADULT NURSE REASSESSMENT NOTE - NS ED NURSE REASSESS COMMENT FT1
rounds frequently provided for pt comfort and safety. no acute distress noted. c/o chest discomfort, medicated as per EMR. pt expresses no other needs at this time. no further concerns as of present. call bell within reach. plan of care ongoing. NPO status maintained for possible bronchoscopy; contacted MD [facility] to confirm status for procedure. as per provider, awaiting to hear back from CT Surgery.

## 2023-10-11 NOTE — CONSULT NOTE ADULT - PROBLEM SELECTOR RECOMMENDATION 2
1.5cm RUL lung nodule  Follows with Dr. Tapia oncologist, lung nodule noted to be larger on most recent CT scan then prior imaging.  Dr. Bhatti to review imaging. 1.5cm RUL lung nodule  Follows with Dr. Tapia oncologist, lung nodule noted to be larger on most recent CT scan then prior imaging.  Plan for bronch/ION biopsy of lesions Friday with Dr. Bhatti.

## 2023-10-11 NOTE — CONSULT NOTE ADULT - SUBJECTIVE AND OBJECTIVE BOX
HPI:  78M PMHx of HTN, CAD s/p CABG x3, COPD, Afib, Lung nodules/?Cancer (follows w/ rad onc (Dr. Tapia), has been treated with lung radiation therapy but did not have a biopsy because it was thought to be too dangerous for him), presented to [facility] ED 10/10 with c/o hemoptysis with BRB and clots. Pt reports for the past 4 days he has been experiencing hemoptysis, SOB, and chest pressure. He reports the chest pressure is intermittent on his left side, not associated with exertion. He reports the hemoptysis occurred roughly 3x/day; noted to have sputum w/ blood clots, he reports the clots had initially improved to only blood tinged sputum but worsened today to clots again. Pt reports he had COVID 3 weeks ago but has not had any fevers since. According to pt, he was never formally dx with lung ca- only with a lung nodule that was thought to be cancerous- he underwent Lung radiation therapy (as biopsy was thought to be too dangerous for him). He has been getting lung CT q6 months and more recently q3 months given some chagnes seen on imaging. Thoracic surgery consulted for possible bronchoscopy. Patient denies acute pain with radiating or aggravating factors. He denies chest pain, shortness of breath, palpitations, headache, dizziness, nausea, or vomiting.     PAST MEDICAL & SURGICAL HISTORY:  CAD in native artery  RCA 3 YAYA 1 in 2002 and 2 in 2019    Arthritis    Lumbosacral disc disease  has nerve stimulator    HTN (hypertension)    HLD (hyperlipidemia)    Lung cancer  Stage I a1; RUL; s/p SBRT in 11/2021; stable disease as of 4/2022    Paroxysmal atrial fibrillation    COPD (chronic obstructive pulmonary disease) with emphysema    H/O heart artery stent  RCA    S/P cataract surgery  b/l eyes 2016    S/P CABG (coronary artery bypass graft)    MEDICATIONS  (STANDING):  atorvastatin 40 milliGRAM(s) Oral at bedtime  budesonide  80 MICROgram(s)/formoterol 4.5 MICROgram(s) Inhaler 1 Puff(s) Inhalation daily  dextrose 5% + sodium chloride 0.9%. 1000 milliLiter(s) (100 mL/Hr) IV Continuous <Continuous>  tamsulosin 0.4 milliGRAM(s) Oral at bedtime    MEDICATIONS  (PRN):  acetaminophen     Tablet .. 650 milliGRAM(s) Oral every 6 hours PRN Temp greater or equal to 38C (100.4F), Mild Pain (1 - 3)  aluminum hydroxide/magnesium hydroxide/simethicone Suspension 30 milliLiter(s) Oral every 4 hours PRN Dyspepsia  HYDROmorphone   Tablet 2 milliGRAM(s) Oral every 8 hours PRN Severe Pain (7 - 10)  melatonin 3 milliGRAM(s) Oral at bedtime PRN Insomnia  morphine  - Injectable 2 milliGRAM(s) IV Push every 8 hours PRN Moderate Pain (4 - 6)  ondansetron Injectable 4 milliGRAM(s) IV Push every 8 hours PRN Nausea and/or Vomiting  tranexamic acid Injectable for Nebulization 500 milliGRAM(s) Inhalation every 8 hours PRN massive hemoptysis as directed by MD      Allergies    ibuprofen (Anaphylaxis)  codeine (Urticaria)    Intolerances    SOCIAL HISTORY:  Smoking:    Exposure History:    Ambulatory status:    Family History   FH: colon cancer      Vital Signs Last 24 Hrs  T(C): 36.8 (11 Oct 2023 11:00), Max: 36.8 (11 Oct 2023 11:00)  T(F): 98.2 (11 Oct 2023 11:00), Max: 98.2 (11 Oct 2023 11:00)  HR: 98 (11 Oct 2023 11:00) (86 - 105)  BP: 124/81 (11 Oct 2023 11:00) (124/81 - 162/84)  BP(mean): --  RR: 16 (11 Oct 2023 11:00) (16 - 18)  SpO2: 95% (11 Oct 2023 11:00) (93% - 98%)    Parameters below as of 11 Oct 2023 11:00  Patient On (Oxygen Delivery Method): room air      General: WN/WD NAD  Neurology: Awake, nonfocal, BARKER x 4  Eyes: Scleras clear, PERRLA/ EOMI, Gross vision intact  ENT:Gross hearing intact, grossly patent pharynx, no stridor  Neck: Neck supple, trachea midline, No JVD,   Respiratory: CTA B/L, No wheezing, rales, rhonchi  CV: RRR, S1S2, no murmurs, rubs or gallops  Abdominal: Soft, NT, ND +BS,   Extremities: No edema, + peripheral pulses  Skin: No Rashes, Hematoma, Ecchymosis  Lymphatic: No Neck, axilla, groin LAD  Psych: Oriented x 3, normal affect    LABS:             12.7   6.33  )-----------( 251      ( 11 Oct 2023 07:00 )             38.2     10-11    140  |  101  |  12.0  ----------------------------<  93  4.1   |  28.0  |  0.51    Ca    9.4      11 Oct 2023 07:00    TPro  6.8  /  Alb  4.2  /  TBili  1.0  /  DBili  x   /  AST  16  /  ALT  16  /  AlkPhos  95  10-11    PT/INR - ( 10 Oct 2023 16:37 )   PT: 13.9 sec;   INR: 1.26 ratio         PTT - ( 10 Oct 2023 16:37 )  PTT:37.0 sec    Urinalysis Basic - ( 11 Oct 2023 07:00 )    Color: x / Appearance: x / SG: x / pH: x  Gluc: 93 mg/dL / Ketone: x  / Bili: x / Urobili: x   Blood: x / Protein: x / Nitrite: x   Leuk Esterase: x / RBC: x / WBC x   Sq Epi: x / Non Sq Epi: x / Bacteria: x        RADIOLOGY & ADDITIONAL STUDIES:    CT Chest     < from: CT Chest w/o Cont (10.06.23 @ 13:16) >  IMPRESSION:    Unchanged right upper lobe 1.5 cm solid nodule, but larger compared to   more remote studies. Differential includes neoplasm and nodular scarring.    --- End of Report ---      < end of copied text >   HPI:  78M PMHx of HTN, CAD s/p CABG x3 (03/2020 with Dr. Hutchison), COPD, Afib (on Eliquis), Lung nodules/?Cancer (follows w/ rad onc (Dr. Tapia), has been treated with lung radiation therapy 2 years ago but did not have a biopsy because it was thought to be too dangerous for him), presented to [facility] ED 10/10 with c/o hemoptysis with BRB and clots. Pt reports for the past 4 days he has been experiencing hemoptysis, SOB, and chest pressure. He reports the chest pressure is intermittent on his left side, not associated with exertion. He reports the hemoptysis occurred roughly 3x/day, he reports the clots had initially improved to only blood tinged sputum but worsened yesterday to clots again. Pt states he had COVID 3 weeks ago but has not had any fevers since, just increased fatigue. According to pt, he was never formally dx with lung ca- only with a lung nodule that was thought to be cancerous- he underwent Lung radiation therapy (as biopsy was thought to be too dangerous for him due to his COPD). He has been getting lung CT q6 months and more recently q3 months given some changes seen on imaging. Thoracic surgery consulted for possible bronchoscopy. He denies palpitations, weight loss, dizziness, nausea, or vomiting.       ROS: All other ROS negative, except those mentioned in the HPI.       PAST MEDICAL & SURGICAL HISTORY:  CAD in native artery  RCA 3 YAYA 1 in 2002 and 2 in 2019    Arthritis    Lumbosacral disc disease  has nerve stimulator    HTN (hypertension)    HLD (hyperlipidemia)    Lung cancer  Stage I a1; RUL; s/p SBRT in 11/2021; stable disease as of 4/2022    Paroxysmal atrial fibrillation    COPD (chronic obstructive pulmonary disease) with emphysema    H/O heart artery stent  RCA    S/P cataract surgery  b/l eyes 2016    S/P CABG (coronary artery bypass graft)    MEDICATIONS  (STANDING):  atorvastatin 40 milliGRAM(s) Oral at bedtime  budesonide  80 MICROgram(s)/formoterol 4.5 MICROgram(s) Inhaler 1 Puff(s) Inhalation daily  dextrose 5% + sodium chloride 0.9%. 1000 milliLiter(s) (100 mL/Hr) IV Continuous <Continuous>  tamsulosin 0.4 milliGRAM(s) Oral at bedtime    MEDICATIONS  (PRN):  acetaminophen     Tablet .. 650 milliGRAM(s) Oral every 6 hours PRN Temp greater or equal to 38C (100.4F), Mild Pain (1 - 3)  aluminum hydroxide/magnesium hydroxide/simethicone Suspension 30 milliLiter(s) Oral every 4 hours PRN Dyspepsia  HYDROmorphone   Tablet 2 milliGRAM(s) Oral every 8 hours PRN Severe Pain (7 - 10)  melatonin 3 milliGRAM(s) Oral at bedtime PRN Insomnia  morphine  - Injectable 2 milliGRAM(s) IV Push every 8 hours PRN Moderate Pain (4 - 6)  ondansetron Injectable 4 milliGRAM(s) IV Push every 8 hours PRN Nausea and/or Vomiting  tranexamic acid Injectable for Nebulization 500 milliGRAM(s) Inhalation every 8 hours PRN massive hemoptysis as directed by MD      Allergies    ibuprofen (Anaphylaxis)  codeine (Urticaria)    Intolerances    SOCIAL HISTORY:  Smoking:    Exposure History:    Ambulatory status:    Family History   FH: colon cancer      Vital Signs Last 24 Hrs  T(C): 36.8 (11 Oct 2023 11:00), Max: 36.8 (11 Oct 2023 11:00)  T(F): 98.2 (11 Oct 2023 11:00), Max: 98.2 (11 Oct 2023 11:00)  HR: 98 (11 Oct 2023 11:00) (86 - 105)  BP: 124/81 (11 Oct 2023 11:00) (124/81 - 162/84)  BP(mean): --  RR: 16 (11 Oct 2023 11:00) (16 - 18)  SpO2: 95% (11 Oct 2023 11:00) (93% - 98%)    Parameters below as of 11 Oct 2023 11:00  Patient On (Oxygen Delivery Method): room air    General: alert, NAD  Neurology: Awake, nonfocal, BARKER x 4  Eyes: Scleras clear, PERRLA/ EOMI, Gross vision intact  Neck: Neck supple, trachea midline, No JVD   Respiratory: breath sounds diminished on the right, No wheezing, rales, rhonchi  CV: RRR, S1S2, no murmurs, rubs or gallops  Abdominal: Soft, NT, ND +BS,   Extremities: No edema, + peripheral pulses  Skin: No Rashes, Hematoma, Ecchymosis, warm, dry   Psych: Oriented x 3, normal affect    LABS:             12.7   6.33  )-----------( 251      ( 11 Oct 2023 07:00 )             38.2     10-11    140  |  101  |  12.0  ----------------------------<  93  4.1   |  28.0  |  0.51    Ca    9.4      11 Oct 2023 07:00    TPro  6.8  /  Alb  4.2  /  TBili  1.0  /  DBili  x   /  AST  16  /  ALT  16  /  AlkPhos  95  10-11    PT/INR - ( 10 Oct 2023 16:37 )   PT: 13.9 sec;   INR: 1.26 ratio         PTT - ( 10 Oct 2023 16:37 )  PTT:37.0 sec    Urinalysis Basic - ( 11 Oct 2023 07:00 )    Color: x / Appearance: x / SG: x / pH: x  Gluc: 93 mg/dL / Ketone: x  / Bili: x / Urobili: x   Blood: x / Protein: x / Nitrite: x   Leuk Esterase: x / RBC: x / WBC x   Sq Epi: x / Non Sq Epi: x / Bacteria: x        RADIOLOGY & ADDITIONAL STUDIES:    CT Chest     < from: CT Chest w/o Cont (10.06.23 @ 13:16) >  IMPRESSION:    Unchanged right upper lobe 1.5 cm solid nodule, but larger compared to   more remote studies. Differential includes neoplasm and nodular scarring.    --- End of Report ---      < end of copied text >   HPI:  78M PMHx of HTN, CAD s/p CABG x3 (03/2020 with Dr. Hutchison), COPD, Afib (on Eliquis), Lung nodules/?Cancer (follows w/ rad onc (Dr. Tapia), has been treated with lung radiation therapy 2 years ago but did not have a biopsy because it was thought to be too dangerous for him), presented to [facility] ED 10/10 with c/o hemoptysis with BRB and clots. Pt reports for the past 4 days he has been experiencing hemoptysis, SOB, and chest pressure. He reports the chest pressure is intermittent on his left side, not associated with exertion. He reports the hemoptysis occurred roughly 3x/day, he reports the clots had initially improved to only blood tinged sputum but worsened yesterday to clots again. Pt states he had COVID 3 weeks ago but has not had any fevers since, just increased fatigue. According to pt, he was never formally dx with lung ca- only with a lung nodule that was thought to be cancerous- he underwent Lung radiation therapy (as biopsy was thought to be too dangerous for him due to his COPD). He has been getting lung CT q6 months and more recently q3 months given some changes seen on imaging. Thoracic surgery consulted for possible bronchoscopy. He denies palpitations, weight loss, dizziness, nausea, or vomiting.       ROS: All other ROS negative, except those mentioned in the HPI.       PAST MEDICAL & SURGICAL HISTORY:  CAD in native artery  RCA 3 YAYA 1 in 2002 and 2 in 2019    Arthritis    Lumbosacral disc disease  has nerve stimulator    HTN (hypertension)    HLD (hyperlipidemia)    Lung cancer  Stage I a1; RUL; s/p SBRT in 11/2021; stable disease as of 4/2022    Paroxysmal atrial fibrillation    COPD (chronic obstructive pulmonary disease) with emphysema    H/O heart artery stent  RCA    S/P cataract surgery  b/l eyes 2016    S/P CABG (coronary artery bypass graft)    MEDICATIONS  (STANDING):  atorvastatin 40 milliGRAM(s) Oral at bedtime  budesonide  80 MICROgram(s)/formoterol 4.5 MICROgram(s) Inhaler 1 Puff(s) Inhalation daily  dextrose 5% + sodium chloride 0.9%. 1000 milliLiter(s) (100 mL/Hr) IV Continuous <Continuous>  tamsulosin 0.4 milliGRAM(s) Oral at bedtime    MEDICATIONS  (PRN):  acetaminophen     Tablet .. 650 milliGRAM(s) Oral every 6 hours PRN Temp greater or equal to 38C (100.4F), Mild Pain (1 - 3)  aluminum hydroxide/magnesium hydroxide/simethicone Suspension 30 milliLiter(s) Oral every 4 hours PRN Dyspepsia  HYDROmorphone   Tablet 2 milliGRAM(s) Oral every 8 hours PRN Severe Pain (7 - 10)  melatonin 3 milliGRAM(s) Oral at bedtime PRN Insomnia  morphine  - Injectable 2 milliGRAM(s) IV Push every 8 hours PRN Moderate Pain (4 - 6)  ondansetron Injectable 4 milliGRAM(s) IV Push every 8 hours PRN Nausea and/or Vomiting  tranexamic acid Injectable for Nebulization 500 milliGRAM(s) Inhalation every 8 hours PRN massive hemoptysis as directed by MD      Allergies    ibuprofen (Anaphylaxis)  codeine (Urticaria)    Intolerances    SOCIAL HISTORY:  Smoking: former smoker     Ambulatory status: ambulates independently     Family History   FH: colon cancer      Vital Signs Last 24 Hrs  T(C): 36.8 (11 Oct 2023 11:00), Max: 36.8 (11 Oct 2023 11:00)  T(F): 98.2 (11 Oct 2023 11:00), Max: 98.2 (11 Oct 2023 11:00)  HR: 98 (11 Oct 2023 11:00) (86 - 105)  BP: 124/81 (11 Oct 2023 11:00) (124/81 - 162/84)  BP(mean): --  RR: 16 (11 Oct 2023 11:00) (16 - 18)  SpO2: 95% (11 Oct 2023 11:00) (93% - 98%)    Parameters below as of 11 Oct 2023 11:00  Patient On (Oxygen Delivery Method): room air    General: alert, NAD  Neurology: Awake, nonfocal, BARKER x 4  Eyes: Scleras clear, PERRLA/ EOMI, Gross vision intact  Neck: Neck supple, trachea midline, No JVD   Respiratory: breath sounds diminished on the right, No wheezing, rales, rhonchi  CV: RRR, S1S2, no murmurs, rubs or gallops  Abdominal: Soft, NT, ND +BS,   Extremities: No edema, + peripheral pulses  Skin: No Rashes, Hematoma, Ecchymosis, warm, dry   Psych: Oriented x 3, normal affect    LABS:             12.7   6.33  )-----------( 251      ( 11 Oct 2023 07:00 )             38.2     10-11    140  |  101  |  12.0  ----------------------------<  93  4.1   |  28.0  |  0.51    Ca    9.4      11 Oct 2023 07:00    TPro  6.8  /  Alb  4.2  /  TBili  1.0  /  DBili  x   /  AST  16  /  ALT  16  /  AlkPhos  95  10-11    PT/INR - ( 10 Oct 2023 16:37 )   PT: 13.9 sec;   INR: 1.26 ratio         PTT - ( 10 Oct 2023 16:37 )  PTT:37.0 sec    Urinalysis Basic - ( 11 Oct 2023 07:00 )    Color: x / Appearance: x / SG: x / pH: x  Gluc: 93 mg/dL / Ketone: x  / Bili: x / Urobili: x   Blood: x / Protein: x / Nitrite: x   Leuk Esterase: x / RBC: x / WBC x   Sq Epi: x / Non Sq Epi: x / Bacteria: x        RADIOLOGY & ADDITIONAL STUDIES:    CT Chest     < from: CT Chest w/o Cont (10.06.23 @ 13:16) >  IMPRESSION:    Unchanged right upper lobe 1.5 cm solid nodule, but larger compared to   more remote studies. Differential includes neoplasm and nodular scarring.    --- End of Report ---      < end of copied text >

## 2023-10-11 NOTE — ED ADULT NURSE REASSESSMENT NOTE - NS ED NURSE REASSESS COMMENT FT1
C/p pt assumed from Erin SORIA @[id number]. no S&S of acute distress, pt resting comfortably on stretcher. pt denies palpitations, SOB/dyspnea, dizziness/headache/lightheadedness/blurry vision, tingling/numbness, fevers/chills, N/V/D, urinary S&S. presented to ED c/o hemoptysis, lightheadedness, and chest discomfort x 5 days; hx lung cancer. pt states blood is "bright red" with clots. CM+ maintained. pt is able to make needs known. call bell in reach and encouraged to use when assistance is needed. awaiting TTE. plan of care reviewed with pt. pt in understanding of plan of care. NPO status maintained. C/p pt assumed from Erin SORIA @[id number]. no S&S of acute distress, pt resting comfortably on stretcher. pt denies palpitations, SOB/dyspnea, dizziness/headache/lightheadedness/blurry vision, tingling/numbness, fevers/chills, N/V/D, urinary S&S. presented to ED c/o hemoptysis, lightheadedness, and chest discomfort x 5 days; hx lung cancer. pt states blood is "bright red" with clots. as per pt, they stopped taking their Eliquis and ASA yesterday. CM+ maintained. pt is able to make needs known. call bell in reach and encouraged to use when assistance is needed. awaiting TTE. plan of care reviewed with pt. pt in understanding of plan of care. NPO status maintained.

## 2023-10-12 NOTE — PROVIDER CONTACT NOTE (OTHER) - ASSESSMENT
bp 116/85 t 97.5 o2 sat 97% r 18  Patient [id number] reports feeling anxious, patient is in no apparent distress.

## 2023-10-12 NOTE — PROGRESS NOTE ADULT - SUBJECTIVE AND OBJECTIVE BOX
Subjective:  Patient seen and examined for hemoptysis. Patient resting in bed / OOB to chair and in no apparent distress. Patient denies chest pain, SOB, abdominal pain, N/V/D/C, or any other acute complaints at this time.    PAST MEDICAL & SURGICAL HISTORY:  CAD in native artery  RCA 3 YAYA 1 in 2002 and 2 in 2019    Arthritis    Lumbosacral disc disease  has nerve stimulator    HTN (hypertension)    HLD (hyperlipidemia)    Lung cancer  Stage I a1; RUL; s/p SBRT in 11/2021; stable disease as of 4/2022    Paroxysmal atrial fibrillation    COPD (chronic obstructive pulmonary disease) with emphysema    H/O heart artery stent  RCA    S/P cataract surgery  b/l eyes 2016    S/P CABG (coronary artery bypass graft)    VITAL SIGNS  Vital Signs Last 24 Hrs  T(C): 36.4 (10-12-23 @ 06:20), Max: 36.9 (10-11-23 @ 20:19)  T(F): 97.5 (10-12-23 @ 06:20), Max: 98.4 (10-11-23 @ 20:19)  HR: 121 (10-12-23 @ 06:20) (98 - 122)  BP: 116/85 (10-12-23 @ 06:20) (112/78 - 124/81)  RR: 18 (10-12-23 @ 06:20) (16 - 18)  SpO2: 97% (10-12-23 @ 06:20) (93% - 97%)  on (O2)              MEDICATIONS  acetaminophen     Tablet .. 650 milliGRAM(s) Oral every 6 hours PRN  aluminum hydroxide/magnesium hydroxide/simethicone Suspension 30 milliLiter(s) Oral every 4 hours PRN  atorvastatin 40 milliGRAM(s) Oral at bedtime  budesonide  80 MICROgram(s)/formoterol 4.5 MICROgram(s) Inhaler 1 Puff(s) Inhalation daily  dextrose 5% + sodium chloride 0.9%. 1000 milliLiter(s) IV Continuous <Continuous>  HYDROmorphone   Tablet 2 milliGRAM(s) Oral every 8 hours PRN  melatonin 3 milliGRAM(s) Oral at bedtime PRN  metoprolol tartrate 12.5 milliGRAM(s) Oral two times a day  morphine  - Injectable 2 milliGRAM(s) IV Push every 8 hours PRN  ondansetron Injectable 4 milliGRAM(s) IV Push every 8 hours PRN  tamsulosin 0.4 milliGRAM(s) Oral at bedtime  tranexamic acid Injectable for Nebulization 500 milliGRAM(s) Inhalation every 8 hours PRN      Weights:  Daily   Admit Wt: Drug Dosing Weight  Height (cm): 170.2 (10 Oct 2023 15:36)  Weight (kg): 72.6 (10 Oct 2023 15:36)  BMI (kg/m2): 25.1 (10 Oct 2023 15:36)  BSA (m2): 1.84 (10 Oct 2023 15:36)    LABS  10-11    140  |  101  |  12.0  ----------------------------<  93  4.1   |  28.0  |  0.51    Ca    9.4      11 Oct 2023 07:00    TPro  6.8  /  Alb  4.2  /  TBili  1.0  /  DBili  x   /  AST  16  /  ALT  16  /  AlkPhos  95  10-11                                 12.8   5.08  )-----------( 239      ( 11 Oct 2023 17:25 )             37.8          PT/INR - ( 10 Oct 2023 16:37 )   PT: 13.9 sec;   INR: 1.26 ratio         PTT - ( 10 Oct 2023 16:37 )  PTT:37.0 sec      DIAGNOSTICS:  All laboratory results, radiology and medications reviewed.    < from: CT Angio Chest PE Protocol w/ IV Cont (10.11.23 @ 18:49) >    IMPRESSION:.    No pulmonary embolism.    1.7 x 0.9 cm nodular opacity within the right mainstem bronchus is   unchanged compared to 10/6/2023. This lesion is indeterminate and while   secretions should be considered, malignancy is also a diagnostic   consideration. Consider bronchoscopy or short-term CT chest follow-up in   one month to further assess.    Posterior right upper lobe 1.4 x 0.8 cm nodular opacityis indeterminate   and malignancy is a diagnostic consideration.    Right hilar 1.7 x 0.9 cm node.    --- End of Report ---            IRENE NAVARRO MD; Attending Radiologist  This document has been electronically signed. Oct 11 2023  7:07PM    < end of copied text >    PHYSICAL EXAM    General: alert, NAD  Neurology: Awake, nonfocal, BARKER x 4  Neck: Neck supple, trachea midline, No JVD   Respiratory: breath sounds diminished on the right, No wheezing, rales, rhonchi  CV: RRR, S1S2, no murmurs, rubs or gallops  Abdominal: Soft, NT, ND +BS,   Extremities: No edema, + peripheral pulses  Skin: No Rashes, Hematoma, Ecchymosis, warm, dry   Psych: Oriented x 3, normal affect       Subjective:  Patient seen and examined for hemoptysis. Patient resting in bed comfortable, in no apparent distress. Patient reports his pain was well controlled over night and he only spit up light tinged sputum, no clots. Patient denies chest pain, SOB, abdominal pain, N/V/D/C, or any other acute complaints at this time.    PAST MEDICAL & SURGICAL HISTORY:  CAD in native artery  RCA 3 YAYA 1 in 2002 and 2 in 2019    Arthritis    Lumbosacral disc disease  has nerve stimulator    HTN (hypertension)    HLD (hyperlipidemia)    Lung cancer  Stage I a1; RUL; s/p SBRT in 11/2021; stable disease as of 4/2022    Paroxysmal atrial fibrillation    COPD (chronic obstructive pulmonary disease) with emphysema    H/O heart artery stent  RCA    S/P cataract surgery  b/l eyes 2016    S/P CABG (coronary artery bypass graft)    VITAL SIGNS  Vital Signs Last 24 Hrs  T(C): 36.4 (10-12-23 @ 06:20), Max: 36.9 (10-11-23 @ 20:19)  T(F): 97.5 (10-12-23 @ 06:20), Max: 98.4 (10-11-23 @ 20:19)  HR: 121 (10-12-23 @ 06:20) (98 - 122)  BP: 116/85 (10-12-23 @ 06:20) (112/78 - 124/81)  RR: 18 (10-12-23 @ 06:20) (16 - 18)  SpO2: 97% (10-12-23 @ 06:20) (93% - 97%)  on (O2)              MEDICATIONS  acetaminophen     Tablet .. 650 milliGRAM(s) Oral every 6 hours PRN  aluminum hydroxide/magnesium hydroxide/simethicone Suspension 30 milliLiter(s) Oral every 4 hours PRN  atorvastatin 40 milliGRAM(s) Oral at bedtime  budesonide  80 MICROgram(s)/formoterol 4.5 MICROgram(s) Inhaler 1 Puff(s) Inhalation daily  dextrose 5% + sodium chloride 0.9%. 1000 milliLiter(s) IV Continuous <Continuous>  HYDROmorphone   Tablet 2 milliGRAM(s) Oral every 8 hours PRN  melatonin 3 milliGRAM(s) Oral at bedtime PRN  metoprolol tartrate 12.5 milliGRAM(s) Oral two times a day  morphine  - Injectable 2 milliGRAM(s) IV Push every 8 hours PRN  ondansetron Injectable 4 milliGRAM(s) IV Push every 8 hours PRN  tamsulosin 0.4 milliGRAM(s) Oral at bedtime  tranexamic acid Injectable for Nebulization 500 milliGRAM(s) Inhalation every 8 hours PRN      Weights:  Daily   Admit Wt: Drug Dosing Weight  Height (cm): 170.2 (10 Oct 2023 15:36)  Weight (kg): 72.6 (10 Oct 2023 15:36)  BMI (kg/m2): 25.1 (10 Oct 2023 15:36)  BSA (m2): 1.84 (10 Oct 2023 15:36)    LABS  10-11    140  |  101  |  12.0  ----------------------------<  93  4.1   |  28.0  |  0.51    Ca    9.4      11 Oct 2023 07:00    TPro  6.8  /  Alb  4.2  /  TBili  1.0  /  DBili  x   /  AST  16  /  ALT  16  /  AlkPhos  95  10-11                                 12.8   5.08  )-----------( 239      ( 11 Oct 2023 17:25 )             37.8          PT/INR - ( 10 Oct 2023 16:37 )   PT: 13.9 sec;   INR: 1.26 ratio         PTT - ( 10 Oct 2023 16:37 )  PTT:37.0 sec      DIAGNOSTICS:  All laboratory results, radiology and medications reviewed.    < from: CT Angio Chest PE Protocol w/ IV Cont (10.11.23 @ 18:49) >    IMPRESSION:.    No pulmonary embolism.    1.7 x 0.9 cm nodular opacity within the right mainstem bronchus is   unchanged compared to 10/6/2023. This lesion is indeterminate and while   secretions should be considered, malignancy is also a diagnostic   consideration. Consider bronchoscopy or short-term CT chest follow-up in   one month to further assess.    Posterior right upper lobe 1.4 x 0.8 cm nodular opacityis indeterminate   and malignancy is a diagnostic consideration.    Right hilar 1.7 x 0.9 cm node.    --- End of Report ---          IRENE NAVARRO MD; Attending Radiologist  This document has been electronically signed. Oct 11 2023  7:07PM    < end of copied text >    PHYSICAL EXAM    General: alert, NAD  Neurology: Awake, nonfocal, BARKER x 4  Neck: Neck supple, trachea midline, No JVD   Respiratory: breath sounds diminished on the right, wheezing at the bases  CV: RRR, S1S2, no murmurs, rubs or gallops  Abdominal: Soft, NT, ND +BS,   Extremities: No edema, + peripheral pulses  Skin: No Rashes, Hematoma, Ecchymosis, warm, dry   Psych: Oriented x 3, normal affect       Debbie Fofana  [address] [place] [address]  Phone: [phone]  Fax: [phone]  Scheduled Appointment: 11/07/2022 11:45 AM

## 2023-10-12 NOTE — PROGRESS NOTE ADULT - PROBLEM SELECTOR PLAN 1
Pt with 4 days of hemoptysis and chest pressure.  Patient is hemodynamically stable, continue to trend CBC and monitor hemodynamics.   Continuous SpO2 monitoring, currently stable on room air with sat 95%   Plan for patient to go for bronchoscopy/ION with Dr. Bhatti tomorrow 10/13.   NPO after midnight.   Continue to hold Eliquis and ASA for procedure.   Thoracic surgery to follow.   Plan discussed with Dr. Bhatti.

## 2023-10-12 NOTE — CONSULT NOTE ADULT - ASSESSMENT
79 y/o M with hx of HTN, Afib on eliquis, CAD s/p CABG 2020, pulmonary nodule s/p empiric radiation for presumed stage 1 lung cancer in 2021 (no biopsy) , now present with hemoptysis.   CTA chest show no PE, but noted to have new right mainstem endobronchial lesion, also with RUL 1.4 cm nodular oapcities, concern for malignancy.    #hemoptysis   #right mainstem endobronchial lesion 1.7 x 0.9 cm  #posterior RUL nodular lesion 1.4x0.8 cm.   #afib on eliquis  #CAD s/p CABG   #COPD on anoro    - pending bronchoscopy with navigation and biopsy by thoracic, will need to rule out infection  - would appreciate if BAL can be sent for from RUL for routine studies and cultures, including fungitel and galactomannan   - no absolute pulmonary contraindication for the planned procedure  - patient does not appear to have acute copd exacerbation, but given minimal wheeze, will optimize with prednisone 20 mg x 3 days   - will start xopenex q6h standing with ipratropium  - xopenex q4h PRN for breakthru  - can complete the inhale TXA for total of 5 days   - ASA and eliquis on hold at this time, last dose was 3 days ago per patient   - upon discharge patient can resume anoro , but for now can continue on the nebulizer while in the hospital   - rest of management per primary team   - pulmonary will follow

## 2023-10-12 NOTE — PROGRESS NOTE ADULT - SUBJECTIVE AND OBJECTIVE BOX
Patient is a 78y old  Male who presents with a chief complaint of     Patient seen and examined at bedside. No overnight events reported. continues to have hemoptysis episodes , deneis any sob     ALLERGIES:  ibuprofen (Anaphylaxis)  codeine (Urticaria)    MEDICATIONS  (STANDING):  atorvastatin 40 milliGRAM(s) Oral at bedtime  budesonide  80 MICROgram(s)/formoterol 4.5 MICROgram(s) Inhaler 1 Puff(s) Inhalation daily  dextrose 5% + sodium chloride 0.9%. 1000 milliLiter(s) (100 mL/Hr) IV Continuous <Continuous>  ipratropium    for Nebulization 500 MICROGram(s) Nebulizer every 6 hours  levalbuterol Inhalation 1.25 milliGRAM(s) Inhalation every 6 hours  metoprolol tartrate 12.5 milliGRAM(s) Oral two times a day  predniSONE   Tablet 20 milliGRAM(s) Oral daily  tamsulosin 0.4 milliGRAM(s) Oral at bedtime    MEDICATIONS  (PRN):  acetaminophen     Tablet .. 650 milliGRAM(s) Oral every 6 hours PRN Temp greater or equal to 38C (100.4F), Mild Pain (1 - 3)  aluminum hydroxide/magnesium hydroxide/simethicone Suspension 30 milliLiter(s) Oral every 4 hours PRN Dyspepsia  HYDROmorphone   Tablet 2 milliGRAM(s) Oral every 8 hours PRN Severe Pain (7 - 10)  levalbuterol Inhalation 1.25 milliGRAM(s) Inhalation every 4 hours PRN shortness of breath  melatonin 3 milliGRAM(s) Oral at bedtime PRN Insomnia  morphine  - Injectable 2 milliGRAM(s) IV Push every 8 hours PRN Moderate Pain (4 - 6)  ondansetron Injectable 4 milliGRAM(s) IV Push every 8 hours PRN Nausea and/or Vomiting  tranexamic acid Injectable for Nebulization 500 milliGRAM(s) Inhalation every 8 hours PRN massive hemoptysis as directed by MD    Vital Signs Last 24 Hrs  T(F): 97.9 (12 Oct 2023 15:36), Max: 98.4 (11 Oct 2023 20:19)  HR: 100 (12 Oct 2023 15:36) (80 - 122)  BP: 123/77 (12 Oct 2023 15:36) (112/78 - 124/76)  RR: 18 (12 Oct 2023 15:36) (18 - 18)  SpO2: 95% (12 Oct 2023 15:36) (92% - 97%)  I&O's Summary    PHYSICAL EXAM:  General: NAD, A/O x 3  ENT: MMM, no thrush  Neck: Supple, No JVD  Lungs: Clear to auscultation bilaterally, good air entry, non-labored breathing  Cardio: RRR, S1/S2, No murmur  Abdomen: Soft, Nontender, Nondistended; Bowel sounds present  Extremities: No calf tenderness, No pitting edema    LABS:                        12.8   5.08  )-----------( 239      ( 11 Oct 2023 17:25 )             37.8     10-11    140  |  101  |  12.0  ----------------------------<  93  4.1   |  28.0  |  0.51    Ca    9.4      11 Oct 2023 07:00    TPro  6.8  /  Alb  4.2  /  TBili  1.0  /  DBili  x   /  AST  16  /  ALT  16  /  AlkPhos  95  10-11          PT/INR - ( 10 Oct 2023 16:37 )   PT: 13.9 sec;   INR: 1.26 ratio         PTT - ( 10 Oct 2023 16:37 )  PTT:37.0 sec    Procalcitonin, Serum: 0.07 ng/mL (10-11-23 @ 17:25)    CARDIAC MARKERS ( 10 Oct 2023 16:37 )  x     / x     / 54 U/L / x     / x                            Urinalysis Basic - ( 11 Oct 2023 07:00 )    Color: x / Appearance: x / SG: x / pH: x  Gluc: 93 mg/dL / Ketone: x  / Bili: x / Urobili: x   Blood: x / Protein: x / Nitrite: x   Leuk Esterase: x / RBC: x / WBC x   Sq Epi: x / Non Sq Epi: x / Bacteria: x          RADIOLOGY & ADDITIONAL TESTS:    Care Discussed with Consultants/Other Providers:

## 2023-10-12 NOTE — PROGRESS NOTE ADULT - PROBLEM SELECTOR PLAN 2
1.5cm RUL lung nodule  Follows with Dr. Tapia oncologist, lung nodule noted to be larger on most recent CT scan then prior imaging.  Plan for bronch/ION biopsy of lesions tomorrow with Dr. Bhatti.

## 2023-10-12 NOTE — CONSULT NOTE ADULT - SUBJECTIVE AND OBJECTIVE BOX
Patient is a 78y old  Male who presents with a chief complaint of     BRIEF HOSPITAL COURSE:   77 y/o M with hx of Afib on eliquis, COPD,  lung nodule , CAD s/p CABG, present with hemoptysis for 4 days.  per patient he also reports having associated SOB, chest pressure, and    Events last 24 hours: ***    PAST MEDICAL & SURGICAL HISTORY:  CAD in native artery  RCA 3 YAYA 1 in 2002 and 2 in 2019      Arthritis      Lumbosacral disc disease  has nerve stimulator      HTN (hypertension)      HLD (hyperlipidemia)      Lung cancer  Stage I a1; RUL; s/p SBRT in 11/2021; stable disease as of 4/2022      Paroxysmal atrial fibrillation      COPD (chronic obstructive pulmonary disease) with emphysema      H/O heart artery stent  RCA      S/P cataract surgery  b/l eyes 2016      S/P CABG (coronary artery bypass graft)        Allergies    ibuprofen (Anaphylaxis)  codeine (Urticaria)    Intolerances      FAMILY HISTORY:  FH: colon cancer        Family history otherwise noncontributory.    Social History: ***    Review of Systems:  CONSTITUTIONAL:  No fevers, chills  HEAD: No headache  EYES: No blurry vision  ENT: No epistaxis, rhinorrhea, sore throat  CARDIOVASCULAR:  No chest pain, no palpitations  RESPIRATORY:  As per HPI  GASTROINTESTINAL:  No abdominal pain, N/V/D  GENITOURINARY:  No dysuria, frequency or urgency  NEUROLOGIC:  No seizures or headaches  EXTREMITIES: No leg swelling  PSYCHIATRIC:  No disorder of thought or mood    ALL OTHER REVIEW OF SYSTEMS EXCEPT PER HPI NEGATIVE.      Medications:    metoprolol tartrate 12.5 milliGRAM(s) Oral two times a day    budesonide  80 MICROgram(s)/formoterol 4.5 MICROgram(s) Inhaler 1 Puff(s) Inhalation daily  ipratropium    for Nebulization 500 MICROGram(s) Nebulizer every 6 hours  levalbuterol Inhalation 1.25 milliGRAM(s) Inhalation every 4 hours PRN  levalbuterol Inhalation 1.25 milliGRAM(s) Inhalation every 6 hours    acetaminophen     Tablet .. 650 milliGRAM(s) Oral every 6 hours PRN  HYDROmorphone   Tablet 2 milliGRAM(s) Oral every 8 hours PRN  melatonin 3 milliGRAM(s) Oral at bedtime PRN  morphine  - Injectable 2 milliGRAM(s) IV Push every 8 hours PRN  ondansetron Injectable 4 milliGRAM(s) IV Push every 8 hours PRN      tranexamic acid Injectable for Nebulization 500 milliGRAM(s) Inhalation every 8 hours PRN    aluminum hydroxide/magnesium hydroxide/simethicone Suspension 30 milliLiter(s) Oral every 4 hours PRN    tamsulosin 0.4 milliGRAM(s) Oral at bedtime    atorvastatin 40 milliGRAM(s) Oral at bedtime  predniSONE   Tablet 20 milliGRAM(s) Oral daily    dextrose 5% + sodium chloride 0.9%. 1000 milliLiter(s) IV Continuous <Continuous>                ICU Vital Signs Last 24 Hrs  T(C): 36.5 (12 Oct 2023 11:53), Max: 36.9 (11 Oct 2023 20:19)  T(F): 97.7 (12 Oct 2023 11:53), Max: 98.4 (11 Oct 2023 20:19)  HR: 96 (12 Oct 2023 11:53) (80 - 122)  BP: 117/66 (12 Oct 2023 11:53) (112/78 - 124/76)  BP(mean): --  ABP: --  ABP(mean): --  RR: 18 (12 Oct 2023 11:53) (18 - 18)  SpO2: 96% (12 Oct 2023 11:53) (92% - 97%)    O2 Parameters below as of 12 Oct 2023 11:53  Patient On (Oxygen Delivery Method): room air          Vital Signs Last 24 Hrs  T(C): 36.5 (12 Oct 2023 11:53), Max: 36.9 (11 Oct 2023 20:19)  T(F): 97.7 (12 Oct 2023 11:53), Max: 98.4 (11 Oct 2023 20:19)  HR: 96 (12 Oct 2023 11:53) (80 - 122)  BP: 117/66 (12 Oct 2023 11:53) (112/78 - 124/76)  BP(mean): --  RR: 18 (12 Oct 2023 11:53) (18 - 18)  SpO2: 96% (12 Oct 2023 11:53) (92% - 97%)    Parameters below as of 12 Oct 2023 11:53  Patient On (Oxygen Delivery Method): room air            I&O's Detail        LABS:                        12.8   5.08  )-----------( 239      ( 11 Oct 2023 17:25 )             37.8     10-11    140  |  101  |  12.0  ----------------------------<  93  4.1   |  28.0  |  0.51    Ca    9.4      11 Oct 2023 07:00    TPro  6.8  /  Alb  4.2  /  TBili  1.0  /  DBili  x   /  AST  16  /  ALT  16  /  AlkPhos  95  10-11      CARDIAC MARKERS ( 11 Oct 2023 07:00 )  x     / <0.01 ng/mL / x     / x     / x      CARDIAC MARKERS ( 10 Oct 2023 16:37 )  x     / <0.01 ng/mL / 54 U/L / x     / x          CAPILLARY BLOOD GLUCOSE        PT/INR - ( 10 Oct 2023 16:37 )   PT: 13.9 sec;   INR: 1.26 ratio         PTT - ( 10 Oct 2023 16:37 )  PTT:37.0 sec  Urinalysis Basic - ( 11 Oct 2023 07:00 )    Color: x / Appearance: x / SG: x / pH: x  Gluc: 93 mg/dL / Ketone: x  / Bili: x / Urobili: x   Blood: x / Protein: x / Nitrite: x   Leuk Esterase: x / RBC: x / WBC x   Sq Epi: x / Non Sq Epi: x / Bacteria: x      CULTURES:      Physical Examination:  GENERAL: In NAD   HEENT: NC/AT  NECK: Supple, trachea midline  PULM: ***  CVS: +S1, S2, RRR  ABD: Soft, non-tender  EXTREMITIES: No pedal edema B/L  SKIN: No open wounds  NEURO: Grossly non-focal    DEVICES:     RADIOLOGY: ***   Patient is a 78y old  Male who presents with a chief complaint of     BRIEF HOSPITAL COURSE:   79 y/o M with hx of Afib on eliquis, COPD,  lung nodule , CAD s/p CABG, present with hemoptysis with brb and clots for 4 days.  per patient he also reports having associated SOB, chest pressure.    Patient was noted to have covid 3 weeks ago, but no fever since.    Regarding cancer history, patient was never biopsied-proven due to his comorbidities.  He empirically receive radiation for presumed stage I cancer for his RUL nodule.  Radiation completed 2021.    Patient was also diagnosed to have afib since 2021 with loop recorder, patient is on eliquis since then.     In the ED, patient started on inhale txa.  Seen by thoracic, tentative plan for bronch on friday with navigation.    CTA shows no PE,  but endobronchial lesion on right main.   pulmonary is consulted for further management     Events last 24 hours: ***    PAST MEDICAL & SURGICAL HISTORY:  CAD in native artery  RCA 3 YAYA 1 in 2002 and 2 in 2019      Arthritis      Lumbosacral disc disease  has nerve stimulator      HTN (hypertension)      HLD (hyperlipidemia)      Lung cancer  Stage I a1; RUL; s/p SBRT in 11/2021; stable disease as of 4/2022      Paroxysmal atrial fibrillation      COPD (chronic obstructive pulmonary disease) with emphysema      H/O heart artery stent  RCA      S/P cataract surgery  b/l eyes 2016      S/P CABG (coronary artery bypass graft)        Allergies    ibuprofen (Anaphylaxis)  codeine (Urticaria)    Intolerances      FAMILY HISTORY:  FH: colon cancer        Family history otherwise noncontributory.    Social History: ***    Review of Systems:  CONSTITUTIONAL:  No fevers, chills  HEAD: No headache  EYES: No blurry vision  ENT: No epistaxis, rhinorrhea, sore throat  CARDIOVASCULAR:  No chest pain, no palpitations  RESPIRATORY:  As per HPI  GASTROINTESTINAL:  No abdominal pain, N/V/D  GENITOURINARY:  No dysuria, frequency or urgency  NEUROLOGIC:  No seizures or headaches  EXTREMITIES: No leg swelling  PSYCHIATRIC:  No disorder of thought or mood    ALL OTHER REVIEW OF SYSTEMS EXCEPT PER HPI NEGATIVE.      Medications:    metoprolol tartrate 12.5 milliGRAM(s) Oral two times a day    budesonide  80 MICROgram(s)/formoterol 4.5 MICROgram(s) Inhaler 1 Puff(s) Inhalation daily  ipratropium    for Nebulization 500 MICROGram(s) Nebulizer every 6 hours  levalbuterol Inhalation 1.25 milliGRAM(s) Inhalation every 4 hours PRN  levalbuterol Inhalation 1.25 milliGRAM(s) Inhalation every 6 hours    acetaminophen     Tablet .. 650 milliGRAM(s) Oral every 6 hours PRN  HYDROmorphone   Tablet 2 milliGRAM(s) Oral every 8 hours PRN  melatonin 3 milliGRAM(s) Oral at bedtime PRN  morphine  - Injectable 2 milliGRAM(s) IV Push every 8 hours PRN  ondansetron Injectable 4 milliGRAM(s) IV Push every 8 hours PRN      tranexamic acid Injectable for Nebulization 500 milliGRAM(s) Inhalation every 8 hours PRN    aluminum hydroxide/magnesium hydroxide/simethicone Suspension 30 milliLiter(s) Oral every 4 hours PRN    tamsulosin 0.4 milliGRAM(s) Oral at bedtime    atorvastatin 40 milliGRAM(s) Oral at bedtime  predniSONE   Tablet 20 milliGRAM(s) Oral daily    dextrose 5% + sodium chloride 0.9%. 1000 milliLiter(s) IV Continuous <Continuous>                ICU Vital Signs Last 24 Hrs  T(C): 36.5 (12 Oct 2023 11:53), Max: 36.9 (11 Oct 2023 20:19)  T(F): 97.7 (12 Oct 2023 11:53), Max: 98.4 (11 Oct 2023 20:19)  HR: 96 (12 Oct 2023 11:53) (80 - 122)  BP: 117/66 (12 Oct 2023 11:53) (112/78 - 124/76)  BP(mean): --  ABP: --  ABP(mean): --  RR: 18 (12 Oct 2023 11:53) (18 - 18)  SpO2: 96% (12 Oct 2023 11:53) (92% - 97%)    O2 Parameters below as of 12 Oct 2023 11:53  Patient On (Oxygen Delivery Method): room air          Vital Signs Last 24 Hrs  T(C): 36.5 (12 Oct 2023 11:53), Max: 36.9 (11 Oct 2023 20:19)  T(F): 97.7 (12 Oct 2023 11:53), Max: 98.4 (11 Oct 2023 20:19)  HR: 96 (12 Oct 2023 11:53) (80 - 122)  BP: 117/66 (12 Oct 2023 11:53) (112/78 - 124/76)  BP(mean): --  RR: 18 (12 Oct 2023 11:53) (18 - 18)  SpO2: 96% (12 Oct 2023 11:53) (92% - 97%)    Parameters below as of 12 Oct 2023 11:53  Patient On (Oxygen Delivery Method): room air            I&O's Detail        LABS:                        12.8   5.08  )-----------( 239      ( 11 Oct 2023 17:25 )             37.8     10-11    140  |  101  |  12.0  ----------------------------<  93  4.1   |  28.0  |  0.51    Ca    9.4      11 Oct 2023 07:00    TPro  6.8  /  Alb  4.2  /  TBili  1.0  /  DBili  x   /  AST  16  /  ALT  16  /  AlkPhos  95  10-11      CARDIAC MARKERS ( 11 Oct 2023 07:00 )  x     / <0.01 ng/mL / x     / x     / x      CARDIAC MARKERS ( 10 Oct 2023 16:37 )  x     / <0.01 ng/mL / 54 U/L / x     / x          CAPILLARY BLOOD GLUCOSE        PT/INR - ( 10 Oct 2023 16:37 )   PT: 13.9 sec;   INR: 1.26 ratio         PTT - ( 10 Oct 2023 16:37 )  PTT:37.0 sec  Urinalysis Basic - ( 11 Oct 2023 07:00 )    Color: x / Appearance: x / SG: x / pH: x  Gluc: 93 mg/dL / Ketone: x  / Bili: x / Urobili: x   Blood: x / Protein: x / Nitrite: x   Leuk Esterase: x / RBC: x / WBC x   Sq Epi: x / Non Sq Epi: x / Bacteria: x      CULTURES:      Physical Examination:  GENERAL: In NAD   HEENT: NC/AT  NECK: Supple, trachea midline  PULM: ***  CVS: +S1, S2, RRR  ABD: Soft, non-tender  EXTREMITIES: No pedal edema B/L  SKIN: No open wounds  NEURO: Grossly non-focal    DEVICES:     RADIOLOGY: ***   Patient is a 78y old  Male who presents with a chief complaint of     BRIEF HOSPITAL COURSE:   79 y/o M with hx of Afib on eliquis, COPD,  lung nodule , CAD s/p CABG, present with hemoptysis with brb and clots for 4 days.  per patient he also reports having associated SOB, chest pressure.    Patient was noted to have covid 3 weeks ago, but no fever since.    Regarding cancer history, patient was never biopsied-proven due to his comorbidities.  He empirically receive radiation for presumed stage I cancer for his RUL nodule.  Radiation completed 2021.    Patient was also diagnosed to have afib since 2021 with loop recorder, patient is on eliquis since then.     In the ED, patient started on inhale txa.  Seen by thoracic, tentative plan for bronch on friday with navigation.    CTA shows no PE,  but endobronchial lesion on right main.   pulmonary is consulted for further management     patient reports feeling better, denies acute complaints. Does report ongoing chest tightness.  No further hemoptysis this AM.     PAST MEDICAL & SURGICAL HISTORY:  CAD in native artery  RCA 3 YAYA 1 in 2002 and 2 in 2019      Arthritis      Lumbosacral disc disease  has nerve stimulator      HTN (hypertension)      HLD (hyperlipidemia)      Lung cancer  Stage I a1; RUL; s/p SBRT in 11/2021; stable disease as of 4/2022      Paroxysmal atrial fibrillation      COPD (chronic obstructive pulmonary disease) with emphysema      H/O heart artery stent  RCA      S/P cataract surgery  b/l eyes 2016      S/P CABG (coronary artery bypass graft)        Allergies    ibuprofen (Anaphylaxis)  codeine (Urticaria)    Intolerances      FAMILY HISTORY:  FH: colon cancer        Family history otherwise noncontributory.    Social History:    Review of Systems:      ALL OTHER REVIEW OF SYSTEMS EXCEPT PER HPI NEGATIVE.      Medications:    metoprolol tartrate 12.5 milliGRAM(s) Oral two times a day    budesonide  80 MICROgram(s)/formoterol 4.5 MICROgram(s) Inhaler 1 Puff(s) Inhalation daily  ipratropium    for Nebulization 500 MICROGram(s) Nebulizer every 6 hours  levalbuterol Inhalation 1.25 milliGRAM(s) Inhalation every 4 hours PRN  levalbuterol Inhalation 1.25 milliGRAM(s) Inhalation every 6 hours    acetaminophen     Tablet .. 650 milliGRAM(s) Oral every 6 hours PRN  HYDROmorphone   Tablet 2 milliGRAM(s) Oral every 8 hours PRN  melatonin 3 milliGRAM(s) Oral at bedtime PRN  morphine  - Injectable 2 milliGRAM(s) IV Push every 8 hours PRN  ondansetron Injectable 4 milliGRAM(s) IV Push every 8 hours PRN      tranexamic acid Injectable for Nebulization 500 milliGRAM(s) Inhalation every 8 hours PRN    aluminum hydroxide/magnesium hydroxide/simethicone Suspension 30 milliLiter(s) Oral every 4 hours PRN    tamsulosin 0.4 milliGRAM(s) Oral at bedtime    atorvastatin 40 milliGRAM(s) Oral at bedtime  predniSONE   Tablet 20 milliGRAM(s) Oral daily    dextrose 5% + sodium chloride 0.9%. 1000 milliLiter(s) IV Continuous <Continuous>          Vital Signs Last 24 Hrs  T(C): 36.5 (12 Oct 2023 11:53), Max: 36.9 (11 Oct 2023 20:19)  T(F): 97.7 (12 Oct 2023 11:53), Max: 98.4 (11 Oct 2023 20:19)  HR: 96 (12 Oct 2023 11:53) (80 - 122)  BP: 117/66 (12 Oct 2023 11:53) (112/78 - 124/76)  BP(mean): --  RR: 18 (12 Oct 2023 11:53) (18 - 18)  SpO2: 96% (12 Oct 2023 11:53) (92% - 97%)    Parameters below as of 12 Oct 2023 11:53  Patient On (Oxygen Delivery Method): room air            I&O's Detail        LABS:                        12.8   5.08  )-----------( 239      ( 11 Oct 2023 17:25 )             37.8     10-11    140  |  101  |  12.0  ----------------------------<  93  4.1   |  28.0  |  0.51    Ca    9.4      11 Oct 2023 07:00    TPro  6.8  /  Alb  4.2  /  TBili  1.0  /  DBili  x   /  AST  16  /  ALT  16  /  AlkPhos  95  10-11      CARDIAC MARKERS ( 11 Oct 2023 07:00 )  x     / <0.01 ng/mL / x     / x     / x      CARDIAC MARKERS ( 10 Oct 2023 16:37 )  x     / <0.01 ng/mL / 54 U/L / x     / x          CAPILLARY BLOOD GLUCOSE        PT/INR - ( 10 Oct 2023 16:37 )   PT: 13.9 sec;   INR: 1.26 ratio         PTT - ( 10 Oct 2023 16:37 )  PTT:37.0 sec  Urinalysis Basic - ( 11 Oct 2023 07:00 )    Color: x / Appearance: x / SG: x / pH: x  Gluc: 93 mg/dL / Ketone: x  / Bili: x / Urobili: x   Blood: x / Protein: x / Nitrite: x   Leuk Esterase: x / RBC: x / WBC x   Sq Epi: x / Non Sq Epi: x / Bacteria: x      CULTURES:      Physical Examination:  GENERAL: In NAD   HEENT: NC/AT  NECK: Supple, trachea midline  PULM: CTA b/l   CVS: +S1, S2, RRR  ABD: Soft, non-tender  EXTREMITIES: No pedal edema B/L  SKIN: No open wounds  NEURO: Grossly non-focal    DEVICES:     RADIOLOGY: reviewed

## 2023-10-12 NOTE — PROGRESS NOTE ADULT - PROBLEM SELECTOR PLAN 4
Continue inhalers as per primary team.   Pulm consult appreciated. Continue inhalers as per primary team.   Pulm consult appreciated, agree with recommendation of nebulizers and steroids.

## 2023-10-13 NOTE — BRIEF OPERATIVE NOTE - OPERATION/FINDINGS
Right endobronchial [id number]-op assessment positive for carcinoma  This is source of hemoptysis

## 2023-10-13 NOTE — PROGRESS NOTE ADULT - SUBJECTIVE AND OBJECTIVE BOX
Patient is a 78y old  Male who presents with a chief complaint of     Patient seen and examined at bedside. No overnight events reported. continues to have hemoptysis episodes , deneis any sob     ALLERGIES:  ibuprofen (Anaphylaxis)  codeine (Urticaria)    MEDICATIONS  (STANDING):  atorvastatin 40 milliGRAM(s) Oral at bedtime  budesonide  80 MICROgram(s)/formoterol 4.5 MICROgram(s) Inhaler 1 Puff(s) Inhalation daily  dextrose 5% + sodium chloride 0.9%. 1000 milliLiter(s) (100 mL/Hr) IV Continuous <Continuous>  ipratropium    for Nebulization 500 MICROGram(s) Nebulizer every 6 hours  levalbuterol Inhalation 1.25 milliGRAM(s) Inhalation every 6 hours  metoprolol tartrate 25 milliGRAM(s) Oral two times a day  predniSONE   Tablet 20 milliGRAM(s) Oral daily  tamsulosin 0.4 milliGRAM(s) Oral at bedtime             MEDICATIONS  (PRN):  acetaminophen     Tablet .. 650 milliGRAM(s) Oral every 6 hours PRN Temp greater or equal to 38C (100.4F), Mild Pain (1 - 3)  aluminum hydroxide/magnesium hydroxide/simethicone Suspension 30 milliLiter(s) Oral every 4 hours PRN Dyspepsia  levalbuterol Inhalation 1.25 milliGRAM(s) Inhalation every 4 hours PRN shortness of breath  melatonin 3 milliGRAM(s) Oral at bedtime PRN Insomnia  morphine  - Injectable 1 milliGRAM(s) IV Push every 4 hours PRN Moderate Pain (4 - 6)  ondansetron Injectable 4 milliGRAM(s) IV Push every 8 hours PRN Nausea and/or Vomiting  tranexamic acid Injectable for Nebulization 500 milliGRAM(s) Inhalation every 8 hours PRN massive hemoptysis as directed by MD    Vital Signs Last 24 Hrs  T(C): 36.9 (13 Oct 2023 11:06), Max: 37.1 (13 Oct 2023 07:48)  T(F): 98.5 (13 Oct 2023 11:06), Max: 98.7 (13 Oct 2023 07:48)  HR: 93 (13 Oct 2023 15:32) (90 - 117)  BP: 146/77 (13 Oct 2023 15:32) (116/71 - 149/79)  BP(mean): --  RR: 18 (13 Oct 2023 15:32) (18 - 18)  SpO2: 93% (13 Oct 2023 15:32) (93% - 96%)    Parameters below as of 13 Oct 2023 15:32  Patient On (Oxygen Delivery Method): room air      PHYSICAL EXAM:  General: NAD, A/O x 3  ENT: MMM, no thrush  Neck: Supple, No JVD  Lungs: Clear to auscultation bilaterally, good air entry, non-labored breathing  Cardio: RRR, S1/S2, No murmur  Abdomen: Soft, Nontender, Nondistended; Bowel sounds present  Extremities: No calf tenderness, No pitting edema    LABS:                                     10.9   5.21  )-----------( 212      ( 13 Oct 2023 03:27 )             33.0   10-13    142  |  105  |  10.9  ----------------------------<  134<H>  3.7   |  26.0  |  0.45<L>    Ca    8.5      13 Oct 2023 03:27  Mg     1.9     10-13    TPro  6.0<L>  /  Alb  3.8  /  TBili  0.4  /  DBili  x   /  AST  15  /  ALT  14  /  AlkPhos  75  10-13

## 2023-10-13 NOTE — PROGRESS NOTE ADULT - SUBJECTIVE AND OBJECTIVE BOX
PULMONARY PROGRESS NOTE      [id number]    Patient is a 78y old  Male who presents with a chief complaint of     INTERVAL HPI/OVERNIGHT EVENTS:  Chart reviewed  No further hemoptysis  FOB planned for today  No cough or wheeze    MEDICATIONS  (STANDING):  atorvastatin 40 milliGRAM(s) Oral at bedtime  budesonide  80 MICROgram(s)/formoterol 4.5 MICROgram(s) Inhaler 1 Puff(s) Inhalation daily  dextrose 5% + sodium chloride 0.9%. 1000 milliLiter(s) (100 mL/Hr) IV Continuous <Continuous>  ipratropium    for Nebulization 500 MICROGram(s) Nebulizer every 6 hours  levalbuterol Inhalation 1.25 milliGRAM(s) Inhalation every 6 hours  metoprolol tartrate 12.5 milliGRAM(s) Oral two times a day  predniSONE   Tablet 20 milliGRAM(s) Oral daily  tamsulosin 0.4 milliGRAM(s) Oral at bedtime      MEDICATIONS  (PRN):  acetaminophen     Tablet .. 650 milliGRAM(s) Oral every 6 hours PRN Temp greater or equal to 38C (100.4F), Mild Pain (1 - 3)  aluminum hydroxide/magnesium hydroxide/simethicone Suspension 30 milliLiter(s) Oral every 4 hours PRN Dyspepsia  levalbuterol Inhalation 1.25 milliGRAM(s) Inhalation every 4 hours PRN shortness of breath  melatonin 3 milliGRAM(s) Oral at bedtime PRN Insomnia  morphine  - Injectable 1 milliGRAM(s) IV Push every 4 hours PRN Moderate Pain (4 - 6)  ondansetron Injectable 4 milliGRAM(s) IV Push every 8 hours PRN Nausea and/or Vomiting  tranexamic acid Injectable for Nebulization 500 milliGRAM(s) Inhalation every 8 hours PRN massive hemoptysis as directed by MD      Allergies    ibuprofen (Anaphylaxis)  codeine (Urticaria)    Intolerances        PAST MEDICAL & SURGICAL HISTORY:  CAD in native artery  RCA 3 YAYA 1 in 2002 and 2 in 2019      Arthritis      Lumbosacral disc disease  has nerve stimulator      HTN (hypertension)      HLD (hyperlipidemia)      Lung cancer  Stage I a1; RUL; s/p SBRT in 11/2021; stable disease as of 4/2022      Paroxysmal atrial fibrillation      COPD (chronic obstructive pulmonary disease) with emphysema      H/O heart artery stent  RCA      S/P cataract surgery  b/l eyes 2016      S/P CABG (coronary artery bypass graft)          SOCIAL HISTORY  Smoking History:       REVIEW OF SYSTEMS:    CONSTITUTIONAL:  No distress    HEENT:  Eyes:  No diplopia or blurred vision. ENT:  No earache, sore throat or runny nose.    CARDIOVASCULAR:  No pressure, squeezing, tightness, heaviness or aching about the chest; no palpitations.    RESPIRATORY:  No cough, shortness of breath, PND or orthopnea. Mild SOBOE    GASTROINTESTINAL:  No nausea, vomiting or diarrhea.    GENITOURINARY:  No dysuria, frequency or urgency.    NEUROLOGIC:  No paresthesias, fasciculations, seizures or weakness.    Extremities: No cyanosis, clubbing or edema    PSYCHIATRIC:  No disorder of thought or mood.    Vital Signs Last 24 Hrs  T(C): 36.9 (13 Oct 2023 11:06), Max: 37.1 (13 Oct 2023 07:48)  T(F): 98.5 (13 Oct 2023 11:06), Max: 98.7 (13 Oct 2023 07:48)  HR: 117 (13 Oct 2023 11:06) (90 - 117)  BP: 123/78 (13 Oct 2023 11:06) (116/71 - 149/79)  BP(mean): --  RR: 18 (13 Oct 2023 11:06) (18 - 18)  SpO2: 96% (13 Oct 2023 11:06) (93% - 96%)    Parameters below as of 13 Oct 2023 11:06  Patient On (Oxygen Delivery Method): room air        PHYSICAL EXAMINATION:    GENERAL: The patient is awake and alert in no apparent distress.     HEENT: Head is normocephalic and atraumatic. Extraocular muscles are intact. Mucous membranes are moist.    NECK: Supple.    LUNGS: Clear to auscultation without wheezing, rales or rhonchi; respirations unlabored    HEART: Regular rate and rhythm without murmur.    ABDOMEN: Soft, nontender, and nondistended.      EXTREMITIES: Without any cyanosis, clubbing, rash, lesions or edema.    NEUROLOGIC: Grossly intact.    LABS:                        10.9   5.21  )-----------( 212      ( 13 Oct 2023 03:27 )             33.0     10-13    142  |  105  |  10.9  ----------------------------<  134<H>  3.7   |  26.0  |  0.45<L>    Ca    8.5      13 Oct 2023 03:27  Mg     1.9     10-13    TPro  6.0<L>  /  Alb  3.8  /  TBili  0.4  /  DBili  x   /  AST  15  /  ALT  14  /  AlkPhos  75  10-13      Urinalysis Basic - ( 13 Oct 2023 03:27 )    Color: x / Appearance: x / SG: x / pH: x  Gluc: 134 mg/dL / Ketone: x  / Bili: x / Urobili: x   Blood: x / Protein: x / Nitrite: x   Leuk Esterase: x / RBC: x / WBC x   Sq Epi: x / Non Sq Epi: x / Bacteria: x                  Procalcitonin, Serum: 0.07 ng/mL (10-11-23 @ 17:25)      MICROBIOLOGY:    RADIOLOGY & ADDITIONAL STUDIES:  < from: CT Angio Chest PE Protocol w/ IV Cont (10.11.23 @ 18:49) >    ACC: [id number] EXAM:  CT ANGIO CHEST PULM ART WAWI   ORDERED BY: MARY JO RICHARDSON     PROCEDURE DATE:  10/11/2023          INTERPRETATION:  HISTORY: Tachycardia. Hemoptysis. Evaluate for pulmonary   embolism.    EXAMINATION: CTA CHEST was performed for evaluation of the pulmonary   arteries. Multiplanar reformatted images and MIPS were acquired.  CONTRAST/COMPLICATIONS:  IV Contrast: Omnipaque 350  65 cc administered   35 cc discarded  Oral Contrast: NONE  Complications: None reported at timeof study completion    COMPARISON: 10/6/2023 CT chest.    FINDINGS:    PULMONARY ARTERIES: No pulmonary embolism.    MEDIASTINUM: Normal heart size. Post CABG. No pericardial effusion.   Thoracic aorta normal caliber.  No large mediastinal lymph nodes. Right   hilar 1.6 x 1.1 cm node (image 66, series 5)    AIRWAYS, LUNGS, PLEURA: 1.7 x 0.9 cm nodular opacity identified within   the right mainstem bronchus (image 62, series 5) is unchanged compared to   10/6/2023 and new compared to 7/6/2022. Mucoid impacted segmental airways   in the right upper, middle, and lower lobes. Emphysema. Posterior right   upper lobe nodular opacity (image 195, series 6) as on 10/6/2023, but   progressed compared to 7/6/2022.    IMAGED ABDOMEN: Unremarkable.    SOFT TISSUES: Unremarkable.    BONES: Neurostimulator device in place. Sternal wires.      IMPRESSION:.    No pulmonary embolism.    1.7 x 0.9 cm nodular opacity within the right mainstem bronchus is   unchanged compared to 10/6/2023. This lesion is indeterminate and while   secretions should be considered, malignancy is also a diagnostic   consideration. Consider bronchoscopy or short-term CT chest follow-up in   one month to further assess.    Posterior right upper lobe 1.4 x 0.8 cm nodular opacityis indeterminate   and malignancy is a diagnostic consideration.    Right hilar 1.7 x 0.9 cm node.    --- End of Report ---            IRENE NAVARRO MD; Attending Radiologist  This document has been electronically signed. Oct 11 2023  7:07PM    < end of copied text >

## 2023-10-14 NOTE — CONSULT NOTE ADULT - SUBJECTIVE AND OBJECTIVE BOX
JENNIFER ROWLEYALVARADO  [id number]      HPI:  77 y/o male PMHx HTN, CAD s/p CABG x3v, COPD, Afib on Eliquis, lung nodule/CA p/w intermittent hemoptysis w/ BRB and blood clots.  Patient now s/p bronch with right endobronchial mass biopsied with intra-op assessment positive for carcinoma.  Patient not having hemoptysis now but had large volume prior to admit and on arrival.  Cardio called to evaluate A/C and ASA.  Patient noted to be in SR with some tachycardia but not overt AF.  Patient had AF last year in setting of PNA and has been on Eliquis.  ILR placed with some PAT but no AF at recent check.  Denies CP, SOB, palps, orthopnea, syncope.      ALLERGIES:  ibuprofen (Anaphylaxis)  codeine (Urticaria)      PAST MEDICAL & SURGICAL HISTORY:  Arthritis  Lumbosacral disc disease  S/P cataract surgery  b/l eyes 2016  Otherwise, as noted above      MEDICATIONS:  acetaminophen     Tablet .. 650 milliGRAM(s) Oral every 6 hours PRN  aluminum hydroxide/magnesium hydroxide/simethicone Suspension 30 milliLiter(s) Oral every 4 hours PRN  atorvastatin 40 milliGRAM(s) Oral at bedtime  budesonide  80 MICROgram(s)/formoterol 4.5 MICROgram(s) Inhaler 1 Puff(s) Inhalation daily  dextrose 5% + sodium chloride 0.9%. 1000 milliLiter(s) IV Continuous <Continuous>  ipratropium    for Nebulization 500 MICROGram(s) Nebulizer every 6 hours  levalbuterol Inhalation 1.25 milliGRAM(s) Inhalation every 4 hours PRN  levalbuterol Inhalation 1.25 milliGRAM(s) Inhalation every 6 hours  melatonin 3 milliGRAM(s) Oral at bedtime PRN  metoprolol tartrate 25 milliGRAM(s) Oral two times a day  morphine  - Injectable 1 milliGRAM(s) IV Push every 4 hours PRN  ondansetron Injectable 4 milliGRAM(s) IV Push every 8 hours PRN  tamsulosin 0.4 milliGRAM(s) Oral at bedtime  tranexamic acid Injectable for Nebulization 500 milliGRAM(s) Inhalation every 8 hours PRN      SOCIAL HISTORY:  Patient denies alcohol, tobacco, drug use    FAMILY HISTORY:  FH: colon cancer    ROS:  Patient denies cough, and other than noted above full ROS is unremarkable      PHYSICAL EXAM:  Vital Signs Last 24 Hrs  T(C): 36.9 (14 Oct 2023 10:00), Max: 37 (14 Oct 2023 05:05)  T(F): 98.4 (14 Oct 2023 10:00), Max: 98.6 (14 Oct 2023 05:05)  HR: 101 (14 Oct 2023 10:00) (82 - 101)  BP: 136/65 (14 Oct 2023 10:00) (122/69 - 165/98)  BP(mean): --  RR: 16 (14 Oct 2023 10:00) (16 - 18)  SpO2: 98% (14 Oct 2023 10:00) (93% - 98%)  General: Patient comfortable in NAD  HEENT: NCAT, mmm, EOMI  Neck: no JVD, no carotid bruits  CVS: nl s1, split s2, no s3, +s4, no murmur or rubs, RRR  Chest: CTA b/l  Abdomen: soft, nt/nd  Extremities: No c/c/e  Neuro: A&O x3  Psych: Normal affect      ECG:  SR with RBBB.  Septal Qs.  No ST-T wave changes.      LABS:                        10.8   5.76  )-----------( 227      ( 14 Oct 2023 04:50 )             33.1     10-14    142  |  103  |  10.2  ----------------------------<  149<H>  3.9   |  26.0  |  0.56    Ca    9.0      14 Oct 2023 04:50  Mg     1.7     10-14    TPro  6.0<L>  /  Alb  3.8  /  TBili  0.4  /  DBili  x   /  AST  16  /  ALT  14  /  AlkPhos  73  10-14          RADIOLOGY:  CTPA:  No pulmonary embolism.  1.7 x 0.9 cm nodular opacity within the right mainstem bronchus is unchanged compared to 10/6/2023. This lesion is indeterminate and while   secretions should be considered, malignancy is also a diagnostic consideration. Consider bronchoscopy or short-term CT chest follow-up in   one month to further assess.  Posterior right upper lobe 1.4 x 0.8 cm nodular opacity is indeterminate and malignancy is a diagnostic consideration.  Right hilar 1.7 x 0.9 cm node.      Echo:   1. Technically suboptimal study.   2. Hyperdynamic global left ventricular systolic function.   3. Left ventricular ejection fraction, by visual estimation, is 70 to 75%.   4. Spectral Doppler shows impaired relaxation pattern of left   ventricular myocardial filling (Grade I diastolic dysfunction).   5. Mildly reduced RV systolic function.   6. Sclerotic aortic valve with normal opening.   7. Mild mitral annular calcification.   8. Trace mitral valve regurgitation.   9. Mild tricuspid regurgitation.  10. There is no evidence of pericardial effusion.          Assessment:  77 y/o male PMHx HTN, CAD s/p CABG x3v, COPD, Afib on Eliquis, lung nodule/CA p/w intermittent hemoptysis w/ BRB and blood clots.  Patient now s/p bronch with right endobronchial mass biopsied with intra-op assessment positive for carcinoma.  Cardio called to evaluate A/C and ASA.  Patient noted to be in SR with some tachycardia but not overt AF.  Patient had AF last year in setting of PNA and has been on Eliquis.  ILR placed with some PAT but no AF at recent check.  D/w pulm and recommended to hold on A/C given amount of hemoptysis until CA treatment can be initiated.  Given lack of significant AF as well would agree.  Can also hold ASA for now.      Plan:  1. Hold ASA and Eliquis with hemoptysis.  2. Continue on tele and to monitor ILR for evidence of AF.  3. Agree with increased Metoprolol, monitor BP.  Encourage po fluids.  4. Pulm/onc f/u.  5. No additional CV testing now.    Will follow.  Thanks!

## 2023-10-14 NOTE — CHART NOTE - NSCHARTNOTEFT_GEN_A_CORE
Called by RN for pt's tachycardia, regular.  Pt is asymptomatic, afebrile, not in any distress.  H/H stable, BUN/Cr wnl.  Pt states he hasn't gotten metoprolol he takes at home, confirmed in med rec  Ordered metoprolol tartrate 12.5mg po bid w/ hold parameters  Rn to monitor and escalate prn.
Called for sinus tachycardia after patient was ambulating.  Seen and assessed at bedside, patient endorsing severe anxiety regarding bronchoscopy.   HR 130s at time of assessment, otherwise hemodynamically stable.   Patient without any other acute complaints.   Patient in NAD, resting comfortably in bed. Respirations nonlabored, speaks in full sentences.   PO Xanax 0.25 mg x1 dose. C/w IVF.   Will check CBC, Procal, Sputum culture.   D/w PMD and Pulm, will obtain CTA to r/o PE or bleed.  RN to continue to monitor, to alert provider w/ any change in patient status.
RN called for bursts of PAT which have been occurring all day. Upon review with tele tech, bursts started at 10:56am. Per RN, EKG ordered earlier today around 2pm. PA made aware at 5:30pm of PAT. Cardiology is following and pt has a ILR. Pt admits to anxiety, denies any other acute complaints. Vitals have remained stable otherwise. Pt reports anxiety started yesterday, given his new CA dx and not being able to sleep last night in the ED.     Vital Signs Last 24 Hrs  T(C): 36.7 (14 Oct 2023 16:30), Max: 37 (14 Oct 2023 05:05)  T(F): 98.1 (14 Oct 2023 16:30), Max: 98.6 (14 Oct 2023 05:05)  HR: 130 (14 Oct 2023 17:30) (82 - 130)  BP: 155/78 (14 Oct 2023 17:30) (117/62 - 155/78)  BP(mean): --  RR: 16 (14 Oct 2023 17:30) (16 - 18)  SpO2: 93% (14 Oct 2023 17:30) (93% - 98%)    Parameters below as of 14 Oct 2023 17:30  Patient On (Oxygen Delivery Method): room air    PHYSICAL EXAM:  GENERAL: Pt lying in bed comfortably in NAD  HEAD:  Atraumatic   CHEST/LUNG: Clear to auscultation bilaterally  HEART: +Tachycardic    EXTREMITIES:  No LE edema  NERVOUS SYSTEM:  Alert & Oriented X3, speech clear. Answers questions appropriately.   SKIN: Warm and dry    A/P: 79 y/o male PMHx HTN, CAD s/p CABG x3v, COPD, Afib on Eliquis, lung nodule/CA p/w intermittent hemoptysis w/ BRB and blood clots.  Patient now s/p bronch with right endobronchial mass biopsied with intra-op assessment positive for carcinoma.  Patient not having hemoptysis now but had large volume prior to admit and on arrival.  Cardiology called to evaluate A/C and ASA.  Patient noted to be in SR with some tachycardia but not overt AF.  Patient had AF last year in setting of PNA and has been on Eliquis. ILR placed with some PAT but no AF at recent check.   - Increased Metoprolol to 50 BID with parameters  - Continue with Xanax 0.25mg PO x1  - Continue with tele monitoring   - RN advised to notify PA for any further changes  - Marko Solis
patient seen and eval. awake alert x 3 ,   episodes of hemoptysis with clotted blood this am   sats in 90s on RA   keep npo   given tranexamic acid nebulized dose  ivf with dextrose while npo  ct surgery consulted   awaiting pulm steph , ed discussed case with pulm dr. Gagnon  monitor on tele / 
S/p  bronchoscopy 10/14  Right endobronchial [id number]-op assessment positive for carcinoma  This is source of hemoptysis  Patient should follow up with Dr. Bhatti in [facility] Surgery office within 1 wk of discharge  Thoracic to sign off at this time.  D/w Dr. Bhatti.

## 2023-10-14 NOTE — PROGRESS NOTE ADULT - SUBJECTIVE AND OBJECTIVE BOX
PULMONARY PROGRESS NOTE      [id number]    Patient is a 78y old  Male who presents with a chief complaint of Hempotysis (13 Oct 2023 11:15)      INTERVAL HPI/OVERNIGHT EVENTS:    78-year-old male well-known to me with a history of chronic obstructive lung disease prior suspected lung cancer treated with empiric radiation, coronary artery disease admitted with hemoptysis.  Patient status post bronchoscopy.  No official report obtained at present but lesion was reported to be seen within the right mainstem bronchus.  Specimens taken pathology pending.  Single episode of hemoptysis last p.m.  Patient awake alert oriented.  Minimal cough.    MEDICATIONS  (STANDING):  atorvastatin 40 milliGRAM(s) Oral at bedtime  budesonide  80 MICROgram(s)/formoterol 4.5 MICROgram(s) Inhaler 1 Puff(s) Inhalation daily  dextrose 5% + sodium chloride 0.9%. 1000 milliLiter(s) (100 mL/Hr) IV Continuous <Continuous>  ipratropium    for Nebulization 500 MICROGram(s) Nebulizer every 6 hours  levalbuterol Inhalation 1.25 milliGRAM(s) Inhalation every 6 hours  metoprolol tartrate 25 milliGRAM(s) Oral two times a day  tamsulosin 0.4 milliGRAM(s) Oral at bedtime      MEDICATIONS  (PRN):  acetaminophen     Tablet .. 650 milliGRAM(s) Oral every 6 hours PRN Temp greater or equal to 38C (100.4F), Mild Pain (1 - 3)  aluminum hydroxide/magnesium hydroxide/simethicone Suspension 30 milliLiter(s) Oral every 4 hours PRN Dyspepsia  levalbuterol Inhalation 1.25 milliGRAM(s) Inhalation every 4 hours PRN shortness of breath  melatonin 3 milliGRAM(s) Oral at bedtime PRN Insomnia  morphine  - Injectable 1 milliGRAM(s) IV Push every 4 hours PRN Moderate Pain (4 - 6)  ondansetron Injectable 4 milliGRAM(s) IV Push every 8 hours PRN Nausea and/or Vomiting  tranexamic acid Injectable for Nebulization 500 milliGRAM(s) Inhalation every 8 hours PRN massive hemoptysis as directed by MD      Allergies    ibuprofen (Anaphylaxis)  codeine (Urticaria)    Intolerances        PAST MEDICAL & SURGICAL HISTORY:  CAD in native artery  RCA 3 YAYA 1 in 2002 and 2 in 2019      Arthritis      Lumbosacral disc disease  has nerve stimulator      HTN (hypertension)      HLD (hyperlipidemia)      Lung cancer  Stage I a1; RUL; s/p SBRT in 11/2021; stable disease as of 4/2022      Paroxysmal atrial fibrillation      COPD (chronic obstructive pulmonary disease) with emphysema      H/O heart artery stent  RCA      S/P cataract surgery  b/l eyes 2016      S/P CABG (coronary artery bypass graft)          SOCIAL HISTORY  Smoking History:       REVIEW OF SYSTEMS:    CONSTITUTIONAL:  No distress    HEENT:  Eyes:  No diplopia or blurred vision. ENT:  No earache, sore throat or runny nose.    CARDIOVASCULAR:  No pressure, squeezing, tightness, heaviness or aching about the chest; no palpitations.    RESPIRATORY:  above    GASTROINTESTINAL:  No nausea, vomiting or diarrhea.    GENITOURINARY:  No dysuria, frequency or urgency.    NEUROLOGIC:  No paresthesias, fasciculations, seizures or weakness.    Extremities: No cyanosis, clubbing or edema    PSYCHIATRIC:  No disorder of thought or mood.    Vital Signs Last 24 Hrs  T(C): 37 (14 Oct 2023 05:05), Max: 37 (14 Oct 2023 05:05)  T(F): 98.6 (14 Oct 2023 05:05), Max: 98.6 (14 Oct 2023 05:05)  HR: 100 (14 Oct 2023 05:05) (87 - 117)  BP: 123/61 (14 Oct 2023 05:05) (122/69 - 165/98)  BP(mean): --  RR: 18 (14 Oct 2023 05:05) (18 - 18)  SpO2: 97% (14 Oct 2023 05:05) (93% - 97%)    Parameters below as of 14 Oct 2023 05:05  Patient On (Oxygen Delivery Method): room air        PHYSICAL EXAMINATION:    GENERAL: The patient is awake and alert in no apparent distress.     HEENT: Head is normocephalic and atraumatic. Extraocular muscles are intact. Mucous membranes are moist.    NECK: Few scattered rhonchi bilateral.  Otherwise clear.  No use of accessory muscles of respiration with costal retractions    LUNGS: Clear to auscultation without wheezing, rales or rhonchi; respirations unlabored    HEART: Regular rate and rhythm without murmur.    ABDOMEN: Soft, nontender, and nondistended.      EXTREMITIES: Without any cyanosis, clubbing, rash, lesions or edema.    NEUROLOGIC: Grossly intact.    LABS:                        10.8   5.76  )-----------( 227      ( 14 Oct 2023 04:50 )             33.1     10-14    142  |  103  |  10.2  ----------------------------<  149<H>  3.9   |  26.0  |  0.56    Ca    9.0      14 Oct 2023 04:50  Mg     1.7     10-14    TPro  6.0<L>  /  Alb  3.8  /  TBili  0.4  /  DBili  x   /  AST  16  /  ALT  14  /  AlkPhos  73  10-14      Urinalysis Basic - ( 14 Oct 2023 04:50 )    Color: x / Appearance: x / SG: x / pH: x  Gluc: 149 mg/dL / Ketone: x  / Bili: x / Urobili: x   Blood: x / Protein: x / Nitrite: x   Leuk Esterase: x / RBC: x / WBC x   Sq Epi: x / Non Sq Epi: x / Bacteria: x                  Procalcitonin, Serum: 0.07 ng/mL (10-11-23 @ 17:25)      MICROBIOLOGY:    RADIOLOGY & ADDITIONAL STUDIES:

## 2023-10-14 NOTE — PROGRESS NOTE ADULT - SUBJECTIVE AND OBJECTIVE BOX
Patient is a 78y old  Male who presents with a chief complaint of hemoptysis (14 Oct 2023 08:30)      Patient seen and examined at bedside. No overnight events reported. comfortable. no hemoptysis this am. brother at bedside     ALLERGIES:  ibuprofen (Anaphylaxis)  codeine (Urticaria)    MEDICATIONS  (STANDING):  atorvastatin 40 milliGRAM(s) Oral at bedtime  budesonide  80 MICROgram(s)/formoterol 4.5 MICROgram(s) Inhaler 1 Puff(s) Inhalation daily  dextrose 5% + sodium chloride 0.9%. 1000 milliLiter(s) (100 mL/Hr) IV Continuous <Continuous>  ipratropium    for Nebulization 500 MICROGram(s) Nebulizer every 6 hours  levalbuterol Inhalation 1.25 milliGRAM(s) Inhalation every 6 hours  metoprolol tartrate 25 milliGRAM(s) Oral two times a day  tamsulosin 0.4 milliGRAM(s) Oral at bedtime    MEDICATIONS  (PRN):  acetaminophen     Tablet .. 650 milliGRAM(s) Oral every 6 hours PRN Temp greater or equal to 38C (100.4F), Mild Pain (1 - 3)  aluminum hydroxide/magnesium hydroxide/simethicone Suspension 30 milliLiter(s) Oral every 4 hours PRN Dyspepsia  levalbuterol Inhalation 1.25 milliGRAM(s) Inhalation every 4 hours PRN shortness of breath  melatonin 3 milliGRAM(s) Oral at bedtime PRN Insomnia  morphine  - Injectable 1 milliGRAM(s) IV Push every 4 hours PRN Moderate Pain (4 - 6)  ondansetron Injectable 4 milliGRAM(s) IV Push every 8 hours PRN Nausea and/or Vomiting  tranexamic acid Injectable for Nebulization 500 milliGRAM(s) Inhalation every 8 hours PRN massive hemoptysis as directed by MD    Vital Signs Last 24 Hrs  T(F): 98.4 (14 Oct 2023 10:00), Max: 98.6 (14 Oct 2023 05:05)  HR: 130 (14 Oct 2023 13:35) (82 - 130)  BP: 124/68 (14 Oct 2023 13:35) (117/62 - 165/98)  RR: 16 (14 Oct 2023 13:35) (16 - 18)  SpO2: 93% (14 Oct 2023 13:35) (93% - 98%)  I&O's Summary    13 Oct 2023 07:01  -  14 Oct 2023 07:00  --------------------------------------------------------  IN: 1000 mL / OUT: 500 mL / NET: 500 mL      PHYSICAL EXAM:  General: NAD, A/O x 3  ENT: MMM, no thrush  Neck: Supple, No JVD  Lungs: Clear to auscultation bilaterally, good air entry, non-labored breathing  Cardio: RRR, S1/S2, No murmur  Abdomen: Soft, Nontender, Nondistended; Bowel sounds present  Extremities: No calf tenderness, No pitting edema    LABS:                        10.8   5.76  )-----------( 227      ( 14 Oct 2023 04:50 )             33.1     10-14    142  |  103  |  10.2  ----------------------------<  149  3.9   |  26.0  |  0.56    Ca    9.0      14 Oct 2023 04:50  Mg     1.7     10-14    TPro  6.0  /  Alb  3.8  /  TBili  0.4  /  DBili  x   /  AST  16  /  ALT  14  /  AlkPhos  73  10-14              Procalcitonin, Serum: 0.07 ng/mL (10-11-23 @ 17:25)                          Urinalysis Basic - ( 14 Oct 2023 04:50 )    Color: x / Appearance: x / SG: x / pH: x  Gluc: 149 mg/dL / Ketone: x  / Bili: x / Urobili: x   Blood: x / Protein: x / Nitrite: x   Leuk Esterase: x / RBC: x / WBC x   Sq Epi: x / Non Sq Epi: x / Bacteria: x          RADIOLOGY & ADDITIONAL TESTS:    Care Discussed with Consultants/Other Providers:

## 2023-10-14 NOTE — PROGRESS NOTE ADULT - PROBLEM SELECTOR PROBLEM 3
Paroxysmal atrial fibrillation
COPD (chronic obstructive pulmonary disease) with emphysema
COPD (chronic obstructive pulmonary disease) with emphysema

## 2023-10-14 NOTE — PROGRESS NOTE ADULT - TIME BILLING
hemoptysis , lung nodule
Chart reviewed, pulmonary care assumed.  PACS reviewed.
hemoptysis , lung nodule
Chart review, final recommendations for discharge made.  Case discussed in detail with the patient at bedside

## 2023-10-15 NOTE — PROGRESS NOTE ADULT - SUBJECTIVE AND OBJECTIVE BOX
JENNIFER ROWLEYALVARADO  [id number]      Chief Complaint:  Hemoptysis/Lung CA/PAF/CAD    Interval History:  Patient with more hemoptysis this am.  Reports feeling cold and some CP when coughing.  Denies SOB, palps.    Tele:  SR, brief PAT, no AF      acetaminophen     Tablet .. 650 milliGRAM(s) Oral every 6 hours PRN  aluminum hydroxide/magnesium hydroxide/simethicone Suspension 30 milliLiter(s) Oral every 4 hours PRN  atorvastatin 40 milliGRAM(s) Oral at bedtime  budesonide  80 MICROgram(s)/formoterol 4.5 MICROgram(s) Inhaler 1 Puff(s) Inhalation daily  ipratropium    for Nebulization 500 MICROGram(s) Nebulizer every 6 hours  levalbuterol Inhalation 1.25 milliGRAM(s) Inhalation every 6 hours  levalbuterol Inhalation 1.25 milliGRAM(s) Inhalation every 4 hours PRN  melatonin 3 milliGRAM(s) Oral at bedtime PRN  metoprolol tartrate 50 milliGRAM(s) Oral two times a day  morphine  - Injectable 1 milliGRAM(s) IV Push every 4 hours PRN  ondansetron Injectable 4 milliGRAM(s) IV Push every 8 hours PRN  tamsulosin 0.4 milliGRAM(s) Oral at bedtime  tranexamic acid Injectable for Nebulization 500 milliGRAM(s) Inhalation every 8 hours PRN          Physical Exam:  T(C): 36.8 (10-15-23 @ 08:00), Max: 37.1 (10-14-23 @ 20:00)  HR: 87 (10-15-23 @ 08:53) (83 - 130)  BP: 105/61 (10-15-23 @ 08:00) (105/61 - 155/78)  RR: 18 (10-15-23 @ 08:00) (16 - 18)  SpO2: 95% (10-15-23 @ 08:53) (93% - 97%)  General: Patient comfortable in NAD  HEENT: NCAT, mmm, EOMI  Neck: no JVD, no carotid bruits  CVS: nl s1, split s2, no s3, +s4, no murmur or rubs, RRR  Chest: CTA b/l  Abdomen: soft, nt/nd  Extremities: No c/c/e  Neuro: A&O x3  Psych: Normal affect      Labs:   15 Oct 2023 05:20    146    |  108    |  9.3    ----------------------------<  121    3.6     |  28.0   |  0.58     Ca    8.7        15 Oct 2023 05:20  Mg     1.7       15 Oct 2023 05:20    TPro  5.6    /  Alb  3.7    /  TBili  0.5    /  DBili  x      /  AST  19     /  ALT  23     /  AlkPhos  76     15 Oct 2023 05:20                          10.9   6.60  )-----------( 239      ( 15 Oct 2023 05:20 )             34.2           CTPA:  No pulmonary embolism.  1.7 x 0.9 cm nodular opacity within the right mainstem bronchus is unchanged compared to 10/6/2023. This lesion is indeterminate and while   secretions should be considered, malignancy is also a diagnostic consideration. Consider bronchoscopy or short-term CT chest follow-up in   one month to further assess.  Posterior right upper lobe 1.4 x 0.8 cm nodular opacity is indeterminate and malignancy is a diagnostic consideration.  Right hilar 1.7 x 0.9 cm node.      Echo:   1. Technically suboptimal study.   2. Hyperdynamic global left ventricular systolic function.   3. Left ventricular ejection fraction, by visual estimation, is 70 to 75%.   4. Spectral Doppler shows impaired relaxation pattern of left   ventricular myocardial filling (Grade I diastolic dysfunction).   5. Mildly reduced RV systolic function.   6. Sclerotic aortic valve with normal opening.   7. Mild mitral annular calcification.   8. Trace mitral valve regurgitation.   9. Mild tricuspid regurgitation.  10. There is no evidence of pericardial effusion.          Assessment:  77 y/o male PMHx HTN, CAD s/p CABG x3v, COPD, Afib on Eliquis, lung nodule/CA p/w intermittent hemoptysis w/ BRB and blood clots.  Patient now s/p bronch with right endobronchial mass biopsied with intra-op assessment positive for carcinoma.  Cardio called to evaluate A/C and ASA.  Patient noted to be in SR with some tachycardia but not overt AF.  Patient had AF last year in setting of PNA and has been on Eliquis.  ILR placed with some PAT but no AF at recent check.  D/w pulm and recommended to hold on A/C given amount of hemoptysis until CA treatment can be initiated.  Given lack of significant AF as well would agree.  Can also hold ASA for now.      Plan:  1. Hold ASA and Eliquis with hemoptysis.  2. Continue on tele while inpatient and monitor ILR for evidence of AF.  3. Agree with increased Metoprolol, monitor BP.  Encourage po fluids.  4. Pulm/onc f/u.  5. No additional CV testing now.  6. CV stable for d/c when ok per pulm/med.  OP f/u with Dr. Fink post d/c.    Will sign off.  Please call with questions.  Thanks!

## 2023-10-15 NOTE — DISCHARGE NOTE PROVIDER - ATTENDING DISCHARGE PHYSICAL EXAMINATION:
T(C): 36.7 (10-16-23 @ 15:04), Max: 37.1 (10-15-23 @ 21:21)  HR: 78 (10-16-23 @ 15:30) (78 - 111)  BP: 126/79 (10-16-23 @ 15:04) (107/63 - 134/77)  RR: 18 (10-16-23 @ 15:04) (17 - 18)  SpO2: 96% (10-16-23 @ 15:30) (93% - 98%)    General: NAD, A/O x 3  ENT: MMM, no thrush  Neck: Supple, No JVD  Lungs: Clear to auscultation bilaterally, good air entry, non-labored breathing  Cardio: RRR, S1/S2, No murmur  Abdomen: Soft, Nontender, Nondistended; Bowel sounds present  Extremities: No calf tenderness, No pitting edema

## 2023-10-15 NOTE — DISCHARGE NOTE PROVIDER - NSDCCPCAREPLAN_GEN_ALL_CORE_FT
PRINCIPAL DISCHARGE DIAGNOSIS  Diagnosis: Cough with hemoptysis  Assessment and Plan of Treatment: post   bronchoscopy 10/14 Right endobronchial fhzz-inkcncjx-phnch-op assessment positive for carcinoma   follow up with Dr. Bhatti in [facility] Surgery office within 1 wk of discharge        SECONDARY DISCHARGE DIAGNOSES  Diagnosis: Lung nodules  Assessment and Plan of Treatment: follwo up with dr. valencia , medical oncology , pulmonology and  thoracic surgery as out patient    Diagnosis: Paroxysmal atrial fibrillation  Assessment and Plan of Treatment: hold aspirin and eliquis.   your metoprolol dose has been adjuted   follow up with cardiology as out patient     PRINCIPAL DISCHARGE DIAGNOSIS  Diagnosis: Cough with hemoptysis  Assessment and Plan of Treatment: post   bronchoscopy 10/14 Right endobronchial kcoj-icscsknx-entmi-op assessment positive for carcinoma   follow up with Dr. Bhatti in [facility] Surgery office within 1 wk of discharge        SECONDARY DISCHARGE DIAGNOSES  Diagnosis: Lung nodules  Assessment and Plan of Treatment: follwo up with dr. valencia , medical oncology , pulmonology and  thoracic surgery as out patient    Diagnosis: Paroxysmal atrial fibrillation  Assessment and Plan of Treatment: hold aspirin and eliquis.   your metoprolol dose has been adjusted   follow up with cardiology as out patient

## 2023-10-15 NOTE — DISCHARGE NOTE PROVIDER - CARE PROVIDER_API CALL
Myles Bhatti  Thoracic Surgery  [address] [place] [address]  Phone: [phone]  Fax: [phone]  Follow Up Time: 1 week    Jassi Fink  Cardiology  [address] [place] [address]  Phone: [phone]  Fax: [phone]  Follow Up Time: 1 week    Epi Fuller  Pulmonary Disease  [address]  [place] [address]  Phone: [phone]  Fax: [phone]  Follow Up Time: 1 week    Melissa Sylvester)  Hematology  [address] [place] [address]  Phone: [phone]  Fax: [phone]  Follow Up Time: 2 weeks

## 2023-10-15 NOTE — DISCHARGE NOTE PROVIDER - DETAILS OF MALNUTRITION DIAGNOSIS/DIAGNOSES
This patient has been assessed with a concern for Malnutrition and was treated during this hospitalization for the following Nutrition diagnosis/diagnoses:     -  10/16/2023: Severe protein-calorie malnutrition

## 2023-10-15 NOTE — DISCHARGE NOTE PROVIDER - HOSPITAL COURSE
78, M, pmh of  HTN, CAD s/p CABG x3v, COPD, Afib, Lung nodules/ didnot have any bx diagnosis / was referred  for rad tx with Dr. valencia , states that nodule initially smaller in size but then there was concern  about change in size and was supposed to follow up for imaging in 3 months , now presents with  intermittent hemoptysis w/ BRB and blood clots x 4 days. ct sx consulted , S/p  bronchoscopy 10/14 Right endobronchial bcco-jhecxoma-zgatu-op assessment positive for carcinoma.  no hemoptysis yesterday but  another episode hemoptysis this am. initial plan was for dc home today but now patient is concerned due to another episode this am .       #Hemoptysis  # hx of copd   #RUL 1.5 cm nodule/ likely malignant   - s/p tranexamic acid nebulized x 1 dose on admission   - cta chest noted   - pulm consulted , started on prednisone x 3 days , nebs, Symbicort   -home eliquis and asa dcd  for now   -ct sx consulted , S/p  bronchoscopy 10/14 Right endobronchial zpiq-qemqpext-ukkpw-op assessment positive for carcinoma.  no further inpatient w/u. follow up with Dr. Bhatti in CT Surgery office within 1 wk of discharge  - 10/15 another episode hemoptysis this am , monitor for any further episodes. if no significant further episodes then dc home in am with op follow up     #CAD s/p CABG  #HTN   # pafib / ILR   -hold Eliquis and asa  - cardio consulted   - noted with PAT , increase bb dose for better hr control , no afib as per cardio ILR check   - op follow up with cardio to eval when can be resumed on ac if indicated     #BPH- flomax     #SCD for dvt ppx   #full code     #dispo: plan for  dc home in am if no further significant hemoptysis episodes      78, M, pmh of  HTN, CAD s/p CABG x3v, COPD, Afib, Lung nodules/ didnot have any bx diagnosis / was referred  for rad tx with Dr. valencia , states that nodule initially smaller in size but then there was concern  about change in size and was supposed to follow up for imaging in 3 months , now presents with  intermittent hemoptysis w/ BRB and blood clots x 4 days. ct sx consulted , S/p  bronchoscopy 10/14 Right endobronchial ibpt-vqhcbdjz-urywb-op assessment positive for carcinoma.       #Hemoptysis  # hx of copd   #RUL 1.5 cm nodule/ likely malignant   - s/p tranexamic acid nebulized x 1 dose on admission   - cta chest noted   - pulm consulted , started on prednisone x 3 days , nebs, Symbicort   -home eliquis and asa dcd  for now   -ct sx consulted , S/p  bronchoscopy 10/14 Right endobronchial vbnz-nkbeecok-ruvbr-op assessment positive for carcinoma.  no further inpatient w/u. follow up with Dr. Bhatti in CT Surgery office within 1 wk of discharge    #CAD s/p CABG  #HTN   # pafib / ILR   -hold Eliquis and asa  - cardio consulted   - noted with PAT , increase bb dose for better hr control , no afib as per cardio ILR check   - op follow up with cardio to eval when can be resumed on ac if indicated     #BPH- flomax     #SCD for dvt ppx   #full code     #dispo: plan for  dc home      78, M, pmh of  HTN, CAD s/p CABG x3v, COPD, Afib, Lung nodules/ didnot have any bx diagnosis / was referred  for rad tx with Dr. valencia , states that nodule initially smaller in size but then there was concern  about change in size and was supposed to follow up for imaging in 3 months , now presents with  intermittent hemoptysis w/ BRB and blood clots x 4 days. ct sx consulted , S/p  bronchoscopy 10/14 Right endobronchial qped-zmlgtwwn-fqaun-op assessment positive for carcinoma. Following bx pt still had intermittent hemoptysis, hgb remained stable, pt advised likely complication of both CA and procedure. Advised that should it become worse to come back to ED. Pt otherwise hemodynamically stable with airway, cough, and swallowing intact. Patient has follow up with Dr. Bhatti in CT Surgery office within 1 wk of discharge.

## 2023-10-15 NOTE — DISCHARGE NOTE PROVIDER - CARE PROVIDERS DIRECT ADDRESSES
[email or web address]

## 2023-10-15 NOTE — DISCHARGE NOTE PROVIDER - NSDCFUSCHEDAPPT_GEN_ALL_CORE_FT
Jassi Fink  [facility] Physician [facility]  CARDIOLOGY [address]  Scheduled Appointment: 10/27/2023    [facility]  ELECTROPH [address]  Scheduled Appointment: 11/15/2023    [facility]  PULED [address]  Scheduled Appointment: 12/04/2023    Epi Fuller  [facility]  [facility] [address]  Scheduled Appointment: 12/04/2023     Myles Bhatti  [facility] Physician [facility]  THORSURG [address]  Scheduled Appointment: 10/26/2023    Jassi Fink  [facility]  CARDIOLOGY [address]  Scheduled Appointment: 10/27/2023    [facility]  ELECTROPH [address]  Scheduled Appointment: 11/15/2023    [facility]  PULMMED [address]  Scheduled Appointment: 12/04/2023    Epi Fuller  [facility]  PULED [address]  Scheduled Appointment: 12/04/2023

## 2023-10-15 NOTE — PROGRESS NOTE ADULT - SUBJECTIVE AND OBJECTIVE BOX
Patient is a 78y old  Male who presents with a chief complaint of hemoptysis (14 Oct 2023 08:30)      Patient seen and examined at bedside. no hemoptysis yesterday but  another episode hemoptysis this am. mixed with phlegm. no fever. initial plan was for dc home today but now patient is concerned due to another episode this am . denies any dizziness. denies any sob.     ALLERGIES:  ibuprofen (Anaphylaxis)  codeine (Urticaria)    MEDICATIONS  (STANDING):  atorvastatin 40 milliGRAM(s) Oral at bedtime  budesonide  80 MICROgram(s)/formoterol 4.5 MICROgram(s) Inhaler 1 Puff(s) Inhalation daily  ipratropium    for Nebulization 500 MICROGram(s) Nebulizer every 6 hours  levalbuterol Inhalation 1.25 milliGRAM(s) Inhalation every 6 hours  metoprolol tartrate 50 milliGRAM(s) Oral two times a day  tamsulosin 0.4 milliGRAM(s) Oral at bedtime    MEDICATIONS  (PRN):  acetaminophen     Tablet .. 650 milliGRAM(s) Oral every 6 hours PRN Temp greater or equal to 38C (100.4F), Mild Pain (1 - 3)  aluminum hydroxide/magnesium hydroxide/simethicone Suspension 30 milliLiter(s) Oral every 4 hours PRN Dyspepsia  levalbuterol Inhalation 1.25 milliGRAM(s) Inhalation every 4 hours PRN shortness of breath  melatonin 3 milliGRAM(s) Oral at bedtime PRN Insomnia  morphine  - Injectable 1 milliGRAM(s) IV Push every 4 hours PRN Moderate Pain (4 - 6)  ondansetron Injectable 4 milliGRAM(s) IV Push every 8 hours PRN Nausea and/or Vomiting  tranexamic acid Injectable for Nebulization 500 milliGRAM(s) Inhalation every 8 hours PRN massive hemoptysis as directed by MD    Vital Signs Last 24 Hrs  T(F): 98.4 (15 Oct 2023 04:44), Max: 98.7 (14 Oct 2023 20:00)  HR: 87 (15 Oct 2023 08:53) (83 - 130)  BP: 115/65 (15 Oct 2023 04:44) (107/64 - 155/78)  RR: 18 (15 Oct 2023 04:44) (16 - 18)  SpO2: 95% (15 Oct 2023 08:53) (93% - 97%)  I&O's Summary    PHYSICAL EXAM:  General: NAD, A/O x 3  ENT: MMM, no thrush  Neck: Supple, No JVD  Lungs: Clear to auscultation bilaterally, good air entry, non-labored breathing  Cardio: RRR, S1/S2, No murmur  Abdomen: Soft, Nontender, Nondistended; Bowel sounds present  Extremities: No calf tenderness, No pitting edema    LABS:                        10.9   6.60  )-----------( 239      ( 15 Oct 2023 05:20 )             34.2     10-15    146  |  108  |  9.3  ----------------------------<  121  3.6   |  28.0  |  0.58    Ca    8.7      15 Oct 2023 05:20  Mg     1.7     10-15    TPro  5.6  /  Alb  3.7  /  TBili  0.5  /  DBili  x   /  AST  19  /  ALT  23  /  AlkPhos  76  10-15                Urinalysis Basic - ( 15 Oct 2023 05:20 )    Color: x / Appearance: x / SG: x / pH: x  Gluc: 121 mg/dL / Ketone: x  / Bili: x / Urobili: x   Blood: x / Protein: x / Nitrite: x   Leuk Esterase: x / RBC: x / WBC x   Sq Epi: x / Non Sq Epi: x / Bacteria: x        Culture - Sputum (collected 13 Oct 2023 22:00)  Source: .Sputum Sputum  Gram Stain (14 Oct 2023 16:08):    Moderate polymorphonuclear leukocytes per low power field    Few Squamous epithelial cells per low power field    Numerous Gram Negative Rods per oil power field    Moderate Gram positive cocci in pairs per oil power field    Moderate Yeast like cells per oil power field        RADIOLOGY & ADDITIONAL TESTS:    Care Discussed with Consultants/Other Providers:

## 2023-10-15 NOTE — DISCHARGE NOTE PROVIDER - NSDCMRMEDTOKEN_GEN_ALL_CORE_FT
acetaminophen 325 mg oral tablet: 2 tab(s) orally every 6 hours As needed Temp greater or equal to 38C (100.4F), Mild Pain (1 - 3)  Anoro Ellipta 62.5 mcg-25 mcg/inh inhalation powder: 1 puff(s) inhaled once a day  atorvastatin 40 mg oral tablet: 1 tab(s) orally once a day (at bedtime)  Flomax 0.4 mg oral capsule: 1 cap(s) orally once a day  gabapentin 400 mg oral capsule: 1 cap(s) orally 3 times a day  metoprolol tartrate 50 mg oral tablet: 1 tab(s) orally 2 times a day   acetaminophen 325 mg oral tablet: 2 tab(s) orally every 6 hours As needed Temp greater or equal to 38C (100.4F), Mild Pain (1 - 3)  ALPRAZolam 0.25 mg oral tablet: 1 tab(s) orally 3 times a day as needed for anxiety MDD: 3 tabs  Anoro Ellipta 62.5 mcg-25 mcg/inh inhalation powder: 1 puff(s) inhaled once a day  atorvastatin 40 mg oral tablet: 1 tab(s) orally once a day (at bedtime)  Flomax 0.4 mg oral capsule: 1 cap(s) orally once a day  gabapentin 400 mg oral capsule: 1 cap(s) orally 3 times a day  metoprolol tartrate 50 mg oral tablet: 1 tab(s) orally 2 times a day

## 2023-10-15 NOTE — DISCHARGE NOTE PROVIDER - PROVIDER TOKENS
PROVIDER:[TOKEN:[[id number]],FOLLOWUP:[1 week]],PROVIDER:[TOKEN:[[id number]],FOLLOWUP:[1 week]],PROVIDER:[TOKEN:[[id number]],FOLLOWUP:[1 week]],PROVIDER:[TOKEN:[[id number]],FOLLOWUP:[2 weeks]]

## 2023-10-16 NOTE — DIETITIAN INITIAL EVALUATION ADULT - ADD RECOMMEND
1) Rx: Ensure Enlive BID (+700cal, 40g pro) and MVI daily to optimize nutrition status   2) Encourage po intake, monitor diet tolerance, and provide assistance at meals as needed  Obtain daily weights to monitor trends  Monitor nutrition related labs; maru

## 2023-10-16 NOTE — DIETITIAN INITIAL EVALUATION ADULT - OTHER INFO
77 y/o male PMHx HTN, CAD s/p CABG x3v, COPD, Afib on Eliquis, lung nodule/CA p/w intermittent hemoptysis w/ BRB and blood clots.  Patient now s/p bronch with right endobronchial mass biopsied with intra-op assessment positive for carcinoma.  Cardio called to evaluate A/C and ASA.  Patient noted to be in SR with some tachycardia but not overt AF.  Patient had AF last year in setting of PNA and has been on Eliquis.  ILR placed with some PAT but no AF at recent check.  D/w pulm and recommended to hold on A/C given amount of hemoptysis until CA treatment can be initiated.  Given lack of significant AF as well would agree.  Can also hold ASA for now.

## 2023-10-16 NOTE — DIETITIAN INITIAL EVALUATION ADULT - PERTINENT MEDS FT
MEDICATIONS  (STANDING):  atorvastatin 40 milliGRAM(s) Oral at bedtime  budesonide  80 MICROgram(s)/formoterol 4.5 MICROgram(s) Inhaler 1 Puff(s) Inhalation daily  ipratropium    for Nebulization 500 MICROGram(s) Nebulizer every 6 hours  levalbuterol Inhalation 1.25 milliGRAM(s) Inhalation every 6 hours  metoprolol tartrate 50 milliGRAM(s) Oral two times a day  tamsulosin 0.4 milliGRAM(s) Oral at bedtime    MEDICATIONS  (PRN):  acetaminophen     Tablet .. 650 milliGRAM(s) Oral every 6 hours PRN Temp greater or equal to 38C (100.4F), Mild Pain (1 - 3)  aluminum hydroxide/magnesium hydroxide/simethicone Suspension 30 milliLiter(s) Oral every 4 hours PRN Dyspepsia  levalbuterol Inhalation 1.25 milliGRAM(s) Inhalation every 4 hours PRN shortness of breath  melatonin 3 milliGRAM(s) Oral at bedtime PRN Insomnia  morphine  - Injectable 1 milliGRAM(s) IV Push every 4 hours PRN Moderate Pain (4 - 6)  ondansetron Injectable 4 milliGRAM(s) IV Push every 8 hours PRN Nausea and/or Vomiting  tranexamic acid Injectable for Nebulization 500 milliGRAM(s) Inhalation every 8 hours PRN massive hemoptysis as directed by MD

## 2023-10-16 NOTE — PROGRESS NOTE ADULT - SUBJECTIVE AND OBJECTIVE BOX
Patient is a 78y old  Male who presents with a chief complaint of Hemoptysis     (16 Oct 2023 11:49)      HPI:  78M with hx of COPD, CAD s/p YAYA s/p CABG, HTN, RUL nodule tx empirically with SBRT 11/2021 due to concern for risk with biopsy, s/p spinal cord stimulator, pAF on Eliquis presented to the ED 10/11 with hemoptysis. Pt received dose of TXA nebs and brief course of steroids. CT chest angio neg for PE but showed 1.7x0.9 nodular opacity in the R mainstem and 1.4 x 0.8cm nodular opacity in the posterior RUL with R hilar node. Pt underwent bronchoscopy 10/13 with R sided endobronchial mass noted s/p biopsy with intraop assessment positive for carcinoma. Seen by cardiology w/ recommendation to hold ASA/Eliquis for now.       Interval hx:  Pt reports hemoptysis has overall improved with only streaks noted.   Pt denies any significant shortness of breath.   Sating well on RA and has been ambulating  No fevers     PAST MEDICAL & SURGICAL HISTORY:  CAD in native artery  RCA 3 YAYA 1 in 2002 and 2 in 2019      Arthritis      Lumbosacral disc disease  has nerve stimulator      HTN (hypertension)      HLD (hyperlipidemia)      Lung cancer  Stage I a1; RUL; s/p SBRT in 11/2021; stable disease as of 4/2022      Paroxysmal atrial fibrillation      COPD (chronic obstructive pulmonary disease) with emphysema      H/O heart artery stent  RCA      S/P cataract surgery  b/l eyes 2016      S/P CABG (coronary artery bypass graft)      Medications:    metoprolol tartrate 50 milliGRAM(s) Oral two times a day    budesonide  80 MICROgram(s)/formoterol 4.5 MICROgram(s) Inhaler 1 Puff(s) Inhalation daily  ipratropium    for Nebulization 500 MICROGram(s) Nebulizer every 6 hours  levalbuterol Inhalation 1.25 milliGRAM(s) Inhalation every 4 hours PRN  levalbuterol Inhalation 1.25 milliGRAM(s) Inhalation every 6 hours    acetaminophen     Tablet .. 650 milliGRAM(s) Oral every 6 hours PRN  ALPRAZolam 0.25 milliGRAM(s) Oral three times a day PRN  melatonin 3 milliGRAM(s) Oral at bedtime PRN  morphine  - Injectable 1 milliGRAM(s) IV Push every 4 hours PRN  ondansetron Injectable 4 milliGRAM(s) IV Push every 8 hours PRN      tranexamic acid Injectable for Nebulization 500 milliGRAM(s) Inhalation every 8 hours PRN    aluminum hydroxide/magnesium hydroxide/simethicone Suspension 30 milliLiter(s) Oral every 4 hours PRN    tamsulosin 0.4 milliGRAM(s) Oral at bedtime    atorvastatin 40 milliGRAM(s) Oral at bedtime        ICU Vital Signs Last 24 Hrs  T(C): 36.7 (16 Oct 2023 15:04), Max: 36.8 (16 Oct 2023 08:06)  T(F): 98.1 (16 Oct 2023 15:04), Max: 98.3 (16 Oct 2023 08:06)  HR: 78 (16 Oct 2023 15:30) (78 - 111)  BP: 126/79 (16 Oct 2023 15:04) (107/63 - 126/79)  BP(mean): --  ABP: --  ABP(mean): --  RR: 18 (16 Oct 2023 15:04) (18 - 18)  SpO2: 96% (16 Oct 2023 15:30) (93% - 98%)    O2 Parameters below as of 16 Oct 2023 15:30  Patient On (Oxygen Delivery Method): room air                I&O's Detail    15 Oct 2023 07:01  -  16 Oct 2023 07:00  --------------------------------------------------------  IN:    Oral Fluid: 1540 mL  Total IN: 1540 mL    OUT:    Voided (mL): 750 mL  Total OUT: 750 mL    Total NET: 790 mL      16 Oct 2023 07:01  -  17 Oct 2023 00:28  --------------------------------------------------------  IN:    Oral Fluid: 800 mL  Total IN: 800 mL    OUT:  Total OUT: 0 mL    Total NET: 800 mL            LABS:                        11.2   7.12  )-----------( 242      ( 16 Oct 2023 06:38 )             34.9     10-16    144  |  103  |  11.1  ----------------------------<  96  3.4<L>   |  30.0<H>  |  0.52    Ca    8.4      16 Oct 2023 06:38  Mg     1.7     10-16    TPro  5.5<L>  /  Alb  3.4  /  TBili  0.4  /  DBili  x   /  AST  18  /  ALT  24  /  AlkPhos  75  10-16          CAPILLARY BLOOD GLUCOSE          Urinalysis Basic - ( 16 Oct 2023 06:38 )    Color: x / Appearance: x / SG: x / pH: x  Gluc: 96 mg/dL / Ketone: x  / Bili: x / Urobili: x   Blood: x / Protein: x / Nitrite: x   Leuk Esterase: x / RBC: x / WBC x   Sq Epi: x / Non Sq Epi: x / Bacteria: x      CULTURES:  Culture Results:   Numerous Klebsiella pneumoniae  Numerous Staphylococcus aureus Susceptibility to follow.  Normal Respiratory Doris present (10-13 @ 22:00)      Physical Examination:    General: alert, in NAD       HEENT: NC/AT, anicteric, MMM    PULM: Clear to auscultation bilaterally, no accessory muscle use     NECK: Supple, trachea midline    CVS: S1S2, RRR    ABD: Soft, nondistended, nontender, normoactive bowel sounds    EXT: No edema, nontender    SKIN: Warm and well perfused, no rashes noted.    NEURO: Alert, oriented, interactive, nonfocal    ROS: negative except per HPI    RADIOLOGY:   < from: CT Angio Chest PE Protocol w/ IV Cont (10.11.23 @ 18:49) >  FINDINGS:    PULMONARY ARTERIES: No pulmonary embolism.    MEDIASTINUM: Normal heart size. Post CABG. No pericardial effusion.   Thoracic aorta normal caliber.  No large mediastinal lymph nodes. Right   hilar 1.6 x 1.1 cm node (image 66, series 5)    AIRWAYS, LUNGS, PLEURA: 1.7 x 0.9 cm nodular opacity identified within   the right mainstem bronchus (image 62, series 5) is unchanged compared to   10/6/2023 and new compared to 7/6/2022. Mucoid impacted segmental airways   in the right upper, middle, and lower lobes. Emphysema. Posterior right   upper lobe nodular opacity (image 195, series 6) as on 10/6/2023, but   progressed compared to 7/6/2022.    IMAGED ABDOMEN: Unremarkable.    SOFT TISSUES: Unremarkable.    BONES: Neurostimulator device in place. Sternal wires.      IMPRESSION:.    No pulmonary embolism.    1.7 x 0.9 cm nodular opacity within the right mainstem bronchus is   unchanged compared to 10/6/2023. This lesion is indeterminate and while   secretions should be considered, malignancy is also a diagnostic   consideration. Consider bronchoscopy or short-term CT chest follow-up in   one month to further assess.    Posterior right upper lobe 1.4 x 0.8 cm nodular opacityis indeterminate   and malignancy is a diagnostic consideration.    Right hilar 1.7 x 0.9 cm node.    --- End of Report ---    < end of copied text >

## 2023-10-16 NOTE — PROGRESS NOTE ADULT - ASSESSMENT
78, M, pmh of  HTN, CAD s/p CABG x3v, COPD, Afib, Lung nodules/ didnot have any bx diagnosis / was referred  for rad tx with Dr. valencia , states that nodule initially smaller in size but then there was concern  about change in size and was supposed to follow up for imaging in 3 months , now presents with  intermittent hemoptysis w/ BRB and blood clots x 4 days.       #Hemoptysis  #RUL 1.5 cm nodule/  unclear etiology   - monitor sats , tele   - s/p tranexamic acid nebulized x 1 dose   - cta chest noted   - pulm consulted , started on prednisone x 3 days , nebs ATC   -hold home eliquis and asa for now   -ct sx consulted , S/p  bronchoscopy 10/14 Right endobronchial alay-kdtqbrkr-owduz-op assessment positive for carcinoma.  no further inpatient w/u. follow up with Dr. Bhatti in [facility] Surgery office within 1 wk of discharge      #Afib/ has ILR   #CAD s/p CABG  #HTN   -hold Eliquis and asa  - cardio consulted   - increase bb dose for better hr control   - op follow up with cardio to eval when can be resumed on ac if indicated     #copd  Symbicort     #BPH- flomax     #SCD for dvt ppx   #full code     #dispo: plan for  dc home in am if medically stable     
78, M, pmh of  HTN, CAD s/p CABG x3v, COPD, Afib, Lung nodules/ didnot have any bx diagnosis / was referred  for rad tx with Dr. valencia , states that nodule initially smaller in size but then there was concern  about change in size and was supposed to follow up for imaging in 3 months , now presents with  intermittent hemoptysis w/ BRB and blood clots x 4 days.       #Hemoptysis  #RUL 1.5 cm nodule/  unclear etiology   - monitor sats , tele   - sputum cxs   - s/p tranexamic acid nebulized x 1 dose   - cta chest noted   - pulm consulted , started on prednisone x 3 days , nebs ATC   -hold home eliquis and asa for now   -ct sx consulted , plan for bronch in am     #Afib  #CAD s/p CABG  #HTN   -c/w bb   -hold eliquis and asa for now     #copd  Symbicort     #BPH- flomax     #SCD for dvt ppx   #full code   #NPO overnight      
78, M, pmh of  HTN, CAD s/p CABG x3v, COPD, Afib, Lung nodules/ didnot have any bx diagnosis / was referred  for rad tx with Dr. valencia , states that nodule initially smaller in size but then there was concern  about change in size and was supposed to follow up for imaging in 3 months , now presents with  intermittent hemoptysis w/ BRB and blood clots x 4 days. ct sx consulted , S/p  bronchoscopy 10/14 Right endobronchial tqak-lhgmwtjo-wmexn-op assessment positive for carcinoma.  no hemoptysis yesterday but  another episode hemoptysis this am. initial plan was for dc home today but now patient is concerned due to another episode this am .       #Hemoptysis  # hx of copd   #RUL 1.5 cm nodule/ likely malignant   - s/p tranexamic acid nebulized x 1 dose on admission   - cta chest noted   - pulm consulted , started on prednisone x 3 days , nebs, Symbicort   -home eliquis and asa dcd  for now   -ct sx consulted , S/p  bronchoscopy 10/14 Right endobronchial xrfy-wfikqwkk-mdayc-op assessment positive for carcinoma.  no further inpatient w/u. follow up with Dr. Bhatti in [facility] Surgery office within 1 wk of discharge  - 10/15 another episode hemoptysis this am , monitor for any further episodes. if no significant further episodes then dc home in am with op follow up     #CAD s/p CABG  #HTN   -hold Eliquis and asa  - cardio consulted   - increase bb dose for better hr control   - op follow up with cardio to eval when can be resumed on ac if indicated     #BPH- flomax     #SCD for dvt ppx   #full code     #dispo: plan for  dc home in am if no further significant hemoptysis episodes     
78M with hx of COPD, CAD s/p YAYA s/p CABG, HTN, RUL nodule tx empirically with SBRT 11/2021 due to concern for risk with biopsy, s/p spinal cord stimulator, pAF on Eliquis presented to the ED 10/11 with hemoptysis found to have R mainstem endobronchial lesion s/p biopsy consistent with adenocarcinoma       Hemoptysis   in the setting of endobronchial lesion and AC use exacerbated by recent biopsy   now has significantly improved per pt   remains off Eliquis/ASA as per cardiology   will need outpatient f/u with pulm/oncology and thoracic on discharge   can resume home inhaler regimen on discharge   remains afebrile, with no leukocytosis and procal negative so does not appear to have active infection   pt aware to return to hospital for evaluation if any worsening of hemoptysis/symptoms   
78-year-old male with history of advanced chronic obstructive lung disease status post empiric therapy for suspected malignancy now with recurrence within the right upper lobe.  Final path pending but will require oncologic evaluation as an outpatient.    Plan:  1.  If no further significant mops is noted would consider discharge on outpatient pulmonary meds.    2. Patient to follow-up with me and arrangements should be made to see [facility] oncology  3.  We will follow-up on 10/16/2023 if the patient remains hospitalized
78, M, pmh of  HTN, CAD s/p CABG x3v, COPD, Afib, Lung nodules/ didnot have any bx diagnosis / was referred  for rad tx with Dr. valencia , states that nodule initially smaller in size but then there was concern  about change in size and was supposed to follow up for imaging in 3 months , now presents with  intermittent hemoptysis w/ BRB and blood clots x 4 days.       #Hemoptysis  #RUL 1.5 cm nodule/  unclear etiology   - monitor sats , tele   - sputum cxs   - s/p tranexamic acid nebulized x 1 dose   - cta chest noted   - pulm consulted , started on prednisone x 3 days , nebs ATC   -hold home eliquis and asa for now   -ct sx consulted , plan for bronch     #Afib  #CAD s/p CABG  #HTN   -hold Eliquis and asa for now   - cardio consulted : marilin   - increase bb dose for better hr control     #copd  Symbicort     #BPH- flomax     #SCD for dvt ppx   #full code   #NPO overnight      
78-year-old male with a history of hypertension, atrial fibrillation, coronary bypass graft, empiric radiation for presumed stage I lung cancer in 2021 presents now with hemoptysis.  CTA is scheduled with enlarging lesion in the right mainstem bronchus.  Most likely recurrence of tumor.    Plan:  1.  Fiberoptic bronchoscopy  2.  Follow-up thereafter with appropriate chemotherapy or further radiation therapy  3.  Treatment of atrial fibrillation as per cardiology  4 will assume care at this time
78M PMHx of HTN, CAD s/p CABG x3 (03/2020 with Dr. Hutchison), COPD, Afib (on Eliquis), Lung nodules/?Cancer (follows w/ rad onc (Dr. Tapia), has been treated with lung radiation therapy 2 years ago but did not have a biopsy because it was thought to be too dangerous for him), presented to [facility] ED 10/10 with c/o hemoptysis with BRB and clots. Pt reports for the past 4 days he has been experiencing hemoptysis, SOB, and chest pressure. He reports the chest pressure is intermittent on his left side, not associated with exertion. He reports the hemoptysis occurred roughly 3x/day, he reports the clots had initially improved to only blood tinged sputum but worsened yesterday to clots again. Pt reports he had COVID 3 weeks ago but has not had any fevers since. According to pt, he was never formally dx with lung ca- only with a lung nodule that was thought to be cancerous- he underwent Lung radiation therapy (as biopsy was thought to be too dangerous for him due to his COPD). He has been getting lung CT q6 months and more recently q3 months given some changes seen on imaging. Thoracic surgery consulted for possible bronchoscopy.

## 2023-10-16 NOTE — DIETITIAN INITIAL EVALUATION ADULT - PERTINENT LABORATORY DATA
10-16    144  |  103  |  11.1  ----------------------------<  96  3.4<L>   |  30.0<H>  |  0.52    Ca    8.4      16 Oct 2023 06:38  Mg     1.7     10-16    TPro  5.5<L>  /  Alb  3.4  /  TBili  0.4  /  DBili  x   /  AST  18  /  ALT  24  /  AlkPhos  75  10-16  10-16 Na144 mmol/L Glu 96 mg/dL K+ 3.4 mmol/L<L> Cr  0.52 mg/dL BUN 11.1 mg/dL Phos n/a   Alb 3.4 g/dL PAB n/a

## 2023-10-16 NOTE — PROGRESS NOTE ADULT - PROVIDER SPECIALTY LIST ADULT
Cardiology
Hospitalist
Hospitalist
Pulmonology
Hospitalist
Hospitalist
Pulmonology
Pulmonology
Thoracic Surgery

## 2023-10-16 NOTE — DISCHARGE NOTE NURSING/CASE MANAGEMENT/SOCIAL WORK - PATIENT PORTAL LINK FT
You can access the FollowMyHealth Patient Portal offered by [facility] by registering at the following website: [email or web address]. By joining [facility]’s FollowMyHealth portal, you will also be able to view your health information using other applications (apps) compatible with our system.

## 2023-10-16 NOTE — DIETITIAN INITIAL EVALUATION ADULT - ORAL INTAKE PTA/DIET HISTORY
RD assessment completed at bedside, pt states since diagnosis appetite has been poor and declining. UBW ~155-160lbs with no recent weight changes. Pt is eating what he can small frequent meals with ensure at home to make sure he is receiving enough nutrition. Pt aware and knowledgeable on increased needs due to dx. Pt anticipating discharge today, encouraged to order lunch prior to leaving. RD to remain available

## 2023-10-17 NOTE — CDI QUERY NOTE - NSCDIOTHERTXTBX_GEN_ALL_CORE_HH
The patient received a nutrition assessment by a Registered Dietician on 10/16; The documentation of this assessment states: Severe protein-calorie malnutrition.  The RD placed recommendations, but it does not appear they were ordered.   Can the nutrition diagnosis and associated treatment be clarified, after study?    -Patient was found to have severe protein calorie malnutrition, no supplements ordered prior to discharge.  Malnutrition ruled out.   -Patient was found to have severe protein-calorie malnutrition and was treated with (please document the order malnutrition treatment plan)  -Other (please specify)  -Not clinically significant      Chart Documentation:        Dietitian Nutrition Risk Notification [Charted Location: [facility] 4WR [id number]] [Authored: 16-Oct-2023 11:55]  Upon Nutritional Assessment by the Registered Dietitian Your Patient was Determined to Meet Criteria/Has Evidence of the Following Diagnosis:	Severe protein-calorie malnutrition  Other Risk Factors	Not applicable  Etiology-Basis	Acute illness or injury  Malnutrition Evaluation as Demonstrated by (Adults > 20 years of age)	Loss of subcutaneous fat...  Loss of muscle...  Body Fat (loss of subcutaneous fat)	Orbital...  Orbital Depletion is	moderate  Muscle Mass (Loss of Muscle)	Temples...  Temples Depletion is	moderate   Nutrition Interventions:  Treatment: The Following Diet Has Been Recommended	Diet, Regular (10-11-23 @ 15:09) [Active]  Additional Comments/Dietitian Recommendations	1) Rx: Ensure Enlive BID (+700cal, 40g pro) and MVI daily to optimize nutrition status   2) Encourage po intake, monitor diet tolerance, and provide assistance at meals as needed  Obtain daily weights to monitor trends  Monitor nutrition related labs; lytes          Discharge Note Provider [Charted Location: [facility] 4WR [id number] 02] [Authored: 15-Oct-2023 10:50]  Did the Patient Present With or was Treated for Malnutrition During This Admission	Yes...  Details of Malnutrition Diagnosis/Diagnoses	This patient has been assessed with a concern for Malnutrition and was treated during this hospitalization for the following Nutrition diagnosis/diagnoses:     -  10/16/2023: Severe protein-calorie malnutrition

## 2023-10-26 PROBLEM — Z87.891 FORMER CIGARETTE SMOKER: Status: ACTIVE | Noted: 2020-02-06

## 2023-10-27 PROBLEM — E78.00 HYPERCHOLESTEROLEMIA: Status: ACTIVE | Noted: 2018-05-07

## 2023-10-27 PROBLEM — R91.1 LUNG NODULE: Status: ACTIVE | Noted: 2021-10-25

## 2023-10-27 PROBLEM — Z95.818 STATUS POST PLACEMENT OF IMPLANTABLE LOOP RECORDER: Status: ACTIVE | Noted: 2021-09-30

## 2023-10-27 PROBLEM — I10 HYPERTENSION: Status: ACTIVE | Noted: 2018-05-07

## 2023-10-28 PROBLEM — R53.83 FATIGUE: Status: ACTIVE | Noted: 2023-01-01

## 2023-10-28 PROBLEM — R06.02 SHORTNESS OF BREATH ON EXERTION: Status: ACTIVE | Noted: 2023-01-01

## 2023-10-28 PROBLEM — R05.9 COUGH: Status: ACTIVE | Noted: 2023-01-01

## 2023-11-07 PROBLEM — J44.9 CHRONIC OBSTRUCTIVE PULMONARY DISEASE, UNSPECIFIED COPD TYPE: Status: ACTIVE | Noted: 2020-12-29

## 2023-11-07 PROBLEM — J44.1 COPD WITH EXACERBATION: Status: ACTIVE | Noted: 2018-04-26

## 2023-11-30 PROBLEM — R09.02 HYPOXIA: Status: ACTIVE | Noted: 2023-01-01

## 2023-11-30 PROBLEM — Z95.1 S/P CABG X 3: Status: ACTIVE | Noted: 2020-04-06

## 2023-11-30 PROBLEM — I48.0 PAROXYSMAL ATRIAL FIBRILLATION: Status: ACTIVE | Noted: 2021-02-23

## 2023-11-30 NOTE — ED PROVIDER NOTE - PHYSICAL EXAMINATION
Constitutional: Well appearing, awake, alert, oriented to person, place, and time/situation and in no apparent distress  ENMT: Airway patent nasal mucosa clear. Mouth with normal mucosa. Throat has no vesicles no oropharyngeal exudates and uvula is midline. No blood in the oropharynx  EYES: clear bilaterally, pupils equal, round and reactive to light  Cardiac: Tachycardic, regular rhythm. Heart sounds S1, S2. No murmurs, rubs or gallops. Good capillary refill, 2+ pulses, no peripheral edema  Respiratory: Lungs CTAB, tachypneic, no crackles, satting 84% on RA in mild distress, improved to 93% on 3L NC   Gastrointestinal: Abdomen nondistended, non-tender, no rebound guarding or peritoneal signs  Genitourinary: No CVA tenderness, pelvis nontender, bladder nondistended  Musculoskeletal: Spine appears normal, range of motion is not limited, no muscle or joint tenderness  Neurological: Alert and oriented, no focal deficits, no motor or sensory deficits. CN 2-12 intact, PERRLA, EOMI, No FND

## 2023-11-30 NOTE — H&P ADULT - SOCIAL HISTORY: ALCOHOL USE
Alcohol remote, last use 2016 per patient. Multiple beers per day unable to report number, patient is alcohol

## 2023-11-30 NOTE — H&P ADULT - NSHPPHYSICALEXAM_GEN_ALL_CORE
Physical Examination:    General: NAD, AOx3, cooperative    HEENT: atraumatic, normal JVD    PULM: decreased breath sounds bilaterally    CVS: irregularly irregular    ABD: soft, mild hepatomegaly.     EXT: warm well perfused    SKIN: no significant excoriations    Neuro: cranial nerves 2-12 intact, AOx3 appropriate

## 2023-11-30 NOTE — ED ADULT NURSE NOTE - NSFALLUNIVINTERV_ED_ALL_ED
Bed/Stretcher in lowest position, wheels locked, appropriate side rails in place/Call bell, personal items and telephone in reach/Instruct patient to call for assistance before getting out of bed/chair/stretcher/Non-slip footwear applied when patient is off stretcher/[place] to call system/Physically safe environment - no spills, clutter or unnecessary equipment/Purposeful proactive rounding/Room/bathroom lighting operational, light cord in reach

## 2023-11-30 NOTE — ED ADULT NURSE NOTE - OBJECTIVE STATEMENT
Assumed care of patient in critical care. Pt a&ox4 rr with sob. Pt pmhx of HTN, cad s/p CABG, COPD, AFIB (previously on eliquis dc'd due to hemoptysis with clots, Lung CA s/p R endobronchial mass biopsy currently on chemo and rdiation therapy, last myesterday (as per EMR). Pt came into ed sent in by MD for elevated and low spo2 on room air. Pt denies chest pain, vision changes, numnbess/tingling, gu/gi symptoms.

## 2023-11-30 NOTE — ED PROVIDER NOTE - CLINICAL SUMMARY MEDICAL DECISION MAKING FREE TEXT BOX
Patient is a 77 yo male with PMHx HTN, CAD s/p CABG, COPD, a fib previously on eliquis now off eliquis due to hemoptysis with clots, Lung ca s/p R endobronchial mass biopsy on chemotherapy and radiation therapy last session yesterday, ILR presenting with SOB and hallucinations found to be in SVT. Febrile, hypoxic    Will give broad spectrum abx, evaluate for infection with lactate cultures cxr, r.o PE with CT angio, control rate, hydrate, give antipyretics, r.o electrolyte and thyroid abnormalities, likely admit.

## 2023-11-30 NOTE — H&P ADULT - NSHPLABSRESULTS_GEN_ALL_CORE
LABS:                        11.5   9.85  )-----------( 205      ( 30 Nov 2023 13:01 )             34.6     11-30    136  |  95<L>  |  16.8  ----------------------------<  93  4.4   |  30.0<H>  |  0.60    Ca    8.7      30 Nov 2023 13:01    TPro  6.4<L>  /  Alb  3.6  /  TBili  1.1  /  DBili  x   /  AST  28  /  ALT  45<H>  /  AlkPhos  105  11-30          CAPILLARY BLOOD GLUCOSE        PT/INR - ( 30 Nov 2023 13:01 )   PT: 12.4 sec;   INR: 1.12 ratio         PTT - ( 30 Nov 2023 13:01 )  PTT:28.7 sec  Urinalysis Basic - ( 30 Nov 2023 13:01 )    Color: x / Appearance: x / SG: x / pH: x  Gluc: 93 mg/dL / Ketone: x  / Bili: x / Urobili: x   Blood: x / Protein: x / Nitrite: x   Leuk Esterase: x / RBC: x / WBC x   Sq Epi: x / Non Sq Epi: x / Bacteria: x      CULTURES:  Rapid RVP Result: Raisa (11-30 @ 13:01)

## 2023-11-30 NOTE — ED ADULT NURSE NOTE - CHIEF COMPLAINT QUOTE
pt sent in by MD for elevated heart rate & low oxygen, wife states he was hallucinating in car  A&Ox3, resp + SOB

## 2023-11-30 NOTE — ED PROVIDER NOTE - ATTENDING CONTRIBUTION TO CARE
I, Shakir West, performed a face to face bedside interview with this patient regarding history of present illness, review of symptoms and relevant past medical, social and family history.  I completed an independent physical examination. I have communicated the patient’s plan of care and disposition with the resident.  78 year old male with PMH lung cancer, COPD, CAD, afib not on eliquis due to hemoptysis presents with fever and rapid heart rate. pt reports 2 days of cough, today with fever and confusion. pt with initial ekg consistent with SVT but after receiving adenosine, flutter waves were uncovered, initially tired lopressor 5 mg x 3 with minimal response, then gave cardizem and HR improved but BP dropped precipitously. EP consulted, AMio and neosyneprhine strarted

## 2023-11-30 NOTE — H&P ADULT - HISTORY OF PRESENT ILLNESS
Patient is a 78y old  Male who presents with a chief complaint of Hypoxia (30 Nov 2023 13:29) from EP office. she reports having had a cough for the last 2 months which has been worsening more recently but cannot give an exact timepoint of its worsening. He reports his loop recorder had an event on friday am, which led his EP physician to recommend he come in for evaluation today. When he presented to the office they recommended he present to the hospital for evaluation. He reports some sputum, but no bloody cough. Denies significant chest pain, although does report lightheadness/near syncopal episode en route to hospital. He reports for the last couple weeks feeling cold but never having checked a temperature.       Medications:  MEDICATIONS  (STANDING):  phenylephrine    Infusion 0.5 MICROgram(s)/kG/Min (13.8 mL/Hr) IV Continuous <Continuous>  piperacillin/tazobactam IVPB.. 3.375 Gram(s) IV Intermittent once  vancomycin  IVPB 1500 milliGRAM(s) IV Intermittent Once    MEDICATIONS  (PRN):          RADIOLOGY/IMAGING/ECHO    Critical care point of care ultrasound:    Assessment/Plan:          CRITICAL CARE TIME SPENT: 37 minutes assessing presenting problems of acute illness, which pose high probability of life threatening deterioration or end organ damage/dysfunction, as well as medical decision making including initiating plan of care, reviewing data, reviewing radiologic exams, discussing with multidisciplinary team,  discussing goals of care with patient/family, and writing this note.  Non-inclusive of procedures performed,

## 2023-11-30 NOTE — CONSULT NOTE ADULT - SUBJECTIVE AND OBJECTIVE BOX
JENNIFER PURDYRUBI  [id number]      HPI:  77 y/o male PMHx HTN, CAD s/p CABG x3v, COPD, Afib off Eliquis 2/2 hemoptysis, lung CA p/w cough, fever, tachycardia and SOB.  Patient was seen in EP office today due to findings on his ILR.  Upon arrival noted to be tachy in AFL and hypoxic and sent to the ER.  Patient reports having had a cough for the last 2 months which has been worsening more recently but cannot give an exact time point of its worsening.  He also has been more SOB and has had a fever.  Patient has been having issues with hemoptysis 2/2 lung CA and off Eliquis 2/2 that.  Has not had hemoptysis very recently.  Currently with some SOB and cough.  Reports mild chest soreness but no real CP.  Denies palps, orthopnea, syncope.          ALLERGIES:  ibuprofen (Anaphylaxis)  codeine (Urticaria)      PAST MEDICAL & SURGICAL HISTORY:  Arthritis  Lumbosacral disc disease  S/P cataract surgery  b/l eyes 2016  Otherwise, as noted above      MEDICATIONS:  albuterol/ipratropium for Nebulization 3 milliLiter(s) Nebulizer every 6 hours  aMIOdarone Infusion 1 mG/Min IV Continuous <Continuous>  atorvastatin 40 milliGRAM(s) Oral at bedtime  budesonide  80 MICROgram(s)/formoterol 4.5 MICROgram(s) Inhaler 1 Puff(s) Inhalation daily  doxycycline IVPB 100 milliGRAM(s) IV Intermittent once  doxycycline IVPB      enoxaparin Injectable 40 milliGRAM(s) SubCutaneous every 24 hours  gabapentin 400 milliGRAM(s) Oral three times a day  LORazepam     Tablet 1 milliGRAM(s) Oral every 6 hours PRN  midodrine 5 milliGRAM(s) Oral every 8 hours  phenylephrine    Infusion 0.5 MICROgram(s)/kG/Min IV Continuous <Continuous>  piperacillin/tazobactam IVPB. 3.375 Gram(s) IV Intermittent once  piperacillin/tazobactam IVPB.- 3.375 Gram(s) IV Intermittent once  tamsulosin 0.4 milliGRAM(s) Oral at bedtime  vancomycin  IVPB          SOCIAL HISTORY:  Patient denies alcohol, tobacco, drug use    FAMILY HISTORY:  FH: colon cancer      ROS:  Patient denies h/a, and other than noted above full ROS is unremarkable      PHYSICAL EXAM:  Vital Signs Last 24 Hrs  T(C): 38 (30 Nov 2023 12:41), Max: 38 (30 Nov 2023 12:41)  T(F): 100.4 (30 Nov 2023 12:41), Max: 100.4 (30 Nov 2023 12:41)  HR: 141 (30 Nov 2023 17:33) (64 - 169)  BP: 118/69 (30 Nov 2023 17:33) (68/44 - 123/66)  BP(mean): 82 (30 Nov 2023 17:33) (57 - 85)  RR: 18 (30 Nov 2023 17:33) (18 - 20)  SpO2: 93% (30 Nov 2023 17:33) (78% - 97%)  General: Patient comfortable in NAD  HEENT: NCAT, mmm, EOMI  Neck: no JVD, no carotid bruits  CVS: nl s1, split s2, no s3, no murmur or rubs, tachy, irregular  Chest: Diminished with rhonchi b/l  Abdomen: soft, nt/nd  Extremities: No c/c/e  Neuro: A&O x3  Psych: Normal affect      ECG:  AFL with RVR    LABS:                        11.5   9.85  )-----------( 205      ( 30 Nov 2023 13:01 )             34.6     11-30    136  |  95<L>  |  16.8  ----------------------------<  93  4.4   |  30.0<H>  |  0.60    Ca    8.7      30 Nov 2023 13:01    TPro  6.4<L>  /  Alb  3.6  /  TBili  1.1  /  DBili  x   /  AST  28  /  ALT  45<H>  /  AlkPhos  105  11-30        PT/INR - ( 30 Nov 2023 13:01 )   PT: 12.4 sec;   INR: 1.12 ratio         PTT - ( 30 Nov 2023 13:01 )  PTT:28.7 sec      RADIOLOGY:  CXR:  RIGHT mid and lower zone multifocal airspace ill-defined opacities/infectious pneumonia.      Assessment:  77 y/o male PMHx HTN, CAD s/p CABG x3v, COPD, Afib off Eliquis 2/2 hemoptysis, lung CA p/w cough, fever, tachycardia and SOB.  Patient seen in EP office with AFL with RVR and hypoxia and sent to ER.  In ER still with RVR but now appears to be more AF, still with RVR.  Noted to be hypotensive, CXR with RML/RLL PNA.  RVR 2/2 infection and hypotension.  Per EP plan is for CCB for rate control and Amio given x1.  Pulm to f/u to discuss possible resumption of A/C and then consideration eventually of ablation.  Will need abx and treatment for PNA which should help with RVR if not causing conversion back to SR.  Does not appear to be tiny cute CHF at this time.      Plan:  1. ICU admit for PNA and sepsis.  2. Cardizem as BP tolerates.  3. Amio given, options limited for rhythm control as off A/C.  D/w pulm if can restart A/C and may consider DCCV and eventual ablation if can be on it.  4. F/u with EP recs.  5. Abx per primary team/ICU.  6. Pulm f/u.  7. F/u CTPA to r/o PE and eval PNA.  8. IVF, pressors as needed.    Prognosis guarded.  D/w EP team.  Will follow.  Thanks!

## 2023-11-30 NOTE — ED PROVIDER NOTE - OBJECTIVE STATEMENT
Patient is a 77 yo male with PMHx HTN, CAD s/p CABG, COPD, a fib previously on eliquis now off eliquis due to hemoptysis with clots, Lung ca s/p R endobronchial mass biopsy, ILR presenting with SOB and hallucinations found to be septic with SVT. Patient is a 77 yo male with PMHx HTN, CAD s/p CABG, COPD, a fib previously on eliquis now off eliquis due to hemoptysis with clots, Lung ca s/p R endobronchial mass biopsy on chemotherapy and radiation therapy last session yesterday, ILR presenting with SOB and hallucinations found to be in SVT. As per patient and wife at bedside, patient received radiation yesterday but developed SOB and weakness went he went home; patient developed hallucinations today and was brought to the hospital for evaluation. Patient tachycardic, febrile, hypoxic. Mentating well, aaox3, moving extremities equally. Denies CP. Denies fevers, chills, dizziness, dysphagia, dysarthria, diplopia, photophobia, syncope, CP, abdominal pain, neck pain, back pain, diarrhea, dysuria, hematuria, hematochezia, hematemesis, n/v, recent travel, sick contacts, leg swelling.

## 2023-11-30 NOTE — H&P ADULT - NSHPREVIEWOFSYSTEMS_GEN_ALL_CORE
Denies night seats, sob, chest pain, chest tightness, jaw pain, palpitations, nausea, vomitting.  All other systems negative.

## 2023-11-30 NOTE — CONSULT NOTE ADULT - ASSESSMENT
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 recently started on chemo/XRT now returns with sepsis with concern for PNA     Acute hypoxic respiratory failure in the setting of PNA with baseline COPD  c/w broad spectrum abx pending cultures   check sputum cx   check MRSA pcr               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

## 2023-11-30 NOTE — ED PROVIDER NOTE - PROGRESS NOTE DETAILS
JK - patient EKG showing SVT, given 6 of adenosine, 12 of adenosine at which point underlying rhythm likely atrial flutter. Will give lopressor 5 mg, reassess. BP WNL, well appearing, mentating well. Will continue to monitor. JK- patient found to be hypotensive to 80s/50s s/p 3 L IVF. CXR c/f PNA. Rate controlled. VSS mentating well. Started on martin. Ready for admission to  ICU. Currently stable.

## 2023-11-30 NOTE — PATIENT PROFILE ADULT - FUNCTIONAL ASSESSMENT - BASIC MOBILITY 5.
INR completed per [facility] MARY Simmons 1-  Patient using 5 mg tabs.     4 = No assist / stand by assistance

## 2023-11-30 NOTE — H&P ADULT - ASSESSMENT
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    Pulm on 3 L NC, COPD on anoro ellipta 1puff/day, albuterol prn, CT/CTA pending     CV afib cont home metoprolol, shock start midodrine 5mg, on martin 0.5mcg/kg/min, obtain TTE, tachycardia now resolved    GI cont atorvastatin    Neuro cont gabapentin 400mg for pain. at home on dilaudid 2mg, on home xanax takes 4/day will order ativan here.      obtain UA, UCulture prn    ID legionella, MRSA, strep pneumonia    DVT ppx lovenox    Dw Dr Peterson, BENJAMINU attending    Scar RUIZM Fellow

## 2023-11-30 NOTE — PATIENT PROFILE ADULT - FALL HARM RISK - HARM RISK INTERVENTIONS

## 2023-11-30 NOTE — H&P ADULT - HIV OFFER
Patient ID: Mikey is a 67 year old male.    Chief Complaint   Patient presents with   • Hospital F/U     Admitted into [facility] 
Ms Ferris is a 49-year-old lady almost one-month status post laparoscopic cholecystectomy, thereby postop bile leak.  Associated drainage resolve now but she still having pain and still trying to get her sleeping better and breathing better and pain better at home.:  Discuss with  today about getting her home oxygen monitoring set up in her home hospital bed set up.  He's been ordered and will be spinal [facility] pharmacy.  Call Dr. Dooley's office today and try to get her plan so we can get her home CPAP set up.  She still having pains I increased her Norco Norco to Percocet will help her pain.  I wrote her hence only medications for benadryl or trazodone at see if one of the other might help she is down 20 pounds and she been urinating a lot so I think the tissue edema might have something to do with her wrist or symptoms which is better.  She does needs labs with a BMP drawn today to check her K levels.  F/U next week.  
Opt out

## 2023-11-30 NOTE — CONSULT NOTE ADULT - SUBJECTIVE AND OBJECTIVE BOX
78 year old male patient, former smoker, with a history of HTN, HLD, CAD s/p PCI 2002 and CABGx3 3/23/20, COPD, lung cancer (recurrent NSCLC; RUL; squamous; on daily radiation with Taxol/Carbo weekly), MDT ILR, and paroxysmal atrial  fibrillation (off Eliquis due to hemoptysis w BRB on admission 10/2023) who presented from outside office with hypoxia and AFib with rapid rates. He reports has noted increase in wheezing over the past week. 02 sat in office 86-90% on RA. Not on home 02. Reports has been sedentary, fatigued. Denies SOB at rest.    ILR now notable for recurrent PAF 11/24/23 34 minutes in duration. Presents to office noted to be in rapid atrial flutter. ILR interrogation in person reporting PAF with RVR as well this am.  ?  Cardiology Summary  Echo: 12/29/20, LVEF 65-70%, nml LA size, sclerotic AV with normal opening, mild aortic stenosis    Cardiac Cath: 1/3/20, LM 30%, mLAD 70%, RCA 50%    PAST MEDICAL & SURGICAL HISTORY:  CAD in native artery  RCA 3 YAYA 1 in 2002 and 2 in 2019  Arthritis  Lumbosacral disc disease  has nerve stimulator  HTN (hypertension)  HLD (hyperlipidemia)  Lung cancer  Stage I a1; RUL; s/p SBRT in 11/2021; stable disease as of 4/2022  Paroxysmal atrial fibrillation  COPD (chronic obstructive pulmonary disease) with emphysema  H/O heart artery stent  RCA  S/P cataract surgery  b/l eyes 2016  S/P CABG (coronary artery bypass graft)    REVIEW OF SYSTEMS  General:	  Skin/Breast:  Ophthalmologic:  ENMT:	  Respiratory and Thorax:	  Cardiovascular:	  Gastrointestinal:	  Genitourinary:	  Musculoskeletal:	  Neurological:	  Psychiatric:	  Hematology/Lymphatics:	  Endocrine:	  Allergic/Immunologic:	    MEDICATIONS  (STANDING):  acetaminophen   IVPB .. 1000 milliGRAM(s) IV Intermittent Once  aDENosine Injectable (ADENOCARD) 12 milliGRAM(s) IV Push once  aDENosine Injectable (ADENOCARD) 6 milliGRAM(s) IV Push once  lactated ringers Bolus 2300 milliLiter(s) IV Bolus once  metoprolol tartrate Injectable 5 milliGRAM(s) IV Push Once  piperacillin/tazobactam IVPB. 3.375 Gram(s) IV Intermittent once  piperacillin/tazobactam IVPB.. 3.375 Gram(s) IV Intermittent once  vancomycin  IVPB 1500 milliGRAM(s) IV Intermittent Once    Allergies  ibuprofen (Anaphylaxis)  codeine (Urticaria)    SOCIAL HISTORY:    FAMILY HISTORY:  FH: colon cancer    Vital Signs Last 24 Hrs  T(C): 38 (30 Nov 2023 12:41), Max: 38 (30 Nov 2023 12:41)  T(F): 100.4 (30 Nov 2023 12:41), Max: 100.4 (30 Nov 2023 12:41)  HR: 168 (30 Nov 2023 12:41) (168 - 168)  BP: 97/62 (30 Nov 2023 12:41) (97/62 - 97/62)  RR: 20 (30 Nov 2023 12:41) (20 - 20)  SpO2: 78% (30 Nov 2023 12:41) (78% - 78%)    Physical Exam:  Constitutional: AAOx3, NAD  Neck: supple, No JVD  Cardiovascular: +S1S2 RRR, no murmurs, rubs, gallops   Pulmonary: CTA b/l, unlabored, no wheezes, rales. rhonci  Abdomen: +BS, soft NTND  Extremities: no edema b/l, +distal pulses b/l  Neuro: non focal, speech clear, BARKER x 4    RADIOLOGY & ADDITIONAL STUDIES:  TTE 10/11/23  Summary:   1. Technically suboptimal study.   2. Hyperdynamic global left ventricular systolic function.   3. Left ventricular ejection fraction, by visual estimation, is 70 to 75%.   4. Spectral Doppler shows impaired relaxation pattern of left ventricular myocardial filling (Grade I diastolic dysfunction).   5. Mildly reduced RV systolic function.   6. Sclerotic aortic valve with normal opening.   7. Mild mitral annular calcification.   8. Trace mitral valve regurgitation.   9. Mild tricuspid regurgitation.  10. There is no evidence of pericardial effusion.    NM PET 10/22/23  IMPRESSION:  1. Hypermetabolic nodule in the posterior aspect of the right main   bronchus compatible with recently biopsy-proven malignancy. Nodular   opacity in the posterior segment of the right upper lobe with increased   activity adjacent to post radiation linear opacities suspicious for   additional site of malignancy. Increased intercostal muscle and   diaphragmatic jerman activity suggestive of difficulty breathing.  2. No suspicious FDG avid malignant lesion in the remaining field of view.     78 year old male patient, former smoker, with a history of HTN, HLD, CAD s/p PCI 2002 and CABGx3 3/23/20, COPD, lung cancer (recurrent NSCLC; RUL; squamous; on daily radiation with Taxol/Carbo weekly), MDT ILR, and paroxysmal atrial  fibrillation (off Eliquis due to hemoptysis w BRB on admission 10/2023) who presented from outside office with hypoxia and AFib with rapid rates. He reports has noted increase in wheezing over the past week. 02 sat in office 86-90% on RA. Not on home 02. Reports has been sedentary, fatigued. Denies SOB at rest. The patient's wife reports that she has observed him steadily decline for the last 1.5-2 weeks. Lack of energy and frequent naps have been observed as well as cough and loss of appetite.     ILR in office now notable for recurrent PAF 11/24/23 34 minutes in duration. Presents to office noted to be in rapid atrial flutter. ILR interrogation in person reporting PAF with RVR as well this am.    In ER found to be in rapid atrial flutter (likely typical) with HR in leander 150s. Received IV lopressor followed by IV Cardizem with improvement in rates to the 70-80s. Prior to this the patient received IV adenosine (was persumed to be in SVT) which revealed Aflutter. Noted to have T max 100.4 in ER by staff.   ?  Cardiology Summary  Echo: 12/29/20, LVEF 65-70%, nml LA size, sclerotic AV with normal opening, mild aortic stenosis    Cardiac Cath: 1/3/20, LM 30%, mLAD 70%, RCA 50%    PAST MEDICAL & SURGICAL HISTORY:  CAD in native artery  RCA 3 YAYA 1 in 2002 and 2 in 2019  Arthritis  Lumbosacral disc disease  has nerve stimulator  HTN (hypertension)  HLD (hyperlipidemia)  Lung cancer  Stage I a1; RUL; s/p SBRT in 11/2021; stable disease as of 4/2022  Paroxysmal atrial fibrillation  COPD (chronic obstructive pulmonary disease) with emphysema  H/O heart artery stent  RCA  S/P cataract surgery  b/l eyes 2016  S/P CABG (coronary artery bypass graft)    REVIEW OF SYSTEMS  General: - fever or chills, + fatigue  Skin/Breast: - rashes  Ophthalmologic: - blurred vision  ENMT: + sore throat  Respiratory and Thorax: + cough, + dyspnea	  Cardiovascular: + palpitations, + KELLY, - chest pain, - syncope   Gastrointestinal: - N/V/D/C. Loss of appetite but not losing weight   Genitourinary: - dysuria  Musculoskeletal:	 + arthritis  Neurological: - weaknesses  Psychiatric: - anxiety or depression   Hematology/Lymphatics:	 + hemoptysis     Home RX:   AeroChamber Z-Stat Plus Chambr; To use with handheld inhaler  Albuterol Sulfate (2.5 MG/3ML) 0.083% Inhalation Nebulization Solution; USE 1 UNIT  DOSE IN NEBULIZER EVERY 4 TO 6 HOURS AS NEEDED  ALPRAZolam 0.25 MG Oral Tablet; TAKE 1 TABLET EVERY 6 TO 8 HOURS AS NEEDED.  MDD:4 tabs  Atorvastatin Calcium 40 MG Oral Tablet; TAKE 1 TABLET BY MOUTH EVERY DAY  Benzonatate 200 MG Oral Capsule; TAKE 1 CAPSULE 3 TIMES DAILY AS NEEDED  DexAMETHasone 4 MG Oral Tablet; Take 5 tablets (20mg) PO 12 hours prior to Cycle 1  and Cycle 2 chemotherapy treatments  Gabapentin 400 MG Oral Capsule; TAKE 1 CAPSULE 3 TIMES DAILY  guaiFENesin 100 MG/5ML Oral Liquid; TAKE 5 - 10 ML EVERY 4 HOURS AS NEEDED  HYDROmorphone HCl - 2 MG Oral Tablet; TAKE 1 TABLET EVERY 4 HOURS AS  NEEDED  Metoprolol Tartrate 50 MG Oral Tablet  Ondansetron HCl - 8 MG Oral Tablet; TAKE 1 TABLET Every 8 hours PRN nausea  Prochlorperazine Maleate 10 MG Oral Tablet; TAKE 1 TABLET EVERY 6 HOURS AS  NEEDED FOR NAUSEA  Tamsulosin HCl - 0.4 MG Oral Capsule; TAKE 1 CAPSULE AT BEDTIME  Trelegy Ellipta 200-62.5-25 MCG/ACT Inhalation Aerosol Powder Breath Activated;  INHALE 1 PUFF BY MOUTH DAILY  Ventolin  (90 Base) MCG/ACT Inhalation Aerosol Solution; INHALE 1-2 PUFFS  EVERY 4-6 HOURS AS NEEDED AND AS DIRECTED    MEDICATIONS  (STANDING):  acetaminophen   IVPB .. 1000 milliGRAM(s) IV Intermittent Once  aDENosine Injectable (ADENOCARD) 12 milliGRAM(s) IV Push once  aDENosine Injectable (ADENOCARD) 6 milliGRAM(s) IV Push once  lactated ringers Bolus 2300 milliLiter(s) IV Bolus once  metoprolol tartrate Injectable 5 milliGRAM(s) IV Push Once  piperacillin/tazobactam IVPB. 3.375 Gram(s) IV Intermittent once  piperacillin/tazobactam IVPB.. 3.375 Gram(s) IV Intermittent once  vancomycin  IVPB 1500 milliGRAM(s) IV Intermittent Once    Allergies  ibuprofen (Anaphylaxis)  codeine (Urticaria)    SOCIAL HISTORY: former smoker and drinker. 2ppd. Stopped 2016    FAMILY HISTORY:  FH: colon cancer    Vital Signs Last 24 Hrs  T(C): 38 (30 Nov 2023 12:41), Max: 38 (30 Nov 2023 12:41)  T(F): 100.4 (30 Nov 2023 12:41), Max: 100.4 (30 Nov 2023 12:41)  HR: 168 (30 Nov 2023 12:41) (168 - 168)  BP: 97/62 (30 Nov 2023 12:41) (97/62 - 97/62)  RR: 20 (30 Nov 2023 12:41) (20 - 20)  SpO2: 78% (30 Nov 2023 12:41) (78% - 78%)    Physical Exam:  Constitutional: AAOx3, mild respiratory distress  Neck: supple, No JVD  Cardiovascular: +S1S2 IR; Atrial flutter at time of exam; 3/6 LIANA at LSB  Pulmonary: Coarse breath sounds in all fields. Mildly labored and tachypneic.   Abdomen: +BS, soft NTND  Extremities: trace LE edema R>L  Neuro: non focal, speech clear, BARKER x 4  Psych: appropriate mood and affect.     RADIOLOGY & ADDITIONAL STUDIES:  TTE 10/11/23  Summary:   1. Technically suboptimal study.   2. Hyperdynamic global left ventricular systolic function.   3. Left ventricular ejection fraction, by visual estimation, is 70 to 75%.   4. Spectral Doppler shows impaired relaxation pattern of left ventricular myocardial filling (Grade I diastolic dysfunction).   5. Mildly reduced RV systolic function.   6. Sclerotic aortic valve with normal opening.   7. Mild mitral annular calcification.   8. Trace mitral valve regurgitation.   9. Mild tricuspid regurgitation.  10. There is no evidence of pericardial effusion.    NM PET 10/22/23  IMPRESSION:  1. Hypermetabolic nodule in the posterior aspect of the right main   bronchus compatible with recently biopsy-proven malignancy. Nodular   opacity in the posterior segment of the right upper lobe with increased   activity adjacent to post radiation linear opacities suspicious for   additional site of malignancy. Increased intercostal muscle and   diaphragmatic jerman activity suggestive of difficulty breathing.  2. No suspicious FDG avid malignant lesion in the remaining field of view.    EKG: AFlutter at 91bpm; QRSD 122ms; RBBB    A/P  78 year old male patient, former smoker, with a history of HTN, HLD, CAD s/p PCI 2002 and CABGx3 (3/23/20 - [facility]), COPD, lung cancer (recurrent NSCLC; RUL; squamous; on daily radiation with Taxol/Carbo weekly), MDT ILR, and paroxysmal atrial fibrillation and flutter (off Eliquis due to hemoptysis w BRB on admission 10/2023) who is admitted with hypoxia, rapid atrial flutter and potentially septic shock.     T max in .4  Responded well to [facility]  Noted to have normal EF in 10/2023    - Rate control strategy for now - would start Cardizem 30mg PO q6h and increase as clinically tolerated  - Rhythm control limited due to intolerance of Eliquis due to hemoptysis  - Agree with fluid resusitation  - Agree with CT scan to rule out PE  - Agree with ABG  - Agree with septic work up  - Consult Cardiology - [facility]  - Consult Pulmonology - Dr. Fuller  - Consider Oncology consult - Was seen by [facility] in past  - Daily EKGs  - CBC, BMP, TSH, blood cultures, BNP drawn  - Will follow closely with you. Full recommendations to follow below    78 year old male patient, former smoker, with a history of HTN, HLD, CAD s/p PCI 2002 and CABGx3 3/23/20, COPD, lung cancer (recurrent NSCLC; RUL; squamous; on daily radiation with Taxol/Carbo weekly), MDT ILR, and paroxysmal atrial  fibrillation (off Eliquis due to hemoptysis w BRB on admission 10/2023) who presented from outside office with hypoxia and AFib with rapid rates. He reports has noted increase in wheezing over the past week. 02 sat in office 86-90% on RA. Not on home 02. Reports has been sedentary, fatigued. Denies SOB at rest. The patient's wife reports that she has observed him steadily decline for the last 1.5-2 weeks. Lack of energy and frequent naps have been observed as well as cough and loss of appetite.     ILR in office now notable for recurrent PAF 11/24/23 34 minutes in duration. Presents to office noted to be in rapid atrial flutter. ILR interrogation in person reporting PAF with RVR as well this am.    In ER found to be in rapid atrial flutter (likely typical) with HR in leander 150s. Received IV lopressor followed by IV Cardizem with improvement in rates to the 70-80s. Prior to this the patient received IV adenosine (was persumed to be in SVT) which revealed Aflutter. Noted to have T max 100.4 in ER by staff.   ?  Cardiology Summary  Echo: 12/29/20, LVEF 65-70%, nml LA size, sclerotic AV with normal opening, mild aortic stenosis    Cardiac Cath: 1/3/20, LM 30%, mLAD 70%, RCA 50%    PAST MEDICAL & SURGICAL HISTORY:  CAD in native artery  RCA 3 YAYA 1 in 2002 and 2 in 2019  Arthritis  Lumbosacral disc disease  has nerve stimulator  HTN (hypertension)  HLD (hyperlipidemia)  Lung cancer  Stage I a1; RUL; s/p SBRT in 11/2021; stable disease as of 4/2022  Paroxysmal atrial fibrillation  COPD (chronic obstructive pulmonary disease) with emphysema  H/O heart artery stent  RCA  S/P cataract surgery  b/l eyes 2016  S/P CABG (coronary artery bypass graft)    REVIEW OF SYSTEMS  General: - fever or chills, + fatigue  Skin/Breast: - rashes  Ophthalmologic: - blurred vision  ENMT: + sore throat  Respiratory and Thorax: + cough, + dyspnea	  Cardiovascular: + palpitations, + KELLY, - chest pain, - syncope   Gastrointestinal: - N/V/D/C. Loss of appetite but not losing weight   Genitourinary: - dysuria  Musculoskeletal:	 + arthritis  Neurological: - weaknesses  Psychiatric: - anxiety or depression   Hematology/Lymphatics:	 + hemoptysis     Home RX:   AeroChamber Z-Stat Plus Chambr; To use with handheld inhaler  Albuterol Sulfate (2.5 MG/3ML) 0.083% Inhalation Nebulization Solution; USE 1 UNIT  DOSE IN NEBULIZER EVERY 4 TO 6 HOURS AS NEEDED  ALPRAZolam 0.25 MG Oral Tablet; TAKE 1 TABLET EVERY 6 TO 8 HOURS AS NEEDED.  MDD:4 tabs  Atorvastatin Calcium 40 MG Oral Tablet; TAKE 1 TABLET BY MOUTH EVERY DAY  Benzonatate 200 MG Oral Capsule; TAKE 1 CAPSULE 3 TIMES DAILY AS NEEDED  DexAMETHasone 4 MG Oral Tablet; Take 5 tablets (20mg) PO 12 hours prior to Cycle 1  and Cycle 2 chemotherapy treatments  Gabapentin 400 MG Oral Capsule; TAKE 1 CAPSULE 3 TIMES DAILY  guaiFENesin 100 MG/5ML Oral Liquid; TAKE 5 - 10 ML EVERY 4 HOURS AS NEEDED  HYDROmorphone HCl - 2 MG Oral Tablet; TAKE 1 TABLET EVERY 4 HOURS AS  NEEDED  Metoprolol Tartrate 50 MG Oral Tablet  Ondansetron HCl - 8 MG Oral Tablet; TAKE 1 TABLET Every 8 hours PRN nausea  Prochlorperazine Maleate 10 MG Oral Tablet; TAKE 1 TABLET EVERY 6 HOURS AS  NEEDED FOR NAUSEA  Tamsulosin HCl - 0.4 MG Oral Capsule; TAKE 1 CAPSULE AT BEDTIME  Trelegy Ellipta 200-62.5-25 MCG/ACT Inhalation Aerosol Powder Breath Activated;  INHALE 1 PUFF BY MOUTH DAILY  Ventolin  (90 Base) MCG/ACT Inhalation Aerosol Solution; INHALE 1-2 PUFFS  EVERY 4-6 HOURS AS NEEDED AND AS DIRECTED    MEDICATIONS  (STANDING):  acetaminophen   IVPB .. 1000 milliGRAM(s) IV Intermittent Once  aDENosine Injectable (ADENOCARD) 12 milliGRAM(s) IV Push once  aDENosine Injectable (ADENOCARD) 6 milliGRAM(s) IV Push once  lactated ringers Bolus 2300 milliLiter(s) IV Bolus once  metoprolol tartrate Injectable 5 milliGRAM(s) IV Push Once  piperacillin/tazobactam IVPB. 3.375 Gram(s) IV Intermittent once  piperacillin/tazobactam IVPB.. 3.375 Gram(s) IV Intermittent once  vancomycin  IVPB 1500 milliGRAM(s) IV Intermittent Once    Allergies  ibuprofen (Anaphylaxis)  codeine (Urticaria)    SOCIAL HISTORY: former smoker and drinker. 2ppd. Stopped 2016    FAMILY HISTORY:  FH: colon cancer    Vital Signs Last 24 Hrs  T(C): 38 (30 Nov 2023 12:41), Max: 38 (30 Nov 2023 12:41)  T(F): 100.4 (30 Nov 2023 12:41), Max: 100.4 (30 Nov 2023 12:41)  HR: 168 (30 Nov 2023 12:41) (168 - 168)  BP: 97/62 (30 Nov 2023 12:41) (97/62 - 97/62)  RR: 20 (30 Nov 2023 12:41) (20 - 20)  SpO2: 78% (30 Nov 2023 12:41) (78% - 78%)    Physical Exam:  Constitutional: AAOx3, mild respiratory distress  Neck: supple, No JVD  Cardiovascular: +S1S2 IR; Atrial flutter at time of exam; 3/6 LIANA at LSB  Pulmonary: Coarse breath sounds in all fields. Mildly labored and tachypneic.   Abdomen: +BS, soft NTND  Extremities: trace LE edema R>L  Neuro: non focal, speech clear, ABRKER x 4  Psych: appropriate mood and affect.     RADIOLOGY & ADDITIONAL STUDIES:  TTE 10/11/23  Summary:   1. Technically suboptimal study.   2. Hyperdynamic global left ventricular systolic function.   3. Left ventricular ejection fraction, by visual estimation, is 70 to 75%.   4. Spectral Doppler shows impaired relaxation pattern of left ventricular myocardial filling (Grade I diastolic dysfunction).   5. Mildly reduced RV systolic function.   6. Sclerotic aortic valve with normal opening.   7. Mild mitral annular calcification.   8. Trace mitral valve regurgitation.   9. Mild tricuspid regurgitation.  10. There is no evidence of pericardial effusion.    NM PET 10/22/23  IMPRESSION:  1. Hypermetabolic nodule in the posterior aspect of the right main   bronchus compatible with recently biopsy-proven malignancy. Nodular   opacity in the posterior segment of the right upper lobe with increased   activity adjacent to post radiation linear opacities suspicious for   additional site of malignancy. Increased intercostal muscle and   diaphragmatic jerman activity suggestive of difficulty breathing.  2. No suspicious FDG avid malignant lesion in the remaining field of view.    EKG: AFlutter at 91bpm; QRSD 122ms; RBBB    A/P  78 year old male patient, former smoker, with a history of HTN, HLD, CAD s/p PCI 2002 and CABGx3 (3/23/20 - [facility]), COPD, lung cancer (recurrent NSCLC; RUL; squamous; on daily radiation with Taxol/Carbo weekly), MDT ILR, and paroxysmal atrial fibrillation and flutter (off Eliquis due to hemoptysis w BRB on admission 10/2023) who is admitted with hypoxia, rapid atrial flutter and potentially septic shock.     T max in .4  Responded well to [facility]  Noted to have normal EF in 10/2023    - Rate control strategy for now - would start Cardizem 30mg PO q6h and increase as clinically tolerated  - Rhythm control limited due to intolerance of Eliquis due to hemoptysis  - Agree with fluid resusitation  - Agree with CT scan to rule out PE  - Agree with ABG  - Agree with septic work up  - Consult Cardiology - [facility]  - Consult Pulmonology - Dr. Fuller - Potential to resume anticoagulation?  - Consider Oncology consult - Was seen by [facility] in past  - Daily EKGs  - CBC, BMP, TSH, blood cultures, BNP drawn  - Will follow closely with you. Full recommendations to follow below. If he can be anticoagulated adequately can consider rhythm control strategies    78 year old male patient, former smoker, with a history of HTN, HLD, CAD s/p PCI 2002 and CABGx3 3/23/20, COPD, lung cancer (recurrent NSCLC; RUL; squamous; on daily radiation with Taxol/Carbo weekly), MDT ILR, and paroxysmal atrial  fibrillation (off Eliquis due to hemoptysis w BRB on admission 10/2023) who presented from outside office with hypoxia and AFib with rapid rates. He reports has noted increase in wheezing over the past week. 02 sat in office 86-90% on RA. Not on home 02. Reports has been sedentary, fatigued. Denies SOB at rest. The patient's wife reports that she has observed him steadily decline for the last 1.5-2 weeks. Lack of energy and frequent naps have been observed as well as cough and loss of appetite.     ILR in office now notable for recurrent PAF 11/24/23 34 minutes in duration. Presents to office noted to be in rapid atrial flutter. ILR interrogation in person reporting PAF with RVR as well this am.    In ER found to be in rapid atrial flutter (likely typical) with HR in leander 150s. Received IV lopressor followed by IV Cardizem with improvement in rates to the 70-80s. Prior to this the patient received IV adenosine (was persumed to be in SVT) which revealed Aflutter. Noted to have T max 100.4 in ER by staff.   ?  Cardiology Summary  Echo: 12/29/20, LVEF 65-70%, nml LA size, sclerotic AV with normal opening, mild aortic stenosis    Cardiac Cath: 1/3/20, LM 30%, mLAD 70%, RCA 50%    PAST MEDICAL & SURGICAL HISTORY:  CAD in native artery  RCA 3 YAYA 1 in 2002 and 2 in 2019  Arthritis  Lumbosacral disc disease  has nerve stimulator  HTN (hypertension)  HLD (hyperlipidemia)  Lung cancer  Stage I a1; RUL; s/p SBRT in 11/2021; stable disease as of 4/2022  Paroxysmal atrial fibrillation  COPD (chronic obstructive pulmonary disease) with emphysema  H/O heart artery stent  RCA  S/P cataract surgery  b/l eyes 2016  S/P CABG (coronary artery bypass graft)    REVIEW OF SYSTEMS  General: - fever or chills, + fatigue  Skin/Breast: - rashes  Ophthalmologic: - blurred vision  ENMT: + sore throat  Respiratory and Thorax: + cough, + dyspnea	  Cardiovascular: + palpitations, + KELLY, - chest pain, - syncope   Gastrointestinal: - N/V/D/C. Loss of appetite but not losing weight   Genitourinary: - dysuria  Musculoskeletal:	 + arthritis  Neurological: - weaknesses  Psychiatric: - anxiety or depression   Hematology/Lymphatics:	 + hemoptysis     Home RX:   AeroChamber Z-Stat Plus Chambr; To use with handheld inhaler  Albuterol Sulfate (2.5 MG/3ML) 0.083% Inhalation Nebulization Solution; USE 1 UNIT  DOSE IN NEBULIZER EVERY 4 TO 6 HOURS AS NEEDED  ALPRAZolam 0.25 MG Oral Tablet; TAKE 1 TABLET EVERY 6 TO 8 HOURS AS NEEDED.  MDD:4 tabs  Atorvastatin Calcium 40 MG Oral Tablet; TAKE 1 TABLET BY MOUTH EVERY DAY  Benzonatate 200 MG Oral Capsule; TAKE 1 CAPSULE 3 TIMES DAILY AS NEEDED  DexAMETHasone 4 MG Oral Tablet; Take 5 tablets (20mg) PO 12 hours prior to Cycle 1  and Cycle 2 chemotherapy treatments  Gabapentin 400 MG Oral Capsule; TAKE 1 CAPSULE 3 TIMES DAILY  guaiFENesin 100 MG/5ML Oral Liquid; TAKE 5 - 10 ML EVERY 4 HOURS AS NEEDED  HYDROmorphone HCl - 2 MG Oral Tablet; TAKE 1 TABLET EVERY 4 HOURS AS  NEEDED  Metoprolol Tartrate 50 MG Oral Tablet  Ondansetron HCl - 8 MG Oral Tablet; TAKE 1 TABLET Every 8 hours PRN nausea  Prochlorperazine Maleate 10 MG Oral Tablet; TAKE 1 TABLET EVERY 6 HOURS AS  NEEDED FOR NAUSEA  Tamsulosin HCl - 0.4 MG Oral Capsule; TAKE 1 CAPSULE AT BEDTIME  Trelegy Ellipta 200-62.5-25 MCG/ACT Inhalation Aerosol Powder Breath Activated;  INHALE 1 PUFF BY MOUTH DAILY  Ventolin  (90 Base) MCG/ACT Inhalation Aerosol Solution; INHALE 1-2 PUFFS  EVERY 4-6 HOURS AS NEEDED AND AS DIRECTED    MEDICATIONS  (STANDING):  acetaminophen   IVPB .. 1000 milliGRAM(s) IV Intermittent Once  aDENosine Injectable (ADENOCARD) 12 milliGRAM(s) IV Push once  aDENosine Injectable (ADENOCARD) 6 milliGRAM(s) IV Push once  lactated ringers Bolus 2300 milliLiter(s) IV Bolus once  metoprolol tartrate Injectable 5 milliGRAM(s) IV Push Once  piperacillin/tazobactam IVPB. 3.375 Gram(s) IV Intermittent once  piperacillin/tazobactam IVPB.. 3.375 Gram(s) IV Intermittent once  vancomycin  IVPB 1500 milliGRAM(s) IV Intermittent Once    Allergies  ibuprofen (Anaphylaxis)  codeine (Urticaria)    SOCIAL HISTORY: former smoker and drinker. 2ppd. Stopped 2016    FAMILY HISTORY:  FH: colon cancer    Vital Signs Last 24 Hrs  T(C): 38 (30 Nov 2023 12:41), Max: 38 (30 Nov 2023 12:41)  T(F): 100.4 (30 Nov 2023 12:41), Max: 100.4 (30 Nov 2023 12:41)  HR: 168 (30 Nov 2023 12:41) (168 - 168)  BP: 97/62 (30 Nov 2023 12:41) (97/62 - 97/62)  RR: 20 (30 Nov 2023 12:41) (20 - 20)  SpO2: 78% (30 Nov 2023 12:41) (78% - 78%)    Physical Exam:  Constitutional: AAOx3, mild respiratory distress  Neck: supple, No JVD  Cardiovascular: +S1S2 IR; Atrial flutter at time of exam; 3/6 LIANA at LSB  Pulmonary: Coarse breath sounds in all fields. Mildly labored and tachypneic.   Abdomen: +BS, soft NTND  Extremities: trace LE edema R>L  Neuro: non focal, speech clear, BARKER x 4  Psych: appropriate mood and affect.     RADIOLOGY & ADDITIONAL STUDIES:  TTE 10/11/23  Summary:   1. Technically suboptimal study.   2. Hyperdynamic global left ventricular systolic function.   3. Left ventricular ejection fraction, by visual estimation, is 70 to 75%.   4. Spectral Doppler shows impaired relaxation pattern of left ventricular myocardial filling (Grade I diastolic dysfunction).   5. Mildly reduced RV systolic function.   6. Sclerotic aortic valve with normal opening.   7. Mild mitral annular calcification.   8. Trace mitral valve regurgitation.   9. Mild tricuspid regurgitation.  10. There is no evidence of pericardial effusion.    NM PET 10/22/23  IMPRESSION:  1. Hypermetabolic nodule in the posterior aspect of the right main   bronchus compatible with recently biopsy-proven malignancy. Nodular   opacity in the posterior segment of the right upper lobe with increased   activity adjacent to post radiation linear opacities suspicious for   additional site of malignancy. Increased intercostal muscle and   diaphragmatic jerman activity suggestive of difficulty breathing.  2. No suspicious FDG avid malignant lesion in the remaining field of view.    EKG: AFlutter at 91bpm; QRSD 122ms; RBBB    A/P  78 year old male patient, former smoker, with a history of HTN, HLD, CAD s/p PCI 2002 and CABGx3 (3/23/20 - [facility]), COPD, lung cancer (recurrent NSCLC; RUL; squamous; on daily radiation with Taxol/Carbo weekly), MDT ILR, and paroxysmal atrial fibrillation and flutter (off Eliquis due to hemoptysis w BRB on admission 10/2023) who is admitted with hypoxia, rapid atrial flutter and potentially septic shock.     T max in .4  Responded well to [facility]  Noted to have normal EF in 10/2023    - Rate control strategy for now - would start Cardizem 30mg PO q6h and increase as clinically tolerated  - Rhythm control limited due to intolerance of Eliquis due to hemoptysis  - Agree with fluid resuscitation  - Agree with CT scan to rule out PE  - Agree with ABG  - Agree with septic work up  - Consult Cardiology - [facility]  - Consult Pulmonology - Dr. Fuller - Potential to resume anticoagulation?  - Consider Oncology consult - Was seen by [facility] in past  - Daily EKGs  - CBC, BMP, TSH, blood cultures, BNP drawn  - Will follow closely with you. Full recommendations to follow below. If he can be anticoagulated adequately can consider rhythm control strategies     **Addendum**  Seen in ER with Aflutter again in the 140s.   [facility] reveals patient was in SR on transmission on 1/30/23 @ 02:12AM  Will give one IV bolus of Amiodarone 150mg and start oral Cardizem in attempt to chemically cardiovert - is within 24 hour period  CHADSVASC: 4  Discussed with Dr. West.

## 2023-11-30 NOTE — CONSULT NOTE ADULT - NS ATTEND AMEND GEN_ALL_CORE FT
Primarily rate control strategy given inability to anticoagulate at this time. Will give one bolus of amio to attempt cardioversion given aflutter is <12 hours in duration. No additional antiarrhythmic therapy until patient can be safely anticoagulated.

## 2023-11-30 NOTE — CONSULT NOTE ADULT - SUBJECTIVE AND OBJECTIVE BOX
Patient is a 78y old  Male who presents with a chief complaint of Hypoxia (30 Nov 2023 13:29)    HPI:  78M with hx of COPD, CAD s/p CABG/YAYA, HTN, hx of RUL nodule treated empirically with SBRT 11/2021 due to concern he would not tolerate biopsy, Afib previously on Eliquis, s/p spinal cord stimulator, prior admission 10/10-10/16 for hemoptysis treated with TXA/Eliquis discontinued and was found to have endobronchial lesion in the R mainstem s/p bronchoscopy w/ pathology favoring squamous cell carcinoma. Pt was recently treated with taxol/carbo 11/27 and has received 2 sessions of radiation. Pt reports overall the last few days he has had generalized malaise with dyspnea and cough with episodes of hypoxia at home. Pt was also noted to have tachycardia on loop recorder and went for follow-up        PAST MEDICAL & SURGICAL HISTORY:  CAD in native artery  RCA 3 YAYA 1 in 2002 and 2 in 2019      Arthritis      Lumbosacral disc disease  has nerve stimulator      HTN (hypertension)      HLD (hyperlipidemia)      Lung cancer  Stage I a1; RUL; s/p SBRT in 11/2021; stable disease as of 4/2022      Paroxysmal atrial fibrillation      COPD (chronic obstructive pulmonary disease) with emphysema      H/O heart artery stent  RCA      S/P cataract surgery  b/l eyes 2016      S/P CABG (coronary artery bypass graft)            Medications:    phenylephrine    Infusion 0.5 MICROgram(s)/kG/Min IV Continuous <Continuous>          enoxaparin Injectable 40 milliGRAM(s) SubCutaneous every 24 hours                        ICU Vital Signs Last 24 Hrs  T(C): 38 (30 Nov 2023 12:41), Max: 38 (30 Nov 2023 12:41)  T(F): 100.4 (30 Nov 2023 12:41), Max: 100.4 (30 Nov 2023 12:41)  HR: 144 (30 Nov 2023 17:02) (64 - 169)  BP: 119/60 (30 Nov 2023 17:02) (68/44 - 123/66)  BP(mean): 85 (30 Nov 2023 17:02) (57 - 85)  ABP: --  ABP(mean): --  RR: 18 (30 Nov 2023 17:02) (18 - 20)  SpO2: 94% (30 Nov 2023 17:02) (78% - 97%)    O2 Parameters below as of 30 Nov 2023 17:02  Patient On (Oxygen Delivery Method): nasal cannula  O2 Flow (L/min): 3              I&O's Detail        LABS:                        11.5   9.85  )-----------( 205      ( 30 Nov 2023 13:01 )             34.6     11-30    136  |  95<L>  |  16.8  ----------------------------<  93  4.4   |  30.0<H>  |  0.60    Ca    8.7      30 Nov 2023 13:01    TPro  6.4<L>  /  Alb  3.6  /  TBili  1.1  /  DBili  x   /  AST  28  /  ALT  45<H>  /  AlkPhos  105  11-30          CAPILLARY BLOOD GLUCOSE        PT/INR - ( 30 Nov 2023 13:01 )   PT: 12.4 sec;   INR: 1.12 ratio         PTT - ( 30 Nov 2023 13:01 )  PTT:28.7 sec  Urinalysis Basic - ( 30 Nov 2023 13:01 )    Color: x / Appearance: x / SG: x / pH: x  Gluc: 93 mg/dL / Ketone: x  / Bili: x / Urobili: x   Blood: x / Protein: x / Nitrite: x   Leuk Esterase: x / RBC: x / WBC x   Sq Epi: x / Non Sq Epi: x / Bacteria: x      CULTURES:  Rapid RVP Result: NotDetec (11-30 @ 13:01)      Physical Examination:    General: No acute distress.      HEENT: Pupils equal, reactive to light.  Symmetric.    PULM: Clear to auscultation bilaterally, no significant sputum production    NECK: Supple, no lymphadenopathy, trachea midline    CVS: Regular rate and rhythm, no murmurs, rubs, or gallops    ABD: Soft, nondistended, nontender, normoactive bowel sounds, no masses    EXT: No edema, nontender    SKIN: Warm and well perfused, no rashes noted.    NEURO: Alert, oriented, interactive, nonfocal    DEVICES:     RADIOLOGY: ***    CRITICAL CARE TIME SPENT: ***   Patient is a 78y old  Male who presents with a chief complaint of Hypoxia (30 Nov 2023 13:29)    HPI:  78M with hx of COPD, CAD s/p CABG/YAYA, HTN, hx of RUL nodule treated empirically with SBRT 11/2021 due to concern he would not tolerate biopsy, Afib previously on Eliquis, s/p spinal cord stimulator, prior admission 10/10-10/16 for hemoptysis treated with TXA/Eliquis discontinued and was found to have endobronchial lesion in the R mainstem s/p bronchoscopy w/ pathology favoring squamous cell carcinoma. Pt was recently treated with taxol/carbo 11/27 and has received 2 sessions of radiation. Pt reports overall the last few days he has had generalized malaise with dyspnea and cough with episodes of hypoxia at home. Pt denies any recurrent episodes of hemoptysis since last time in October. Endorses palpitations but denies chest pain. Denies LE pain/swelling. Endorses some wheezing. Pt was also noted to have tachycardia on loop recorder and went for follow-up and was referred to the ED as noted in rapid atrial flutter. Pt was found to be in rapid atrial flutter requiring multiple medications. Pt also noted to be hypotensive despite fluid resuscitation and initiated on Chaitanya. CXR with R mid and lower lung opacities concerning for PNA.          PAST MEDICAL & SURGICAL HISTORY:  CAD in native artery  RCA 3 YAYA 1 in 2002 and 2 in 2019  Arthritis  Lumbosacral disc disease  has nerve stimulator  HTN (hypertension)  HLD (hyperlipidemia)  Lung cancer  Stage I a1; RUL; s/p SBRT in 11/2021; stable disease as of 4/2022  Paroxysmal atrial fibrillation  COPD (chronic obstructive pulmonary disease) with emphysema  H/O heart artery stent  RCA  S/P cataract surgery  b/l eyes 2016  S/P CABG (coronary artery bypass graft)    Medications:  phenylephrine    Infusion 0.5 MICROgram(s)/kG/Min IV Continuous <Continuous>  enoxaparin Injectable 40 milliGRAM(s) SubCutaneous every 24 hours    Allergies: codeine, ibuprofen    Social: former smoker 2 pdd x 20 years quit 8 yrs ago, denies EtOH abuse, denies illicit drug use     FH: denies family hx of pulmonary disease or malignancy     ICU Vital Signs Last 24 Hrs  T(C): 38 (30 Nov 2023 12:41), Max: 38 (30 Nov 2023 12:41)  T(F): 100.4 (30 Nov 2023 12:41), Max: 100.4 (30 Nov 2023 12:41)  HR: 144 (30 Nov 2023 17:02) (64 - 169)  BP: 119/60 (30 Nov 2023 17:02) (68/44 - 123/66)  BP(mean): 85 (30 Nov 2023 17:02) (57 - 85)  ABP: --  ABP(mean): --  RR: 18 (30 Nov 2023 17:02) (18 - 20)  SpO2: 94% (30 Nov 2023 17:02) (78% - 97%)    O2 Parameters below as of 30 Nov 2023 17:02  Patient On (Oxygen Delivery Method): nasal cannula  O2 Flow (L/min): 3    LABS:                        11.5   9.85  )-----------( 205      ( 30 Nov 2023 13:01 )             34.6     11-30    136  |  95<L>  |  16.8  ----------------------------<  93  4.4   |  30.0<H>  |  0.60    Ca    8.7      30 Nov 2023 13:01    TPro  6.4<L>  /  Alb  3.6  /  TBili  1.1  /  DBili  x   /  AST  28  /  ALT  45<H>  /  AlkPhos  105  11-30    CAPILLARY BLOOD GLUCOSE    PT/INR - ( 30 Nov 2023 13:01 )   PT: 12.4 sec;   INR: 1.12 ratio         PTT - ( 30 Nov 2023 13:01 )  PTT:28.7 sec  Urinalysis Basic - ( 30 Nov 2023 13:01 )    Color: x / Appearance: x / SG: x / pH: x  Gluc: 93 mg/dL / Ketone: x  / Bili: x / Urobili: x   Blood: x / Protein: x / Nitrite: x   Leuk Esterase: x / RBC: x / WBC x   Sq Epi: x / Non Sq Epi: x / Bacteria: x      CULTURES:  Rapid RVP Result: NotDetec (11-30 @ 13:01)      Physical Examination:    General: awake, alert, in NAD      HEENT: NC/AT, anicteric, MMM    PULM: diminished breath sounds bilaterally, no accessory muscle use     NECK: Supple, trachea midline    CVS: regular, tachycardiac     ABD: Soft, nondistended, nontender, normoactive bowel sounds    EXT: No edema, nontender    SKIN: Warm and well perfused, no rashes noted    NEURO: Alert, oriented, interactive, nonfocal    ROS: negative except as per HPI    RADIOLOGY:  < from: Xray Chest 1 View- PORTABLE-Urgent (11.30.23 @ 15:06) >  FINDINGS:  CATHETERS AND TUBES: None    PULMONARY: RIGHT mid and lower zone multifocal airspace ill-defined   opacities/infectious pneumonia. Remaining visualized lung parenchyma   clear. No pneumothorax.    HEART/VASCULAR: The heart and mediastinum size and configuration are   within normal limits.    BONES: Visualized osseous thorax intact.    IMPRESSION:   RIGHT mid and lower zone multifocal airspace ill-defined   opacities/infectious pneumonia.    < end of copied text >

## 2023-12-01 NOTE — PROGRESS NOTE ADULT - ASSESSMENT
ASSESSMENT: 78M with PMHx of essential HTN, COPD, CAD s/p CABG/drug-eluting stent, Afib (not on anticoagulation due to recent hemoptysis), spinal cord stimulator, Klebsiella pneumonia/S. aureus pneumonia, Squamous Cell Carcinoma (on chemotherapy and radiation therapy) admitted to ICU for acute hypoxic respiratory failure, distributive shock, Afib with RVR, and healthcare-associated PNA.     PLAN:    Neuro:  - Gabapentin 400 mg TID  - CT head:     CV:  - Previously holding off on anticoagulation due to hemoptysis  - Per EP, will need uninterrupted anticoagulation for 30 days to undergo ablation/cardioversion  - Heparin gtt started today, goal PTT 50-70  - Amiodarone 400 mg q8h, stop after 12 doses  - Then start Amiodarone 200 mg qd  - Diltiazem 120 mg qd  - Atorvastatin 40 mg qhs  - Midodrine 5 mg q8h  - Phenylephrine infusion  - CTA Chest: no PE, thick linear opacity in RUL, new nodules in RML and RLL, + pulmonary edema  - TTE: LVEF 55-60%, grade 1 diastolic dysfunction, mild concentric LVE    Respiratory:  - Duoneb q6h  - Budesonide inhaler 1 puff qd  - Sputum culture -----> Pending  - Legionella antigen -----> Pending  - MRSA/MSSA PCR: negative  - S. pneumoniae Ag: negative  - CT chest:     ID:  - Doxycycline IVPB  - Zosyn  - Vancomycin    Renal:  - Magnesium repleted   - UA wnl    :  - Tamsulosin 0.4 mg qhs     ASSESSMENT: 78M with PMHx of essential HTN, COPD, CAD s/p CABG/drug-eluting stent, Afib (not on anticoagulation due to recent hemoptysis), spinal cord stimulator, Klebsiella pneumonia/S. aureus pneumonia, Squamous Cell Carcinoma (on chemotherapy and radiation therapy) admitted to ICU for acute hypoxic respiratory failure, distributive shock, Afib with RVR, and healthcare-associated PNA.     PLAN:    Neuro:  - Gabapentin 400 mg TID  - CT head: nonacute    CV:  - Previously holding off on anticoagulation due to hemoptysis  - Per EP, will need uninterrupted anticoagulation for 30 days to undergo ablation/cardioversion  - Heparin gtt started today, goal PTT 50-70  - Attempt to start Eliquis during this hospitalization  - Amiodarone 400 mg q8h, stop after 12 doses  - Then start Amiodarone 200 mg qd  - Diltiazem 120 mg qd  - Atorvastatin 40 mg qhs  - Midodrine 5 mg q8h  - Phenylephrine infusion  - CTA Chest: no PE, thick linear opacity in RUL, new nodules in RML and RLL, + pulmonary edema  - TTE: LVEF 55-60%, grade 1 diastolic dysfunction, mild concentric LVE  - EP following, recs noted     Respiratory:  - Duoneb q6h  - Budesonide inhaler 1 puff qd  - Sputum culture -----> Pending  - Legionella antigen -----> Pending  - MRSA/MSSA PCR: negative  - S. pneumoniae Ag: negative    ID:  - Doxycycline  - Zosyn  - Vancomycin    Renal:  - Magnesium repleted   - UA wnl    :  - Tamsulosin 0.4 mg qhs    MSK:  - Dilaudid 2 mg q8h for severe pain  - Pain management consult ------> Pending

## 2023-12-01 NOTE — PROGRESS NOTE ADULT - SUBJECTIVE AND OBJECTIVE BOX
Patient seen today in MICU. Reports mild improvement in symptoms. Remains on 4L NC oxygen supplementation. Telemetry reviewed.     TELE: Sinus Tachycardia in the 90-100bpm range. Converted in ER most likely     MEDICATIONS  (STANDING):  albuterol/ipratropium for Nebulization 3 milliLiter(s) Nebulizer every 6 hours  aMIOdarone    Tablet   Oral   aMIOdarone    Tablet 400 milliGRAM(s) Oral every 8 hours  aMIOdarone Infusion 0.5 mG/Min (16.7 mL/Hr) IV Continuous <Continuous>  atorvastatin 40 milliGRAM(s) Oral at bedtime  budesonide  80 MICROgram(s)/formoterol 4.5 MICROgram(s) Inhaler 1 Puff(s) Inhalation daily  chlorhexidine 2% Cloths 1 Application(s) Topical daily  diltiazem    milliGRAM(s) Oral once  doxycycline IVPB      doxycycline IVPB 100 milliGRAM(s) IV Intermittent every 12 hours  gabapentin 400 milliGRAM(s) Oral three times a day  heparin  Infusion.  Unit(s)/Hr (13 mL/Hr) IV Continuous <Continuous>  midodrine 5 milliGRAM(s) Oral every 8 hours  phenylephrine    Infusion 0.5 MICROgram(s)/kG/Min (13.8 mL/Hr) IV Continuous <Continuous>  piperacillin/tazobactam IVPB.. 3.375 Gram(s) IV Intermittent every 8 hours  tamsulosin 0.4 milliGRAM(s) Oral at bedtime  vancomycin  IVPB 1000 milliGRAM(s) IV Intermittent every 12 hours  vancomycin  IVPB        MEDICATIONS  (PRN):  acetaminophen     Tablet .. 650 milliGRAM(s) Oral every 6 hours PRN Moderate Pain (4 - 6)  HYDROmorphone   Tablet 2 milliGRAM(s) Oral every 8 hours PRN Severe Pain (7 - 10)  LORazepam     Tablet 1 milliGRAM(s) Oral every 6 hours PRN Anxiety    Allergies  ibuprofen (Anaphylaxis)  codeine (Urticaria)    PAST MEDICAL & SURGICAL HISTORY:  CAD in native artery  RCA 3 YAYA 1 in 2002 and 2 in 2019  Arthritis  Lumbosacral disc disease  has nerve stimulator  HTN (hypertension)  HLD (hyperlipidemia)  Lung cancer  Stage I a1; RUL; s/p SBRT in 11/2021; stable disease as of 4/2022  Paroxysmal atrial fibrillation  COPD (chronic obstructive pulmonary disease) with emphysema  H/O heart artery stent  RCA  S/P cataract surgery  b/l eyes 2016  S/P CABG (coronary artery bypass graft)    Vital Signs Last 24 Hrs  T(C): 37.2 (01 Dec 2023 04:00), Max: 38 (30 Nov 2023 12:41)  T(F): 99 (01 Dec 2023 04:00), Max: 100.4 (30 Nov 2023 12:41)  HR: 112 (01 Dec 2023 10:00) (64 - 169)  BP: 139/79 (01 Dec 2023 10:00) (68/44 - 139/79)  BP(mean): 93 (01 Dec 2023 10:00) (57 - 117)  RR: 25 (01 Dec 2023 10:00) (15 - 31)  SpO2: 98% (01 Dec 2023 10:00) (78% - 100%)    Parameters below as of 01 Dec 2023 08:00  Patient On (Oxygen Delivery Method): nasal cannula  O2 Flow (L/min): 2    Physical Exam:  Constitutional: AAOx3, NAD   Neck: supple, No JVD  Cardiovascular: +S1S2 RRR; SR at time of exam; 3/6 LIANA at LSB  Pulmonary: Coarse breath sounds in all fields. Mildly labored and tachypneic.   Abdomen: +BS, soft NTND  Extremities: trace LE edema R>L  Neuro: non focal, speech clear, BARKER x 4    LABS:                        9.7    6.47  )-----------( 182      ( 01 Dec 2023 04:07 )             29.4     138  |  98  |  9.9  ----------------------------<  103<H>  4.0   |  30.0<H>  |  0.40<L>  Ca    8.0<L>      01 Dec 2023 04:07  Phos  3.5     12-01  Mg     1.3     12-01  TPro  5.1<L>  /  Alb  2.8<L>  /  TBili  1.6  /  DBili  x   /  AST  16  /  ALT  30  /  AlkPhos  89  12-01  PT/INR - ( 30 Nov 2023 13:01 )   PT: 12.4 sec;   INR: 1.12 ratio    PTT - ( 30 Nov 2023 13:01 )  PTT:28.7 sec    RADIOLOGY & ADDITIONAL TESTS:  CT angio 11/30/23  IMPRESSION: No pulmonary embolus is noted.  Previously noted nodule in the right upper lobe is not visualized as a   thick linear opacity is now noted in its place.  New nodules in the right middle and right lower lobes as described above   of uncertain etiology.  Patchy opacities in the right middle and right lower lobes, interlobular   septal thickening in the right lower lobe and small right pleural   effusion are noted. The constellation of the findings suggest pulmonary   edema.    TTE 11/30/23  Summary:   1. Left ventricular ejection fraction, by visual estimation, is 55 to 60%.   2. Normal global left ventricular systolic function.   3. Spectral Doppler shows impaired relaxation pattern of left ventricular myocardial filling (Grade I diastolic dysfunction).   4. There is mild concentric left ventricular hypertrophy.   5. Normal left atrial size.   6. Normal right atrial size.   7. There is no evidence of pericardial effusion.   8. Mild thickening of the anterior and posterior mitral valve leaflets.   9. Trace mitral valve regurgitation.  10. Sclerotic aortic valve with normal opening.  11. Endocardial visualization was enhanced with intravenous echo contrast.    A/P  78 year old male patient, former smoker, with a history of HTN, HLD, CAD s/p PCI 2002 and CABGx3 (3/23/20 - [facility]), COPD, lung cancer (recurrent NSCLC; RUL; squamous; on daily radiation with Taxol/Carbo weekly), MDT ILR, and paroxysmal atrial fibrillation and flutter (off Eliquis due to hemoptysis w BRB on admission 10/2023) who is admitted with hypoxia, rapid atrial flutter and potentially septic shock.     Currently in SR with rates in the upper 90-100s  Repeat Echo with preserved EF    - Would transition to Cardizem CD 120mg and stop beta blockers  - Continue Amiodarone for now. May need to reconsider use based on pulmonary status   - Would attempt trial on Eliquis while admitted to test for tolerance  - Continue to treat underlying disease process: PNA, CA  - Full recommendations to follow below.      Patient seen today in MICU. Reports mild improvement in symptoms. Remains on 4L NC oxygen supplementation. Telemetry reviewed.     TELE: Sinus Tachycardia in the 90-100bpm range. Converted in ER most likely     MEDICATIONS  (STANDING):  albuterol/ipratropium for Nebulization 3 milliLiter(s) Nebulizer every 6 hours  aMIOdarone    Tablet   Oral   aMIOdarone    Tablet 400 milliGRAM(s) Oral every 8 hours  aMIOdarone Infusion 0.5 mG/Min (16.7 mL/Hr) IV Continuous <Continuous>  atorvastatin 40 milliGRAM(s) Oral at bedtime  budesonide  80 MICROgram(s)/formoterol 4.5 MICROgram(s) Inhaler 1 Puff(s) Inhalation daily  chlorhexidine 2% Cloths 1 Application(s) Topical daily  diltiazem    milliGRAM(s) Oral once  doxycycline IVPB      doxycycline IVPB 100 milliGRAM(s) IV Intermittent every 12 hours  gabapentin 400 milliGRAM(s) Oral three times a day  heparin  Infusion.  Unit(s)/Hr (13 mL/Hr) IV Continuous <Continuous>  midodrine 5 milliGRAM(s) Oral every 8 hours  phenylephrine    Infusion 0.5 MICROgram(s)/kG/Min (13.8 mL/Hr) IV Continuous <Continuous>  piperacillin/tazobactam IVPB.. 3.375 Gram(s) IV Intermittent every 8 hours  tamsulosin 0.4 milliGRAM(s) Oral at bedtime  vancomycin  IVPB 1000 milliGRAM(s) IV Intermittent every 12 hours  vancomycin  IVPB        MEDICATIONS  (PRN):  acetaminophen     Tablet .. 650 milliGRAM(s) Oral every 6 hours PRN Moderate Pain (4 - 6)  HYDROmorphone   Tablet 2 milliGRAM(s) Oral every 8 hours PRN Severe Pain (7 - 10)  LORazepam     Tablet 1 milliGRAM(s) Oral every 6 hours PRN Anxiety    Allergies  ibuprofen (Anaphylaxis)  codeine (Urticaria)    PAST MEDICAL & SURGICAL HISTORY:  CAD in native artery  RCA 3 YAYA 1 in 2002 and 2 in 2019  Arthritis  Lumbosacral disc disease  has nerve stimulator  HTN (hypertension)  HLD (hyperlipidemia)  Lung cancer  Stage I a1; RUL; s/p SBRT in 11/2021; stable disease as of 4/2022  Paroxysmal atrial fibrillation  COPD (chronic obstructive pulmonary disease) with emphysema  H/O heart artery stent  RCA  S/P cataract surgery  b/l eyes 2016  S/P CABG (coronary artery bypass graft)    Vital Signs Last 24 Hrs  T(C): 37.2 (01 Dec 2023 04:00), Max: 38 (30 Nov 2023 12:41)  T(F): 99 (01 Dec 2023 04:00), Max: 100.4 (30 Nov 2023 12:41)  HR: 112 (01 Dec 2023 10:00) (64 - 169)  BP: 139/79 (01 Dec 2023 10:00) (68/44 - 139/79)  BP(mean): 93 (01 Dec 2023 10:00) (57 - 117)  RR: 25 (01 Dec 2023 10:00) (15 - 31)  SpO2: 98% (01 Dec 2023 10:00) (78% - 100%)    Parameters below as of 01 Dec 2023 08:00  Patient On (Oxygen Delivery Method): nasal cannula  O2 Flow (L/min): 2    Physical Exam:  Constitutional: AAOx3, NAD   Neck: supple, No JVD  Cardiovascular: +S1S2 RRR; SR at time of exam; 3/6 LIANA at LSB  Pulmonary: Coarse breath sounds in all fields. Mildly labored and tachypneic.   Abdomen: +BS, soft NTND  Extremities: trace LE edema R>L  Neuro: non focal, speech clear, BARKER x 4    LABS:                        9.7    6.47  )-----------( 182      ( 01 Dec 2023 04:07 )             29.4     138  |  98  |  9.9  ----------------------------<  103<H>  4.0   |  30.0<H>  |  0.40<L>  Ca    8.0<L>      01 Dec 2023 04:07  Phos  3.5     12-01  Mg     1.3     12-01  TPro  5.1<L>  /  Alb  2.8<L>  /  TBili  1.6  /  DBili  x   /  AST  16  /  ALT  30  /  AlkPhos  89  12-01  PT/INR - ( 30 Nov 2023 13:01 )   PT: 12.4 sec;   INR: 1.12 ratio    PTT - ( 30 Nov 2023 13:01 )  PTT:28.7 sec    RADIOLOGY & ADDITIONAL TESTS:  CT angio 11/30/23  IMPRESSION: No pulmonary embolus is noted.  Previously noted nodule in the right upper lobe is not visualized as a   thick linear opacity is now noted in its place.  New nodules in the right middle and right lower lobes as described above   of uncertain etiology.  Patchy opacities in the right middle and right lower lobes, interlobular   septal thickening in the right lower lobe and small right pleural   effusion are noted. The constellation of the findings suggest pulmonary   edema.    TTE 11/30/23  Summary:   1. Left ventricular ejection fraction, by visual estimation, is 55 to 60%.   2. Normal global left ventricular systolic function.   3. Spectral Doppler shows impaired relaxation pattern of left ventricular myocardial filling (Grade I diastolic dysfunction).   4. There is mild concentric left ventricular hypertrophy.   5. Normal left atrial size.   6. Normal right atrial size.   7. There is no evidence of pericardial effusion.   8. Mild thickening of the anterior and posterior mitral valve leaflets.   9. Trace mitral valve regurgitation.  10. Sclerotic aortic valve with normal opening.  11. Endocardial visualization was enhanced with intravenous echo contrast.    A/P  78 year old male patient, former smoker, with a history of HTN, HLD, CAD s/p PCI 2002 and CABGx3 (3/23/20 - [facility]), COPD, lung cancer (recurrent NSCLC; RUL; squamous; on daily radiation with Taxol/Carbo weekly), MDT ILR, and paroxysmal atrial fibrillation and flutter (off Eliquis due to hemoptysis w BRB on admission 10/2023) who is admitted with hypoxia, rapid atrial flutter and potentially septic shock.     Currently in SR with rates in the upper 90-100s  Repeat Echo with preserved EF    - Would transition to Cardizem CD 120mg and stop beta blockers  - Stop amiodarone. '  - Would attempt trial on Eliquis while admitted to test for tolerance  - Continue to treat underlying disease process: PNA, CA  - Full recommendations to follow below.

## 2023-12-01 NOTE — PROGRESS NOTE ADULT - SUBJECTIVE AND OBJECTIVE BOX
Patient is a 78y old  Male who presents with a chief complaint of Pneumonia, septic shock (01 Dec 2023 12:08)    BRIEF HOSPITAL COURSE: 78M with PMHx of essential HTN, COPD, CAD s/p CABG/drug-eluting stent, Afib (not on anticoagulation due to recent hemoptysis), spinal cord stimulator, Klebsiella pneumonia/S. aureus pneumonia, Squamous Cell Carcinoma (on chemotherapy and radiation therapy) presented with a few days of worsening difficulty breathing and cough with dizziness and wheezing. Patient underwent sepsis workup in ED and received over 3L of IV fluid. Patient also received adenosine, 3 doses of Lopressor, and Cardizem. Patient subsequently became hypotensive  and was not responding to fluid resuscitation, requiring Chaitanya-Synephrine infusion and was started on amiodarone infusion.       INTERVAL HPI/OVERNIGHT EVENTS: No acute events overnight.       ICU Vital Signs Last 24 Hrs  T(C): 37.6 (01 Dec 2023 12:00), Max: 37.6 (01 Dec 2023 12:00)  T(F): 99.6 (01 Dec 2023 12:00), Max: 99.6 (01 Dec 2023 12:00)  HR: 112 (01 Dec 2023 10:00) (64 - 169)  BP: 139/79 (01 Dec 2023 10:00) (68/44 - 139/79)  BP(mean): 93 (01 Dec 2023 10:00) (57 - 117)  ABP: --  ABP(mean): --  RR: 25 (01 Dec 2023 10:00) (15 - 31)  SpO2: 98% (01 Dec 2023 10:00) (90% - 100%)    O2 Parameters below as of 01 Dec 2023 08:00  Patient On (Oxygen Delivery Method): nasal cannula  O2 Flow (L/min): 2        I&O's Summary    30 Nov 2023 07:01  -  01 Dec 2023 07:00  --------------------------------------------------------  IN: 924.9 mL / OUT: 2300 mL / NET: -1375.1 mL    01 Dec 2023 07:01  -  01 Dec 2023 12:44  --------------------------------------------------------  IN: 0 mL / OUT: 1000 mL / NET: -1000 mL          LABS:                        9.7    6.47  )-----------( 182      ( 01 Dec 2023 04:07 )             29.4     12-01    138  |  98  |  9.9  ----------------------------<  103<H>  4.0   |  30.0<H>  |  0.40<L>    Ca    8.0<L>      01 Dec 2023 04:07  Phos  3.5     12-01  Mg     1.3     12-01    TPro  5.1<L>  /  Alb  2.8<L>  /  TBili  1.6  /  DBili  x   /  AST  16  /  ALT  30  /  AlkPhos  89  12-01    PT/INR - ( 30 Nov 2023 13:01 )   PT: 12.4 sec;   INR: 1.12 ratio         PTT - ( 30 Nov 2023 13:01 )  PTT:28.7 sec  Urinalysis Basic - ( 01 Dec 2023 04:07 )    Color: x / Appearance: x / SG: x / pH: x  Gluc: 103 mg/dL / Ketone: x  / Bili: x / Urobili: x   Blood: x / Protein: x / Nitrite: x   Leuk Esterase: x / RBC: x / WBC x   Sq Epi: x / Non Sq Epi: x / Bacteria: x      CAPILLARY BLOOD GLUCOSE        ABG - ( 30 Nov 2023 18:47 )  pH, Arterial: 7.400 pH, Blood: x     /  pCO2: 55    /  pO2: 70    / HCO3: 34    / Base Excess: 9.3   /  SaO2: 96.6                RADIOLOGY & ADDITIONAL TESTS:    Xray Chest 1 View- PORTABLE-Urgent (11.30.23 @ 15:06) >  RIGHT mid and lower zone multifocal airspace ill-defined   opacities/infectious pneumonia.    CT Head No Cont (11.30.23 @ 18:40) >  Age-appropriate involutional change and microvascular   ischemic disease. No evidence of acute stroke. No space-occupying lesion.   No intracranial hemorrhage. No change from 11/6/2023    CT Angio Chest PE Protocol w/ IV Cont (11.30.23 @ 18:42) >  No pulmonary embolus is noted.    Previously noted nodule in the right upper lobe is not visualized as a   thick linear opacity is now noted in its place.    New nodules in the right middle and right lower lobes as described above   of uncertain etiology.    Patchy opacities in the right middle and right lower lobes, interlobular   septal thickening in the right lower lobe and small right pleural   effusion are noted. The constellation of the findings suggest pulmonary   edema.      Consultant(s) Notes Reviewed:  [x ] YES  [ ] NO    MEDICATIONS  (STANDING):  albuterol/ipratropium for Nebulization 3 milliLiter(s) Nebulizer every 6 hours  aMIOdarone    Tablet 400 milliGRAM(s) Oral every 8 hours  aMIOdarone    Tablet   Oral   aMIOdarone Infusion 0.5 mG/Min (16.7 mL/Hr) IV Continuous <Continuous>  atorvastatin 40 milliGRAM(s) Oral at bedtime  budesonide  80 MICROgram(s)/formoterol 4.5 MICROgram(s) Inhaler 1 Puff(s) Inhalation daily  chlorhexidine 2% Cloths 1 Application(s) Topical daily  diltiazem    milliGRAM(s) Oral once  doxycycline IVPB 100 milliGRAM(s) IV Intermittent every 12 hours  doxycycline IVPB      gabapentin 400 milliGRAM(s) Oral three times a day  heparin  Infusion.  Unit(s)/Hr (13 mL/Hr) IV Continuous <Continuous>  midodrine 5 milliGRAM(s) Oral every 8 hours  phenylephrine    Infusion 0.5 MICROgram(s)/kG/Min (13.8 mL/Hr) IV Continuous <Continuous>  piperacillin/tazobactam IVPB.. 3.375 Gram(s) IV Intermittent every 8 hours  tamsulosin 0.4 milliGRAM(s) Oral at bedtime  vancomycin  IVPB 1000 milliGRAM(s) IV Intermittent every 12 hours  vancomycin  IVPB        MEDICATIONS  (PRN):  acetaminophen     Tablet .. 650 milliGRAM(s) Oral every 6 hours PRN Moderate Pain (4 - 6)  HYDROmorphone   Tablet 2 milliGRAM(s) Oral every 8 hours PRN Severe Pain (7 - 10)  LORazepam     Tablet 1 milliGRAM(s) Oral every 6 hours PRN Anxiety      PHYSICAL EXAM:  GENERAL: lying in bed comfortably, in no acute distress  HEAD:  Atraumatic, Normocephalic  EYES: EOMI, PERRLA, conjunctiva and sclera clear  NERVOUS SYSTEM:  Alert & Awake.   CHEST/LUNG: Decreased breath sounds at bases  HEART: Irregularly irregular rhythm; No murmurs  ABDOMEN: Soft, Nontender; Bowel sounds present  EXTREMITIES: No clubbing, cyanosis, or edema    Care Discussed with Consultants/Other Providers [ x] YES  [ ] NO

## 2023-12-01 NOTE — PROGRESS NOTE ADULT - SUBJECTIVE AND OBJECTIVE BOX
JENNIFER ELLISLEMUEL  [id number]      Chief Complaint:  PAFL/PNA    Interval History:  Patient feeling much better.  Reports no SOB or cough lying in bed.  Denies CP, palps.    Tele:  AFL -> SR          acetaminophen     Tablet .. 650 milliGRAM(s) Oral every 6 hours PRN  albuterol/ipratropium for Nebulization 3 milliLiter(s) Nebulizer every 6 hours  aMIOdarone    Tablet 400 milliGRAM(s) Oral every 8 hours  aMIOdarone    Tablet   Oral   aMIOdarone Infusion 0.5 mG/Min IV Continuous <Continuous>  atorvastatin 40 milliGRAM(s) Oral at bedtime  budesonide  80 MICROgram(s)/formoterol 4.5 MICROgram(s) Inhaler 1 Puff(s) Inhalation daily  chlorhexidine 2% Cloths 1 Application(s) Topical daily  diltiazem    milliGRAM(s) Oral once  doxycycline IVPB      doxycycline IVPB 100 milliGRAM(s) IV Intermittent every 12 hours  gabapentin 400 milliGRAM(s) Oral three times a day  heparin  Infusion.  Unit(s)/Hr IV Continuous <Continuous>  HYDROmorphone   Tablet 2 milliGRAM(s) Oral every 8 hours PRN  LORazepam     Tablet 1 milliGRAM(s) Oral every 6 hours PRN  midodrine 5 milliGRAM(s) Oral every 8 hours  phenylephrine    Infusion 0.5 MICROgram(s)/kG/Min IV Continuous <Continuous>  piperacillin/tazobactam IVPB.. 3.375 Gram(s) IV Intermittent every 8 hours  tamsulosin 0.4 milliGRAM(s) Oral at bedtime  vancomycin  IVPB      vancomycin  IVPB 1000 milliGRAM(s) IV Intermittent every 12 hours          Physical Exam:  T(C): 37.6 (12-01-23 @ 12:00), Max: 37.6 (12-01-23 @ 12:00)  HR: 112 (12-01-23 @ 10:00) (73 - 157)  BP: 139/79 (12-01-23 @ 10:00) (75/48 - 139/79)  RR: 25 (12-01-23 @ 10:00) (15 - 31)  SpO2: 98% (12-01-23 @ 10:00) (90% - 100%)  General: Comfortable in NAD  Neck: No JVD  CVS: nl s1s2, no s3  Pulm: Diminished b/l  Abd: soft, non-tender  Ext: No c/c/e  Neuro A&O x3  Psych: Normal affect      Labs:   01 Dec 2023 04:07    138    |  98     |  9.9    ----------------------------<  103    4.0     |  30.0   |  0.40     Ca    8.0        01 Dec 2023 04:07  Phos  3.5       01 Dec 2023 04:07  Mg     1.3       01 Dec 2023 04:07    TPro  5.1    /  Alb  2.8    /  TBili  1.6    /  DBili  x      /  AST  16     /  ALT  30     /  AlkPhos  89     01 Dec 2023 04:07                          9.7    6.47  )-----------( 182      ( 01 Dec 2023 04:07 )             29.4     PT/INR - ( 30 Nov 2023 13:01 )   PT: 12.4 sec;   INR: 1.12 ratio         PTT - ( 30 Nov 2023 13:01 )  PTT:28.7 sec      Echo:   1. Left ventricular ejection fraction, by visual estimation, is 55 to 60%.   2. Normal global left ventricular systolic function.   3. Spectral Doppler shows impaired relaxation pattern of left ventricular myocardial filling (Grade I diastolic dysfunction).   4. There is mild concentric left ventricular hypertrophy.   5. Normal left atrial size.   6. Normal right atrial size.   7. There is no evidence of pericardial effusion.   8. Mild thickening of the anterior and posterior mitral valve leaflets.   9. Trace mitral valve regurgitation.  10. Sclerotic aortic valve with normal opening.  11. Endocardial visualization was enhanced with intravenous echo contrast.    CTPA:  No pulmonary embolus is noted.  Previously noted nodule in the right upper lobe is not visualized as a thick linear opacity is now noted in its place.  New nodules in the right middle and right lower lobes as described above of uncertain etiology.  Patchy opacities in the right middle and right lower lobes, interlobular septal thickening in the right lower lobe and small right pleural   effusion are noted. The constellation of the findings suggest pulmonary edema.        Assessment:  79 y/o male PMHx HTN, CAD s/p CABG x3v, COPD, Afib off Eliquis 2/2 hemoptysis, lung CA p/w cough, fever, tachycardia and SOB.  Patient seen in EP office with AFL with RVR and hypoxia and sent to ER.  In ER still with RVR but now appears to be more AF, still with RVR.  Noted to be hypotensive, CXR with RML/RLL PNA.  RVR 2/2 infection and hypotension.  Per EP plan is for CCB for rate control and Amio given x1.  Pulm to f/u to discuss possible resumption of A/C and then consideration eventually of ablation.  Will need abx and treatment for PNA which should help with RVR if not causing conversion back to SR.  Does not appear to be tiny cute CHF at this time.  -Improved today now back in SR with Amio and Cardizem  Still no CHF.  -Echo with normal EF and no significant VHD.  -CTC with ?pulm edema vs. PNA.  Given exam and improvement with abx and not diuresis would hold on diuresis now.  Also has had hypotension.      Plan:  1. F/u EP.  D/c Amio and continue on Cardizem CD.  2. D/w pulm if can restart A/C.  May consider Watchman and possible ablation as OP.  3. Abx per primary team/ICU.  4. Hold on diuresis for now.  Monitor for more clinical evidence of CHF.  5. Continue other current CV meds at current doses.  6. No additional CV testing now.    D/w Dr. Mayorga.

## 2023-12-01 NOTE — PHARMACOTHERAPY INTERVENTION NOTE - COMMENTS
MRSA PCR not detected, discussed with team can discontinue vancomycin.
Arrhythmia
started amiodarone gtt on 11/30 @19:30 at rate of 1 mg/min x 14 hrs  will decrease to 0.5 mg/min x 2 hrs then po load

## 2023-12-02 NOTE — DIETITIAN INITIAL EVALUATION ADULT - ADD RECOMMEND
Add Ensure Plus BID (350 kcals, 20 g pro each)  Rx: MVI daily to optimize nutrition  Encourage/assist with PO intake as needed  Trend weights Add Ensure Plus BID (350 kcals, 20 g pro each)  Rx: MVI daily to optimize nutrition  Encourage/assist with PO intake as needed  Monitor electrolytes, replete as needed  Trend weights

## 2023-12-02 NOTE — DIETITIAN INITIAL EVALUATION ADULT - OTHER INFO
78 year old male, former smoker, with HTN, hyperlipidemia, CAD s/p PCI 2002 and CABG x 3 (3/2020 with Dr. Hutchison), COPD, lung cancer (recurrent NSCLC; RUL; squamous; on daily radiation with Taxol/Carbo weekly), and paroxysmal atrial fibrillation s/p MDT ILR who has been off AC since 10/2023 due to hemoptysis. He presented to EP followup office visit with c/o cough, fever, and worsening shortness of breath over several days, noted to be in AFL with rapid ventricular rates on ILR. Sent to [facility] for further evaluation and was admitted with acute hypoxic respiratory failure and septic shock 2/2 pneumonia. Initially on pressors which have since been discontinued.

## 2023-12-02 NOTE — DIETITIAN INITIAL EVALUATION ADULT - PERTINENT LABORATORY DATA
12-02    141  |  100  |  8.2  ----------------------------<  119<H>  4.1   |  31.0<H>  |  0.45<L>    Ca    8.4      02 Dec 2023 03:00  Phos  3.8     12-02  Mg     1.5     12-02    TPro  5.1<L>  /  Alb  2.8<L>  /  TBili  1.6  /  DBili  x   /  AST  16  /  ALT  30  /  AlkPhos  89  12-01

## 2023-12-02 NOTE — PROGRESS NOTE ADULT - SUBJECTIVE AND OBJECTIVE BOX
Brief Hospital Course: Pt is 79 y/o male HD 2, ICU Day 2, with PMHx of HTN, CAD s/p CABG x3v, COPD, Afib off Eliquis 2/2 hemoptysis, lung CA p/w cough, fever, tachycardia and SOB. Patient seen in EP office with AFL with RVR and hypoxia and sent to ER.  In ER still with RVR but now appears to be more AF, still with RVR.  Noted to be hypotensive, CXR with RML/RLL PNA.  RVR 2/2 infection and hypotension.  Per EP plan is for CCB for rate control and Amio given x1 and now  on Cardizem   Pulm seeing pt [OK for heparin and then consideration eventually of ablation].      PMHx:  CAD in native artery  RCA 3 YAYA 1 in 2002 and 2 in 2019  Arthritis  Lumbosacral disc disease, has nerve stimulator  HTN (hypertension)  HLD (hyperlipidemia)  Lung cancer [Stage I a1; RUL; s/p SBRT in 11/2021; stable disease as of 4/2022]  Paroxysmal atrial fibrillation  COPD (chronic obstructive pulmonary disease) with emphysema    PSHx:  H/O heart artery stent  RCA  S/P cataract surgery  b/l eyes 2016  S/P CABG (coronary artery bypass graft)      Overnight Events:  -Started on Heparin gtt    Daytime Events:  -IV Diltiazem and Amio gtt d/cd  -Started on oral Diltiazem and Metoprolol  -Started on Lasix  -To consider resuming Eliquis if pt tolerates heparin gtt for several days without any complications      Review of Systems:  Admits to faring better today. Denies headache, chest pain, palpitation or dyspnea.                          Allergies: Ibuprofen [anaphylaxis]; Codeine [Urticaria]     Intolerances: None reported       ICU Vital Signs Last 24 Hrs  T(C): 37.8 (02 Dec 2023 15:36), Max: 38.1 (01 Dec 2023 23:58)  T(F): 100 (02 Dec 2023 15:36), Max: 100.5 (01 Dec 2023 23:58)  HR: 89 (02 Dec 2023 15:00) (80 - 141)  BP: 112/65 (02 Dec 2023 15:00) (98/75 - 138/66)  BP(mean): 79 (02 Dec 2023 15:00) (68 - 87)  ABP: --  ABP(mean): --  RR: 22 (02 Dec 2023 15:00) (16 - 31)  SpO2: 95% (02 Dec 2023 15:00) (88% - 98%)    O2 Parameters below as of 02 Dec 2023 16:00  Patient On (Oxygen Delivery Method): nasal cannula  O2 Flow (L/min): 3        Physical Examination:    General: Stable. NAD. Lying supine in bed and O2 satting at 95% on 3L O2 by NC    NEURO: AOx4. Motor 5/5 in upper ext b/l, and 4/5 in lower ext b/l [likely due to reduced effort]. CN II to XII grossly intact.     HEENT: PERRLA    PULM: Reduced AE on R lower lobe on anterior chest wall. No rales or rhonchi.    CVS: Normal S1 and S2. No rgm    ABD: Full. Soft. Non-tender. No organomegaly     EXT: No obvious limb deformities.     SKIN: Warm ext b/l       ABG - ( 30 Nov 2023 18:47 )  pH, Arterial: 7.400 pH, Blood: x     /  pCO2: 55    /  pO2: 70    / HCO3: 34    / Base Excess: 9.3   /  SaO2: 96.6        LABS:                        10.1   6.68  )-----------( 193      ( 02 Dec 2023 03:00 )             30.5     12-02    141  |  100  |  8.2  ----------------------------<  119<H>  4.1   |  31.0<H>  |  0.45<L>    Ca    8.4      02 Dec 2023 03:00  Phos  3.8     12-02  Mg     1.5     12-02    TPro  5.1<L>  /  Alb  2.8<L>  /  TBili  1.6  /  DBili  x   /  AST  16  /  ALT  30  /  AlkPhos  89  12-01    PTT - ( 02 Dec 2023 12:33 )  PTT:68.4 sec  Urinalysis Basic - ( 02 Dec 2023 03:00 )    Color: x / Appearance: x / SG: x / pH: x  Gluc: 119 mg/dL / Ketone: x  / Bili: x / Urobili: x   Blood: x / Protein: x / Nitrite: x   Leuk Esterase: x / RBC: x / WBC x   Sq Epi: x / Non Sq Epi: x / Bacteria: x      CULTURES:  Culture Results:   <10,000 CFU/mL Normal Urogenital Doris (11-30 @ 17:18)  Culture Results:   No growth at 24 hours (11-30 @ 13:08)  Culture Results:   No growth at 24 hours (11-30 @ 13:07)  Rapid RVP Result: NotDetec (11-30 @ 13:01)      Medications:  MEDICATIONS  (STANDING):  albuterol/ipratropium for Nebulization 3 milliLiter(s) Nebulizer every 6 hours  atorvastatin 40 milliGRAM(s) Oral at bedtime  budesonide  80 MICROgram(s)/formoterol 4.5 MICROgram(s) Inhaler 1 Puff(s) Inhalation daily  chlorhexidine 2% Cloths 1 Application(s) Topical daily  doxycycline IVPB 100 milliGRAM(s) IV Intermittent every 12 hours  doxycycline IVPB      furosemide    Tablet 40 milliGRAM(s) Oral daily  gabapentin 400 milliGRAM(s) Oral three times a day  heparin  Infusion.  Unit(s)/Hr (13 mL/Hr) IV Continuous <Continuous>  metoprolol tartrate 12.5 milliGRAM(s) Oral every 12 hours  midodrine 5 milliGRAM(s) Oral every 8 hours  piperacillin/tazobactam IVPB.. 3.375 Gram(s) IV Intermittent every 8 hours  tamsulosin 0.4 milliGRAM(s) Oral at bedtime    MEDICATIONS  (PRN):  acetaminophen     Tablet .. 650 milliGRAM(s) Oral every 6 hours PRN Temp greater or equal to 38C (100.4F), Moderate Pain (4 - 6)  HYDROmorphone   Tablet 2 milliGRAM(s) Oral every 8 hours PRN Severe Pain (7 - 10)  LORazepam     Tablet 1 milliGRAM(s) Oral every 6 hours PRN Anxiety        12-01 @ 07:01  -  12-02 @ 07:00  --------------------------------------------------------  IN: 616 mL / OUT: 2920 mL / NET: -2304 mL    12-02 @ 07:01 - 12-02 @ 15:39  --------------------------------------------------------  IN: 353 mL / OUT: 500 mL / NET: -147 mL       Brief Hospital Course: Pt is 79 y/o male HD 2, ICU Day 2, with PMHx of HTN, CAD s/p CABG x3v, COPD, Afib off Eliquis 2/2 hemoptysis, lung CA p/w cough, fever, tachycardia and SOB. Patient seen in EP office with AFL with RVR and hypoxia and sent to ER.  In ER still with RVR but now appears to be more AF, still with RVR.  Noted to be hypotensive, CXR with RML/RLL PNA.  RVR 2/2 infection and hypotension.  Per EP, plan is for CCB for rate control and Amio given x1 and subsequently on Cardizem. Pulm seeing pt [OK for heparin and then consideration eventually of ablation].      PMHx:  CAD in native artery  RCA 3 YAYA 1 in 2002 and 2 in 2019  Arthritis  Lumbosacral disc disease, has nerve stimulator  HTN (hypertension)  HLD (hyperlipidemia)  Lung cancer [Stage I a1; RUL; s/p SBRT in 11/2021; stable disease as of 4/2022]  Paroxysmal atrial fibrillation  COPD (chronic obstructive pulmonary disease) with emphysema    PSHx:  H/O heart artery stent  RCA  S/P cataract surgery  b/l eyes 2016  S/P CABG (coronary artery bypass graft)      Overnight Events:  -Started on Heparin gtt    Daytime Events:  -IV Diltiazem and Amio gtt d/cd  -Started on oral Diltiazem and Metoprolol  -Started on Lasix  -To consider resuming Eliquis if pt tolerates heparin gtt for several days without any complications      Review of Systems:  Admits to faring better today. Denies headache, chest pain, palpitation or dyspnea.                          Allergies: Ibuprofen [anaphylaxis]; Codeine [Urticaria]     Intolerances: None reported       ICU Vital Signs Last 24 Hrs  T(C): 37.8 (02 Dec 2023 15:36), Max: 38.1 (01 Dec 2023 23:58)  T(F): 100 (02 Dec 2023 15:36), Max: 100.5 (01 Dec 2023 23:58)  HR: 89 (02 Dec 2023 15:00) (80 - 141)  BP: 112/65 (02 Dec 2023 15:00) (98/75 - 138/66)  BP(mean): 79 (02 Dec 2023 15:00) (68 - 87)  ABP: --  ABP(mean): --  RR: 22 (02 Dec 2023 15:00) (16 - 31)  SpO2: 95% (02 Dec 2023 15:00) (88% - 98%)    O2 Parameters below as of 02 Dec 2023 16:00  Patient On (Oxygen Delivery Method): nasal cannula  O2 Flow (L/min): 3        Physical Examination:    General: Stable. NAD. Lying supine in bed and O2 satting at 95% on 3L O2 by NC    NEURO: AOx4. Motor 5/5 in upper ext b/l, and 4/5 in lower ext b/l [likely due to reduced effort]. CN II to XII grossly intact.     HEENT: PERRLA    PULM: Reduced AE on R lower lobe on anterior chest wall. No rales or rhonchi.    CVS: Normal S1 and S2. No rgm    ABD: Full. Soft. Non-tender. No organomegaly     EXT: No obvious limb deformities.     SKIN: Warm ext b/l       ABG - ( 30 Nov 2023 18:47 )  pH, Arterial: 7.400 pH, Blood: x     /  pCO2: 55    /  pO2: 70    / HCO3: 34    / Base Excess: 9.3   /  SaO2: 96.6        LABS:                        10.1   6.68  )-----------( 193      ( 02 Dec 2023 03:00 )             30.5     12-02    141  |  100  |  8.2  ----------------------------<  119<H>  4.1   |  31.0<H>  |  0.45<L>    Ca    8.4      02 Dec 2023 03:00  Phos  3.8     12-02  Mg     1.5     12-02    TPro  5.1<L>  /  Alb  2.8<L>  /  TBili  1.6  /  DBili  x   /  AST  16  /  ALT  30  /  AlkPhos  89  12-01    PTT - ( 02 Dec 2023 12:33 )  PTT:68.4 sec  Urinalysis Basic - ( 02 Dec 2023 03:00 )    Color: x / Appearance: x / SG: x / pH: x  Gluc: 119 mg/dL / Ketone: x  / Bili: x / Urobili: x   Blood: x / Protein: x / Nitrite: x   Leuk Esterase: x / RBC: x / WBC x   Sq Epi: x / Non Sq Epi: x / Bacteria: x      CULTURES:  Culture Results:   <10,000 CFU/mL Normal Urogenital Doris (11-30 @ 17:18)  Culture Results:   No growth at 24 hours (11-30 @ 13:08)  Culture Results:   No growth at 24 hours (11-30 @ 13:07)  Rapid RVP Result: NotDetec (11-30 @ 13:01)      Medications:  MEDICATIONS  (STANDING):  albuterol/ipratropium for Nebulization 3 milliLiter(s) Nebulizer every 6 hours  atorvastatin 40 milliGRAM(s) Oral at bedtime  budesonide  80 MICROgram(s)/formoterol 4.5 MICROgram(s) Inhaler 1 Puff(s) Inhalation daily  chlorhexidine 2% Cloths 1 Application(s) Topical daily  doxycycline IVPB 100 milliGRAM(s) IV Intermittent every 12 hours  doxycycline IVPB      furosemide    Tablet 40 milliGRAM(s) Oral daily  gabapentin 400 milliGRAM(s) Oral three times a day  heparin  Infusion.  Unit(s)/Hr (13 mL/Hr) IV Continuous <Continuous>  metoprolol tartrate 12.5 milliGRAM(s) Oral every 12 hours  midodrine 5 milliGRAM(s) Oral every 8 hours  piperacillin/tazobactam IVPB.. 3.375 Gram(s) IV Intermittent every 8 hours  tamsulosin 0.4 milliGRAM(s) Oral at bedtime    MEDICATIONS  (PRN):  acetaminophen     Tablet .. 650 milliGRAM(s) Oral every 6 hours PRN Temp greater or equal to 38C (100.4F), Moderate Pain (4 - 6)  HYDROmorphone   Tablet 2 milliGRAM(s) Oral every 8 hours PRN Severe Pain (7 - 10)  LORazepam     Tablet 1 milliGRAM(s) Oral every 6 hours PRN Anxiety        12-01 @ 07:01  -  12-02 @ 07:00  --------------------------------------------------------  IN: 616 mL / OUT: 2920 mL / NET: -2304 mL    12-02 @ 07:01 - 12-02 @ 15:39  --------------------------------------------------------  IN: 353 mL / OUT: 500 mL / NET: -147 mL       Brief Hospital Course: Pt is 77 y/o male HD 2, ICU Day 2, with PMHx of HTN, CAD s/p CABG x3v, COPD, Afib off Eliquis 2/2 hemoptysis, lung CA p/w cough, fever, tachycardia and SOB. Patient seen in EP office with AFL with RVR and hypoxia and sent to ER.  In ER still with RVR but now appears to be more AF, still with RVR.  Noted to be hypotensive, CXR with RML/RLL PNA.  RVR 2/2 infection and hypotension.  Per EP, plan is for CCB for rate control and Amio given x1 and subsequently on Cardizem. Pulm seeing pt [OK for heparin and then consideration eventually of ablation].      PMHx:  CAD in native artery  RCA 3 YAYA 1 in 2002 and 2 in 2019  Arthritis  Lumbosacral disc disease, has nerve stimulator  HTN (hypertension)  HLD (hyperlipidemia)  Lung cancer [Stage I a1; RUL; s/p SBRT in 11/2021; stable disease as of 4/2022]  Paroxysmal atrial fibrillation  COPD (chronic obstructive pulmonary disease) with emphysema    PSHx:  H/O heart artery stent  RCA  S/P cataract surgery  b/l eyes 2016  S/P CABG (coronary artery bypass graft)      Overnight Events:  -Started on Heparin gtt    Daytime Events:  -IV Diltiazem and Amio gtt d/cd  -Started on oral Diltiazem and Metoprolol  -Started on Lasix  -To consider resuming Eliquis if pt tolerates heparin gtt for several days without any complications      Review of Systems:  Admits to faring better today. Denies headache, chest pain, palpitation or dyspnea.                          Allergies: Ibuprofen [anaphylaxis]; Codeine [Urticaria]     Intolerances: None reported       ICU Vital Signs Last 24 Hrs  T(C): 37.8 (02 Dec 2023 15:36), Max: 38.1 (01 Dec 2023 23:58)  T(F): 100 (02 Dec 2023 15:36), Max: 100.5 (01 Dec 2023 23:58)  HR: 89 (02 Dec 2023 15:00) (80 - 141)  BP: 112/65 (02 Dec 2023 15:00) (98/75 - 138/66)  BP(mean): 79 (02 Dec 2023 15:00) (68 - 87)  ABP: --  ABP(mean): --  RR: 22 (02 Dec 2023 15:00) (16 - 31)  SpO2: 95% (02 Dec 2023 15:00) (88% - 98%)    O2 Parameters below as of 02 Dec 2023 16:00  Patient On (Oxygen Delivery Method): nasal cannula  O2 Flow (L/min): 3        Physical Examination:    General: Stable. NAD. Lying supine in bed and O2 satting at 95% on 3L O2 by NC    NEURO: AOx4. Motor 5/5 in upper ext b/l, and 4/5 in lower ext b/l [likely due to reduced effort]. CN II to XII grossly intact.     HEENT: PERRLA    PULM: Reduced AE on R lower lobe on anterior chest wall. No rales or rhonchi.    CVS: Normal S1 and S2. No rgm    ABD: Full. Soft. Non-tender. No organomegaly     EXT: No obvious limb deformities.     SKIN: Warm ext b/l       ABG - ( 30 Nov 2023 18:47 )  pH, Arterial: 7.400 pH, Blood: x     /  pCO2: 55    /  pO2: 70    / HCO3: 34    / Base Excess: 9.3   /  SaO2: 96.6        LABS:                        10.1   6.68  )-----------( 193      ( 02 Dec 2023 03:00 )             30.5     12-02    141  |  100  |  8.2  ----------------------------<  119<H>  4.1   |  31.0<H>  |  0.45<L>    Ca    8.4      02 Dec 2023 03:00  Phos  3.8     12-02  Mg     1.5     12-02    TPro  5.1<L>  /  Alb  2.8<L>  /  TBili  1.6  /  DBili  x   /  AST  16  /  ALT  30  /  AlkPhos  89  12-01    PTT - ( 02 Dec 2023 12:33 )  PTT:68.4 sec  Urinalysis Basic - ( 02 Dec 2023 03:00 )    Color: x / Appearance: x / SG: x / pH: x  Gluc: 119 mg/dL / Ketone: x  / Bili: x / Urobili: x   Blood: x / Protein: x / Nitrite: x   Leuk Esterase: x / RBC: x / WBC x   Sq Epi: x / Non Sq Epi: x / Bacteria: x      CULTURES:  Culture Results:   <10,000 CFU/mL Normal Urogenital Doris (11-30 @ 17:18)  Culture Results:   No growth at 24 hours (11-30 @ 13:08)  Culture Results:   No growth at 24 hours (11-30 @ 13:07)  Rapid RVP Result: NotDetec (11-30 @ 13:01)      Medications:  MEDICATIONS  (STANDING):  albuterol/ipratropium for Nebulization 3 milliLiter(s) Nebulizer every 6 hours  atorvastatin 40 milliGRAM(s) Oral at bedtime  budesonide  80 MICROgram(s)/formoterol 4.5 MICROgram(s) Inhaler 1 Puff(s) Inhalation daily  chlorhexidine 2% Cloths 1 Application(s) Topical daily  doxycycline IVPB 100 milliGRAM(s) IV Intermittent every 12 hours  doxycycline IVPB      furosemide    Tablet 40 milliGRAM(s) Oral daily  gabapentin 400 milliGRAM(s) Oral three times a day  heparin  Infusion.  Unit(s)/Hr (13 mL/Hr) IV Continuous <Continuous>  metoprolol tartrate 12.5 milliGRAM(s) Oral every 12 hours  midodrine 5 milliGRAM(s) Oral every 8 hours  piperacillin/tazobactam IVPB.. 3.375 Gram(s) IV Intermittent every 8 hours  tamsulosin 0.4 milliGRAM(s) Oral at bedtime    MEDICATIONS  (PRN):  acetaminophen     Tablet .. 650 milliGRAM(s) Oral every 6 hours PRN Temp greater or equal to 38C (100.4F), Moderate Pain (4 - 6)  HYDROmorphone   Tablet 2 milliGRAM(s) Oral every 8 hours PRN Severe Pain (7 - 10)  LORazepam     Tablet 1 milliGRAM(s) Oral every 6 hours PRN Anxiety        12-01 @ 07:01  -  12-02 @ 07:00  --------------------------------------------------------  IN: 616 mL / OUT: 2920 mL / NET: -2304 mL    12-02 @ 07:01 - 12-02 @ 15:39  --------------------------------------------------------  IN: 353 mL / OUT: 500 mL / NET: -147 mL

## 2023-12-02 NOTE — PROGRESS NOTE ADULT - SUBJECTIVE AND OBJECTIVE BOX
Pt seen lying in bed, daughter at bedside. Remains on 4L NC with O2 sats ~ 92%. Denies chest pain or palpitation; breathing unchanged.       PAST MEDICAL & SURGICAL HISTORY:  CAD in native artery, RCA 3 YAYA 1 in 2002 and 2 in 2019  Arthritis  Lumbosacral disc disease  HTN (hypertension)  HLD (hyperlipidemia)  Lung cancer  Paroxysmal atrial fibrillation  COPD (chronic obstructive pulmonary disease) with emphysema  S/P cataract surgery  S/P CABG (coronary artery bypass graft)    MEDICATIONS  (STANDING):  albuterol/ipratropium for Nebulization 3 milliLiter(s) Nebulizer every 6 hours  atorvastatin 40 milliGRAM(s) Oral at bedtime  budesonide  80 MICROgram(s)/formoterol 4.5 MICROgram(s) Inhaler 1 Puff(s) Inhalation daily  chlorhexidine 2% Cloths 1 Application(s) Topical daily  doxycycline IVPB      doxycycline IVPB 100 milliGRAM(s) IV Intermittent every 12 hours  gabapentin 400 milliGRAM(s) Oral three times a day  heparin  Infusion.  Unit(s)/Hr (13 mL/Hr) IV Continuous <Continuous>  metoprolol tartrate 12.5 milliGRAM(s) Oral every 12 hours  midodrine 5 milliGRAM(s) Oral every 8 hours  piperacillin/tazobactam IVPB.. 3.375 Gram(s) IV Intermittent every 8 hours  tamsulosin 0.4 milliGRAM(s) Oral at bedtime    MEDICATIONS  (PRN):  acetaminophen     Tablet .. 650 milliGRAM(s) Oral every 6 hours PRN Temp greater or equal to 38C (100.4F), Moderate Pain (4 - 6)  HYDROmorphone   Tablet 2 milliGRAM(s) Oral every 8 hours PRN Severe Pain (7 - 10)  LORazepam     Tablet 1 milliGRAM(s) Oral every 6 hours PRN Anxiety    Allergies:  ibuprofen (Anaphylaxis)  codeine (Urticaria)    Vital Signs Last 24 Hrs  T(C): 37.1 (02 Dec 2023 07:25), Max: 38.1 (01 Dec 2023 23:58)  T(F): 98.8 (02 Dec 2023 07:25), Max: 100.5 (01 Dec 2023 23:58)  HR: 127 (02 Dec 2023 12:00) (100 - 141)  BP: 120/65 (02 Dec 2023 12:00) (88/74 - 138/66)  BP(mean): 80 (02 Dec 2023 12:00) (68 - 91)  RR: 27 (02 Dec 2023 12:00) (16 - 32)  SpO2: 91% (02 Dec 2023 12:00) (88% - 98%)    Parameters below as of 02 Dec 2023 09:49  Patient On (Oxygen Delivery Method): nasal cannula, 3L    Physical Exam:  Constitutional: awake, alert, no obvious distress   Cardiovascular: +S1S2 tachycardic   Pulmonary: b/l rhonchi lower lobes   GI: soft NTND +BS  Extremities: no pedal edema, +distal pulses b/l  Neuro: non focal, BARKER x4    LABS:                        10.1   6.68  )-----------( 193      ( 02 Dec 2023 03:00 )             30.5     12-02    141  |  100  |  8.2  ----------------------------<  119<H>  4.1   |  31.0<H>  |  0.45<L>    Ca    8.4      02 Dec 2023 03:00  Phos  3.8     12-02  Mg     1.5     12-02    TPro  5.1<L>  /  Alb  2.8<L>  /  TBili  1.6  /  DBili  x   /  AST  16  /  ALT  30  /  AlkPhos  89  12-01    PT/INR - ( 30 Nov 2023 13:01 )   PT: 12.4 sec;   INR: 1.12 ratio      PTT - ( 02 Dec 2023 05:45 )  PTT:73.6 sec  Urinalysis Basic - ( 02 Dec 2023 03:00 )    Color: x / Appearance: x / SG: x / pH: x  Gluc: 119 mg/dL / Ketone: x  / Bili: x / Urobili: x   Blood: x / Protein: x / Nitrite: x   Leuk Esterase: x / RBC: x / WBC x   Sq Epi: x / Non Sq Epi: x / Bacteria: x    RADIOLOGY & ADDITIONAL TESTS:  Echo: 11/30/23:   Summary:   1. Left ventricular ejection fraction, by visual estimation, is 55 to 60%.   2. Normal global left ventricular systolic function.   3. Spectral Doppler shows impaired relaxation pattern of left ventricular myocardial filling (Grade I diastolic dysfunction).   4. There is mild concentric left ventricular hypertrophy.   5. Normal left atrial size.   6. Normal right atrial size.   7. There is no evidence of pericardial effusion.   8. Mild thickening of the anterior and posterior mitral valve leaflets.   9. Trace mitral valve regurgitation.  10. Sclerotic aortic valve with normal opening.  11. Endocardial visualization was enhanced with intravenous echo contrast.  Rich Bailey, Electronically signed on 12/1/2023 at 11:00:54 AM    CT angio chest: 11/30/23:   IMPRESSION: No pulmonary embolus is noted.  Previously noted nodule in the right upper lobe is not visualized as a thick linear opacity is now noted in its place.  New nodules in the right middle and right lower lobes as described above of uncertain etiology.  Patchy opacities in the right middle and right lower lobes, interlobular septal thickening in the right lower lobe and small right pleural effusion are noted. The constellation of the findings suggest pulmonary edema.  --- End of Report ---  AMI DUMONT MD; Attending Radiologist  This document has been electronically signed. Nov 30 2023  6:52PM     Pt seen lying in bed, daughter at bedside. Remains on 4L NC with O2 sats ~ 92%. Denies chest pain or palpitation; breathing unchanged.   Telemetry reviewed: sinus tachycardia, possible AT, 120-130s, converted to atrial fibrillation @ 12:45PM with rates in the 80s     PAST MEDICAL & SURGICAL HISTORY:  CAD in native artery, RCA 3 YAYA 1 in 2002 and 2 in 2019  Arthritis  Lumbosacral disc disease  HTN (hypertension)  HLD (hyperlipidemia)  Lung cancer  Paroxysmal atrial fibrillation  COPD (chronic obstructive pulmonary disease) with emphysema  S/P cataract surgery  S/P CABG (coronary artery bypass graft)    MEDICATIONS  (STANDING):  albuterol/ipratropium for Nebulization 3 milliLiter(s) Nebulizer every 6 hours  atorvastatin 40 milliGRAM(s) Oral at bedtime  budesonide  80 MICROgram(s)/formoterol 4.5 MICROgram(s) Inhaler 1 Puff(s) Inhalation daily  chlorhexidine 2% Cloths 1 Application(s) Topical daily  doxycycline IVPB      doxycycline IVPB 100 milliGRAM(s) IV Intermittent every 12 hours  gabapentin 400 milliGRAM(s) Oral three times a day  heparin  Infusion.  Unit(s)/Hr (13 mL/Hr) IV Continuous <Continuous>  metoprolol tartrate 12.5 milliGRAM(s) Oral every 12 hours  midodrine 5 milliGRAM(s) Oral every 8 hours  piperacillin/tazobactam IVPB.. 3.375 Gram(s) IV Intermittent every 8 hours  tamsulosin 0.4 milliGRAM(s) Oral at bedtime    MEDICATIONS  (PRN):  acetaminophen     Tablet .. 650 milliGRAM(s) Oral every 6 hours PRN Temp greater or equal to 38C (100.4F), Moderate Pain (4 - 6)  HYDROmorphone   Tablet 2 milliGRAM(s) Oral every 8 hours PRN Severe Pain (7 - 10)  LORazepam     Tablet 1 milliGRAM(s) Oral every 6 hours PRN Anxiety    Allergies:  ibuprofen (Anaphylaxis)  codeine (Urticaria)    Vital Signs Last 24 Hrs  T(C): 37.1 (02 Dec 2023 07:25), Max: 38.1 (01 Dec 2023 23:58)  T(F): 98.8 (02 Dec 2023 07:25), Max: 100.5 (01 Dec 2023 23:58)  HR: 127 (02 Dec 2023 12:00) (100 - 141)  BP: 120/65 (02 Dec 2023 12:00) (88/74 - 138/66)  BP(mean): 80 (02 Dec 2023 12:00) (68 - 91)  RR: 27 (02 Dec 2023 12:00) (16 - 32)  SpO2: 91% (02 Dec 2023 12:00) (88% - 98%)    Parameters below as of 02 Dec 2023 09:49  Patient On (Oxygen Delivery Method): nasal cannula, 3L    Physical Exam:  Constitutional: awake, alert, no obvious distress   Cardiovascular: +S1S2 tachycardic   Pulmonary: b/l rhonchi lower lobes   GI: soft NTND +BS  Extremities: no pedal edema, +distal pulses b/l  Neuro: non focal, BARKER x4    LABS:                        10.1   6.68  )-----------( 193      ( 02 Dec 2023 03:00 )             30.5     12-02    141  |  100  |  8.2  ----------------------------<  119<H>  4.1   |  31.0<H>  |  0.45<L>    Ca    8.4      02 Dec 2023 03:00  Phos  3.8     12-02  Mg     1.5     12-02    TPro  5.1<L>  /  Alb  2.8<L>  /  TBili  1.6  /  DBili  x   /  AST  16  /  ALT  30  /  AlkPhos  89  12-01    PT/INR - ( 30 Nov 2023 13:01 )   PT: 12.4 sec;   INR: 1.12 ratio      PTT - ( 02 Dec 2023 05:45 )  PTT:73.6 sec  Urinalysis Basic - ( 02 Dec 2023 03:00 )    Color: x / Appearance: x / SG: x / pH: x  Gluc: 119 mg/dL / Ketone: x  / Bili: x / Urobili: x   Blood: x / Protein: x / Nitrite: x   Leuk Esterase: x / RBC: x / WBC x   Sq Epi: x / Non Sq Epi: x / Bacteria: x    RADIOLOGY & ADDITIONAL TESTS:  Echo: 11/30/23:   Summary:   1. Left ventricular ejection fraction, by visual estimation, is 55 to 60%.   2. Normal global left ventricular systolic function.   3. Spectral Doppler shows impaired relaxation pattern of left ventricular myocardial filling (Grade I diastolic dysfunction).   4. There is mild concentric left ventricular hypertrophy.   5. Normal left atrial size.   6. Normal right atrial size.   7. There is no evidence of pericardial effusion.   8. Mild thickening of the anterior and posterior mitral valve leaflets.   9. Trace mitral valve regurgitation.  10. Sclerotic aortic valve with normal opening.  11. Endocardial visualization was enhanced with intravenous echo contrast.  Rich Bailey, Electronically signed on 12/1/2023 at 11:00:54 AM    CT angio chest: 11/30/23:   IMPRESSION: No pulmonary embolus is noted.  Previously noted nodule in the right upper lobe is not visualized as a thick linear opacity is now noted in its place.  New nodules in the right middle and right lower lobes as described above of uncertain etiology.  Patchy opacities in the right middle and right lower lobes, interlobular septal thickening in the right lower lobe and small right pleural effusion are noted. The constellation of the findings suggest pulmonary edema.  --- End of Report ---  AMI DUMONT MD; Attending Radiologist  This document has been electronically signed. Nov 30 2023  6:52PM

## 2023-12-02 NOTE — PROGRESS NOTE ADULT - ATTENDING COMMENTS
Acute hypoxic respiratory failure  distributive shock, r/o sepsis   afib rvr  pneumonia  hx of hemoptysis off AC for afib, now hemoptysis improved after SCC treatment  recent dx of SCC with endobronchial lesion    -appreciated EP, will do diltiazem PO   - diltiazem drip and amiodarone drip were all d/c   - will d/c Po amiodarone too and use dilt as single agent  - risk and benefit of AC discussed with patient, will start AC with heparin drip with goal 50 to 70 at this time  - midodrine as needed  - continue antibiotics, pending culture  - need PT  - OOB to chair, incentive spirometer  - diet started  - likely d/g if remains stable
Acute hypoxic respiratory failure  distributive shock, r/o sepsis   afib rvr  pneumonia  hx of hemoptysis off AC for afib, now hemoptysis improved after SCC treatment  recent dx of SCC with endobronchial lesion    -cardizem 180 CD daily + metoprolol 12.5 mg BID at this time, has room to increases metoprolol   - diltiazem drip and amiodarone drip were all d/c   - risk and benefit of AC discussed with patient, c/w AC with heparin drip with goal 50 to 70 at this time  - midodrine as needed  - continue antibiotics for 7 day s total  legionella negative, will d/c doxycyline.   - need PT  - OOB to chair, incentive spirometer  - diet started  - likely d/g if remains stable .

## 2023-12-02 NOTE — DIETITIAN INITIAL EVALUATION ADULT - ORAL INTAKE PTA/DIET HISTORY
Pt seen asleep this AM. Known to nutrition services from recent prior admission. Chart reviewed. UBW ~155-160 lbs, 10/16/23 admission weight 160 lbs. This admission 157 lbs. Pt with decreased po intake consuming < 50% of meals. Previously identified with malnutrition, continues to meet criteria. RD to follow.

## 2023-12-02 NOTE — PROGRESS NOTE ADULT - ASSESSMENT
Assessment and Plan:   Pt is 77 y/o male HD 2, ICU Day 2, with PMHx of HTN, CAD s/p CABG x3v, COPD, Afib off Eliquis 2/2 hemoptysis, lung CA p/w cough, fever, tachycardia and SOB. Patient seen in EP office with AFL with RVR and hypoxia and sent to ER.    In ER still with RVR but now appears to be more AF, still with RVR.  Noted to be hypotensive, CXR with RML/RLL PNA.  RVR 2/2 infection and hypotension.  Per EP plan is for CCB for rate control and Amio given x1 and subsequently on Cardizem   Pulm seeing pt [OK for heparin and then consideration eventually of ablation]. Patient has improved since being on MICU, and his ICU course is updated below.       #Acute hypoxic respiratory failure  #Distributive shock, r/o sepsis   #Afib rvr  #Pneumonia  #Hx of hemoptysis off AC for afib, now hemoptysis improved after SCC treatment  #Recent dx of SCC with endobronchial lesion      NEURO:  - Gabapentin 400 mg TID  - CT head: nonacute    CVS:  - Patient no longer on PEP  -IV Diltiazem and Amio gtt d/cd  -Started on oral Diltiazem and Oral Metoprolol  -Started on Heparin gtt with goal PTT 50-70 [Previously holding off on anticoagulation due to hemoptysis. Per EP, will need uninterrupted anticoagulation for 30 days to undergo ablation/cardioversion]  -To consider resuming Eliquis if no complications reported with Heparin  - Midodrine 5 mg q8h  -Started on lasix  - CTA Chest: no PE, thick linear opacity in RUL, new nodules in RML and RLL, + pulmonary edema  - TTE: LVEF 55-60%, grade 1 diastolic dysfunction, mild concentric LVE  - EP following, recs noted     RESP:  - Duoneb q6h  - Budesonide inhaler 1 puff qd  - Awaiting Sputum culture  - Legionella antigen - negative  - MRSA/MSSA PCR: negative  - S. pneumoniae Ag: negative  -To follow-up with oncology and radiology Onc on chemo/radiotherapy schedule     ID:  - Doxycycline  - Zosyn  - Vancomycin    RENAL:  - Magnesium repleted   - UA wnl    :  - Tamsulosin 0.4 mg qhs    MSK:  - Dilaudid 2 mg q8h for severe pain  - Pain management consulted     QA:  Full code     DISPOSITION:  -Transfer to telemetry        Assessment and Plan:   Pt is 79 y/o male HD 2, ICU Day 2, with PMHx of HTN, CAD s/p CABG x3v, COPD, Afib off Eliquis 2/2 hemoptysis, lung CA p/w cough, fever, tachycardia and SOB. Patient seen in EP office with AFL with RVR and hypoxia and sent to ER.    In ER still with RVR but now appears to be more AF, still with RVR.  Noted to be hypotensive, CXR with RML/RLL PNA.  RVR 2/2 infection and hypotension.  Per EP plan is for CCB for rate control and Amio given x1 and subsequently on Cardizem   Pulm seeing pt [OK for heparin and then consideration eventually of ablation]. Patient has improved since being on MICU, and his ICU course is updated below.       #Acute hypoxic respiratory failure  #Distributive shock, r/o sepsis   #Afib rvr  #Pneumonia  #Hx of hemoptysis off AC for afib, now hemoptysis improved after SCC treatment  #Recent dx of SCC with endobronchial lesion      NEURO:  - Gabapentin 400 mg TID  - CT head: nonacute    CVS:  - Patient no longer on PEP  -IV Diltiazem and Amio gtt d/cd  -Started on oral Diltiazem and Oral Metoprolol  -Started on Heparin gtt with goal PTT 50-70 [Previously holding off on anticoagulation due to hemoptysis. Per EP, will need uninterrupted anticoagulation for 30 days to undergo ablation/cardioversion]  -To consider resuming Eliquis if no complications reported with Heparin  - Midodrine 5 mg q8h  -Started on lasix  - CTA Chest: no PE, thick linear opacity in RUL, new nodules in RML and RLL, + pulmonary edema  - TTE: LVEF 55-60%, grade 1 diastolic dysfunction, mild concentric LVE  - EP following, recs noted     RESP:  - Duoneb q6h  - Budesonide inhaler 1 puff qd  - Awaiting Sputum culture  - Legionella antigen - negative  - MRSA/MSSA PCR: negative  - S. pneumoniae Ag: negative  -To follow-up with oncology and radiology Onc on chemo/radiotherapy schedule     ID:  - Doxycycline  - Zosyn  - Vancomycin    RENAL:  - Magnesium repleted   - UA wnl    :  - Tamsulosin 0.4 mg qhs    MSK:  - Dilaudid 2 mg q8h for severe pain  - Pain management consulted     QA:  Full code     DISPOSITION:  -Transfer to telemetry        Assessment and Plan:   Pt is 79 y/o male HD 2, ICU Day 2, with PMHx of HTN, CAD s/p CABG x3v, COPD, Afib off Eliquis 2/2 hemoptysis, lung CA p/w cough, fever, tachycardia and SOB. Patient seen in EP office with AFL with RVR and hypoxia and sent to ER.    In ER still with RVR but now appears to be more AF, still with RVR.  Noted to be hypotensive, CXR with RML/RLL PNA.  RVR 2/2 infection and hypotension.  Per EP plan is for CCB for rate control and Amio given x1 and subsequently on Cardizem   Pulm seeing pt [OK for heparin and then consideration eventually of ablation]. Patient has improved since being on MICU, and his ICU course is updated below.       #Acute hypoxic respiratory failure  #Distributive shock, r/o sepsis   #Afib rvr  #Pneumonia  #Hx of hemoptysis off AC for afib, now hemoptysis improved after SCC treatment  #Recent dx of SCC with endobronchial lesion      NEURO:  - Gabapentin 400 mg TID  - CT head: nonacute    CVS:  - Patient no longer on PEP  -IV Diltiazem and Amio gtt d/cd  -Started on oral Diltiazem and Oral Metoprolol  -Started on Heparin gtt with goal PTT 50-70 [Previously holding off on anticoagulation due to hemoptysis. Per EP, will need uninterrupted anticoagulation for 30 days to undergo ablation/cardioversion]  -To consider resuming Eliquis if no complications reported with Heparin  - Midodrine 5 mg q8h  -Started on lasix  - CTA Chest: no PE, thick linear opacity in RUL, new nodules in RML and RLL, + pulmonary edema  - TTE: LVEF 55-60%, grade 1 diastolic dysfunction, mild concentric LVE  - EP following, recs noted     RESP:  - Duoneb q6h  - Budesonide inhaler 1 puff qd  - Awaiting Sputum culture  - Legionella antigen - negative  - MRSA/MSSA PCR: negative  - S. pneumoniae Ag: negative  -To follow-up with oncology and radiology Onc on chemo/radiotherapy schedule     ID:  - Doxycycline  - Zosyn  - Vancomycin    RENAL:  - Magnesium repleted   - UA wnl    :  - Tamsulosin 0.4 mg qhs    MSK:  - Dilaudid 2 mg q8h for severe pain  - Pain management consulted     QA:  Full code          Assessment and Plan:   Pt is 77 y/o male HD 2, ICU Day 2, with PMHx of HTN, CAD s/p CABG x3v, COPD, Afib off Eliquis 2/2 hemoptysis, lung CA p/w cough, fever, tachycardia and SOB. Patient seen in EP office with AFL with RVR and hypoxia and sent to ER.    In ER still with RVR but now appears to be more AF, still with RVR.  Noted to be hypotensive, CXR with RML/RLL PNA.  RVR 2/2 infection and hypotension.  Per EP plan is for CCB for rate control and Amio given x1 and subsequently on Cardizem   Pulm seeing pt [OK for heparin and then consideration eventually of ablation]. Patient has improved since being on MICU, and his ICU course is updated below.       #Acute hypoxic respiratory failure  #Distributive shock, r/o sepsis   #Afib rvr  #Pneumonia  #Hx of hemoptysis off AC for afib, now hemoptysis improved after SCC treatment  #Recent dx of SCC with endobronchial lesion      NEURO:  - Gabapentin 400 mg TID  - CT head: nonacute    CVS:  - Patient no longer on PEP  -IV Diltiazem and Amio gtt d/cd  -Started on oral Diltiazem and Oral Metoprolol  -Started on Heparin gtt with goal PTT 50-70 [Previously holding off on anticoagulation due to hemoptysis. Per EP, will need uninterrupted anticoagulation for 30 days to undergo ablation/cardioversion]  -To consider resuming Eliquis if no complications reported with Heparin  - Midodrine 5 mg q8h  -Started on lasix  - CTA Chest: no PE, thick linear opacity in RUL, new nodules in RML and RLL, + pulmonary edema  - TTE: LVEF 55-60%, grade 1 diastolic dysfunction, mild concentric LVE  - EP following, recs noted     RESP:  - Duoneb q6h  - Budesonide inhaler 1 puff qd  - Awaiting Sputum culture  - Legionella antigen - negative  - MRSA/MSSA PCR: negative  - S. pneumoniae Ag: negative  -To follow-up with oncology and radiology Onc on chemo/radiotherapy schedule     ID:  - Doxycycline  - Zosyn  - Vancomycin    RENAL:  - Magnesium repleted   - UA wnl    :  - Tamsulosin 0.4 mg qhs    MSK:  - Dilaudid 2 mg q8h for severe pain  - Pain management consulted     QA:  Full code

## 2023-12-02 NOTE — PROGRESS NOTE ADULT - SUBJECTIVE AND OBJECTIVE BOX
Mari Fonseca M.D., F.A.C.C.                                                                                            [facility]                                                                                                [facility] [place] [address]                                                                                                     [phone]      COVERING FOR PATRICIO CHAVEZ is a   77 y/o male PMHx HTN, CAD s/p CABG x3v, COPD, Afib off Eliquis 2/2 hemoptysis, lung CA p/w cough, fever, tachycardia and SOB.   Patient seen in EP office with AFL with RVR and hypoxia and sent to ER.    In ER still with RVR but now appears to be more AF, still with RVR.  Noted to be hypotensive, CXR with RML/RLL PNA.  RVR 2/2 infection and hypotension.    Per EP plan is for CCB for rate control and Amio given x1 and now  on Cardizem    Pulm seeing pt -- OK for heparin and then consideration eventually of ablation.    MEDICATIONS  (STANDING):  albuterol/ipratropium for Nebulization 3 milliLiter(s) Nebulizer every 6 hours  atorvastatin 40 milliGRAM(s) Oral at bedtime  budesonide  80 MICROgram(s)/formoterol 4.5 MICROgram(s) Inhaler 1 Puff(s) Inhalation daily  chlorhexidine 2% Cloths 1 Application(s) Topical daily  doxycycline IVPB      doxycycline IVPB 100 milliGRAM(s) IV Intermittent every 12 hours  gabapentin 400 milliGRAM(s) Oral three times a day  heparin  Infusion.  Unit(s)/Hr (13 mL/Hr) IV Continuous <Continuous>  midodrine 5 milliGRAM(s) Oral every 8 hours  piperacillin/tazobactam IVPB.. 3.375 Gram(s) IV Intermittent every 8 hours  tamsulosin 0.4 milliGRAM(s) Oral at bedtime    MEDICATIONS  (PRN):  acetaminophen     Tablet .. 650 milliGRAM(s) Oral every 6 hours PRN Temp greater or equal to 38C (100.4F), Moderate Pain (4 - 6)  HYDROmorphone   Tablet 2 milliGRAM(s) Oral every 8 hours PRN Severe Pain (7 - 10)  LORazepam     Tablet 1 milliGRAM(s) Oral every 6 hours PRN Anxiety      Vital Signs Last 24 Hrs  T(C): 37.1 (02 Dec 2023 07:25), Max: 38.1 (01 Dec 2023 23:58)  T(F): 98.8 (02 Dec 2023 07:25), Max: 100.5 (01 Dec 2023 23:58)  HR: 128 (02 Dec 2023 10:00) (100 - 141)  BP: 113/59 (02 Dec 2023 10:00) (88/74 - 138/66)  BP(mean): 73 (02 Dec 2023 10:00) (68 - 91)  RR: 16 (02 Dec 2023 10:00) (16 - 32)  SpO2: 94% (02 Dec 2023 10:00) (88% - 98%)    Parameters below as of 02 Dec 2023 09:49  Patient On (Oxygen Delivery Method): nasal cannula, 3L        I&O's Detail    01 Dec 2023 07:01  -  02 Dec 2023 07:00  --------------------------------------------------------  IN:    Heparin Infusion: 241 mL    IV PiggyBack: 100 mL    IV PiggyBack: 275 mL  Total IN: 616 mL    OUT:    Incontinent per Condom Catheter (mL): 2420 mL    Phenylephrine: 0 mL    Voided (mL): 500 mL  Total OUT: 2920 mL    Total NET: -2304 mL      02 Dec 2023 07:01  -  02 Dec 2023 11:29  --------------------------------------------------------  IN:    Heparin Infusion: 34 mL    IV PiggyBack: 25 mL    IV PiggyBack: 100 mL  Total IN: 159 mL    OUT:    Voided (mL): 150 mL  Total OUT: 150 mL    Total NET: 9 mL          Constitutional:    NC/AT: Normal     HEENT: Normal     Neck:  No JVD, bruits or thyromegaly    Respiratory:  Clear without rales or rhonchi    Cardiovascular: I  RR without murmur, rub or gallop.    Gastrointestinal: Soft without hepatosplenomegaly.    Extremities: without cyanosis, clubbing or edema.    Neurological:  Oriented   x  3       . No gross sensory or motor defects.    Skin: Normal     Psychiatric: Normal affect.                              10.1   6.68  )-----------( 193      ( 02 Dec 2023 03:00 )             30.5     12-02    141  |  100  |  8.2  ----------------------------<  119<H>  4.1   |  31.0<H>  |  0.45<L>    Ca    8.4      02 Dec 2023 03:00  Phos  3.8     12-02  Mg     1.5     12-02    TPro  5.1<L>  /  Alb  2.8<L>  /  TBili  1.6  /  DBili  x   /  AST  16  /  ALT  30  /  AlkPhos  89  12-01    PT/INR - ( 30 Nov 2023 13:01 )   PT: 12.4 sec;   INR: 1.12 ratio         PTT - ( 02 Dec 2023 05:45 )  PTT:73.6 sec      Echo:   1. Left ventricular ejection fraction, by visual estimation, is 55 to 60%.   2. Normal global left ventricular systolic function.   3. Spectral Doppler shows impaired relaxation pattern of left ventricular myocardial filling (Grade I diastolic dysfunction).   4. There is mild concentric left ventricular hypertrophy.   5. Normal left atrial size.   6. Normal right atrial size.   7. There is no evidence of pericardial effusion.   8. Mild thickening of the anterior and posterior mitral valve leaflets.   9. Trace mitral valve regurgitation.  10. Sclerotic aortic valve with normal opening.  11. Endocardial visualization was enhanced with intravenous echo contrast.    CTPA:  No pulmonary embolus is noted.  Previously noted nodule in the right upper lobe is not visualized as a thick linear opacity is now noted in its place.  New nodules in the right middle and right lower lobes as described above of uncertain etiology.  Patchy opacities in the right middle and right lower lobes, interlobular septal thickening in the right lower lobe and small right pleural   effusion are noted. The constellation of the findings suggest pulmonary edema.        Assessment:    77 y/o male PMHx HTN, CAD s/p CABG x3v, COPD, Afib off Eliquis 2/2 hemoptysis, lung CA p/w cough, fever, tachycardia and SOB.  Patient was seen in EP office with AFL with RVR and hypoxia and sent to ER.     CXR with L/RLL PNA.  RVR 2/2 infection and hypotension.    EPS seeing pt  ---plan is for CCB for rate control and Amio given x1.    Pulm seeing pt -- now on  heparin and then consideration eventually of ablation.      Plan:    Had been on Amio and Cardizem  and went back to NSR  but went into rapid AF again this morning (feels fine)  On IV heparin  EP seeing pt-- would consider switcing  Cardizem to or adding  Lopressor for rate control

## 2023-12-02 NOTE — PROGRESS NOTE ADULT - ASSESSMENT
Assessment/Recommendations:   78 year old male, former smoker, with HTN, hyperlipidemia, CAD s/p PCI 2002 and CABG x 3 (3/2020 with Dr. Hutchison), COPD, lung cancer (recurrent NSCLC; RUL; squamous; on daily radiation with Taxol/Carbo weekly), and paroxysmal atrial fibrillation s/p MDT ILR who has been off AC since 10/2023 due to hemoptysis. He presented to EP followup office visit with c/o cough, fever, and worsening shortness of breath over several days, noted to be in AFL with rapid ventricular rates on ILR. Sent to [facility] for further evaluation and was admitted with acute hypoxic respiratory failure and septic shock 2/2 pneumonia. Initially on pressors which have since been discontinued.     # Atrial flutter, CHADSVASc = 4 (age, HTN, CAD)   - Eliquis was previously discontinued 10/2023 due to hemoptysis, with no recurrence since that time   - Trial of AC with heparin gtt, with transition to Eliquis 5mg Q 12 if no bleeding issues     # Acute hypoxic respiratory failure  # Pneumonia   - Supplemental O2 as needed   - Abx as per primary team     INCOMPLETE NOTE    Assessment/Recommendations:   78 year old male, former smoker, with HTN, hyperlipidemia, CAD s/p PCI 2002 and CABG x 3 (3/2020 with Dr. Hutchison), COPD, lung cancer (recurrent NSCLC; RUL; squamous; on daily radiation with Taxol/Carbo weekly), and paroxysmal atrial fibrillation s/p MDT ILR who has been off AC since 10/2023 due to hemoptysis. He presented to EP followup office visit with c/o cough, fever, and worsening shortness of breath over several days, noted to be in AFL with rapid ventricular rates on ILR. Sent to [facility] for further evaluation and was admitted with acute hypoxic respiratory failure and septic shock 2/2 pneumonia. Initially on pressors which have since been discontinued. Had converted to sinus rhythm/sinus tachycardia on 12/1 but now converted into atrial fibrillation with controlled rates.     # Atrial flutter and fibrillation, CHADSVASc = 4 (age, HTN, CAD)   - Eliquis was previously discontinued 10/2023 due to hemoptysis, with no recurrence since that time   - Trial of AC with heparin gtt, with transition to Eliquis 5mg Q 12 if no bleeding issues   - Continue cardizem   - Telemetry monitoring     # Acute hypoxic respiratory failure  # Pneumonia   - Supplemental O2 as needed   - Abx as per primary team     Discussed with Dr. Francisco

## 2023-12-02 NOTE — DIETITIAN INITIAL EVALUATION ADULT - NUTRITIONGOAL OUTCOME1
Patient agreed to COVID test on 12/9 at the  [facility] 7 am- 4:30 pm located at  [address] [place] [address]. Patient instructed to bring ID. Patient instructed to self isolate until day of surgery. Pt will meet > 75% est nutrient needs via oral diet and oral nutrition supplementation

## 2023-12-02 NOTE — DIETITIAN NUTRITION RISK NOTIFICATION - ADDITIONAL COMMENTS/DIETITIAN RECOMMENDATIONS
Add Ensure Plus BID (350 kcals, 20 g pro each)  Rx: MVI daily to optimize nutrition  Encourage/assist with PO intake as needed  Monitor electrolytes, replete as needed  Trend weights

## 2023-12-02 NOTE — DIETITIAN INITIAL EVALUATION ADULT - PERTINENT MEDS FT
MEDICATIONS  (STANDING):  albuterol/ipratropium for Nebulization 3 milliLiter(s) Nebulizer every 6 hours  atorvastatin 40 milliGRAM(s) Oral at bedtime  budesonide  80 MICROgram(s)/formoterol 4.5 MICROgram(s) Inhaler 1 Puff(s) Inhalation daily  chlorhexidine 2% Cloths 1 Application(s) Topical daily  doxycycline IVPB      doxycycline IVPB 100 milliGRAM(s) IV Intermittent every 12 hours  furosemide    Tablet 40 milliGRAM(s) Oral daily  gabapentin 400 milliGRAM(s) Oral three times a day  heparin  Infusion.  Unit(s)/Hr (13 mL/Hr) IV Continuous <Continuous>  metoprolol tartrate 12.5 milliGRAM(s) Oral every 12 hours  midodrine 5 milliGRAM(s) Oral every 8 hours  piperacillin/tazobactam IVPB.. 3.375 Gram(s) IV Intermittent every 8 hours  tamsulosin 0.4 milliGRAM(s) Oral at bedtime    MEDICATIONS  (PRN):  acetaminophen     Tablet .. 650 milliGRAM(s) Oral every 6 hours PRN Temp greater or equal to 38C (100.4F), Moderate Pain (4 - 6)  HYDROmorphone   Tablet 2 milliGRAM(s) Oral every 8 hours PRN Severe Pain (7 - 10)  LORazepam     Tablet 1 milliGRAM(s) Oral every 6 hours PRN Anxiety

## 2023-12-03 NOTE — PROGRESS NOTE ADULT - CONSTITUTIONAL COMMENTS
elderly male sitting up in bed in [facility], pleasant elderly male sitting up in bed in [facility], pleasant elderly male sitting up in bed in [facility], pleasant

## 2023-12-03 NOTE — PROGRESS NOTE ADULT - SUBJECTIVE AND OBJECTIVE BOX
Subjective: Feels short of breath      TELE: Atrial fibrillation with rates up to 140s    MEDICATIONS  (STANDING):  albuterol/ipratropium for Nebulization 3 milliLiter(s) Nebulizer every 6 hours  atorvastatin 40 milliGRAM(s) Oral at bedtime  budesonide  80 MICROgram(s)/formoterol 4.5 MICROgram(s) Inhaler 1 Puff(s) Inhalation daily  chlorhexidine 2% Cloths 1 Application(s) Topical daily  diltiazem    Tablet 60 milliGRAM(s) Oral <User Schedule>  furosemide    Tablet 40 milliGRAM(s) Oral daily  gabapentin 400 milliGRAM(s) Oral three times a day  heparin  Infusion.  Unit(s)/Hr (13 mL/Hr) IV Continuous <Continuous>  metoprolol tartrate 12.5 milliGRAM(s) Oral every 12 hours  midodrine 5 milliGRAM(s) Oral every 8 hours  piperacillin/tazobactam IVPB.. 3.375 Gram(s) IV Intermittent every 8 hours  tamsulosin 0.4 milliGRAM(s) Oral at bedtime    MEDICATIONS  (PRN):  acetaminophen     Tablet .. 650 milliGRAM(s) Oral every 6 hours PRN Temp greater or equal to 38C (100.4F), Moderate Pain (4 - 6)  HYDROmorphone   Tablet 2 milliGRAM(s) Oral every 8 hours PRN Severe Pain (7 - 10)  LORazepam     Tablet 1 milliGRAM(s) Oral every 6 hours PRN Anxiety  traMADol 50 milliGRAM(s) Oral every 8 hours PRN Mild Pain (1 - 3)      Allergies    ibuprofen (Anaphylaxis)  codeine (Urticaria)    Intolerances        Vital Signs Last 24 Hrs  T(C): 36.8 (03 Dec 2023 12:11), Max: 37.8 (02 Dec 2023 15:36)  T(F): 98.3 (03 Dec 2023 12:11), Max: 100 (02 Dec 2023 15:36)  HR: 142 (03 Dec 2023 12:00) (76 - 173)  BP: 96/75 (03 Dec 2023 12:00) (93/62 - 122/63)  BP(mean): 83 (03 Dec 2023 12:00) (67 - 84)  RR: 27 (03 Dec 2023 12:00) (16 - 33)  SpO2: 91% (03 Dec 2023 12:00) (87% - 99%)    Parameters below as of 03 Dec 2023 09:35  Patient On (Oxygen Delivery Method): nasal cannula, 4L        Physical Exam:  Constitutional: NAD, AAOx3  Cardiovascular: +S1S2 RRR  Pulmonary: CTA b/l, unlabored  GI: soft NTND +BS  Extremities: no pedal edema, +distal pulses b/l  Neuro: non focal, BARKER x4    LABS:                        9.9    6.47  )-----------( 220      ( 03 Dec 2023 03:30 )             29.9     12-03    134<L>  |  94<L>  |  8.2  ----------------------------<  118<H>  3.7   |  31.0<H>  |  0.44<L>    Ca    8.6      03 Dec 2023 03:30  Phos  3.2     12-03  Mg     1.6     12-03      PTT - ( 03 Dec 2023 03:30 )  PTT:46.0 sec  Urinalysis Basic - ( 03 Dec 2023 03:30 )    Color: x / Appearance: x / SG: x / pH: x  Gluc: 118 mg/dL / Ketone: x  / Bili: x / Urobili: x   Blood: x / Protein: x / Nitrite: x   Leuk Esterase: x / RBC: x / WBC x   Sq Epi: x / Non Sq Epi: x / Bacteria: x        RADIOLOGY & ADDITIONAL TESTS:

## 2023-12-03 NOTE — PROGRESS NOTE ADULT - ASSESSMENT
78 year old male, former smoker, with HTN, hyperlipidemia, CAD s/p PCI 2002 and CABG x 3 (3/2020 with Dr. Hutchison), COPD, lung cancer (recurrent NSCLC; RUL; squamous; on daily radiation with Taxol/Carbo weekly), and paroxysmal atrial fibrillation s/p MDT ILR who has been off AC since 10/2023 due to hemoptysis. He presented to EP followup office visit with c/o cough, fever, and worsening shortness of breath over several days, noted to be in AFL with rapid ventricular rates on ILR. Sent to [facility] for further evaluation and was admitted with acute hypoxic respiratory failure and septic shock 2/2 pneumonia. Initially on pressors which have since been discontinued. Had converted to sinus rhythm/sinus tachycardia on 12/1 but then converted back into atrial fibrillation on 12/2/23. Started on IV heparin to assess tolerance/recurrence of hemoptysis.     Remains in AF with ventricular rates sometimes up to 130s to 140s.  Now on diltiazem 60 q6h, and metoprolol 12.5 mg bid.    Recommendations:  - Agree with increasing dose of cardizem to 60 mg q6h and starting low dose metoprolol 12.5 mg bid  - If rates are poorly controlled may need to consider YRN/DCCV if the patient is tolerating therapeutic AC.   - Telemetry monitoring   - Pneumonia treatment per primary team     78 year old male, former smoker, with HTN, hyperlipidemia, CAD s/p PCI 2002 and CABG x 3 (3/2020 with Dr. Hutchison), COPD, lung cancer (recurrent NSCLC; RUL; squamous; on daily radiation with Taxol/Carbo weekly), and paroxysmal atrial fibrillation s/p MDT ILR who has been off AC since 10/2023 due to hemoptysis. He presented to EP followup office visit with c/o cough, fever, and worsening shortness of breath over several days, noted to be in AFL with rapid ventricular rates on ILR. Sent to [facility] for further evaluation and was admitted with acute hypoxic respiratory failure and septic shock 2/2 pneumonia. Initially on pressors which have since been discontinued. Had converted to sinus rhythm/sinus tachycardia on 12/1 but then converted back into atrial fibrillation on 12/2/23. Started on IV heparin to assess tolerance/recurrence of hemoptysis.     Remains in AF with ventricular rates sometimes up to 130s to 140s.  Now on diltiazem 60 q6h, and metoprolol 12.5 mg bid.    Recommendations:  - Agree with increasing dose of cardizem to 60 mg q6h and starting low dose metoprolol 12.5 mg bid  - If rates are poorly controlled may need to consider YRN/DCCV if the patient is tolerating therapeutic AC.   - Telemetry monitoring   - Pneumonia treatment per primary team     78 year old male, former smoker, with HTN, hyperlipidemia, CAD s/p PCI 2002 and CABG x 3 (3/2020 with Dr. Hutchison), COPD, lung cancer (recurrent NSCLC; RUL; squamous; on daily radiation with Taxol/Carbo weekly), and paroxysmal atrial fibrillation s/p MDT ILR who has been off AC since 10/2023 due to hemoptysis. He presented to EP followup office visit with c/o cough, fever, and worsening shortness of breath over several days, noted to be in AFL with rapid ventricular rates on ILR. Sent to [facility] for further evaluation and was admitted with acute hypoxic respiratory failure and septic shock 2/2 pneumonia. Initially on pressors which have since been discontinued. Had converted to sinus rhythm/sinus tachycardia on 12/1 but then converted back into atrial fibrillation on 12/2/23. Started on IV heparin to assess tolerance/recurrence of hemoptysis.     Remains in AF with ventricular rates sometimes up to 130s to 140s.  Now on diltiazem 60 q6h, and metoprolol 12.5 mg bid.    Recommendations:  - Agree with increasing dose of cardizem to 60 mg q6h and starting low dose metoprolol 12.5 mg bid  - If rates are poorly controlled may need to consider YRN/DCCV if the patient is tolerating therapeutic AC.   - Telemetry monitoring   - Pneumonia treatment per primary team

## 2023-12-03 NOTE — PROGRESS NOTE ADULT - SUBJECTIVE AND OBJECTIVE BOX
[facility] Division of Hospital Medicine    Chief Complaint:      SUBJECTIVE / OVERNIGHT EVENTS:    Patient denies chest pain, SOB, abd pain, N/V, fever, chills, dysuria or any other complaints. All remainder ROS negative.     MEDICATIONS  (STANDING):  albuterol/ipratropium for Nebulization 3 milliLiter(s) Nebulizer every 6 hours  atorvastatin 40 milliGRAM(s) Oral at bedtime  budesonide  80 MICROgram(s)/formoterol 4.5 MICROgram(s) Inhaler 1 Puff(s) Inhalation daily  chlorhexidine 2% Cloths 1 Application(s) Topical daily  diltiazem    Tablet 60 milliGRAM(s) Oral <User Schedule>  furosemide    Tablet 40 milliGRAM(s) Oral daily  gabapentin 400 milliGRAM(s) Oral three times a day  heparin  Infusion.  Unit(s)/Hr (13 mL/Hr) IV Continuous <Continuous>  metoprolol tartrate 12.5 milliGRAM(s) Oral every 12 hours  midodrine 5 milliGRAM(s) Oral every 8 hours  piperacillin/tazobactam IVPB.. 3.375 Gram(s) IV Intermittent every 8 hours  tamsulosin 0.4 milliGRAM(s) Oral at bedtime    MEDICATIONS  (PRN):  acetaminophen     Tablet .. 650 milliGRAM(s) Oral every 6 hours PRN Temp greater or equal to 38C (100.4F), Moderate Pain (4 - 6)  HYDROmorphone   Tablet 2 milliGRAM(s) Oral every 8 hours PRN Severe Pain (7 - 10)  LORazepam     Tablet 1 milliGRAM(s) Oral every 6 hours PRN Anxiety  traMADol 50 milliGRAM(s) Oral every 8 hours PRN Mild Pain (1 - 3)        I&O's Summary    02 Dec 2023 07:01  -  03 Dec 2023 07:00  --------------------------------------------------------  IN: 814 mL / OUT: 1650 mL / NET: -836 mL    03 Dec 2023 07:01  -  03 Dec 2023 09:35  --------------------------------------------------------  IN: 82 mL / OUT: 600 mL / NET: -518 mL        PHYSICAL EXAM:  Vital Signs Last 24 Hrs  T(C): 36.9 (03 Dec 2023 08:07), Max: 37.8 (02 Dec 2023 15:36)  T(F): 98.5 (03 Dec 2023 08:07), Max: 100 (02 Dec 2023 15:36)  HR: 124 (03 Dec 2023 09:00) (76 - 159)  BP: 114/58 (03 Dec 2023 09:00) (93/62 - 122/63)  BP(mean): 75 (03 Dec 2023 09:00) (67 - 84)  RR: 33 (03 Dec 2023 09:00) (16 - 33)  SpO2: 92% (03 Dec 2023 09:00) (87% - 99%)    Parameters below as of 03 Dec 2023 08:00  Patient On (Oxygen Delivery Method): nasal cannula            CONSTITUTIONAL: NAD, well-developed, well-groomed  ENMT: Moist oral mucosa, no pharyngeal injection or exudates; normal dentition  RESPIRATORY: Normal respiratory effort; lungs are clear to auscultation bilaterally  CARDIOVASCULAR: Regular rate and rhythm, normal S1 and S2, no murmur/rub/gallop; No lower extremity edema; Peripheral pulses are 2+ bilaterally  ABDOMEN: Nontender to palpation, normoactive bowel sounds, no rebound/guarding; No hepatosplenomegaly  MUSCLOSKELETAL:  Normal gait; no clubbing or cyanosis of digits; no joint swelling or tenderness to palpation  PSYCH: A+O to person, place, and time; affect appropriate  NEUROLOGY: CN 2-12 are intact and symmetric; no gross sensory deficits;   SKIN: No rashes; no palpable lesions    LABS:                        9.9    6.47  )-----------( 220      ( 03 Dec 2023 03:30 )             29.9     12-03    134<L>  |  94<L>  |  8.2  ----------------------------<  118<H>  3.7   |  31.0<H>  |  0.44<L>    Ca    8.6      03 Dec 2023 03:30  Phos  3.2     12-03  Mg     1.6     12-03      PTT - ( 03 Dec 2023 03:30 )  PTT:46.0 sec      Urinalysis Basic - ( 03 Dec 2023 03:30 )    Color: x / Appearance: x / SG: x / pH: x  Gluc: 118 mg/dL / Ketone: x  / Bili: x / Urobili: x   Blood: x / Protein: x / Nitrite: x   Leuk Esterase: x / RBC: x / WBC x   Sq Epi: x / Non Sq Epi: x / Bacteria: x        Culture - Urine (collected 30 Nov 2023 17:18)  Source: Clean Catch Clean Catch (Midstream)  Final Report (01 Dec 2023 20:33):    <10,000 CFU/mL Normal Urogenital Doris    Culture - Blood (collected 30 Nov 2023 13:08)  Source: .Blood Blood-Peripheral  Preliminary Report (02 Dec 2023 20:01):    No growth at 48 Hours    Culture - Blood (collected 30 Nov 2023 13:07)  Source: .Blood Blood-Peripheral  Preliminary Report (02 Dec 2023 20:01):    No growth at 48 Hours      CAPILLARY BLOOD GLUCOSE            RADIOLOGY & ADDITIONAL TESTS:  Results Reviewed:   Imaging Personally Reviewed:  Electrocardiogram Personally Reviewed:                                           [facility] Division of Hospital Medicine    Chief Complaint:      SUBJECTIVE / OVERNIGHT EVENTS:    Patient denies chest pain, SOB, abd pain, N/V, fever, chills, dysuria or any other complaints. All remainder ROS negative.     MEDICATIONS  (STANDING):  albuterol/ipratropium for Nebulization 3 milliLiter(s) Nebulizer every 6 hours  atorvastatin 40 milliGRAM(s) Oral at bedtime  budesonide  80 MICROgram(s)/formoterol 4.5 MICROgram(s) Inhaler 1 Puff(s) Inhalation daily  chlorhexidine 2% Cloths 1 Application(s) Topical daily  diltiazem    Tablet 60 milliGRAM(s) Oral <User Schedule>  furosemide    Tablet 40 milliGRAM(s) Oral daily  gabapentin 400 milliGRAM(s) Oral three times a day  heparin  Infusion.  Unit(s)/Hr (13 mL/Hr) IV Continuous <Continuous>  metoprolol tartrate 12.5 milliGRAM(s) Oral every 12 hours  midodrine 5 milliGRAM(s) Oral every 8 hours  piperacillin/tazobactam IVPB.. 3.375 Gram(s) IV Intermittent every 8 hours  tamsulosin 0.4 milliGRAM(s) Oral at bedtime    MEDICATIONS  (PRN):  acetaminophen     Tablet .. 650 milliGRAM(s) Oral every 6 hours PRN Temp greater or equal to 38C (100.4F), Moderate Pain (4 - 6)  HYDROmorphone   Tablet 2 milliGRAM(s) Oral every 8 hours PRN Severe Pain (7 - 10)  LORazepam     Tablet 1 milliGRAM(s) Oral every 6 hours PRN Anxiety  traMADol 50 milliGRAM(s) Oral every 8 hours PRN Mild Pain (1 - 3)        I&O's Summary    02 Dec 2023 07:01  -  03 Dec 2023 07:00  --------------------------------------------------------  IN: 814 mL / OUT: 1650 mL / NET: -836 mL    03 Dec 2023 07:01  -  03 Dec 2023 09:35  --------------------------------------------------------  IN: 82 mL / OUT: 600 mL / NET: -518 mL        PHYSICAL EXAM:  Vital Signs Last 24 Hrs  T(C): 36.9 (03 Dec 2023 08:07), Max: 37.8 (02 Dec 2023 15:36)  T(F): 98.5 (03 Dec 2023 08:07), Max: 100 (02 Dec 2023 15:36)  HR: 124 (03 Dec 2023 09:00) (76 - 159)  BP: 114/58 (03 Dec 2023 09:00) (93/62 - 122/63)  BP(mean): 75 (03 Dec 2023 09:00) (67 - 84)  RR: 33 (03 Dec 2023 09:00) (16 - 33)  SpO2: 92% (03 Dec 2023 09:00) (87% - 99%)    Parameters below as of 03 Dec 2023 08:00  Patient On (Oxygen Delivery Method): nasal cannula            CONSTITUTIONAL: NAD, well-developed, well-groomed  ENMT: Moist oral mucosa, no pharyngeal injection or exudates; normal dentition  RESPIRATORY: Normal respiratory effort; lungs are clear to auscultation bilaterally  CARDIOVASCULAR: Regular rate and rhythm, normal S1 and S2, no murmur/rub/gallop; No lower extremity edema; Peripheral pulses are 2+ bilaterally  ABDOMEN: Nontender to palpation, normoactive bowel sounds, no rebound/guarding; No hepatosplenomegaly  MUSCLOSKELETAL:  Normal gait; no clubbing or cyanosis of digits; no joint swelling or tenderness to palpation  PSYCH: A+O to person, place, and time; affect appropriate  NEUROLOGY: CN 2-12 are intact and symmetric; no gross sensory deficits;   SKIN: No rashes; no palpable lesions    LABS:                        9.9    6.47  )-----------( 220      ( 03 Dec 2023 03:30 )             29.9     12-03    134<L>  |  94<L>  |  8.2  ----------------------------<  118<H>  3.7   |  31.0<H>  |  0.44<L>    Ca    8.6      03 Dec 2023 03:30  Phos  3.2     12-03  Mg     1.6     12-03      PTT - ( 03 Dec 2023 03:30 )  PTT:46.0 sec      Urinalysis Basic - ( 03 Dec 2023 03:30 )    Color: x / Appearance: x / SG: x / pH: x  Gluc: 118 mg/dL / Ketone: x  / Bili: x / Urobili: x   Blood: x / Protein: x / Nitrite: x   Leuk Esterase: x / RBC: x / WBC x   Sq Epi: x / Non Sq Epi: x / Bacteria: x        Culture - Urine (collected 30 Nov 2023 17:18)  Source: Clean Catch Clean Catch (Midstream)  Final Report (01 Dec 2023 20:33):    <10,000 CFU/mL Normal Urogenital Doris    Culture - Blood (collected 30 Nov 2023 13:08)  Source: .Blood Blood-Peripheral  Preliminary Report (02 Dec 2023 20:01):    No growth at 48 Hours    Culture - Blood (collected 30 Nov 2023 13:07)  Source: .Blood Blood-Peripheral  Preliminary Report (02 Dec 2023 20:01):    No growth at 48 Hours      CAPILLARY BLOOD GLUCOSE            RADIOLOGY & ADDITIONAL TESTS:  Results Reviewed:   Imaging Personally Reviewed:  Electrocardiogram Personally Reviewed:                                           [facility] Division of Hospital Medicine    Chief Complaint:      SUBJECTIVE / OVERNIGHT EVENTS:    Patient denies chest pain, SOB, abd pain, N/V, fever, chills, dysuria or any other complaints. All remainder ROS negative.     MEDICATIONS  (STANDING):  albuterol/ipratropium for Nebulization 3 milliLiter(s) Nebulizer every 6 hours  atorvastatin 40 milliGRAM(s) Oral at bedtime  budesonide  80 MICROgram(s)/formoterol 4.5 MICROgram(s) Inhaler 1 Puff(s) Inhalation daily  chlorhexidine 2% Cloths 1 Application(s) Topical daily  diltiazem    Tablet 60 milliGRAM(s) Oral <User Schedule>  furosemide    Tablet 40 milliGRAM(s) Oral daily  gabapentin 400 milliGRAM(s) Oral three times a day  heparin  Infusion.  Unit(s)/Hr (13 mL/Hr) IV Continuous <Continuous>  metoprolol tartrate 12.5 milliGRAM(s) Oral every 12 hours  midodrine 5 milliGRAM(s) Oral every 8 hours  piperacillin/tazobactam IVPB.. 3.375 Gram(s) IV Intermittent every 8 hours  tamsulosin 0.4 milliGRAM(s) Oral at bedtime    MEDICATIONS  (PRN):  acetaminophen     Tablet .. 650 milliGRAM(s) Oral every 6 hours PRN Temp greater or equal to 38C (100.4F), Moderate Pain (4 - 6)  HYDROmorphone   Tablet 2 milliGRAM(s) Oral every 8 hours PRN Severe Pain (7 - 10)  LORazepam     Tablet 1 milliGRAM(s) Oral every 6 hours PRN Anxiety  traMADol 50 milliGRAM(s) Oral every 8 hours PRN Mild Pain (1 - 3)        I&O's Summary    02 Dec 2023 07:01  -  03 Dec 2023 07:00  --------------------------------------------------------  IN: 814 mL / OUT: 1650 mL / NET: -836 mL    03 Dec 2023 07:01  -  03 Dec 2023 09:35  --------------------------------------------------------  IN: 82 mL / OUT: 600 mL / NET: -518 mL        PHYSICAL EXAM:  Vital Signs Last 24 Hrs  T(C): 36.9 (03 Dec 2023 08:07), Max: 37.8 (02 Dec 2023 15:36)  T(F): 98.5 (03 Dec 2023 08:07), Max: 100 (02 Dec 2023 15:36)  HR: 124 (03 Dec 2023 09:00) (76 - 159)  BP: 114/58 (03 Dec 2023 09:00) (93/62 - 122/63)  BP(mean): 75 (03 Dec 2023 09:00) (67 - 84)  RR: 33 (03 Dec 2023 09:00) (16 - 33)  SpO2: 92% (03 Dec 2023 09:00) (87% - 99%)    Parameters below as of 03 Dec 2023 08:00  Patient On (Oxygen Delivery Method): nasal cannula            CONSTITUTIONAL: NAD, well-developed, well-groomed  ENMT: Moist oral mucosa, no pharyngeal injection or exudates; normal dentition  RESPIRATORY: Normal respiratory effort; lungs are clear to auscultation bilaterally  CARDIOVASCULAR: Regular rate and rhythm, normal S1 and S2, no murmur/rub/gallop; No lower extremity edema; Peripheral pulses are 2+ bilaterally  ABDOMEN: Nontender to palpation, normoactive bowel sounds, no rebound/guarding; No hepatosplenomegaly  MUSCLOSKELETAL:  Normal gait; no clubbing or cyanosis of digits; no joint swelling or tenderness to palpation  PSYCH: A+O to person, place, and time; affect appropriate  NEUROLOGY: CN 2-12 are intact and symmetric; no gross sensory deficits;   SKIN: No rashes; no palpable lesions    LABS:                        9.9    6.47  )-----------( 220      ( 03 Dec 2023 03:30 )             29.9     12-03    134<L>  |  94<L>  |  8.2  ----------------------------<  118<H>  3.7   |  31.0<H>  |  0.44<L>    Ca    8.6      03 Dec 2023 03:30  Phos  3.2     12-03  Mg     1.6     12-03      PTT - ( 03 Dec 2023 03:30 )  PTT:46.0 sec      Urinalysis Basic - ( 03 Dec 2023 03:30 )    Color: x / Appearance: x / SG: x / pH: x  Gluc: 118 mg/dL / Ketone: x  / Bili: x / Urobili: x   Blood: x / Protein: x / Nitrite: x   Leuk Esterase: x / RBC: x / WBC x   Sq Epi: x / Non Sq Epi: x / Bacteria: x        Culture - Urine (collected 30 Nov 2023 17:18)  Source: Clean Catch Clean Catch (Midstream)  Final Report (01 Dec 2023 20:33):    <10,000 CFU/mL Normal Urogenital Doris    Culture - Blood (collected 30 Nov 2023 13:08)  Source: .Blood Blood-Peripheral  Preliminary Report (02 Dec 2023 20:01):    No growth at 48 Hours    Culture - Blood (collected 30 Nov 2023 13:07)  Source: .Blood Blood-Peripheral  Preliminary Report (02 Dec 2023 20:01):    No growth at 48 Hours      CAPILLARY BLOOD GLUCOSE            RADIOLOGY & ADDITIONAL TESTS:  Results Reviewed:   Imaging Personally Reviewed:  Electrocardiogram Personally Reviewed:                                           [facility] Division of Hospital Medicine    Chief Complaint:  sob    SUBJECTIVE / OVERNIGHT EVENTS:  Pt seen and examined at bedside. Pt has no complaints. Denies CP, SOB, N/V/D, abd pain, fever, chills. Rest of ROS (-).     MEDICATIONS  (STANDING):  albuterol/ipratropium for Nebulization 3 milliLiter(s) Nebulizer every 6 hours  atorvastatin 40 milliGRAM(s) Oral at bedtime  budesonide  80 MICROgram(s)/formoterol 4.5 MICROgram(s) Inhaler 1 Puff(s) Inhalation daily  chlorhexidine 2% Cloths 1 Application(s) Topical daily  diltiazem    Tablet 60 milliGRAM(s) Oral <User Schedule>  furosemide    Tablet 40 milliGRAM(s) Oral daily  gabapentin 400 milliGRAM(s) Oral three times a day  heparin  Infusion.  Unit(s)/Hr (13 mL/Hr) IV Continuous <Continuous>  metoprolol tartrate 12.5 milliGRAM(s) Oral every 12 hours  midodrine 5 milliGRAM(s) Oral every 8 hours  piperacillin/tazobactam IVPB.. 3.375 Gram(s) IV Intermittent every 8 hours  tamsulosin 0.4 milliGRAM(s) Oral at bedtime    MEDICATIONS  (PRN):  acetaminophen     Tablet .. 650 milliGRAM(s) Oral every 6 hours PRN Temp greater or equal to 38C (100.4F), Moderate Pain (4 - 6)  HYDROmorphone   Tablet 2 milliGRAM(s) Oral every 8 hours PRN Severe Pain (7 - 10)  LORazepam     Tablet 1 milliGRAM(s) Oral every 6 hours PRN Anxiety  traMADol 50 milliGRAM(s) Oral every 8 hours PRN Mild Pain (1 - 3)        I&O's Summary    02 Dec 2023 07:01  -  03 Dec 2023 07:00  --------------------------------------------------------  IN: 814 mL / OUT: 1650 mL / NET: -836 mL    03 Dec 2023 07:01  -  03 Dec 2023 09:35  --------------------------------------------------------  IN: 82 mL / OUT: 600 mL / NET: -518 mL        PHYSICAL EXAM:  Vital Signs Last 24 Hrs  T(C): 36.9 (03 Dec 2023 08:07), Max: 37.8 (02 Dec 2023 15:36)  T(F): 98.5 (03 Dec 2023 08:07), Max: 100 (02 Dec 2023 15:36)  HR: 124 (03 Dec 2023 09:00) (76 - 159)  BP: 114/58 (03 Dec 2023 09:00) (93/62 - 122/63)  BP(mean): 75 (03 Dec 2023 09:00) (67 - 84)  RR: 33 (03 Dec 2023 09:00) (16 - 33)  SpO2: 92% (03 Dec 2023 09:00) (87% - 99%)    Parameters below as of 03 Dec 2023 08:00  Patient On (Oxygen Delivery Method): nasal cannula          General: Age-appearing, in no acute distress  Head: Normocephalic, atraumatic  ENMT: EOMI, no scleral icterus, neck supple, no JVD  Cardiovascular: +S1, S2; Regular rate and rhythm, no murmurs, rubs, gallops  Respiratory: b/l decreased breath sounds  Gastrointestinal: soft, ND, NT, (+) BS, (-) rebound, (-) fluid wave  Extremities: No clubbing, cyanosis  Vascular: 2+ pulses, cap refill < 2 seconds  Neuro: AAOx3, CN2-12 intact, no FND  Musculoskeletal: Normal tone, no deformities  Skin: Warm, dry, no rash, no jaundice  Psych: Calm, cooperative     LABS:                        9.9    6.47  )-----------( 220      ( 03 Dec 2023 03:30 )             29.9     12-03    134<L>  |  94<L>  |  8.2  ----------------------------<  118<H>  3.7   |  31.0<H>  |  0.44<L>    Ca    8.6      03 Dec 2023 03:30  Phos  3.2     12-03  Mg     1.6     12-03      PTT - ( 03 Dec 2023 03:30 )  PTT:46.0 sec      Urinalysis Basic - ( 03 Dec 2023 03:30 )    Color: x / Appearance: x / SG: x / pH: x  Gluc: 118 mg/dL / Ketone: x  / Bili: x / Urobili: x   Blood: x / Protein: x / Nitrite: x   Leuk Esterase: x / RBC: x / WBC x   Sq Epi: x / Non Sq Epi: x / Bacteria: x        Culture - Urine (collected 30 Nov 2023 17:18)  Source: Clean Catch Clean Catch (Midstream)  Final Report (01 Dec 2023 20:33):    <10,000 CFU/mL Normal Urogenital Doris    Culture - Blood (collected 30 Nov 2023 13:08)  Source: .Blood Blood-Peripheral  Preliminary Report (02 Dec 2023 20:01):    No growth at 48 Hours    Culture - Blood (collected 30 Nov 2023 13:07)  Source: .Blood Blood-Peripheral  Preliminary Report (02 Dec 2023 20:01):    No growth at 48 Hours      CAPILLARY BLOOD GLUCOSE            RADIOLOGY & ADDITIONAL TESTS:  Results Reviewed:   Imaging Personally Reviewed:  Electrocardiogram Personally Reviewed:                                           [facility] Division of Hospital Medicine    Chief Complaint:  sob    SUBJECTIVE / OVERNIGHT EVENTS:  Pt seen and examined at bedside. Pt has no complaints. Denies CP, SOB, N/V/D, abd pain, fever, chills. Rest of ROS (-).     MEDICATIONS  (STANDING):  albuterol/ipratropium for Nebulization 3 milliLiter(s) Nebulizer every 6 hours  atorvastatin 40 milliGRAM(s) Oral at bedtime  budesonide  80 MICROgram(s)/formoterol 4.5 MICROgram(s) Inhaler 1 Puff(s) Inhalation daily  chlorhexidine 2% Cloths 1 Application(s) Topical daily  diltiazem    Tablet 60 milliGRAM(s) Oral <User Schedule>  furosemide    Tablet 40 milliGRAM(s) Oral daily  gabapentin 400 milliGRAM(s) Oral three times a day  heparin  Infusion.  Unit(s)/Hr (13 mL/Hr) IV Continuous <Continuous>  metoprolol tartrate 12.5 milliGRAM(s) Oral every 12 hours  midodrine 5 milliGRAM(s) Oral every 8 hours  piperacillin/tazobactam IVPB.. 3.375 Gram(s) IV Intermittent every 8 hours  tamsulosin 0.4 milliGRAM(s) Oral at bedtime    MEDICATIONS  (PRN):  acetaminophen     Tablet .. 650 milliGRAM(s) Oral every 6 hours PRN Temp greater or equal to 38C (100.4F), Moderate Pain (4 - 6)  HYDROmorphone   Tablet 2 milliGRAM(s) Oral every 8 hours PRN Severe Pain (7 - 10)  LORazepam     Tablet 1 milliGRAM(s) Oral every 6 hours PRN Anxiety  traMADol 50 milliGRAM(s) Oral every 8 hours PRN Mild Pain (1 - 3)        I&O's Summary    02 Dec 2023 07:01  -  03 Dec 2023 07:00  --------------------------------------------------------  IN: 814 mL / OUT: 1650 mL / NET: -836 mL    03 Dec 2023 07:01  -  03 Dec 2023 09:35  --------------------------------------------------------  IN: 82 mL / OUT: 600 mL / NET: -518 mL        PHYSICAL EXAM:  Vital Signs Last 24 Hrs  T(C): 36.9 (03 Dec 2023 08:07), Max: 37.8 (02 Dec 2023 15:36)  T(F): 98.5 (03 Dec 2023 08:07), Max: 100 (02 Dec 2023 15:36)  HR: 124 (03 Dec 2023 09:00) (76 - 159)  BP: 114/58 (03 Dec 2023 09:00) (93/62 - 122/63)  BP(mean): 75 (03 Dec 2023 09:00) (67 - 84)  RR: 33 (03 Dec 2023 09:00) (16 - 33)  SpO2: 92% (03 Dec 2023 09:00) (87% - 99%)    Parameters below as of 03 Dec 2023 08:00  Patient On (Oxygen Delivery Method): nasal cannula          General: Age-appearing, in no acute distress  Head: Normocephalic, atraumatic  ENMT: EOMI, no scleral icterus, neck supple, no JVD  Cardiovascular: +S1, S2; Regular rate and rhythm, no murmurs, rubs, gallops  Respiratory: b/l decreased breath sounds  Gastrointestinal: soft, ND, NT, (+) BS, (-) rebound, (-) fluid wave  Extremities: No clubbing, cyanosis  Vascular: 2+ pulses, cap refill < 2 seconds  Neuro: AAOx3, CN2-12 intact, no FND  Musculoskeletal: Normal tone, no deformities  Skin: Warm, dry, no rash, no jaundice  Psych: Calm, cooperative     LABS:                        9.9    6.47  )-----------( 220      ( 03 Dec 2023 03:30 )             29.9     12-03    134<L>  |  94<L>  |  8.2  ----------------------------<  118<H>  3.7   |  31.0<H>  |  0.44<L>    Ca    8.6      03 Dec 2023 03:30  Phos  3.2     12-03  Mg     1.6     12-03      PTT - ( 03 Dec 2023 03:30 )  PTT:46.0 sec      Urinalysis Basic - ( 03 Dec 2023 03:30 )    Color: x / Appearance: x / SG: x / pH: x  Gluc: 118 mg/dL / Ketone: x  / Bili: x / Urobili: x   Blood: x / Protein: x / Nitrite: x   Leuk Esterase: x / RBC: x / WBC x   Sq Epi: x / Non Sq Epi: x / Bacteria: x        Culture - Urine (collected 30 Nov 2023 17:18)  Source: Clean Catch Clean Catch (Midstream)  Final Report (01 Dec 2023 20:33):    <10,000 CFU/mL Normal Urogenital Doris    Culture - Blood (collected 30 Nov 2023 13:08)  Source: .Blood Blood-Peripheral  Preliminary Report (02 Dec 2023 20:01):    No growth at 48 Hours    Culture - Blood (collected 30 Nov 2023 13:07)  Source: .Blood Blood-Peripheral  Preliminary Report (02 Dec 2023 20:01):    No growth at 48 Hours      CAPILLARY BLOOD GLUCOSE            RADIOLOGY & ADDITIONAL TESTS:  Results Reviewed:   Imaging Personally Reviewed:  Electrocardiogram Personally Reviewed:                                           [facility] Division of Hospital Medicine    Chief Complaint:  sob    SUBJECTIVE / OVERNIGHT EVENTS:  Pt seen and examined at bedside. Pt has no complaints. Denies CP, SOB, N/V/D, abd pain, fever, chills. Rest of ROS (-).     MEDICATIONS  (STANDING):  albuterol/ipratropium for Nebulization 3 milliLiter(s) Nebulizer every 6 hours  atorvastatin 40 milliGRAM(s) Oral at bedtime  budesonide  80 MICROgram(s)/formoterol 4.5 MICROgram(s) Inhaler 1 Puff(s) Inhalation daily  chlorhexidine 2% Cloths 1 Application(s) Topical daily  diltiazem    Tablet 60 milliGRAM(s) Oral <User Schedule>  furosemide    Tablet 40 milliGRAM(s) Oral daily  gabapentin 400 milliGRAM(s) Oral three times a day  heparin  Infusion.  Unit(s)/Hr (13 mL/Hr) IV Continuous <Continuous>  metoprolol tartrate 12.5 milliGRAM(s) Oral every 12 hours  midodrine 5 milliGRAM(s) Oral every 8 hours  piperacillin/tazobactam IVPB.. 3.375 Gram(s) IV Intermittent every 8 hours  tamsulosin 0.4 milliGRAM(s) Oral at bedtime    MEDICATIONS  (PRN):  acetaminophen     Tablet .. 650 milliGRAM(s) Oral every 6 hours PRN Temp greater or equal to 38C (100.4F), Moderate Pain (4 - 6)  HYDROmorphone   Tablet 2 milliGRAM(s) Oral every 8 hours PRN Severe Pain (7 - 10)  LORazepam     Tablet 1 milliGRAM(s) Oral every 6 hours PRN Anxiety  traMADol 50 milliGRAM(s) Oral every 8 hours PRN Mild Pain (1 - 3)        I&O's Summary    02 Dec 2023 07:01  -  03 Dec 2023 07:00  --------------------------------------------------------  IN: 814 mL / OUT: 1650 mL / NET: -836 mL    03 Dec 2023 07:01  -  03 Dec 2023 09:35  --------------------------------------------------------  IN: 82 mL / OUT: 600 mL / NET: -518 mL        PHYSICAL EXAM:  Vital Signs Last 24 Hrs  T(C): 36.9 (03 Dec 2023 08:07), Max: 37.8 (02 Dec 2023 15:36)  T(F): 98.5 (03 Dec 2023 08:07), Max: 100 (02 Dec 2023 15:36)  HR: 124 (03 Dec 2023 09:00) (76 - 159)  BP: 114/58 (03 Dec 2023 09:00) (93/62 - 122/63)  BP(mean): 75 (03 Dec 2023 09:00) (67 - 84)  RR: 33 (03 Dec 2023 09:00) (16 - 33)  SpO2: 92% (03 Dec 2023 09:00) (87% - 99%)    Parameters below as of 03 Dec 2023 08:00  Patient On (Oxygen Delivery Method): nasal cannula          General: Age-appearing, in no acute distress  Head: Normocephalic, atraumatic  ENMT: EOMI, no scleral icterus, neck supple, no JVD  Cardiovascular: +S1, S2; Regular rate and rhythm, no murmurs, rubs, gallops  Respiratory: b/l decreased breath sounds  Gastrointestinal: soft, ND, NT, (+) BS, (-) rebound, (-) fluid wave  Extremities: No clubbing, cyanosis  Vascular: 2+ pulses, cap refill < 2 seconds  Neuro: AAOx3, CN2-12 intact, no FND  Musculoskeletal: Normal tone, no deformities  Skin: Warm, dry, no rash, no jaundice  Psych: Calm, cooperative     LABS:                        9.9    6.47  )-----------( 220      ( 03 Dec 2023 03:30 )             29.9     12-03    134<L>  |  94<L>  |  8.2  ----------------------------<  118<H>  3.7   |  31.0<H>  |  0.44<L>    Ca    8.6      03 Dec 2023 03:30  Phos  3.2     12-03  Mg     1.6     12-03      PTT - ( 03 Dec 2023 03:30 )  PTT:46.0 sec      Urinalysis Basic - ( 03 Dec 2023 03:30 )    Color: x / Appearance: x / SG: x / pH: x  Gluc: 118 mg/dL / Ketone: x  / Bili: x / Urobili: x   Blood: x / Protein: x / Nitrite: x   Leuk Esterase: x / RBC: x / WBC x   Sq Epi: x / Non Sq Epi: x / Bacteria: x        Culture - Urine (collected 30 Nov 2023 17:18)  Source: Clean Catch Clean Catch (Midstream)  Final Report (01 Dec 2023 20:33):    <10,000 CFU/mL Normal Urogenital Doris    Culture - Blood (collected 30 Nov 2023 13:08)  Source: .Blood Blood-Peripheral  Preliminary Report (02 Dec 2023 20:01):    No growth at 48 Hours    Culture - Blood (collected 30 Nov 2023 13:07)  Source: .Blood Blood-Peripheral  Preliminary Report (02 Dec 2023 20:01):    No growth at 48 Hours      CAPILLARY BLOOD GLUCOSE            RADIOLOGY & ADDITIONAL TESTS:  Results Reviewed:   Imaging Personally Reviewed:  Electrocardiogram Personally Reviewed:

## 2023-12-03 NOTE — PROGRESS NOTE ADULT - SUBJECTIVE AND OBJECTIVE BOX
Mari Fonseca M.D., F.A.C.C.                                                                                            [facility]                                                                                                [address] [place] [address]                                                                                                     [phone]        COVERING FOR PATRICIO CHAVEZ is a   77 y/o male PMHx HTN, CAD s/p CABG x3v, COPD, Afib off Eliquis 2/2 hemoptysis, lung CA p/w cough, fever, tachycardia and SOB.   Patient seen in EP office with AFL with RVR and hypoxia and sent to ER.    In ER still with RVR but now appears to be more AF, still with RVR.  Noted to be hypotensive, CXR with RML/RLL PNA.  RVR 2/2 infection and hypotension.    Per EP plan is for CCB for rate control. Amio was given once  Is now on Cardizem and Lopressor  AF controlled overall (can go up with activity)  Is on  heparin and then eventually consideration of ablation.      MEDICATIONS  (STANDING):  albuterol/ipratropium for Nebulization 3 milliLiter(s) Nebulizer every 6 hours  atorvastatin 40 milliGRAM(s) Oral at bedtime  budesonide  80 MICROgram(s)/formoterol 4.5 MICROgram(s) Inhaler 1 Puff(s) Inhalation daily  chlorhexidine 2% Cloths 1 Application(s) Topical daily  diltiazem    Tablet 60 milliGRAM(s) Oral <User Schedule>  furosemide    Tablet 40 milliGRAM(s) Oral daily  gabapentin 400 milliGRAM(s) Oral three times a day  heparin  Infusion.  Unit(s)/Hr (13 mL/Hr) IV Continuous <Continuous>  metoprolol tartrate 12.5 milliGRAM(s) Oral every 12 hours  midodrine 5 milliGRAM(s) Oral every 8 hours  piperacillin/tazobactam IVPB.. 3.375 Gram(s) IV Intermittent every 8 hours  tamsulosin 0.4 milliGRAM(s) Oral at bedtime    MEDICATIONS  (PRN):  acetaminophen     Tablet .. 650 milliGRAM(s) Oral every 6 hours PRN Temp greater or equal to 38C (100.4F), Moderate Pain (4 - 6)  HYDROmorphone   Tablet 2 milliGRAM(s) Oral every 8 hours PRN Severe Pain (7 - 10)  LORazepam     Tablet 1 milliGRAM(s) Oral every 6 hours PRN Anxiety  traMADol 50 milliGRAM(s) Oral every 8 hours PRN Mild Pain (1 - 3)      Vital Signs Last 24 Hrs  T(C): 36.9 (03 Dec 2023 08:07), Max: 37.8 (02 Dec 2023 15:36)  T(F): 98.5 (03 Dec 2023 08:07), Max: 100 (02 Dec 2023 15:36)  HR: 142 (03 Dec 2023 10:00) (76 - 159)  BP: 103/69 (03 Dec 2023 10:00) (93/62 - 122/63)  BP(mean): 80 (03 Dec 2023 10:00) (67 - 84)  RR: 28 (03 Dec 2023 10:00) (16 - 33)  SpO2: 97% (03 Dec 2023 10:00) (87% - 99%)    Parameters below as of 03 Dec 2023 09:35  Patient On (Oxygen Delivery Method): nasal cannula, 4L        I&O's Detail    02 Dec 2023 07:01  -  03 Dec 2023 07:00  --------------------------------------------------------  IN:    Heparin Infusion: 414 mL    IV PiggyBack: 100 mL    IV PiggyBack: 300 mL  Total IN: 814 mL    OUT:    Voided (mL): 1650 mL  Total OUT: 1650 mL    Total NET: -836 mL      03 Dec 2023 07:01  -  03 Dec 2023 11:08  --------------------------------------------------------  IN:    Heparin Infusion: 57 mL    IV PiggyBack: 25 mL  Total IN: 82 mL    OUT:    Voided (mL): 600 mL  Total OUT: 600 mL    Total NET: -518 mL          Constitutional:    NC/AT: Normal     HEENT: Normal     Neck:  No JVD, bruits or thyromegaly    Respiratory:  Clear without rales or rhonchi    Cardiovascular: I RR without murmur, rub or gallop.    Gastrointestinal: Soft without hepatosplenomegaly.    Extremities: without cyanosis, clubbing or edema.    Neurological:  Oriented   x  3      . No gross sensory or motor defects.    Skin: Normal     Psychiatric: Normal affect.                              9.9    6.47  )-----------( 220      ( 03 Dec 2023 03:30 )             29.9     12-03    134<L>  |  94<L>  |  8.2  ----------------------------<  118<H>  3.7   |  31.0<H>  |  0.44<L>    Ca    8.6      03 Dec 2023 03:30  Phos  3.2     12-03  Mg     1.6     12-03      PTT - ( 03 Dec 2023 03:30 )  PTT:46.0 sec                  IMPRESSION:    AFib  CAD/Hx of CABG  COPD  Hypertension  Lung CA with hemoptysis     HR OK on  Cardizem and Lopressor (flllowed by EPS)  Is on heparin without further bleeding/hemoptysis   Eventual  consideration of ablation.  [facility] will be following pt                                                                                         Mari Fonseca M.D., F.A.C.C.                                                                                            [facility]                                                                                                [address] [place] [address]                                                                                                     [phone]        COVERING FOR PATRICIO CHAVEZ is a   79 y/o male PMHx HTN, CAD s/p CABG x3v, COPD, Afib off Eliquis 2/2 hemoptysis, lung CA p/w cough, fever, tachycardia and SOB.   Patient seen in EP office with AFL with RVR and hypoxia and sent to ER.    In ER still with RVR but now appears to be more AF, still with RVR.  Noted to be hypotensive, CXR with RML/RLL PNA.  RVR 2/2 infection and hypotension.    Per EP plan is for CCB for rate control. Amio was given once  Is now on Cardizem and Lopressor  AF controlled overall (can go up with activity)  Is on  heparin and then eventually consideration of ablation.      MEDICATIONS  (STANDING):  albuterol/ipratropium for Nebulization 3 milliLiter(s) Nebulizer every 6 hours  atorvastatin 40 milliGRAM(s) Oral at bedtime  budesonide  80 MICROgram(s)/formoterol 4.5 MICROgram(s) Inhaler 1 Puff(s) Inhalation daily  chlorhexidine 2% Cloths 1 Application(s) Topical daily  diltiazem    Tablet 60 milliGRAM(s) Oral <User Schedule>  furosemide    Tablet 40 milliGRAM(s) Oral daily  gabapentin 400 milliGRAM(s) Oral three times a day  heparin  Infusion.  Unit(s)/Hr (13 mL/Hr) IV Continuous <Continuous>  metoprolol tartrate 12.5 milliGRAM(s) Oral every 12 hours  midodrine 5 milliGRAM(s) Oral every 8 hours  piperacillin/tazobactam IVPB.. 3.375 Gram(s) IV Intermittent every 8 hours  tamsulosin 0.4 milliGRAM(s) Oral at bedtime    MEDICATIONS  (PRN):  acetaminophen     Tablet .. 650 milliGRAM(s) Oral every 6 hours PRN Temp greater or equal to 38C (100.4F), Moderate Pain (4 - 6)  HYDROmorphone   Tablet 2 milliGRAM(s) Oral every 8 hours PRN Severe Pain (7 - 10)  LORazepam     Tablet 1 milliGRAM(s) Oral every 6 hours PRN Anxiety  traMADol 50 milliGRAM(s) Oral every 8 hours PRN Mild Pain (1 - 3)      Vital Signs Last 24 Hrs  T(C): 36.9 (03 Dec 2023 08:07), Max: 37.8 (02 Dec 2023 15:36)  T(F): 98.5 (03 Dec 2023 08:07), Max: 100 (02 Dec 2023 15:36)  HR: 142 (03 Dec 2023 10:00) (76 - 159)  BP: 103/69 (03 Dec 2023 10:00) (93/62 - 122/63)  BP(mean): 80 (03 Dec 2023 10:00) (67 - 84)  RR: 28 (03 Dec 2023 10:00) (16 - 33)  SpO2: 97% (03 Dec 2023 10:00) (87% - 99%)    Parameters below as of 03 Dec 2023 09:35  Patient On (Oxygen Delivery Method): nasal cannula, 4L        I&O's Detail    02 Dec 2023 07:01  -  03 Dec 2023 07:00  --------------------------------------------------------  IN:    Heparin Infusion: 414 mL    IV PiggyBack: 100 mL    IV PiggyBack: 300 mL  Total IN: 814 mL    OUT:    Voided (mL): 1650 mL  Total OUT: 1650 mL    Total NET: -836 mL      03 Dec 2023 07:01  -  03 Dec 2023 11:08  --------------------------------------------------------  IN:    Heparin Infusion: 57 mL    IV PiggyBack: 25 mL  Total IN: 82 mL    OUT:    Voided (mL): 600 mL  Total OUT: 600 mL    Total NET: -518 mL          Constitutional:    NC/AT: Normal     HEENT: Normal     Neck:  No JVD, bruits or thyromegaly    Respiratory:  Clear without rales or rhonchi    Cardiovascular: I RR without murmur, rub or gallop.    Gastrointestinal: Soft without hepatosplenomegaly.    Extremities: without cyanosis, clubbing or edema.    Neurological:  Oriented   x  3      . No gross sensory or motor defects.    Skin: Normal     Psychiatric: Normal affect.                              9.9    6.47  )-----------( 220      ( 03 Dec 2023 03:30 )             29.9     12-03    134<L>  |  94<L>  |  8.2  ----------------------------<  118<H>  3.7   |  31.0<H>  |  0.44<L>    Ca    8.6      03 Dec 2023 03:30  Phos  3.2     12-03  Mg     1.6     12-03      PTT - ( 03 Dec 2023 03:30 )  PTT:46.0 sec                  IMPRESSION:    AFib  CAD/Hx of CABG  COPD  Hypertension  Lung CA with hemoptysis     HR OK on  Cardizem and Lopressor (flllowed by EPS)  Is on heparin without further bleeding/hemoptysis   Eventual  consideration of ablation.  [facility] will be following pt                                                                                         Mari Fonseca M.D., F.A.C.C.                                                                                            [facility]                                                                                                [address] [place] [address]                                                                                                     [phone]        COVERING FOR PATRICIO CHAVEZ is a   77 y/o male PMHx HTN, CAD s/p CABG x3v, COPD, Afib off Eliquis 2/2 hemoptysis, lung CA p/w cough, fever, tachycardia and SOB.   Patient seen in EP office with AFL with RVR and hypoxia and sent to ER.    In ER still with RVR but now appears to be more AF, still with RVR.  Noted to be hypotensive, CXR with RML/RLL PNA.  RVR 2/2 infection and hypotension.    Per EP plan is for CCB for rate control. Amio was given once  Is now on Cardizem and Lopressor  AF controlled overall (can go up with activity)  Is on  heparin and then eventually consideration of ablation.      MEDICATIONS  (STANDING):  albuterol/ipratropium for Nebulization 3 milliLiter(s) Nebulizer every 6 hours  atorvastatin 40 milliGRAM(s) Oral at bedtime  budesonide  80 MICROgram(s)/formoterol 4.5 MICROgram(s) Inhaler 1 Puff(s) Inhalation daily  chlorhexidine 2% Cloths 1 Application(s) Topical daily  diltiazem    Tablet 60 milliGRAM(s) Oral <User Schedule>  furosemide    Tablet 40 milliGRAM(s) Oral daily  gabapentin 400 milliGRAM(s) Oral three times a day  heparin  Infusion.  Unit(s)/Hr (13 mL/Hr) IV Continuous <Continuous>  metoprolol tartrate 12.5 milliGRAM(s) Oral every 12 hours  midodrine 5 milliGRAM(s) Oral every 8 hours  piperacillin/tazobactam IVPB.. 3.375 Gram(s) IV Intermittent every 8 hours  tamsulosin 0.4 milliGRAM(s) Oral at bedtime    MEDICATIONS  (PRN):  acetaminophen     Tablet .. 650 milliGRAM(s) Oral every 6 hours PRN Temp greater or equal to 38C (100.4F), Moderate Pain (4 - 6)  HYDROmorphone   Tablet 2 milliGRAM(s) Oral every 8 hours PRN Severe Pain (7 - 10)  LORazepam     Tablet 1 milliGRAM(s) Oral every 6 hours PRN Anxiety  traMADol 50 milliGRAM(s) Oral every 8 hours PRN Mild Pain (1 - 3)      Vital Signs Last 24 Hrs  T(C): 36.9 (03 Dec 2023 08:07), Max: 37.8 (02 Dec 2023 15:36)  T(F): 98.5 (03 Dec 2023 08:07), Max: 100 (02 Dec 2023 15:36)  HR: 142 (03 Dec 2023 10:00) (76 - 159)  BP: 103/69 (03 Dec 2023 10:00) (93/62 - 122/63)  BP(mean): 80 (03 Dec 2023 10:00) (67 - 84)  RR: 28 (03 Dec 2023 10:00) (16 - 33)  SpO2: 97% (03 Dec 2023 10:00) (87% - 99%)    Parameters below as of 03 Dec 2023 09:35  Patient On (Oxygen Delivery Method): nasal cannula, 4L        I&O's Detail    02 Dec 2023 07:01  -  03 Dec 2023 07:00  --------------------------------------------------------  IN:    Heparin Infusion: 414 mL    IV PiggyBack: 100 mL    IV PiggyBack: 300 mL  Total IN: 814 mL    OUT:    Voided (mL): 1650 mL  Total OUT: 1650 mL    Total NET: -836 mL      03 Dec 2023 07:01  -  03 Dec 2023 11:08  --------------------------------------------------------  IN:    Heparin Infusion: 57 mL    IV PiggyBack: 25 mL  Total IN: 82 mL    OUT:    Voided (mL): 600 mL  Total OUT: 600 mL    Total NET: -518 mL          Constitutional:    NC/AT: Normal     HEENT: Normal     Neck:  No JVD, bruits or thyromegaly    Respiratory:  Clear without rales or rhonchi    Cardiovascular: I RR without murmur, rub or gallop.    Gastrointestinal: Soft without hepatosplenomegaly.    Extremities: without cyanosis, clubbing or edema.    Neurological:  Oriented   x  3      . No gross sensory or motor defects.    Skin: Normal     Psychiatric: Normal affect.                              9.9    6.47  )-----------( 220      ( 03 Dec 2023 03:30 )             29.9     12-03    134<L>  |  94<L>  |  8.2  ----------------------------<  118<H>  3.7   |  31.0<H>  |  0.44<L>    Ca    8.6      03 Dec 2023 03:30  Phos  3.2     12-03  Mg     1.6     12-03      PTT - ( 03 Dec 2023 03:30 )  PTT:46.0 sec                  IMPRESSION:    AFib  CAD/Hx of CABG  COPD  Hypertension  Lung CA with hemoptysis     HR OK on  Cardizem and Lopressor (flllowed by EPS)  Is on heparin without further bleeding/hemoptysis   Eventual  consideration of ablation.  [facility] will be following pt

## 2023-12-03 NOTE — PROGRESS NOTE ADULT - SUBJECTIVE AND OBJECTIVE BOX
CC: I have Pneumonia and Afib  HPI: 79 y/o male with hx of CAD s/p CABG in 3/20, PCI in 02' and 19' Afib no longer on AC due to hemoptysis from Lung nodule which was biopsied via Bronch in 10/23 currently on Chemo with Paclitaxel last session on 11/27 and XRT last session on 11/29, COPD not on home 02, Chronic back pain s/p spinal stimulator, remote hx of etoh use disorder, prior admissions for PNA. Patient admitted to MICU on 11/30 after presenting to ED with SOB, cough, confusion, w/ fever, and SVT w/ underlying Afib w/ RVR. Patient became hypotensive while being treated in ED despite 3 liters NS, and was started on Pressers and trans to MICU for septic shock due to PNA. While in MICU he has been maintained on IV abx, had his AVB agents adjusted and received amiodarone as well with good response. He was titrated off pressers is on Midodrine. Started on IV heparin gtt for elevated chads vasc and felt that being he is on XRT his risk of recurrent hemoptysis should be lower. He has not had any hemoptysis while on heparin thus far. RVP neg, blood cx neg, urine cx neg. He has remained afebrile and is rate controlled in low 100s. Echo with normal EF, no effusion overt valvulopathy.    ICU Vital Signs Last 24 Hrs  T(C): 36.8 (03 Dec 2023 03:19), Max: 37.8 (02 Dec 2023 15:36)  T(F): 98.3 (03 Dec 2023 03:19), Max: 100 (02 Dec 2023 15:36)  HR: 92 (03 Dec 2023 03:00) (76 - 159)  BP: 122/63 (03 Dec 2023 03:00) (93/62 - 122/63)  BP(mean): 82 (03 Dec 2023 03:00) (67 - 84)  ABP: --  ABP(mean): --  RR: 23 (03 Dec 2023 03:00) (16 - 31)  SpO2: 99% (03 Dec 2023 03:00) (87% - 99%)

## 2023-12-03 NOTE — PROGRESS NOTE ADULT - ASSESSMENT
77 y/o male with resolving septic shock due to PNA, Afib w/ RVR, SCC on chemo/XRT, hypomagnesemia, CAD s/p CABG/PCI, Chronic back pain    Resolving Septic Shock due to PNA:  -Hemodynamics stable off pressers  -Titrate off midodrine  -Cultures with NGTD, legionella neg  -Complete course of IV abx  -Trend WBC/Temp curve  -OOB, ambulate, on Heparin for DVT-P  -Titrate off supplemental 02  -Fall risk, PT eval  -DC planning    Afib w/ RVR:  -Titrate AVB as BP will tolerate  -Cardiology noted reviewed, possible Watchman/Ablation as o/p  -tolerating heparin gtt, no hemoptysis reported by Patient/staff  -Cont. tele    CAD s/p CABG/PCI:  -Cont. o/p regimen  -No anginal symptoms reported    NSCLC:  -S/P Chemo/XRT as above  -F/U with Dr. Rinaldi/Regina at [facility] post DC    Hypomagnesemia:  -Replaced in ICU  -F/U repeat BMP/Mag in AM    Chronic back pain:  -Cont. prn opioids   -fall risk  -PT    Discussed with Patient, MICU Nurse     79 y/o male with resolving septic shock due to PNA, Afib w/ RVR, SCC on chemo/XRT, hypomagnesemia, CAD s/p CABG/PCI, Chronic back pain    Resolving Septic Shock due to PNA:  -Hemodynamics stable off pressers  -Titrate off midodrine  -Cultures with NGTD, legionella neg  -Complete course of IV abx  -Trend WBC/Temp curve  -OOB, ambulate, on Heparin for DVT-P  -Titrate off supplemental 02  -Fall risk, PT eval  -DC planning    Afib w/ RVR:  -Titrate AVB as BP will tolerate  -Cardiology noted reviewed, possible Watchman/Ablation as o/p  -tolerating heparin gtt, no hemoptysis reported by Patient/staff  -Cont. tele    CAD s/p CABG/PCI:  -Cont. o/p regimen  -No anginal symptoms reported    NSCLC:  -S/P Chemo/XRT as above  -F/U with Dr. Rinaldi/Regina at [facility] post DC    Hypomagnesemia:  -Replaced in ICU  -F/U repeat BMP/Mag in AM    Chronic back pain:  -Cont. prn opioids   -fall risk  -PT    Discussed with Patient, MICU Nurse     79 y/o male with resolving septic shock due to PNA, Afib w/ RVR, SCC on chemo/XRT, hypomagnesemia, CAD s/p CABG/PCI, Chronic back pain    Resolving Septic Shock due to PNA:  -Hemodynamics stable off pressers  -Titrate off midodrine  -Cultures with NGTD, legionella neg  -Complete course of IV abx  -Trend WBC/Temp curve  -OOB, ambulate, on Heparin for DVT-P  -Titrate off supplemental 02  -Fall risk, PT eval  -DC planning    Afib w/ RVR:  -Titrate AVB as BP will tolerate  -Cardiology noted reviewed, possible Watchman/Ablation as o/p  -tolerating heparin gtt, no hemoptysis reported by Patient/staff  -Cont. tele    CAD s/p CABG/PCI:  -Cont. o/p regimen  -No anginal symptoms reported    NSCLC:  -S/P Chemo/XRT as above  -F/U with Dr. Rinaldi/Regina at [facility] post DC    Hypomagnesemia:  -Replaced in ICU  -F/U repeat BMP/Mag in AM    Chronic back pain:  -Cont. prn opioids   -fall risk  -PT    Discussed with Patient, MICU Nurse     77 y/o male with resolving septic shock due to PNA, Afib w/ RVR, SCC on chemo/XRT, hypomagnesemia, CAD s/p CABG/PCI, COPD, Chronic back pain    Resolving Septic Shock due to PNA:  -Hemodynamics stable off pressers  -Titrate off midodrine  -Cultures with NGTD, legionella neg  -Complete course of IV abx  -Trend WBC/Temp curve  -OOB, ambulate, on Heparin for DVT-P  -Titrate off supplemental 02  -Fall risk, PT eval  -DC planning    Afib w/ RVR:  -Titrate AVB as BP will tolerate  -Cardiology noted reviewed, possible Watchman/Ablation as o/p  -tolerating heparin gtt, no hemoptysis reported by Patient/staff  -Cont. tele    CAD s/p CABG/PCI:  -Cont. o/p regimen  -No anginal symptoms reported    NSCLC:  -S/P Chemo/XRT as above  -F/U with Dr. Rinaldi/Regina at [facility] post DC    COPD:  -Cont. Symbicort in place of trelegy   -Check RA sat at rest/ambulation  -nebs prn    Hypomagnesemia:  -Replaced in ICU  -F/U repeat BMP/Mag in AM    Chronic back pain:  -Cont. prn opioids   -Cont. neurontin  -S/P spinal stimulator   -fall risk  -PT    Discussed with Patient, MICU Nurse     79 y/o male with resolving septic shock due to PNA, Afib w/ RVR, SCC on chemo/XRT, hypomagnesemia, CAD s/p CABG/PCI, COPD, Chronic back pain    Resolving Septic Shock due to PNA:  -Hemodynamics stable off pressers  -Titrate off midodrine  -Cultures with NGTD, legionella neg  -Complete course of IV abx  -Trend WBC/Temp curve  -OOB, ambulate, on Heparin for DVT-P  -Titrate off supplemental 02  -Fall risk, PT eval  -DC planning    Afib w/ RVR:  -Titrate AVB as BP will tolerate  -Cardiology noted reviewed, possible Watchman/Ablation as o/p  -tolerating heparin gtt, no hemoptysis reported by Patient/staff  -Cont. tele    CAD s/p CABG/PCI:  -Cont. o/p regimen  -No anginal symptoms reported    NSCLC:  -S/P Chemo/XRT as above  -F/U with Dr. Rinaldi/Regina at [facility] post DC    COPD:  -Cont. Symbicort in place of trelegy   -Check RA sat at rest/ambulation  -nebs prn    Hypomagnesemia:  -Replaced in ICU  -F/U repeat BMP/Mag in AM    Chronic back pain:  -Cont. prn opioids   -Cont. neurontin  -S/P spinal stimulator   -fall risk  -PT    Discussed with Patient, MICU Nurse     79 y/o male with resolving septic shock due to PNA, Afib w/ RVR, SCC on chemo/XRT, hypomagnesemia, CAD s/p CABG/PCI, COPD, Chronic back pain    Resolving Septic Shock due to PNA:  -Hemodynamics stable off pressers  -Titrate off midodrine  -Cultures with NGTD, legionella neg  -Complete course of IV abx  -Trend WBC/Temp curve  -OOB, ambulate, on Heparin for DVT-P  -Titrate off supplemental 02  -Fall risk, PT eval  -DC planning    Afib w/ RVR:  -Titrate AVB as BP will tolerate  -Cardiology noted reviewed, possible Watchman/Ablation as o/p  -tolerating heparin gtt, no hemoptysis reported by Patient/staff  -Cont. tele    CAD s/p CABG/PCI:  -Cont. o/p regimen  -No anginal symptoms reported    NSCLC:  -S/P Chemo/XRT as above  -F/U with Dr. Rinaldi/Regina at [facility] post DC    COPD:  -Cont. Symbicort in place of trelegy   -Check RA sat at rest/ambulation  -nebs prn    Hypomagnesemia:  -Replaced in ICU  -F/U repeat BMP/Mag in AM    Chronic back pain:  -Cont. prn opioids   -Cont. neurontin  -S/P spinal stimulator   -fall risk  -PT    Discussed with Patient, MICU Nurse

## 2023-12-04 NOTE — PROGRESS NOTE ADULT - SUBJECTIVE AND OBJECTIVE BOX
Chief complaint: Septic shock    Patient seen and examined at bedside. Overnight patient tachycardic in Afib with RVR. Patient denies headache, fever, chills, cough, nausea or vomiting.     Vital Signs Last 24 Hrs  T(F): 98.5 (04 Dec 2023 07:00), Max: 99.8 (03 Dec 2023 20:19)  HR: 77 (04 Dec 2023 10:00) (72 - 173)  BP: 101/59 (04 Dec 2023 10:00) (94/58 - 113/75)  RR: 27 (04 Dec 2023 10:00) (18 - 29)  SpO2: 92% (04 Dec 2023 10:00) (88% - 99%)    Physical Exam:  Constitutional: alert and oriented, in no acute distress   Neck: Soft and supple  Respiratory: Good air entry b/l  Cardiovascular: Irregular rhythm  Gastrointestinal: Soft, non-tender to palpation, +bs  Vascular: 2+ peripheral pulses  Neurological: A/O x 3  Musculoskeletal: 5/5 strength b/l upper and lower extremities, no lower extremity edema bilaterally    Labs:                        9.1    5.14  )-----------( 228      ( 04 Dec 2023 04:30 )             27.3   12-04    134<L>  |  94<L>  |  11.4  ----------------------------<  115<H>  3.9   |  30.0<H>  |  0.41<L>    Ca    8.1<L>      04 Dec 2023 04:30  Phos  3.2     12-03  Mg     1.4     12-04

## 2023-12-04 NOTE — CHART NOTE - NSCHARTNOTEFT_GEN_A_CORE
Delayed Entry:  Patient with sudden increase in HR into 160s, underlying Fib/flutter on monitor. No complaints of CP, N/V, Diaphoresis. Patient last dose of PO Cardizem not given due to SBP <100 per holding parameters. Patient noted to have >4L net negative fluid balance. Lasix dcd, 1 liter NS given in setting up SBP in 70-80s during episode with good response. Given IV Cardizem 10mg with poor response, then lopressor 10mg IV with good response and loaded with Digoxin 500mcg IVP with additional 250mcg ordered Q 6 hours x 2 doses. ICU performed bedside POCU showing IVC collapse c/w volume depletion. Recent echo with normal EF, no sig valvulopathy. Patient states not on Diuretics at home and is normally on metoprolol 50mg PO BID. Will adjust hold parameters for SBP Delayed Entry:  Patient with sudden increase in HR into 160s, underlying Fib/flutter on monitor. No complaints of CP, N/V, Diaphoresis. Patient last dose of PO Cardizem not given due to SBP <100 per holding parameters. Patient noted to have >4L net negative fluid balance. Lasix dcd, 1 liter NS given in setting up SBP in 70-80s during episode with good response. Given IV Cardizem 10mg with poor response, then lopressor 10mg IV with good response and loaded with Digoxin 500mcg IVP with additional 250mcg ordered Q 6 hours x 2 doses. ICU performed bedside POCU showing IVC collapse c/w volume depletion. Recent echo with normal EF, no sig valvulopathy. Patient states not on Diuretics at home and is normally on metoprolol 50mg PO BID. Will adjust hold parameters for SBP <90 to avoid recurrent afib/flutter w/ RVR. EP to possibly due YRN/Cardioversion. Discussed with ICU Attending/Nursing. Will endorse to day team.

## 2023-12-04 NOTE — PROGRESS NOTE ADULT - SUBJECTIVE AND OBJECTIVE BOX
JENNIFER OSCAR  [id number]      Chief Complaint:  Follow up atrial flutter, hemoptysis, lung Ca, PNA    Interval History:  Overnight with AFL with RVR    Tele:  AFL with variable AV conduction.       acetaminophen     Tablet .. 650 milliGRAM(s) Oral every 6 hours PRN  albuterol/ipratropium for Nebulization 3 milliLiter(s) Nebulizer every 6 hours  atorvastatin 40 milliGRAM(s) Oral at bedtime  budesonide  80 MICROgram(s)/formoterol 4.5 MICROgram(s) Inhaler 1 Puff(s) Inhalation daily  chlorhexidine 2% Cloths 1 Application(s) Topical daily  digoxin  Injectable 250 MICROGram(s) IV Push every 6 hours  diltiazem    Tablet 60 milliGRAM(s) Oral <User Schedule>  gabapentin 400 milliGRAM(s) Oral three times a day  heparin  Infusion.  Unit(s)/Hr IV Continuous <Continuous>  HYDROmorphone   Tablet 2 milliGRAM(s) Oral every 8 hours PRN  LORazepam     Tablet 1 milliGRAM(s) Oral every 6 hours PRN  metoprolol tartrate 12.5 milliGRAM(s) Oral every 12 hours  midodrine 5 milliGRAM(s) Oral every 8 hours  piperacillin/tazobactam IVPB.. 3.375 Gram(s) IV Intermittent every 8 hours  tamsulosin 0.4 milliGRAM(s) Oral at bedtime  traMADol 50 milliGRAM(s) Oral every 8 hours PRN          Physical Exam:  T(C): 36.3 (12-04-23 @ 11:00), Max: 37.7 (12-03-23 @ 20:19)  HR: 85 (12-04-23 @ 11:00) (72 - 161)  BP: 109/66 (12-04-23 @ 11:00) (94/58 - 113/75)  RR: 28 (12-04-23 @ 11:00) (18 - 29)  SpO2: 95% (12-04-23 @ 11:00) (88% - 99%)  Wt(kg): --  General: Comfortable in NAD, on   Neck: No JVD  CVS: nl s1s2, no s3  Pulm: CTA b/l  Abd: soft, non-tender  Ext: No c/c/e  Neuro A&O x3  Psych: Normal affect      Labs:   04 Dec 2023 04:30    134    |  94     |  11.4   ----------------------------<  115    3.9     |  30.0   |  0.41     Ca    8.1        04 Dec 2023 04:30  Phos  3.2       03 Dec 2023 03:30  Mg     1.4       04 Dec 2023 04:30                            9.1    5.14  )-----------( 228      ( 04 Dec 2023 04:30 )             27.3     PTT - ( 04 Dec 2023 04:30 )  PTT:72.3 sec          Assessment:  78 year old male, former smoker, with HTN, hyperlipidemia, CAD s/p PCI 2002 and CABG x 3 (3/2020 with Dr. Hutchison), COPD, lung cancer (recurrent NSCLC; RUL; squamous; on daily radiation with Taxol/Carbo weekly), and paroxysmal atrial fibrillation s/p MDT ILR who has been off AC since 10/2023 due to hemoptysis. He presented to EP followup office visit with c/o cough, fever, and worsening shortness of breath over several days, noted to be in AFL with rapid ventricular rates on ILR. Sent to [facility] for further evaluation and was admitted with acute hypoxic respiratory failure and septic shock 2/2 pneumonia. Initially on pressors which have since been discontinued. Had converted to sinus rhythm/sinus tachycardia on 12/1 but then converted back into atrial fibrillation on 12/2/23. Started on IV heparin to assess tolerance/recurrence of hemoptysis.     still with AFL with RVR mostly with exertion, HR ok at rest    Plan:  1. Continue current CV meds   2. Will try to do YRN/CV once his respiratory status stabilizes, probably in the next few days.   3. On heparin drip to assess tolerance to anticoagulation   4. Rest as per primary team.        JENNIFER OSCAR  [id number]      Chief Complaint:  Follow up atrial flutter, hemoptysis, lung Ca, PNA    Interval History:  Overnight with AFL with RVR    Tele:  AFL with variable AV conduction.       acetaminophen     Tablet .. 650 milliGRAM(s) Oral every 6 hours PRN  albuterol/ipratropium for Nebulization 3 milliLiter(s) Nebulizer every 6 hours  atorvastatin 40 milliGRAM(s) Oral at bedtime  budesonide  80 MICROgram(s)/formoterol 4.5 MICROgram(s) Inhaler 1 Puff(s) Inhalation daily  chlorhexidine 2% Cloths 1 Application(s) Topical daily  digoxin  Injectable 250 MICROGram(s) IV Push every 6 hours  diltiazem    Tablet 60 milliGRAM(s) Oral <User Schedule>  gabapentin 400 milliGRAM(s) Oral three times a day  heparin  Infusion.  Unit(s)/Hr IV Continuous <Continuous>  HYDROmorphone   Tablet 2 milliGRAM(s) Oral every 8 hours PRN  LORazepam     Tablet 1 milliGRAM(s) Oral every 6 hours PRN  metoprolol tartrate 12.5 milliGRAM(s) Oral every 12 hours  midodrine 5 milliGRAM(s) Oral every 8 hours  piperacillin/tazobactam IVPB.. 3.375 Gram(s) IV Intermittent every 8 hours  tamsulosin 0.4 milliGRAM(s) Oral at bedtime  traMADol 50 milliGRAM(s) Oral every 8 hours PRN          Physical Exam:  T(C): 36.3 (12-04-23 @ 11:00), Max: 37.7 (12-03-23 @ 20:19)  HR: 85 (12-04-23 @ 11:00) (72 - 161)  BP: 109/66 (12-04-23 @ 11:00) (94/58 - 113/75)  RR: 28 (12-04-23 @ 11:00) (18 - 29)  SpO2: 95% (12-04-23 @ 11:00) (88% - 99%)  Wt(kg): --  General: Comfortable in NAD, on   Neck: No JVD  CVS: nl s1s2, no s3  Pulm: CTA b/l  Abd: soft, non-tender  Ext: No c/c/e  Neuro A&O x3  Psych: Normal affect      Labs:   04 Dec 2023 04:30    134    |  94     |  11.4   ----------------------------<  115    3.9     |  30.0   |  0.41     Ca    8.1        04 Dec 2023 04:30  Phos  3.2       03 Dec 2023 03:30  Mg     1.4       04 Dec 2023 04:30                            9.1    5.14  )-----------( 228      ( 04 Dec 2023 04:30 )             27.3     PTT - ( 04 Dec 2023 04:30 )  PTT:72.3 sec          Assessment:  78 year old male, former smoker, with HTN, hyperlipidemia, CAD s/p PCI 2002 and CABG x 3 (3/2020 with Dr. Hutchison), COPD, lung cancer (recurrent NSCLC; RUL; squamous; on daily radiation with Taxol/Carbo weekly), and paroxysmal atrial fibrillation s/p MDT ILR who has been off AC since 10/2023 due to hemoptysis. He presented to EP followup office visit with c/o cough, fever, and worsening shortness of breath over several days, noted to be in AFL with rapid ventricular rates on ILR. Sent to [facility] for further evaluation and was admitted with acute hypoxic respiratory failure and septic shock 2/2 pneumonia. Initially on pressors which have since been discontinued. Had converted to sinus rhythm/sinus tachycardia on 12/1 but then converted back into atrial fibrillation on 12/2/23. Started on IV heparin to assess tolerance/recurrence of hemoptysis.     still with AFL with RVR mostly with exertion, HR ok at rest    Plan:  1. Continue current CV meds   2. Will try to do YRN/CV once his respiratory status stabilizes, probably in the next few days.   3. On heparin drip to assess tolerance to anticoagulation   4. Rest as per primary team.

## 2023-12-04 NOTE — PROGRESS NOTE ADULT - ASSESSMENT
79 y/o male with resolving septic shock due to PNA, Afib w/ RVR, SCC on chemo/XRT, hypomagnesemia, CAD s/p CABG/PCI, COPD, Chronic back pain    Septic Shock due to PNA (shock resolved)  - Hemodynamics stable off pressers  - Midodrine 5mg q8h, titrate as tolerated  - Blood cx NGTD  - Continue Zosyn  - Saturating well on nasal cannula    Afib w/ RVR  - Telemetry monitoring  - Cardiology recs appreciated  - Continue Heparin drip  - Digoxin 250mg q6h for 2 doses  - Cardizem 60mg QID  - Lopressor 12.5mg q12h    CAD s/p CABG/PCI  - Continue Lipitor 40mg nightly     NSCLC  - s/p Chemo/XRT as above  - f/u with Dr. Rinaldi/Regina at [facility] post DC    COPD  - Duonebs PRN  - Continue Symbicort    Chronic back pain  - Tramadol 50mg q8h PRN  - Dilauded 2mg q8h PRN  - Gabapentin 400mg TID  - S/P spinal stimulator     DVT ppx  - Heparin drip   79 y/o male with resolving septic shock due to PNA, Afib w/ RVR, SCC on chemo/XRT, hypomagnesemia, CAD s/p CABG/PCI, COPD, Chronic back pain    Septic Shock due to PNA (shock resolved)  - Hemodynamics stable off pressers  - Midodrine 5mg q8h, titrate as tolerated  - Blood cx NGTD  - Continue Zosyn  - Saturating well on nasal cannula    Afib w/ RVR  - Telemetry monitoring  - Cardiology recs appreciated  - Continue Heparin drip  - Digoxin 250mg q6h for 2 doses  - Cardizem 60mg QID  - Lopressor 12.5mg q12h    CAD s/p CABG/PCI  - Continue Lipitor 40mg nightly     NSCLC  - s/p Chemo/XRT as above  - f/u with Dr. Rinaldi/Regina at [facility] post DC    COPD  - Duonebs PRN  - Continue Symbicort    Chronic back pain  - Tramadol 50mg q8h PRN  - Dilauded 2mg q8h PRN  - Gabapentin 400mg TID  - S/P spinal stimulator     DVT ppx  - Heparin drip

## 2023-12-04 NOTE — PROGRESS NOTE ADULT - SUBJECTIVE AND OBJECTIVE BOX
Subjective:    pt reports feeling better today, less dyspnea.   Though he initially converted to sinus rhythm after presentation, then went back to AF and now again in atrial flutter with rapid rates (sometimes more controlled in 70s-90s bpm, then back to 150s bpm). he denies symptoms with arrhythmia.   He has been on metoprolol and diltiazem, and digoxin also started  tolerating heparin gtt thus far with no hypoxia    MEDICATIONS  (STANDING):  albuterol/ipratropium for Nebulization 3 milliLiter(s) Nebulizer every 6 hours  atorvastatin 40 milliGRAM(s) Oral at bedtime  budesonide  80 MICROgram(s)/formoterol 4.5 MICROgram(s) Inhaler 1 Puff(s) Inhalation daily  chlorhexidine 2% Cloths 1 Application(s) Topical daily  digoxin  Injectable 250 MICROGram(s) IV Push every 6 hours  diltiazem    Tablet 60 milliGRAM(s) Oral <User Schedule>  gabapentin 400 milliGRAM(s) Oral three times a day  heparin  Infusion.  Unit(s)/Hr (13 mL/Hr) IV Continuous <Continuous>  magnesium sulfate  IVPB 3 Gram(s) IV Intermittent once  metoprolol tartrate 12.5 milliGRAM(s) Oral every 12 hours  midodrine 5 milliGRAM(s) Oral every 8 hours  piperacillin/tazobactam IVPB.. 3.375 Gram(s) IV Intermittent every 8 hours  tamsulosin 0.4 milliGRAM(s) Oral at bedtime    MEDICATIONS  (PRN):  acetaminophen     Tablet .. 650 milliGRAM(s) Oral every 6 hours PRN Temp greater or equal to 38C (100.4F), Moderate Pain (4 - 6)  HYDROmorphone   Tablet 2 milliGRAM(s) Oral every 8 hours PRN Severe Pain (7 - 10)  LORazepam     Tablet 1 milliGRAM(s) Oral every 6 hours PRN Anxiety  traMADol 50 milliGRAM(s) Oral every 8 hours PRN Mild Pain (1 - 3)      Allergies    ibuprofen (Anaphylaxis)  codeine (Urticaria)    Intolerances        Vital Signs Last 24 Hrs  T(C): 37.1 (04 Dec 2023 16:38), Max: 37.7 (03 Dec 2023 20:19)  T(F): 98.7 (04 Dec 2023 16:38), Max: 99.8 (03 Dec 2023 20:19)  HR: 76 (04 Dec 2023 16:38) (72 - 161)  BP: 110/61 (04 Dec 2023 16:38) (94/58 - 120/71)  BP(mean): 82 (04 Dec 2023 14:00) (68 - 90)  RR: 18 (04 Dec 2023 16:38) (17 - 29)  SpO2: 98% (04 Dec 2023 16:38) (88% - 98%)    Parameters below as of 04 Dec 2023 15:39  Patient On (Oxygen Delivery Method): nasal cannula, 3L        Physical Exam:  Constitutional: NAD, AAOx3  Cardiovascular: +S1S2 RRR  Pulmonary: CTA b/l, unlabored  GI: soft NTND +BS  Extremities: no pedal edema, +distal pulses b/l  Neuro: non focal, BARKER x4    LABS:                        9.1    5.14  )-----------( 228      ( 04 Dec 2023 04:30 )             27.3     12-04    134<L>  |  94<L>  |  11.4  ----------------------------<  115<H>  3.9   |  30.0<H>  |  0.41<L>    Ca    8.1<L>      04 Dec 2023 04:30  Phos  3.2     12-03  Mg     1.4     12-04      PTT - ( 04 Dec 2023 04:30 )  PTT:72.3 sec  Urinalysis Basic - ( 04 Dec 2023 04:30 )    Color: x / Appearance: x / SG: x / pH: x  Gluc: 115 mg/dL / Ketone: x  / Bili: x / Urobili: x   Blood: x / Protein: x / Nitrite: x   Leuk Esterase: x / RBC: x / WBC x   Sq Epi: x / Non Sq Epi: x / Bacteria: x        RADIOLOGY & ADDITIONAL TESTS:  < from: TTE Echo Complete w/ Contrast w/ Doppler (11.30.23 @ 19:19) >   1. Left ventricular ejection fraction, by visual estimation, is 55 to   60%.   2. Normal global left ventricular systolic function.   3. Spectral Doppler shows impaired relaxation pattern of left   ventricular myocardial filling (Grade I diastolic dysfunction).   4. There is mild concentric left ventricular hypertrophy.   5. Normal left atrial size.   6. Normal right atrial size.   7. There is no evidence of pericardial effusion.   8. Mild thickening of the anterior and posterior mitral valve leaflets.   9. Trace mitral valve regurgitation.  10. Sclerotic aortic valve with normal opening.  11. Endocardial visualization was enhanced with intravenous echo contrast.    < end of copied text >

## 2023-12-04 NOTE — CHART NOTE - NSCHARTNOTEFT_GEN_A_CORE
Medicine PA-  F/U labs Mg-2.4, K-4.0.  Pt admitted with aflutter RVR, due to start 1st dose sotalol, asked to read EKG.  EKG- QTc-446,  SR, ?1st degree, R.BBB  Per Dr. Mayorga ok to start sotalol if QTc <500.  Pt resting comfortably, no c/o CP or palpitations.    12-04    136  |  95<L>  |  7.7<L>  ----------------------------<  101<H>  4.0   |  32.0<H>  |  0.44<L>    Ca    8.5      04 Dec 2023 21:50  Phos  3.2     12-03  Mg     2.4     12-04    ADDENDUM:   SR on monitor, no  complaints.    Vital Signs Last 24 Hrs  T(C): 36.9 (05 Dec 2023 00:34), Max: 37.2 (04 Dec 2023 03:15)  T(F): 98.5 (05 Dec 2023 00:34), Max: 99 (04 Dec 2023 03:15)  HR: 88 (05 Dec 2023 00:34) (72 - 161)  BP: 107/68 (05 Dec 2023 00:34) (94/64 - 120/71)  BP(mean): 82 (04 Dec 2023 14:00) (71 - 88)  RR: 18 (05 Dec 2023 00:34) (17 - 29)  SpO2: 98% (05 Dec 2023 00:34) (88% - 98%)    Parameters below as of 04 Dec 2023 21:30  Patient On (Oxygen Delivery Method): nasal cannula, 3

## 2023-12-04 NOTE — PROGRESS NOTE ADULT - ASSESSMENT
78 year old male, former smoker, with HTN, hyperlipidemia, CAD s/p PCI 2002 and CABG x 3 (3/2020 with Dr. Hutchison), COPD, lung cancer (recurrent NSCLC; RUL; squamous; on daily radiation with Taxol/Carbo weekly), and paroxysmal atrial fibrillation s/p MDT ILR who has been off AC since 10/2023 due to hemoptysis. He presented to EP followup office visit with c/o cough, fever, and worsening shortness of breath over several days, noted to be in AFL with rapid ventricular rates on ILR. Sent to [facility] for further evaluation and was admitted with acute hypoxic respiratory failure and septic shock 2/2 pneumonia. Initially on pressors which have since been discontinued. Had converted to sinus rhythm/sinus tachycardia on 12/1 but then converted back into atrial fibrillation on 12/2/23. Started on IV heparin to assess tolerance/recurrence of hemoptysis.     Now remains in atrial flutter with intermittently rapid rates, despite digoxin, diltiazem 60 q6h, and metoprolol 12.5 mg bid.  would benefit from rhythm control if possible.  will hold off on DCCV at this time as arrhythmia had been paroxysmal.   he will need to remain on anticoagulation- currently tolerating heparin and will plan to restart Eliquis if no further hemoptysis  will trial antiarrhythmic medication with sotalol (will avoid amiodarone given severe lung disease). In order to tolerate sotalol, will need to ensure electrolytes replete. Currently getting 4g of IV magnesium, need goal K>4, Mg>2.   Once Magnesium administered, will start sotalol 120mg bid.   check ECG 2 hours after administration. Hold next dose of sotalol if QTc>500 ms  once on sotalol will likely stop metoprolol and digoxin. Can continue diltiazem as tolerated  continue heparin with plan to transition to eliquis     78 year old male, former smoker, with HTN, hyperlipidemia, CAD s/p PCI 2002 and CABG x 3 (3/2020 with Dr. Hutchison), COPD, lung cancer (recurrent NSCLC; RUL; squamous; on daily radiation with Taxol/Carbo weekly), and paroxysmal atrial fibrillation s/p MDT ILR who has been off AC since 10/2023 due to hemoptysis. He presented to EP followup office visit with c/o cough, fever, and worsening shortness of breath over several days, noted to be in AFL with rapid ventricular rates on ILR. Sent to [facility] for further evaluation and was admitted with acute hypoxic respiratory failure and septic shock 2/2 pneumonia. Initially on pressors which have since been discontinued. Had converted to sinus rhythm/sinus tachycardia on 12/1 but then converted back into atrial fibrillation on 12/2/23. Started on IV heparin to assess tolerance/recurrence of hemoptysis.     Now remains in atrial flutter with intermittently rapid rates, despite digoxin, diltiazem 60 q6h, and metoprolol 12.5 mg bid.  would benefit from rhythm control if possible.  will hold off on DCCV at this time as arrhythmia had been paroxysmal.   he will need to remain on anticoagulation- currently tolerating heparin and will plan to restart Eliquis if no further hemoptysis  will trial antiarrhythmic medication with sotalol (will avoid amiodarone given severe lung disease). In order to tolerate sotalol, will need to ensure electrolytes replete. Currently getting 4g of IV magnesium, need goal K>4, Mg>2.   Once Magnesium administered, will start sotalol 120mg bid.   check ECG 2 hours after administration. Hold next dose of sotalol if QTc>500 ms  once on sotalol will likely stop metoprolol and digoxin. Can continue diltiazem as tolerated  continue heparin with plan to transition to eliquis     78 year old male, former smoker, with HTN, hyperlipidemia, CAD s/p PCI 2002 and CABG x 3 (3/2020 with Dr. Hutchison), COPD, lung cancer (recurrent NSCLC; RUL; squamous; on daily radiation with Taxol/Carbo weekly), and paroxysmal atrial fibrillation s/p MDT ILR who has been off AC since 10/2023 due to hemoptysis. He presented to EP followup office visit with c/o cough, fever, and worsening shortness of breath over several days, noted to be in AFL with rapid ventricular rates on ILR. Sent to [facility] for further evaluation and was admitted with acute hypoxic respiratory failure and septic shock 2/2 pneumonia. Initially on pressors which have since been discontinued. Had converted to sinus rhythm/sinus tachycardia on 12/1 but then converted back into atrial fibrillation on 12/2/23. Started on IV heparin to assess tolerance/recurrence of hemoptysis.     Now remains in atrial flutter with intermittently rapid rates, despite digoxin, diltiazem 60 q6h, and metoprolol 12.5 mg bid.  would benefit from rhythm control if possible.  will hold off on DCCV at this time as arrhythmia had been paroxysmal.   -he will need to remain on anticoagulation- currently tolerating heparin and will plan to restart Eliquis if no further hemoptysis  -will trial antiarrhythmic medication with sotalol (will avoid amiodarone given severe lung disease). In order to tolerate sotalol, will need to ensure electrolytes replete. Currently getting 4g of IV magnesium, need goal K>4, Mg>2.   -Once Magnesium administered, will start sotalol 120mg bid.   -check ECG 2 hours after administration. Hold next dose of sotalol if QTc>500 ms  -once on sotalol will likely stop metoprolol and digoxin. Can continue diltiazem as tolerated  -continue heparin with plan to transition to eliquis  -keep K>4, Mg>2- particularly important to avoid QT prolongation and ensure safety of antiarrhythmic meds   78 year old male, former smoker, with HTN, hyperlipidemia, CAD s/p PCI 2002 and CABG x 3 (3/2020 with Dr. Hutchison), COPD, lung cancer (recurrent NSCLC; RUL; squamous; on daily radiation with Taxol/Carbo weekly), and paroxysmal atrial fibrillation s/p MDT ILR who has been off AC since 10/2023 due to hemoptysis. He presented to EP followup office visit with c/o cough, fever, and worsening shortness of breath over several days, noted to be in AFL with rapid ventricular rates on ILR. Sent to [facility] for further evaluation and was admitted with acute hypoxic respiratory failure and septic shock 2/2 pneumonia. Initially on pressors which have since been discontinued. Had converted to sinus rhythm/sinus tachycardia on 12/1 but then converted back into atrial fibrillation on 12/2/23. Started on IV heparin to assess tolerance/recurrence of hemoptysis.     Now remains in atrial flutter with intermittently rapid rates, despite digoxin, diltiazem 60 q6h, and metoprolol 12.5 mg bid.  would benefit from rhythm control if possible.  will hold off on DCCV at this time as arrhythmia had been paroxysmal.   -he will need to remain on anticoagulation- currently tolerating heparin and will plan to restart Eliquis if no further hemoptysis  -will trial antiarrhythmic medication with sotalol (will avoid amiodarone given severe lung disease). In order to tolerate sotalol, will need to ensure electrolytes replete. Currently getting 4g of IV magnesium, need goal K>4, Mg>2.   -Once Magnesium administered, will start sotalol 120mg bid.   -check ECG 2 hours after administration. Hold next dose of sotalol if QTc>500 ms  -once on sotalol will likely stop metoprolol and digoxin. Can continue diltiazem as tolerated  -continue heparin with plan to transition to eliquis  -keep K>4, Mg>2- particularly important to avoid QT prolongation and ensure safety of antiarrhythmic meds

## 2023-12-05 NOTE — PROGRESS NOTE ADULT - SUBJECTIVE AND OBJECTIVE BOX
Pt seen sitting up in bed. He c/o some shortness of breath, and remains on 4L NC. Telemetry shows he converted to sinus rhythm overnight.     PAST MEDICAL & SURGICAL HISTORY:  CAD   Arthritis  Lumbosacral disc disease  HTN (hypertension)  HLD (hyperlipidemia)  Lung cancer Stage I a1; RUL; s/p SBRT in 11/2021; stable disease as of 4/2022  Paroxysmal atrial fibrillation  COPD (chronic obstructive pulmonary disease) with emphysema  H/O heart artery stent  S/P cataract surgery  S/P CABG (coronary artery bypass graft)    MEDICATIONS  (STANDING):  albuterol/ipratropium for Nebulization 3 milliLiter(s) Nebulizer every 6 hours  atorvastatin 40 milliGRAM(s) Oral at bedtime  budesonide  80 MICROgram(s)/formoterol 4.5 MICROgram(s) Inhaler 1 Puff(s) Inhalation daily  chlorhexidine 2% Cloths 1 Application(s) Topical daily  diltiazem    Tablet 90 milliGRAM(s) Oral every 6 hours  gabapentin 400 milliGRAM(s) Oral three times a day  heparin  Infusion.  Unit(s)/Hr (13 mL/Hr) IV Continuous <Continuous>  midodrine 5 milliGRAM(s) Oral every 8 hours  piperacillin/tazobactam IVPB.. 3.375 Gram(s) IV Intermittent every 8 hours  sotalol. 120 milliGRAM(s) Oral every 12 hours  tamsulosin 0.4 milliGRAM(s) Oral at bedtime    MEDICATIONS  (PRN):  acetaminophen     Tablet .. 650 milliGRAM(s) Oral every 6 hours PRN Temp greater or equal to 38C (100.4F), Moderate Pain (4 - 6)  HYDROmorphone   Tablet 2 milliGRAM(s) Oral every 8 hours PRN Severe Pain (7 - 10)  LORazepam     Tablet 1 milliGRAM(s) Oral every 6 hours PRN Anxiety  traMADol 50 milliGRAM(s) Oral every 8 hours PRN Mild Pain (1 - 3)    Allergies:  ibuprofen (Anaphylaxis)  codeine (Urticaria)    Vital Signs Last 24 Hrs  T(C): 36.4 (05 Dec 2023 11:00), Max: 37.1 (04 Dec 2023 16:38)  T(F): 97.6 (05 Dec 2023 11:00), Max: 98.8 (04 Dec 2023 21:00)  HR: 75 (05 Dec 2023 11:00) (74 - 88)  BP: 96/60 (05 Dec 2023 11:00) (96/60 - 116/74)  RR: 18 (05 Dec 2023 04:35) (18 - 22)  SpO2: 93% (05 Dec 2023 11:00) (88% - 98%)    Parameters below as of 05 Dec 2023 11:00  Patient On (Oxygen Delivery Method): room air    Physical Exam:  Constitutional: awake, alert, no obvious distress   Cardiovascular: +S1S2 RRR  Pulmonary: diminished, mild effort   GI: soft NTND +BS  Extremities: no pedal edema, +distal pulses b/l  Neuro: non focal, BARKER x4    LABS:                        9.6    5.05  )-----------( 310      ( 05 Dec 2023 05:14 )             29.8     12-05    136  |  94<L>  |  11.4  ----------------------------<  103<H>  5.1   |  35.0<H>  |  0.49<L>    Ca    8.9      05 Dec 2023 11:58  Mg     2.1     12-05      PTT - ( 05 Dec 2023 05:14 )  PTT:78.9 sec  Urinalysis Basic - ( 05 Dec 2023 11:58 )    Color: x / Appearance: x / SG: x / pH: x  Gluc: 103 mg/dL / Ketone: x  / Bili: x / Urobili: x   Blood: x / Protein: x / Nitrite: x   Leuk Esterase: x / RBC: x / WBC x   Sq Epi: x / Non Sq Epi: x / Bacteria: x    RADIOLOGY & ADDITIONAL TESTS:  Echo: 11/30/23:   Summary:   1. Left ventricular ejection fraction, by visual estimation, is 55 to 60%.   2. Normal global left ventricular systolic function.   3. Spectral Doppler shows impaired relaxation pattern of left ventricular myocardial filling (Grade I diastolic dysfunction).   4. There is mild concentric left ventricular hypertrophy.   5. Normal left atrial size.   6. Normal right atrial size.   7. There is no evidence of pericardial effusion.   8. Mild thickening of the anterior and posterior mitral valve leaflets.   9. Trace mitral valve regurgitation.  10. Sclerotic aortic valve with normal opening.  11. Endocardial visualization was enhanced with intravenous echo contrast.  Rich Bailey, Electronically signed on 12/1/2023 at 11:00:54 AM    CT angio chest: 11/30/23:   IMPRESSION: No pulmonary embolus is noted.  Previously noted nodule in the right upper lobe is not visualized as a thick linear opacity is now noted in its place.  New nodules in the right middle and right lower lobes as described above of uncertain etiology.  Patchy opacities in the right middle and right lower lobes, interlobular septal thickening in the right lower lobe and small right pleural effusion are noted. The constellation of the findings suggest pulmonary edema.  --- End of Report ---  AMI DUMONT MD; Attending Radiologist  This document has been electronically signed. Nov 30 2023  6:52PM

## 2023-12-05 NOTE — PROGRESS NOTE ADULT - SUBJECTIVE AND OBJECTIVE BOX
Patient is a 78y old  Male who presents with a chief complaint of Pneumonia, septic shock (05 Dec 2023 14:22)      Patient seen and examined at bedside. comfortable. in no acute distress. converted to nsr overnight     ALLERGIES:  ibuprofen (Anaphylaxis)  codeine (Urticaria)    MEDICATIONS  (STANDING):  albuterol/ipratropium for Nebulization 3 milliLiter(s) Nebulizer every 6 hours  atorvastatin 40 milliGRAM(s) Oral at bedtime  budesonide  80 MICROgram(s)/formoterol 4.5 MICROgram(s) Inhaler 1 Puff(s) Inhalation daily  chlorhexidine 2% Cloths 1 Application(s) Topical daily  diltiazem    Tablet 90 milliGRAM(s) Oral every 6 hours  gabapentin 400 milliGRAM(s) Oral three times a day  heparin  Infusion.  Unit(s)/Hr (13 mL/Hr) IV Continuous <Continuous>  midodrine 5 milliGRAM(s) Oral every 8 hours  piperacillin/tazobactam IVPB.. 3.375 Gram(s) IV Intermittent every 8 hours  sotalol. 120 milliGRAM(s) Oral every 12 hours  tamsulosin 0.4 milliGRAM(s) Oral at bedtime    MEDICATIONS  (PRN):  acetaminophen     Tablet .. 650 milliGRAM(s) Oral every 6 hours PRN Temp greater or equal to 38C (100.4F), Moderate Pain (4 - 6)  HYDROmorphone   Tablet 2 milliGRAM(s) Oral every 8 hours PRN Severe Pain (7 - 10)  LORazepam     Tablet 1 milliGRAM(s) Oral every 6 hours PRN Anxiety  traMADol 50 milliGRAM(s) Oral every 8 hours PRN Mild Pain (1 - 3)    Vital Signs Last 24 Hrs  T(F): 97.6 (05 Dec 2023 11:00), Max: 98.8 (04 Dec 2023 21:00)  HR: 67 (05 Dec 2023 14:55) (67 - 88)  BP: 96/60 (05 Dec 2023 11:00) (96/60 - 116/64)  RR: 18 (05 Dec 2023 04:35) (18 - 19)  SpO2: 97% (05 Dec 2023 14:55) (88% - 98%)  I&O's Summary    04 Dec 2023 07:01  -  05 Dec 2023 07:00  --------------------------------------------------------  IN: 244 mL / OUT: 1450 mL / NET: -1206 mL      PHYSICAL EXAM:  General: NAD, A/O x 3  ENT: MMM, no thrush  Neck: Supple, No JVD  Lungs: rhonchi b/l noted   Cardio: RRR, S1/S2, No murmur  Abdomen: Soft, Nontender, Nondistended; Bowel sounds present  Extremities: No calf tenderness, No pitting edema    LABS:                        9.6    5.05  )-----------( 310      ( 05 Dec 2023 05:14 )             29.8     12-05    136  |  94  |  11.4  ----------------------------<  103  5.1   |  35.0  |  0.49    Ca    8.9      05 Dec 2023 11:58  Phos  3.2     12-03  Mg     2.1     12-05            PTT - ( 05 Dec 2023 05:14 )  PTT:78.9 sec          Urinalysis Basic - ( 05 Dec 2023 11:58 )    Color: x / Appearance: x / SG: x / pH: x  Gluc: 103 mg/dL / Ketone: x  / Bili: x / Urobili: x   Blood: x / Protein: x / Nitrite: x   Leuk Esterase: x / RBC: x / WBC x   Sq Epi: x / Non Sq Epi: x / Bacteria: x        Culture - Urine (collected 30 Nov 2023 17:18)  Source: Clean Catch Clean Catch (Midstream)  Final Report (01 Dec 2023 20:33):    <10,000 CFU/mL Normal Urogenital Doris    Culture - Blood (collected 30 Nov 2023 13:08)  Source: .Blood Blood-Peripheral  Preliminary Report (04 Dec 2023 20:00):    No growth at 4 days    Culture - Blood (collected 30 Nov 2023 13:07)  Source: .Blood Blood-Peripheral  Preliminary Report (04 Dec 2023 20:00):    No growth at 4 days        RADIOLOGY & ADDITIONAL TESTS:    Care Discussed with Consultants/Other Providers:

## 2023-12-05 NOTE — PROGRESS NOTE ADULT - ASSESSMENT
Assessment/Recommendations:   78 year old male, former smoker, with HTN, hyperlipidemia, CAD s/p PCI 2002 and CABG x 3 (3/2020 with Dr. Hutchison), COPD, lung cancer (recurrent NSCLC; RUL; squamous; on daily radiation with Taxol/Carbo weekly), and paroxysmal atrial fibrillation s/p MDT ILR who has been off AC since 10/2023 due to hemoptysis. He presented to EP followup office visit with c/o cough, fever, and worsening shortness of breath over several days, noted to be in AFL with rapid ventricular rates on ILR. Sent to [facility] for further evaluation and was admitted with acute hypoxic respiratory failure and septic shock 2/2 pneumonia. Initially on pressors which have since been discontinued. Had converted to sinus rhythm/sinus tachycardia on 12/1 but converted back to atrial fibrillation and then flutter with difficult to control rates despite digoxin, diltiazem, and metoprolol. He started trial of antiarrhythmic medication with sotalol (avoiding amiodarone due to severe lung disease) and converted to sinus rhythm overnight. He remains in sinus rhythm. ECG remains within normal limits.    # Atrial flutter and fibrillation, CHADSVASc = 4 (age, HTN, CAD)   - Eliquis was previously discontinued 10/2023 due to hemoptysis, with no recurrence since that time   - Doing well on heparin gtt, transition to Eliquis 5mg Q 12   - Continue sotalol 120mg Q 12 hrs. Received dose #2/5 this AM.   - Check ECG 2 hrs after sotalol dose to assess QT interval. If QTc>500 ms hold next dose of sotalol.   - Continue diltiazem 90mg Q 6 hrs.   - Monitor lytes: Keep K > 4, Mg > 2     # Acute hypoxic respiratory failure  # Pneumonia   - Supplemental O2 as needed   - Abx as per primary team     Seen and discussed with Dr. Mayorga  Assessment/Recommendations:   78 year old male, former smoker, with HTN, hyperlipidemia, CAD s/p PCI 2002 and CABG x 3 (3/2020 with Dr. Hutchison), COPD, lung cancer (recurrent NSCLC; RUL; squamous; on daily radiation with Taxol/Carbo weekly), and paroxysmal atrial fibrillation s/p MDT ILR who has been off AC since 10/2023 due to hemoptysis. He presented to EP followup office visit with c/o cough, fever, and worsening shortness of breath over several days, noted to be in AFL with rapid ventricular rates on ILR. Sent to [facility] for further evaluation and was admitted with acute hypoxic respiratory failure and septic shock 2/2 pneumonia. Initially on pressors which have since been discontinued. Had converted to sinus rhythm/sinus tachycardia on 12/1 but converted back to atrial fibrillation and then flutter with difficult to control rates despite digoxin, diltiazem, and metoprolol. He started trial of antiarrhythmic medication with sotalol (avoiding amiodarone due to severe lung disease) and converted to sinus rhythm overnight. He remains in sinus rhythm. ECG remains within normal limits.    # Atrial flutter and fibrillation, CHADSVASc = 4 (age, HTN, CAD)   - Eliquis was previously discontinued 10/2023 due to hemoptysis, with no recurrence since that time   - Doing well on heparin gtt, transition to Eliquis 5mg Q 12   - Continue sotalol 120mg Q 12 hrs. Received dose #2/5 this AM.   - Check ECG 2 hrs after sotalol dose to assess QT interval. If QTc>500 ms hold next dose of sotalol.   - Continue diltiazem 90mg Q 6 hrs.   - Monitor lytes: Keep K > 4, Mg > 2     # Acute hypoxic respiratory failure  # Pneumonia   - Supplemental O2 as needed   - Abx as per primary team     Seen and discussed with Dr. Mayorga

## 2023-12-05 NOTE — PROGRESS NOTE ADULT - ASSESSMENT
77 y/o male with resolving septic shock due to PNA, Afib w/ RVR, SCC on chemo/XRT, hypomagnesemia, CAD s/p CABG/PCI, COPD, Chronic back pain    Septic Shock due to PNA/ present on admission / shock resolved  - Hemodynamics stable off pressers  - Midodrine 5mg q8h, titrate as tolerated  - Blood cx NGTD  - Continue Zosyn  - Saturating well on nasal cannula    Afib w/ RVR  - Telemetry monitoring  - Continue Heparin drip  - c/w Cardizem 60mg QID,  Lopressor 12.5mg q12h  - c/w sotalol if qtc < 500   - monitor daily ekg     CAD s/p CABG/PCI  - Continue Lipitor 40mg nightly     NSCLC  - s/p Chemo/XRT as above  - f/u with Dr. Rinaldi/Regina at [facility] post DC    COPD  - Duonebs PRN  - Continue Symbicort    Chronic back pain  - Tramadol 50mg q8h PRN  - Dilauded 2mg q8h PRN  - Gabapentin 400mg TID  - S/P spinal stimulator     DVT ppx  - Heparin drip   79 y/o male with resolving septic shock due to PNA, Afib w/ RVR, SCC on chemo/XRT, hypomagnesemia, CAD s/p CABG/PCI, COPD, Chronic back pain    Septic Shock due to PNA/ present on admission / shock resolved  - Hemodynamics stable off pressers  - Midodrine 5mg q8h, titrate as tolerated  - Blood cx NGTD  - Continue Zosyn  - Saturating well on nasal cannula    Afib w/ RVR  - Telemetry monitoring  - Continue Heparin drip  - c/w Cardizem 60mg QID,  Lopressor 12.5mg q12h  - c/w sotalol if qtc < 500   - monitor daily ekg     CAD s/p CABG/PCI  - Continue Lipitor 40mg nightly     NSCLC  - s/p Chemo/XRT as above  - f/u with Dr. Rinaldi/Regina at [facility] post DC    COPD  - Duonebs PRN  - Continue Symbicort    Chronic back pain  - Tramadol 50mg q8h PRN  - Dilauded 2mg q8h PRN  - Gabapentin 400mg TID  - S/P spinal stimulator     DVT ppx  - Heparin drip   77 y/o male with resolving septic shock due to PNA, Afib w/ RVR, SCC on chemo/XRT, hypomagnesemia, CAD s/p CABG/PCI, COPD, Chronic back pain    Septic Shock due to PNA/ present on admission / shock resolved  - Hemodynamics stable off pressers  - Midodrine 5mg q8h, titrate as tolerated  - Blood cx NGTD  - Continue Zosyn  - Saturating well on nasal cannula    Afib w/ RVR  - Telemetry monitoring  - Continue Heparin drip, changed to po eliquis as per ep   - c/w Cardizem   - c/w sotalol if qtc remains < 500   - hold bb , digoxin   - monitor daily ekg     CAD s/p CABG/PCI  - Continue Lipitor 40mg nightly     NSCLC  - s/p Chemo/XRT as above  - f/u with Dr. Rinaldi/Regina at [facility] post DC    COPD  - Duonebs PRN  - Continue Symbicort    Chronic back pain  - Tramadol 50mg q8h PRN  - Dilaudid 2mg q8h PRN  - Gabapentin 400mg TID  - S/P spinal stimulator     DVT ppx  - eliquis    77 y/o male with resolving septic shock due to PNA, Afib w/ RVR, SCC on chemo/XRT, hypomagnesemia, CAD s/p CABG/PCI, COPD, Chronic back pain    Septic Shock due to PNA/ present on admission / shock resolved  - Hemodynamics stable off pressers  - Midodrine 5mg q8h, titrate as tolerated  - Blood cx NGTD  - Continue Zosyn  - Saturating well on nasal cannula    Afib w/ RVR  - Telemetry monitoring  - Continue Heparin drip, changed to po eliquis as per ep   - c/w Cardizem   - c/w sotalol if qtc remains < 500   - hold bb , digoxin   - monitor daily ekg     CAD s/p CABG/PCI  - Continue Lipitor 40mg nightly     NSCLC  - s/p Chemo/XRT as above  - f/u with Dr. Rinaldi/Regina at [facility] post DC    COPD  - Duonebs PRN  - Continue Symbicort    Chronic back pain  - Tramadol 50mg q8h PRN  - Dilaudid 2mg q8h PRN  - Gabapentin 400mg TID  - S/P spinal stimulator     DVT ppx  - eliquis

## 2023-12-05 NOTE — PROGRESS NOTE ADULT - SUBJECTIVE AND OBJECTIVE BOX
JENNIFER ELLISLEMUEL  [id number]      Chief Complaint:  Follow up atrial flutter, hemoptysis, lung Ca, PNA    Interval History:  Overnight started on sotalol     Tele:  back to SR        acetaminophen     Tablet .. 650 milliGRAM(s) Oral every 6 hours PRN  albuterol/ipratropium for Nebulization 3 milliLiter(s) Nebulizer every 6 hours  atorvastatin 40 milliGRAM(s) Oral at bedtime  budesonide  80 MICROgram(s)/formoterol 4.5 MICROgram(s) Inhaler 1 Puff(s) Inhalation daily  chlorhexidine 2% Cloths 1 Application(s) Topical daily  diltiazem    Tablet 90 milliGRAM(s) Oral every 6 hours  gabapentin 400 milliGRAM(s) Oral three times a day  heparin  Infusion.  Unit(s)/Hr IV Continuous <Continuous>  HYDROmorphone   Tablet 2 milliGRAM(s) Oral every 8 hours PRN  LORazepam     Tablet 1 milliGRAM(s) Oral every 6 hours PRN  midodrine 5 milliGRAM(s) Oral every 8 hours  piperacillin/tazobactam IVPB.. 3.375 Gram(s) IV Intermittent every 8 hours  sotalol. 120 milliGRAM(s) Oral every 12 hours  tamsulosin 0.4 milliGRAM(s) Oral at bedtime  traMADol 50 milliGRAM(s) Oral every 8 hours PRN          Physical Exam:  T(C): 36.4 (12-05-23 @ 11:00), Max: 37.1 (12-04-23 @ 16:38)  HR: 75 (12-05-23 @ 11:00) (74 - 88)  BP: 96/60 (12-05-23 @ 11:00) (96/60 - 116/74)  RR: 18 (12-05-23 @ 04:35) (18 - 22)  SpO2: 93% (12-05-23 @ 11:00) (88% - 98%)  Wt(kg): --  General: Comfortable in NAD  Neck: No JVD  CVS: nl s1s2, no s3  Pulm: CTA b/l  Abd: soft, non-tender  Ext: No c/c/e  Neuro A&O x3  Psych: Normal affect      Labs:   05 Dec 2023 11:58    136    |  94     |  11.4   ----------------------------<  103    5.1     |  35.0   |  0.49     Ca    8.9        05 Dec 2023 11:58  Mg     2.1       05 Dec 2023 05:14                            9.6    5.05  )-----------( 310      ( 05 Dec 2023 05:14 )             29.8     PTT - ( 05 Dec 2023 05:14 )  PTT:78.9 sec        Assessment:  78 year old male, former smoker, with HTN, hyperlipidemia, CAD s/p PCI 2002 and CABG x 3 (3/2020 with Dr. Hutchison), COPD, lung cancer (recurrent NSCLC; RUL; squamous; on daily radiation with Taxol/Carbo weekly), and paroxysmal atrial fibrillation s/p MDT ILR who has been off AC since 10/2023 due to hemoptysis. He presented to EP followup office visit with c/o cough, fever, and worsening shortness of breath over several days, noted to be in AFL with rapid ventricular rates on ILR. Sent to [facility] for further evaluation and was admitted with acute hypoxic respiratory failure and septic shock 2/2 pneumonia. Initially on pressors which have since been discontinued. Had converted to sinus rhythm/sinus tachycardia on 12/1 but then converted back into atrial fibrillation on 12/2/23. Started on IV heparin to assess tolerance/recurrence of hemoptysis.     Back in SR  CBC stable     Plan:  1. Continue current CV meds   2. On sotalol as per EP  3. consider NOAC tomorrow.   4. Rest as per primary team.      JENNIFER ELLISLEMUEL  [id number]      Chief Complaint:  Follow up atrial flutter, hemoptysis, lung Ca, PNA    Interval History:  Overnight started on sotalol     Tele:  back to SR        acetaminophen     Tablet .. 650 milliGRAM(s) Oral every 6 hours PRN  albuterol/ipratropium for Nebulization 3 milliLiter(s) Nebulizer every 6 hours  atorvastatin 40 milliGRAM(s) Oral at bedtime  budesonide  80 MICROgram(s)/formoterol 4.5 MICROgram(s) Inhaler 1 Puff(s) Inhalation daily  chlorhexidine 2% Cloths 1 Application(s) Topical daily  diltiazem    Tablet 90 milliGRAM(s) Oral every 6 hours  gabapentin 400 milliGRAM(s) Oral three times a day  heparin  Infusion.  Unit(s)/Hr IV Continuous <Continuous>  HYDROmorphone   Tablet 2 milliGRAM(s) Oral every 8 hours PRN  LORazepam     Tablet 1 milliGRAM(s) Oral every 6 hours PRN  midodrine 5 milliGRAM(s) Oral every 8 hours  piperacillin/tazobactam IVPB.. 3.375 Gram(s) IV Intermittent every 8 hours  sotalol. 120 milliGRAM(s) Oral every 12 hours  tamsulosin 0.4 milliGRAM(s) Oral at bedtime  traMADol 50 milliGRAM(s) Oral every 8 hours PRN          Physical Exam:  T(C): 36.4 (12-05-23 @ 11:00), Max: 37.1 (12-04-23 @ 16:38)  HR: 75 (12-05-23 @ 11:00) (74 - 88)  BP: 96/60 (12-05-23 @ 11:00) (96/60 - 116/74)  RR: 18 (12-05-23 @ 04:35) (18 - 22)  SpO2: 93% (12-05-23 @ 11:00) (88% - 98%)  Wt(kg): --  General: Comfortable in NAD  Neck: No JVD  CVS: nl s1s2, no s3  Pulm: CTA b/l  Abd: soft, non-tender  Ext: No c/c/e  Neuro A&O x3  Psych: Normal affect      Labs:   05 Dec 2023 11:58    136    |  94     |  11.4   ----------------------------<  103    5.1     |  35.0   |  0.49     Ca    8.9        05 Dec 2023 11:58  Mg     2.1       05 Dec 2023 05:14                            9.6    5.05  )-----------( 310      ( 05 Dec 2023 05:14 )             29.8     PTT - ( 05 Dec 2023 05:14 )  PTT:78.9 sec        Assessment:  78 year old male, former smoker, with HTN, hyperlipidemia, CAD s/p PCI 2002 and CABG x 3 (3/2020 with Dr. Hutchison), COPD, lung cancer (recurrent NSCLC; RUL; squamous; on daily radiation with Taxol/Carbo weekly), and paroxysmal atrial fibrillation s/p MDT ILR who has been off AC since 10/2023 due to hemoptysis. He presented to EP followup office visit with c/o cough, fever, and worsening shortness of breath over several days, noted to be in AFL with rapid ventricular rates on ILR. Sent to [facility] for further evaluation and was admitted with acute hypoxic respiratory failure and septic shock 2/2 pneumonia. Initially on pressors which have since been discontinued. Had converted to sinus rhythm/sinus tachycardia on 12/1 but then converted back into atrial fibrillation on 12/2/23. Started on IV heparin to assess tolerance/recurrence of hemoptysis.     Back in SR  CBC stable     Plan:  1. Continue current CV meds   2. On sotalol as per EP  3. consider NOAC tomorrow.   4. Rest as per primary team.

## 2023-12-06 NOTE — PROGRESS NOTE ADULT - SUBJECTIVE AND OBJECTIVE BOX
Patient is a 78y old  Male who presents with a chief complaint of Pneumonia, septic shock (05 Dec 2023 16:35)      Patient seen and examined at bedside. No overnight events reported. remains in sinus rhythm.    ALLERGIES:  ibuprofen (Anaphylaxis)  codeine (Urticaria)    MEDICATIONS  (STANDING):  albuterol/ipratropium for Nebulization 3 milliLiter(s) Nebulizer every 6 hours  apixaban 5 milliGRAM(s) Oral every 12 hours  atorvastatin 40 milliGRAM(s) Oral at bedtime  budesonide  80 MICROgram(s)/formoterol 4.5 MICROgram(s) Inhaler 1 Puff(s) Inhalation daily  chlorhexidine 2% Cloths 1 Application(s) Topical daily  diltiazem    Tablet 90 milliGRAM(s) Oral every 6 hours  gabapentin 400 milliGRAM(s) Oral three times a day  magnesium oxide 400 milliGRAM(s) Oral three times a day with meals  magnesium sulfate  IVPB 2 Gram(s) IV Intermittent once  midodrine 5 milliGRAM(s) Oral every 8 hours  piperacillin/tazobactam IVPB.. 3.375 Gram(s) IV Intermittent every 8 hours  sotalol. 120 milliGRAM(s) Oral every 12 hours  tamsulosin 0.4 milliGRAM(s) Oral at bedtime    MEDICATIONS  (PRN):  acetaminophen     Tablet .. 650 milliGRAM(s) Oral every 6 hours PRN Temp greater or equal to 38C (100.4F), Moderate Pain (4 - 6)  HYDROmorphone   Tablet 2 milliGRAM(s) Oral every 8 hours PRN Severe Pain (7 - 10)  LORazepam     Tablet 1 milliGRAM(s) Oral every 6 hours PRN Anxiety  traMADol 50 milliGRAM(s) Oral every 8 hours PRN Mild Pain (1 - 3)    Vital Signs Last 24 Hrs  T(F): 99.3 (06 Dec 2023 04:31), Max: 99.3 (06 Dec 2023 04:31)  HR: 80 (06 Dec 2023 14:44) (80 - 96)  BP: 109/65 (06 Dec 2023 14:00) (107/60 - 128/67)  RR: 18 (06 Dec 2023 04:31) (18 - 18)  SpO2: 94% (06 Dec 2023 14:44) (92% - 96%)  I&O's Summary    05 Dec 2023 07:01  -  06 Dec 2023 07:00  --------------------------------------------------------  IN: 204 mL / OUT: 0 mL / NET: 204 mL      PHYSICAL EXAM:  General: NAD, A/O x 3  ENT: MMM, no thrush  Neck: Supple, No JVD  Lungs: rhonchi b/l noted   Cardio: RRR, S1/S2, No murmur  Abdomen: Soft, Nontender, Nondistended; Bowel sounds present  Extremities: No calf tenderness, No pitting edema            LABS:                        9.7    6.16  )-----------( 355      ( 06 Dec 2023 05:05 )             30.0     12-06    134  |  94  |  11.6  ----------------------------<  107  5.2   |  30.0  |  0.42    Ca    8.9      06 Dec 2023 05:05  Mg     1.6     12-06    TPro  6.4  /  Alb  3.1  /  TBili  0.5  /  DBili  x   /  AST  51  /  ALT  53  /  AlkPhos  269  12-06          PTT - ( 06 Dec 2023 05:05 )  PTT:36.3 sec            Urinalysis Basic - ( 06 Dec 2023 05:05 )    Color: x / Appearance: x / SG: x / pH: x  Gluc: 107 mg/dL / Ketone: x  / Bili: x / Urobili: x   Blood: x / Protein: x / Nitrite: x   Leuk Esterase: x / RBC: x / WBC x   Sq Epi: x / Non Sq Epi: x / Bacteria: x        Culture - Urine (collected 30 Nov 2023 17:18)  Source: Clean Catch Clean Catch (Midstream)  Final Report (01 Dec 2023 20:33):    <10,000 CFU/mL Normal Urogenital Doris    Culture - Blood (collected 30 Nov 2023 13:08)  Source: .Blood Blood-Peripheral  Final Report (05 Dec 2023 20:00):    No growth at 5 days    Culture - Blood (collected 30 Nov 2023 13:07)  Source: .Blood Blood-Peripheral  Final Report (05 Dec 2023 20:00):    No growth at 5 days        RADIOLOGY & ADDITIONAL TESTS:    Care Discussed with Consultants/Other Providers:

## 2023-12-06 NOTE — PROGRESS NOTE ADULT - ASSESSMENT
79 y/o male with resolving septic shock due to PNA, Afib w/ RVR, SCC on chemo/XRT, hypomagnesemia, CAD s/p CABG/PCI, COPD, Chronic back pain    Septic Shock due to PNA/ present on admission   - shock  now resolved  - Hemodynamics stable off pressers  - Midodrine 5mg q8h, wean down to 2.5 mg po tid   - Blood cx NGTD  - Continue Zosyn to complete x 7 days   - Saturating well on nasal cannula    Afib w/ RVR  - remains in sinus rhythm   - Telemetry monitoring  - Continue Heparin drip, changed to po eliquis as per ep   - c/w Cardizem   - c/w sotalol if qtc remains < 500   - hold bb , digoxin   - monitor daily ekg     CAD s/p CABG/PCI  - Continue Lipitor 40mg nightly     NSCLC  - s/p Chemo/XRT as above  - f/u with Dr. Rinaldi/Regina at [facility] post DC    COPD  - Duonebs PRN  - Continue Symbicort    Chronic back pain  - Tramadol 50mg q8h PRN  - Dilaudid 2mg q8h PRN  - Gabapentin 400mg TID  - S/P spinal stimulator     DVT ppx  - eliquis     dispo: pt eval. possible dc   in 48 hrs pending pt eval , ep  clearance    79 y/o male with resolving septic shock due to PNA, Afib w/ RVR, SCC on chemo/XRT, hypomagnesemia, CAD s/p CABG/PCI, COPD, Chronic back pain    Septic Shock due to PNA/ present on admission   - shock  now resolved  - Hemodynamics stable off pressers  - Midodrine 5mg q8h, wean down to 2.5 mg po tid   - Blood cx NGTD  - Continue Zosyn to complete x 7 days   - Saturating well on nasal cannula    Afib w/ RVR  - remains in sinus rhythm   - Telemetry monitoring  - Continue Heparin drip, changed to po eliquis as per ep   - c/w Cardizem   - c/w sotalol if qtc remains < 500   - hold bb , digoxin   - monitor daily ekg     CAD s/p CABG/PCI  - Continue Lipitor 40mg nightly     NSCLC  - s/p Chemo/XRT as above  - f/u with Dr. Rinaldi/Regina at [facility] post DC    COPD  - Duonebs PRN  - Continue Symbicort    Chronic back pain  - Tramadol 50mg q8h PRN  - Dilaudid 2mg q8h PRN  - Gabapentin 400mg TID  - S/P spinal stimulator     DVT ppx  - eliquis     dispo: pt eval. possible dc   in 48 hrs pending pt eval , ep  clearance

## 2023-12-06 NOTE — PROGRESS NOTE ADULT - SUBJECTIVE AND OBJECTIVE BOX
Patient sleeping comfortably. Per covering RN, family members expressed concern about transient confusion that occurred overnight and in early morning hours. Patient reportedly called daughter early this morning and sounded confused and was not aware he was in [facility]. He told daughter that his wife was as bedside, however, she was hope at the time.  Daughter contacted RN directly shortly after and confirmed A&Ox4. Per daughter and covering RN, no further concern for confusion through out the day today.       EC23 @ 2pm - sinus rhythm @ 78bpm, RBBB, QRS 126ms, QTC 460ms  TELE:  sinus rhythm, PAC,  NO AF or tachyarrhythmia events    MEDICATIONS  (STANDING):  albuterol/ipratropium for Nebulization 3 milliLiter(s) Nebulizer every 6 hours  apixaban 5 milliGRAM(s) Oral every 12 hours  atorvastatin 40 milliGRAM(s) Oral at bedtime  budesonide  80 MICROgram(s)/formoterol 4.5 MICROgram(s) Inhaler 1 Puff(s) Inhalation daily  chlorhexidine 2% Cloths 1 Application(s) Topical daily  diltiazem    Tablet 90 milliGRAM(s) Oral every 6 hours  gabapentin 400 milliGRAM(s) Oral three times a day  magnesium oxide 400 milliGRAM(s) Oral three times a day with meals  magnesium sulfate  IVPB 2 Gram(s) IV Intermittent once  midodrine 2.5 milliGRAM(s) Oral every 8 hours  piperacillin/tazobactam IVPB.. 3.375 Gram(s) IV Intermittent every 8 hours  sotalol. 120 milliGRAM(s) Oral every 12 hours  tamsulosin 0.4 milliGRAM(s) Oral at bedtime    MEDICATIONS  (PRN):  acetaminophen     Tablet .. 650 milliGRAM(s) Oral every 6 hours PRN Temp greater or equal to 38C (100.4F), Moderate Pain (4 - 6)  HYDROmorphone   Tablet 2 milliGRAM(s) Oral every 8 hours PRN Severe Pain (7 - 10)  LORazepam     Tablet 1 milliGRAM(s) Oral every 6 hours PRN Anxiety  traMADol 50 milliGRAM(s) Oral every 8 hours PRN Mild Pain (1 - 3)      Allergies    ibuprofen (Anaphylaxis)  codeine (Urticaria)    Intolerances      PAST MEDICAL & SURGICAL HISTORY:  CAD in native artery  RCA 3 YAYA 1 in  and 2 in 2019  Arthritis  Lumbosacral disc disease  has nerve stimulator  HTN (hypertension)  HLD (hyperlipidemia)  Lung cancer  Stage I a1; RUL; s/p SBRT in 2021; stable disease as of 2022  Paroxysmal atrial fibrillation  COPD (chronic obstructive pulmonary disease) with emphysema      H/O heart artery stent RCA  S/P cataract surgery  b/l eyes 2016  S/P CABG (coronary artery bypass graft)    Vital Signs Last 24 Hrs  T(C): 37.4 (06 Dec 2023 04:31), Max: 37.4 (06 Dec 2023 04:31)  T(F): 99.3 (06 Dec 2023 04:31), Max: 99.3 (06 Dec 2023 04:31)  HR: 80 (06 Dec 2023 14:44) (80 - 96)  BP: 109/65 (06 Dec 2023 14:00) (107/60 - 128/67)  BP(mean): --  RR: 18 (06 Dec 2023 04:31) (18 - 18)  SpO2: 94% (06 Dec 2023 14:44) (92% - 96%)    Parameters below as of 06 Dec 2023 14:44  Patient On (Oxygen Delivery Method): nasal cannula    Physical Exam:  Constitutional: resting comfortably  Cardiovascular: +S1S2 RRR  Pulmonary: CTA b/l, unlabored  GI: soft NTND +BS  Extremities: no pedal edema, +distal pulses b/l  Neuro: non focal, BARKER x4    LABS:                        9.7    6.16  )-----------( 355      ( 06 Dec 2023 05:05 )             30.0     12-    134<L>  |  94<L>  |  11.6  ----------------------------<  107<H>  5.2   |  30.0<H>  |  0.42<L>    Ca    8.9      06 Dec 2023 05:05  Mg     1.6     12-06    TPro  6.4<L>  /  Alb  3.1<L>  /  TBili  0.5  /  DBili  x   /  AST  51<H>  /  ALT  53<H>  /  AlkPhos  269<H>  12-06    PTT - ( 06 Dec 2023 05:05 )  PTT:36.3 sec  Urinalysis Basic - ( 06 Dec 2023 05:05 )    Color: x / Appearance: x / SG: x / pH: x  Gluc: 107 mg/dL / Ketone: x  / Bili: x / Urobili: x   Blood: x / Protein: x / Nitrite: x   Leuk Esterase: x / RBC: x / WBC x   Sq Epi: x / Non Sq Epi: x / Bacteria: x        RADIOLOGY & ADDITIONAL TESTS:    78 year old male, former smoker, with HTN, hyperlipidemia, CAD s/p PCI  and CABG x 3 (3/2020 with Dr. Hutchison), COPD, lung cancer (recurrent NSCLC; RUL; squamous; on daily radiation with Taxol/Carbo weekly), and paroxysmal atrial fibrillation s/p MDT ILR who has been off AC since 10/2023 due to hemoptysis. He presented to EP followup office visit with c/o cough, fever, and worsening shortness of breath over several days, noted to be in AFL with rapid ventricular rates on ILR. Sent to [facility] for further evaluation and was admitted with acute hypoxic respiratory failure and septic shock 2/2 pneumonia. Initially on pressors which have since been discontinued. Had converted to sinus rhythm/sinus tachycardia on  but converted back to atrial fibrillation and then flutter with difficult to control rates despite digoxin, diltiazem, and metoprolol. He started trial of antiarrhythmic medication with sotalol (avoiding amiodarone due to severe lung disease) and converted to sinus rhythm overnight. He remains in sinus rhythm. ECG remains within normal limits.    # Atrial flutter and fibrillation, CHADSVASc = 4 (age, HTN, CAD)   - Eliquis was previously discontinued 10/2023 due to hemoptysis (no recurrence since).    - Tolerated heparin gtt during admission and transition to Eliquis 5mg Q 12 since last night. Tolerating oral a/c thus far without recurrence of bleeding.  - Continue sotalol 120mg Q 12 hrs. Received dose 4/5 this AM @ 11AM.  Subsequent EKG (2pm) showed QTc 460ms and RBBB  - Continue to check ECG 2 hrs after sotalol dose to assess QT interval. If QTc>500 ms hold next dose of sotalol.   - Continue diltiazem 90mg Q 6 hrs.  Will transition to 360mg at discharge.   - Monitor lytes: Keep K > 4, Mg > 2     # Acute hypoxic respiratory failure  # Pneumonia   - Supplemental O2 as needed   - Abx as per primary team       INCOMPLETE NOTE   Patient sleeping comfortably. Per covering RN, family members expressed concern about transient confusion that occurred overnight and in early morning hours. Patient reportedly called daughter early this morning and sounded confused and was not aware he was in [facility]. He told daughter that his wife was as bedside, however, she was hope at the time.  Daughter contacted RN directly shortly after and confirmed A&Ox4. Per daughter and covering RN, no further concern for confusion through out the day today.       EC23 @ 2pm - sinus rhythm @ 78bpm, RBBB, QRS 126ms, QTC 460ms  TELE:  sinus rhythm, PAC,  NO AF or tachyarrhythmia events    MEDICATIONS  (STANDING):  albuterol/ipratropium for Nebulization 3 milliLiter(s) Nebulizer every 6 hours  apixaban 5 milliGRAM(s) Oral every 12 hours  atorvastatin 40 milliGRAM(s) Oral at bedtime  budesonide  80 MICROgram(s)/formoterol 4.5 MICROgram(s) Inhaler 1 Puff(s) Inhalation daily  chlorhexidine 2% Cloths 1 Application(s) Topical daily  diltiazem    Tablet 90 milliGRAM(s) Oral every 6 hours  gabapentin 400 milliGRAM(s) Oral three times a day  magnesium oxide 400 milliGRAM(s) Oral three times a day with meals  magnesium sulfate  IVPB 2 Gram(s) IV Intermittent once  midodrine 2.5 milliGRAM(s) Oral every 8 hours  piperacillin/tazobactam IVPB.. 3.375 Gram(s) IV Intermittent every 8 hours  sotalol. 120 milliGRAM(s) Oral every 12 hours  tamsulosin 0.4 milliGRAM(s) Oral at bedtime    MEDICATIONS  (PRN):  acetaminophen     Tablet .. 650 milliGRAM(s) Oral every 6 hours PRN Temp greater or equal to 38C (100.4F), Moderate Pain (4 - 6)  HYDROmorphone   Tablet 2 milliGRAM(s) Oral every 8 hours PRN Severe Pain (7 - 10)  LORazepam     Tablet 1 milliGRAM(s) Oral every 6 hours PRN Anxiety  traMADol 50 milliGRAM(s) Oral every 8 hours PRN Mild Pain (1 - 3)      Allergies    ibuprofen (Anaphylaxis)  codeine (Urticaria)    Intolerances      PAST MEDICAL & SURGICAL HISTORY:  CAD in native artery  RCA 3 YAYA 1 in  and 2 in 2019  Arthritis  Lumbosacral disc disease  has nerve stimulator  HTN (hypertension)  HLD (hyperlipidemia)  Lung cancer  Stage I a1; RUL; s/p SBRT in 2021; stable disease as of 2022  Paroxysmal atrial fibrillation  COPD (chronic obstructive pulmonary disease) with emphysema      H/O heart artery stent RCA  S/P cataract surgery  b/l eyes 2016  S/P CABG (coronary artery bypass graft)    Vital Signs Last 24 Hrs  T(C): 37.4 (06 Dec 2023 04:31), Max: 37.4 (06 Dec 2023 04:31)  T(F): 99.3 (06 Dec 2023 04:31), Max: 99.3 (06 Dec 2023 04:31)  HR: 80 (06 Dec 2023 14:44) (80 - 96)  BP: 109/65 (06 Dec 2023 14:00) (107/60 - 128/67)  BP(mean): --  RR: 18 (06 Dec 2023 04:31) (18 - 18)  SpO2: 94% (06 Dec 2023 14:44) (92% - 96%)    Parameters below as of 06 Dec 2023 14:44  Patient On (Oxygen Delivery Method): nasal cannula    Physical Exam:  Constitutional: resting comfortably  Cardiovascular: +S1S2 RRR  Pulmonary: CTA b/l, unlabored  GI: soft NTND +BS  Extremities: no pedal edema, +distal pulses b/l  Neuro: non focal, BARKER x4    LABS:                        9.7    6.16  )-----------( 355      ( 06 Dec 2023 05:05 )             30.0     12-    134<L>  |  94<L>  |  11.6  ----------------------------<  107<H>  5.2   |  30.0<H>  |  0.42<L>    Ca    8.9      06 Dec 2023 05:05  Mg     1.6     12-06    TPro  6.4<L>  /  Alb  3.1<L>  /  TBili  0.5  /  DBili  x   /  AST  51<H>  /  ALT  53<H>  /  AlkPhos  269<H>  12-06    PTT - ( 06 Dec 2023 05:05 )  PTT:36.3 sec  Urinalysis Basic - ( 06 Dec 2023 05:05 )    Color: x / Appearance: x / SG: x / pH: x  Gluc: 107 mg/dL / Ketone: x  / Bili: x / Urobili: x   Blood: x / Protein: x / Nitrite: x   Leuk Esterase: x / RBC: x / WBC x   Sq Epi: x / Non Sq Epi: x / Bacteria: x        RADIOLOGY & ADDITIONAL TESTS:    78 year old male, former smoker, with HTN, hyperlipidemia, CAD s/p PCI  and CABG x 3 (3/2020 with Dr. Hutchison), COPD, lung cancer (recurrent NSCLC; RUL; squamous; on daily radiation with Taxol/Carbo weekly), and paroxysmal atrial fibrillation s/p MDT ILR who has been off AC since 10/2023 due to hemoptysis. He presented to EP followup office visit with c/o cough, fever, and worsening shortness of breath over several days, noted to be in AFL with rapid ventricular rates on ILR. Sent to [facility] for further evaluation and was admitted with acute hypoxic respiratory failure and septic shock 2/2 pneumonia. Initially on pressors which have since been discontinued. Had converted to sinus rhythm/sinus tachycardia on  but converted back to atrial fibrillation and then flutter with difficult to control rates despite digoxin, diltiazem, and metoprolol. He started trial of antiarrhythmic medication with sotalol (avoiding amiodarone due to severe lung disease) and converted to sinus rhythm overnight. He remains in sinus rhythm. ECG remains within normal limits.    # Atrial flutter and fibrillation, CHADSVASc = 4 (age, HTN, CAD)   - Eliquis was previously discontinued 10/2023 due to hemoptysis (no recurrence since).    - Tolerated heparin gtt during admission and transition to Eliquis 5mg Q 12 since last night. Tolerating oral a/c thus far without recurrence of bleeding.  - Continue sotalol 120mg Q 12 hrs. Received dose 4/5 this AM @ 11AM.  Subsequent EKG (2pm) showed QTc 460ms and RBBB  - Continue to check ECG 2 hrs after sotalol dose to assess QT interval. If QTc>500 ms hold next dose of sotalol.   - Continue diltiazem 90mg Q 6 hrs.  Will transition to 360mg at discharge.   - Monitor lytes: Keep K > 4, Mg > 2     # Acute hypoxic respiratory failure  # Pneumonia   - Supplemental O2 as needed   - Abx as per primary team       INCOMPLETE NOTE   Per patient RN, concern for transient confusion overnight and early this morning hours. Patient reportedly called daughter early this morning and sounded confused and was not aware he was in [facility]. He told daughter that his wife was as bedside, however, she was home at the time.  Daughter contacted RN directly shortly after and confirmed A&Ox4. Per daughter and covering RN, no further concern for confusion through out the day today.   Patient sitting comfortably and denies complaints. Now on Eliquis and denies hemoptysis or recurrent bleeding events.  Denies confusion.  No focal neurologically deficits.       EC23 @ 2pm - sinus rhythm @ 78bpm, RBBB, QRS 126ms, QTC 460ms  TELE:  sinus rhythm, PAC,  NO AF or tachyarrhythmia events    MEDICATIONS  (STANDING):  albuterol/ipratropium for Nebulization 3 milliLiter(s) Nebulizer every 6 hours  apixaban 5 milliGRAM(s) Oral every 12 hours  atorvastatin 40 milliGRAM(s) Oral at bedtime  budesonide  80 MICROgram(s)/formoterol 4.5 MICROgram(s) Inhaler 1 Puff(s) Inhalation daily  chlorhexidine 2% Cloths 1 Application(s) Topical daily  diltiazem    Tablet 90 milliGRAM(s) Oral every 6 hours  gabapentin 400 milliGRAM(s) Oral three times a day  magnesium oxide 400 milliGRAM(s) Oral three times a day with meals  magnesium sulfate  IVPB 2 Gram(s) IV Intermittent once  midodrine 2.5 milliGRAM(s) Oral every 8 hours  piperacillin/tazobactam IVPB.. 3.375 Gram(s) IV Intermittent every 8 hours  sotalol. 120 milliGRAM(s) Oral every 12 hours  tamsulosin 0.4 milliGRAM(s) Oral at bedtime    MEDICATIONS  (PRN):  acetaminophen     Tablet .. 650 milliGRAM(s) Oral every 6 hours PRN Temp greater or equal to 38C (100.4F), Moderate Pain (4 - 6)  HYDROmorphone   Tablet 2 milliGRAM(s) Oral every 8 hours PRN Severe Pain (7 - 10)  LORazepam     Tablet 1 milliGRAM(s) Oral every 6 hours PRN Anxiety  traMADol 50 milliGRAM(s) Oral every 8 hours PRN Mild Pain (1 - 3)      Allergies    ibuprofen (Anaphylaxis)  codeine (Urticaria)    Intolerances      PAST MEDICAL & SURGICAL HISTORY:  CAD in native artery  RCA 3 YAYA 1 in  and 2 in 2019  Arthritis  Lumbosacral disc disease  has nerve stimulator  HTN (hypertension)  HLD (hyperlipidemia)  Lung cancer  Stage I a1; RUL; s/p SBRT in 2021; stable disease as of 2022  Paroxysmal atrial fibrillation  COPD (chronic obstructive pulmonary disease) with emphysema      H/O heart artery stent RCA  S/P cataract surgery  b/l eyes 2016  S/P CABG (coronary artery bypass graft)    Vital Signs Last 24 Hrs  T(C): 37.4 (06 Dec 2023 04:31), Max: 37.4 (06 Dec 2023 04:31)  T(F): 99.3 (06 Dec 2023 04:31), Max: 99.3 (06 Dec 2023 04:31)  HR: 80 (06 Dec 2023 14:44) (80 - 96)  BP: 109/65 (06 Dec 2023 14:00) (107/60 - 128/67)  BP(mean): --  RR: 18 (06 Dec 2023 04:31) (18 - 18)  SpO2: 94% (06 Dec 2023 14:44) (92% - 96%)    Parameters below as of 06 Dec 2023 14:44  Patient On (Oxygen Delivery Method): nasal cannula    Physical Exam:  Constitutional: resting comfortably  Cardiovascular: +S1S2 RRR  Pulmonary: CTA b/l, unlabored  GI: soft NTND +BS  Extremities: no pedal edema, +distal pulses b/l  Neuro: non focal, BARKER x4    LABS:                        9.7    6.16  )-----------( 355      ( 06 Dec 2023 05:05 )             30.0     12-    134<L>  |  94<L>  |  11.6  ----------------------------<  107<H>  5.2   |  30.0<H>  |  0.42<L>    Ca    8.9      06 Dec 2023 05:05  Mg     1.6     12-    TPro  6.4<L>  /  Alb  3.1<L>  /  TBili  0.5  /  DBili  x   /  AST  51<H>  /  ALT  53<H>  /  AlkPhos  269<H>  12-    PTT - ( 06 Dec 2023 05:05 )  PTT:36.3 sec  Urinalysis Basic - ( 06 Dec 2023 05:05 )    Color: x / Appearance: x / SG: x / pH: x  Gluc: 107 mg/dL / Ketone: x  / Bili: x / Urobili: x   Blood: x / Protein: x / Nitrite: x   Leuk Esterase: x / RBC: x / WBC x   Sq Epi: x / Non Sq Epi: x / Bacteria: x        RADIOLOGY & ADDITIONAL TESTS:    78 year old male, former smoker, with HTN, hyperlipidemia, CAD s/p PCI  and CABG x 3 (3/2020 with Dr. Hutchison), COPD, lung cancer (recurrent NSCLC; RUL; squamous; on daily radiation with Taxol/Carbo weekly), and paroxysmal atrial fibrillation s/p MDT ILR who has been off AC since 10/2023 due to hemoptysis. He presented to EP followup office visit with c/o cough, fever, and worsening shortness of breath over several days, noted to be in AFL with rapid ventricular rates on ILR. Sent to [facility] for further evaluation and was admitted with acute hypoxic respiratory failure and septic shock 2/2 pneumonia. Initially on pressors which have since been discontinued. Had converted to sinus rhythm/sinus tachycardia on  but converted back to atrial fibrillation and then flutter with difficult to control rates despite digoxin, diltiazem, and metoprolol. He started trial of antiarrhythmic medication with sotalol (avoiding amiodarone due to severe lung disease) and converted to sinus rhythm overnight. He remains in sinus rhythm. ECG remains within normal limits.    # Atrial flutter and fibrillation, CHADSVASc = 4 (age, HTN, CAD)   - Eliquis was previously discontinued 10/2023 due to hemoptysis (no recurrence since).    - Tolerated heparin gtt during admission and transition to Eliquis 5mg Q 12 since last night. Tolerating oral a/c thus far without recurrence of bleeding.  - Continue sotalol 120mg Q 12 hrs. Received dose 4/5 this AM @ 11AM.  Subsequent EKG (2pm) showed QTc 460ms and RBBB  - Continue to check ECG 2 hrs after sotalol dose to assess QT interval. If QTc>500 ms hold next dose of sotalol.   - Continue diltiazem 90mg Q 6 hrs.  Will transition to 360mg at discharge.   - Monitor lytes: Keep K > 4, Mg > 2     # Acute hypoxic respiratory failure  # Pneumonia   - Supplemental O2 as needed   - Abx as per primary team       INCOMPLETE NOTE   Per patient RN, concern for transient confusion overnight and early this morning hours. Patient reportedly called daughter early this morning and sounded confused and was not aware he was in [facility]. He told daughter that his wife was as bedside, however, she was home at the time.  Daughter contacted RN directly shortly after and confirmed A&Ox4. Per daughter and covering RN, no further concern for confusion through out the day today.   Patient sitting comfortably and denies complaints. Now on Eliquis and denies hemoptysis or recurrent bleeding events.  Denies confusion.  No focal neurologically deficits.       EC23 @ 2pm - sinus rhythm @ 78bpm, RBBB, QRS 126ms, QTC 460ms  TELE:  sinus rhythm, PAC,  NO AF or tachyarrhythmia events    MEDICATIONS  (STANDING):  albuterol/ipratropium for Nebulization 3 milliLiter(s) Nebulizer every 6 hours  apixaban 5 milliGRAM(s) Oral every 12 hours  atorvastatin 40 milliGRAM(s) Oral at bedtime  budesonide  80 MICROgram(s)/formoterol 4.5 MICROgram(s) Inhaler 1 Puff(s) Inhalation daily  chlorhexidine 2% Cloths 1 Application(s) Topical daily  diltiazem    Tablet 90 milliGRAM(s) Oral every 6 hours  gabapentin 400 milliGRAM(s) Oral three times a day  magnesium oxide 400 milliGRAM(s) Oral three times a day with meals  magnesium sulfate  IVPB 2 Gram(s) IV Intermittent once  midodrine 2.5 milliGRAM(s) Oral every 8 hours  piperacillin/tazobactam IVPB.. 3.375 Gram(s) IV Intermittent every 8 hours  sotalol. 120 milliGRAM(s) Oral every 12 hours  tamsulosin 0.4 milliGRAM(s) Oral at bedtime    MEDICATIONS  (PRN):  acetaminophen     Tablet .. 650 milliGRAM(s) Oral every 6 hours PRN Temp greater or equal to 38C (100.4F), Moderate Pain (4 - 6)  HYDROmorphone   Tablet 2 milliGRAM(s) Oral every 8 hours PRN Severe Pain (7 - 10)  LORazepam     Tablet 1 milliGRAM(s) Oral every 6 hours PRN Anxiety  traMADol 50 milliGRAM(s) Oral every 8 hours PRN Mild Pain (1 - 3)      Allergies    ibuprofen (Anaphylaxis)  codeine (Urticaria)    Intolerances      PAST MEDICAL & SURGICAL HISTORY:  CAD in native artery  RCA 3 YAYA 1 in  and 2 in 2019  Arthritis  Lumbosacral disc disease  has nerve stimulator  HTN (hypertension)  HLD (hyperlipidemia)  Lung cancer  Stage I a1; RUL; s/p SBRT in 2021; stable disease as of 2022  Paroxysmal atrial fibrillation  COPD (chronic obstructive pulmonary disease) with emphysema      H/O heart artery stent RCA  S/P cataract surgery  b/l eyes 2016  S/P CABG (coronary artery bypass graft)    Vital Signs Last 24 Hrs  T(C): 37.4 (06 Dec 2023 04:31), Max: 37.4 (06 Dec 2023 04:31)  T(F): 99.3 (06 Dec 2023 04:31), Max: 99.3 (06 Dec 2023 04:31)  HR: 80 (06 Dec 2023 14:44) (80 - 96)  BP: 109/65 (06 Dec 2023 14:00) (107/60 - 128/67)  BP(mean): --  RR: 18 (06 Dec 2023 04:31) (18 - 18)  SpO2: 94% (06 Dec 2023 14:44) (92% - 96%)    Parameters below as of 06 Dec 2023 14:44  Patient On (Oxygen Delivery Method): nasal cannula    Physical Exam:  Constitutional: resting comfortably  Cardiovascular: +S1S2 RRR  Pulmonary: CTA b/l, unlabored  GI: soft NTND +BS  Extremities: no pedal edema, +distal pulses b/l  Neuro: non focal, BARKER x4    LABS:                        9.7    6.16  )-----------( 355      ( 06 Dec 2023 05:05 )             30.0     12-    134<L>  |  94<L>  |  11.6  ----------------------------<  107<H>  5.2   |  30.0<H>  |  0.42<L>    Ca    8.9      06 Dec 2023 05:05  Mg     1.6     12-    TPro  6.4<L>  /  Alb  3.1<L>  /  TBili  0.5  /  DBili  x   /  AST  51<H>  /  ALT  53<H>  /  AlkPhos  269<H>  12-    PTT - ( 06 Dec 2023 05:05 )  PTT:36.3 sec  Urinalysis Basic - ( 06 Dec 2023 05:05 )    Color: x / Appearance: x / SG: x / pH: x  Gluc: 107 mg/dL / Ketone: x  / Bili: x / Urobili: x   Blood: x / Protein: x / Nitrite: x   Leuk Esterase: x / RBC: x / WBC x   Sq Epi: x / Non Sq Epi: x / Bacteria: x        RADIOLOGY & ADDITIONAL TESTS:    78 year old male, former smoker, with HTN, hyperlipidemia, CAD s/p PCI  and CABG x 3 (3/2020 with Dr. Hutchison), COPD, lung cancer (recurrent NSCLC; RUL; squamous; on daily radiation with Taxol/Carbo weekly), and paroxysmal atrial fibrillation s/p MDT ILR who has been off AC since 10/2023 due to hemoptysis. He presented to EP followup office visit with c/o cough, fever, and worsening shortness of breath over several days, noted to be in AFL with rapid ventricular rates on ILR. Sent to [facility] for further evaluation and was admitted with acute hypoxic respiratory failure and septic shock 2/2 pneumonia. Initially on pressors which have since been discontinued. Had converted to sinus rhythm/sinus tachycardia on  but converted back to atrial fibrillation and then flutter with difficult to control rates despite digoxin, diltiazem, and metoprolol. He started trial of antiarrhythmic medication with sotalol (avoiding amiodarone due to severe lung disease) and converted to sinus rhythm overnight. He remains in sinus rhythm. ECG remains within normal limits.    # Atrial flutter and fibrillation, CHADSVASc = 4 (age, HTN, CAD)   - Eliquis was previously discontinued 10/2023 due to hemoptysis (no recurrence since).    - Tolerated heparin gtt during admission and transition to Eliquis 5mg Q 12 since last night. Tolerating oral a/c thus far without recurrence of bleeding.  - Continue sotalol 120mg Q 12 hrs. Received dose 4/5 this AM @ 11AM.  Subsequent EKG (2pm) showed QTc 460ms and RBBB  - Continue to check ECG 2 hrs after sotalol dose to assess QT interval. If QTc>500 ms hold next dose of sotalol.   - Continue diltiazem 90mg Q 6 hrs.  Will transition to 360mg at discharge.   - Monitor lytes: Keep K > 4, Mg > 2     # Acute hypoxic respiratory failure  # Pneumonia   - Supplemental O2 as needed   - Abx as per primary team       INCOMPLETE NOTE   Per patient RN, concern for transient confusion overnight and early this morning hours. Patient reportedly called daughter early this morning and sounded confused and was not aware he was in [facility]. He told daughter that his wife was as bedside, however, she was home at the time.  Daughter reportedly contacted RN directly shortly after and confirmed A&Ox4. Per daughter and covering RN, no further concern for confusion through out the day today.     Patient sitting comfortably and denies complaints. Now on Eliquis and denies hemoptysis or recurrent bleeding events.  Denies confusion.  Remains A&o x 3. No focal neurologically deficits.      EC23 @ 2pm - sinus rhythm @ 78bpm, RBBB, QRS 126ms, QTC 460ms  TELE:  sinus rhythm, PAC,  NO AF or tachyarrhythmia events    MEDICATIONS  (STANDING):  albuterol/ipratropium for Nebulization 3 milliLiter(s) Nebulizer every 6 hours  apixaban 5 milliGRAM(s) Oral every 12 hours  atorvastatin 40 milliGRAM(s) Oral at bedtime  budesonide  80 MICROgram(s)/formoterol 4.5 MICROgram(s) Inhaler 1 Puff(s) Inhalation daily  chlorhexidine 2% Cloths 1 Application(s) Topical daily  diltiazem    Tablet 90 milliGRAM(s) Oral every 6 hours  gabapentin 400 milliGRAM(s) Oral three times a day  magnesium oxide 400 milliGRAM(s) Oral three times a day with meals  magnesium sulfate  IVPB 2 Gram(s) IV Intermittent once  midodrine 2.5 milliGRAM(s) Oral every 8 hours  piperacillin/tazobactam IVPB.. 3.375 Gram(s) IV Intermittent every 8 hours  sotalol. 120 milliGRAM(s) Oral every 12 hours  tamsulosin 0.4 milliGRAM(s) Oral at bedtime    MEDICATIONS  (PRN):  acetaminophen     Tablet .. 650 milliGRAM(s) Oral every 6 hours PRN Temp greater or equal to 38C (100.4F), Moderate Pain (4 - 6)  HYDROmorphone   Tablet 2 milliGRAM(s) Oral every 8 hours PRN Severe Pain (7 - 10)  LORazepam     Tablet 1 milliGRAM(s) Oral every 6 hours PRN Anxiety  traMADol 50 milliGRAM(s) Oral every 8 hours PRN Mild Pain (1 - 3)      Allergies    ibuprofen (Anaphylaxis)  codeine (Urticaria)    Intolerances      PAST MEDICAL & SURGICAL HISTORY:  CAD in native artery  RCA 3 YAYA 1 in  and 2 in 2019  Arthritis  Lumbosacral disc disease  has nerve stimulator  HTN (hypertension)  HLD (hyperlipidemia)  Lung cancer  Stage I a1; RUL; s/p SBRT in 2021; stable disease as of 2022  Paroxysmal atrial fibrillation  COPD (chronic obstructive pulmonary disease) with emphysema      H/O heart artery stent RCA  S/P cataract surgery  b/l eyes 2016  S/P CABG (coronary artery bypass graft)    Vital Signs Last 24 Hrs  T(C): 37.4 (06 Dec 2023 04:31), Max: 37.4 (06 Dec 2023 04:31)  T(F): 99.3 (06 Dec 2023 04:31), Max: 99.3 (06 Dec 2023 04:31)  HR: 80 (06 Dec 2023 14:44) (80 - 96)  BP: 109/65 (06 Dec 2023 14:00) (107/60 - 128/67)  BP(mean): --  RR: 18 (06 Dec 2023 04:31) (18 - 18)  SpO2: 94% (06 Dec 2023 14:44) (92% - 96%)    Parameters below as of 06 Dec 2023 14:44  Patient On (Oxygen Delivery Method): nasal cannula    Physical Exam:  Constitutional: resting comfortably  Cardiovascular: +S1S2 RRR  Pulmonary: CTA b/l, unlabored  GI: soft NTND +BS  Extremities: no pedal edema, +distal pulses b/l  Neuro: non focal, BARKER x4    LABS:                        9.7    6.16  )-----------( 355      ( 06 Dec 2023 05:05 )             30.0     12-06    134<L>  |  94<L>  |  11.6  ----------------------------<  107<H>  5.2   |  30.0<H>  |  0.42<L>    Ca    8.9      06 Dec 2023 05:05  Mg     1.6     12-06    TPro  6.4<L>  /  Alb  3.1<L>  /  TBili  0.5  /  DBili  x   /  AST  51<H>  /  ALT  53<H>  /  AlkPhos  269<H>  12-06    PTT - ( 06 Dec 2023 05:05 )  PTT:36.3 sec  Urinalysis Basic - ( 06 Dec 2023 05:05 )    Color: x / Appearance: x / SG: x / pH: x  Gluc: 107 mg/dL / Ketone: x  / Bili: x / Urobili: x   Blood: x / Protein: x / Nitrite: x   Leuk Esterase: x / RBC: x / WBC x   Sq Epi: x / Non Sq Epi: x / Bacteria: x        RADIOLOGY & ADDITIONAL TESTS:    78 year old male, former smoker, with HTN, hyperlipidemia, CAD s/p PCI  and CABG x 3 (3/2020 with Dr. Hutchison), COPD, lung cancer (recurrent NSCLC; RUL; squamous; on daily radiation with Taxol/Carbo weekly), and paroxysmal atrial fibrillation s/p MDT ILR who has been off AC since 10/2023 due to hemoptysis. He presented to EP followup office visit with c/o cough, fever, and worsening shortness of breath over several days, noted to be in AFL with rapid ventricular rates on ILR. Sent to [facility] for further evaluation and was admitted with acute hypoxic respiratory failure and septic shock 2/2 pneumonia. Initially on pressors which have since been discontinued. Had converted to sinus rhythm/sinus tachycardia on  but converted back to atrial fibrillation and then flutter with difficult to control rates despite digoxin, diltiazem, and metoprolol. He started trial of antiarrhythmic medication with sotalol (avoiding amiodarone due to severe lung disease) and converted to sinus rhythm overnight. He remains in sinus rhythm. ECG remains within normal limits.    # Atrial flutter and fibrillation, CHADSVASc = 4 (age, HTN, CAD)   - Eliquis was previously discontinued 10/2023 due to hemoptysis (no recurrence since).    - Tolerated heparin gtt during admission and transitioned to Eliquis 5mg Q 12 since last night. Tolerating oral a/c thus far without recurrence of bleeding.  - Continue sotalol 120mg Q 12 hrs. Received dose 4/5 this AM @ 11AM.  Subsequent EKG (2pm) showed QTc 460ms and RBBB  - Continue to check ECG 2 hrs after sotalol dose to assess QT interval. If QTc>500 ms hold next dose of sotalol.   - Continue diltiazem 90mg Q 6 hrs.  Will transition to 360mg at discharge.   - Monitor lytes: Keep K > 4, Mg > 2     # Transient confusion overnight  - No current focal neurological and remains A&Ox 3 but admits periods of confusion which he attributes to lack of sleep  - Discussed with hospitalist. Will arrange CT head and ABG    # Acute hypoxic respiratory failure  # Pneumonia   - Supplemental O2 as needed   - Abx as per primary team        Per patient RN, concern for transient confusion overnight and early this morning hours. Patient reportedly called daughter early this morning and sounded confused and was not aware he was in [facility]. He told daughter that his wife was as bedside, however, she was home at the time.  Daughter reportedly contacted RN directly shortly after and confirmed A&Ox4. Per daughter and covering RN, no further concern for confusion through out the day today.     Patient sitting comfortably and denies complaints. Now on Eliquis and denies hemoptysis or recurrent bleeding events.  Denies confusion.  Remains A&o x 3. No focal neurologically deficits.      EC23 @ 2pm - sinus rhythm @ 78bpm, RBBB, QRS 126ms, QTC 460ms  TELE:  sinus rhythm, PAC,  NO AF or tachyarrhythmia events    MEDICATIONS  (STANDING):  albuterol/ipratropium for Nebulization 3 milliLiter(s) Nebulizer every 6 hours  apixaban 5 milliGRAM(s) Oral every 12 hours  atorvastatin 40 milliGRAM(s) Oral at bedtime  budesonide  80 MICROgram(s)/formoterol 4.5 MICROgram(s) Inhaler 1 Puff(s) Inhalation daily  chlorhexidine 2% Cloths 1 Application(s) Topical daily  diltiazem    Tablet 90 milliGRAM(s) Oral every 6 hours  gabapentin 400 milliGRAM(s) Oral three times a day  magnesium oxide 400 milliGRAM(s) Oral three times a day with meals  magnesium sulfate  IVPB 2 Gram(s) IV Intermittent once  midodrine 2.5 milliGRAM(s) Oral every 8 hours  piperacillin/tazobactam IVPB.. 3.375 Gram(s) IV Intermittent every 8 hours  sotalol. 120 milliGRAM(s) Oral every 12 hours  tamsulosin 0.4 milliGRAM(s) Oral at bedtime    MEDICATIONS  (PRN):  acetaminophen     Tablet .. 650 milliGRAM(s) Oral every 6 hours PRN Temp greater or equal to 38C (100.4F), Moderate Pain (4 - 6)  HYDROmorphone   Tablet 2 milliGRAM(s) Oral every 8 hours PRN Severe Pain (7 - 10)  LORazepam     Tablet 1 milliGRAM(s) Oral every 6 hours PRN Anxiety  traMADol 50 milliGRAM(s) Oral every 8 hours PRN Mild Pain (1 - 3)      Allergies    ibuprofen (Anaphylaxis)  codeine (Urticaria)    Intolerances      PAST MEDICAL & SURGICAL HISTORY:  CAD in native artery  RCA 3 YAYA 1 in  and 2 in 2019  Arthritis  Lumbosacral disc disease  has nerve stimulator  HTN (hypertension)  HLD (hyperlipidemia)  Lung cancer  Stage I a1; RUL; s/p SBRT in 2021; stable disease as of 2022  Paroxysmal atrial fibrillation  COPD (chronic obstructive pulmonary disease) with emphysema      H/O heart artery stent RCA  S/P cataract surgery  b/l eyes 2016  S/P CABG (coronary artery bypass graft)    Vital Signs Last 24 Hrs  T(C): 37.4 (06 Dec 2023 04:31), Max: 37.4 (06 Dec 2023 04:31)  T(F): 99.3 (06 Dec 2023 04:31), Max: 99.3 (06 Dec 2023 04:31)  HR: 80 (06 Dec 2023 14:44) (80 - 96)  BP: 109/65 (06 Dec 2023 14:00) (107/60 - 128/67)  BP(mean): --  RR: 18 (06 Dec 2023 04:31) (18 - 18)  SpO2: 94% (06 Dec 2023 14:44) (92% - 96%)    Parameters below as of 06 Dec 2023 14:44  Patient On (Oxygen Delivery Method): nasal cannula    Physical Exam:  Constitutional: resting comfortably  Cardiovascular: +S1S2 RRR  Pulmonary: CTA b/l, unlabored  GI: soft NTND +BS  Extremities: no pedal edema, +distal pulses b/l  Neuro: non focal, BARKER x4    LABS:                        9.7    6.16  )-----------( 355      ( 06 Dec 2023 05:05 )             30.0     12-06    134<L>  |  94<L>  |  11.6  ----------------------------<  107<H>  5.2   |  30.0<H>  |  0.42<L>    Ca    8.9      06 Dec 2023 05:05  Mg     1.6     12-06    TPro  6.4<L>  /  Alb  3.1<L>  /  TBili  0.5  /  DBili  x   /  AST  51<H>  /  ALT  53<H>  /  AlkPhos  269<H>  12-06    PTT - ( 06 Dec 2023 05:05 )  PTT:36.3 sec  Urinalysis Basic - ( 06 Dec 2023 05:05 )    Color: x / Appearance: x / SG: x / pH: x  Gluc: 107 mg/dL / Ketone: x  / Bili: x / Urobili: x   Blood: x / Protein: x / Nitrite: x   Leuk Esterase: x / RBC: x / WBC x   Sq Epi: x / Non Sq Epi: x / Bacteria: x        RADIOLOGY & ADDITIONAL TESTS:    78 year old male, former smoker, with HTN, hyperlipidemia, CAD s/p PCI  and CABG x 3 (3/2020 with Dr. Hutchison), COPD, lung cancer (recurrent NSCLC; RUL; squamous; on daily radiation with Taxol/Carbo weekly), and paroxysmal atrial fibrillation s/p MDT ILR who has been off AC since 10/2023 due to hemoptysis. He presented to EP followup office visit with c/o cough, fever, and worsening shortness of breath over several days, noted to be in AFL with rapid ventricular rates on ILR. Sent to [facility] for further evaluation and was admitted with acute hypoxic respiratory failure and septic shock 2/2 pneumonia. Initially on pressors which have since been discontinued. Had converted to sinus rhythm/sinus tachycardia on  but converted back to atrial fibrillation and then flutter with difficult to control rates despite digoxin, diltiazem, and metoprolol. He started trial of antiarrhythmic medication with sotalol (avoiding amiodarone due to severe lung disease) and converted to sinus rhythm overnight. He remains in sinus rhythm. ECG remains within normal limits.    # Atrial flutter and fibrillation, CHADSVASc = 4 (age, HTN, CAD)   - Eliquis was previously discontinued 10/2023 due to hemoptysis (no recurrence since).    - Tolerated heparin gtt during admission and transitioned to Eliquis 5mg Q 12 since last night. Tolerating oral a/c thus far without recurrence of bleeding.  - Continue sotalol 120mg Q 12 hrs. Received dose 4/5 this AM @ 11AM.  Subsequent EKG (2pm) showed QTc 460ms and RBBB  - Continue to check ECG 2 hrs after sotalol dose to assess QT interval. If QTc>500 ms hold next dose of sotalol.   - Continue diltiazem 90mg Q 6 hrs.  Will transition to 360mg at discharge.   - Monitor lytes: Keep K > 4, Mg > 2     # Transient confusion overnight  - No current focal neurological and remains A&Ox 3 but admits periods of confusion which he attributes to lack of sleep  - Discussed with hospitalist. Will arrange CT head and ABG    # Acute hypoxic respiratory failure  # Pneumonia   - Supplemental O2 as needed   - Abx as per primary team        Per patient RN, concern for transient confusion overnight and early this morning hours. Patient reportedly called daughter early this morning and sounded confused and was not aware he was in [facility]. He told daughter that his wife was as bedside, however, she was home at the time.  Daughter reportedly contacted RN directly shortly after and confirmed A&Ox4. Per daughter and covering RN, no further concern for confusion through out the day today.     Patient sitting comfortably and denies complaints. Now on Eliquis and denies hemoptysis or recurrent bleeding events.  Denies confusion.  Remains A&o x 3. No focal neurologically deficits.      EC23 @ 2pm - sinus rhythm @ 78bpm, RBBB, QRS 126ms, QTC 460ms  TELE:  sinus rhythm, PAC,  NO AF or tachyarrhythmia events    MEDICATIONS  (STANDING):  albuterol/ipratropium for Nebulization 3 milliLiter(s) Nebulizer every 6 hours  apixaban 5 milliGRAM(s) Oral every 12 hours  atorvastatin 40 milliGRAM(s) Oral at bedtime  budesonide  80 MICROgram(s)/formoterol 4.5 MICROgram(s) Inhaler 1 Puff(s) Inhalation daily  chlorhexidine 2% Cloths 1 Application(s) Topical daily  diltiazem    Tablet 90 milliGRAM(s) Oral every 6 hours  gabapentin 400 milliGRAM(s) Oral three times a day  magnesium oxide 400 milliGRAM(s) Oral three times a day with meals  magnesium sulfate  IVPB 2 Gram(s) IV Intermittent once  midodrine 2.5 milliGRAM(s) Oral every 8 hours  piperacillin/tazobactam IVPB.. 3.375 Gram(s) IV Intermittent every 8 hours  sotalol. 120 milliGRAM(s) Oral every 12 hours  tamsulosin 0.4 milliGRAM(s) Oral at bedtime    MEDICATIONS  (PRN):  acetaminophen     Tablet .. 650 milliGRAM(s) Oral every 6 hours PRN Temp greater or equal to 38C (100.4F), Moderate Pain (4 - 6)  HYDROmorphone   Tablet 2 milliGRAM(s) Oral every 8 hours PRN Severe Pain (7 - 10)  LORazepam     Tablet 1 milliGRAM(s) Oral every 6 hours PRN Anxiety  traMADol 50 milliGRAM(s) Oral every 8 hours PRN Mild Pain (1 - 3)      Allergies    ibuprofen (Anaphylaxis)  codeine (Urticaria)    Intolerances      PAST MEDICAL & SURGICAL HISTORY:  CAD in native artery  RCA 3 YAYA 1 in  and 2 in 2019  Arthritis  Lumbosacral disc disease  has nerve stimulator  HTN (hypertension)  HLD (hyperlipidemia)  Lung cancer  Stage I a1; RUL; s/p SBRT in 2021; stable disease as of 2022  Paroxysmal atrial fibrillation  COPD (chronic obstructive pulmonary disease) with emphysema      H/O heart artery stent RCA  S/P cataract surgery  b/l eyes 2016  S/P CABG (coronary artery bypass graft)    Vital Signs Last 24 Hrs  T(C): 37.4 (06 Dec 2023 04:31), Max: 37.4 (06 Dec 2023 04:31)  T(F): 99.3 (06 Dec 2023 04:31), Max: 99.3 (06 Dec 2023 04:31)  HR: 80 (06 Dec 2023 14:44) (80 - 96)  BP: 109/65 (06 Dec 2023 14:00) (107/60 - 128/67)  BP(mean): --  RR: 18 (06 Dec 2023 04:31) (18 - 18)  SpO2: 94% (06 Dec 2023 14:44) (92% - 96%)    Parameters below as of 06 Dec 2023 14:44  Patient On (Oxygen Delivery Method): nasal cannula    Physical Exam:  Constitutional: resting comfortably  Cardiovascular: +S1S2 RRR  Pulmonary: CTA b/l, unlabored  GI: soft NTND +BS  Extremities: no pedal edema, +distal pulses b/l  Neuro: non focal, BARKER x4    LABS:                        9.7    6.16  )-----------( 355      ( 06 Dec 2023 05:05 )             30.0     12-06    134<L>  |  94<L>  |  11.6  ----------------------------<  107<H>  5.2   |  30.0<H>  |  0.42<L>    Ca    8.9      06 Dec 2023 05:05  Mg     1.6     12-06    TPro  6.4<L>  /  Alb  3.1<L>  /  TBili  0.5  /  DBili  x   /  AST  51<H>  /  ALT  53<H>  /  AlkPhos  269<H>  12-06    PTT - ( 06 Dec 2023 05:05 )  PTT:36.3 sec  Urinalysis Basic - ( 06 Dec 2023 05:05 )    Color: x / Appearance: x / SG: x / pH: x  Gluc: 107 mg/dL / Ketone: x  / Bili: x / Urobili: x   Blood: x / Protein: x / Nitrite: x   Leuk Esterase: x / RBC: x / WBC x   Sq Epi: x / Non Sq Epi: x / Bacteria: x        RADIOLOGY & ADDITIONAL TESTS:       Per patient RN, concern for transient confusion overnight and early this morning hours. Patient reportedly called daughter early this morning and sounded confused and was not aware he was in [facility]. He told daughter that his wife was as bedside, however, she was home at the time.  Daughter reportedly contacted RN directly shortly after and confirmed A&Ox4. Per daughter and covering RN, no further concern for confusion through out the day today.     Patient sitting comfortably and denies complaints. Now on Eliquis and denies hemoptysis or recurrent bleeding events.  Denies confusion.  Remains A&o x 3. No focal neurologically deficits.      EC23 @ 2pm - sinus rhythm @ 78bpm, RBBB, QRS 126ms, QTC 460ms  TELE:  sinus rhythm, PAC,  NO AF or tachyarrhythmia events    MEDICATIONS  (STANDING):  albuterol/ipratropium for Nebulization 3 milliLiter(s) Nebulizer every 6 hours  apixaban 5 milliGRAM(s) Oral every 12 hours  atorvastatin 40 milliGRAM(s) Oral at bedtime  budesonide  80 MICROgram(s)/formoterol 4.5 MICROgram(s) Inhaler 1 Puff(s) Inhalation daily  chlorhexidine 2% Cloths 1 Application(s) Topical daily  diltiazem    Tablet 90 milliGRAM(s) Oral every 6 hours  gabapentin 400 milliGRAM(s) Oral three times a day  magnesium oxide 400 milliGRAM(s) Oral three times a day with meals  magnesium sulfate  IVPB 2 Gram(s) IV Intermittent once  midodrine 2.5 milliGRAM(s) Oral every 8 hours  piperacillin/tazobactam IVPB.. 3.375 Gram(s) IV Intermittent every 8 hours  sotalol. 120 milliGRAM(s) Oral every 12 hours  tamsulosin 0.4 milliGRAM(s) Oral at bedtime    MEDICATIONS  (PRN):  acetaminophen     Tablet .. 650 milliGRAM(s) Oral every 6 hours PRN Temp greater or equal to 38C (100.4F), Moderate Pain (4 - 6)  HYDROmorphone   Tablet 2 milliGRAM(s) Oral every 8 hours PRN Severe Pain (7 - 10)  LORazepam     Tablet 1 milliGRAM(s) Oral every 6 hours PRN Anxiety  traMADol 50 milliGRAM(s) Oral every 8 hours PRN Mild Pain (1 - 3)      Allergies    ibuprofen (Anaphylaxis)  codeine (Urticaria)    Intolerances      PAST MEDICAL & SURGICAL HISTORY:  CAD in native artery  RCA 3 YAYA 1 in  and 2 in 2019  Arthritis  Lumbosacral disc disease  has nerve stimulator  HTN (hypertension)  HLD (hyperlipidemia)  Lung cancer  Stage I a1; RUL; s/p SBRT in 2021; stable disease as of 2022  Paroxysmal atrial fibrillation  COPD (chronic obstructive pulmonary disease) with emphysema      H/O heart artery stent RCA  S/P cataract surgery  b/l eyes 2016  S/P CABG (coronary artery bypass graft)    Vital Signs Last 24 Hrs  T(C): 37.4 (06 Dec 2023 04:31), Max: 37.4 (06 Dec 2023 04:31)  T(F): 99.3 (06 Dec 2023 04:31), Max: 99.3 (06 Dec 2023 04:31)  HR: 80 (06 Dec 2023 14:44) (80 - 96)  BP: 109/65 (06 Dec 2023 14:00) (107/60 - 128/67)  BP(mean): --  RR: 18 (06 Dec 2023 04:31) (18 - 18)  SpO2: 94% (06 Dec 2023 14:44) (92% - 96%)    Parameters below as of 06 Dec 2023 14:44  Patient On (Oxygen Delivery Method): nasal cannula    Physical Exam:  Constitutional: resting comfortably  Cardiovascular: +S1S2 RRR  Pulmonary: CTA b/l, unlabored  GI: soft NTND +BS  Extremities: no pedal edema, +distal pulses b/l  Neuro: non focal, BARKER x4    LABS:                        9.7    6.16  )-----------( 355      ( 06 Dec 2023 05:05 )             30.0     12-06    134<L>  |  94<L>  |  11.6  ----------------------------<  107<H>  5.2   |  30.0<H>  |  0.42<L>    Ca    8.9      06 Dec 2023 05:05  Mg     1.6     12-06    TPro  6.4<L>  /  Alb  3.1<L>  /  TBili  0.5  /  DBili  x   /  AST  51<H>  /  ALT  53<H>  /  AlkPhos  269<H>  12-06    PTT - ( 06 Dec 2023 05:05 )  PTT:36.3 sec  Urinalysis Basic - ( 06 Dec 2023 05:05 )    Color: x / Appearance: x / SG: x / pH: x  Gluc: 107 mg/dL / Ketone: x  / Bili: x / Urobili: x   Blood: x / Protein: x / Nitrite: x   Leuk Esterase: x / RBC: x / WBC x   Sq Epi: x / Non Sq Epi: x / Bacteria: x        RADIOLOGY & ADDITIONAL TESTS:

## 2023-12-06 NOTE — CHART NOTE - NSCHARTNOTEFT_GEN_A_CORE
Called by MD at 1830 that Patient yesterday night was made aware that Patient had intermittent confusion at last night. Seen and examined at bedside. Pt states that he was confused about the time of day last night because he looked at the clock and was not sure if it was 6:30 am or pm. Pt states that it was because the curtain was covering the window. Pt denies chest pain/ pressure, palpitations, shortness of breath, dizziness, headaches, abdominal pain, nausea or any other complaints or any other episodes of confusion. Pt on 5L NC.      VITALS:  T(C): 36.8 (12-06-23 @ 17:40), Max: 37.4 (12-06-23 @ 04:31)  HR: 76 (12-06-23 @ 17:40) (76 - 85)  BP: 113/65 (12-06-23 @ 17:40) (107/60 - 128/67)  RR: 18 (12-06-23 @ 17:40) (18 - 18)  SpO2: 93% (12-06-23 @ 17:40) (92% - 96%)    PHYSICAL EXAM:  GENERAL: Pt lying in bed comfortably in NAD  ENT: Moist mucous membranes  CHEST/LUNG: Clear to auscultation bilaterally; No wheezing. Unlabored respirations  HEART: S1, S2   ABDOMEN: Bowel sounds present; Soft, Nontender, Nondistended. No guarding or rigidity    EXTREMITIES:  2+ Peripheral Pulses, brisk capillary refill. No clubbing, cyanosis, or edema   NERVOUS SYSTEM:  Alert & Oriented X3, speech clear. Answers questions appropriately. No deficits     A/P: Pt is a 78yM admitted 11-30-23 for Atrial flutter, hypoxia due to Pna.   -CT head and ABG ordered per MD  -Pt in agreement with plan, d/w RN   -continue to monitor closely, RN to recall PA or MD for worsening symptoms

## 2023-12-06 NOTE — PROGRESS NOTE ADULT - SUBJECTIVE AND OBJECTIVE BOX
JENNIFER OSCAR  [id number]      Chief Complaint:  Follow up atrial flutter, hemoptysis, lung Ca, PNA    Interval History:  Patient resting comfortably without c/o.    Tele:  SR, PACs, no AF/AFL          acetaminophen     Tablet .. 650 milliGRAM(s) Oral every 6 hours PRN  albuterol/ipratropium for Nebulization 3 milliLiter(s) Nebulizer every 6 hours  apixaban 5 milliGRAM(s) Oral every 12 hours  atorvastatin 40 milliGRAM(s) Oral at bedtime  budesonide  80 MICROgram(s)/formoterol 4.5 MICROgram(s) Inhaler 1 Puff(s) Inhalation daily  chlorhexidine 2% Cloths 1 Application(s) Topical daily  diltiazem    Tablet 90 milliGRAM(s) Oral every 6 hours  gabapentin 400 milliGRAM(s) Oral three times a day  HYDROmorphone   Tablet 2 milliGRAM(s) Oral every 8 hours PRN  LORazepam     Tablet 1 milliGRAM(s) Oral every 6 hours PRN  magnesium oxide 400 milliGRAM(s) Oral three times a day with meals  magnesium sulfate  IVPB 2 Gram(s) IV Intermittent once  midodrine 5 milliGRAM(s) Oral every 8 hours  piperacillin/tazobactam IVPB.. 3.375 Gram(s) IV Intermittent every 8 hours  sotalol. 120 milliGRAM(s) Oral every 12 hours  tamsulosin 0.4 milliGRAM(s) Oral at bedtime  traMADol 50 milliGRAM(s) Oral every 8 hours PRN          Physical Exam:  T(C): 37.4 (12-06-23 @ 04:31), Max: 37.4 (12-06-23 @ 04:31)  HR: 80 (12-06-23 @ 09:15) (67 - 96)  BP: 118/67 (12-06-23 @ 08:00) (107/60 - 128/67)  RR: 18 (12-06-23 @ 04:31) (18 - 18)  SpO2: 94% (12-06-23 @ 09:15) (92% - 97%)  General: Comfortable in NAD  Neck: No JVD  CVS: nl s1s2, no s3  Pulm: CTA b/l  Abd: soft, non-tender  Ext: No c/c/e  Neuro A&O x3  Psych: Normal affect      Labs:   06 Dec 2023 05:05    134    |  94     |  11.6   ----------------------------<  107    5.2     |  30.0   |  0.42     Ca    8.9        06 Dec 2023 05:05  Mg     1.6       06 Dec 2023 05:05    TPro  6.4    /  Alb  3.1    /  TBili  0.5    /  DBili  x      /  AST  51     /  ALT  53     /  AlkPhos  269    06 Dec 2023 05:05                          9.7    6.16  )-----------( 355      ( 06 Dec 2023 05:05 )             30.0     PTT - ( 06 Dec 2023 05:05 )  PTT:36.3 sec          Assessment:  78 year old male, former smoker, with HTN, hyperlipidemia, CAD s/p PCI 2002 and CABG x 3 (3/2020 with Dr. Hutchison), COPD, lung cancer (recurrent NSCLC; RUL; squamous; on daily radiation with Taxol/Carbo weekly), and paroxysmal atrial fibrillation s/p MDT ILR who has been off AC since 10/2023 due to hemoptysis. He presented to EP followup office visit with c/o cough, fever, and worsening shortness of breath over several days, noted to be in AFL with rapid ventricular rates on ILR. Sent to [facility] for further evaluation and was admitted with acute hypoxic respiratory failure and septic shock 2/2 pneumonia. Initially on pressors which have since been discontinued. Had converted to sinus rhythm/sinus tachycardia on 12/1 but then converted back into atrial fibrillation on 12/2/23. Started on IV heparin to assess tolerance/recurrence of hemoptysis.   -Now back in SR on Sotalol and Cardizem.  -Tolerated Heparin gtt and now on Eliquis.    Plan:  1. Continue Sotalol and Cardizem for rate/rhythm control.  Follow EKG and continue Sotalol as long as QTc<500ms.  2. Continue on Eliquis  3. No additional CV w/u now.  4. Abx per med.  5. Rest as per primary team.  6. F/u with our office and EP post d/c.    Will sign off.  Please call with questions.  Thanks!     JENNIFER OSCAR  [id number]      Chief Complaint:  Follow up atrial flutter, hemoptysis, lung Ca, PNA    Interval History:  Patient resting comfortably without c/o.    Tele:  SR, PACs, no AF/AFL          acetaminophen     Tablet .. 650 milliGRAM(s) Oral every 6 hours PRN  albuterol/ipratropium for Nebulization 3 milliLiter(s) Nebulizer every 6 hours  apixaban 5 milliGRAM(s) Oral every 12 hours  atorvastatin 40 milliGRAM(s) Oral at bedtime  budesonide  80 MICROgram(s)/formoterol 4.5 MICROgram(s) Inhaler 1 Puff(s) Inhalation daily  chlorhexidine 2% Cloths 1 Application(s) Topical daily  diltiazem    Tablet 90 milliGRAM(s) Oral every 6 hours  gabapentin 400 milliGRAM(s) Oral three times a day  HYDROmorphone   Tablet 2 milliGRAM(s) Oral every 8 hours PRN  LORazepam     Tablet 1 milliGRAM(s) Oral every 6 hours PRN  magnesium oxide 400 milliGRAM(s) Oral three times a day with meals  magnesium sulfate  IVPB 2 Gram(s) IV Intermittent once  midodrine 5 milliGRAM(s) Oral every 8 hours  piperacillin/tazobactam IVPB.. 3.375 Gram(s) IV Intermittent every 8 hours  sotalol. 120 milliGRAM(s) Oral every 12 hours  tamsulosin 0.4 milliGRAM(s) Oral at bedtime  traMADol 50 milliGRAM(s) Oral every 8 hours PRN          Physical Exam:  T(C): 37.4 (12-06-23 @ 04:31), Max: 37.4 (12-06-23 @ 04:31)  HR: 80 (12-06-23 @ 09:15) (67 - 96)  BP: 118/67 (12-06-23 @ 08:00) (107/60 - 128/67)  RR: 18 (12-06-23 @ 04:31) (18 - 18)  SpO2: 94% (12-06-23 @ 09:15) (92% - 97%)  General: Comfortable in NAD  Neck: No JVD  CVS: nl s1s2, no s3  Pulm: CTA b/l  Abd: soft, non-tender  Ext: No c/c/e  Neuro A&O x3  Psych: Normal affect      Labs:   06 Dec 2023 05:05    134    |  94     |  11.6   ----------------------------<  107    5.2     |  30.0   |  0.42     Ca    8.9        06 Dec 2023 05:05  Mg     1.6       06 Dec 2023 05:05    TPro  6.4    /  Alb  3.1    /  TBili  0.5    /  DBili  x      /  AST  51     /  ALT  53     /  AlkPhos  269    06 Dec 2023 05:05                          9.7    6.16  )-----------( 355      ( 06 Dec 2023 05:05 )             30.0     PTT - ( 06 Dec 2023 05:05 )  PTT:36.3 sec          Assessment:  78 year old male, former smoker, with HTN, hyperlipidemia, CAD s/p PCI 2002 and CABG x 3 (3/2020 with Dr. Hutchison), COPD, lung cancer (recurrent NSCLC; RUL; squamous; on daily radiation with Taxol/Carbo weekly), and paroxysmal atrial fibrillation s/p MDT ILR who has been off AC since 10/2023 due to hemoptysis. He presented to EP followup office visit with c/o cough, fever, and worsening shortness of breath over several days, noted to be in AFL with rapid ventricular rates on ILR. Sent to [facility] for further evaluation and was admitted with acute hypoxic respiratory failure and septic shock 2/2 pneumonia. Initially on pressors which have since been discontinued. Had converted to sinus rhythm/sinus tachycardia on 12/1 but then converted back into atrial fibrillation on 12/2/23. Started on IV heparin to assess tolerance/recurrence of hemoptysis.   -Now back in SR on Sotalol and Cardizem.  -Tolerated Heparin gtt and now on Eliquis.    Plan:  1. Continue Sotalol and Cardizem for rate/rhythm control.  Follow EKG and continue Sotalol as long as QTc<500ms.  2. Continue on Eliquis  3. No additional CV w/u now.  4. Abx per med.  5. Rest as per primary team.  6. F/u with our office and EP post d/c.    Will sign off.  Please call with questions.  Thanks!

## 2023-12-06 NOTE — PROGRESS NOTE ADULT - ASSESSMENT
78 year old male, former smoker, with HTN, hyperlipidemia, CAD s/p PCI 2002 and CABG x 3 (3/2020 with Dr. Hutchison), COPD, lung cancer (recurrent NSCLC; RUL; squamous; on daily radiation with Taxol/Carbo weekly), and paroxysmal atrial fibrillation s/p MDT ILR who has been off AC since 10/2023 due to hemoptysis. He presented to EP followup office visit with c/o cough, fever, and worsening shortness of breath over several days, noted to be in AFL with rapid ventricular rates on ILR. Sent to [facility] for further evaluation and was admitted with acute hypoxic respiratory failure and septic shock 2/2 pneumonia. Initially on pressors which have since been discontinued. Had converted to sinus rhythm/sinus tachycardia on 12/1 but converted back to atrial fibrillation and then flutter with difficult to control rates despite digoxin, diltiazem, and metoprolol. He started trial of antiarrhythmic medication with sotalol (avoiding amiodarone due to severe lung disease) and converted to sinus rhythm overnight. He remains in sinus rhythm. ECG remains within normal limits.    # Atrial flutter and fibrillation, CHADSVASc = 4 (age, HTN, CAD)   - Eliquis was previously discontinued 10/2023 due to hemoptysis (no recurrence since).    - Tolerated heparin gtt during admission and transitioned to Eliquis 5mg Q 12 since last night. Tolerating oral a/c thus far without recurrence of bleeding.  - Continue sotalol 120mg Q 12 hrs. Received dose 4/5 this AM @ 11AM.  Subsequent EKG (2pm) showed QTc 460ms and RBBB  - Continue to check ECG 2 hrs after sotalol dose to assess QT interval. If QTc>500 ms hold next dose of sotalol.   - Continue diltiazem 90mg Q 6 hrs.  Will transition to 360mg at discharge.   - Monitor lytes: Keep K > 4, Mg > 2     # Transient confusion overnight  - No current focal neurological and remains A&Ox 3 but admits periods of confusion which he attributes to lack of sleep  - Discussed with hospitalist. Will arrange CT head and ABG    # Acute hypoxic respiratory failure  # Pneumonia   - Supplemental O2 as needed   - Abx as per primary team      78 year old male, former smoker, with HTN, hyperlipidemia, CAD s/p PCI 2002 and CABG x 3 (3/2020 with Dr. Hutchison), COPD, lung cancer (recurrent NSCLC; RUL; squamous; on daily radiation with Taxol/Carbo weekly), and paroxysmal atrial fibrillation s/p MDT ILR who has been off AC since 10/2023 due to hemoptysis. He presented to EP followup office visit with c/o cough, fever, and worsening shortness of breath over several days, noted to be in AFL with rapid ventricular rates on ILR. Sent to [facility] for further evaluation and was admitted with acute hypoxic respiratory failure and septic shock 2/2 pneumonia. Initially on pressors which have since been discontinued. Had converted to sinus rhythm/sinus tachycardia on 12/1 but converted back to atrial fibrillation and then flutter with difficult to control rates despite digoxin, diltiazem, and metoprolol. He started trial of antiarrhythmic medication with sotalol (avoiding amiodarone due to severe lung disease) and converted to sinus rhythm overnight. He remains in sinus rhythm. ECG remains within normal limits.    # Atrial flutter and fibrillation, CHADSVASc = 4 (age, HTN, CAD)   - Eliquis was previously discontinued 10/2023 due to hemoptysis (no recurrence since).    - Tolerated heparin gtt during admission and transitioned to Eliquis 5mg Q 12 since last night. Tolerating oral a/c thus far without recurrence of bleeding.  - Continue sotalol 120mg Q 12 hrs. Received dose 4/5 this AM @ 11AM.  Subsequent EKG (2pm) showed QTc 460ms and RBBB  - Continue to check ECG 2 hrs after sotalol dose to assess QT interval. If QTc>500 ms hold next dose of sotalol.   - Continue diltiazem 90mg Q 6 hrs.  Will transition to 360mg at discharge.   - Monitor lytes: Keep K > 4, Mg > 2     # Transient confusion overnight  - No current focal neurological and remains A&Ox 3 but admits periods of confusion which he attributes to lack of sleep  - Discussed with hospitalist. Will arrange CT head and ABG    # Acute hypoxic respiratory failure  # Pneumonia   - Supplemental O2 as needed   - Abx as per primary team

## 2023-12-07 NOTE — DISCHARGE NOTE NURSING/CASE MANAGEMENT/SOCIAL WORK - NSDCFUADDAPPT_GEN_ALL_CORE_FT
[facility] for Pneumonia    Please see below for post hospital follow up information     1. VIVO Meds To Bed: [phone] - AGREEABLE    2. Home Care:  [facility] AT HOME    3. Transitional Care Services: [phone]    4, A. Primary Care Appointment:  PCP follow up appointment made with    Dr. Maynard  on  12/21/2023   at  12:15 PM.   If you are unable to attend your pre-scheduled appointment,   please contact the office directly at [phone] to reschedule.      4, B. Specialty Appointment:      5. STAR Education Packet Provided    [facility] for Pneumonia    Please see below for post hospital follow up information     1. VIVO Meds To Bed: [phone] - AGREEABLE    2. Home Care:  [facility] AT HOME    3. Transitional Care Services: [phone]    4, A. Primary Care Appointment:  PCP follow up appointment made with    Dr. Maynard  on  12/21/2023   at  12:15 PM.   If you are unable to attend your pre-scheduled appointment,   please contact the office directly at [phone] to reschedule.      4, B. Specialty Appointment:      5. STAR Education Packet Provided    [facility] for Pneumonia    Please see below for post hospital follow up information     1. VIVO Meds To Bed: [phone] - AGREEABLE    2. Home Care:  [facility] AT HOME    3. Transitional Care Services: [phone]    4, A. Primary Care Appointment:  PCP follow up appointment made with    Dr. Maynard  on  12/21/2023   at  12:15 PM.   If you are unable to attend your pre-scheduled appointment,   please contact the office directly at [phone] to reschedule.      4, B. Specialty Appointment:  Pulmonary follow up Appt scheduled with  Dr. Fuller   On 12/20/2023 at 3:30 PM.   If you are unable to attend your pre-scheduled appointment,   please contact the office directly at [phone] to reschedule.    5. STAR Education Packet Provided    [facility] for Pneumonia    Please see below for post hospital follow up information     1. VIVO Meds To Bed: [phone] - AGREEABLE    2. Home Care:  [facility] AT HOME    3. Transitional Care Services: [phone]    4, A. Primary Care Appointment:  PCP follow up appointment made with    Dr. Maynard  on  12/21/2023   at  12:15 PM.   If you are unable to attend your pre-scheduled appointment,   please contact the office directly at [phone] to reschedule.      4, B. Specialty Appointment:  Pulmonary follow up Appt scheduled with  Dr. Fuller   On 12/20/2023 at 3:30 PM.   If you are unable to attend your pre-scheduled appointment,   please contact the office directly at [phone] to reschedule.    5. STAR Education Packet Provided

## 2023-12-07 NOTE — CHART NOTE - NSCHARTNOTEFT_GEN_A_CORE
Source: Patient [ ]  Family [ ]   other [x]    Current Diet:   Diet, DASH/TLC:   Sodium & Cholesterol Restricted (12-01-23 @ 15:29)    Patient reports [ ] nausea  [ ] vomiting [ ] diarrhea [ ] constipation  [ ]chewing problems [ ] swallowing issues  [ ] other:     PO intake:  < 50% [ ]   50-75%  [ ]   %  [ x]  other :    Source for PO intake [ ] Patient [ ] family [ x] chart [ x] staff [ ] other    Current Weight:   (12/4)  150.7 lbs   (12/3)  151.6 lbs  (11/30) 157.4 lbs    % Weight Change: 1+ dependent edema on admission now resolved     Pertinent Medications: MEDICATIONS  (STANDING):  albuterol/ipratropium for Nebulization 3 milliLiter(s) Nebulizer every 6 hours  apixaban 5 milliGRAM(s) Oral every 12 hours  atorvastatin 40 milliGRAM(s) Oral at bedtime  budesonide  80 MICROgram(s)/formoterol 4.5 MICROgram(s) Inhaler 1 Puff(s) Inhalation daily  chlorhexidine 2% Cloths 1 Application(s) Topical daily  diltiazem    Tablet 90 milliGRAM(s) Oral every 6 hours  gabapentin 400 milliGRAM(s) Oral three times a day  magnesium oxide 400 milliGRAM(s) Oral three times a day with meals  midodrine 2.5 milliGRAM(s) Oral every 8 hours  piperacillin/tazobactam IVPB.. 3.375 Gram(s) IV Intermittent every 8 hours  sotalol. 120 milliGRAM(s) Oral every 12 hours  tamsulosin 0.4 milliGRAM(s) Oral at bedtime    MEDICATIONS  (PRN):  acetaminophen     Tablet .. 650 milliGRAM(s) Oral every 6 hours PRN Temp greater or equal to 38C (100.4F), Moderate Pain (4 - 6)  HYDROmorphone   Tablet 2 milliGRAM(s) Oral every 8 hours PRN Severe Pain (7 - 10)  LORazepam     Tablet 1 milliGRAM(s) Oral every 6 hours PRN Anxiety  traMADol 50 milliGRAM(s) Oral every 8 hours PRN Mild Pain (1 - 3)    Pertinent Labs: CBC Full  -  ( 07 Dec 2023 05:05 )  WBC Count : 6.31 K/uL  RBC Count : 3.28 M/uL  Hemoglobin : 9.8 g/dL  Hematocrit : 30.1 %  Platelet Count - Automated : 400 K/uL  Mean Cell Volume : 91.8 fl  Mean Cell Hemoglobin : 29.9 pg  Mean Cell Hemoglobin Concentration : 32.6 gm/dL  12-07 Na139 mmol/L Glu 108 mg/dL<H> K+ 4.8 mmol/L Cr  0.42 mg/dL<L> BUN 12.2 mg/dL Phos n/a   Alb 3.1 g/dL<L> PAB n/a       Skin: No skin breakdown/edema noted     Nutrition focused physical exam conducted - found signs of malnutrition [ ]absent [x ]present    Subcutaneous fat loss: [x ] Orbital fat pads region, [ ]Buccal fat region, [ ]Triceps region,  [ ]Ribs region    Muscle wasting: [x ]Temples region, [ ]Clavicle region, [ x]Shoulder region, [ ]Scapula region, [ ]Interosseous region,  [ ]thigh region, [ ]Calf region    Estimated Needs:   [x ] no change since previous assessment  [ ] recalculated:     Current Nutrition Diagnosis: Pt remains at high nutrition risk secondary to malnutrition (chronic, moderate) related to insufficient energy/protein intake in setting of COPD and decreased po intake as evidenced by pt meeting < 75% est needs > 1 month, physical findings. Pt with some confusion A&Ox3-4 Pt attributes to lack of sleep. Pt is completing % of meals with assistance. Last documented BM 12/4.     Recommendations:   1) Add Ensure Plus BID  2) Rx: MVI daily to optimize nutrition  3) Encourage/assist with PO intake as needed  4) Monitor electrolytes, replete as needed  5) Monitor weights daily for trend/accuracy     Monitoring and Evaluation:   [x] PO intake [x] Tolerance to diet prescription [X] Weights  [X] Follow up per protocol [X] Labs:

## 2023-12-07 NOTE — PROGRESS NOTE ADULT - SUBJECTIVE AND OBJECTIVE BOX
Subjective: Pt resting comfortably at time of assessment and is w/o complaints. Pt episode of confusion yesterday, results of CT head are pending. Currently denies chest pain, SOB, palpitations, dizziness.       EK23 9:12AM: Sinus rhythm with intact conduction. RBBB QTc 448ms  TELE: Sinus rhythm with intact conduction.     MEDICATIONS  (STANDING):  albuterol/ipratropium for Nebulization 3 milliLiter(s) Nebulizer every 6 hours  apixaban 5 milliGRAM(s) Oral every 12 hours  atorvastatin 40 milliGRAM(s) Oral at bedtime  budesonide  80 MICROgram(s)/formoterol 4.5 MICROgram(s) Inhaler 1 Puff(s) Inhalation daily  chlorhexidine 2% Cloths 1 Application(s) Topical daily  diltiazem    Tablet 90 milliGRAM(s) Oral every 6 hours  gabapentin 400 milliGRAM(s) Oral three times a day  magnesium oxide 400 milliGRAM(s) Oral three times a day with meals  methylPREDNISolone sodium succinate Injectable 40 milliGRAM(s) IV Push every 8 hours  midodrine 2.5 milliGRAM(s) Oral every 8 hours  piperacillin/tazobactam IVPB.. 3.375 Gram(s) IV Intermittent every 8 hours  sotalol. 120 milliGRAM(s) Oral every 12 hours  tamsulosin 0.4 milliGRAM(s) Oral at bedtime    MEDICATIONS  (PRN):  acetaminophen     Tablet .. 650 milliGRAM(s) Oral every 6 hours PRN Temp greater or equal to 38C (100.4F), Moderate Pain (4 - 6)  HYDROmorphone   Tablet 2 milliGRAM(s) Oral every 8 hours PRN Severe Pain (7 - 10)  LORazepam     Tablet 1 milliGRAM(s) Oral every 6 hours PRN Anxiety  traMADol 50 milliGRAM(s) Oral every 8 hours PRN Mild Pain (1 - 3)      Allergies    ibuprofen (Anaphylaxis)  codeine (Urticaria)    Intolerances        Vital Signs Last 24 Hrs  T(C): 36.8 (07 Dec 2023 04:41), Max: 37 (06 Dec 2023 21:00)  T(F): 98.3 (07 Dec 2023 04:41), Max: 98.6 (06 Dec 2023 21:00)  HR: 83 (07 Dec 2023 04:41) (76 - 85)  BP: 112/68 (07 Dec 2023 04:41) (109/65 - 121/67)  BP(mean): --  RR: 18 (07 Dec 2023 04:41) (18 - 18)  SpO2: 93% (07 Dec 2023 04:41) (92% - 94%)    Parameters below as of 07 Dec 2023 08:45  Patient On (Oxygen Delivery Method): nasal cannula        Physical Exam:  Constitutional: NAD  Cardiovascular: +S1S2 RRR  Pulmonary: CTA b/l, unlabored  GI: soft NTND +BS  Extremities: no pedal edema, +distal pulses b/l  Neuro: non focal, BARKER x4    LABS:                        9.8    6.31  )-----------( 400      ( 07 Dec 2023 05:05 )             30.1     12-    139  |  99  |  12.2  ----------------------------<  108<H>  4.8   |  33.0<H>  |  0.42<L>    Ca    8.7      07 Dec 2023 05:05  Mg     1.9     12-    TPro  6.1<L>  /  Alb  3.1<L>  /  TBili  0.4  /  DBili  x   /  AST  29  /  ALT  43<H>  /  AlkPhos  233<H>  12-07    PTT - ( 06 Dec 2023 05:05 )  PTT:36.3 sec  Urinalysis Basic - ( 07 Dec 2023 05:05 )    Color: x / Appearance: x / SG: x / pH: x  Gluc: 108 mg/dL / Ketone: x  / Bili: x / Urobili: x   Blood: x / Protein: x / Nitrite: x   Leuk Esterase: x / RBC: x / WBC x   Sq Epi: x / Non Sq Epi: x / Bacteria: x        RADIOLOGY & ADDITIONAL TESTS:    < from: TTE Echo Complete w/ Contrast w/ Doppler (23 @ 19:19) >  Summary:   1. Left ventricular ejection fraction, by visual estimation, is 55 to   60%.   2. Normal global left ventricular systolic function.   3. Spectral Doppler shows impaired relaxation pattern of left   ventricular myocardial filling (Grade I diastolic dysfunction).   4. There is mild concentric left ventricular hypertrophy.   5. Normal left atrial size.   6. Normal right atrial size.   7. There is no evidence of pericardial effusion.   8. Mild thickening of the anterior and posterior mitral valve leaflets.   9. Trace mitral valve regurgitation.  10. Sclerotic aortic valve with normal opening.  11. Endocardial visualization was enhanced with intravenous echo contrast.        Assessment:    78 year old male, former smoker, with HTN, hyperlipidemia, CAD s/p PCI  and CABG x 3 (3/2020 with Dr. Hutchison), COPD, lung cancer (recurrent NSCLC; RUL; squamous; on daily radiation with Taxol/Carbo weekly), and paroxysmal atrial fibrillation s/p MDT ILR who had been off AC since 10/2023 due to hemoptysis. He presented to EP followup office visit with c/o cough, fever, and worsening shortness of breath over several days, noted to be in AFL with rapid ventricular rates on ILR. Sent to [facility] for [facility] evaluation and was admitted with acute hypoxic respiratory failure and septic shock 2/2 pneumonia. Initially on pressors which have since been discontinued. Had converted to sinus rhythm/sinus tachycardia on  but converted back to atrial fibrillation and then flutter with difficult to control rates despite digoxin, diltiazem, and metoprolol. He started trial of antiarrhythmic medication with sotalol (avoiding amiodarone due to severe lung disease) and has since converted back to sinus rhythm.    Pt has had a total of 6 doses of Sotalol and QTc has remained stable (448ms this AM). Pt resting comfortably at time of assessment and is w/o complaints. Pt episode of confusion yesterday, results of CT head are pending. Currently denies chest pain, SOB, palpitations, dizziness      Plan:    1) Atrial flutter and fibrillation, CHADSVASc = 4 (age, HTN, CAD)   - Eliquis was previously discontinued 10/2023 due to hemoptysis (no recurrence since).    - Tolerated heparin gtt during admission and transitioned to Eliquis 5mg Q 12.  - Continue with Eliquis 5mg Q12HR  - Continue sotalol 120mg Q 12 hrs. Pt has received a total of 6 doses throughout admission. Qtc 448ms this AM  - No further post sotalol EKGs required.  - Continue diltiazem 90mg Q 6 hrs. Transition to 360mg extended release at time of discharge.   - Monitor lytes: Keep K > 4, Mg > 2   - c/w daily Mg supplementation    2) Transient confusion  - CT head results pending  - Pt A&O x 3 at time of assessment     3) Acute hypoxic respiratory failure 2/2 Pneumonia  - Supplemental O2 as needed   - Abx as per primary team       WIll discuss with Dr. Francisco, further recommendations to follow   Subjective: Pt resting comfortably at time of assessment and is w/o complaints. Pt episode of confusion yesterday, results of CT head are pending. Currently denies chest pain, SOB, palpitations, dizziness.       EK23 9:12AM: Sinus rhythm with intact conduction. RBBB QTc 448ms  TELE: Sinus rhythm with intact conduction.     MEDICATIONS  (STANDING):  albuterol/ipratropium for Nebulization 3 milliLiter(s) Nebulizer every 6 hours  apixaban 5 milliGRAM(s) Oral every 12 hours  atorvastatin 40 milliGRAM(s) Oral at bedtime  budesonide  80 MICROgram(s)/formoterol 4.5 MICROgram(s) Inhaler 1 Puff(s) Inhalation daily  chlorhexidine 2% Cloths 1 Application(s) Topical daily  diltiazem    Tablet 90 milliGRAM(s) Oral every 6 hours  gabapentin 400 milliGRAM(s) Oral three times a day  magnesium oxide 400 milliGRAM(s) Oral three times a day with meals  methylPREDNISolone sodium succinate Injectable 40 milliGRAM(s) IV Push every 8 hours  midodrine 2.5 milliGRAM(s) Oral every 8 hours  piperacillin/tazobactam IVPB.. 3.375 Gram(s) IV Intermittent every 8 hours  sotalol. 120 milliGRAM(s) Oral every 12 hours  tamsulosin 0.4 milliGRAM(s) Oral at bedtime    MEDICATIONS  (PRN):  acetaminophen     Tablet .. 650 milliGRAM(s) Oral every 6 hours PRN Temp greater or equal to 38C (100.4F), Moderate Pain (4 - 6)  HYDROmorphone   Tablet 2 milliGRAM(s) Oral every 8 hours PRN Severe Pain (7 - 10)  LORazepam     Tablet 1 milliGRAM(s) Oral every 6 hours PRN Anxiety  traMADol 50 milliGRAM(s) Oral every 8 hours PRN Mild Pain (1 - 3)      Allergies    ibuprofen (Anaphylaxis)  codeine (Urticaria)    Intolerances        Vital Signs Last 24 Hrs  T(C): 36.8 (07 Dec 2023 04:41), Max: 37 (06 Dec 2023 21:00)  T(F): 98.3 (07 Dec 2023 04:41), Max: 98.6 (06 Dec 2023 21:00)  HR: 83 (07 Dec 2023 04:41) (76 - 85)  BP: 112/68 (07 Dec 2023 04:41) (109/65 - 121/67)  BP(mean): --  RR: 18 (07 Dec 2023 04:41) (18 - 18)  SpO2: 93% (07 Dec 2023 04:41) (92% - 94%)    Parameters below as of 07 Dec 2023 08:45  Patient On (Oxygen Delivery Method): nasal cannula        Physical Exam:  Constitutional: NAD  Cardiovascular: +S1S2 RRR  Pulmonary: CTA b/l, unlabored  GI: soft NTND +BS  Extremities: no pedal edema, +distal pulses b/l  Neuro: non focal, BARKER x4    LABS:                        9.8    6.31  )-----------( 400      ( 07 Dec 2023 05:05 )             30.1     12-    139  |  99  |  12.2  ----------------------------<  108<H>  4.8   |  33.0<H>  |  0.42<L>    Ca    8.7      07 Dec 2023 05:05  Mg     1.9     12-    TPro  6.1<L>  /  Alb  3.1<L>  /  TBili  0.4  /  DBili  x   /  AST  29  /  ALT  43<H>  /  AlkPhos  233<H>  12-07    PTT - ( 06 Dec 2023 05:05 )  PTT:36.3 sec  Urinalysis Basic - ( 07 Dec 2023 05:05 )    Color: x / Appearance: x / SG: x / pH: x  Gluc: 108 mg/dL / Ketone: x  / Bili: x / Urobili: x   Blood: x / Protein: x / Nitrite: x   Leuk Esterase: x / RBC: x / WBC x   Sq Epi: x / Non Sq Epi: x / Bacteria: x        RADIOLOGY & ADDITIONAL TESTS:    < from: TTE Echo Complete w/ Contrast w/ Doppler (23 @ 19:19) >  Summary:   1. Left ventricular ejection fraction, by visual estimation, is 55 to   60%.   2. Normal global left ventricular systolic function.   3. Spectral Doppler shows impaired relaxation pattern of left   ventricular myocardial filling (Grade I diastolic dysfunction).   4. There is mild concentric left ventricular hypertrophy.   5. Normal left atrial size.   6. Normal right atrial size.   7. There is no evidence of pericardial effusion.   8. Mild thickening of the anterior and posterior mitral valve leaflets.   9. Trace mitral valve regurgitation.  10. Sclerotic aortic valve with normal opening.  11. Endocardial visualization was enhanced with intravenous echo contrast.        Assessment:    78 year old male, former smoker, with HTN, hyperlipidemia, CAD s/p PCI  and CABG x 3 (3/2020 with Dr. Hutchison), COPD, lung cancer (recurrent NSCLC; RUL; squamous; on daily radiation with Taxol/Carbo weekly), and paroxysmal atrial fibrillation s/p MDT ILR who had been off AC since 10/2023 due to hemoptysis. He presented to EP followup office visit with c/o cough, fever, and worsening shortness of breath over several days, noted to be in AFL with rapid ventricular rates on ILR. Sent to [facility] for further evaluation and was admitted with acute hypoxic respiratory failure and septic shock 2/2 pneumonia. Initially on pressors which have since been discontinued. Had converted to sinus rhythm/sinus tachycardia on  but converted back to atrial fibrillation and then flutter with difficult to control rates despite digoxin, diltiazem, and metoprolol. He started trial of antiarrhythmic medication with sotalol (avoiding amiodarone due to severe lung disease) and has since converted back to sinus rhythm.    Pt has had a total of 6 doses of Sotalol and QTc has remained stable (448ms this AM). Pt resting comfortably at time of assessment and is w/o complaints. Pt episode of confusion yesterday, results of CT head are pending. Currently denies chest pain, SOB, palpitations, dizziness      Plan:    1) Atrial flutter and fibrillation, CHADSVASc = 4 (age, HTN, CAD)   - Eliquis was previously discontinued 10/2023 due to hemoptysis (no recurrence since).    - Tolerated heparin gtt during admission and transitioned to Eliquis 5mg Q 12.  - Continue with Eliquis 5mg Q12HR  - Continue sotalol 120mg Q 12 hrs. Pt has received a total of 6 doses throughout admission. Qtc 448ms this AM  - No further post sotalol EKGs required.  - Continue diltiazem 90mg Q 6 hrs. Transition to 360mg extended release at time of discharge.   - Monitor lytes: Keep K > 4, Mg > 2   - c/w daily Mg supplementation    2) Transient confusion  - CT head results pending  - Pt A&O x 3 at time of assessment     3) Acute hypoxic respiratory failure 2/2 Pneumonia  - Supplemental O2 as needed   - Abx as per primary team       WIll discuss with Dr. Francisco, further recommendations to follow

## 2023-12-07 NOTE — PROGRESS NOTE ADULT - SUBJECTIVE AND OBJECTIVE BOX
Patient is a 78y old  Male who presents with a chief complaint of Pneumonia, septic shock (07 Dec 2023 11:43)      Patient seen and examined at bedside.  mild sundowning thsi am about if hes in his room. but awake alert x 3 now. still mild sob . wife states patient has been sleepjng with lozenges in his mouth and does same at home       ALLERGIES:  ibuprofen (Anaphylaxis)  codeine (Urticaria)    MEDICATIONS  (STANDING):  albuterol/ipratropium for Nebulization 3 milliLiter(s) Nebulizer every 6 hours  apixaban 5 milliGRAM(s) Oral every 12 hours  atorvastatin 40 milliGRAM(s) Oral at bedtime  budesonide  80 MICROgram(s)/formoterol 4.5 MICROgram(s) Inhaler 1 Puff(s) Inhalation daily  chlorhexidine 2% Cloths 1 Application(s) Topical daily  diltiazem    Tablet 90 milliGRAM(s) Oral every 6 hours  gabapentin 400 milliGRAM(s) Oral three times a day  magnesium oxide 400 milliGRAM(s) Oral three times a day with meals  methylPREDNISolone sodium succinate Injectable 40 milliGRAM(s) IV Push every 8 hours  midodrine 2.5 milliGRAM(s) Oral every 8 hours  piperacillin/tazobactam IVPB.. 3.375 Gram(s) IV Intermittent every 8 hours  sotalol. 120 milliGRAM(s) Oral every 12 hours  tamsulosin 0.4 milliGRAM(s) Oral at bedtime    MEDICATIONS  (PRN):  acetaminophen     Tablet .. 650 milliGRAM(s) Oral every 6 hours PRN Temp greater or equal to 38C (100.4F), Moderate Pain (4 - 6)  HYDROmorphone   Tablet 2 milliGRAM(s) Oral every 8 hours PRN Severe Pain (7 - 10)  LORazepam     Tablet 1 milliGRAM(s) Oral every 6 hours PRN Anxiety  traMADol 50 milliGRAM(s) Oral every 8 hours PRN Mild Pain (1 - 3)    Vital Signs Last 24 Hrs  T(F): 97.9 (07 Dec 2023 12:00), Max: 98.6 (06 Dec 2023 21:00)  HR: 75 (07 Dec 2023 12:00) (75 - 92)  BP: 118/72 (07 Dec 2023 12:00) (110/65 - 121/67)  RR: 18 (07 Dec 2023 12:00) (18 - 18)  SpO2: 95% (07 Dec 2023 12:00) (92% - 95%)  I&O's Summary    06 Dec 2023 07:01  -  07 Dec 2023 07:00  --------------------------------------------------------  IN: 0 mL / OUT: 175 mL / NET: -175 mL      PHYSICAL EXAM:  General: NAD, A/O x 3, frail ,   ENT: MMM, no thrush  Neck: Supple, No JVD  Lungs: occasional rhonchi , occasional wheezing noted , some accessory muscle use , able to speak in full sentences   Cardio: RRR, S1/S2, No murmur  Abdomen: Soft, Nontender, Nondistended; Bowel sounds present  Extremities: No calf tenderness, No pitting edema    LABS:                        9.8    6.31  )-----------( 400      ( 07 Dec 2023 05:05 )             30.1     12-07    139  |  99  |  12.2  ----------------------------<  108  4.8   |  33.0  |  0.42    Ca    8.7      07 Dec 2023 05:05  Mg     1.9     12-07    TPro  6.1  /  Alb  3.1  /  TBili  0.4  /  DBili  x   /  AST  29  /  ALT  43  /  AlkPhos  233  12-07          PTT - ( 06 Dec 2023 05:05 )  PTT:36.3 sec          Urinalysis Basic - ( 07 Dec 2023 05:05 )    Color: x / Appearance: x / SG: x / pH: x  Gluc: 108 mg/dL / Ketone: x  / Bili: x / Urobili: x   Blood: x / Protein: x / Nitrite: x   Leuk Esterase: x / RBC: x / WBC x   Sq Epi: x / Non Sq Epi: x / Bacteria: x        Culture - Urine (collected 30 Nov 2023 17:18)  Source: Clean Catch Clean Catch (Midstream)  Final Report (01 Dec 2023 20:33):    <10,000 CFU/mL Normal Urogenital Doris        RADIOLOGY & ADDITIONAL TESTS:    Care Discussed with Consultants/Other Providers:

## 2023-12-07 NOTE — PROGRESS NOTE ADULT - ASSESSMENT
79 y/o male with resolving septic shock due to PNA, Afib w/ RVR, SCC on chemo/XRT, hypomagnesemia, CAD s/p CABG/ PCI, COPD, Chronic back pain, a/w pna , afib with rvr     Septic Shock due to PNA/ present on admission   - shock  now resolved  - Hemodynamics stable off pressers  - s/p Midodrine, taper to stop   - Blood cx NGTD  - Continue Zosyn to complete x 7 - 10 course , can change to po abxs on dc   - wean down o2 as tolerated   - will start on iv steroids as mild wheezing   - pulm consulted   - speech eval , r/o aspiration     Afib w/ RVR  - remains in sinus rhythm   - Telemetry monitoring  - Continue Heparin drip, changed to po eliquis as per ep   - c/w Cardizem   - c/w sotalol   - hold bb , digoxin     CAD s/p CABG/PCI  - Continue Lipitor 40mg nightly     NSCLC  - s/p Chemo/ XRT as above  - f/u with Dr. Rinaldi/ Regina at [facility] post DC    COPD  - Duonebs PRN  - Continue Symbicort    Chronic back pain  - Tramadol 50mg q8h PRN  - Dilaudid 2mg q8h PRN  - Gabapentin 400mg TID  - S/P spinal stimulator     DVT ppx  - eliquis     dispo: remains acute . wean down o2 ,  will need home o2 eval , pt     plan of care discussed with patient, wife on phone    77 y/o male with resolving septic shock due to PNA, Afib w/ RVR, SCC on chemo/XRT, hypomagnesemia, CAD s/p CABG/ PCI, COPD, Chronic back pain, a/w pna , afib with rvr     Septic Shock due to PNA/ present on admission   - shock  now resolved  - Hemodynamics stable off pressers  - s/p Midodrine, taper to stop   - Blood cx NGTD  - Continue Zosyn to complete x 7 - 10 course , can change to po abxs on dc   - wean down o2 as tolerated   - will start on iv steroids as mild wheezing   - pulm consulted   - speech eval , r/o aspiration     Afib w/ RVR  - remains in sinus rhythm   - Telemetry monitoring  - Continue Heparin drip, changed to po eliquis as per ep   - c/w Cardizem   - c/w sotalol   - hold bb , digoxin     CAD s/p CABG/PCI  - Continue Lipitor 40mg nightly     NSCLC  - s/p Chemo/ XRT as above  - f/u with Dr. Rinaldi/ Regina at [facility] post DC    COPD  - Duonebs PRN  - Continue Symbicort    Chronic back pain  - Tramadol 50mg q8h PRN  - Dilaudid 2mg q8h PRN  - Gabapentin 400mg TID  - S/P spinal stimulator     DVT ppx  - eliquis     dispo: remains acute . wean down o2 ,  will need home o2 eval , pt     plan of care discussed with patient, wife on phone

## 2023-12-07 NOTE — DISCHARGE NOTE NURSING/CASE MANAGEMENT/SOCIAL WORK - PATIENT PORTAL LINK FT
You can access the FollowMyHealth Patient Portal offered by [facility] by registering at the following website: [email or web address]. By joining [facility]’s FollowMyHealth portal, you will also be able to view your health information using other applications (apps) compatible with our system. You can access the FollowMyHealth Patient Portal offered by [facility] by registering at the following website: [email or web address]. By joining [facility]’s FollowMyHealth portal, you will also be able to view your health information using other applications (apps) compatible with our system.

## 2023-12-07 NOTE — DISCHARGE NOTE NURSING/CASE MANAGEMENT/SOCIAL WORK - NSDCPEFALRISK_GEN_ALL_CORE
For information on Fall & Injury Prevention, visit: [email or web address] OR  [email or web address] OR  https://www.cdc.gov/steadi/patient.html For information on Fall & Injury Prevention, visit: [email or web address] OR  [email or web address] OR  https://www.cdc.gov/steadi/patient.html

## 2023-12-08 NOTE — SWALLOW BEDSIDE ASSESSMENT ADULT - COMMENTS
79 y/o male with resolving septic shock due to PNA, Afib w/ RVR, SCC on chemo/XRT, hypomagnesemia, CAD s/p CABG/ PCI, COPD, Chronic back pain, a/w pna , afib with rvr   CT chest results from 12/7:  "pneumonia, most severe involving right middle lobe & right lower lobe"

## 2023-12-08 NOTE — SWALLOW BEDSIDE ASSESSMENT ADULT - SLP PERTINENT HISTORY OF CURRENT PROBLEM
Pt known to this dept with h/o dysphagia from 2021 admission. MBS completed at that time with Rx for regular solids with chin down posture & thin fluids in head neutral. Pt reports non-compliance with Rx's at that time.

## 2023-12-08 NOTE — PROGRESS NOTE ADULT - ASSESSMENT
-Emphysema  -COPD  -Pneumonia; GNR; on abx  -Sq cell lung cancer; on RT and chemo  -Hypoxic resp failure  -AFib    RECC:  Complete abx. Titrate O2. Nebs. Symbicort. Steroid taper; change to PO in am. Will need outpt f/u ct chest. Cardio f/u. On anticoagulation.     D/W pt, spouse.

## 2023-12-08 NOTE — PROGRESS NOTE ADULT - SUBJECTIVE AND OBJECTIVE BOX
Subjective: Pt resting comfortably in hospital bed at time of assessment. Currently reports mild SOB and nonproductive cough. No other complaints. Telemetry review reveals sinus rhythm with intact conduction, no evidence of atrial arrhythmias.      MEDICATIONS  (STANDING):  albuterol/ipratropium for Nebulization 3 milliLiter(s) Nebulizer every 6 hours  apixaban 5 milliGRAM(s) Oral every 12 hours  atorvastatin 40 milliGRAM(s) Oral at bedtime  budesonide  80 MICROgram(s)/formoterol 4.5 MICROgram(s) Inhaler 1 Puff(s) Inhalation daily  chlorhexidine 2% Cloths 1 Application(s) Topical daily  diltiazem    Tablet 90 milliGRAM(s) Oral every 6 hours  gabapentin 400 milliGRAM(s) Oral three times a day  magnesium oxide 400 milliGRAM(s) Oral three times a day with meals  methylPREDNISolone sodium succinate Injectable 40 milliGRAM(s) IV Push every 8 hours  sotalol. 120 milliGRAM(s) Oral every 12 hours  tamsulosin 0.4 milliGRAM(s) Oral at bedtime    MEDICATIONS  (PRN):  acetaminophen     Tablet .. 650 milliGRAM(s) Oral every 6 hours PRN Temp greater or equal to 38C (100.4F), Moderate Pain (4 - 6)  benzonatate 100 milliGRAM(s) Oral every 8 hours PRN Cough  HYDROmorphone   Tablet 2 milliGRAM(s) Oral every 8 hours PRN Severe Pain (7 - 10)  traMADol 50 milliGRAM(s) Oral every 8 hours PRN Mild Pain (1 - 3)      Allergies    ibuprofen (Anaphylaxis)  codeine (Urticaria)    Intolerances        Vital Signs Last 24 Hrs  T(C): 36.6 (08 Dec 2023 08:48), Max: 36.9 (07 Dec 2023 20:28)  T(F): 97.8 (08 Dec 2023 08:48), Max: 98.4 (07 Dec 2023 20:28)  HR: 89 (08 Dec 2023 08:48) (71 - 92)  BP: 103/60 (08 Dec 2023 08:48) (103/60 - 122/69)  BP(mean): --  RR: 18 (08 Dec 2023 08:48) (18 - 18)  SpO2: 94% (08 Dec 2023 08:48) (91% - 95%)    Parameters below as of 08 Dec 2023 04:39  Patient On (Oxygen Delivery Method): nasal cannula        Physical Exam:  Constitutional: NAD  Cardiovascular: +S1S2 RRR  Pulmonary: wheeze b/l  GI: soft NTND +BS  Extremities: no pedal edema, +distal pulses b/l  Neuro: non focal, BARKER x4      LABS:                        10.6   4.80  )-----------( 481      ( 08 Dec 2023 05:00 )             32.0     12-08    136  |  97  |  16.5  ----------------------------<  138<H>  5.0   |  32.0<H>  |  0.42<L>    Ca    9.2      08 Dec 2023 05:00  Mg     1.9     12-08    TPro  6.3<L>  /  Alb  3.2<L>  /  TBili  0.3<L>  /  DBili  x   /  AST  34  /  ALT  46<H>  /  AlkPhos  217<H>  12-08      Urinalysis Basic - ( 08 Dec 2023 05:00 )    Color: x / Appearance: x / SG: x / pH: x  Gluc: 138 mg/dL / Ketone: x  / Bili: x / Urobili: x   Blood: x / Protein: x / Nitrite: x   Leuk Esterase: x / RBC: x / WBC x   Sq Epi: x / Non Sq Epi: x / Bacteria: x        RADIOLOGY & ADDITIONAL TESTS:    < from: TTE Echo Complete w/ Contrast w/ Doppler (11.30.23 @ 19:19) >  Summary:   1. Left ventricular ejection fraction, by visual estimation, is 55 to   60%.   2. Normal global left ventricular systolic function.   3. Spectral Doppler shows impaired relaxation pattern of left   ventricular myocardial filling (Grade I diastolic dysfunction).   4. There is mild concentric left ventricular hypertrophy.   5. Normal left atrial size.   6. Normal right atrial size.   7. There is no evidence of pericardial effusion.   8. Mild thickening of the anterior and posterior mitral valve leaflets.   9. Trace mitral valve regurgitation.  10. Sclerotic aortic valve with normal opening.  11. Endocardial visualization was enhanced with intravenous echo contrast.        Assessment:      78 year old male, former smoker, with HTN, hyperlipidemia, CAD s/p PCI 2002 and CABG x 3 (3/2020 with Dr. Hutchison), COPD, lung cancer (recurrent NSCLC; RUL; squamous; on daily radiation with Taxol/Carbo weekly), and paroxysmal atrial fibrillation s/p MDT ILR who had been off AC since 10/2023 due to hemoptysis. He presented to EP followup office visit with c/o cough, fever, and worsening shortness of breath over several days, noted to be in AFL with rapid ventricular rates on ILR. Sent to [facility] for further evaluation and was admitted with acute hypoxic respiratory failure and septic shock 2/2 pneumonia. Initially on pressors which have since been discontinued. Had converted to sinus rhythm/sinus tachycardia on 12/1 but converted back to atrial fibrillation and then flutter with difficult to control rates despite digoxin, diltiazem, and metoprolol. He started trial of antiarrhythmic medication with sotalol (avoiding amiodarone due to severe lung disease) and has since converted back to sinus rhythm.         Subjective: Pt resting comfortably in hospital bed at time of assessment. Currently reports mild SOB and nonproductive cough. No other complaints. Telemetry review reveals sinus rhythm with intact conduction, no evidence of atrial arrhythmias.      MEDICATIONS  (STANDING):  albuterol/ipratropium for Nebulization 3 milliLiter(s) Nebulizer every 6 hours  apixaban 5 milliGRAM(s) Oral every 12 hours  atorvastatin 40 milliGRAM(s) Oral at bedtime  budesonide  80 MICROgram(s)/formoterol 4.5 MICROgram(s) Inhaler 1 Puff(s) Inhalation daily  chlorhexidine 2% Cloths 1 Application(s) Topical daily  diltiazem    Tablet 90 milliGRAM(s) Oral every 6 hours  gabapentin 400 milliGRAM(s) Oral three times a day  magnesium oxide 400 milliGRAM(s) Oral three times a day with meals  methylPREDNISolone sodium succinate Injectable 40 milliGRAM(s) IV Push every 8 hours  sotalol. 120 milliGRAM(s) Oral every 12 hours  tamsulosin 0.4 milliGRAM(s) Oral at bedtime    MEDICATIONS  (PRN):  acetaminophen     Tablet .. 650 milliGRAM(s) Oral every 6 hours PRN Temp greater or equal to 38C (100.4F), Moderate Pain (4 - 6)  benzonatate 100 milliGRAM(s) Oral every 8 hours PRN Cough  HYDROmorphone   Tablet 2 milliGRAM(s) Oral every 8 hours PRN Severe Pain (7 - 10)  traMADol 50 milliGRAM(s) Oral every 8 hours PRN Mild Pain (1 - 3)      Allergies    ibuprofen (Anaphylaxis)  codeine (Urticaria)    Intolerances        Vital Signs Last 24 Hrs  T(C): 36.6 (08 Dec 2023 08:48), Max: 36.9 (07 Dec 2023 20:28)  T(F): 97.8 (08 Dec 2023 08:48), Max: 98.4 (07 Dec 2023 20:28)  HR: 89 (08 Dec 2023 08:48) (71 - 92)  BP: 103/60 (08 Dec 2023 08:48) (103/60 - 122/69)  BP(mean): --  RR: 18 (08 Dec 2023 08:48) (18 - 18)  SpO2: 94% (08 Dec 2023 08:48) (91% - 95%)    Parameters below as of 08 Dec 2023 04:39  Patient On (Oxygen Delivery Method): nasal cannula        Physical Exam:  Constitutional: NAD  Cardiovascular: +S1S2 RRR  Pulmonary: wheeze b/l  GI: soft NTND +BS  Extremities: no pedal edema, +distal pulses b/l  Neuro: non focal, BARKER x4      LABS:                        10.6   4.80  )-----------( 481      ( 08 Dec 2023 05:00 )             32.0     12-08    136  |  97  |  16.5  ----------------------------<  138<H>  5.0   |  32.0<H>  |  0.42<L>    Ca    9.2      08 Dec 2023 05:00  Mg     1.9     12-08    TPro  6.3<L>  /  Alb  3.2<L>  /  TBili  0.3<L>  /  DBili  x   /  AST  34  /  ALT  46<H>  /  AlkPhos  217<H>  12-08      Urinalysis Basic - ( 08 Dec 2023 05:00 )    Color: x / Appearance: x / SG: x / pH: x  Gluc: 138 mg/dL / Ketone: x  / Bili: x / Urobili: x   Blood: x / Protein: x / Nitrite: x   Leuk Esterase: x / RBC: x / WBC x   Sq Epi: x / Non Sq Epi: x / Bacteria: x        RADIOLOGY & ADDITIONAL TESTS:    < from: TTE Echo Complete w/ Contrast w/ Doppler (11.30.23 @ 19:19) >  Summary:   1. Left ventricular ejection fraction, by visual estimation, is 55 to   60%.   2. Normal global left ventricular systolic function.   3. Spectral Doppler shows impaired relaxation pattern of left   ventricular myocardial filling (Grade I diastolic dysfunction).   4. There is mild concentric left ventricular hypertrophy.   5. Normal left atrial size.   6. Normal right atrial size.   7. There is no evidence of pericardial effusion.   8. Mild thickening of the anterior and posterior mitral valve leaflets.   9. Trace mitral valve regurgitation.  10. Sclerotic aortic valve with normal opening.  11. Endocardial visualization was enhanced with intravenous echo contrast.        Assessment:      78 year old male, former smoker, with HTN, hyperlipidemia, CAD s/p PCI 2002 and CABG x 3 (3/2020 with Dr. Hutchison), COPD, lung cancer (recurrent NSCLC; RUL; squamous; on daily radiation with Taxol/Carbo weekly), and paroxysmal atrial fibrillation s/p MDT ILR who had been off AC since 10/2023 due to hemoptysis. He presented to EP followup office visit with c/o cough, fever, and worsening shortness of breath over several days, noted to be in AFL with rapid ventricular rates on ILR. Sent to [facility] for further evaluation and was admitted with acute hypoxic respiratory failure and septic shock 2/2 pneumonia. Initially on pressors which have since been discontinued. Had converted to sinus rhythm/sinus tachycardia on 12/1 but converted back to atrial fibrillation and then flutter with difficult to control rates despite digoxin, diltiazem, and metoprolol. He started trial of antiarrhythmic medication with sotalol (avoiding amiodarone due to severe lung disease) and has since converted back to sinus rhythm.         Subjective: Pt resting comfortably in hospital bed at time of assessment. Currently reports mild SOB and nonproductive cough. No other complaints. Telemetry review reveals sinus rhythm with intact conduction, no evidence of atrial arrhythmias.      MEDICATIONS  (STANDING):  albuterol/ipratropium for Nebulization 3 milliLiter(s) Nebulizer every 6 hours  apixaban 5 milliGRAM(s) Oral every 12 hours  atorvastatin 40 milliGRAM(s) Oral at bedtime  budesonide  80 MICROgram(s)/formoterol 4.5 MICROgram(s) Inhaler 1 Puff(s) Inhalation daily  chlorhexidine 2% Cloths 1 Application(s) Topical daily  diltiazem    Tablet 90 milliGRAM(s) Oral every 6 hours  gabapentin 400 milliGRAM(s) Oral three times a day  magnesium oxide 400 milliGRAM(s) Oral three times a day with meals  methylPREDNISolone sodium succinate Injectable 40 milliGRAM(s) IV Push every 8 hours  sotalol. 120 milliGRAM(s) Oral every 12 hours  tamsulosin 0.4 milliGRAM(s) Oral at bedtime    MEDICATIONS  (PRN):  acetaminophen     Tablet .. 650 milliGRAM(s) Oral every 6 hours PRN Temp greater or equal to 38C (100.4F), Moderate Pain (4 - 6)  benzonatate 100 milliGRAM(s) Oral every 8 hours PRN Cough  HYDROmorphone   Tablet 2 milliGRAM(s) Oral every 8 hours PRN Severe Pain (7 - 10)  traMADol 50 milliGRAM(s) Oral every 8 hours PRN Mild Pain (1 - 3)      Allergies    ibuprofen (Anaphylaxis)  codeine (Urticaria)    Intolerances        Vital Signs Last 24 Hrs  T(C): 36.6 (08 Dec 2023 08:48), Max: 36.9 (07 Dec 2023 20:28)  T(F): 97.8 (08 Dec 2023 08:48), Max: 98.4 (07 Dec 2023 20:28)  HR: 89 (08 Dec 2023 08:48) (71 - 92)  BP: 103/60 (08 Dec 2023 08:48) (103/60 - 122/69)  BP(mean): --  RR: 18 (08 Dec 2023 08:48) (18 - 18)  SpO2: 94% (08 Dec 2023 08:48) (91% - 95%)    Parameters below as of 08 Dec 2023 04:39  Patient On (Oxygen Delivery Method): nasal cannula        Physical Exam:  Constitutional: NAD  Cardiovascular: +S1S2 RRR  Pulmonary: wheeze b/l  GI: soft NTND +BS  Extremities: no pedal edema, +distal pulses b/l  Neuro: non focal, BARKER x4      LABS:                        10.6   4.80  )-----------( 481      ( 08 Dec 2023 05:00 )             32.0     12-08    136  |  97  |  16.5  ----------------------------<  138<H>  5.0   |  32.0<H>  |  0.42<L>    Ca    9.2      08 Dec 2023 05:00  Mg     1.9     12-08    TPro  6.3<L>  /  Alb  3.2<L>  /  TBili  0.3<L>  /  DBili  x   /  AST  34  /  ALT  46<H>  /  AlkPhos  217<H>  12-08      Urinalysis Basic - ( 08 Dec 2023 05:00 )    Color: x / Appearance: x / SG: x / pH: x  Gluc: 138 mg/dL / Ketone: x  / Bili: x / Urobili: x   Blood: x / Protein: x / Nitrite: x   Leuk Esterase: x / RBC: x / WBC x   Sq Epi: x / Non Sq Epi: x / Bacteria: x        RADIOLOGY & ADDITIONAL TESTS:    < from: TTE Echo Complete w/ Contrast w/ Doppler (11.30.23 @ 19:19) >  Summary:   1. Left ventricular ejection fraction, by visual estimation, is 55 to   60%.   2. Normal global left ventricular systolic function.   3. Spectral Doppler shows impaired relaxation pattern of left   ventricular myocardial filling (Grade I diastolic dysfunction).   4. There is mild concentric left ventricular hypertrophy.   5. Normal left atrial size.   6. Normal right atrial size.   7. There is no evidence of pericardial effusion.   8. Mild thickening of the anterior and posterior mitral valve leaflets.   9. Trace mitral valve regurgitation.  10. Sclerotic aortic valve with normal opening.  11. Endocardial visualization was enhanced with intravenous echo contrast.        Assessment:    78 year old male, former smoker, with HTN, hyperlipidemia, CAD s/p PCI 2002 and CABG x 3 (3/2020 with Dr. Hutchison), COPD, lung cancer (recurrent NSCLC; RUL; squamous; on daily radiation with Taxol/Carbo weekly), and paroxysmal atrial fibrillation s/p MDT ILR who had been off AC since 10/2023 due to hemoptysis. He presented to EP followup office visit with c/o cough, fever, and worsening shortness of breath over several days, noted to be in AFL with rapid ventricular rates on ILR. Sent to [facility] for further evaluation and was admitted with acute hypoxic respiratory failure and septic shock 2/2 pneumonia. Initially on pressors which have since been discontinued. Had converted to sinus rhythm/sinus tachycardia on 12/1 but converted back to atrial fibrillation and then flutter with difficult to control rates despite digoxin, diltiazem, and metoprolol. He started trial of antiarrhythmic medication with sotalol (avoiding amiodarone due to severe lung disease) and has since converted back to sinus rhythm.    Pt has had a total of 8 doses of Sotalol and QTc has remained stable. Currently reports mild SOB and nonproductive cough. No other complaints. Telemetry review reveals sinus rhythm with intact conduction, no evidence of atrial arrhythmias.      Plan:    1) Atrial flutter and fibrillation, CHADSVASc = 4 (age, HTN, CAD)   - Eliquis was previously discontinued 10/2023 due to hemoptysis (no recurrence since).    - Tolerated heparin gtt during admission and transitioned to Eliquis 5mg Q 12.  - Continue with Eliquis 5mg Q12HR  - Continue sotalol 120mg Q 12 hrs. Pt has received a total of 8 doses throughout admission.  - No further post sotalol EKGs required.  - Change Diltiazem to 240mg CD QD.  - Monitor lytes: Keep K > 4, Mg > 2   - c/w daily Mg supplementation    2) Transient confusion  - Improved  - CT head with no emergent findings  - Pt A&O x 3 at time of assessment     3) Acute hypoxic respiratory failure 2/2 Pneumonia  - Supplemental O2 as needed   - Abx as per primary team       Discussed with Dr. Mayorga       Subjective: Pt resting comfortably in hospital bed at time of assessment. Currently reports mild SOB and nonproductive cough. No other complaints. Telemetry review reveals sinus rhythm with intact conduction, no evidence of atrial arrhythmias.      MEDICATIONS  (STANDING):  albuterol/ipratropium for Nebulization 3 milliLiter(s) Nebulizer every 6 hours  apixaban 5 milliGRAM(s) Oral every 12 hours  atorvastatin 40 milliGRAM(s) Oral at bedtime  budesonide  80 MICROgram(s)/formoterol 4.5 MICROgram(s) Inhaler 1 Puff(s) Inhalation daily  chlorhexidine 2% Cloths 1 Application(s) Topical daily  diltiazem    Tablet 90 milliGRAM(s) Oral every 6 hours  gabapentin 400 milliGRAM(s) Oral three times a day  magnesium oxide 400 milliGRAM(s) Oral three times a day with meals  methylPREDNISolone sodium succinate Injectable 40 milliGRAM(s) IV Push every 8 hours  sotalol. 120 milliGRAM(s) Oral every 12 hours  tamsulosin 0.4 milliGRAM(s) Oral at bedtime    MEDICATIONS  (PRN):  acetaminophen     Tablet .. 650 milliGRAM(s) Oral every 6 hours PRN Temp greater or equal to 38C (100.4F), Moderate Pain (4 - 6)  benzonatate 100 milliGRAM(s) Oral every 8 hours PRN Cough  HYDROmorphone   Tablet 2 milliGRAM(s) Oral every 8 hours PRN Severe Pain (7 - 10)  traMADol 50 milliGRAM(s) Oral every 8 hours PRN Mild Pain (1 - 3)      Allergies    ibuprofen (Anaphylaxis)  codeine (Urticaria)    Intolerances        Vital Signs Last 24 Hrs  T(C): 36.6 (08 Dec 2023 08:48), Max: 36.9 (07 Dec 2023 20:28)  T(F): 97.8 (08 Dec 2023 08:48), Max: 98.4 (07 Dec 2023 20:28)  HR: 89 (08 Dec 2023 08:48) (71 - 92)  BP: 103/60 (08 Dec 2023 08:48) (103/60 - 122/69)  BP(mean): --  RR: 18 (08 Dec 2023 08:48) (18 - 18)  SpO2: 94% (08 Dec 2023 08:48) (91% - 95%)    Parameters below as of 08 Dec 2023 04:39  Patient On (Oxygen Delivery Method): nasal cannula        Physical Exam:  Constitutional: NAD  Cardiovascular: +S1S2 RRR  Pulmonary: wheeze b/l  GI: soft NTND +BS  Extremities: no pedal edema, +distal pulses b/l  Neuro: non focal, BARKER x4      LABS:                        10.6   4.80  )-----------( 481      ( 08 Dec 2023 05:00 )             32.0     12-08    136  |  97  |  16.5  ----------------------------<  138<H>  5.0   |  32.0<H>  |  0.42<L>    Ca    9.2      08 Dec 2023 05:00  Mg     1.9     12-08    TPro  6.3<L>  /  Alb  3.2<L>  /  TBili  0.3<L>  /  DBili  x   /  AST  34  /  ALT  46<H>  /  AlkPhos  217<H>  12-08      Urinalysis Basic - ( 08 Dec 2023 05:00 )    Color: x / Appearance: x / SG: x / pH: x  Gluc: 138 mg/dL / Ketone: x  / Bili: x / Urobili: x   Blood: x / Protein: x / Nitrite: x   Leuk Esterase: x / RBC: x / WBC x   Sq Epi: x / Non Sq Epi: x / Bacteria: x        RADIOLOGY & ADDITIONAL TESTS:    < from: TTE Echo Complete w/ Contrast w/ Doppler (11.30.23 @ 19:19) >  Summary:   1. Left ventricular ejection fraction, by visual estimation, is 55 to   60%.   2. Normal global left ventricular systolic function.   3. Spectral Doppler shows impaired relaxation pattern of left   ventricular myocardial filling (Grade I diastolic dysfunction).   4. There is mild concentric left ventricular hypertrophy.   5. Normal left atrial size.   6. Normal right atrial size.   7. There is no evidence of pericardial effusion.   8. Mild thickening of the anterior and posterior mitral valve leaflets.   9. Trace mitral valve regurgitation.  10. Sclerotic aortic valve with normal opening.  11. Endocardial visualization was enhanced with intravenous echo contrast.        Assessment:    78 year old male, former smoker, with HTN, hyperlipidemia, CAD s/p PCI 2002 and CABG x 3 (3/2020 with Dr. Hutchison), COPD, lung cancer (recurrent NSCLC; RUL; squamous; on daily radiation with Taxol/Carbo weekly), and paroxysmal atrial fibrillation s/p MDT ILR who had been off AC since 10/2023 due to hemoptysis. He presented to EP followup office visit with c/o cough, fever, and worsening shortness of breath over several days, noted to be in AFL with rapid ventricular rates on ILR. Sent to [facility] for further evaluation and was admitted with acute hypoxic respiratory failure and septic shock 2/2 pneumonia. Initially on pressors which have since been discontinued. Had converted to sinus rhythm/sinus tachycardia on 12/1 but converted back to atrial fibrillation and then flutter with difficult to control rates despite digoxin, diltiazem, and metoprolol. He started trial of antiarrhythmic medication with sotalol (avoiding amiodarone due to severe lung disease) and has since converted back to sinus rhythm.    Pt has had a total of 8 doses of Sotalol and QTc has remained stable. Currently reports mild SOB and nonproductive cough. No other complaints. Telemetry review reveals sinus rhythm with intact conduction, no evidence of atrial arrhythmias.      Plan:    1) Atrial flutter and fibrillation, CHADSVASc = 4 (age, HTN, CAD)   - Eliquis was previously discontinued 10/2023 due to hemoptysis (no recurrence since).    - Tolerated heparin gtt during admission and transitioned to Eliquis 5mg Q 12.  - Continue with Eliquis 5mg Q12HR  - Continue sotalol 120mg Q 12 hrs. Pt has received a total of 8 doses throughout admission.  - No further post sotalol EKGs required.  - Change Diltiazem to 240mg CD QD.  - Monitor lytes: Keep K > 4, Mg > 2   - c/w daily Mg supplementation    2) Transient confusion  - Improved  - CT head with no emergent findings  - Pt A&O x 3 at time of assessment     3) Acute hypoxic respiratory failure 2/2 Pneumonia  - Supplemental O2 as needed   - Abx as per primary team       Discussed with Dr. Mayorga

## 2023-12-08 NOTE — PROGRESS NOTE ADULT - SUBJECTIVE AND OBJECTIVE BOX
Patient is a 78y old  Male who presents with a chief complaint of Pneumonia, septic shock (08 Dec 2023 15:25)      Patient seen and examined at bedside. No overnight events reported. feeling little better with breathing after iv steroids started. had better last night sleep.     ALLERGIES:  ibuprofen (Anaphylaxis)  codeine (Urticaria)    MEDICATIONS  (STANDING):  albuterol/ipratropium for Nebulization 3 milliLiter(s) Nebulizer every 6 hours  apixaban 5 milliGRAM(s) Oral every 12 hours  atorvastatin 40 milliGRAM(s) Oral at bedtime  budesonide  80 MICROgram(s)/formoterol 4.5 MICROgram(s) Inhaler 1 Puff(s) Inhalation daily  chlorhexidine 2% Cloths 1 Application(s) Topical daily  diltiazem    milliGRAM(s) Oral daily  gabapentin 400 milliGRAM(s) Oral three times a day  magnesium oxide 400 milliGRAM(s) Oral three times a day with meals  methylPREDNISolone sodium succinate Injectable 40 milliGRAM(s) IV Push every 8 hours  sotalol. 120 milliGRAM(s) Oral every 12 hours  tamsulosin 0.4 milliGRAM(s) Oral at bedtime    MEDICATIONS  (PRN):  acetaminophen     Tablet .. 650 milliGRAM(s) Oral every 6 hours PRN Temp greater or equal to 38C (100.4F), Moderate Pain (4 - 6)  benzonatate 100 milliGRAM(s) Oral every 8 hours PRN Cough  traMADol 50 milliGRAM(s) Oral every 8 hours PRN Mild Pain (1 - 3)    Vital Signs Last 24 Hrs  T(F): 97.8 (08 Dec 2023 14:00), Max: 98.4 (07 Dec 2023 20:28)  HR: 76 (08 Dec 2023 16:22) (71 - 89)  BP: 110/64 (08 Dec 2023 16:22) (103/60 - 122/69)  RR: 18 (08 Dec 2023 16:22) (18 - 18)  SpO2: 93% (08 Dec 2023 16:22) (91% - 94%)  I&O's Summary    PHYSICAL EXAM:  General: NAD, A/O x 3  ENT: MMM, no thrush  Neck: Supple, No JVD  Lungs: occasional wheezing , rhonchi , fair airflow , less accessory muscle use today   Cardio: RRR, S1/S2, No murmur  Abdomen: Soft, Nontender, Nondistended; Bowel sounds present  Extremities: No calf tenderness, No pitting edema    LABS:                        10.6   4.80  )-----------( 481      ( 08 Dec 2023 05:00 )             32.0     12-08    136  |  97  |  16.5  ----------------------------<  138  5.0   |  32.0  |  0.42    Ca    9.2      08 Dec 2023 05:00  Mg     1.9     12-08    TPro  6.3  /  Alb  3.2  /  TBili  0.3  /  DBili  x   /  AST  34  /  ALT  46  /  AlkPhos  217  12-08          PTT - ( 06 Dec 2023 05:05 )  PTT:36.3 sec        Urinalysis Basic - ( 08 Dec 2023 05:00 )    Color: x / Appearance: x / SG: x / pH: x  Gluc: 138 mg/dL / Ketone: x  / Bili: x / Urobili: x   Blood: x / Protein: x / Nitrite: x   Leuk Esterase: x / RBC: x / WBC x   Sq Epi: x / Non Sq Epi: x / Bacteria: x          RADIOLOGY & ADDITIONAL TESTS:    Care Discussed with Consultants/Other Providers:

## 2023-12-08 NOTE — PROGRESS NOTE ADULT - NS ATTEND AMEND GEN_ALL_CORE FT
[FreeTextEntry1] : 86 y.o female with PMHx significant for HTN, HLD, DM2, diabetic neuropathy, PAD s/p Right LE Stent (?), memory loss, dementia - early Alzheimer's, presenting for follow up of blood pressure and diabetes..\par \par RENAL: CKD Stage 5\par -Stable, last Cr 2.45, will check Renal panel today.\par -Renal US with [facility]\par -Nephrology Dr Marco A zheng, Appt in June.\par -Continue Furosemide.\par -Avoid Nephrotoxins\par \par ENDOCRINE/NEUROLOGY: h/o T2DM, diabetic neuropathy, memory difficulties, advancing early Alzheimer's dementia\par -Last HgA1c 7.0-->9.0 --> 11.1\par -Currently on Glipizide, Januvia, Jardiance 10 mg (renally dosed)\par -Will start Lantus 8 units at bedtime and will titrate as needed.\par -Recommend fasting BS <130.\par -Moderate exercise recommended\par -Discontinue Gabapentin to avoid oversedation..\par -Rx refilled for Metanx e-prescribed for neuropathy\par -Diabetic eye exam July 2019, referral given not done yet.\par -Neurology referral given\par -6 Item cognitive assessment score 23 represents significant Cognitive impairment.\par \par CVS: h/o HTN, HLD, PAD s/p stent to RLE for severe right leg claudication to right tibio-Peroneal and balloon Angioplasty to Right posterior tibial and right anterior tibial.\par -Cardiology - [facility], Dr D'Agate.\par -Stent placed by Dr Anurag Mcgee.\par -Blood pressure at goal\par -Continue Carvedilol, Clonidine 0.2 mg tid, Hydralazine 100 mg TID, Simvastatin, Clopidogrel (Rx refilled).\par -Moderate exercise recommended.\par -NST 4/30/19 negative for Ischemia\par -Carotid duplex Mild-Mod 16-49% stenosis of Right bulb and pRICA. Mild to Mod 16-49% stenosis of the left Bulb and pLICA\par -2D Echo (5/20/19) EF 65-70%, normal LVSF, mild to mod AV stenosis.\par \par GI PPx- Continue Famotidine\par \par HEME: Anemia of chronic disease\par -Stable, currently s/p  Aranesp injections\par -Hematology note appreciated, Dr MILDRED Myers  following.\par \par NEURO: Memory difficulties. \par -Continue Namenda 5 mg daily.\par -Recommend HHA to help with ADLs as well as medication management.\par \par HCM:\par -Ophthalmology referral given, no appointment set up yet.\par -Received Prevnar 13 in office Nov 2019\par -PFIZER Covid-19 vaccine X  2 DOSES 3/14/21, 4/14/21.\par -Flu vaccine administered in house 10/20\par -RTO in 6 weeks for diabetes check.
pt converted to sinus rhythm.   will avoid amiodarone given significant pulmonary disease unless absolutely needed.   would trial anticoagulation while inpatient if ok with pulmonary.   if recurrent hemoptysis will have to avoid.   consider LAAO as outpatient.
Patient back in AF this AM. Rates are well controlled. Continue cardizem. On IV heparin to assess tolerance.
pt reports some symptomatic improvement.  pt remains in sinus rhythm on sotalol.   QTc has been stable.   will continue sotalol and diltiazem as above (change to long-acting 240mg qd).   continue Eliquis, which has also been tolerated.     further management per pulm and primary team.
pt converted back to sinus rhythm overnight, after receiving sotalol.   feeling improved.   will continue sotalol and diltiazem. remain off metoprolol/digoxin.   Check ECGs 2 hours after sotalol- if QTc >500ms will decrease dose or discontinue.   tolerating heparin- transition to Eliquis. will consider lowering dose to 2.5mg bid in future.   transition to long-acting diltiazem if remains in sinus.
pt with multiple comorbid conditions as above  afl/afib s/p chemical cardivoersion remains in SR  tolerating sotalol 120mg bid   ecg shows RBBB, QTs 430ms  monitor ecg 2h post sotalol x6 doses  mg supplement daily also on discharge   intermittent confusion follow with primary team, may howie repeat head CT; likely symptoms due to fatigue from lack of sleep for days now  ideally he remains stable and gets discharged tomorrow  cont oac

## 2023-12-08 NOTE — SWALLOW BEDSIDE ASSESSMENT ADULT - SWALLOW EVAL: DIAGNOSIS
Oral & pharyngeal stage of swallow clinically unremarkable with NO overt s/ aspiration noted post intake. Of note, if there are concerns with silent aspiration, this cannot be r/o bedside & would suggest MBS

## 2023-12-08 NOTE — PROGRESS NOTE ADULT - NS ATTEND OPT1 GEN_ALL_CORE
I independently performed the documented:
I attest my time as attending is greater than 50% of the total combined time spent on qualifying patient care activities by the PA/NP and attending.
I independently performed the documented:
I independently performed the documented:
I attest my time as attending is greater than 50% of the total combined time spent on qualifying patient care activities by the PA/NP and attending.

## 2023-12-08 NOTE — PROGRESS NOTE ADULT - SUBJECTIVE AND OBJECTIVE BOX
PULMONARY PROGRESS NOTE      [id number]    Patient is a 78y old  Male who presents with a chief complaint of Pneumonia, septic shock (08 Dec 2023 09:52)      INTERVAL HPI/OVERNIGHT EVENTS:  hx of COPD, CAD s/p CABG/YAYA, HTN, hx of RUL nodule treated empirically with SBRT 11/2021 due to concern he would not tolerate biopsy, Afib previously on Eliquis, s/p spinal cord stimulator, prior admission 10/10-10/16 for hemoptysis treated with TXA/Eliquis discontinued and was found to have endobronchial lesion in the R mainstem s/p bronchoscopy w/ pathology favoring squamous cell carcinoma. Pt was recently treated with taxol/carbo 11/27 and has received 2 sessions of radiation. Pt reports overall the last few days he has had generalized malaise with dyspnea and cough with episodes of hypoxia at home. Pt denies any recurrent episodes of hemoptysis since last time in October. Endorses palpitations but denies chest pain. Denies LE pain/swelling. Endorses some wheezing. Pt was also noted to have tachycardia on loop recorder and went for follow-up and was referred to the ED as noted in rapid atrial flutter. Pt was found to be in rapid atrial flutter requiring multiple medications. Pt also noted to be hypotensive despite fluid resuscitation and initiated on Chaitanya. CXR with R mid and lower lung opacities concerning for PNA.          Feeling better but still wiht some cough. Congestion. On NCO2.       MEDICATIONS  (STANDING):  albuterol/ipratropium for Nebulization 3 milliLiter(s) Nebulizer every 6 hours  apixaban 5 milliGRAM(s) Oral every 12 hours  atorvastatin 40 milliGRAM(s) Oral at bedtime  budesonide  80 MICROgram(s)/formoterol 4.5 MICROgram(s) Inhaler 1 Puff(s) Inhalation daily  chlorhexidine 2% Cloths 1 Application(s) Topical daily  diltiazem    milliGRAM(s) Oral daily  gabapentin 400 milliGRAM(s) Oral three times a day  magnesium oxide 400 milliGRAM(s) Oral three times a day with meals  methylPREDNISolone sodium succinate Injectable 40 milliGRAM(s) IV Push every 8 hours  sotalol. 120 milliGRAM(s) Oral every 12 hours  tamsulosin 0.4 milliGRAM(s) Oral at bedtime      MEDICATIONS  (PRN):  acetaminophen     Tablet .. 650 milliGRAM(s) Oral every 6 hours PRN Temp greater or equal to 38C (100.4F), Moderate Pain (4 - 6)  benzonatate 100 milliGRAM(s) Oral every 8 hours PRN Cough  HYDROmorphone   Tablet 2 milliGRAM(s) Oral every 8 hours PRN Severe Pain (7 - 10)  traMADol 50 milliGRAM(s) Oral every 8 hours PRN Mild Pain (1 - 3)      Allergies    ibuprofen (Anaphylaxis)  codeine (Urticaria)    Intolerances        PAST MEDICAL & SURGICAL HISTORY:  CAD in native artery  RCA 3 YAYA 1 in 2002 and 2 in 2019      Arthritis      Lumbosacral disc disease  has nerve stimulator      HTN (hypertension)      HLD (hyperlipidemia)      Lung cancer  Stage I a1; RUL; s/p SBRT in 11/2021; stable disease as of 4/2022      Paroxysmal atrial fibrillation      COPD (chronic obstructive pulmonary disease) with emphysema      H/O heart artery stent  RCA      S/P cataract surgery  b/l eyes 2016      S/P CABG (coronary artery bypass graft)          SOCIAL HISTORY  Smoking History: former smoker      REVIEW OF SYSTEMS:    CONSTITUTIONAL:  No distress    HEENT:  Eyes:  No diplopia or blurred vision. ENT:  No earache, sore throat or runny nose.    CARDIOVASCULAR:  No pressure, squeezing, tightness, heaviness or aching about the chest; no palpitations.    RESPIRATORY:  per HPI     GASTROINTESTINAL:  No nausea, vomiting or diarrhea.    GENITOURINARY:  No dysuria, frequency or urgency.    NEUROLOGIC:  No paresthesias, fasciculations, seizures or weakness.    PSYCHIATRIC:  No disorder of thought or mood.    Vital Signs Last 24 Hrs  T(C): 36.6 (08 Dec 2023 14:00), Max: 36.9 (07 Dec 2023 20:28)  T(F): 97.8 (08 Dec 2023 14:00), Max: 98.4 (07 Dec 2023 20:28)  HR: 82 (08 Dec 2023 14:35) (71 - 89)  BP: 122/69 (08 Dec 2023 14:00) (103/60 - 122/69)  BP(mean): --  RR: 18 (08 Dec 2023 14:00) (18 - 18)  SpO2: 92% (08 Dec 2023 14:35) (91% - 94%)    Parameters below as of 08 Dec 2023 14:35  Patient On (Oxygen Delivery Method): nasal cannula        PHYSICAL EXAMINATION:    GENERAL: The patient is awake and alert in no apparent distress.     HEENT: Head is normocephalic and atraumatic. Extraocular muscles are intact. Mucous membranes are moist.    NECK: Supple.    LUNGS: rales on R, respirations unlabored    HEART: Regular rate and rhythm without murmur.    ABDOMEN: Soft, nontender, and nondistended.      EXTREMITIES: Without any cyanosis, clubbing, rash, lesions or edema.         LABS:                        10.6   4.80  )-----------( 481      ( 08 Dec 2023 05:00 )             32.0     12-08    136  |  97  |  16.5  ----------------------------<  138<H>  5.0   |  32.0<H>  |  0.42<L>    Ca    9.2      08 Dec 2023 05:00  Mg     1.9     12-08    TPro  6.3<L>  /  Alb  3.2<L>  /  TBili  0.3<L>  /  DBili  x   /  AST  34  /  ALT  46<H>  /  AlkPhos  217<H>  12-08            MICROBIOLOGY:  Culture - Sputum . (10.13.23 @ 22:00)    Gram Stain:   Moderate polymorphonuclear leukocytes per low power field  Few Squamous epithelial cells per low power field  Numerous Gram Negative Rods per oil power field  Moderate Gram positive cocci in pairs per oil power field  Moderate Yeast like cells per oil power field   -  Amikacin: S <=16   -  Amoxicillin/Clavulanic Acid: S <=8/4   -  Ampicillin: R >16 These ampicillin results predict results for amoxicillin   -  Ampicillin/Sulbactam: S <=4/2   -  Ampicillin/Sulbactam: S <=8/4   -  Aztreonam: S <=4   -  Cefazolin: S <=2   -  Cefazolin: S <=4   -  Cefepime: S <=2   -  Cefoxitin: S <=8   -  Ceftriaxone: S <=1   -  Ciprofloxacin: S <=0.25   -  Clindamycin: S <=0.25   -  Ertapenem: S <=0.5   -  Erythromycin: S <=0.25   -  Gentamicin: S <=1 Should not be used as monotherapy   -  Gentamicin: S <=2   -  Imipenem: S <=1   -  Levofloxacin: S <=0.5   -  Meropenem: S <=1   -  Oxacillin: S <=0.25 Oxacillin predicts susceptibility for dicloxacillin, methicillin, and nafcillin   -  Penicillin: R >8   -  Piperacillin/Tazobactam: S <=8   -  Rifampin: R >2 Should not be used as monotherapy   -  Tetracycline: S <=1   -  Tobramycin: S <=2   -  Trimethoprim/Sulfamethoxazole: S <=0.5/9.5   -  Trimethoprim/Sulfamethoxazole: S <=0.5/9.5   -  Vancomycin: S 1   Specimen Source: .Sputum Sputum   Culture Results:   Numerous Klebsiella pneumoniae  Numerous Staphylococcus aureus  Normal Respiratory Doris present   Organism Identification: Klebsiella pneumoniae  Staphylococcus aureus   Organism: Klebsiella pneumoniae   Organism: Staphylococcus aureus   Method Type: BENJAMIN   Method Type: BENJAMIN        RADIOLOGY & ADDITIONAL STUDIES:  < from: CT Chest No Cont (12.07.23 @ 10:45) >    ACC: [id number] EXAM:  CT CHEST   ORDERED BY: RAFAEL UNEGR     PROCEDURE DATE:  12/07/2023          INTERPRETATION:  HISTORY: Confusion. Atrial flutter. Non-small cell lung   cancer.    EXAMINATION: CT CHEST was performed without IV contrast.    COMPARISON: 11/30/2023 CT chest.    FINDINGS:    AIRWAYS, LUNGS, PLEURA: Persistent endobronchial occlusion of the   segmental right upper lobe airways (image 64, series 3).    Emphysema. Consolidation throughout the right middle lobe and right lower   lobe and patchy posterior left apical ground-glass opacity. Right upper   lobe opacity from 11/30/2023 markedly improved. There is a 2 x 1.2 cm   nodular opacity identified in the right upper lobe (image 64, series 3).   Trace right pleural effusion.    MEDIASTINUM: Normal heart size. Post CABG. No pericardial effusion.   Dilated ascending thoracic aorta measures 4 cm.  No large mediastinal   lymph nodes.    IMAGED ABDOMEN: Unremarkable.    SOFT TISSUES: Unremarkable.    BONES: Sternal wires. Neurostimulator device in place.      IMPRESSION:.    Pneumonia most severe involving the right middle lobe and right lower   lobe. Recommend CT chest follow-up in 1 month to ensure clearing.    Trace right pleural effusion.    Persistent endobronchial occlusion of the segmental right upper lobe   airways as on 11/30/2023.    Indeterminate 2 x 1.2 cm nodular opacity in the right upper lobe.    --- End of Report ---            IRENE NAVARRO MD; Attending Radiologist  This document has been electronicallysigned. Dec  7 2023 11:04AM    < end of copied text >   PULMONARY PROGRESS NOTE      [id number]    Patient is a 78y old  Male who presents with a chief complaint of Pneumonia, septic shock (08 Dec 2023 09:52)      INTERVAL HPI/OVERNIGHT EVENTS:  hx of COPD, CAD s/p CABG/YAYA, HTN, hx of RUL nodule treated empirically with SBRT 11/2021 due to concern he would not tolerate biopsy, Afib previously on Eliquis, s/p spinal cord stimulator, prior admission 10/10-10/16 for hemoptysis treated with TXA/Eliquis discontinued and was found to have endobronchial lesion in the R mainstem s/p bronchoscopy w/ pathology favoring squamous cell carcinoma. Pt was recently treated with taxol/carbo 11/27 and has received 2 sessions of radiation. Pt reports overall the last few days he has had generalized malaise with dyspnea and cough with episodes of hypoxia at home. Pt denies any recurrent episodes of hemoptysis since last time in October. Endorses palpitations but denies chest pain. Denies LE pain/swelling. Endorses some wheezing. Pt was also noted to have tachycardia on loop recorder and went for follow-up and was referred to the ED as noted in rapid atrial flutter. Pt was found to be in rapid atrial flutter requiring multiple medications. Pt also noted to be hypotensive despite fluid resuscitation and initiated on Chaitanya. CXR with R mid and lower lung opacities concerning for PNA.          Feeling better but still wiht some cough. Congestion. On NCO2.       MEDICATIONS  (STANDING):  albuterol/ipratropium for Nebulization 3 milliLiter(s) Nebulizer every 6 hours  apixaban 5 milliGRAM(s) Oral every 12 hours  atorvastatin 40 milliGRAM(s) Oral at bedtime  budesonide  80 MICROgram(s)/formoterol 4.5 MICROgram(s) Inhaler 1 Puff(s) Inhalation daily  chlorhexidine 2% Cloths 1 Application(s) Topical daily  diltiazem    milliGRAM(s) Oral daily  gabapentin 400 milliGRAM(s) Oral three times a day  magnesium oxide 400 milliGRAM(s) Oral three times a day with meals  methylPREDNISolone sodium succinate Injectable 40 milliGRAM(s) IV Push every 8 hours  sotalol. 120 milliGRAM(s) Oral every 12 hours  tamsulosin 0.4 milliGRAM(s) Oral at bedtime      MEDICATIONS  (PRN):  acetaminophen     Tablet .. 650 milliGRAM(s) Oral every 6 hours PRN Temp greater or equal to 38C (100.4F), Moderate Pain (4 - 6)  benzonatate 100 milliGRAM(s) Oral every 8 hours PRN Cough  HYDROmorphone   Tablet 2 milliGRAM(s) Oral every 8 hours PRN Severe Pain (7 - 10)  traMADol 50 milliGRAM(s) Oral every 8 hours PRN Mild Pain (1 - 3)      Allergies    ibuprofen (Anaphylaxis)  codeine (Urticaria)    Intolerances        PAST MEDICAL & SURGICAL HISTORY:  CAD in native artery  RCA 3 YAYA 1 in 2002 and 2 in 2019      Arthritis      Lumbosacral disc disease  has nerve stimulator      HTN (hypertension)      HLD (hyperlipidemia)      Lung cancer  Stage I a1; RUL; s/p SBRT in 11/2021; stable disease as of 4/2022      Paroxysmal atrial fibrillation      COPD (chronic obstructive pulmonary disease) with emphysema      H/O heart artery stent  RCA      S/P cataract surgery  b/l eyes 2016      S/P CABG (coronary artery bypass graft)          SOCIAL HISTORY  Smoking History: former smoker      REVIEW OF SYSTEMS:    CONSTITUTIONAL:  No distress    HEENT:  Eyes:  No diplopia or blurred vision. ENT:  No earache, sore throat or runny nose.    CARDIOVASCULAR:  No pressure, squeezing, tightness, heaviness or aching about the chest; no palpitations.    RESPIRATORY:  per HPI     GASTROINTESTINAL:  No nausea, vomiting or diarrhea.    GENITOURINARY:  No dysuria, frequency or urgency.    NEUROLOGIC:  No paresthesias, fasciculations, seizures or weakness.    PSYCHIATRIC:  No disorder of thought or mood.    Vital Signs Last 24 Hrs  T(C): 36.6 (08 Dec 2023 14:00), Max: 36.9 (07 Dec 2023 20:28)  T(F): 97.8 (08 Dec 2023 14:00), Max: 98.4 (07 Dec 2023 20:28)  HR: 82 (08 Dec 2023 14:35) (71 - 89)  BP: 122/69 (08 Dec 2023 14:00) (103/60 - 122/69)  BP(mean): --  RR: 18 (08 Dec 2023 14:00) (18 - 18)  SpO2: 92% (08 Dec 2023 14:35) (91% - 94%)    Parameters below as of 08 Dec 2023 14:35  Patient On (Oxygen Delivery Method): nasal cannula        PHYSICAL EXAMINATION:    GENERAL: The patient is awake and alert in no apparent distress.     HEENT: Head is normocephalic and atraumatic. Extraocular muscles are intact. Mucous membranes are moist.    NECK: Supple.    LUNGS: rales on R, respirations unlabored    HEART: Regular rate and rhythm without murmur.    ABDOMEN: Soft, nontender, and nondistended.      EXTREMITIES: Without any cyanosis, clubbing, rash, lesions or edema.         LABS:                        10.6   4.80  )-----------( 481      ( 08 Dec 2023 05:00 )             32.0     12-08    136  |  97  |  16.5  ----------------------------<  138<H>  5.0   |  32.0<H>  |  0.42<L>    Ca    9.2      08 Dec 2023 05:00  Mg     1.9     12-08    TPro  6.3<L>  /  Alb  3.2<L>  /  TBili  0.3<L>  /  DBili  x   /  AST  34  /  ALT  46<H>  /  AlkPhos  217<H>  12-08            MICROBIOLOGY:  Culture - Sputum . (10.13.23 @ 22:00)    Gram Stain:   Moderate polymorphonuclear leukocytes per low power field  Few Squamous epithelial cells per low power field  Numerous Gram Negative Rods per oil power field  Moderate Gram positive cocci in pairs per oil power field  Moderate Yeast like cells per oil power field   -  Amikacin: S <=16   -  Amoxicillin/Clavulanic Acid: S <=8/4   -  Ampicillin: R >16 These ampicillin results predict results for amoxicillin   -  Ampicillin/Sulbactam: S <=4/2   -  Ampicillin/Sulbactam: S <=8/4   -  Aztreonam: S <=4   -  Cefazolin: S <=2   -  Cefazolin: S <=4   -  Cefepime: S <=2   -  Cefoxitin: S <=8   -  Ceftriaxone: S <=1   -  Ciprofloxacin: S <=0.25   -  Clindamycin: S <=0.25   -  Ertapenem: S <=0.5   -  Erythromycin: S <=0.25   -  Gentamicin: S <=1 Should not be used as monotherapy   -  Gentamicin: S <=2   -  Imipenem: S <=1   -  Levofloxacin: S <=0.5   -  Meropenem: S <=1   -  Oxacillin: S <=0.25 Oxacillin predicts susceptibility for dicloxacillin, methicillin, and nafcillin   -  Penicillin: R >8   -  Piperacillin/Tazobactam: S <=8   -  Rifampin: R >2 Should not be used as monotherapy   -  Tetracycline: S <=1   -  Tobramycin: S <=2   -  Trimethoprim/Sulfamethoxazole: S <=0.5/9.5   -  Trimethoprim/Sulfamethoxazole: S <=0.5/9.5   -  Vancomycin: S 1   Specimen Source: .Sputum Sputum   Culture Results:   Numerous Klebsiella pneumoniae  Numerous Staphylococcus aureus  Normal Respiratory Doris present   Organism Identification: Klebsiella pneumoniae  Staphylococcus aureus   Organism: Klebsiella pneumoniae   Organism: Staphylococcus aureus   Method Type: BENJAMIN   Method Type: BENJAMIN        RADIOLOGY & ADDITIONAL STUDIES:  < from: CT Chest No Cont (12.07.23 @ 10:45) >    ACC: [id number] EXAM:  CT CHEST   ORDERED BY: RAFAEL UNGER     PROCEDURE DATE:  12/07/2023          INTERPRETATION:  HISTORY: Confusion. Atrial flutter. Non-small cell lung   cancer.    EXAMINATION: CT CHEST was performed without IV contrast.    COMPARISON: 11/30/2023 CT chest.    FINDINGS:    AIRWAYS, LUNGS, PLEURA: Persistent endobronchial occlusion of the   segmental right upper lobe airways (image 64, series 3).    Emphysema. Consolidation throughout the right middle lobe and right lower   lobe and patchy posterior left apical ground-glass opacity. Right upper   lobe opacity from 11/30/2023 markedly improved. There is a 2 x 1.2 cm   nodular opacity identified in the right upper lobe (image 64, series 3).   Trace right pleural effusion.    MEDIASTINUM: Normal heart size. Post CABG. No pericardial effusion.   Dilated ascending thoracic aorta measures 4 cm.  No large mediastinal   lymph nodes.    IMAGED ABDOMEN: Unremarkable.    SOFT TISSUES: Unremarkable.    BONES: Sternal wires. Neurostimulator device in place.      IMPRESSION:.    Pneumonia most severe involving the right middle lobe and right lower   lobe. Recommend CT chest follow-up in 1 month to ensure clearing.    Trace right pleural effusion.    Persistent endobronchial occlusion of the segmental right upper lobe   airways as on 11/30/2023.    Indeterminate 2 x 1.2 cm nodular opacity in the right upper lobe.    --- End of Report ---            IRENE NAVARRO MD; Attending Radiologist  This document has been electronicallysigned. Dec  7 2023 11:04AM    < end of copied text >

## 2023-12-08 NOTE — PROGRESS NOTE ADULT - ASSESSMENT
77 y/o male with hx of copd , SCC on chemo/XRT, hypomagnesemia, CAD s/p CABG/ PCI, COPD, Chronic back pain, a/w pna/ sepsis  , afib with rvr     Septic Shock due to PNA/ present on admission   - Hemodynamics stable off pressers  - s/p Midodrine, now weaned off    - Blood cx NGTD  - c/w iv steroids   - Continue Zosyn to complete x 7 - 10 course , can change to po abxs on dc   - wean down o2 as tolerated   - pulm consulted   - speech eval, hx of chocking with food   - will need home o2 eval prior to dc     Afib w/ RVR/ now in  sinus rhythm   - Telemetry monitoring  - Continue Heparin drip, changed to po eliquis as per ep   - c/w Cardizem  - c/w sotalol   - hold bb , digoxin   - seen by ep     CAD s/p CABG/PCI  - Continue Lipitor 40mg nightly     NSCLC  - s/p Chemo/ XRT as above  - f/u with Dr. Rinaldi/ Regina at [facility] post DC    COPD  - Duonebs PRN  - Continue Symbicort    Chronic back pain  - Tramadol 50mg q8h PRN  - Dilaudid 2mg q8h PRN  - Gabapentin 400mg TID  - S/P spinal stimulator     DVT ppx  - eliquis     dispo: remains acute . wean down iv steroids , o2 ,  will need home o2 eval , pt . possible dc in 2-3 days pending progress     plan of care discussed with patient, wife on phone    79 y/o male with hx of copd , SCC on chemo/XRT, hypomagnesemia, CAD s/p CABG/ PCI, COPD, Chronic back pain, a/w pna/ sepsis  , afib with rvr     Septic Shock due to PNA/ present on admission   - Hemodynamics stable off pressers  - s/p Midodrine, now weaned off    - Blood cx NGTD  - c/w iv steroids   - Continue Zosyn to complete x 7 - 10 course , can change to po abxs on dc   - wean down o2 as tolerated   - pulm consulted   - speech eval, hx of chocking with food   - will need home o2 eval prior to dc     Afib w/ RVR/ now in  sinus rhythm   - Telemetry monitoring  - Continue Heparin drip, changed to po eliquis as per ep   - c/w Cardizem  - c/w sotalol   - hold bb , digoxin   - seen by ep     CAD s/p CABG/PCI  - Continue Lipitor 40mg nightly     NSCLC  - s/p Chemo/ XRT as above  - f/u with Dr. Rinaldi/ Regina at [facility] post DC    COPD  - Duonebs PRN  - Continue Symbicort    Chronic back pain  - Tramadol 50mg q8h PRN  - Dilaudid 2mg q8h PRN  - Gabapentin 400mg TID  - S/P spinal stimulator     DVT ppx  - eliquis     dispo: remains acute . wean down iv steroids , o2 ,  will need home o2 eval , pt . possible dc in 2-3 days pending progress     plan of care discussed with patient, wife on phone    77 y/o male with hx of copd , SCC on chemo/XRT, hypomagnesemia, CAD s/p CABG/ PCI, COPD, Chronic back pain, a/w pna/ sepsis  , afib with rvr     Septic Shock due to PNA/ present on admission   - Hemodynamics stable off pressers  - s/p Midodrine, now weaned off    - Blood cx NGTD  - c/w iv steroids   - Continue Zosyn to complete x 7 - 10 course , can change to po abxs on dc   - wean down o2 as tolerated   - pulm consulted   - speech eval, hx of chocking with food   - will need home o2 eval prior to dc     Afib w/ RVR/ now in  sinus rhythm   - Telemetry monitoring  - Continue Heparin drip, changed to po eliquis as per ep   - c/w Cardizem  - c/w sotalol   - hold bb , digoxin   - seen by ep     CAD s/p CABG/PCI  - Continue Lipitor 40mg nightly     elevated lfts  - likely due to meds   - check RUQ us to r/o acute changes    - trend       NSCLC  - s/p Chemo/ XRT as above  - f/u with Dr. Rinaldi/ Regina at [facility] post DC    COPD  - Duonebs PRN  - Continue Symbicort    Chronic back pain  - Tramadol 50mg q8h PRN  - Dilaudid 2mg q8h PRN  - Gabapentin 400mg TID  - S/P spinal stimulator     DVT ppx  - eliquis     dispo: remains acute . wean down iv steroids , o2 ,  will need home o2 eval , pt . possible dc in 2-3 days pending progress     plan of care discussed with patient, wife on phone    77 y/o male with hx of copd , SCC on chemo/XRT, hypomagnesemia, CAD s/p CABG/ PCI, COPD, Chronic back pain, a/w pna/ sepsis  , afib with rvr     Septic Shock due to PNA/ present on admission   - Hemodynamics stable off pressers  - s/p Midodrine, now weaned off    - Blood cx NGTD  - c/w iv steroids   - Continue Zosyn to complete x 7 - 10 course , can change to po abxs on dc   - wean down o2 as tolerated   - pulm consulted   - speech eval, hx of chocking with food   - will need home o2 eval prior to dc     Afib w/ RVR/ now in  sinus rhythm   - Telemetry monitoring  - Continue Heparin drip, changed to po eliquis as per ep   - c/w Cardizem  - c/w sotalol   - hold bb , digoxin   - seen by ep     CAD s/p CABG/PCI  - Continue Lipitor 40mg nightly     elevated lfts  - likely due to meds   - check RUQ us to r/o acute changes    - trend       NSCLC  - s/p Chemo/ XRT as above  - f/u with Dr. Rinaldi/ Regina at [facility] post DC    COPD  - Duonebs PRN  - Continue Symbicort    Chronic back pain  - Tramadol 50mg q8h PRN  - Dilaudid 2mg q8h PRN  - Gabapentin 400mg TID  - S/P spinal stimulator     DVT ppx  - eliquis     dispo: remains acute . wean down iv steroids , o2 ,  will need home o2 eval , pt . possible dc in 2-3 days pending progress     plan of care discussed with patient, wife on phone

## 2023-12-09 NOTE — PROGRESS NOTE ADULT - ASSESSMENT
Patient is a 78 year old male with hx of copd , SCC on chemo/XRT, hypomagnesemia, CAD s/p CABG/ PCI, COPD, Chronic back pain, a/w pna/ sepsis  , afib with rvr     Septic Shock due to PNA   - BP stable off pressers and midodrine  - Blood cx NGTD  - completed course of zosyn  - CXR and CT chest reviewed; RML/RLL PNA  - continue dysphagia diet - speech recs appreciated    Acute Hypoxic Respiratory Failure due to PNA and COPD  - remains hypoxic on 3 liters NC  - switch to prednisone in AM  - completed course of zosyn  - imaging as above  - continue bronchodilators   -wean O2 as tolerated; home O2 arranged  - pulm recs appreciated    Afib w/ RVR  - now in NSR  - continue Sotalol, Cardizem and Eliquis  - EP recs appreciated    CAD s/p CABG/PCI  - denies anginal symptoms  - not on baby ASA  - continue Lipitor    Transaminitis due to hepatic steatosis  - LFTs relatively stable therefore will continue statin  - RUQ sono with hepatic steatosis  - monitor LFTS    NSCLC  - s/p Chemo/ XRT   - f/u with Dr. Rinaldi/ Regina at [facility] post DC    Chronic back pain  - S/P spinal stimulator   - resume home analgesic regimen ; on dilaudid 2 mg TID as needed at home  - continue gabapentin    Anxiety  -xanax 0.25 mg bedtime PRN    BPH  -continue flomax    DVT ppx - Eliquis     Dispo- Home O2 arranged. Switch to prednisone in AM and discharge home. Requested to remain inpatient an additional day.    Plan discussed with patient, daughter, Dr. Medeiros RN   Patient is a 78 year old male with hx of copd , SCC on chemo/XRT, hypomagnesemia, CAD s/p CABG/ PCI, COPD, Chronic back pain, a/w pna/ sepsis  , afib with rvr     Septic Shock due to PNA   - BP stable off pressers and midodrine  - Blood cx NGTD  - completed course of zosyn  - CXR and CT chest reviewed; RML/RLL PNA  - continue dysphagia diet - speech recs appreciated    Acute Hypoxic Respiratory Failure due to PNA and COPD  - remains hypoxic on 3 liters NC  - switch to prednisone in AM  - completed course of zosyn  - imaging as above  - continue bronchodilators   -wean O2 as tolerated; home O2 arranged  - pulm recs appreciated    Afib w/ RVR  - now in NSR  - continue Sotalol, Cardizem and Eliquis  - EP recs appreciated    CAD s/p CABG/PCI  - denies anginal symptoms  - not on baby ASA  - continue Lipitor    Transaminitis due to hepatic steatosis  - LFTs relatively stable therefore will continue statin  - RUQ sono with hepatic steatosis  - monitor LFTS    NSCLC  - s/p Chemo/ XRT   - f/u with Dr. Rinaldi/ Regina at [facility] post DC    Chronic back pain  - S/P spinal stimulator   - resume home analgesic regimen ; on dilaudid 2 mg TID as needed at home  - continue gabapentin    Anxiety  -xanax 0.25 mg bedtime PRN    BPH  -continue flomax    DVT ppx - Eliquis     Dispo- Home O2 arranged. Switch to prednisone in AM and discharge home. Requested to remain inpatient an additional day.    Plan discussed with patient, daughter, Dr. Medeiros RN

## 2023-12-09 NOTE — PROGRESS NOTE ADULT - SUBJECTIVE AND OBJECTIVE BOX
Subjective:  pt remains in sinus rhythm on sotalol.   tolerating eliquis.     MEDICATIONS  (STANDING):  albuterol/ipratropium for Nebulization 3 milliLiter(s) Nebulizer every 6 hours  apixaban 5 milliGRAM(s) Oral every 12 hours  atorvastatin 40 milliGRAM(s) Oral at bedtime  budesonide  80 MICROgram(s)/formoterol 4.5 MICROgram(s) Inhaler 1 Puff(s) Inhalation daily  chlorhexidine 2% Cloths 1 Application(s) Topical daily  diltiazem    milliGRAM(s) Oral daily  gabapentin 400 milliGRAM(s) Oral three times a day  magnesium oxide 400 milliGRAM(s) Oral three times a day with meals  methylPREDNISolone sodium succinate Injectable 40 milliGRAM(s) IV Push every 8 hours  sotalol. 120 milliGRAM(s) Oral every 12 hours  tamsulosin 0.4 milliGRAM(s) Oral at bedtime    MEDICATIONS  (PRN):  acetaminophen     Tablet .. 650 milliGRAM(s) Oral every 6 hours PRN Temp greater or equal to 38C (100.4F), Moderate Pain (4 - 6)  benzonatate 100 milliGRAM(s) Oral every 8 hours PRN Cough  HYDROmorphone  Injectable 2 milliGRAM(s) IV Push every 8 hours PRN Severe Pain (7 - 10)  traMADol 50 milliGRAM(s) Oral every 8 hours PRN Mild Pain (1 - 3)      Allergies    ibuprofen (Anaphylaxis)  codeine (Urticaria)    Intolerances        Vital Signs Last 24 Hrs  T(C): 36.6 (09 Dec 2023 12:00), Max: 36.9 (08 Dec 2023 20:42)  T(F): 97.8 (09 Dec 2023 12:00), Max: 98.4 (08 Dec 2023 20:42)  HR: 68 (09 Dec 2023 12:00) (67 - 82)  BP: 117/70 (09 Dec 2023 12:00) (110/64 - 122/69)  BP(mean): --  RR: 18 (09 Dec 2023 05:30) (17 - 18)  SpO2: 92% (09 Dec 2023 12:00) (92% - 96%)    Parameters below as of 09 Dec 2023 12:00  Patient On (Oxygen Delivery Method): nasal cannula        Physical Exam:  Constitutional: NAD  Cardiovascular: +S1S2 RRR  Pulmonary: wheeze b/l  GI: soft NTND +BS  Extremities: no pedal edema, +distal pulses b/l  Neuro: non focal, BARKER x4      LABS:                        10.7   5.66  )-----------( 552      ( 09 Dec 2023 06:36 )             32.5     12-09    141  |  98  |  17.8  ----------------------------<  158<H>  5.2   |  35.0<H>  |  0.40<L>    Ca    9.2      09 Dec 2023 06:36  Mg     1.9     12-09    TPro  6.3<L>  /  Alb  3.4  /  TBili  0.3<L>  /  DBili  x   /  AST  41<H>  /  ALT  60<H>  /  AlkPhos  187<H>  12-09      Urinalysis Basic - ( 09 Dec 2023 06:36 )    Color: x / Appearance: x / SG: x / pH: x  Gluc: 158 mg/dL / Ketone: x  / Bili: x / Urobili: x   Blood: x / Protein: x / Nitrite: x   Leuk Esterase: x / RBC: x / WBC x   Sq Epi: x / Non Sq Epi: x / Bacteria: x        RADIOLOGY & ADDITIONAL TESTS:  < from: TTE Echo Complete w/ Contrast w/ Doppler (11.30.23 @ 19:19) >   1. Left ventricular ejection fraction, by visual estimation, is 55 to   60%.   2. Normal global left ventricular systolic function.   3. Spectral Doppler shows impaired relaxation pattern of left   ventricular myocardial filling (Grade I diastolic dysfunction).   4. There is mild concentric left ventricular hypertrophy.   5. Normal left atrial size.   6. Normal right atrial size.   7. There is no evidence of pericardial effusion.   8. Mild thickening of the anterior and posterior mitral valve leaflets.   9. Trace mitral valve regurgitation.  10. Sclerotic aortic valve with normal opening.  11. Endocardial visualization was enhanced with intravenous echo contrast.    < end of copied text >

## 2023-12-09 NOTE — PROGRESS NOTE ADULT - ASSESSMENT
78 year old male, former smoker, with HTN, hyperlipidemia, CAD s/p PCI 2002 and CABG x 3 (3/2020 with Dr. Hutchison), COPD, lung cancer (recurrent NSCLC; RUL; squamous; on daily radiation with Taxol/Carbo weekly), and paroxysmal atrial fibrillation s/p MDT ILR who had been off AC since 10/2023 due to hemoptysis. He presented to EP followup office visit with c/o cough, fever, and worsening shortness of breath over several days, noted to be in AFL with rapid ventricular rates on ILR. Sent to [facility] for further evaluation and was admitted with acute hypoxic respiratory failure and septic shock 2/2 pneumonia. Initially on pressors which have since been discontinued. Had converted to sinus rhythm/sinus tachycardia on 12/1 but converted back to atrial fibrillation and then flutter with difficult to control rates despite digoxin, diltiazem, and metoprolol. He started trial of antiarrhythmic medication with sotalol (avoiding amiodarone due to severe lung disease) and has since converted back to sinus rhythm.    Now s/p sotalol initiation with stable QTc and remains in sinus rhythm. =  - Tolerated heparin gtt during admission and transitioned to Eliquis 5mg Q 12.  - Continue with Eliquis 5mg Q12HR  - Continue sotalol 120mg Q 12 hrs.  - continue Diltiazem to 240mg CD QD.  - Monitor lytes: Keep K > 4, Mg > 2   - c/w daily Mg supplementation  -appreciate pulm and medical care- further management per these services.     EP to followup as outpt.    78 year old male, former smoker, with HTN, hyperlipidemia, CAD s/p PCI 2002 and CABG x 3 (3/2020 with Dr. Hutchison), COPD, lung cancer (recurrent NSCLC; RUL; squamous; on daily radiation with Taxol/Carbo weekly), and paroxysmal atrial fibrillation s/p MDT ILR who had been off AC since 10/2023 due to hemoptysis. He presented to EP followup office visit with c/o cough, fever, and worsening shortness of breath over several days, noted to be in AFL with rapid ventricular rates on ILR. Sent to [facility] for further evaluation and was admitted with acute hypoxic respiratory failure and septic shock 2/2 pneumonia. Initially on pressors which have since been discontinued. Had converted to sinus rhythm/sinus tachycardia on 12/1 but converted back to atrial fibrillation and then flutter with difficult to control rates despite digoxin, diltiazem, and metoprolol. He started trial of antiarrhythmic medication with sotalol (avoiding amiodarone due to severe lung disease) and has since converted back to sinus rhythm.    Now s/p sotalol initiation with stable QTc and remains in sinus rhythm. =  - Tolerated heparin gtt during admission and transitioned to Eliquis 5mg Q 12.  - Continue with Eliquis 5mg Q12HR  - Continue sotalol 120mg Q 12 hrs.  - continue Diltiazem to 240mg CD QD.  - Monitor lytes: Keep K > 4, Mg > 2   - c/w daily Mg supplementation  -appreciate pulm and medical care- further management per these services.     EP to followup as outpt.

## 2023-12-09 NOTE — PROGRESS NOTE ADULT - SUBJECTIVE AND OBJECTIVE BOX
CHIEF COMPLAINT/INTERVAL HISTORY:    Patient is a 78y old  Male who presents with a chief complaint of Pneumonia, septic shock (09 Dec 2023 15:04)      HPI:  Patient is a 78y old  Male who presents with a chief complaint of Hypoxia (30 Nov 2023 13:29) from EP office. she reports having had a cough for the last 2 months which has been worsening more recently but cannot give an exact timepoint of its worsening. He reports his loop recorder had an event on friday am, which led his EP physician to recommend he come in for evaluation today. When he presented to the office they recommended he present to the hospital for evaluation. He reports some sputum, but no bloody cough. Denies significant chest pain, although does report lightheadness/near syncopal episode en route to hospital. He reports for the last couple weeks feeling cold but never having checked a temperature.       Medications:  MEDICATIONS  (STANDING):  phenylephrine    Infusion 0.5 MICROgram(s)/kG/Min (13.8 mL/Hr) IV Continuous <Continuous>  piperacillin/tazobactam IVPB.. 3.375 Gram(s) IV Intermittent once  vancomycin  IVPB 1500 milliGRAM(s) IV Intermittent Once    MEDICATIONS  (PRN):          RADIOLOGY/IMAGING/ECHO    Critical care point of care ultrasound:    Assessment/Plan:          CRITICAL CARE TIME SPENT: 37 minutes assessing presenting problems of acute illness, which pose high probability of life threatening deterioration or end organ damage/dysfunction, as well as medical decision making including initiating plan of care, reviewing data, reviewing radiologic exams, discussing with multidisciplinary team,  discussing goals of care with patient/family, and writing this note.  Non-inclusive of procedures performed,         (30 Nov 2023 16:45)      SUBJECTIVE & OBJECTIVE: Pt seen and examined at bedside.     ICU Vital Signs Last 24 Hrs  T(C): 36.6 (09 Dec 2023 16:45), Max: 36.9 (08 Dec 2023 20:42)  T(F): 97.9 (09 Dec 2023 16:45), Max: 98.4 (08 Dec 2023 20:42)  HR: 64 (09 Dec 2023 16:45) (64 - 80)  BP: 106/65 (09 Dec 2023 16:45) (106/65 - 117/70)  BP(mean): --  ABP: --  ABP(mean): --  RR: 18 (09 Dec 2023 05:30) (17 - 18)  SpO2: 93% (09 Dec 2023 16:45) (92% - 96%)    O2 Parameters below as of 09 Dec 2023 16:45  Patient On (Oxygen Delivery Method): nasal cannula              MEDICATIONS  (STANDING):  albuterol/ipratropium for Nebulization 3 milliLiter(s) Nebulizer every 6 hours  apixaban 5 milliGRAM(s) Oral every 12 hours  atorvastatin 40 milliGRAM(s) Oral at bedtime  budesonide  80 MICROgram(s)/formoterol 4.5 MICROgram(s) Inhaler 1 Puff(s) Inhalation daily  chlorhexidine 2% Cloths 1 Application(s) Topical daily  diltiazem    milliGRAM(s) Oral daily  gabapentin 400 milliGRAM(s) Oral three times a day  magnesium oxide 400 milliGRAM(s) Oral three times a day with meals  sotalol. 120 milliGRAM(s) Oral every 12 hours  tamsulosin 0.4 milliGRAM(s) Oral at bedtime    MEDICATIONS  (PRN):  acetaminophen     Tablet .. 650 milliGRAM(s) Oral every 6 hours PRN Temp greater or equal to 38C (100.4F), Moderate Pain (4 - 6)  benzonatate 100 milliGRAM(s) Oral every 8 hours PRN Cough  HYDROmorphone  Injectable 2 milliGRAM(s) IV Push every 8 hours PRN Severe Pain (7 - 10)      LABS:                        10.7   5.66  )-----------( 552      ( 09 Dec 2023 06:36 )             32.5     12-09    141  |  98  |  17.8  ----------------------------<  158<H>  5.2   |  35.0<H>  |  0.40<L>    Ca    9.2      09 Dec 2023 06:36  Mg     1.9     12-09    TPro  6.3<L>  /  Alb  3.4  /  TBili  0.3<L>  /  DBili  x   /  AST  41<H>  /  ALT  60<H>  /  AlkPhos  187<H>  12-09      Urinalysis Basic - ( 09 Dec 2023 06:36 )    Color: x / Appearance: x / SG: x / pH: x  Gluc: 158 mg/dL / Ketone: x  / Bili: x / Urobili: x   Blood: x / Protein: x / Nitrite: x   Leuk Esterase: x / RBC: x / WBC x   Sq Epi: x / Non Sq Epi: x / Bacteria: x    PHYSICAL EXAM:    GENERAL: NAD, well-groomed, well-developed  HEAD:  Atraumatic, Normocephalic  EYES: EOMI, PERRLA, conjunctiva and sclera clear  ENMT: Moist mucous membranes  NECK: Supple, No JVD  NERVOUS SYSTEM:  Alert & Oriented X3, Motor Strength 5/5 B/L upper and lower extremities; DTRs 2+ intact and symmetric  CHEST/LUNG: Clear to auscultation bilaterally; No rales, rhonchi, wheezing, or rubs  HEART: Regular rate and rhythm; No murmurs, rubs, or gallops  ABDOMEN: Soft, Nontender, Nondistended; Bowel sounds present  EXTREMITIES:  2+ Peripheral Pulses, No clubbing, cyanosis, or edema     CHIEF COMPLAINT/INTERVAL HISTORY:    Patient is a 78y old  Male who presents with a chief complaint of Pneumonia, septic shock (09 Dec 2023 15:04)    SUBJECTIVE & OBJECTIVE: Pt seen and examined at bedside. No overnight events. Reports feeling tired today and not ready to be discharged. Home O2 arranged. Switch to prednisone tomorrow and discharge home.     ROS: No chest pain, palpitations, SOB, light headedness, dizziness, headache, nausea/vomiting, fevers/chills, abdominal pain, dysuria.    ICU Vital Signs Last 24 Hrs  T(C): 36.6 (09 Dec 2023 16:45), Max: 36.9 (08 Dec 2023 20:42)  T(F): 97.9 (09 Dec 2023 16:45), Max: 98.4 (08 Dec 2023 20:42)  HR: 64 (09 Dec 2023 16:45) (64 - 80)  BP: 106/65 (09 Dec 2023 16:45) (106/65 - 117/70)  RR: 18 (09 Dec 2023 05:30) (17 - 18)  SpO2: 93% (09 Dec 2023 16:45) (92% - 96%)    O2 Parameters below as of 09 Dec 2023 16:45  Patient On (Oxygen Delivery Method): nasal cannula    MEDICATIONS  (STANDING):  albuterol/ipratropium for Nebulization 3 milliLiter(s) Nebulizer every 6 hours  apixaban 5 milliGRAM(s) Oral every 12 hours  atorvastatin 40 milliGRAM(s) Oral at bedtime  budesonide  80 MICROgram(s)/formoterol 4.5 MICROgram(s) Inhaler 1 Puff(s) Inhalation daily  chlorhexidine 2% Cloths 1 Application(s) Topical daily  diltiazem    milliGRAM(s) Oral daily  gabapentin 400 milliGRAM(s) Oral three times a day  magnesium oxide 400 milliGRAM(s) Oral three times a day with meals  sotalol. 120 milliGRAM(s) Oral every 12 hours  tamsulosin 0.4 milliGRAM(s) Oral at bedtime    MEDICATIONS  (PRN):  acetaminophen     Tablet .. 650 milliGRAM(s) Oral every 6 hours PRN Temp greater or equal to 38C (100.4F), Moderate Pain (4 - 6)  benzonatate 100 milliGRAM(s) Oral every 8 hours PRN Cough  HYDROmorphone  Injectable 2 milliGRAM(s) IV Push every 8 hours PRN Severe Pain (7 - 10)      LABS:                        10.7   5.66  )-----------( 552      ( 09 Dec 2023 06:36 )             32.5     12-09    141  |  98  |  17.8  ----------------------------<  158<H>  5.2   |  35.0<H>  |  0.40<L>    Ca    9.2      09 Dec 2023 06:36  Mg     1.9     12-09    TPro  6.3<L>  /  Alb  3.4  /  TBili  0.3<L>  /  DBili  x   /  AST  41<H>  /  ALT  60<H>  /  AlkPhos  187<H>  12-09      Urinalysis Basic - ( 09 Dec 2023 06:36 )    Color: x / Appearance: x / SG: x / pH: x  Gluc: 158 mg/dL / Ketone: x  / Bili: x / Urobili: x   Blood: x / Protein: x / Nitrite: x   Leuk Esterase: x / RBC: x / WBC x   Sq Epi: x / Non Sq Epi: x / Bacteria: x    PHYSICAL EXAM:    GENERAL: elderly male, laying in bed, NAD  HEAD:  Atraumatic, Normocephalic  EYES: EOMI, PERRLA, conjunctiva and sclera clear  ENMT: Moist mucous membranes  NECK: Supple   NERVOUS SYSTEM:  Alert & Oriented X3, Motor Strength 5/5 B/L upper and lower extremities   CHEST/LUNG: mild expiratory wheeze  HEART: Regular rate and rhythm; + S1/S2  ABDOMEN: Soft, Nontender, Nondistended; Bowel sounds present  EXTREMITIES:  no pedal edema

## 2023-12-09 NOTE — PROGRESS NOTE ADULT - TIME BILLING
reviewing labs, radiology, other provider's notes, d/w pt and family at bedside
patient care , labs , meds , chart review
greater than 50% of time spent reviewing labs, notes, orders and radiographs, coordinating care  discussed with nursing, MICU resident , consultants.
patient care , labs , meds , chart review
reviewing labs, radiology, other provider's notes, d/w pt and Dr Sotelo

## 2023-12-09 NOTE — PROGRESS NOTE ADULT - PROVIDER SPECIALTY LIST ADULT
Cardiology
Cardiology
Electrophysiology
Cardiology
Electrophysiology
Hospitalist
MICU
MICU
Electrophysiology
Cardiology
Electrophysiology
Hospitalist
Pulmonology
Hospitalist
Internal Medicine
Pulmonology
Hospitalist
Internal Medicine

## 2023-12-09 NOTE — PROGRESS NOTE ADULT - ASSESSMENT
-Emphysema  -COPD  -Pneumonia; GNR; on abx  -Sq cell lung cancer; on RT and chemo  -Hypoxic resp failure  -AFib    RECC:  Complete abx. Titrate O2. Nebs. Symbicort. Steroid taper; change to PO. Will need outpt f/u ct chest. Cardio f/u. On anticoagulation.     F/U with Dr Fuller after d/c.     D/W pt, Dr Sotelo.    Call if needed further in the hospital.

## 2023-12-09 NOTE — PROGRESS NOTE ADULT - NUTRITIONAL ASSESSMENT
This patient has been assessed with a concern for Malnutrition and has been determined to have a diagnosis/diagnoses of Moderate protein-calorie malnutrition.    This patient is being managed with:   Diet DASH/TLC-  Sodium & Cholesterol Restricted  Supplement Feeding Modality:  Oral  Ensure Plus High Protein Cans or Servings Per Day:  2       Frequency:  Two Times a day  Entered: Dec  2 2023  3:12PM    Diet DASH/TLC-  Sodium & Cholesterol Restricted  Entered: Dec  1 2023  3:29PM    The following pending diet order is being considered for treatment of Moderate protein-calorie malnutrition:null
This patient has been assessed with a concern for Malnutrition and has been determined to have a diagnosis/diagnoses of Moderate protein-calorie malnutrition.    This patient is being managed with:   Diet Easy to Chew-  Entered: Dec  8 2023 10:39AM    The following pending diet order is being considered for treatment of Moderate protein-calorie malnutrition:  Diet DASH/TLC-  Sodium & Cholesterol Restricted  Supplement Feeding Modality:  Oral  Ensure Plus High Protein Cans or Servings Per Day:  2       Frequency:  Two Times a day  Entered: Dec  2 2023  3:12PM  
This patient has been assessed with a concern for Malnutrition and has been determined to have a diagnosis/diagnoses of Moderate protein-calorie malnutrition.    This patient is being managed with:   Diet DASH/TLC-  Sodium & Cholesterol Restricted  Supplement Feeding Modality:  Oral  Ensure Plus High Protein Cans or Servings Per Day:  2       Frequency:  Two Times a day  Entered: Dec  2 2023  3:12PM    Diet DASH/TLC-  Sodium & Cholesterol Restricted  Entered: Dec  1 2023  3:29PM    The following pending diet order is being considered for treatment of Moderate protein-calorie malnutrition:null
This patient has been assessed with a concern for Malnutrition and has been determined to have a diagnosis/diagnoses of Moderate protein-calorie malnutrition.    This patient is being managed with:   Diet Easy to Chew-  Entered: Dec  8 2023 10:39AM    The following pending diet order is being considered for treatment of Moderate protein-calorie malnutrition:  Diet DASH/TLC-  Sodium & Cholesterol Restricted  Supplement Feeding Modality:  Oral  Ensure Plus High Protein Cans or Servings Per Day:  2       Frequency:  Two Times a day  Entered: Dec  2 2023  3:12PM

## 2023-12-09 NOTE — PROGRESS NOTE ADULT - REASON FOR ADMISSION
Pneumonia, septic shock
Pneumonia, septic shock, AF w/ RVR
Pneumonia, septic shock

## 2023-12-09 NOTE — PROGRESS NOTE ADULT - SUBJECTIVE AND OBJECTIVE BOX
PULMONARY PROGRESS NOTE      JENNIFER MOORERegency Meridian-[id number]    Patient is a 78y old  Male who presents with a chief complaint of Pneumonia, septic shock (09 Dec 2023 11:29)      INTERVAL HPI/OVERNIGHT EVENTS: Feeling improved. Still not at baseline but less sob, congestion.     MEDICATIONS  (STANDING):  albuterol/ipratropium for Nebulization 3 milliLiter(s) Nebulizer every 6 hours  apixaban 5 milliGRAM(s) Oral every 12 hours  atorvastatin 40 milliGRAM(s) Oral at bedtime  budesonide  80 MICROgram(s)/formoterol 4.5 MICROgram(s) Inhaler 1 Puff(s) Inhalation daily  chlorhexidine 2% Cloths 1 Application(s) Topical daily  diltiazem    milliGRAM(s) Oral daily  gabapentin 400 milliGRAM(s) Oral three times a day  magnesium oxide 400 milliGRAM(s) Oral three times a day with meals  methylPREDNISolone sodium succinate Injectable 40 milliGRAM(s) IV Push every 8 hours  sotalol. 120 milliGRAM(s) Oral every 12 hours  tamsulosin 0.4 milliGRAM(s) Oral at bedtime      MEDICATIONS  (PRN):  acetaminophen     Tablet .. 650 milliGRAM(s) Oral every 6 hours PRN Temp greater or equal to 38C (100.4F), Moderate Pain (4 - 6)  benzonatate 100 milliGRAM(s) Oral every 8 hours PRN Cough  HYDROmorphone  Injectable 2 milliGRAM(s) IV Push every 8 hours PRN Severe Pain (7 - 10)  traMADol 50 milliGRAM(s) Oral every 8 hours PRN Mild Pain (1 - 3)      Allergies    ibuprofen (Anaphylaxis)  codeine (Urticaria)    Intolerances        PAST MEDICAL & SURGICAL HISTORY:  CAD in native artery  RCA 3 YAYA 1 in 2002 and 2 in 2019      Arthritis      Lumbosacral disc disease  has nerve stimulator      HTN (hypertension)      HLD (hyperlipidemia)      Lung cancer  Stage I a1; RUL; s/p SBRT in 11/2021; stable disease as of 4/2022      Paroxysmal atrial fibrillation      COPD (chronic obstructive pulmonary disease) with emphysema      H/O heart artery stent  RCA      S/P cataract surgery  b/l eyes 2016      S/P CABG (coronary artery bypass graft)          SOCIAL HISTORY  Smoking History:       REVIEW OF SYSTEMS:    CONSTITUTIONAL:  No distress    HEENT:  Eyes:  No diplopia or blurred vision. ENT:  No earache, sore throat or runny nose.    CARDIOVASCULAR:  No pressure, squeezing, tightness, heaviness or aching about the chest; no palpitations.    RESPIRATORY:  per HPI     GASTROINTESTINAL:  No nausea, vomiting or diarrhea.    GENITOURINARY:  No dysuria, frequency or urgency.    NEUROLOGIC:  No paresthesias, fasciculations, seizures or weakness.    PSYCHIATRIC:  No disorder of thought or mood.    Vital Signs Last 24 Hrs  T(C): 36.6 (09 Dec 2023 12:00), Max: 36.9 (08 Dec 2023 20:42)  T(F): 97.8 (09 Dec 2023 12:00), Max: 98.4 (08 Dec 2023 20:42)  HR: 68 (09 Dec 2023 12:00) (67 - 80)  BP: 117/70 (09 Dec 2023 12:00) (110/64 - 117/70)  BP(mean): --  RR: 18 (09 Dec 2023 05:30) (17 - 18)  SpO2: 92% (09 Dec 2023 12:00) (92% - 96%)    Parameters below as of 09 Dec 2023 12:20  Patient On (Oxygen Delivery Method): nasal cannula, 3        PHYSICAL EXAMINATION:    GENERAL: The patient is awake and alert in no apparent distress.     HEENT: Head is normocephalic and atraumatic. Extraocular muscles are intact. Mucous membranes are moist.    NECK: Supple.    LUNGS: Now wheeze, respirations unlabored    HEART: Regular rate and rhythm     ABDOMEN: Soft, nontender, and nondistended.      EXTREMITIES: Without any cyanosis, clubbing, rash, lesions or edema.    NEUROLOGIC: Grossly intact.    LABS:                        10.7   5.66  )-----------( 552      ( 09 Dec 2023 06:36 )             32.5     12-09    141  |  98  |  17.8  ----------------------------<  158<H>  5.2   |  35.0<H>  |  0.40<L>    Ca    9.2      09 Dec 2023 06:36  Mg     1.9     12-09    TPro  6.3<L>  /  Alb  3.4  /  TBili  0.3<L>  /  DBili  x   /  AST  41<H>  /  ALT  60<H>  /  AlkPhos  187<H>  12-09        MICROBIOLOGY:    RADIOLOGY & ADDITIONAL STUDIES: PULMONARY PROGRESS NOTE      [facility][id number]    Patient is a 78y old  Male who presents with a chief complaint of Pneumonia, septic shock (09 Dec 2023 11:29)      INTERVAL HPI/OVERNIGHT EVENTS: Feeling improved. Still not at baseline but less sob, congestion.     MEDICATIONS  (STANDING):  albuterol/ipratropium for Nebulization 3 milliLiter(s) Nebulizer every 6 hours  apixaban 5 milliGRAM(s) Oral every 12 hours  atorvastatin 40 milliGRAM(s) Oral at bedtime  budesonide  80 MICROgram(s)/formoterol 4.5 MICROgram(s) Inhaler 1 Puff(s) Inhalation daily  chlorhexidine 2% Cloths 1 Application(s) Topical daily  diltiazem    milliGRAM(s) Oral daily  gabapentin 400 milliGRAM(s) Oral three times a day  magnesium oxide 400 milliGRAM(s) Oral three times a day with meals  methylPREDNISolone sodium succinate Injectable 40 milliGRAM(s) IV Push every 8 hours  sotalol. 120 milliGRAM(s) Oral every 12 hours  tamsulosin 0.4 milliGRAM(s) Oral at bedtime      MEDICATIONS  (PRN):  acetaminophen     Tablet .. 650 milliGRAM(s) Oral every 6 hours PRN Temp greater or equal to 38C (100.4F), Moderate Pain (4 - 6)  benzonatate 100 milliGRAM(s) Oral every 8 hours PRN Cough  HYDROmorphone  Injectable 2 milliGRAM(s) IV Push every 8 hours PRN Severe Pain (7 - 10)  traMADol 50 milliGRAM(s) Oral every 8 hours PRN Mild Pain (1 - 3)      Allergies    ibuprofen (Anaphylaxis)  codeine (Urticaria)    Intolerances        PAST MEDICAL & SURGICAL HISTORY:  CAD in native artery  RCA 3 YAYA 1 in 2002 and 2 in 2019      Arthritis      Lumbosacral disc disease  has nerve stimulator      HTN (hypertension)      HLD (hyperlipidemia)      Lung cancer  Stage I a1; RUL; s/p SBRT in 11/2021; stable disease as of 4/2022      Paroxysmal atrial fibrillation      COPD (chronic obstructive pulmonary disease) with emphysema      H/O heart artery stent  RCA      S/P cataract surgery  b/l eyes 2016      S/P CABG (coronary artery bypass graft)          SOCIAL HISTORY  Smoking History:       REVIEW OF SYSTEMS:    CONSTITUTIONAL:  No distress    HEENT:  Eyes:  No diplopia or blurred vision. ENT:  No earache, sore throat or runny nose.    CARDIOVASCULAR:  No pressure, squeezing, tightness, heaviness or aching about the chest; no palpitations.    RESPIRATORY:  per HPI     GASTROINTESTINAL:  No nausea, vomiting or diarrhea.    GENITOURINARY:  No dysuria, frequency or urgency.    NEUROLOGIC:  No paresthesias, fasciculations, seizures or weakness.    PSYCHIATRIC:  No disorder of thought or mood.    Vital Signs Last 24 Hrs  T(C): 36.6 (09 Dec 2023 12:00), Max: 36.9 (08 Dec 2023 20:42)  T(F): 97.8 (09 Dec 2023 12:00), Max: 98.4 (08 Dec 2023 20:42)  HR: 68 (09 Dec 2023 12:00) (67 - 80)  BP: 117/70 (09 Dec 2023 12:00) (110/64 - 117/70)  BP(mean): --  RR: 18 (09 Dec 2023 05:30) (17 - 18)  SpO2: 92% (09 Dec 2023 12:00) (92% - 96%)    Parameters below as of 09 Dec 2023 12:20  Patient On (Oxygen Delivery Method): nasal cannula, 3        PHYSICAL EXAMINATION:    GENERAL: The patient is awake and alert in no apparent distress.     HEENT: Head is normocephalic and atraumatic. Extraocular muscles are intact. Mucous membranes are moist.    NECK: Supple.    LUNGS: Now wheeze, respirations unlabored    HEART: Regular rate and rhythm     ABDOMEN: Soft, nontender, and nondistended.      EXTREMITIES: Without any cyanosis, clubbing, rash, lesions or edema.    NEUROLOGIC: Grossly intact.    LABS:                        10.7   5.66  )-----------( 552      ( 09 Dec 2023 06:36 )             32.5     12-09    141  |  98  |  17.8  ----------------------------<  158<H>  5.2   |  35.0<H>  |  0.40<L>    Ca    9.2      09 Dec 2023 06:36  Mg     1.9     12-09    TPro  6.3<L>  /  Alb  3.4  /  TBili  0.3<L>  /  DBili  x   /  AST  41<H>  /  ALT  60<H>  /  AlkPhos  187<H>  12-09        MICROBIOLOGY:    RADIOLOGY & ADDITIONAL STUDIES:

## 2023-12-10 NOTE — DISCHARGE NOTE PROVIDER - ATTENDING DISCHARGE PHYSICAL EXAMINATION:
PHYSICAL EXAM:    GENERAL: elderly male, laying in bed, NAD  HEAD:  Atraumatic, Normocephalic  EYES: EOMI, PERRLA, conjunctiva and sclera clear  ENMT: Moist mucous membranes  NECK: Supple   NERVOUS SYSTEM:  Alert & Oriented X3, Motor Strength 5/5 B/L upper and lower extremities   CHEST/LUNG: no wheezing, bilateral air entry  HEART: Regular rate and rhythm; + S1/S2  ABDOMEN: Soft, Nontender, Nondistended; Bowel sounds present  EXTREMITIES:  no pedal edema

## 2023-12-10 NOTE — DISCHARGE NOTE PROVIDER - CARE PROVIDERS DIRECT ADDRESSES
[email or web address]

## 2023-12-10 NOTE — DISCHARGE NOTE PROVIDER - HOSPITAL COURSE
Patient is a 78 year old male with hx of copd , SCC on chemo/XRT, hypomagnesemia, CAD s/p CABG/ PCI, COPD, Chronic back pain, a/w pna/ sepsis  , afib with rvr     Septic Shock due to PNA   - BP stable off pressers and midodrine  - Blood cx NGTD  - completed course of zosyn  - CXR and CT chest reviewed; RML/RLL PNA  - continue dysphagia diet - speech recs appreciated    Acute Hypoxic Respiratory Failure due to PNA and COPD  - remains hypoxic on 3 liters NC  - prednisone taper  - completed course of zosyn  - imaging as above  - continue bronchodilators   -home O2  - pulm recs appreciated    Afib w/ RVR  - now in NSR  - continue Sotalol, Cardizem and Eliquis  - EP recs appreciated    CAD s/p CABG/PCI  - denies anginal symptoms  - not on baby ASA  - continue Lipitor    Transaminitis due to hepatic steatosis  - LFTs relatively stable therefore will continue statin  - RUQ sono with hepatic steatosis  - monitor LFTS    NSCLC  - s/p Chemo/ XRT   - f/u with Dr. Rinaldi/ Regina at [facility] post DC    Chronic back pain  - S/P spinal stimulator   - resume home analgesic regimen; on dilaudid 2 mg TID as needed at home  - continue gabapentin    Anxiety  -xanax 0.25 mg bedtime PRN    BPH  -continue flomax    Patient is medically stable for discharge home. Patient is a 78 year old male with hx of copd , SCC on chemo/XRT, hypomagnesemia, CAD s/p CABG/ PCI, COPD, Chronic back pain, a/w pna/ sepsis  , afib with rvr     Septic Shock due to PNA   - BP stable off pressers and midodrine  - Blood cx NGTD  - completed course of zosyn  - CXR and CT chest reviewed; RML/RLL PNA  - continue dysphagia diet - speech recs appreciated    Acute Hypoxic Respiratory Failure due to PNA and COPD  - remains hypoxic on 3 liters NC  - prednisone taper  - completed course of zosyn  - imaging as above  - continue bronchodilators   -home O2  - pulm recs appreciated    Afib w/ RVR  - now in NSR  - continue Sotalol, Cardizem and Eliquis  - EP recs appreciated    CAD s/p CABG/PCI  - denies anginal symptoms  - not on baby ASA  - continue Lipitor    Transaminitis due to hepatic steatosis  - LFTs relatively stable therefore will continue statin  - RUQ sono with hepatic steatosis  - monitor LFTS    NSCLC  - s/p Chemo/ XRT   - f/u with Dr. Rinaldi/ Regina at [facility] post DC    Chronic back pain  - S/P spinal stimulator   - resume home analgesic regimen; on dilaudid 2 mg TID as needed at home  - continue gabapentin    Anxiety  -xanax 0.25 mg bedtime PRN    BPH  -continue flomax    Patient is medically stable for discharge home.

## 2023-12-10 NOTE — DISCHARGE NOTE PROVIDER - NSDCCPCAREPLAN_GEN_ALL_CORE_FT
PRINCIPAL DISCHARGE DIAGNOSIS  Diagnosis: Atrial flutter  Assessment and Plan of Treatment: please take sotalol, cardizem and eliquis as prescribed  follow up with Dr. Mayorga within 1-2 weeks      SECONDARY DISCHARGE DIAGNOSES  Diagnosis: Pneumonia  Assessment and Plan of Treatment: you have completed a couse of antibiotics for your pneumonia  continue supplemental oxygen as precribed  continue home inhalers  repeat Chest XRAY with Dr. Fuller within 4 weeks    Diagnosis: COPD exacerbation  Assessment and Plan of Treatment: please take prednisone taper as instructed  please continue home inhalers  please take over the counter cough medication as needed  use oxygen as directed and follow up with Dr. Fuller within 1 week    Diagnosis: Squamous cell lung cancer  Assessment and Plan of Treatment: follow up with Dr. Rinaldi as scheduled    Diagnosis: Transaminitis  Assessment and Plan of Treatment: your liver enzymes are improving  continue your cholesterol medication but please follow up with your PCP within 1 week for repeat labs  ultrasound showed that you had a fatty liver

## 2023-12-10 NOTE — DISCHARGE NOTE PROVIDER - CARE PROVIDER_API CALL
Epi Fuller  Pulmonary Disease  [address] [facility] [facility], [address]  [place] [address]  Phone: [phone]  Fax: [phone]  Follow Up Time:     Elvin Mayorga  Cardiac Electrophysiology  [address] [place] [id number]  Phone: [phone]  Fax: [phone]  Follow Up Time:    Epi Fuller  Pulmonary Disease  [address] [facility], [address]  [place] [address]  Phone: [phone]  Fax: [phone]  Follow Up Time:     Elvin Mayorga  Cardiac Electrophysiology  [address] [place] [id number]  Phone: [phone]  Fax: [phone]  Follow Up Time:

## 2023-12-10 NOTE — DISCHARGE NOTE PROVIDER - NSDCMRMEDTOKEN_GEN_ALL_CORE_FT
Albuterol (Eqv-ProAir HFA) 90 mcg/inh inhalation aerosol: 2 puff(s) inhaled every 4 hours as needed for  shortness of breath and/or wheezing  ALPRAZolam 0.25 mg oral tablet: 1 tab(s) orally 3 times a day as needed for anxiety MDD: 3 tabs  Anoro Ellipta 62.5 mcg-25 mcg/inh inhalation powder: 1 puff(s) inhaled once a day  apixaban 5 mg oral tablet: 1 tab(s) orally every 12 hours  atorvastatin 40 mg oral tablet: 1 tab(s) orally once a day (at bedtime)  dilTIAZem 240 mg/24 hours oral capsule, extended release: 1 cap(s) orally once a day  Flomax 0.4 mg oral capsule: 1 cap(s) orally once a day  gabapentin 400 mg oral capsule: 1 cap(s) orally 3 times a day  HYDROmorphone 2 mg oral tablet: 1 tab(s) orally 3 times a day as needed for pain  ondansetron 8 mg oral tablet: 1 tab(s) orally every 8 hours as needed for  nausea/vomiting  predniSONE 10 mg oral tablet: 1 tab(s) orally once a day Please take 4 tabs daily x 3 days then 3 tabs daily x 3 days then 2 tabs daily x 3 days and then 1 tab daily x 3 days  sotalol 120 mg oral tablet: 1 tab(s) orally every 12 hours  traMADol 100 mg oral tablet: 1 tab(s) orally once a day

## 2023-12-10 NOTE — DISCHARGE NOTE PROVIDER - PROVIDER TOKENS
PROVIDER:[TOKEN:[[id number]]],PROVIDER:[TOKEN:[[id number]]] PROVIDER:[TOKEN:[[id number]]],PROVIDER:[TOKEN:[[id number]]]

## 2023-12-10 NOTE — DISCHARGE NOTE PROVIDER - NSDCFUADDAPPT_GEN_ALL_CORE_FT
[facility] for Pneumonia    Please see below for post hospital follow up information     1. VIVO Meds To Bed: [phone] - AGREEABLE    2. Home Care:  [facility] AT [facility]    3. Transitional Care Services: [phone]    4, A. Primary Care Appointment:  PCP follow up appointment made with    Dr. Maynard  on  12/21/2023   at  12:15 PM.   If you are unable to attend your pre-scheduled appointment,   please contact the office directly at [phone] to reschedule.      4, B. Specialty Appointment:  Pulmonary follow up Appt scheduled with  Dr. Fuller   On 12/20/2023 at 3:30 PM.   If you are unable to attend your pre-scheduled appointment,   please contact the office directly at [phone] to reschedule.    5. STAR Education Packet Provided    [facility] for Pneumonia    Please see below for post hospital follow up information     1. VIVO Meds To Bed: [phone] - AGREEABLE    2. Home Care:  [facility] AT HOME    3. Transitional Care Services: [phone]    4, A. Primary Care Appointment:  PCP follow up appointment made with    Dr. Maynard  on  12/21/2023   at  12:15 PM.   If you are unable to attend your pre-scheduled appointment,   please contact the office directly at [phone] to reschedule.      4, B. Specialty Appointment:  Pulmonary follow up Appt scheduled with  Dr. Fuller   On 12/20/2023 at 3:30 PM.   If you are unable to attend your pre-scheduled appointment,   please contact the office directly at [phone] to reschedule.    5. STAR Education Packet Provided

## 2023-12-12 NOTE — DIETITIAN INITIAL EVALUATION ADULT. - PHYSICAL APPEARANCE
Past Medical History:   Diagnosis Date    Anemia 06/04/2021    Anticoagulant long-term use     Basal ganglia hemorrhage     Left basal ganglia hemorrhage with resultant right-sided hemiparesis which has resolved.     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        Past Surgical History:   Procedure Laterality Date    CARPAL TUNNEL RELEASE Left 2/19/2020    Procedure: RELEASE, CARPAL TUNNEL LEFT;  Surgeon: Maria Luisa Mccurdy MD;  Location: [facility] OR;  Service: Orthopedics;  Laterality: Left;    COLONOSCOPY N/A 7/28/2023    Procedure: COLONOSCOPY;  Surgeon: Jaylene Alvarado MD;  Location: [facility] ENDO;  Service: Endoscopy;  Laterality: N/A;    EPIDURAL STEROID INJECTION N/A 5/2/2019    Procedure: Injection, Steroid, Epidural Cervical;  Surgeon: Dariel Doan Jr., MD;  Location: [facility] ENDO;  Service: Pain Management;  Laterality: N/A;  Cervical Epidural Steroid Injection     17332    Arrive @ 1130; ASA; Check BG    EPIDURAL STEROID INJECTION Bilateral 5/29/2019    Procedure: Lumbar Medial Branch Blocks;  Surgeon: Dariel Doan Jr., MD;  Location: [facility] ENDO;  Service: Pain Management;  Laterality: Bilateral;  Bilateral Lumbar Medial Branch Blocks L3, L4, L5    [id number]  [id number]    Attive @ 1030 (11 arrival request); NO Sedation; ASA; Check BG    EPIDURAL STEROID INJECTION Bilateral 7/3/2019    Procedure: Lumbar Medial Branch Blocks;  Surgeon: Dariel Doan Jr., MD;  Location: [facility]  ENDO;  Service: Pain Management;  Laterality: Bilateral;  Bilateral Lumbar Medial Branch Blocks L3, L4, L5    [id number]  [id number]    Arrive @ 0930; NO Sedation; ASA; Check BG    EPIDURAL STEROID INJECTION N/A 8/7/2019    Procedure: Injection, Steroid, Epidural Cervical;  Surgeon: Dariel Doan Jr., MD;  Location: [facility] ENDO;  Service: Pain Management;  Laterality: N/A;  Cervical Epidural Steroid Injection C7-T1    23111    Arrive @ 1115; Last ASA 7/30; Check BG    ESOPHAGOGASTRODUODENOSCOPY N/A 7/28/2023    Procedure: EGD (ESOPHAGOGASTRODUODENOSCOPY);  Surgeon: Jaylene Alvarado MD;  Location: [facility] ENDO;  Service: Endoscopy;  Laterality: N/A;  inst via portal    LEFT HEART CATHETERIZATION Left 9/21/2021    Procedure: Left heart cath 9am start, R rad access;  Surgeon: Dariel Conner MD;  Location: [facility] CATH LAB;  Service: Cardiology;  Laterality: Left;  RN PRE OP Covid NEGATIVE ON  9-20-21.  C A    MIDLINE CATHETER PLACEMENT  12/12/2023    NO PAST SURGERIES         Review of patient's allergies indicates:   Allergen Reactions    Tomato (solanum lycopersicum) Hives    Naproxen Hives    Shrimp Other (See Comments)       Medications:  Medications Prior to Admission   Medication Sig    acetaminophen (TYLENOL) 650 MG TbSR Take 1,300 mg by mouth every 6 (six) hours as needed (Pain).    ascorbic acid, vitamin C, (VITAMIN C) 500 MG tablet Take 500 mg by mouth once daily.    atorvastatin (LIPITOR) 40 MG tablet Take 1 tablet (40 mg total) by mouth once daily.    cyclobenzaprine (FLEXERIL) 10 MG tablet Take 10 mg by mouth 3 (three) times daily as needed for Muscle spasms.    docusate sodium (COLACE) 100 MG capsule Take 100 mg by mouth daily as needed for Constipation.    furosemide (LASIX) 20 MG tablet Take 1 tablet (20 mg total) by mouth 2 (two) times daily.    gabapentin (NEURONTIN) 600 MG tablet TAKE 1 TABLET(600 MG) BY MOUTH THREE TIMES DAILY AS NEEDED    HYDROcodone-acetaminophen (NORCO) 5-325 mg per tablet Take 1  tablet by mouth every 6 (six) hours as needed for Pain.    PREVIDENT 5000 SENSITIVE 1.1-5 % Pste Brush teeth every other day as directed.    tamsulosin (FLOMAX) 0.4 mg Cap Take 0.4 mg by mouth once daily.    aspirin (ECOTRIN) 81 MG EC tablet Take 81 mg by mouth once daily.    blood pressure test kit-large Kit 1 Device by Misc.(Non-Drug; Combo Route) route 2 (two) times daily.    blood sugar diagnostic Strp To check BG 2 times daily, to use with insurance preferred meter    blood-glucose meter kit To check BG 2 times daily, to use with insurance preferred meter    diltiaZEM (CARDIZEM CD) 240 MG 24 hr capsule Take 1 capsule (240 mg total) by mouth once daily. (Patient not taking: Reported on 12/11/2023)    fluticasone-salmeterol diskus inhaler 500-50 mcg Inhale 1 puff into the lungs 2 (two) times daily. Controller    lancets Misc To check BG 2 times daily, to use with insurance preferred meter    PFIZER COVID BIVAL,12Y UP,,PF, 30 mcg/0.3 mL injection      Antibiotics (From admission, onward)      Start     Stop Route Frequency Ordered    12/11/23 2300  vancomycin 2 g in dextrose 5 % 500 mL IVPB         -- IV Every 24 hours (non-standard times) 12/11/23 0720    12/11/23 1610  metronidazole IVPB 500 mg         -- IV Every 8 hours (non-standard times) 12/11/23 1610    12/11/23 0800  ceFEPIme (MAXIPIME) 2 g in dextrose 5 % in water (D5W) 100 mL IVPB (MB+)         -- IV Every 8 hours (non-standard times) 12/11/23 0711    12/11/23 0752  vancomycin - pharmacy to dose  (vancomycin IVPB (PEDS and ADULTS))        See Hyperspace for full Linked Orders Report.    -- IV pharmacy to manage frequency 12/11/23 0654          Antifungals (From admission, onward)      None          Antivirals (From admission, onward)      None             Immunization History   Administered Date(s) Administered    COVID-19, MRNA, LN-S, PF (MODERNA FULL 0.5 ML DOSE) 05/17/2022    COVID-19, MRNA, LN-S, PF (Pfizer) (Purple Cap) 09/22/2022    COVID-19,  mRNA, LNP-S, PF, kim-sucrose, 30 mcg/0.3 mL (Pfizer 2023 Ages 12+) 10/12/2023    COVID-19, mRNA, LNP-S, bivalent booster, PF (Moderna Omicron)12 + YEARS 09/22/2022    COVID-19, mRNA, LNP-S, bivalent booster, PF (PFIZER OMICRON) 06/14/2023    COVID-19, vector-nr, rS-Ad26, PF (Apurva) 03/08/2021, 11/05/2021    Influenza 10/22/2012, 02/28/2014    Influenza (FLUAD) - Quadrivalent - Adjuvanted - PF *Preferred* (65+) 09/06/2023    Influenza (FLUAD) - Trivalent - Adjuvanted - PF (65+) 10/10/2018, 10/10/2018, 09/12/2019, 09/12/2019    Influenza - High Dose - PF (65 years and older) 10/23/2017, 10/23/2017    Influenza - Quadrivalent 09/23/2015    Influenza - Quadrivalent - High Dose - PF (65 years and older) 09/10/2020, 09/10/2020, 09/29/2021, 09/22/2022    Influenza - Quadrivalent - PF *Preferred* (6 months and older) 12/03/2016    Influenza - Trivalent (ADULT) 10/22/2012, 02/28/2014    Influenza Split 10/22/2012, 02/28/2014    Pneumococcal Conjugate - 13 Valent 02/19/2016, 10/10/2018, 10/10/2018    Pneumococcal Polysaccharide - 23 Valent 03/07/2017, 09/12/2019, 09/12/2019, 09/10/2020, 09/10/2020    RSVpreF (Arexvy) 10/12/2023    Tdap 02/19/2016       Family History       Problem Relation (Age of Onset)    Diabetes Paternal Grandfather    Heart disease Paternal Grandfather    Hypertension     No Known Problems Mother, Father, Sister, Brother, Maternal Aunt, Maternal Uncle, Paternal Aunt, Paternal Uncle, Maternal Grandmother, Maternal Grandfather, Paternal Grandmother          Social History     Socioeconomic History    Marital status:     Number of children: 8    Highest education level: 11th grade   Occupational History    Occupation:    Tobacco Use    Smoking status: Never    Smokeless tobacco: Never   Substance and Sexual Activity    Alcohol use: Yes     Alcohol/week: 0.0 standard drinks of alcohol     Comment: occacionally    Drug use: No    Sexual activity: Not Currently     Social Determinants of  Health     Financial Resource Strain: Low Risk  (3/7/2023)    Overall Financial Resource Strain (CARDIA)     Difficulty of Paying Living Expenses: Not very hard   Food Insecurity: No Food Insecurity (3/7/2023)    Hunger Vital Sign     Worried About Running Out of Food in the Last Year: Never true     Ran Out of Food in the Last Year: Never true   Transportation Needs: No Transportation Needs (3/7/2023)    PRAPARE - Transportation     Lack of Transportation (Medical): No     Lack of Transportation (Non-Medical): No   Physical Activity: Inactive (3/7/2023)    Exercise Vital Sign     Days of Exercise per Week: 0 days     Minutes of Exercise per Session: 0 min   Stress: No Stress Concern Present (3/7/2023)    Citizen of [place] of Occupational Health - Occupational Stress Questionnaire     Feeling of Stress : Not at all   Social Connections: Socially Isolated (3/7/2023)    Social Connection and Isolation Panel [NHANES]     Frequency of Communication with Friends and Family: More than three times a week     Frequency of Social Gatherings with Friends and Family: More than three times a week     Attends [employer] Services: Never     Active Member of Clubs or Organizations: No     Attends Club or Organization Meetings: Never     Marital Status:    Housing Stability: Unknown (3/7/2023)    Housing Stability Vital Sign     Unable to Pay for Housing in the Last Year: No     Unstable Housing in the Last Year: No     Review of Systems   Unable to perform ROS: Patient nonverbal     Objective:     Vital Signs (Most Recent):  Temp: 99.3 °F (37.4 °C) (12/12/23 1237)  Pulse: (!) 126 (12/12/23 1237)  Resp: (!) 28 (12/12/23 1237)  BP: (!) 152/80 (12/12/23 1237)  SpO2: 95 % (12/12/23 1237) Vital Signs (24h Range):  Temp:  [96.7 °F (35.9 °C)-101.3 °F (38.5 °C)] 99.3 °F (37.4 °C)  Pulse:  [101-155] 126  Resp:  [14-30] 28  SpO2:  [94 %-100 %] 95 %  BP: (125-164)/(79-92) 152/80     Weight: 120.2 kg (265 lb)  Body mass index is 39.13  kg/m².    Estimated Creatinine Clearance: 78.8 mL/min (based on SCr of 1.1 mg/dL).     Physical Exam  Vitals reviewed.   Constitutional:       General: He is not in acute distress.     Appearance: He is ill-appearing. He is not toxic-appearing or diaphoretic.   HENT:      Nose: Nose normal.      Mouth/Throat:      Mouth: Mucous membranes are moist.      Pharynx: Oropharynx is clear.   Cardiovascular:      Rate and Rhythm: Normal rate and regular rhythm.      Pulses: Normal pulses.      Heart sounds: Normal heart sounds. No murmur heard.  Pulmonary:      Effort: Pulmonary effort is normal. No respiratory distress.      Breath sounds: Normal breath sounds. No stridor.   Abdominal:      General: Abdomen is flat. There is no distension.      Palpations: Abdomen is soft.      Tenderness: There is no abdominal tenderness. There is no guarding or rebound.   Musculoskeletal:         General: Swelling present. No deformity. Normal range of motion.      Right lower leg: No edema.      Left lower leg: No edema.   Skin:     General: Skin is warm.      Findings: No lesion or rash.   Neurological:      Mental Status: He is disoriented.      Motor: Weakness present.      Gait: Gait abnormal.          Significant Labs: All pertinent labs within the past 24 hours have been reviewed.    Significant Imaging: I have reviewed all pertinent imaging results/findings within the past 24 hours.   debilitated/26

## 2023-12-18 NOTE — ASSESSMENT
[FreeTextEntry1] : 78-year M with PMHX of HTN, CAD s/p CABG x3v, COPD, Afib, Lung nodules, former smoker coronary stent placement, chronic low back pain, HTN, HLD implanted loop recorder, and implanted spinal cord stimulator.   Presents for initial consultation for squamous cell ca of the lung.  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  - He was started on Concurrent Chemo Rt on 11/27/23  Patient was subsequently admitted to [facility] from 11/30/23- 12/10/23 due to Septic Shock due to PNA  was admitted to MICU on pressers and midodrine. IV antibiotics zosyn  CXR and CT chest reviewed; RML/RLL PNA

## 2023-12-18 NOTE — HISTORY OF PRESENT ILLNESS
[de-identified] : JENNIFER MOORE is a 78 year M with PMHX of  HTN, CAD s/p CABG x3v, COPD, Afib, Lung nodules, former smoker  presents for initial consultation for squamous cell ca of the lung.  Patient has been following Dr. Tapia since 2021 for RML lung nodule. Given his medical comorbidities and his prior discussions with Colten Fuller and [facility], he is not felt to be a good candidate at that time for biopsy or surgery at this time. Patient completed RT in Nov 24, 2021  CT Chest on 6/24/23 reported a 2.3 x 1 cm oval opacity within posterior right upper lobe is more conspicuous from prior exam. Planned to have repeat CT in 3 months.   10/6/23 CT Chest demonstrated unchanged right upper lobe 1.5 cm solid nodule, but larger compared to  more remote studies. Differential includes neoplasm and nodular scarring.  Patient presented to [facility] ED on 10/10/23 c/o hemoptysis x 4 days.  CTA performed on 10/11/23 reported right  hilar 1.6 x 1.1 cm node,  1.7 x 0.9 cm nodular opacity identified within  the right mainstem bronchus is unchanged compared to  10/6/2023 and new compared to 7/6/2022. Mucoid impacted segmental airways  in the right upper, middle, and lower lobes. Posterior right  upper lobe nodular opacity as on 10/6/2023, but progressed compared to 7/6/2022.  Patient s/p  bronchoscopy 10/14 Right upper endobronchial ueof-mcyseniz-urvoz-op assessment revealed Non- small cell carcinoma, favor squamous cell carcinoma. Core biopsy blocks show small fragments of squamous cell carcinoma involving fibrous tissue with focal hemorrhage. Note: Immunostains corroborate these findings and show that P40 is diffusely and strongly positive. TTF1 is negative.  PDL1 is in progress and results will be reported in an addendum.  10/22/23 PET/CT: Focal activity localizing to the right main bronchus shows SUV 9.0 and localizes to a 1.7 x 0.6 cm nodularity better seen on  the diagnostic CT. This is compatible with recently biopsied malignancy. -Thin linear opacities laterally in the right upper lobe with patchy activity showing up to SUV 1.9 compatible with history of prior radiation therapy to the area. More nodular component posteriorly  measures 1.8 x 1.0 cm with SUV 4.3 (measured 1.4 x 0.8 cm on the diagnostic CT); this is suspicious for additional site of malignant  process. -New since the most recent CT chest, scattered peripheral ground glass changes in the right lower lobe with mild activity; for example medially in the superior segment (image 87) with SUV 2.2. Most likely inflammatory process.  Past medical history includes paroxysmal atrial fibrillation, CAD (s/p CABG x 3), coronary stent placement, chronic low back pain, COPD, former cigarette smoker stopped 7 years ago, hypercholesterolemia, hypertension, implanted loop recorder, and implanted spinal cord stimulator.  No h/o rheumatological conditions, SLE, US, Crohn's disease. He took one-week course of steroids for COPD and has hx of taking steroids for pneumonia. [de-identified] : pateint here for f/u appointment  He was started on Concurrent Chemo Rt on 11/27/23   Patient was subsequently admitted to [facility] from 11/30/23- 12/10/23 due to Septic Shock due to PNA  was admitted to MICU on pressers and midodrine. IV antibiotics zosyn - CXR and CT chest reviewed; RML/RLL PNA

## 2023-12-18 NOTE — RESULTS/DATA
[FreeTextEntry1] : 10/22/23 PET/CT LUNGS: Focal activity localizing to the right main bronchus (image 79)  shows SUV 9.0 and localizes to a 1.7 x 0.6 cm nodularity better seen on  the diagnostic CT. This is compatible with recently biopsied malignancy. -Thin linear opacities laterally in the right upper lobe (image 75) with  patchy activity showing up to SUV 1.9 compatible with history of prior  radiation therapy to the area. More nodular component posteriorly (image  76) measures 1.8 x 1.0 cm with SUV 4.3 (measured 1.4 x 0.8 cm on the  diagnostic CT); this is suspicious for additional site of malignant  process. -New since the most recent CT chest, scattered peripheral groundglass  changes in the right lower lobe with mild activity; for example medially  in the superior segment (image 87) with SUV 2.2. Most likely inflammatory  process.   10/13/23 Pathology  Fine Needle Aspiration Report - Auth (Verified)  Specimen(s) Submitted LUNG, RIGHT UPPER, ENDOBRONCHIAL BIOPSY  Final Diagnosis LUNG, RIGHT UPPER, ENDOBRONCHIAL BIOPSY POSITIVE FOR MALIGNANT CELLS. Non- small cell carcinoma, favor squamous cell carcinoma.  Cytology slides reveal a hypercellular specimen composed of crowded groups  and single-lying malignant cells displaying anisonucleosis, irregular chromatin, and scant cytoplasm. Core biopsy blocks show small fragments of squamous cell carcinoma involving fibrous tissue with focal hemorrhage. Note: Immunostains corroborate these findings and show that P40 is diffusely and strongly positive. TTF1 is negative.  PDL1 is in progress and results will be reported in an addendum.  10/11/23 CTA MEDIASTINUM: Normal heart size. Post CABG. No pericardial effusion.  Thoracic aorta normal caliber.  No large mediastinal lymph nodes. Right  hilar 1.6 x 1.1 cm node (image 66, series 5)  AIRWAYS, LUNGS, PLEURA: 1.7 x 0.9 cm nodular opacity identified within  the right mainstem bronchus (image 62, series 5) is unchanged compared to  10/6/2023 and new compared to 7/6/2022. Mucoid impacted segmental airways  in the right upper, middle, and lower lobes. Emphysema. Posterior right  upper lobe nodular opacity (image 195, series 6) as on 10/6/2023, but  progressed compared to 7/6/2022.  10/6/23 CT Chest IMPRESSION:  Unchanged right upper lobe 1.5 cm solid nodule, but larger compared to  more remote studies. Differential includes neoplasm and nodular scarring.  10/18/21 CT chest noted a 0.8 x 0.5 CM nodule in the right upper lobe, which had been slowly growing compared to prior, and suspicious for a lung neoplasm. Also noted was a 0.4 CM nodule in the right middle lobe, which had been stable from 2016, and stable small calcified granulomas. There was no mediastinal or hilar adenopathy.

## 2023-12-18 NOTE — ADDENDUM
[FreeTextEntry1] : Documented by Bibiana Morse acting as a scribe for Dr. Rinaldi on [mm//dd/yyyy].   All Medical record entries made by the scribe were at my, Dr. Rinaldi, direction and personally directed by me on 10/30/2023. I have reviewed the chart and agree that the record accurately reflects my personal performance of the history, physical exam, assessment, and plan. I have also personally directed, reviewed, and agreed with the discharge instructions.

## 2023-12-20 PROBLEM — J96.11 CHRONIC RESPIRATORY FAILURE WITH HYPOXIA: Status: ACTIVE | Noted: 2020-12-29

## 2023-12-20 PROBLEM — Z09 HOSPITAL DISCHARGE FOLLOW-UP: Status: ACTIVE | Noted: 2021-05-19

## 2023-12-20 PROBLEM — C34.90 NON-SMALL CELL CARCINOMA OF LUNG: Status: ACTIVE | Noted: 2023-01-01

## 2023-12-20 PROBLEM — J18.9 PNEUMONIA: Status: ACTIVE | Noted: 2021-01-08

## 2023-12-20 PROBLEM — C34.11 MALIGNANT NEOPLASM OF UPPER LOBE OF RIGHT LUNG: Status: ACTIVE | Noted: 2021-10-28

## 2023-12-20 NOTE — CONSULT LETTER
[Dear  ___] : Dear  [unfilled], [Consult Letter:] : I had the pleasure of evaluating your patient, [unfilled]. [Please see my note below.] : Please see my note below. [Consult Closing:] : Thank you very much for allowing me to participate in the care of this patient.  If you have any questions, please do not hesitate to contact me. [Sincerely,] : Sincerely, [FreeTextEntry3] : Epi Fuller MD FCCP\par  Pulmonary/Critical Care/Sleep Medicine\par  Department of Internal Medicine\par  \par  [facility] [employer]\par

## 2023-12-20 NOTE — HISTORY OF PRESENT ILLNESS
[Doing Well] : doing well [Cough] : denies coughing [Coughing Up Sputum] : denies coughing up sputum [Wheezing] : denies wheezing [Regional Soft Tissue Swelling Both Lower Extremities] : denies lower extremity edema [Adherent] : the patient is adherent with ~his/her~ medication regimen [TextBox_4] :  Hospital Course: Discharge Date	10-Dec-2023 Admission Date	30-Nov-2023 16:21 Reason for Admission	Pneumonia, septic shock Hospital Course	 Patient is a 78 year old male with hx of copd , SCC on chemo/XRT, hypomagnesemia, CAD s/p CABG/ PCI, COPD, Chronic back pain, a/w pna/ sepsis  , afib with rvr   Septic Shock due to PNA - BP stable off pressers and midodrine - Blood cx NGTD - completed course of zosyn - CXR and CT chest reviewed; RML/RLL PNA - continue dysphagia diet - speech recs appreciated   Acute Hypoxic Respiratory Failure due to PNA and COPD - remains hypoxic on 3 liters NC - prednisone taper - completed course of zosyn - imaging as above - continue bronchodilators  -home O2 - pulm recs appreciated   Afib w/ RVR - now in NSR - continue Sotalol, Cardizem and Eliquis - EP recs appreciated   CAD s/p CABG/PCI - denies anginal symptoms - not on baby ASA - continue Lipitor   Transaminitis due to hepatic steatosis - LFTs relatively stable therefore will continue statin - RUQ sono with hepatic steatosis - monitor LFTS   NSCLC - s/p Chemo/ XRT - f/u with Dr. Rinaldi/ [place] at [facility] post DC   Chronic back pain - S/P spinal stimulator - resume home analgesic regimen; on dilaudid 2 mg TID as needed at home - continue gabapentin   Anxiety -xanax 0.25 mg bedtime PRN   BPH -continue flomax   Patient is medically stable for discharge home.   Med Reconciliation: Medication Reconciliation Status	Admission Reconciliation is Not Complete Discharge Reconciliation is Completed Discharge Medications	Albuterol (Eqv-ProAir HFA) 90 mcg/inh inhalation aerosol: 2 puff(s) inhaled every 4 hours as needed for  shortness of breath and/or wheezing ALPRAZolam 0.25 mg oral tablet: 1 tab(s) orally 3 times a day as needed for anxiety MDD: 3 tabs Anoro Ellipta 62.5 mcg-25 mcg/inh inhalation powder: 1 puff(s) inhaled once a day apixaban 5 mg oral tablet: 1 tab(s) orally every 12 hours atorvastatin 40 mg oral tablet: 1 tab(s) orally once a day (at bedtime) dilTIAZem 240 mg/24 hours oral capsule, extended release: 1 cap(s) orally once a day Flomax 0.4 mg oral capsule: 1 cap(s) orally once a day gabapentin 400 mg oral capsule: 1 cap(s) orally 3 times a day HYDROmorphone 2 mg oral tablet: 1 tab(s) orally 3 times a day as needed for pain ondansetron 8 mg oral tablet: 1 tab(s) orally every 8 hours as needed for nausea/vomiting predniSONE 10 mg oral tablet: 1 tab(s) orally once a day Please take 4 tabs daily x 3 days then 3 tabs daily x 3 days then 2 tabs daily x 3 days and then 1 tab daily x 3 days sotalol 120 mg oral tablet: 1 tab(s) orally every 12 hours traMADol 100 mg oral tablet: 1 tab(s) orally once a day , , Care Plan/Procedures: Discharge Diagnoses, Assessment and Plan of Treatment	PRINCIPAL DISCHARGE DIAGNOSIS Diagnosis: Atrial flutter Assessment and Plan of Treatment: please take sotalol, cardizem and eliquis as prescribed follow up with Dr. Mayorga within 1-2 weeks     SECONDARY DISCHARGE DIAGNOSES Diagnosis: Pneumonia Assessment and Plan of Treatment: you have completed a couse of antibiotics for your pneumonia continue supplemental oxygen as precribed continue home inhalers repeat Chest XRAY with Dr. Fuller within 4 weeks   Diagnosis: COPD exacerbation Assessment and Plan of Treatment: please take prednisone taper as instructed please continue home inhalers please take over the counter cough medication as needed use oxygen as directed and follow up with Dr. Fuller within 1 week   Diagnosis: Squamous cell lung cancer Assessment and Plan of Treatment: follow up with Dr. Rinaldi as scheduled   Diagnosis: Transaminitis Assessment and Plan of Treatment: your liver enzymes are improving continue your cholesterol medication but please follow up with your PCP within 1 week for repeat labs ultrasound showed that you had a fatty liver Goal(s)	To get better and follow your care plan as instructed. Follow Up: Care Providers for Follow up (PCP/Outpatient Provider)	[facility] [address] [place] [address], [address] [place] [address] Phone: [phone] Fax: [phone] Follow Up Time:   Elvin Mayorga Cardiac Electrophysiology [address] [place] [address] Phone: [phone] Fax: [phone] Follow Up Time: Additional Provider Info (For SysAdmin Use Only)	 PROVIDER:[TOKEN:[[id number]]],PROVIDER:[TOKEN:[[id number]]] Care Providers Direct Addresses (For SYSAdmin Use Only)	 [email or web address] NPI number (For SysAdmin Use Only) :	[[id number]] Additional Scheduled Appointments	Star Patient for Pneumonia   Please see below for post hospital follow up information   1. VIVO Meds To Bed: [phone] - AGREEABLE   2. Home Care:  [facility] HOME   3. Transitional Care Services: [phone]   4, A. Primary Care Appointment:  PCP follow up appointment made with Dr. Maynard  on  12/21/2023   at  12:15 PM. If you are unable to attend your pre-scheduled appointment, please contact the office directly at [phone] to reschedule.   4, B. Specialty Appointment:  Pulmonary follow up Appt scheduled with  Dr. Fuller On 12/20/2023 at 3:30 PM. If you are unable to attend your pre-scheduled appointment, please contact the office directly at [phone] to reschedule. [Difficulty Breathing During Exertion] : improved dyspnea on exertion [Feelings Of Weakness On Exertion] : improved exercise intolerance [de-identified] : afib, CAD,Non-small cell carcinoma TNM stage I a N0 M0, cad,Status post SBRT completed 11/24/2021 [de-identified] : see above

## 2023-12-20 NOTE — RESULTS/DATA
[TextEntry] : ACC: [id number] EXAM: CT CHEST ORDERED BY: RAFAEL UNGER PROCEDURE DATE: 12/07/2023 INTERPRETATION: HISTORY: Confusion. Atrial flutter. Non-small cell lung cancer. EXAMINATION: CT CHEST was performed without IV contrast. COMPARISON: 11/30/2023 CT chest. FINDINGS: AIRWAYS, LUNGS, PLEURA: Persistent endobronchial occlusion of the segmental right upper lobe airways (image 64, series 3). Emphysema. Consolidation throughout the right middle lobe and right lower lobe and patchy posterior left apical ground-glass opacity. Right upper lobe opacity from 11/30/2023 markedly improved. There is a 2 x 1.2 cm nodular opacity identified in the right upper lobe (image 64, series 3). Trace right pleural effusion. MEDIASTINUM: Normal heart size. Post CABG. No pericardial effusion. Dilated ascending thoracic aorta measures 4 cm. No large mediastinal lymph nodes. IMAGED ABDOMEN: Unremarkable. SOFT TISSUES: Unremarkable. BONES: Sternal wires. Neurostimulator device in place. IMPRESSION:. Pneumonia most severe involving the right middle lobe and right lower lobe. Recommend CT chest follow-up in 1 month to ensure clearing. Trace right pleural effusion. Persistent endobronchial occlusion of the segmental right upper lobe airways as on 11/30/2023. Indeterminate 2 x 1.2 cm nodular opacity in the right upper lobe. IRENE NAVARRO MD; Attending Radiologist This document has been electronically signed. Dec 7 2023 11:04AM

## 2023-12-20 NOTE — DISCUSSION/SUMMARY
[COPD] : chronic obstructive pulmonary disease [Stage III (Severe)] : stage III (severe) [Patient] : the patient [Improving] : improving [Responding to Treatment] : responding to treatment [None] : There are no changes in medication management [Supplemental Oxygen] : supplemental oxygen [de-identified] : s/p sepsis and CAP and complicated by lung CA

## 2023-12-20 NOTE — END OF VISIT
[Time Spent: ___ minutes] : I have spent [unfilled] minutes of time on the encounter. [FreeTextEntry3] : Hospital admission reviewed, discharge summary reviewed, PACS reviewed with patient and wife

## 2024-01-01 ENCOUNTER — APPOINTMENT (OUTPATIENT)
Age: 79
End: 2024-01-01

## 2024-01-01 ENCOUNTER — APPOINTMENT (OUTPATIENT)
Dept: HEMATOLOGY ONCOLOGY | Facility: CLINIC | Age: 79
End: 2024-01-01

## 2024-01-17 NOTE — DISEASE MANAGEMENT
[Clinical] : TNM Stage: c [N/A] : Currently not applicable [FreeTextEntry4] : recurrent NSCLC (RUL, squamous) [TTNM] : - [NTNM] : - [MTNM] : 0

## 2024-01-17 NOTE — HISTORY OF PRESENT ILLNESS
[FreeTextEntry1] : 78 year old gentleman with clinical lung cancer (RUL, cT1a N0), status post SBRT completed 11/24/21.  Recall, CT chest on 10/6/23 had shown right upper lobe 1.5 cm solid nodule, but larger compared to more remote studies.  However, he had episode of hemoptysis, and was directed to [facility] for evaluation.  10/11/23 CTA chest showed no pulmonary embolism, but a 1.7 x 0.9 cm nodular opacity within the right mainstem bronchus. Also noted was a posterior right upper lobe 1.4 x 0.8 cm nodular opacity.  He underwent bronchoscopy on 10/14/23, and endobronchial mass biopsy showed non- small cell carcinoma, favor squamous cell carcinoma.  Discharged 10/16/23.  10/22/23 PET/CT showed focal activity in the right main bronchus, localizing to a 1.7 x 0.6 cm nodule (SUV 9.0). There was also a nodular component posteriorly (image 76) measuring 1.8 x 1 cm (SUV 4.3), suspicious for additional site of malignant process. No evidence of distant metastases.  Interval history: admitted [facility] on  11/30/23 with sepsis and A fib 12/7/23 Ct chest: IMPRESSION: Pneumonia most severe involving the right middle lobe and right lower lobe. Recommend CT chest follow-up in 1 month to ensure clearing. Trace right pleural effusion. Persistent endobronchial occlusion of the segmental right upper lobe airways as on 11/30/2023. Indeterminate 2 x 1.2 cm nodular opacity in the right upper lobe. CT head: IMPRESSION: No evidence of acute intracranial hemorrhage, midline shift or CT evidence of acute territorial infarct. If the patient's symptoms persist, consider short interval follow-up head CT or brain MRI if there are no MRI contraindications.  discharged home 12/10/23  on 12/27/23 was unresponsive and sent to [facility] by ambulance   exertional dyspnea, cough with  sputum, intermittent orthopnea, hemoptysis, wheezing right sided chest pain requiring hydromorphone 2mg daily. No odynophagia, dysphagia, or unintentional weight loss  Care team Thoracic surgeon: Myles WileyOnc: Melissa Subramanian PCP: Venkatesh Maynard Pulm: Epi Fuller Card: Jassi Fink vascular Quoc Chaves

## 2024-01-17 NOTE — DISEASE MANAGEMENT
[FreeTextEntry4] : recurrent NSCLC (RUL, squamous) [TTNM] : - [NTNM] : - [MTNM] : 0 [de-identified] : 491 [de-identified] : 2924 [de-identified] : lung

## 2024-01-20 NOTE — ASU PATIENT PROFILE, ADULT - BRADEN SCORE (IF 18 OR LESS ACTIVATE SKIN INJURY RISK INCREASED GUIDELINE), MLM
22
Pt re-evaluated reports some improvement in SOB. VBG with respiratory acidosis, troponin elevated to 185, BNP elevated- 3300. Cardiology consulted. Pt declined bipap

## 2024-01-25 ENCOUNTER — APPOINTMENT (OUTPATIENT)
Dept: ELECTROPHYSIOLOGY | Facility: CLINIC | Age: 79
End: 2024-01-25

## 2024-01-29 NOTE — PATIENT PROFILE ADULT - FUNCTIONAL SCREEN CURRENT LEVEL: COMMUNICATION, MLM
Attempted to reach patient via telephone.  Left message. No response.  Patient portal message sent requesting an update on blood pressure readings since starting losartan.   
In an effort to ensure that our patients LiveWell, a Team Member has reviewed your chart and identified an opportunity to provide the best care possible. An attempt was made to discuss or schedule overdue Preventive or Disease Management screening.     The Outcome was Contact was not made, left messageIf you have any questions or need help with scheduling, contact your primary care provider.. Care Gaps include Hypertension.    
Recording entered in chart  
0 = understands/communicates without difficulty

## 2024-02-14 NOTE — DISCHARGE NOTE PROVIDER - DISCHARGE DATE
"Discharge facility: [facility]  Discharge diagnosis: Hydradenitis  Admission date: 2/12/24  Discharge date: 2/13/24  Follow Up Appointment Date: Follow up with Ham Gonzalez MD (Pediatric Surgery) in 1 week (2/20/2024); for drain removal on 2/21/24 at 10am in [facility]- 1st floor of [facility] main [place], mom \"will call to schedule today.\"    Outreach call to patient's mother to support a smooth transition of care from recent admission.  Spoke with patient's mother, reviewed discharge medications, discharge instructions, assessed social needs, and provided education on importance of follow-up appointment with provider.  Will continue to monitor through transition period.     Engagement  Call Start Time: 1256 (2/14/2024  1:00 PM)    Medications  Medications reviewed with patient/caregiver?: Yes (2/14/2024  1:00 PM)  Is the patient having any side effects they believe may be caused by any medication additions or changes?: No (2/14/2024  1:00 PM)  Does the patient have all medications ordered at discharge?: Yes (2/14/2024  1:00 PM)  Care Management Interventions: No intervention needed (2/14/2024  1:00 PM)  Prescription Comments: START taking these medications     clindamycin 300 mg capsule; Commonly known as: Cleocin; Take 2 capsules   (600 mg) by mouth 3 times a day for 4 days.     CHANGE how you take these medications     ibuprofen 600 mg tablet; Take 1 tablet (600 mg) by mouth every 6 hours   if needed for moderate pain (4 - 6).; What changed: Another medication   with the same name was removed. Continue taking this medication, and   follow the directions you see here.     CONTINUE taking these medications     acetaminophen 325 mg tablet; Commonly known as: Tylenol; Take 2 tablets   (650 mg) by mouth every 6 hours if needed for mild pain (1 - 3).   albuterol 90 mcg/actuation inhaler; INHALE 2 PUFFS BY MOUTH EVERY 4   HOURS AS NEEDED WITH SPACER   benzoyl peroxide 5 % external wash; Apply 1 Application topically " "once   daily. To face and skin folds in shower. Leave on 1 minute before rinsing   clindamycin 1 % lotion; Commonly known as: Cleocin T; Apply topically   once daily. To face and skin folds after showering   doxycycline 100 mg capsule; Commonly known as: Monodox; Take 1 capsule   (100 mg) by mouth 2 times a day. Take with at least 8 ounces (large glass)   of water, do not lie down for 30 minutes after   Flovent  mcg/actuation inhaler; Generic drug: fluticasone; INHALE   1 PUFF BY MOUTH TWO TIMES A DAY WITH SPACER REGARDLESS OF WHETHER IRAIDA   IS HAVING SYMPTOMS   * methylphenidate ER 54 mg ER tablet; Commonly known as: Concerta   * methylphenidate ER 54 mg ER tablet; Commonly known as: Concerta; Take   1 tablet (54 mg) by mouth once daily in the morning. Do not crush, chew,   or split.   * methylphenidate ER 54 mg ER tablet; Commonly known as: Concerta; Take   1 tablet (54 mg) by mouth once daily in the morning. Do not crush, chew,   or split. Do not start before December 1, 2023.   * methylphenidate ER 54 mg ER tablet; Commonly known as: Concerta; Take   1 tablet (54 mg) by mouth once daily in the morning. Do not crush, chew,   or split. Do not start before December 31, 2023.   tretinoin 0.025 % cream; Commonly known as: Retin-A; Apply topically   once daily at bedtime. To face  * This list has 4 medication(s) that are the same as other medications   prescribed for you. Read the directions carefully, and ask your doctor or   other care provider to review them with you.     STOP taking these medications     doxycycline 100 mg capsule; Commonly known as: Vibramycin (2/14/2024  1:00 PM)  Is the patient taking all medications as directed (includes completed medication regime)?: Yes (2/14/2024  1:00 PM)    Appointments  Does the patient have a primary care provider?: Yes (2/14/2024  1:00 PM)  Care Management Interventions: Educated patient on importance of making appointment (mom informed me that \"she will call " "to schedule\") (2/14/2024  1:00 PM)  Has the patient kept scheduled appointments due by today?: Yes (2/14/2024  1:00 PM)    Patient Teaching  Does the patient have access to their discharge instructions?: Yes (2/14/2024  1:00 PM)  Care Management Interventions: Reviewed instructions with patient (2/14/2024  1:00 PM)  What is the patient's perception of their health status since discharge?: Improving (2/14/2024  1:00 PM)  Is the patient/caregiver able to teach back the hierarchy of who to call/visit for symptoms/problems? PCP, Specialist, Home Health nurse, Urgent Care, ED, 911: Yes (2/14/2024  1:00 PM)    Wrap Up  Call End Time: 1416 (2/14/2024  1:00 PM)    Michelle Herzog RN      " 13-Jul-2022

## 2024-02-27 NOTE — H&P PST ADULT - ENMT
Patient's appt on 3/25 will need to be rescheduled as new patient session limit has been exceeded as patient visit was scheduled incorrectly.     Patient will need 2 separate appointments - new patient visit and medicare visit.      Labs will be ordered at new to establish appointment. If early in the morning, patient can come fasting.   negative No oral lesions; no gross abnormalities

## 2024-02-27 NOTE — DISCHARGE NOTE PROVIDER - NSDCMRMEDTOKEN_GEN_ALL_CORE_FT
Anoro Ellipta 62.5 mcg-25 mcg/inh inhalation powder: 1 puff(s) inhaled once a day  aspirin 81 mg oral tablet, chewable: 1 tab(s) orally once a day MDD:1  atorvastatin 40 mg oral tablet: 1 tab(s) orally once a day (at bedtime)  Flomax 0.4 mg oral capsule: 1 cap(s) orally once a day  gabapentin 400 mg oral capsule: 1 cap(s) orally 3 times a day  Metoprolol Tartrate 25 mg oral tablet: 1 tab(s) orally 2 times a day  midodrine 5 mg oral tablet: 1 tab(s) orally 2 times a day  Percocet 10/325 oral tablet: 1 tab(s) orally 3 times a day, As Needed  Vitamin D3 5000 intl units (125 mcg) oral capsule: 1 tab(s) orally once a day   acetaminophen 325 mg oral tablet: 2 tab(s) orally every 6 hours, As needed, Temp greater or equal to 38C (100.4F), Mild Pain (1 - 3)  albuterol 90 mcg/inh inhalation aerosol: 1 puff(s) inhaled 4 times a day, As Needed   Anoro Ellipta 62.5 mcg-25 mcg/inh inhalation powder: 1 puff(s) inhaled once a day  aspirin 81 mg oral tablet, chewable: 1 tab(s) orally once a day MDD:1  atorvastatin 40 mg oral tablet: 1 tab(s) orally once a day (at bedtime)  Augmentin 875 mg-125 mg oral tablet: 1 tab(s) orally 2 times a day   azithromycin 500 mg oral tablet: 1 tab(s) orally once a day   cholecalciferol oral tablet: 5000 unit(s) orally once a day  Flomax 0.4 mg oral capsule: 1 cap(s) orally once a day  gabapentin 400 mg oral capsule: 1 cap(s) orally 3 times a day  metoprolol tartrate 25 mg oral tablet: 1 tab(s) orally 2 times a day  midodrine 5 mg oral tablet: 1 tab(s) orally   Percocet 10/325 oral tablet: 1 tab(s) orally 3 times a day, As Needed   There are no Wet Read(s) to document.

## 2024-03-01 ENCOUNTER — APPOINTMENT (OUTPATIENT)
Dept: PULMONOLOGY | Facility: CLINIC | Age: 79
End: 2024-03-01

## 2024-03-25 ENCOUNTER — APPOINTMENT (OUTPATIENT)
Dept: PULMONOLOGY | Facility: CLINIC | Age: 79
End: 2024-03-25

## 2024-04-10 ENCOUNTER — APPOINTMENT (OUTPATIENT)
Dept: VASCULAR SURGERY | Facility: CLINIC | Age: 79
End: 2024-04-10

## 2024-05-24 NOTE — PATIENT PROFILE ADULT - NSTRANSFERBELONGINGSDISPO_GEN_A_NUR
OPERATIVE REPORT  PATIENT NAME: Bay Real  :  1933  MRN: [id number]  Pt Location: [facility]    SURGERY DATE: 24    Surgeons and Role:     * Alonzo Rodrigues MD - Primary     * Franklin Mcgee PA-C - Assisting    Pre-Op Diagnosis:  Dislocation of finger, initial encounter [S63.259A]  Abrasion of left hand, initial encounter [S60.512A]    Post-Op Diagnosis Codes:     * Dislocation of finger, initial encounter [S63.259A]     * Abrasion of left hand, initial encounter [S60.512A]    Procedure(s):  Exploration of Left Index, Long, and Ring fingers; I&D of traumatic arthotomy of the left ring finger with partial tendon repair; I&D of left index finger with partial extensor tendon repair; I&D of left long finger proximal phalax fracture & traumatic arthotomy with extensor tendon reconstruction (Left)    Specimen(s):  No specimens collected during this procedure.    Estimated Blood Loss:   Minimal    Anesthesia Type:   Regional with Sedation    IMPLANTS:  * No implants in log *    PERIOPERATIVE ANTIBIOTICS:    cefazolin, 2 grams    Tourniquet Time: 44 min  at 250 mmHg          Operative Indications:  The patient has a history of fall with abrasions of the index long and ring fingers of the left hand with suspected tendon injury to the long and PIP dislocation that was recalcitrant to conservative management.  The decision was made to bring the patient to the operating room for left hand long ring and index fingers exploration left long finger tendon repair and all other necessary procedures.  Risks of the procedure were explained which include, but are not limited to bleeding; infection; damage to nerves, arteries,veins, tendons; scar; pain; need for reoperation; failure to give desired result; and risks of anaesthesia.  All questions were answered to satisfaction and they were willing to proceed.         Operative Findings:  Segmental loss of extensor mechanism overlying the PIP joint of the long  finger, with 60% loss of articular cartilage of the proximal phalanx head dorsally  Traumatic arthrotomy left long finger PIP joint  Partial tendon tear left ring finger with traumatic arthrotomy  Partial tendon tear left index finger    Complications:   None    Procedure and Technique:  After the patient, site, and procedure were identified, the patient was brought into the operating room in a supine position.  Local anaesthesia and sedation were provided.  A well padded tourniquet was applied to the extremity, set at 250 mmHg.  The  left upper extremity was then prepped and drapped in a normal, sterile, orthopedic fashion.    The abrasions of the long ring and index fingers were opened by removing the previously placed sutures.  And explored.  Inspection of the index finger demonstrated partial thickness tendon tear of the extensor mechanism without traumatic arthrotomy.  Exploration of the ring finger of the left hand demonstrated partial tear of the extensor tendon with traumatic arthrotomy at the ulnar aspect of the PIP joint.  Explore ration of the long finger abrasion proximally demonstrated segmental loss of the extensor mechanism overlying the PIP joint approximately 1 cm in its size as well as complete loss of the dorsal capsule, and approximately two thirds of the articular cartilage of the proximal phalanx head.  Distally laceration of the long finger did not demonstrate any tendon injury.   Prior to proceeding 3 L of irrigation were utilized to perform an irrigation of the traumatic arthrotomy's and of the long and ring fingers as well as the open fracture of the long finger and the wound of the index.  Excisional debridement was performed including skin fascia tendon but not muscle or bone in a total area of 10 x 3 cm back to healthy bleeding tissue using a blunt scissor and Ray-Camille abrasion.  The partial extensor tendon tear of the index finger was repaired first using 6-0 Prolene.  Next the partial  tendon tear of the ring finger was also repaired using 6-0 Prolene.   Given the defect of the long finger primary repair was not possible, and a reconstruction was undertaken by rotating the proximal portion of the tendon once elevated off of the proximal phalanx to fill in the defect after a 1 cm advancement.  It was secured with 3-0 FiberWire in interrupted fashion followed by 6-0 Prolene epitendinous suture to reinforce the repair.  Orthogonal fluoroscopic films were obtained which demonstrated a nearly concentrically reduced PIP joint of the long finger with evidence of small bony defect in the area of cartilage and bone loss along the dorsal aspect of the proximal phalanx head.     At the completion of the procedure, hemostasis was obtained with cautery and direct pressure.  The wounds were copiously irrigated with sterile solution.  The wounds were closed with Prolene.  Sterile dressings were applied, including [employer], Gauze, Volar Splint, Ace, and Sling.  Please note, all sponge, needle, and instrument counts were correct prior to closure.  Loupe magnification was utilized.  The patient tolerated the procedure well.     I was present for the entire procedure., A qualified resident physician was not available., and A physician assistant was required during the procedure for retraction, tissue handling, dissection and suturing.    Patient Disposition:  PACU     SIGNATURE: Alonzo Rodrigues MD  DATE: 05/24/24  TIME: 1:58 PM         not applicable

## 2024-05-30 NOTE — PROGRESS NOTE ADULT - PROBLEM/PLAN-6
Detail Level: Detailed
DISPLAY PLAN FREE TEXT

## 2024-08-26 NOTE — ED PROVIDER NOTE - EKG ADDITIONAL QUESTION - PERFORMED INDEPENDENT VISUALIZATION
Prior to immunization administration, verified patients identity using patient s name and date of birth. Please see Immunization Activity for additional information.     Is the patient's temperature normal (100.5 or less)? Yes     Patient MEETS CRITERIA. PROCEED with vaccine administration.        8/26/2024   General Questionnaire   Do you have any questions for the care team about the vaccines the child will be receiving today? no                    Patient MEETS CRITERIA. PROCEED with vaccine administration.                Patient MEETS CRITERIA. PROCEED with vaccine administration.          8/26/2024   TD/TDAP   Has the child had a serious reaction to a Td or Tdap vaccine or to something in these vaccines? No   Has the child had coma, fainting, or seizures (encephalopathy) within 1 week of getting a DTP, DTaP, or Tdap vaccine? No   Has the child had a serious reaction to thimerosal? No   Has the child had a reaction (swelling, bleeding, gangrene) to a tetanus, diphtheria, or meningococcal vaccine? No   Has the child had Guillain-[place] syndrome within 6 weeks of getting a vaccine? No   Does the child have seizures that aren't controlled, encephalopathy that's getting worse, or a brain disorder that's unstable or getting worse? No   Does the child have an allergy to latex? No   Has the child had a puncture wound, bite wound, wound with something in it, or dirty wound in the past 3 weeks? No            Patient MEETS CRITERIA. PROCEED with vaccine administration.     TDAP PREFERRED. TD acceptable if requested by the patient.      Patient instructed to remain in clinic for 15 minutes afterwards, and to report any adverse reactions.      Link to Ancillary Visit Immunization Standing Orders SmartSet     Screening performed by Kristy Garcia CMA on 8/26/2024 at 8:57 AM.               Yes

## 2024-11-30 NOTE — H&P PST ADULT - MUSCULOSKELETAL COMMENTS
Ms Berg is a 89yr old F with pmh significant for TIA on plavix, HTN, DM, CKD (baseline 1.5-1.6). She presented with cc of Brbpr x 1 day. patient reports that on friday, she had watery bm that was mostly blood and bright red, filled the toilet bowl. x 3 episodes. was taken to [facility] by daughter and then transferred to [facility] by EMS, received 1 unit of prb enroute. Reports historyh of constipation (takes metamucil at home), decreased appetite and PO intake (attributes to old age, usually supplements her food with ensure). Endorses some weight loss but not sure how much. Takes Plavix at home and 2 pills f advil daily for joint pains. Only abd surgery done is hernia repair. Denies history of abdominal pain, low bp at home (bp usually 130's to 140's at home), rectal pain, PUD, use of OAC, previous episode in the past, diarrhea, N/V, hematemesis, smoking, etoh use.   Vital on presentation is stable with bp of 157/65, HR 91.   Adm hb of 10.8 from 12.9 in sep 2024, Pt/inr wnl, Plt wnl  On my evaluation, patient had active dark red blood per rectum.  Ct abd - active extravasation in prox sigmoid colon, hepatic cyst and diverticulosis.         PAST MEDICAL & SURGICAL HISTORY:  Hypertension      Diabetes      Hyperlipidemia, unspecified hyperlipidemia type      Gout      Hypothyroidism, unspecified type      Gastroesophageal reflux disease, esophagitis presence not specified      S/P Tonsillectomy and Adenoidectomy      S/P Tonsillectomy and Adenoidectomy      Paraumbilical hernia      Osteoarthritis of right hip, unspecified osteoarthritis type  partial Right Hip Replacement 2017        FAMILY HISTORY:      MEDS:  MEDICATIONS  (STANDING):    MEDICATIONS  (PRN):  acetaminophen     Tablet .. 650 milliGRAM(s) Oral every 6 hours PRN Temp greater or equal to 38C (100.4F), Mild Pain (1 - 3)    Allergies    No Known Allergies    Intolerances          CONSTITUTIONAL:  No weight loss, fever, chills, weakness or fatigue.  HEENT:  Eyes:  No visual loss, blurred vision, double vision or yellow sclerae.  SKIN:  No rash or itching.  CARDIOVASCULAR:  No chest pain, chest pressure or chest discomfort.  RESPIRATORY:  No shortness of breath, cough or sputum.  GASTROINTESTINAL:  SEE HPI  GENITOURINARY:  No dysuria, hematuria, urinary frequency  NEUROLOGICAL:  No headache, dizziness, syncope, paralysis, ataxia, numbness or tingling in the extremities.  MUSCULOSKELETAL:  No muscle, back pain, joint pain or stiffness.  ENDOCRINOLOGIC:  No reports of sweating, cold or heat intolerance.     ______________________________________________________________________  PHYSICAL EXAM:  T(C): 36.7 (11-30-24 @ 06:30), Max: 37.6 (11-29-24 @ 22:27)  HR: 91 (11-30-24 @ 06:30)  BP: 157/65 (11-30-24 @ 06:30)  RR: 18 (11-30-24 @ 06:30)  SpO2: 97% (11-30-24 @ 06:30)  Wt(kg): --      GEN: NAD, normocephalic  CVS: S1S2+  CHEST: clear to auscultation  ABD: soft , nontender, nondistended, bowel sounds present  EXTR: no cyanosis, no clubbing, no edema  NEURO: A&OX3  SKIN:  warm;  non icteric  MADAY - Active dark blood per rectum    ______________________________________________________________________  LABS:                        10.4   7.31  )-----------( 179      ( 30 Nov 2024 04:01 )             31.7     11-30    140  |  107  |  30[H]  ----------------------------<  166[H]  4.1   |  21[L]  |  1.50[H]    Ca    8.2[L]      30 Nov 2024 04:01    TPro  5.6[L]  /  Alb  3.2[L]  /  TBili  0.9  /  DBili  x   /  AST  18  /  ALT  17  /  AlkPhos  63  11-30    LIVER FUNCTIONS - ( 30 Nov 2024 04:01 )  Alb: 3.2 g/dL / Pro: 5.6 g/dL / ALK PHOS: 63 U/L / ALT: 17 U/L / AST: 18 U/L / GGT: x           PT/INR - ( 30 Nov 2024 04:01 )   PT: 11.0 sec;   INR: 0.96 ratio         PTT - ( 30 Nov 2024 04:01 )  PTT:29.2 sec  ____________________________________________          
spine stimulator

## 2024-12-05 NOTE — ED PROVIDER NOTE - EKG #1 DATE/TIME
Comments: Dr Odom told pt if he needed a refill to have Dr. May fill it.  Can it be a 90 day supply      Last Office Visit (last PCP visit):   11/27/2024     Next Visit Date:    Future Appointments   Date Time Provider Department Center   2/27/2025  1:00 PM Gladis Neumann APRN - CNP [place] [facility] -        **If hasn't been seen in over a year OR hasn't followed up according to last diabetes/ADHD visit, make appointment for patient before sending refill to provider.     Rx requested:    Requested Prescriptions     Pending Prescriptions Disp Refills    aspirin-dipyridamole (AGGRENOX)  MG per extended release capsule 60 capsule 1     Sig: Take 1 capsule by mouth 2 times daily            
Comments: Dr. Odom told pt if he needed a refill to have Dr. Camargo fill it. Can it be a 90 day supply?    Last Office Visit (last PCP visit):   11/27/2024    Next Visit Date:  Future Appointments   Date Time Provider Department Center   2/27/2025  1:00 PM Gladis Neumann APRN - CNP [place] [facility] -       **If hasn't been seen in over a year OR hasn't followed up according to last diabetes/ADHD visit, make appointment for patient before sending refill to provider.    Rx requested:  Requested Prescriptions     Pending Prescriptions Disp Refills    aspirin-dipyridamole (AGGRENOX)  MG per extended release capsule 90 capsule 1     Sig: Take 1 capsule by mouth 2 times daily               
29-Dec-2020 12:15

## 2024-12-23 NOTE — ED ADULT NURSE NOTE - HIV OFFER
Continued Stay SW/CM Assessment/Plan of Care Note       Active Substitute Decision Maker (SDM)       TANNER PINEDA (Preferred name *Kendra*) Surrogate Primary Contact - Spouse   Primary Phone: [phone] (Mobile)  Home Phone: [phone]                     Progress note:    Discharge planning discussed with patient's spouse over the phone. Spouse states she plans to visit [facility] today.     1100:Spoke with [facility] liaanjana Duarte who confirms a tour is scheduled with the patient's spouse today at 3 PM.      See SW/CM flowsheets for other objective data.    Disposition Recommendations:  Preliminary discharge destination: Planned Discharge Destination: Arrangements in progress  SW/CM recommendation for discharge: LTAC, Intensive therapy program    Destination Pharmacy:        Discharge Plan/Needs:     Continued Care and Services - Admitted Since 12/13/2024    No active coordination exists for this encounter.           Devices/ Equipment that need to be arranged for discharge:     Accepted   Pending insurance authorization   Others:    Anticipated date of DME availability:     Prior To Hospitalization:    Living Situation: Spouse, Children and residing at Apartment    .  Support Systems: Spouse   Home Devices/Equipment: None            Mobility Assist Devices: None   Type of Service Prior to Hospitalization: None               Patient/Family discharge goal (s):  Home     Resources provided:           Prior Function                Current Function  Last Filed Values       None            Therapy Recommendations for Discharge:   PT:      Last Filed Values       None          OT:       Last Filed Values       None          SLP:    Last Filed Values       None            Mobility Equipment Recommended for Discharge:        Barriers to Discharge  Identified Barriers to Discharge/Transition Planning: Medical necessity for acute care                   Previously Declined (within the last year)

## 2024-12-23 NOTE — ED ADULT NURSE NOTE - DRUG PRE-SCREENING (DAST -1)
Hospital Medicine History & Physical Note    Date of Service  7/11/2021    Primary Care Physician  Shawnee Junior P.A.-C.    Consultants  NA    Code Status  Full Code    Chief Complaint  Chief Complaint   Patient presents with   • Leg Swelling       History of Presenting Illness  Michael Ontiveros is a 32 y.o. male with history of PE on warfarin who presented 7/11/2021 with right lower extremity infection.  Patient has chronic venous stasis in both lower extremities.  He states that 2 weeks ago he had an exacerbation of his eczema and scratched it causing a wound on his right lower leg.  Reports over the past week used over-the-counter bacitracin and Vaseline with over-the-counter wrappings and compression.  He states that he was initially getting better but over the past 2 days began to swell and weep more.  He has been trying to keep his leg elevated.  He did not seek medical care because he recently started a new job and does not have insurance.  On presentation he was afebrile, initially tachycardic at 102.  On 2-3 L nasal cannula.  INR 2.1 he was given Unasyn and vancomycin for cellulitis    I discussed the plan of care with patient.  ER physician    Review of Systems  Review of Systems   Constitutional: Negative for chills and fever.   HENT: Negative for congestion and sore throat.    Eyes: Negative for blurred vision.   Respiratory: Negative for cough and shortness of breath.    Cardiovascular: Negative for chest pain, palpitations and leg swelling.   Gastrointestinal: Negative for abdominal pain, constipation, diarrhea, nausea and vomiting.   Genitourinary: Negative for dysuria, frequency and urgency.   Musculoskeletal: Negative for falls.   Skin:        Infection of right lower extremity   Neurological: Negative for dizziness, weakness and headaches.   Psychiatric/Behavioral: Negative for depression. The patient is not nervous/anxious.    All other systems reviewed and are negative.      Past  Medical History   has a past medical history of Asthma, Blood clotting disorder (HCC), Chickenpox, Eczema, Hypertension, Influenza, and Tonsillitis.    Surgical History   has a past surgical history that includes dental extraction(s) (11/3/2017).     Family History  family history includes Diabetes in his paternal aunt; Respiratory Disease in his mother.   Family history reviewed with patient. There is family history that is pertinent to the chief complaint.     Social History   reports that he has never smoked. He has never used smokeless tobacco. He reports previous alcohol use. He reports that he does not use drugs.    Allergies  Allergies   Allergen Reactions   • Pistachio    • Tree Nuts Food Allergy    • [place] Anaphylaxis     pistachios       Medications  Prior to Admission Medications   Prescriptions Last Dose Informant Patient Reported? Taking?   ALBUTEROL INH   Yes No   Sig: Inhale  by mouth. Dose unknown   warfarin (COUMADIN) 7.5 MG Tab   No No   Sig: Take one-half to one tablet by mouth daily or as directed by anticoagulation clinic      Facility-Administered Medications: None       Physical Exam  Temp:  [37.8 °C (100 °F)] 37.8 °C (100 °F)  Pulse:  [] 82  Resp:  [13-20] 19  BP: (124-156)/(66-83) 139/74  SpO2:  [88 %-100 %] 93 %    Physical Exam  Vitals and nursing note reviewed.   Constitutional:       General: He is not in acute distress.     Appearance: Normal appearance. He is obese.   HENT:      Head: Normocephalic and atraumatic.      Nose: Nose normal.      Mouth/Throat:      Mouth: Mucous membranes are moist.      Pharynx: Oropharynx is clear.   Eyes:      Extraocular Movements: Extraocular movements intact.      Conjunctiva/sclera: Conjunctivae normal.   Cardiovascular:      Rate and Rhythm: Normal rate and regular rhythm.      Pulses: Normal pulses.      Heart sounds: Normal heart sounds. No murmur heard.   No friction rub. No gallop.    Pulmonary:      Effort: Pulmonary effort is normal.  No respiratory distress.      Breath sounds: Normal breath sounds. No wheezing or rales.   Chest:      Chest wall: No tenderness.   Abdominal:      General: Abdomen is flat. Bowel sounds are normal. There is no distension.      Palpations: Abdomen is soft. There is no mass.      Tenderness: There is no abdominal tenderness. There is no guarding.   Musculoskeletal:         General: Normal range of motion.      Cervical back: Normal range of motion and neck supple.      Right lower leg: Edema present.      Left lower leg: Edema present.   Skin:     General: Skin is warm.      Capillary Refill: Capillary refill takes less than 2 seconds.      Comments: Chronic venous stasis of bilateral lower extremities, erythema and weeping of right lower extremity   Neurological:      General: No focal deficit present.      Mental Status: He is alert and oriented to person, place, and time. Mental status is at baseline.      Cranial Nerves: No cranial nerve deficit.      Motor: No weakness.   Psychiatric:         Mood and Affect: Mood normal.         Behavior: Behavior normal.         Laboratory:  Recent Labs     07/11/21  1406   WBC 6.9   RBC 6.79*   HEMOGLOBIN 15.1   HEMATOCRIT 55.1*   MCV 81.1*   MCH 22.2*   MCHC 27.4*   RDW 67.1*   PLATELETCT 231         No results for input(s): ALTSGPT, ASTSGOT, ALKPHOSPHAT, TBILIRUBIN, DBILIRUBIN, GAMMAGT, AMYLASE, LIPASE, ALB, PREALBUMIN, GLUCOSE in the last 72 hours.      No results for input(s): NTPROBNP in the last 72 hours.      No results for input(s): TROPONINT in the last 72 hours.    Imaging:  No orders to display       no X-Ray or EKG requiring interpretation    Assessment/Plan:  I anticipate this patient is appropriate for observation status at this time.    Cellulitis of right lower extremity  Assessment & Plan  Started on vancomycin, Unasyn  Wound care  Patient may benefit from diuresis for edema and chronic venous stasis.  Most recent echo last year showed normal ejection  fraction  Check BNP    History of pulmonary embolism  Assessment & Plan  Continue Coumadin dosed by pharmacy  Check INR    Acute respiratory failure with hypoxia (HCC)  Assessment & Plan  History of PE.  Incentive spirometer  RT protocol  Wean oxygen as tolerated    Morbid obesity (HCC)- (present on admission)  Assessment & Plan  Body mass index is 62.18 kg/m².  Counseled about diet and exercise      VTE prophylaxis: therapeutic anticoagulation with Coumadin   Patient is a 4y10mo [age] w/hx of febrile seizures (never lasting greater than 5 mins per MOC, chart will be further reviewed since patient's care has been at [facility] [place]) presenting in status epilepticus. Patient was in his usual state of health 2 days ago and yesterday developed rhinorrhea. Statement Selected Patient is a 4y10mo M w/hx of febrile seizures (never lasting greater than 5 mins per MOC, chart will be further reviewed since patient's care has been at [facility]) presenting in status epilepticus. Patient was in his usual state of health 2 days ago and yesterday developed rhinorrhea.    attending- as per EMS and mother.  patient was at grandma's home and was laying down.  Grandma noticed he was seizing in his sleep.  911 called as well as another family member.  When EMS arrived patient with active GTC.  EMS believes patient had been actively seizing for approximately 20 minutes at the time of their arrival.  Versed given with improvement in symptoms.  Patient then with more shaking and another dose of versed administered.  Patient then with some shaking/chills and clenching of jaw but no further tonic clonic movements.  Patient with rhinorrhea prior to this but otherwise well as per mom.  Last febrile seizure 2 years ago.  No taking any medication.  NKDA Patient is a 4y10mo M w/hx of febrile seizures (never lasting greater than 5 mins per MOC, confirmed on chart review from previous 3 ED presentations) presenting in status epilepticus. Patient was in his usual state of health 2 days ago and yesterday developed rhinorrhea. His grandma noticed him shaking while he was laying down. His temp was not taken, but patient's grandma immediately called EMS.     attending- as per EMS and mother.  patient was at grandma's home and was laying down.  Grandma noticed he was seizing in his sleep.  911 called as well as another family member.  When EMS arrived patient with active GTC.  EMS believes patient had been actively seizing for approximately 20 minutes at the time of their arrival.  Versed given with improvement in symptoms.  Patient then with more shaking and another dose of versed administered.  Patient then with some shaking/chills and clenching of jaw but no further tonic clonic movements.  Patient with rhinorrhea prior to this but otherwise well as per mom.  Last febrile seizure 2 years ago.  No taking any medication.  NKDA

## 2025-03-05 NOTE — PROGRESS NOTE ADULT - ASSESSMENT
79 y/o male with resolving septic shock due to PNA, Afib w/ RVR, SCC on chemo/XRT, hypomagnesemia, CAD s/p CABG/PCI, COPD, Chronic back pain    Resolving Septic Shock due to PNA:  -Hemodynamics stable off pressers  -Titrate off midodrine  -Cultures with NGTD, legionella neg  -Complete course of IV abx  -Trend WBC/Temp curve  -OOB, ambulate, on Heparin for DVT-P  -Titrate off supplemental 02  -Fall risk, PT eval  -DC planning    Afib w/ RVR:  -Titrate AVB as BP will tolerate  -Cardiology noted reviewed, possible Watchman/Ablation as o/p  -tolerating heparin gtt, no hemoptysis reported by Patient/staff  -Cont. tele    CAD s/p CABG/PCI:  -Cont. o/p regimen  -No anginal symptoms reported    NSCLC:  -S/P Chemo/XRT as above  -F/U with Dr. Rinaldi/Regina at [facility] post DC    COPD:  -Cont. Symbicort in place of trelegy   -Check RA sat at rest/ambulation  -nebs prn    Hypomagnesemia:  -Replaced in ICU  -F/U repeat BMP/Mag in AM    Chronic back pain:  -Cont. prn opioids   -Cont. neurontin  -S/P spinal stimulator   -fall risk  -PT    Discussed with Patient, MICU Nurse     Detail Level: Zone 77 y/o male with resolving septic shock due to PNA, Afib w/ RVR, SCC on chemo/XRT, hypomagnesemia, CAD s/p CABG/PCI, COPD, Chronic back pain    Resolving Septic Shock due to PNA:  -Hemodynamics stable off pressers  -Titrate off midodrine  -Cultures with NGTD, legionella neg  -Complete course of IV abx  -Trend WBC/Temp curve  -OOB, ambulate, on Heparin for DVT-P  -Titrate off supplemental 02  -Fall risk, PT eval  -DC planning    Afib w/ RVR:  -Titrate AVB as BP will tolerate  -Cardiology noted reviewed, possible Watchman/Ablation as o/p  -tolerating heparin gtt, no hemoptysis reported by Patient/staff  -Cont. tele    CAD s/p CABG/PCI:  -Cont. o/p regimen  -No anginal symptoms reported    NSCLC:  -S/P Chemo/XRT as above  -F/U with Dr. Rinaldi/Regina at [facility] post DC    COPD:  -Cont. Symbicort in place of trelegy   -Check RA sat at rest/ambulation  -nebs prn    Hypomagnesemia:  -Replaced in ICU  -F/U repeat BMP/Mag in AM    Chronic back pain:  -Cont. prn opioids   -Cont. neurontin  -S/P spinal stimulator   -fall risk  -PT    Discussed with Patient, MICU Nurse     77 y/o male with resolving septic shock due to PNA, Afib w/ RVR, SCC on chemo/XRT, hypomagnesemia, CAD s/p CABG/PCI, COPD, Chronic back pain    Resolving Septic Shock due to PNA:  -Hemodynamics stable off pressers  -Titrate off midodrine  -Cultures with NGTD, legionella neg  -Complete course of IV abx  -Trend WBC/Temp curve  -OOB, ambulate, on Heparin for DVT-P  -Titrate off supplemental 02  -Fall risk, PT eval  -DC planning    Afib w/ RVR:  -Titrate AVB as BP will tolerate  -Cardiology noted reviewed, possible Watchman/Ablation as o/p  -tolerating heparin gtt, no hemoptysis reported by Patient/staff  -Cont. tele    CAD s/p CABG/PCI:  -Cont. o/p regimen  -No anginal symptoms reported    NSCLC:  -S/P Chemo/XRT as above  -F/U with Dr. Rinaldi/Regina at [facility] post DC    COPD:  -Cont. Symbicort in place of trelegy   -Check RA sat at rest/ambulation  -nebs prn    Hypomagnesemia:  -Replaced in ICU  -F/U repeat BMP/Mag in AM    Chronic back pain:  -Cont. prn opioids   -Cont. neurontin  -S/P spinal stimulator   -fall risk  -PT    Discussed with Patient, MICU Nurse     79 y/o male with resolving septic shock due to PNA, Afib w/ RVR, SCC on chemo/XRT, hypomagnesemia, CAD s/p CABG/PCI, COPD, Chronic back pain    Septic Shock due to PNA (shock resolved).  -Hemodynamics stable off pressers  -Titrate off midodrine  -Blood cx NGTD  -C/w Zosyn (day 2)  -Trend WBC/Temp curve  -OOB, ambulate, on Heparin for DVT-P  -Currently saturating 94% on 3L via NC, will wean as tolerated (goal is >88% given hx of COPD)  -Fall risk, PT eval  -Chest PT, incentive spirometry    Afib w/ RVR:  -Titrate AVB as BP will tolerate  -Cardiology noted reviewed, possible Watchman/Ablation as o/p  -Tolerating heparin gtt, no episodes of overt bleed.   -Cont. tele    CAD s/p CABG/PCI:  -Cont. o/p regimen  -No anginal symptoms reported    NSCLC:  -S/P Chemo/XRT as above  -F/U with Dr. Rinaldi/Regina at [facility] post DC    COPD:  -Cont. Symbicort in place of Trelegy   -nebs prn    Chronic back pain:  -Cont. prn opioids   -Cont. neurontin  -S/P spinal stimulator   -fall risk  -PT eval    Discussed with Patient     77 y/o male with resolving septic shock due to PNA, Afib w/ RVR, SCC on chemo/XRT, hypomagnesemia, CAD s/p CABG/PCI, COPD, Chronic back pain    Septic Shock due to PNA (shock resolved).  -Hemodynamics stable off pressers  -Titrate off midodrine  -Blood cx NGTD  -C/w Zosyn (day 2)  -Trend WBC/Temp curve  -OOB, ambulate, on Heparin for DVT-P  -Currently saturating 94% on 3L via NC, will wean as tolerated (goal is >88% given hx of COPD)  -Fall risk, PT eval  -Chest PT, incentive spirometry    Afib w/ RVR:  -Titrate AVB as BP will tolerate  -Cardiology noted reviewed, possible Watchman/Ablation as o/p  -Tolerating heparin gtt, no episodes of overt bleed.   -Cont. tele    CAD s/p CABG/PCI:  -Cont. o/p regimen  -No anginal symptoms reported    NSCLC:  -S/P Chemo/XRT as above  -F/U with Dr. Rinaldi/Regina at [facility] post DC    COPD:  -Cont. Symbicort in place of Trelegy   -nebs prn    Chronic back pain:  -Cont. prn opioids   -Cont. neurontin  -S/P spinal stimulator   -fall risk  -PT eval    Discussed with Patient     77 y/o male with resolving septic shock due to PNA, Afib w/ RVR, SCC on chemo/XRT, hypomagnesemia, CAD s/p CABG/PCI, COPD, Chronic back pain    Septic Shock due to PNA (shock resolved).  -Hemodynamics stable off pressers  -Titrate off midodrine  -Blood cx NGTD  -C/w Zosyn (day 2)  -Trend WBC/Temp curve  -OOB, ambulate, on Heparin for DVT-P  -Currently saturating 94% on 3L via NC, will wean as tolerated (goal is >88% given hx of COPD)  -Fall risk, PT eval  -Chest PT, incentive spirometry    Afib w/ RVR:  -Titrate AVB as BP will tolerate  -Cardiology noted reviewed, possible Watchman/Ablation as o/p  -Tolerating heparin gtt, no episodes of overt bleed.   -Cont. tele    CAD s/p CABG/PCI:  -Cont. o/p regimen  -No anginal symptoms reported    NSCLC:  -S/P Chemo/XRT as above  -F/U with Dr. Rinaldi/Regina at [facility] post DC    COPD:  -Cont. Symbicort in place of Trelegy   -nebs prn    Chronic back pain:  -Cont. prn opioids   -Cont. neurontin  -S/P spinal stimulator   -fall risk  -PT eval    Discussed with Patient     Detail Level: Generalized

## 2025-03-24 NOTE — SWALLOW BEDSIDE ASSESSMENT ADULT - NS ASR SWALLOW FINDINGS DISCUS
Physician/Nursing/Patient [Feeling Fatigued] : not feeling fatigued [Cough] : no cough [Nocturia] : nocturia [Joint Pain] : joint pain [Dizziness] : no dizziness [Easy Bleeding] : a tendency for easy bleeding [Negative] : Respiratory

## 2025-06-12 NOTE — DIETITIAN INITIAL EVALUATION ADULT. - ENERGY INTAKE
Recommend following up with [facility] and also follows with Jose G Castillo for same day evaluation    Can trial over the counter antihistamine, eye drops  
Fair (>50%)

## (undated) DEVICE — VALVE SUCTION EVIS 160/200/240

## (undated) DEVICE — GLV 8 PROTEXIS (WHITE)

## (undated) DEVICE — ION BIOPSY NEEDLE 23G

## (undated) DEVICE — BITE BLOCK ADULT 20 X 27MM (GREEN)

## (undated) DEVICE — ION SWIVEL CONNECTOR

## (undated) DEVICE — SYR LUER LOK 10CC

## (undated) DEVICE — PACK IV START WITH CHG

## (undated) DEVICE — GOWN IMPERV XL

## (undated) DEVICE — ION BIOPSY NEEDLE 21G

## (undated) DEVICE — VALVE BIOPSY BRONCHOVIDEOSCOPE

## (undated) DEVICE — FORCEP RADIAL JAW 4 PEDIATRIC W NDL 1.8MM 2MM 160CM DISP

## (undated) DEVICE — VISITEC 4X4

## (undated) DEVICE — TRAP SPECIMEN SPUTUM 40CC

## (undated) DEVICE — ION SENSOR CONNECTION CLEANER

## (undated) DEVICE — OMNIPAQUE 180MG/ML 10ML 10/BX

## (undated) DEVICE — WARMING BLANKET LOWER ADULT

## (undated) DEVICE — CATH IV SAFE BC 22G X 1" (BLUE)

## (undated) DEVICE — DRAPE 3/4 SHEET 52X76"

## (undated) DEVICE — SOL IRR POUR NS 0.9% 1000ML

## (undated) DEVICE — ION VISION PROBE ADAPTOR

## (undated) DEVICE — SYR IV FLUSH SALINE 10ML 30/TY

## (undated) DEVICE — ION PERIPHERAL VISION PROBE

## (undated) DEVICE — BRUSH CYTO DISP

## (undated) DEVICE — ION BIOPSY NEEDLE 19G

## (undated) DEVICE — SYR CONTROL LUER LOK 10CC

## (undated) DEVICE — ION VISION PROBE BAG

## (undated) DEVICE — ION CATHETER GUIDE

## (undated) DEVICE — DRAPE MAYO STAND 23"

## (undated) DEVICE — ION FULLY ARTICULATING CATHETER

## (undated) DEVICE — TUBING CONNECTING 6MM 20FT

## (undated) DEVICE — SOL IRR POUR H2O 1000ML

## (undated) DEVICE — DENTURE CUP PINK

## (undated) DEVICE — SPECIMEN TRAP 70ML

## (undated) DEVICE — VENODYNE/SCD SLEEVE CALF MEDIUM

## (undated) DEVICE — DRAPE XL SHEET 77X98"

## (undated) DEVICE — GLV 7.5 PROTEXIS (WHITE)